# Patient Record
Sex: FEMALE | Race: WHITE | NOT HISPANIC OR LATINO | Employment: OTHER | URBAN - METROPOLITAN AREA
[De-identification: names, ages, dates, MRNs, and addresses within clinical notes are randomized per-mention and may not be internally consistent; named-entity substitution may affect disease eponyms.]

---

## 2017-01-10 ENCOUNTER — HOSPITAL ENCOUNTER (INPATIENT)
Facility: HOSPITAL | Age: 62
LOS: 5 days | Discharge: HOME HEALTH | DRG: 300 | End: 2017-01-15
Attending: EMERGENCY MEDICINE | Admitting: FAMILY MEDICINE
Payer: COMMERCIAL

## 2017-01-10 DIAGNOSIS — L03.119 CELLULITIS AND ABSCESS OF LOWER EXTREMITY: Primary | ICD-10-CM

## 2017-01-10 DIAGNOSIS — L02.419 CELLULITIS AND ABSCESS OF LOWER EXTREMITY: Primary | ICD-10-CM

## 2017-01-10 DIAGNOSIS — I87.2 VENOUS INSUFFICIENCY (CHRONIC) (PERIPHERAL): ICD-10-CM

## 2017-01-10 DIAGNOSIS — E11.9 DIABETES MELLITUS (HCC): ICD-10-CM

## 2017-01-10 DIAGNOSIS — I87.2 CHRONIC VENOUS STASIS DERMATITIS OF BOTH LOWER EXTREMITIES: ICD-10-CM

## 2017-01-10 PROBLEM — L03.90 CELLULITIS: Status: ACTIVE | Noted: 2017-01-10

## 2017-01-10 LAB
ANION GAP SERPL CALCULATED.3IONS-SCNC: 8 MMOL/L (ref 4–13)
BASOPHILS # BLD AUTO: 0.05 THOUSANDS/ΜL (ref 0–0.1)
BASOPHILS NFR BLD AUTO: 1 % (ref 0–1)
BUN SERPL-MCNC: 14 MG/DL (ref 5–25)
CALCIUM SERPL-MCNC: 8.9 MG/DL (ref 8.3–10.1)
CHLORIDE SERPL-SCNC: 94 MMOL/L (ref 100–108)
CO2 SERPL-SCNC: 29 MMOL/L (ref 21–32)
CREAT SERPL-MCNC: 1.06 MG/DL (ref 0.6–1.3)
EOSINOPHIL # BLD AUTO: 0.22 THOUSAND/ΜL (ref 0–0.61)
EOSINOPHIL NFR BLD AUTO: 3 % (ref 0–6)
ERYTHROCYTE [DISTWIDTH] IN BLOOD BY AUTOMATED COUNT: 17.4 % (ref 11.6–15.1)
GFR SERPL CREATININE-BSD FRML MDRD: 52.9 ML/MIN/1.73SQ M
GLUCOSE SERPL-MCNC: 332 MG/DL (ref 65–140)
GLUCOSE SERPL-MCNC: 353 MG/DL (ref 65–140)
HCT VFR BLD AUTO: 34.9 % (ref 34.8–46.1)
HGB BLD-MCNC: 10.8 G/DL (ref 11.5–15.4)
LYMPHOCYTES # BLD AUTO: 2.2 THOUSANDS/ΜL (ref 0.6–4.47)
LYMPHOCYTES NFR BLD AUTO: 26 % (ref 14–44)
MCH RBC QN AUTO: 22.5 PG (ref 26.8–34.3)
MCHC RBC AUTO-ENTMCNC: 30.9 G/DL (ref 31.4–37.4)
MCV RBC AUTO: 73 FL (ref 82–98)
MONOCYTES # BLD AUTO: 0.47 THOUSAND/ΜL (ref 0.17–1.22)
MONOCYTES NFR BLD AUTO: 6 % (ref 4–12)
NEUTROPHILS # BLD AUTO: 5.44 THOUSANDS/ΜL (ref 1.85–7.62)
NEUTS SEG NFR BLD AUTO: 64 % (ref 43–75)
PLATELET # BLD AUTO: 275 THOUSANDS/UL (ref 149–390)
PMV BLD AUTO: 9.1 FL (ref 8.9–12.7)
POTASSIUM SERPL-SCNC: 4.5 MMOL/L (ref 3.5–5.3)
RBC # BLD AUTO: 4.79 MILLION/UL (ref 3.81–5.12)
SODIUM SERPL-SCNC: 131 MMOL/L (ref 136–145)
WBC # BLD AUTO: 8.38 THOUSAND/UL (ref 4.31–10.16)

## 2017-01-10 PROCEDURE — 80048 BASIC METABOLIC PNL TOTAL CA: CPT | Performed by: EMERGENCY MEDICINE

## 2017-01-10 PROCEDURE — 99284 EMERGENCY DEPT VISIT MOD MDM: CPT

## 2017-01-10 PROCEDURE — 85025 COMPLETE CBC W/AUTO DIFF WBC: CPT | Performed by: EMERGENCY MEDICINE

## 2017-01-10 PROCEDURE — 96374 THER/PROPH/DIAG INJ IV PUSH: CPT

## 2017-01-10 PROCEDURE — 36415 COLL VENOUS BLD VENIPUNCTURE: CPT | Performed by: EMERGENCY MEDICINE

## 2017-01-10 PROCEDURE — 82948 REAGENT STRIP/BLOOD GLUCOSE: CPT

## 2017-01-10 RX ORDER — ACETAMINOPHEN 325 MG/1
650 TABLET ORAL EVERY 6 HOURS PRN
Status: DISCONTINUED | OUTPATIENT
Start: 2017-01-10 | End: 2017-01-15 | Stop reason: HOSPADM

## 2017-01-10 RX ORDER — MORPHINE SULFATE 15 MG/1
15 TABLET, FILM COATED, EXTENDED RELEASE ORAL EVERY 12 HOURS SCHEDULED
Status: DISCONTINUED | OUTPATIENT
Start: 2017-01-10 | End: 2017-01-15 | Stop reason: HOSPADM

## 2017-01-10 RX ORDER — OXYCODONE HYDROCHLORIDE 30 MG/1
30 TABLET ORAL EVERY 4 HOURS PRN
Status: ON HOLD | COMMUNITY
End: 2020-03-04 | Stop reason: SDUPTHER

## 2017-01-10 RX ORDER — CALCIUM CARBONATE 200(500)MG
1000 TABLET,CHEWABLE ORAL DAILY PRN
Status: DISCONTINUED | OUTPATIENT
Start: 2017-01-10 | End: 2017-01-15 | Stop reason: HOSPADM

## 2017-01-10 RX ORDER — TRIAMTERENE AND HYDROCHLOROTHIAZIDE 75; 50 MG/1; MG/1
1 TABLET ORAL DAILY
COMMUNITY
End: 2018-02-23 | Stop reason: HOSPADM

## 2017-01-10 RX ORDER — PRAVASTATIN SODIUM 40 MG
40 TABLET ORAL
Status: DISCONTINUED | OUTPATIENT
Start: 2017-01-11 | End: 2017-01-15 | Stop reason: HOSPADM

## 2017-01-10 RX ORDER — TRIAMTERENE AND HYDROCHLOROTHIAZIDE 37.5; 25 MG/1; MG/1
2 TABLET ORAL DAILY
Status: DISCONTINUED | OUTPATIENT
Start: 2017-01-11 | End: 2017-01-15 | Stop reason: HOSPADM

## 2017-01-10 RX ORDER — SENNOSIDES 8.6 MG
1 TABLET ORAL DAILY
Status: DISCONTINUED | OUTPATIENT
Start: 2017-01-11 | End: 2017-01-15 | Stop reason: HOSPADM

## 2017-01-10 RX ORDER — AMLODIPINE BESYLATE AND BENAZEPRIL HYDROCHLORIDE 10; 20 MG/1; MG/1
1 CAPSULE ORAL DAILY
COMMUNITY
End: 2018-02-20

## 2017-01-10 RX ORDER — DULOXETIN HYDROCHLORIDE 60 MG/1
60 CAPSULE, DELAYED RELEASE ORAL DAILY
COMMUNITY
End: 2022-03-23 | Stop reason: HOSPADM

## 2017-01-10 RX ORDER — DULOXETIN HYDROCHLORIDE 30 MG/1
60 CAPSULE, DELAYED RELEASE ORAL DAILY
Status: DISCONTINUED | OUTPATIENT
Start: 2017-01-11 | End: 2017-01-15 | Stop reason: HOSPADM

## 2017-01-10 RX ORDER — MORPHINE SULFATE 15 MG/1
15 TABLET ORAL DAILY
Status: DISCONTINUED | OUTPATIENT
Start: 2017-01-11 | End: 2017-01-10

## 2017-01-10 RX ORDER — SODIUM CHLORIDE 9 MG/ML
100 INJECTION, SOLUTION INTRAVENOUS CONTINUOUS
Status: DISCONTINUED | OUTPATIENT
Start: 2017-01-10 | End: 2017-01-11

## 2017-01-10 RX ORDER — NICOTINE 21 MG/24HR
1 PATCH, TRANSDERMAL 24 HOURS TRANSDERMAL DAILY
Status: DISCONTINUED | OUTPATIENT
Start: 2017-01-11 | End: 2017-01-15 | Stop reason: HOSPADM

## 2017-01-10 RX ORDER — MORPHINE SULFATE 20 MG/1
100 CAPSULE, EXTENDED RELEASE ORAL 2 TIMES DAILY
Status: ON HOLD | COMMUNITY
End: 2018-02-23

## 2017-01-10 RX ORDER — ONDANSETRON 2 MG/ML
4 INJECTION INTRAMUSCULAR; INTRAVENOUS EVERY 6 HOURS PRN
Status: DISCONTINUED | OUTPATIENT
Start: 2017-01-10 | End: 2017-01-15 | Stop reason: HOSPADM

## 2017-01-10 RX ORDER — GLIPIZIDE 10 MG/1
10 TABLET ORAL
COMMUNITY
End: 2018-02-23 | Stop reason: HOSPADM

## 2017-01-10 RX ORDER — SIMVASTATIN 20 MG
20 TABLET ORAL
COMMUNITY
End: 2018-02-23 | Stop reason: HOSPADM

## 2017-01-10 RX ORDER — CLINDAMYCIN PHOSPHATE 600 MG/50ML
600 INJECTION INTRAVENOUS ONCE
Status: COMPLETED | OUTPATIENT
Start: 2017-01-10 | End: 2017-01-10

## 2017-01-10 RX ADMIN — MORPHINE SULFATE 15 MG: 15 TABLET, EXTENDED RELEASE ORAL at 22:11

## 2017-01-10 RX ADMIN — SODIUM CHLORIDE 100 ML/HR: 0.9 INJECTION, SOLUTION INTRAVENOUS at 22:11

## 2017-01-10 RX ADMIN — CLINDAMYCIN PHOSPHATE 600 MG: 12 INJECTION, SOLUTION INTRAMUSCULAR; INTRAVENOUS at 19:19

## 2017-01-10 RX ADMIN — OXYCODONE HYDROCHLORIDE 15 MG: 5 TABLET ORAL at 23:48

## 2017-01-10 RX ADMIN — VANCOMYCIN HYDROCHLORIDE 1250 MG: 1 INJECTION, POWDER, LYOPHILIZED, FOR SOLUTION INTRAVENOUS at 23:40

## 2017-01-11 LAB
ALBUMIN SERPL BCP-MCNC: 2.4 G/DL (ref 3.5–5)
ALP SERPL-CCNC: 95 U/L (ref 46–116)
ALT SERPL W P-5'-P-CCNC: 7 U/L (ref 12–78)
ANION GAP SERPL CALCULATED.3IONS-SCNC: 5 MMOL/L (ref 4–13)
AST SERPL W P-5'-P-CCNC: 15 U/L (ref 5–45)
BILIRUB SERPL-MCNC: 0.3 MG/DL (ref 0.2–1)
BUN SERPL-MCNC: 15 MG/DL (ref 5–25)
CALCIUM SERPL-MCNC: 8.2 MG/DL (ref 8.3–10.1)
CHLORIDE SERPL-SCNC: 103 MMOL/L (ref 100–108)
CO2 SERPL-SCNC: 28 MMOL/L (ref 21–32)
CREAT SERPL-MCNC: 1 MG/DL (ref 0.6–1.3)
ERYTHROCYTE [DISTWIDTH] IN BLOOD BY AUTOMATED COUNT: 17.4 % (ref 11.6–15.1)
EST. AVERAGE GLUCOSE BLD GHB EST-MCNC: 237 MG/DL
GFR SERPL CREATININE-BSD FRML MDRD: 56.6 ML/MIN/1.73SQ M
GLUCOSE SERPL-MCNC: 218 MG/DL (ref 65–140)
GLUCOSE SERPL-MCNC: 248 MG/DL (ref 65–140)
GLUCOSE SERPL-MCNC: 253 MG/DL (ref 65–140)
GLUCOSE SERPL-MCNC: 309 MG/DL (ref 65–140)
GLUCOSE SERPL-MCNC: 367 MG/DL (ref 65–140)
HBA1C MFR BLD: 9.9 % (ref 4.2–6.3)
HCT VFR BLD AUTO: 29.8 % (ref 34.8–46.1)
HGB BLD-MCNC: 8.9 G/DL (ref 11.5–15.4)
MCH RBC QN AUTO: 22.2 PG (ref 26.8–34.3)
MCHC RBC AUTO-ENTMCNC: 29.9 G/DL (ref 31.4–37.4)
MCV RBC AUTO: 74 FL (ref 82–98)
NT-PROBNP SERPL-MCNC: 438 PG/ML
PLATELET # BLD AUTO: 228 THOUSANDS/UL (ref 149–390)
PMV BLD AUTO: 9.2 FL (ref 8.9–12.7)
POTASSIUM SERPL-SCNC: 4.6 MMOL/L (ref 3.5–5.3)
PROT SERPL-MCNC: 6.2 G/DL (ref 6.4–8.2)
RBC # BLD AUTO: 4.01 MILLION/UL (ref 3.81–5.12)
SODIUM SERPL-SCNC: 136 MMOL/L (ref 136–145)
WBC # BLD AUTO: 7.91 THOUSAND/UL (ref 4.31–10.16)

## 2017-01-11 PROCEDURE — 87077 CULTURE AEROBIC IDENTIFY: CPT | Performed by: PHYSICIAN ASSISTANT

## 2017-01-11 PROCEDURE — 80053 COMPREHEN METABOLIC PANEL: CPT | Performed by: PHYSICIAN ASSISTANT

## 2017-01-11 PROCEDURE — 82948 REAGENT STRIP/BLOOD GLUCOSE: CPT

## 2017-01-11 PROCEDURE — 87186 SC STD MICRODIL/AGAR DIL: CPT | Performed by: PHYSICIAN ASSISTANT

## 2017-01-11 PROCEDURE — 87205 SMEAR GRAM STAIN: CPT | Performed by: PHYSICIAN ASSISTANT

## 2017-01-11 PROCEDURE — 87070 CULTURE OTHR SPECIMN AEROBIC: CPT | Performed by: PHYSICIAN ASSISTANT

## 2017-01-11 PROCEDURE — 83036 HEMOGLOBIN GLYCOSYLATED A1C: CPT | Performed by: PHYSICIAN ASSISTANT

## 2017-01-11 PROCEDURE — 85027 COMPLETE CBC AUTOMATED: CPT | Performed by: PHYSICIAN ASSISTANT

## 2017-01-11 PROCEDURE — 83880 ASSAY OF NATRIURETIC PEPTIDE: CPT | Performed by: FAMILY MEDICINE

## 2017-01-11 PROCEDURE — 87147 CULTURE TYPE IMMUNOLOGIC: CPT | Performed by: PHYSICIAN ASSISTANT

## 2017-01-11 RX ORDER — FUROSEMIDE 10 MG/ML
40 INJECTION INTRAMUSCULAR; INTRAVENOUS
Status: DISCONTINUED | OUTPATIENT
Start: 2017-01-11 | End: 2017-01-14

## 2017-01-11 RX ORDER — ALPRAZOLAM 0.5 MG/1
0.5 TABLET ORAL
Status: DISCONTINUED | OUTPATIENT
Start: 2017-01-11 | End: 2017-01-15 | Stop reason: HOSPADM

## 2017-01-11 RX ADMIN — VANCOMYCIN HYDROCHLORIDE 1250 MG: 1 INJECTION, POWDER, LYOPHILIZED, FOR SOLUTION INTRAVENOUS at 09:20

## 2017-01-11 RX ADMIN — ENOXAPARIN SODIUM 40 MG: 40 INJECTION SUBCUTANEOUS at 08:57

## 2017-01-11 RX ADMIN — INSULIN LISPRO 3 UNITS: 100 INJECTION, SOLUTION INTRAVENOUS; SUBCUTANEOUS at 17:23

## 2017-01-11 RX ADMIN — MORPHINE SULFATE 15 MG: 15 TABLET, EXTENDED RELEASE ORAL at 21:44

## 2017-01-11 RX ADMIN — ONDANSETRON 4 MG: 2 INJECTION INTRAMUSCULAR; INTRAVENOUS at 11:14

## 2017-01-11 RX ADMIN — TRIAMTERENE AND HYDROCHLOROTHIAZIDE 2 TABLET: 37.5; 25 TABLET ORAL at 08:55

## 2017-01-11 RX ADMIN — SODIUM CHLORIDE 100 ML/HR: 0.9 INJECTION, SOLUTION INTRAVENOUS at 09:20

## 2017-01-11 RX ADMIN — AMLODIPINE BESYLATE: 10 TABLET ORAL at 08:55

## 2017-01-11 RX ADMIN — PRAVASTATIN SODIUM 40 MG: 40 TABLET ORAL at 15:48

## 2017-01-11 RX ADMIN — INSULIN LISPRO 4 UNITS: 100 INJECTION, SOLUTION INTRAVENOUS; SUBCUTANEOUS at 11:56

## 2017-01-11 RX ADMIN — FUROSEMIDE 40 MG: 10 INJECTION, SOLUTION INTRAMUSCULAR; INTRAVENOUS at 15:48

## 2017-01-11 RX ADMIN — ALPRAZOLAM 0.5 MG: 0.5 TABLET ORAL at 00:41

## 2017-01-11 RX ADMIN — DULOXETINE 60 MG: 30 CAPSULE, DELAYED RELEASE ORAL at 08:55

## 2017-01-11 RX ADMIN — MORPHINE SULFATE 15 MG: 15 TABLET, EXTENDED RELEASE ORAL at 08:55

## 2017-01-11 RX ADMIN — INSULIN LISPRO 3 UNITS: 100 INJECTION, SOLUTION INTRAVENOUS; SUBCUTANEOUS at 08:56

## 2017-01-11 RX ADMIN — SENNOSIDES 8.6 MG: 8.6 TABLET, FILM COATED ORAL at 08:58

## 2017-01-12 LAB
ANION GAP SERPL CALCULATED.3IONS-SCNC: 6 MMOL/L (ref 4–13)
BASOPHILS # BLD AUTO: 0.03 THOUSANDS/ΜL (ref 0–0.1)
BASOPHILS NFR BLD AUTO: 0 % (ref 0–1)
BUN SERPL-MCNC: 15 MG/DL (ref 5–25)
CALCIUM SERPL-MCNC: 8.2 MG/DL (ref 8.3–10.1)
CHLORIDE SERPL-SCNC: 98 MMOL/L (ref 100–108)
CO2 SERPL-SCNC: 29 MMOL/L (ref 21–32)
CREAT SERPL-MCNC: 0.83 MG/DL (ref 0.6–1.3)
EOSINOPHIL # BLD AUTO: 0.11 THOUSAND/ΜL (ref 0–0.61)
EOSINOPHIL NFR BLD AUTO: 2 % (ref 0–6)
ERYTHROCYTE [DISTWIDTH] IN BLOOD BY AUTOMATED COUNT: 17.3 % (ref 11.6–15.1)
GFR SERPL CREATININE-BSD FRML MDRD: >60 ML/MIN/1.73SQ M
GLUCOSE SERPL-MCNC: 207 MG/DL (ref 65–140)
GLUCOSE SERPL-MCNC: 225 MG/DL (ref 65–140)
GLUCOSE SERPL-MCNC: 226 MG/DL (ref 65–140)
GLUCOSE SERPL-MCNC: 287 MG/DL (ref 65–140)
GLUCOSE SERPL-MCNC: 347 MG/DL (ref 65–140)
HCT VFR BLD AUTO: 31.8 % (ref 34.8–46.1)
HGB BLD-MCNC: 9.3 G/DL (ref 11.5–15.4)
LYMPHOCYTES # BLD AUTO: 1.08 THOUSANDS/ΜL (ref 0.6–4.47)
LYMPHOCYTES NFR BLD AUTO: 16 % (ref 14–44)
MCH RBC QN AUTO: 21.9 PG (ref 26.8–34.3)
MCHC RBC AUTO-ENTMCNC: 29.2 G/DL (ref 31.4–37.4)
MCV RBC AUTO: 75 FL (ref 82–98)
MONOCYTES # BLD AUTO: 0.52 THOUSAND/ΜL (ref 0.17–1.22)
MONOCYTES NFR BLD AUTO: 8 % (ref 4–12)
NEUTROPHILS # BLD AUTO: 5.22 THOUSANDS/ΜL (ref 1.85–7.62)
NEUTS SEG NFR BLD AUTO: 74 % (ref 43–75)
PLATELET # BLD AUTO: 232 THOUSANDS/UL (ref 149–390)
PMV BLD AUTO: 9.3 FL (ref 8.9–12.7)
POTASSIUM SERPL-SCNC: 4 MMOL/L (ref 3.5–5.3)
RBC # BLD AUTO: 4.24 MILLION/UL (ref 3.81–5.12)
SODIUM SERPL-SCNC: 133 MMOL/L (ref 136–145)
WBC # BLD AUTO: 6.96 THOUSAND/UL (ref 4.31–10.16)

## 2017-01-12 PROCEDURE — 80048 BASIC METABOLIC PNL TOTAL CA: CPT | Performed by: FAMILY MEDICINE

## 2017-01-12 PROCEDURE — 82948 REAGENT STRIP/BLOOD GLUCOSE: CPT

## 2017-01-12 PROCEDURE — 85025 COMPLETE CBC W/AUTO DIFF WBC: CPT | Performed by: FAMILY MEDICINE

## 2017-01-12 RX ORDER — INSULIN GLARGINE 100 [IU]/ML
10 INJECTION, SOLUTION SUBCUTANEOUS
Status: DISCONTINUED | OUTPATIENT
Start: 2017-01-12 | End: 2017-01-14

## 2017-01-12 RX ADMIN — DULOXETINE 60 MG: 30 CAPSULE, DELAYED RELEASE ORAL at 08:17

## 2017-01-12 RX ADMIN — MORPHINE SULFATE 15 MG: 15 TABLET, EXTENDED RELEASE ORAL at 08:16

## 2017-01-12 RX ADMIN — INSULIN LISPRO 5 UNITS: 100 INJECTION, SOLUTION INTRAVENOUS; SUBCUTANEOUS at 12:36

## 2017-01-12 RX ADMIN — INSULIN LISPRO 4 UNITS: 100 INJECTION, SOLUTION INTRAVENOUS; SUBCUTANEOUS at 17:03

## 2017-01-12 RX ADMIN — OXYCODONE HYDROCHLORIDE 15 MG: 5 TABLET ORAL at 22:12

## 2017-01-12 RX ADMIN — FUROSEMIDE 40 MG: 10 INJECTION, SOLUTION INTRAMUSCULAR; INTRAVENOUS at 16:59

## 2017-01-12 RX ADMIN — INSULIN GLARGINE 10 UNITS: 100 INJECTION, SOLUTION SUBCUTANEOUS at 21:54

## 2017-01-12 RX ADMIN — ENOXAPARIN SODIUM 40 MG: 40 INJECTION SUBCUTANEOUS at 08:17

## 2017-01-12 RX ADMIN — SENNOSIDES 8.6 MG: 8.6 TABLET, FILM COATED ORAL at 08:17

## 2017-01-12 RX ADMIN — FUROSEMIDE 40 MG: 10 INJECTION, SOLUTION INTRAMUSCULAR; INTRAVENOUS at 08:17

## 2017-01-12 RX ADMIN — TRIAMTERENE AND HYDROCHLOROTHIAZIDE 2 TABLET: 37.5; 25 TABLET ORAL at 08:16

## 2017-01-12 RX ADMIN — PRAVASTATIN SODIUM 40 MG: 40 TABLET ORAL at 16:59

## 2017-01-12 RX ADMIN — MORPHINE SULFATE 15 MG: 15 TABLET, EXTENDED RELEASE ORAL at 20:09

## 2017-01-12 RX ADMIN — INSULIN LISPRO 2 UNITS: 100 INJECTION, SOLUTION INTRAVENOUS; SUBCUTANEOUS at 08:18

## 2017-01-12 RX ADMIN — AMLODIPINE BESYLATE: 10 TABLET ORAL at 08:17

## 2017-01-13 LAB
ANION GAP SERPL CALCULATED.3IONS-SCNC: 6 MMOL/L (ref 4–13)
BASOPHILS # BLD AUTO: 0.02 THOUSANDS/ΜL (ref 0–0.1)
BASOPHILS NFR BLD AUTO: 0 % (ref 0–1)
BUN SERPL-MCNC: 13 MG/DL (ref 5–25)
CALCIUM SERPL-MCNC: 8.4 MG/DL (ref 8.3–10.1)
CHLORIDE SERPL-SCNC: 96 MMOL/L (ref 100–108)
CO2 SERPL-SCNC: 34 MMOL/L (ref 21–32)
CREAT SERPL-MCNC: 0.82 MG/DL (ref 0.6–1.3)
EOSINOPHIL # BLD AUTO: 0.12 THOUSAND/ΜL (ref 0–0.61)
EOSINOPHIL NFR BLD AUTO: 2 % (ref 0–6)
ERYTHROCYTE [DISTWIDTH] IN BLOOD BY AUTOMATED COUNT: 17.2 % (ref 11.6–15.1)
GFR SERPL CREATININE-BSD FRML MDRD: >60 ML/MIN/1.73SQ M
GLUCOSE SERPL-MCNC: 192 MG/DL (ref 65–140)
GLUCOSE SERPL-MCNC: 216 MG/DL (ref 65–140)
GLUCOSE SERPL-MCNC: 228 MG/DL (ref 65–140)
GLUCOSE SERPL-MCNC: 257 MG/DL (ref 65–140)
GLUCOSE SERPL-MCNC: 262 MG/DL (ref 65–140)
HCT VFR BLD AUTO: 33.8 % (ref 34.8–46.1)
HGB BLD-MCNC: 10.1 G/DL (ref 11.5–15.4)
LYMPHOCYTES # BLD AUTO: 1.23 THOUSANDS/ΜL (ref 0.6–4.47)
LYMPHOCYTES NFR BLD AUTO: 20 % (ref 14–44)
MCH RBC QN AUTO: 22.1 PG (ref 26.8–34.3)
MCHC RBC AUTO-ENTMCNC: 29.9 G/DL (ref 31.4–37.4)
MCV RBC AUTO: 74 FL (ref 82–98)
MONOCYTES # BLD AUTO: 0.54 THOUSAND/ΜL (ref 0.17–1.22)
MONOCYTES NFR BLD AUTO: 9 % (ref 4–12)
NEUTROPHILS # BLD AUTO: 4.34 THOUSANDS/ΜL (ref 1.85–7.62)
NEUTS SEG NFR BLD AUTO: 69 % (ref 43–75)
PLATELET # BLD AUTO: 234 THOUSANDS/UL (ref 149–390)
PMV BLD AUTO: 9.7 FL (ref 8.9–12.7)
POTASSIUM SERPL-SCNC: 3.6 MMOL/L (ref 3.5–5.3)
RBC # BLD AUTO: 4.57 MILLION/UL (ref 3.81–5.12)
SODIUM SERPL-SCNC: 136 MMOL/L (ref 136–145)
WBC # BLD AUTO: 6.25 THOUSAND/UL (ref 4.31–10.16)

## 2017-01-13 PROCEDURE — 80048 BASIC METABOLIC PNL TOTAL CA: CPT | Performed by: FAMILY MEDICINE

## 2017-01-13 PROCEDURE — 82948 REAGENT STRIP/BLOOD GLUCOSE: CPT

## 2017-01-13 PROCEDURE — 85025 COMPLETE CBC W/AUTO DIFF WBC: CPT | Performed by: FAMILY MEDICINE

## 2017-01-13 RX ORDER — HYDRALAZINE HYDROCHLORIDE 20 MG/ML
5 INJECTION INTRAMUSCULAR; INTRAVENOUS EVERY 6 HOURS PRN
Status: DISCONTINUED | OUTPATIENT
Start: 2017-01-13 | End: 2017-01-15 | Stop reason: HOSPADM

## 2017-01-13 RX ADMIN — OXYCODONE HYDROCHLORIDE 15 MG: 5 TABLET ORAL at 08:26

## 2017-01-13 RX ADMIN — INSULIN GLARGINE 10 UNITS: 100 INJECTION, SOLUTION SUBCUTANEOUS at 22:01

## 2017-01-13 RX ADMIN — OXYCODONE HYDROCHLORIDE 15 MG: 5 TABLET ORAL at 02:56

## 2017-01-13 RX ADMIN — FUROSEMIDE 40 MG: 10 INJECTION, SOLUTION INTRAMUSCULAR; INTRAVENOUS at 08:27

## 2017-01-13 RX ADMIN — INSULIN LISPRO 3 UNITS: 100 INJECTION, SOLUTION INTRAVENOUS; SUBCUTANEOUS at 12:16

## 2017-01-13 RX ADMIN — MORPHINE SULFATE 15 MG: 15 TABLET, EXTENDED RELEASE ORAL at 22:01

## 2017-01-13 RX ADMIN — INSULIN LISPRO 2 UNITS: 100 INJECTION, SOLUTION INTRAVENOUS; SUBCUTANEOUS at 16:40

## 2017-01-13 RX ADMIN — SENNOSIDES 8.6 MG: 8.6 TABLET, FILM COATED ORAL at 08:23

## 2017-01-13 RX ADMIN — ENOXAPARIN SODIUM 40 MG: 40 INJECTION SUBCUTANEOUS at 08:23

## 2017-01-13 RX ADMIN — PRAVASTATIN SODIUM 40 MG: 40 TABLET ORAL at 16:40

## 2017-01-13 RX ADMIN — TRIAMTERENE AND HYDROCHLOROTHIAZIDE 2 TABLET: 37.5; 25 TABLET ORAL at 08:22

## 2017-01-13 RX ADMIN — MORPHINE SULFATE 15 MG: 15 TABLET, EXTENDED RELEASE ORAL at 08:21

## 2017-01-13 RX ADMIN — INSULIN LISPRO 2 UNITS: 100 INJECTION, SOLUTION INTRAVENOUS; SUBCUTANEOUS at 08:35

## 2017-01-13 RX ADMIN — AMLODIPINE BESYLATE: 10 TABLET ORAL at 08:20

## 2017-01-13 RX ADMIN — FUROSEMIDE 40 MG: 10 INJECTION, SOLUTION INTRAMUSCULAR; INTRAVENOUS at 16:40

## 2017-01-13 RX ADMIN — ONDANSETRON 4 MG: 2 INJECTION INTRAMUSCULAR; INTRAVENOUS at 08:40

## 2017-01-13 RX ADMIN — DULOXETINE 60 MG: 30 CAPSULE, DELAYED RELEASE ORAL at 08:21

## 2017-01-14 PROBLEM — E11.40 DIABETIC NEUROPATHY (HCC): Status: ACTIVE | Noted: 2017-01-14

## 2017-01-14 PROBLEM — I87.2 VENOUS INSUFFICIENCY (CHRONIC) (PERIPHERAL): Status: ACTIVE | Noted: 2017-01-14

## 2017-01-14 LAB
ANION GAP SERPL CALCULATED.3IONS-SCNC: 6 MMOL/L (ref 4–13)
BASOPHILS # BLD AUTO: 0.03 THOUSANDS/ΜL (ref 0–0.1)
BASOPHILS NFR BLD AUTO: 0 % (ref 0–1)
BUN SERPL-MCNC: 17 MG/DL (ref 5–25)
CALCIUM SERPL-MCNC: 8.5 MG/DL (ref 8.3–10.1)
CHLORIDE SERPL-SCNC: 94 MMOL/L (ref 100–108)
CO2 SERPL-SCNC: 35 MMOL/L (ref 21–32)
CREAT SERPL-MCNC: 1.22 MG/DL (ref 0.6–1.3)
EOSINOPHIL # BLD AUTO: 0.17 THOUSAND/ΜL (ref 0–0.61)
EOSINOPHIL NFR BLD AUTO: 2 % (ref 0–6)
ERYTHROCYTE [DISTWIDTH] IN BLOOD BY AUTOMATED COUNT: 17.6 % (ref 11.6–15.1)
GFR SERPL CREATININE-BSD FRML MDRD: 45 ML/MIN/1.73SQ M
GLUCOSE SERPL-MCNC: 160 MG/DL (ref 65–140)
GLUCOSE SERPL-MCNC: 169 MG/DL (ref 65–140)
GLUCOSE SERPL-MCNC: 225 MG/DL (ref 65–140)
GLUCOSE SERPL-MCNC: 257 MG/DL (ref 65–140)
GLUCOSE SERPL-MCNC: 266 MG/DL (ref 65–140)
HCT VFR BLD AUTO: 38 % (ref 34.8–46.1)
HGB BLD-MCNC: 11.5 G/DL (ref 11.5–15.4)
LYMPHOCYTES # BLD AUTO: 1.75 THOUSANDS/ΜL (ref 0.6–4.47)
LYMPHOCYTES NFR BLD AUTO: 18 % (ref 14–44)
MCH RBC QN AUTO: 22.5 PG (ref 26.8–34.3)
MCHC RBC AUTO-ENTMCNC: 30.3 G/DL (ref 31.4–37.4)
MCV RBC AUTO: 74 FL (ref 82–98)
MONOCYTES # BLD AUTO: 0.6 THOUSAND/ΜL (ref 0.17–1.22)
MONOCYTES NFR BLD AUTO: 6 % (ref 4–12)
NEUTROPHILS # BLD AUTO: 6.98 THOUSANDS/ΜL (ref 1.85–7.62)
NEUTS SEG NFR BLD AUTO: 74 % (ref 43–75)
PLATELET # BLD AUTO: 282 THOUSANDS/UL (ref 149–390)
PMV BLD AUTO: 9.6 FL (ref 8.9–12.7)
POTASSIUM SERPL-SCNC: 4.2 MMOL/L (ref 3.5–5.3)
RBC # BLD AUTO: 5.12 MILLION/UL (ref 3.81–5.12)
SODIUM SERPL-SCNC: 135 MMOL/L (ref 136–145)
WBC # BLD AUTO: 9.53 THOUSAND/UL (ref 4.31–10.16)

## 2017-01-14 PROCEDURE — 82948 REAGENT STRIP/BLOOD GLUCOSE: CPT

## 2017-01-14 PROCEDURE — 80048 BASIC METABOLIC PNL TOTAL CA: CPT | Performed by: FAMILY MEDICINE

## 2017-01-14 PROCEDURE — 85025 COMPLETE CBC W/AUTO DIFF WBC: CPT | Performed by: FAMILY MEDICINE

## 2017-01-14 RX ORDER — INSULIN GLARGINE 100 [IU]/ML
15 INJECTION, SOLUTION SUBCUTANEOUS
Status: DISCONTINUED | OUTPATIENT
Start: 2017-01-14 | End: 2017-01-15 | Stop reason: HOSPADM

## 2017-01-14 RX ORDER — CYCLOBENZAPRINE HCL 10 MG
10 TABLET ORAL 3 TIMES DAILY PRN
Status: DISCONTINUED | OUTPATIENT
Start: 2017-01-14 | End: 2017-01-15 | Stop reason: HOSPADM

## 2017-01-14 RX ORDER — FUROSEMIDE 40 MG/1
40 TABLET ORAL
Status: DISCONTINUED | OUTPATIENT
Start: 2017-01-14 | End: 2017-01-15 | Stop reason: HOSPADM

## 2017-01-14 RX ADMIN — TRIAMTERENE AND HYDROCHLOROTHIAZIDE 2 TABLET: 37.5; 25 TABLET ORAL at 08:41

## 2017-01-14 RX ADMIN — DULOXETINE 60 MG: 30 CAPSULE, DELAYED RELEASE ORAL at 08:41

## 2017-01-14 RX ADMIN — INSULIN LISPRO 1 UNITS: 100 INJECTION, SOLUTION INTRAVENOUS; SUBCUTANEOUS at 08:03

## 2017-01-14 RX ADMIN — ENOXAPARIN SODIUM 40 MG: 40 INJECTION SUBCUTANEOUS at 08:40

## 2017-01-14 RX ADMIN — PRAVASTATIN SODIUM 40 MG: 40 TABLET ORAL at 16:42

## 2017-01-14 RX ADMIN — INSULIN LISPRO 2 UNITS: 100 INJECTION, SOLUTION INTRAVENOUS; SUBCUTANEOUS at 16:42

## 2017-01-14 RX ADMIN — OXYCODONE HYDROCHLORIDE 15 MG: 5 TABLET ORAL at 05:46

## 2017-01-14 RX ADMIN — MORPHINE SULFATE 15 MG: 15 TABLET, EXTENDED RELEASE ORAL at 08:42

## 2017-01-14 RX ADMIN — FUROSEMIDE 40 MG: 40 TABLET ORAL at 16:42

## 2017-01-14 RX ADMIN — AMLODIPINE BESYLATE: 10 TABLET ORAL at 08:41

## 2017-01-14 RX ADMIN — SENNOSIDES 8.6 MG: 8.6 TABLET, FILM COATED ORAL at 08:42

## 2017-01-14 RX ADMIN — INSULIN GLARGINE 15 UNITS: 100 INJECTION, SOLUTION SUBCUTANEOUS at 21:49

## 2017-01-14 RX ADMIN — OXYCODONE HYDROCHLORIDE 15 MG: 5 TABLET ORAL at 00:57

## 2017-01-14 RX ADMIN — ALPRAZOLAM 0.5 MG: 0.5 TABLET ORAL at 23:48

## 2017-01-14 RX ADMIN — MORPHINE SULFATE 15 MG: 15 TABLET, EXTENDED RELEASE ORAL at 20:25

## 2017-01-14 RX ADMIN — FUROSEMIDE 40 MG: 40 TABLET ORAL at 08:41

## 2017-01-14 RX ADMIN — INSULIN LISPRO 3 UNITS: 100 INJECTION, SOLUTION INTRAVENOUS; SUBCUTANEOUS at 12:57

## 2017-01-14 RX ADMIN — OXYCODONE HYDROCHLORIDE 15 MG: 5 TABLET ORAL at 15:08

## 2017-01-15 VITALS
BODY MASS INDEX: 32.85 KG/M2 | SYSTOLIC BLOOD PRESSURE: 115 MMHG | HEART RATE: 84 BPM | DIASTOLIC BLOOD PRESSURE: 59 MMHG | HEIGHT: 63 IN | RESPIRATION RATE: 18 BRPM | OXYGEN SATURATION: 95 % | WEIGHT: 185.41 LBS | TEMPERATURE: 97.4 F

## 2017-01-15 LAB
ANION GAP SERPL CALCULATED.3IONS-SCNC: 7 MMOL/L (ref 4–13)
BACTERIA WND AEROBE CULT: NORMAL
BASOPHILS # BLD AUTO: 0.03 THOUSANDS/ΜL (ref 0–0.1)
BASOPHILS NFR BLD AUTO: 0 % (ref 0–1)
BUN SERPL-MCNC: 24 MG/DL (ref 5–25)
CALCIUM SERPL-MCNC: 8.6 MG/DL (ref 8.3–10.1)
CHLORIDE SERPL-SCNC: 89 MMOL/L (ref 100–108)
CO2 SERPL-SCNC: 34 MMOL/L (ref 21–32)
CREAT SERPL-MCNC: 1.42 MG/DL (ref 0.6–1.3)
EOSINOPHIL # BLD AUTO: 0.18 THOUSAND/ΜL (ref 0–0.61)
EOSINOPHIL NFR BLD AUTO: 3 % (ref 0–6)
ERYTHROCYTE [DISTWIDTH] IN BLOOD BY AUTOMATED COUNT: 17.4 % (ref 11.6–15.1)
GFR SERPL CREATININE-BSD FRML MDRD: 37.7 ML/MIN/1.73SQ M
GLUCOSE SERPL-MCNC: 168 MG/DL (ref 65–140)
GLUCOSE SERPL-MCNC: 187 MG/DL (ref 65–140)
GLUCOSE SERPL-MCNC: 244 MG/DL (ref 65–140)
GRAM STN SPEC: NORMAL
GRAM STN SPEC: NORMAL
HCT VFR BLD AUTO: 36.7 % (ref 34.8–46.1)
HGB BLD-MCNC: 11.1 G/DL (ref 11.5–15.4)
LYMPHOCYTES # BLD AUTO: 1.7 THOUSANDS/ΜL (ref 0.6–4.47)
LYMPHOCYTES NFR BLD AUTO: 24 % (ref 14–44)
MCH RBC QN AUTO: 22.4 PG (ref 26.8–34.3)
MCHC RBC AUTO-ENTMCNC: 30.2 G/DL (ref 31.4–37.4)
MCV RBC AUTO: 74 FL (ref 82–98)
MONOCYTES # BLD AUTO: 0.55 THOUSAND/ΜL (ref 0.17–1.22)
MONOCYTES NFR BLD AUTO: 8 % (ref 4–12)
NEUTROPHILS # BLD AUTO: 4.61 THOUSANDS/ΜL (ref 1.85–7.62)
NEUTS SEG NFR BLD AUTO: 65 % (ref 43–75)
PLATELET # BLD AUTO: 257 THOUSANDS/UL (ref 149–390)
PMV BLD AUTO: 9.7 FL (ref 8.9–12.7)
POTASSIUM SERPL-SCNC: 4.1 MMOL/L (ref 3.5–5.3)
RBC # BLD AUTO: 4.96 MILLION/UL (ref 3.81–5.12)
SODIUM SERPL-SCNC: 130 MMOL/L (ref 136–145)
WBC # BLD AUTO: 7.07 THOUSAND/UL (ref 4.31–10.16)

## 2017-01-15 PROCEDURE — 82948 REAGENT STRIP/BLOOD GLUCOSE: CPT

## 2017-01-15 PROCEDURE — 85025 COMPLETE CBC W/AUTO DIFF WBC: CPT | Performed by: FAMILY MEDICINE

## 2017-01-15 PROCEDURE — 80048 BASIC METABOLIC PNL TOTAL CA: CPT | Performed by: FAMILY MEDICINE

## 2017-01-15 RX ADMIN — FUROSEMIDE 40 MG: 40 TABLET ORAL at 08:21

## 2017-01-15 RX ADMIN — ENOXAPARIN SODIUM 40 MG: 40 INJECTION SUBCUTANEOUS at 08:15

## 2017-01-15 RX ADMIN — SENNOSIDES 8.6 MG: 8.6 TABLET, FILM COATED ORAL at 08:20

## 2017-01-15 RX ADMIN — AMLODIPINE BESYLATE: 10 TABLET ORAL at 08:15

## 2017-01-15 RX ADMIN — OXYCODONE HYDROCHLORIDE 15 MG: 5 TABLET ORAL at 02:49

## 2017-01-15 RX ADMIN — DULOXETINE 60 MG: 30 CAPSULE, DELAYED RELEASE ORAL at 08:15

## 2017-01-15 RX ADMIN — TRIAMTERENE AND HYDROCHLOROTHIAZIDE 2 TABLET: 37.5; 25 TABLET ORAL at 08:15

## 2017-01-15 RX ADMIN — INSULIN LISPRO 3 UNITS: 100 INJECTION, SOLUTION INTRAVENOUS; SUBCUTANEOUS at 12:14

## 2017-01-15 RX ADMIN — OXYCODONE HYDROCHLORIDE 15 MG: 5 TABLET ORAL at 14:51

## 2017-01-15 RX ADMIN — INSULIN LISPRO 1 UNITS: 100 INJECTION, SOLUTION INTRAVENOUS; SUBCUTANEOUS at 08:43

## 2017-01-15 RX ADMIN — MORPHINE SULFATE 15 MG: 15 TABLET, EXTENDED RELEASE ORAL at 08:20

## 2017-07-14 ENCOUNTER — HOSPITAL ENCOUNTER (EMERGENCY)
Facility: HOSPITAL | Age: 62
End: 2017-07-14
Attending: EMERGENCY MEDICINE | Admitting: EMERGENCY MEDICINE
Payer: COMMERCIAL

## 2017-07-14 ENCOUNTER — APPOINTMENT (EMERGENCY)
Dept: RADIOLOGY | Facility: HOSPITAL | Age: 62
End: 2017-07-14
Payer: COMMERCIAL

## 2017-07-14 ENCOUNTER — HOSPITAL ENCOUNTER (INPATIENT)
Facility: HOSPITAL | Age: 62
LOS: 3 days | Discharge: HOME | DRG: 184 | End: 2017-07-17
Attending: SURGERY | Admitting: SURGERY
Payer: COMMERCIAL

## 2017-07-14 ENCOUNTER — APPOINTMENT (EMERGENCY)
Dept: CT IMAGING | Facility: HOSPITAL | Age: 62
End: 2017-07-14
Payer: COMMERCIAL

## 2017-07-14 ENCOUNTER — APPOINTMENT (EMERGENCY)
Dept: NON INVASIVE DIAGNOSTICS | Facility: HOSPITAL | Age: 62
DRG: 184 | End: 2017-07-14
Payer: COMMERCIAL

## 2017-07-14 VITALS
OXYGEN SATURATION: 99 % | TEMPERATURE: 98.4 F | HEART RATE: 103 BPM | SYSTOLIC BLOOD PRESSURE: 168 MMHG | DIASTOLIC BLOOD PRESSURE: 79 MMHG | RESPIRATION RATE: 18 BRPM

## 2017-07-14 DIAGNOSIS — E78.5 HYPERLIPIDEMIA, UNSPECIFIED HYPERLIPIDEMIA TYPE: ICD-10-CM

## 2017-07-14 DIAGNOSIS — R60.0 EDEMA OF BOTH LEGS: ICD-10-CM

## 2017-07-14 DIAGNOSIS — I87.2 CHRONIC VENOUS STASIS DERMATITIS OF BOTH LOWER EXTREMITIES: Primary | ICD-10-CM

## 2017-07-14 DIAGNOSIS — G93.9 CEREBELLAR LESION: ICD-10-CM

## 2017-07-14 DIAGNOSIS — G45.9 BRAIN TIA: ICD-10-CM

## 2017-07-14 DIAGNOSIS — R40.4 TRANSIENT ALTERATION OF AWARENESS: ICD-10-CM

## 2017-07-14 DIAGNOSIS — M79.662 PAIN IN BOTH LOWER LEGS: ICD-10-CM
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
## 2017-07-14 DIAGNOSIS — S22.41XA CLOSED FRACTURE OF MULTIPLE RIBS OF RIGHT SIDE, INITIAL ENCOUNTER: ICD-10-CM

## 2017-07-14 DIAGNOSIS — R93.0 ABNORMAL HEAD CT: ICD-10-CM
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
## 2017-07-14 DIAGNOSIS — R00.2 PALPITATIONS: ICD-10-CM

## 2017-07-14 DIAGNOSIS — M79.661 PAIN IN BOTH LOWER LEGS: ICD-10-CM

## 2017-07-14 DIAGNOSIS — R55 NEAR SYNCOPE: ICD-10-CM

## 2017-07-14 PROBLEM — E87.1 HYPONATREMIA: Status: ACTIVE | Noted: 2017-07-14

## 2017-07-14 LAB
ALBUMIN SERPL BCP-MCNC: 3.1 G/DL (ref 3.5–5)
ALP SERPL-CCNC: 171 U/L (ref 46–116)
ALT SERPL W P-5'-P-CCNC: 42 U/L (ref 12–78)
ANION GAP SERPL CALCULATED.3IONS-SCNC: 6 MMOL/L (ref 4–13)
APTT PPP: 39 SECONDS (ref 23–35)
AST SERPL W P-5'-P-CCNC: 32 U/L (ref 5–45)
BASOPHILS # BLD AUTO: 0.02 THOUSANDS/ΜL (ref 0–0.1)
BASOPHILS NFR BLD AUTO: 0 % (ref 0–1)
BILIRUB SERPL-MCNC: 0.3 MG/DL (ref 0.2–1)
BUN SERPL-MCNC: 23 MG/DL (ref 5–25)
CALCIUM SERPL-MCNC: 8.6 MG/DL (ref 8.3–10.1)
CHLORIDE SERPL-SCNC: 96 MMOL/L (ref 100–108)
CLARITY, POC: CLEAR
CO2 SERPL-SCNC: 28 MMOL/L (ref 21–32)
COLOR, POC: YELLOW
CREAT SERPL-MCNC: 1.35 MG/DL (ref 0.6–1.3)
EOSINOPHIL # BLD AUTO: 0.1 THOUSAND/ΜL (ref 0–0.61)
EOSINOPHIL NFR BLD AUTO: 2 % (ref 0–6)
ERYTHROCYTE [DISTWIDTH] IN BLOOD BY AUTOMATED COUNT: 15.8 % (ref 11.6–15.1)
EXT BILIRUBIN, UA: NEGATIVE
EXT BLOOD URINE: NORMAL
EXT GLUCOSE, UA: NEGATIVE
EXT KETONES: NEGATIVE
EXT NITRITE, UA: NEGATIVE
EXT PH, UA: 6
EXT PROTEIN, UA: NEGATIVE
EXT SPECIFIC GRAVITY, UA: 1.01
EXT UROBILINOGEN: NEGATIVE
GFR SERPL CREATININE-BSD FRML MDRD: 40 ML/MIN/1.73SQ M
GLUCOSE SERPL-MCNC: 186 MG/DL (ref 65–140)
HCT VFR BLD AUTO: 30.2 % (ref 34.8–46.1)
HGB BLD-MCNC: 9.5 G/DL (ref 11.5–15.4)
INR PPP: 1.05 (ref 0.86–1.16)
LYMPHOCYTES # BLD AUTO: 1.12 THOUSANDS/ΜL (ref 0.6–4.47)
LYMPHOCYTES NFR BLD AUTO: 18 % (ref 14–44)
MCH RBC QN AUTO: 25 PG (ref 26.8–34.3)
MCHC RBC AUTO-ENTMCNC: 31.5 G/DL (ref 31.4–37.4)
MCV RBC AUTO: 80 FL (ref 82–98)
MONOCYTES # BLD AUTO: 0.47 THOUSAND/ΜL (ref 0.17–1.22)
MONOCYTES NFR BLD AUTO: 7 % (ref 4–12)
NEUTROPHILS # BLD AUTO: 4.64 THOUSANDS/ΜL (ref 1.85–7.62)
NEUTS SEG NFR BLD AUTO: 73 % (ref 43–75)
NT-PROBNP SERPL-MCNC: 396 PG/ML
PLATELET # BLD AUTO: 180 THOUSANDS/UL (ref 149–390)
PMV BLD AUTO: 8.6 FL (ref 8.9–12.7)
POTASSIUM SERPL-SCNC: 4.6 MMOL/L (ref 3.5–5.3)
PROT SERPL-MCNC: 7.7 G/DL (ref 6.4–8.2)
PROTHROMBIN TIME: 14 SECONDS (ref 12.1–14.4)
RBC # BLD AUTO: 3.8 MILLION/UL (ref 3.81–5.12)
SODIUM SERPL-SCNC: 130 MMOL/L (ref 136–145)
TROPONIN I SERPL-MCNC: <0.02 NG/ML
WBC # BLD AUTO: 6.35 THOUSAND/UL (ref 4.31–10.16)
WBC # BLD EST: NEGATIVE 10*3/UL

## 2017-07-14 PROCEDURE — 84484 ASSAY OF TROPONIN QUANT: CPT | Performed by: PHYSICIAN ASSISTANT

## 2017-07-14 PROCEDURE — 85730 THROMBOPLASTIN TIME PARTIAL: CPT | Performed by: PHYSICIAN ASSISTANT

## 2017-07-14 PROCEDURE — 93005 ELECTROCARDIOGRAM TRACING: CPT | Performed by: EMERGENCY MEDICINE

## 2017-07-14 PROCEDURE — 85610 PROTHROMBIN TIME: CPT | Performed by: PHYSICIAN ASSISTANT

## 2017-07-14 PROCEDURE — 85025 COMPLETE CBC W/AUTO DIFF WBC: CPT | Performed by: PHYSICIAN ASSISTANT

## 2017-07-14 PROCEDURE — 83880 ASSAY OF NATRIURETIC PEPTIDE: CPT | Performed by: PHYSICIAN ASSISTANT

## 2017-07-14 PROCEDURE — 71020 HB CHEST X-RAY 2VW FRONTAL&LATL: CPT

## 2017-07-14 PROCEDURE — 36415 COLL VENOUS BLD VENIPUNCTURE: CPT | Performed by: PHYSICIAN ASSISTANT

## 2017-07-14 PROCEDURE — 70450 CT HEAD/BRAIN W/O DYE: CPT

## 2017-07-14 PROCEDURE — 99285 EMERGENCY DEPT VISIT HI MDM: CPT

## 2017-07-14 PROCEDURE — 93970 EXTREMITY STUDY: CPT

## 2017-07-14 PROCEDURE — 93005 ELECTROCARDIOGRAM TRACING: CPT | Performed by: PHYSICIAN ASSISTANT

## 2017-07-14 PROCEDURE — 71100 X-RAY EXAM RIBS UNI 2 VIEWS: CPT

## 2017-07-14 PROCEDURE — 81002 URINALYSIS NONAUTO W/O SCOPE: CPT | Performed by: PHYSICIAN ASSISTANT

## 2017-07-14 PROCEDURE — 80053 COMPREHEN METABOLIC PANEL: CPT | Performed by: PHYSICIAN ASSISTANT

## 2017-07-14 RX ORDER — METHOCARBAMOL 500 MG/1
500 TABLET, FILM COATED ORAL EVERY 6 HOURS PRN
Status: DISCONTINUED | OUTPATIENT
Start: 2017-07-14 | End: 2017-07-17 | Stop reason: HOSPADM

## 2017-07-14 RX ORDER — MORPHINE SULFATE 4 MG/ML
4 INJECTION, SOLUTION INTRAMUSCULAR; INTRAVENOUS EVERY 4 HOURS PRN
Status: DISCONTINUED | OUTPATIENT
Start: 2017-07-14 | End: 2017-07-14

## 2017-07-14 RX ORDER — SENNOSIDES 8.6 MG
1 TABLET ORAL DAILY
Status: DISCONTINUED | OUTPATIENT
Start: 2017-07-15 | End: 2017-07-17 | Stop reason: HOSPADM

## 2017-07-14 RX ORDER — OXYCODONE HYDROCHLORIDE 10 MG/1
30 TABLET ORAL EVERY 4 HOURS PRN
Status: DISCONTINUED | OUTPATIENT
Start: 2017-07-14 | End: 2017-07-17 | Stop reason: HOSPADM

## 2017-07-14 RX ORDER — DOCUSATE SODIUM 100 MG/1
100 CAPSULE, LIQUID FILLED ORAL 2 TIMES DAILY
Status: DISCONTINUED | OUTPATIENT
Start: 2017-07-14 | End: 2017-07-17 | Stop reason: HOSPADM

## 2017-07-14 RX ORDER — LIDOCAINE 50 MG/G
1 PATCH TOPICAL DAILY
Status: DISCONTINUED | OUTPATIENT
Start: 2017-07-15 | End: 2017-07-17 | Stop reason: HOSPADM

## 2017-07-14 RX ORDER — DULOXETIN HYDROCHLORIDE 60 MG/1
60 CAPSULE, DELAYED RELEASE ORAL DAILY
Status: DISCONTINUED | OUTPATIENT
Start: 2017-07-15 | End: 2017-07-17 | Stop reason: HOSPADM

## 2017-07-14 RX ORDER — OXYCODONE HYDROCHLORIDE 5 MG/1
5 TABLET ORAL EVERY 4 HOURS PRN
Status: DISCONTINUED | OUTPATIENT
Start: 2017-07-14 | End: 2017-07-14

## 2017-07-14 RX ORDER — MORPHINE SULFATE 100 MG/5ML
20 SOLUTION ORAL DAILY
Status: DISCONTINUED | OUTPATIENT
Start: 2017-07-15 | End: 2017-07-14

## 2017-07-14 RX ORDER — ONDANSETRON 2 MG/ML
4 INJECTION INTRAMUSCULAR; INTRAVENOUS EVERY 6 HOURS PRN
Status: DISCONTINUED | OUTPATIENT
Start: 2017-07-14 | End: 2017-07-17 | Stop reason: HOSPADM

## 2017-07-14 RX ORDER — OXYCODONE HYDROCHLORIDE 5 MG/1
2.5 TABLET ORAL EVERY 4 HOURS PRN
Status: DISCONTINUED | OUTPATIENT
Start: 2017-07-14 | End: 2017-07-14

## 2017-07-14 RX ORDER — OXYCODONE HYDROCHLORIDE 10 MG/1
10 TABLET ORAL EVERY 4 HOURS PRN
Status: DISCONTINUED | OUTPATIENT
Start: 2017-07-14 | End: 2017-07-14

## 2017-07-14 RX ORDER — HEPARIN SODIUM 5000 [USP'U]/ML
5000 INJECTION, SOLUTION INTRAVENOUS; SUBCUTANEOUS EVERY 8 HOURS SCHEDULED
Status: DISCONTINUED | OUTPATIENT
Start: 2017-07-14 | End: 2017-07-14

## 2017-07-14 RX ORDER — ACETAMINOPHEN 325 MG/1
650 TABLET ORAL EVERY 6 HOURS SCHEDULED
Status: DISCONTINUED | OUTPATIENT
Start: 2017-07-14 | End: 2017-07-17 | Stop reason: HOSPADM

## 2017-07-14 RX ADMIN — HYDROMORPHONE HYDROCHLORIDE 1 MG: 1 INJECTION, SOLUTION INTRAMUSCULAR; INTRAVENOUS; SUBCUTANEOUS at 22:27

## 2017-07-14 RX ADMIN — DOCUSATE SODIUM 100 MG: 100 CAPSULE, LIQUID FILLED ORAL at 22:17

## 2017-07-15 ENCOUNTER — APPOINTMENT (INPATIENT)
Dept: RADIOLOGY | Facility: HOSPITAL | Age: 62
DRG: 184 | End: 2017-07-15
Payer: COMMERCIAL

## 2017-07-15 ENCOUNTER — APPOINTMENT (INPATIENT)
Dept: NON INVASIVE DIAGNOSTICS | Facility: HOSPITAL | Age: 62
DRG: 184 | End: 2017-07-15
Payer: COMMERCIAL

## 2017-07-15 LAB
ANION GAP SERPL CALCULATED.3IONS-SCNC: 4 MMOL/L (ref 4–13)
ATRIAL RATE: 91 BPM
ATRIAL RATE: 93 BPM
BUN SERPL-MCNC: 20 MG/DL (ref 5–25)
CALCIUM SERPL-MCNC: 8.4 MG/DL (ref 8.3–10.1)
CHLORIDE SERPL-SCNC: 103 MMOL/L (ref 100–108)
CO2 SERPL-SCNC: 28 MMOL/L (ref 21–32)
CREAT SERPL-MCNC: 1.1 MG/DL (ref 0.6–1.3)
ERYTHROCYTE [DISTWIDTH] IN BLOOD BY AUTOMATED COUNT: 15.9 % (ref 11.6–15.1)
GFR SERPL CREATININE-BSD FRML MDRD: 50.7 ML/MIN/1.73SQ M
GLUCOSE SERPL-MCNC: 127 MG/DL (ref 65–140)
GLUCOSE SERPL-MCNC: 136 MG/DL (ref 65–140)
GLUCOSE SERPL-MCNC: 144 MG/DL (ref 65–140)
GLUCOSE SERPL-MCNC: 237 MG/DL (ref 65–140)
GLUCOSE SERPL-MCNC: 293 MG/DL (ref 65–140)
HCT VFR BLD AUTO: 25.4 % (ref 34.8–46.1)
HGB BLD-MCNC: 8.4 G/DL (ref 11.5–15.4)
INR PPP: 1.21 (ref 0.86–1.16)
MAGNESIUM SERPL-MCNC: 2.4 MG/DL (ref 1.6–2.6)
MCH RBC QN AUTO: 26.1 PG (ref 26.8–34.3)
MCHC RBC AUTO-ENTMCNC: 33.1 G/DL (ref 31.4–37.4)
MCV RBC AUTO: 79 FL (ref 82–98)
P AXIS: 61 DEGREES
P AXIS: 79 DEGREES
PHOSPHATE SERPL-MCNC: 3 MG/DL (ref 2.3–4.1)
PLATELET # BLD AUTO: 162 THOUSANDS/UL (ref 149–390)
PMV BLD AUTO: 8.9 FL (ref 8.9–12.7)
POTASSIUM SERPL-SCNC: 4.7 MMOL/L (ref 3.5–5.3)
PR INTERVAL: 164 MS
PR INTERVAL: 168 MS
PROTHROMBIN TIME: 15.4 SECONDS (ref 12.1–14.4)
QRS AXIS: 32 DEGREES
QRS AXIS: 56 DEGREES
QRSD INTERVAL: 76 MS
QRSD INTERVAL: 82 MS
QT INTERVAL: 328 MS
QT INTERVAL: 338 MS
QTC INTERVAL: 407 MS
QTC INTERVAL: 415 MS
RBC # BLD AUTO: 3.22 MILLION/UL (ref 3.81–5.12)
SODIUM SERPL-SCNC: 135 MMOL/L (ref 136–145)
T WAVE AXIS: 52 DEGREES
T WAVE AXIS: 65 DEGREES
VENTRICULAR RATE: 91 BPM
VENTRICULAR RATE: 93 BPM
WBC # BLD AUTO: 4.33 THOUSAND/UL (ref 4.31–10.16)

## 2017-07-15 PROCEDURE — G8980 MOBILITY D/C STATUS: HCPCS

## 2017-07-15 PROCEDURE — 82948 REAGENT STRIP/BLOOD GLUCOSE: CPT

## 2017-07-15 PROCEDURE — 97165 OT EVAL LOW COMPLEX 30 MIN: CPT

## 2017-07-15 PROCEDURE — 80048 BASIC METABOLIC PNL TOTAL CA: CPT | Performed by: EMERGENCY MEDICINE

## 2017-07-15 PROCEDURE — 93306 TTE W/DOPPLER COMPLETE: CPT

## 2017-07-15 PROCEDURE — 83735 ASSAY OF MAGNESIUM: CPT | Performed by: EMERGENCY MEDICINE

## 2017-07-15 PROCEDURE — 97163 PT EVAL HIGH COMPLEX 45 MIN: CPT

## 2017-07-15 PROCEDURE — G8979 MOBILITY GOAL STATUS: HCPCS

## 2017-07-15 PROCEDURE — 84100 ASSAY OF PHOSPHORUS: CPT | Performed by: EMERGENCY MEDICINE

## 2017-07-15 PROCEDURE — G8978 MOBILITY CURRENT STATUS: HCPCS

## 2017-07-15 PROCEDURE — 85610 PROTHROMBIN TIME: CPT | Performed by: EMERGENCY MEDICINE

## 2017-07-15 PROCEDURE — G8987 SELF CARE CURRENT STATUS: HCPCS

## 2017-07-15 PROCEDURE — 70450 CT HEAD/BRAIN W/O DYE: CPT

## 2017-07-15 PROCEDURE — 85027 COMPLETE CBC AUTOMATED: CPT | Performed by: EMERGENCY MEDICINE

## 2017-07-15 PROCEDURE — G8988 SELF CARE GOAL STATUS: HCPCS

## 2017-07-15 PROCEDURE — G8989 SELF CARE D/C STATUS: HCPCS

## 2017-07-15 RX ADMIN — OXYCODONE HYDROCHLORIDE 30 MG: 10 TABLET ORAL at 11:56

## 2017-07-15 RX ADMIN — MORPHINE SULFATE 105 MG: 30 TABLET, EXTENDED RELEASE ORAL at 20:21

## 2017-07-15 RX ADMIN — OXYCODONE HYDROCHLORIDE 30 MG: 10 TABLET ORAL at 05:15

## 2017-07-15 RX ADMIN — DOCUSATE SODIUM 100 MG: 100 CAPSULE, LIQUID FILLED ORAL at 19:26

## 2017-07-15 RX ADMIN — ACETAMINOPHEN 650 MG: 325 TABLET, FILM COATED ORAL at 11:56

## 2017-07-15 RX ADMIN — MORPHINE SULFATE 105 MG: 30 TABLET, EXTENDED RELEASE ORAL at 08:17

## 2017-07-15 RX ADMIN — OXYCODONE HYDROCHLORIDE 30 MG: 10 TABLET ORAL at 23:45

## 2017-07-15 RX ADMIN — INSULIN LISPRO 4 UNITS: 100 INJECTION, SOLUTION INTRAVENOUS; SUBCUTANEOUS at 11:58

## 2017-07-15 RX ADMIN — SENNOSIDES 8.6 MG: 8.6 TABLET, FILM COATED ORAL at 08:17

## 2017-07-15 RX ADMIN — INSULIN LISPRO 1 UNITS: 100 INJECTION, SOLUTION INTRAVENOUS; SUBCUTANEOUS at 15:15

## 2017-07-15 RX ADMIN — HYDROMORPHONE HYDROCHLORIDE 1 MG: 1 INJECTION, SOLUTION INTRAMUSCULAR; INTRAVENOUS; SUBCUTANEOUS at 15:15

## 2017-07-15 RX ADMIN — DULOXETINE HYDROCHLORIDE 60 MG: 60 CAPSULE, DELAYED RELEASE ORAL at 08:17

## 2017-07-15 RX ADMIN — INSULIN LISPRO 3 UNITS: 100 INJECTION, SOLUTION INTRAVENOUS; SUBCUTANEOUS at 20:21

## 2017-07-15 RX ADMIN — DOCUSATE SODIUM 100 MG: 100 CAPSULE, LIQUID FILLED ORAL at 08:17

## 2017-07-15 RX ADMIN — OXYCODONE HYDROCHLORIDE 30 MG: 10 TABLET ORAL at 17:00

## 2017-07-15 RX ADMIN — OXYCODONE HYDROCHLORIDE 30 MG: 10 TABLET ORAL at 00:31

## 2017-07-15 RX ADMIN — HYDROMORPHONE HYDROCHLORIDE 1 MG: 1 INJECTION, SOLUTION INTRAMUSCULAR; INTRAVENOUS; SUBCUTANEOUS at 19:25

## 2017-07-15 RX ADMIN — LIDOCAINE 1 PATCH: 50 PATCH CUTANEOUS at 08:17

## 2017-07-16 LAB
GLUCOSE SERPL-MCNC: 121 MG/DL (ref 65–140)
GLUCOSE SERPL-MCNC: 172 MG/DL (ref 65–140)
GLUCOSE SERPL-MCNC: 191 MG/DL (ref 65–140)
GLUCOSE SERPL-MCNC: 270 MG/DL (ref 65–140)
TSH SERPL DL<=0.05 MIU/L-ACNC: 1.42 UIU/ML (ref 0.36–3.74)

## 2017-07-16 PROCEDURE — 84443 ASSAY THYROID STIM HORMONE: CPT | Performed by: NURSE PRACTITIONER

## 2017-07-16 PROCEDURE — 82948 REAGENT STRIP/BLOOD GLUCOSE: CPT

## 2017-07-16 RX ORDER — TRIAMTERENE AND HYDROCHLOROTHIAZIDE 37.5; 25 MG/1; MG/1
2 TABLET ORAL DAILY
Status: DISCONTINUED | OUTPATIENT
Start: 2017-07-16 | End: 2017-07-17 | Stop reason: HOSPADM

## 2017-07-16 RX ORDER — AMLODIPINE BESYLATE 10 MG/1
10 TABLET ORAL DAILY
Status: DISCONTINUED | OUTPATIENT
Start: 2017-07-16 | End: 2017-07-17 | Stop reason: HOSPADM

## 2017-07-16 RX ORDER — BENAZEPRIL HYDROCHLORIDE 10 MG/1
20 TABLET ORAL DAILY
Status: DISCONTINUED | OUTPATIENT
Start: 2017-07-16 | End: 2017-07-17 | Stop reason: HOSPADM

## 2017-07-16 RX ADMIN — LIDOCAINE 1 PATCH: 50 PATCH CUTANEOUS at 08:03

## 2017-07-16 RX ADMIN — INSULIN LISPRO 2 UNITS: 100 INJECTION, SOLUTION INTRAVENOUS; SUBCUTANEOUS at 20:46

## 2017-07-16 RX ADMIN — DULOXETINE HYDROCHLORIDE 60 MG: 60 CAPSULE, DELAYED RELEASE ORAL at 08:03

## 2017-07-16 RX ADMIN — AMLODIPINE BESYLATE 10 MG: 10 TABLET ORAL at 12:10

## 2017-07-16 RX ADMIN — OXYCODONE HYDROCHLORIDE 30 MG: 10 TABLET ORAL at 12:11

## 2017-07-16 RX ADMIN — MORPHINE SULFATE 105 MG: 30 TABLET, EXTENDED RELEASE ORAL at 08:16

## 2017-07-16 RX ADMIN — MORPHINE SULFATE 105 MG: 30 TABLET, EXTENDED RELEASE ORAL at 20:45

## 2017-07-16 RX ADMIN — OXYCODONE HYDROCHLORIDE 30 MG: 10 TABLET ORAL at 06:15

## 2017-07-16 RX ADMIN — BENAZEPRIL HYDROCHLORIDE 20 MG: 10 TABLET, COATED ORAL at 12:09

## 2017-07-16 RX ADMIN — INSULIN LISPRO 4 UNITS: 100 INJECTION, SOLUTION INTRAVENOUS; SUBCUTANEOUS at 12:07

## 2017-07-16 RX ADMIN — DOCUSATE SODIUM 100 MG: 100 CAPSULE, LIQUID FILLED ORAL at 20:47

## 2017-07-16 RX ADMIN — HYDROMORPHONE HYDROCHLORIDE 1 MG: 1 INJECTION, SOLUTION INTRAMUSCULAR; INTRAVENOUS; SUBCUTANEOUS at 08:03

## 2017-07-16 RX ADMIN — TRIAMTERENE AND HYDROCHLOROTHIAZIDE 2 TABLET: 37.5; 25 TABLET ORAL at 12:09

## 2017-07-16 RX ADMIN — INSULIN LISPRO 1 UNITS: 100 INJECTION, SOLUTION INTRAVENOUS; SUBCUTANEOUS at 15:46

## 2017-07-16 RX ADMIN — DOCUSATE SODIUM 100 MG: 100 CAPSULE, LIQUID FILLED ORAL at 08:03

## 2017-07-16 RX ADMIN — SENNOSIDES 8.6 MG: 8.6 TABLET, FILM COATED ORAL at 08:03

## 2017-07-17 VITALS
HEIGHT: 62 IN | HEART RATE: 99 BPM | RESPIRATION RATE: 17 BRPM | TEMPERATURE: 97.9 F | DIASTOLIC BLOOD PRESSURE: 71 MMHG | WEIGHT: 205.25 LBS | SYSTOLIC BLOOD PRESSURE: 141 MMHG | BODY MASS INDEX: 37.77 KG/M2 | OXYGEN SATURATION: 96 %

## 2017-07-17 PROBLEM — R26.2 AMBULATORY DYSFUNCTION: Status: ACTIVE | Noted: 2017-07-17

## 2017-07-17 PROBLEM — R41.0 DELIRIUM: Status: ACTIVE | Noted: 2017-07-17

## 2017-07-17 PROBLEM — R53.81 PHYSICAL DECONDITIONING: Status: ACTIVE | Noted: 2017-07-17

## 2017-07-17 PROBLEM — R32 INCONTINENCE: Status: ACTIVE | Noted: 2017-07-17

## 2017-07-17 LAB
FOLATE SERPL-MCNC: 16.2 NG/ML (ref 3.1–17.5)
GLUCOSE SERPL-MCNC: 135 MG/DL (ref 65–140)
GLUCOSE SERPL-MCNC: 162 MG/DL (ref 65–140)
GLUCOSE SERPL-MCNC: 224 MG/DL (ref 65–140)
GLUCOSE SERPL-MCNC: 323 MG/DL (ref 65–140)
VIT B12 SERPL-MCNC: 536 PG/ML (ref 100–900)

## 2017-07-17 PROCEDURE — 82948 REAGENT STRIP/BLOOD GLUCOSE: CPT

## 2017-07-17 PROCEDURE — 82607 VITAMIN B-12: CPT | Performed by: NURSE PRACTITIONER

## 2017-07-17 PROCEDURE — 82746 ASSAY OF FOLIC ACID SERUM: CPT | Performed by: NURSE PRACTITIONER

## 2017-07-17 RX ORDER — DOCUSATE SODIUM 100 MG/1
100 CAPSULE, LIQUID FILLED ORAL 2 TIMES DAILY
Qty: 10 CAPSULE | Refills: 0 | Status: SHIPPED | OUTPATIENT
Start: 2017-07-17 | End: 2020-03-11 | Stop reason: HOSPADM

## 2017-07-17 RX ORDER — ACETAMINOPHEN 325 MG/1
650 TABLET ORAL EVERY 6 HOURS PRN
Qty: 30 TABLET | Refills: 0 | Status: SHIPPED | OUTPATIENT
Start: 2017-07-17 | End: 2020-03-07

## 2017-07-17 RX ADMIN — OXYCODONE HYDROCHLORIDE 30 MG: 10 TABLET ORAL at 10:28

## 2017-07-17 RX ADMIN — MORPHINE SULFATE 105 MG: 30 TABLET, EXTENDED RELEASE ORAL at 08:50

## 2017-07-17 RX ADMIN — TRIAMTERENE AND HYDROCHLOROTHIAZIDE 2 TABLET: 37.5; 25 TABLET ORAL at 08:45

## 2017-07-17 RX ADMIN — LIDOCAINE 1 PATCH: 50 PATCH CUTANEOUS at 08:44

## 2017-07-17 RX ADMIN — DULOXETINE HYDROCHLORIDE 60 MG: 60 CAPSULE, DELAYED RELEASE ORAL at 08:44

## 2017-07-17 RX ADMIN — BENAZEPRIL HYDROCHLORIDE 20 MG: 10 TABLET, COATED ORAL at 08:44

## 2017-07-17 RX ADMIN — DOCUSATE SODIUM 100 MG: 100 CAPSULE, LIQUID FILLED ORAL at 08:44

## 2017-07-17 RX ADMIN — SENNOSIDES 8.6 MG: 8.6 TABLET, FILM COATED ORAL at 08:44

## 2017-07-17 RX ADMIN — AMLODIPINE BESYLATE 10 MG: 10 TABLET ORAL at 08:44

## 2017-07-17 RX ADMIN — INSULIN LISPRO 5 UNITS: 100 INJECTION, SOLUTION INTRAVENOUS; SUBCUTANEOUS at 13:45

## 2017-07-24 DIAGNOSIS — E11.9 TYPE 2 DIABETES MELLITUS WITHOUT COMPLICATIONS (HCC): ICD-10-CM

## 2017-07-26 ENCOUNTER — TRANSCRIBE ORDERS (OUTPATIENT)
Dept: LAB | Facility: CLINIC | Age: 62
End: 2017-07-26

## 2017-07-26 ENCOUNTER — APPOINTMENT (OUTPATIENT)
Dept: LAB | Facility: CLINIC | Age: 62
End: 2017-07-26
Payer: COMMERCIAL

## 2017-07-26 DIAGNOSIS — E11.9 TYPE 2 DIABETES MELLITUS WITHOUT COMPLICATIONS (HCC): ICD-10-CM

## 2017-07-26 LAB
ANION GAP SERPL CALCULATED.3IONS-SCNC: 6 MMOL/L (ref 4–13)
BUN SERPL-MCNC: 20 MG/DL (ref 5–25)
CALCIUM SERPL-MCNC: 8.8 MG/DL (ref 8.3–10.1)
CHLORIDE SERPL-SCNC: 94 MMOL/L (ref 100–108)
CO2 SERPL-SCNC: 28 MMOL/L (ref 21–32)
CREAT SERPL-MCNC: 1.16 MG/DL (ref 0.6–1.3)
GFR SERPL CREATININE-BSD FRML MDRD: 51 ML/MIN/1.73SQ M
GLUCOSE SERPL-MCNC: 210 MG/DL (ref 65–140)
POTASSIUM SERPL-SCNC: 4.9 MMOL/L (ref 3.5–5.3)
SODIUM SERPL-SCNC: 128 MMOL/L (ref 136–145)

## 2017-07-26 PROCEDURE — 36415 COLL VENOUS BLD VENIPUNCTURE: CPT

## 2017-07-26 PROCEDURE — 80048 BASIC METABOLIC PNL TOTAL CA: CPT

## 2017-08-01 ENCOUNTER — ALLSCRIPTS OFFICE VISIT (OUTPATIENT)
Dept: OTHER | Facility: OTHER | Age: 62
End: 2017-08-01

## 2017-08-08 ENCOUNTER — HOSPITAL ENCOUNTER (OUTPATIENT)
Dept: MRI IMAGING | Facility: HOSPITAL | Age: 62
Discharge: HOME | End: 2017-08-08
Payer: COMMERCIAL

## 2017-08-08 DIAGNOSIS — R93.0 ABNORMAL HEAD CT: ICD-10-CM

## 2017-08-08 PROCEDURE — A9585 GADOBUTROL INJECTION: HCPCS | Performed by: PHYSICIAN ASSISTANT

## 2017-08-08 PROCEDURE — 70553 MRI BRAIN STEM W/O & W/DYE: CPT

## 2017-08-08 RX ADMIN — GADOBUTROL 9 ML: 604.72 INJECTION INTRAVENOUS at 15:48

## 2017-08-17 ENCOUNTER — ALLSCRIPTS OFFICE VISIT (OUTPATIENT)
Dept: OTHER | Facility: OTHER | Age: 62
End: 2017-08-17

## 2017-08-17 ENCOUNTER — TRANSCRIBE ORDERS (OUTPATIENT)
Dept: ADMINISTRATIVE | Facility: HOSPITAL | Age: 62
End: 2017-08-17

## 2017-08-17 DIAGNOSIS — R93.0 FAMILIAL ENLARGEMENT OF THE SELLA TURCICA: Primary | ICD-10-CM

## 2017-09-07 ENCOUNTER — TRANSCRIBE ORDERS (OUTPATIENT)
Dept: ADMINISTRATIVE | Facility: HOSPITAL | Age: 62
End: 2017-09-07

## 2017-09-07 DIAGNOSIS — I87.2 UNSPECIFIED VENOUS (PERIPHERAL) INSUFFICIENCY: Primary | ICD-10-CM

## 2017-10-25 ENCOUNTER — GENERIC CONVERSION - ENCOUNTER (OUTPATIENT)
Dept: OTHER | Facility: OTHER | Age: 62
End: 2017-10-25

## 2017-10-25 ENCOUNTER — ALLSCRIPTS OFFICE VISIT (OUTPATIENT)
Dept: OTHER | Facility: OTHER | Age: 62
End: 2017-10-25

## 2017-10-25 DIAGNOSIS — G31.84 MILD COGNITIVE IMPAIRMENT: ICD-10-CM

## 2017-11-15 ENCOUNTER — GENERIC CONVERSION - ENCOUNTER (OUTPATIENT)
Dept: OTHER | Facility: OTHER | Age: 62
End: 2017-11-15

## 2017-12-12 ENCOUNTER — GENERIC CONVERSION - ENCOUNTER (OUTPATIENT)
Dept: OTHER | Facility: OTHER | Age: 62
End: 2017-12-12

## 2018-01-13 VITALS
WEIGHT: 194 LBS | BODY MASS INDEX: 35.7 KG/M2 | TEMPERATURE: 96.6 F | RESPIRATION RATE: 16 BRPM | DIASTOLIC BLOOD PRESSURE: 62 MMHG | SYSTOLIC BLOOD PRESSURE: 98 MMHG | HEART RATE: 80 BPM | HEIGHT: 62 IN

## 2018-01-22 VITALS
WEIGHT: 192 LBS | HEART RATE: 110 BPM | BODY MASS INDEX: 35.33 KG/M2 | HEIGHT: 62 IN | DIASTOLIC BLOOD PRESSURE: 78 MMHG | SYSTOLIC BLOOD PRESSURE: 159 MMHG | OXYGEN SATURATION: 98 % | RESPIRATION RATE: 16 BRPM

## 2018-01-23 NOTE — RESULT NOTES
Discussion/Summary    Patient's order was accidentally placed underneath the trauma team   This was a neurosurgery order  Discussed with team of neurosurgeons  Patient had appropriate follow-up   With neurosurgeryregarding MRI  Verified Results  * MRI BRAIN W WO CONTRAST [id number] 02:41PM Radha Kaur     Test Name Result Flag Reference   MRI BRAIN W WO CONTRAST (Report)     This is a summary report  The complete report is available in the patient's medical record  If you cannot access the medical record, please contact the sending organization for a detailed fax or copy  MRI BRAIN WITH AND WITHOUT CONTRAST     INDICATION: R93 0: Abnormal findings on diagnostic imaging of skull and head, not elsewhere classified  History taken directly from the electronic ordering system  COMPARISON: CT performed on 7/15/2017 and 7/14/2017     TECHNIQUE:   Sagittal T1, axial T2, axial FLAIR, axial T1, axial [place], axial diffusion  Sagittal, axial and coronal T1 postcontrast  Axial BRAVO post contrast       IV Contrast: 9 mL of gadobutrol injection (MULTI-DOSE)       IMAGE QUALITY:  Diagnostic  FINDINGS: BRAIN PARENCHYMA:    There are no areas of restricted diffusion  No midline shift, mass effect, or extra-axial collection is identified  No areas of gradient susceptibility to suggest blood product deposition  Again noted is curvilinear enhancement along the right lateral tentorial leaflet seen on series 15 image 112  No associated restricted diffusion or demonstrable gradient echo but this could be a product of volume averaging  No associated parenchymal    edema  Differential considerations include venous vasculature versus a small tentorial meningioma  Scattered patchy areas of increased T2 and FLAIR signal are present in the supratentorial white matter which is a nonspecific finding but likely represents mild chronic microvascular ischemia  Cerebral volume is within normal limits for age  VENTRICLES: The ventricles are normal in size and contour  VASCULATURE: Major arterial and dural venous flow voids are preserved by spin echo criteria  ORBITS: Normal      PARANASAL SINUSES: Normal      EXTRACRANIAL SOFT TISSUES: Normal      SELLA AND PITUITARY GLAND: Hypothalamic and pituitary region are grossly normal       CALVARIUM AND SKULL BASE: Craniocervical junction is within normal limits  Marrow signal is within normal limits without signs of an infiltrative process  IMPRESSION:     No acute intracranial abnormality  Focus of hyperdensity seen on CT corresponds to a curvilinear focus of enhancement along the right tentorial leaflet  Differential considerations remain small venous vasculature (favored) versus a small tentorial meningioma  No adjacent parenchymal edema or changes         Workstation performed: [id number]     Signed by:   Herminia Franz MD   8/9/17       Signatures   Electronically signed by : Hilaria Jaime, [facility]; Dec 12 2017  3:10PM EST                       (Author)

## 2018-02-20 ENCOUNTER — HOSPITAL ENCOUNTER (INPATIENT)
Facility: HOSPITAL | Age: 63
LOS: 3 days | Discharge: HOME HEALTH | DRG: 638 | End: 2018-02-23
Attending: EMERGENCY MEDICINE | Admitting: FAMILY MEDICINE
Payer: COMMERCIAL

## 2018-02-20 DIAGNOSIS — R53.81 PHYSICAL DECONDITIONING: ICD-10-CM

## 2018-02-20 DIAGNOSIS — I10 ESSENTIAL HYPERTENSION: ICD-10-CM

## 2018-02-20 DIAGNOSIS — E11.65 TYPE 2 DIABETES MELLITUS WITH HYPERGLYCEMIA, WITHOUT LONG-TERM CURRENT USE OF INSULIN (HCC): ICD-10-CM

## 2018-02-20 DIAGNOSIS — R11.0 NAUSEA: ICD-10-CM

## 2018-02-20 DIAGNOSIS — R73.9 HYPERGLYCEMIA: Primary | ICD-10-CM

## 2018-02-20 DIAGNOSIS — G62.9 PERIPHERAL NEUROPATHY: ICD-10-CM

## 2018-02-20 DIAGNOSIS — E87.5 HYPERKALEMIA: ICD-10-CM

## 2018-02-20 DIAGNOSIS — E87.1 HYPONATREMIA: ICD-10-CM

## 2018-02-20 LAB
ACETONE SERPL-MCNC: NEGATIVE MG/DL
ALBUMIN SERPL BCP-MCNC: 3.3 G/DL (ref 3.5–5)
ALP SERPL-CCNC: 144 U/L (ref 46–116)
ALT SERPL W P-5'-P-CCNC: 33 U/L (ref 12–78)
ANION GAP SERPL CALCULATED.3IONS-SCNC: 5 MMOL/L (ref 4–13)
ANION GAP SERPL CALCULATED.3IONS-SCNC: 9 MMOL/L (ref 4–13)
AST SERPL W P-5'-P-CCNC: 49 U/L (ref 5–45)
BACTERIA UR QL AUTO: ABNORMAL /HPF
BASOPHILS # BLD AUTO: 0.01 THOUSANDS/ΜL (ref 0–0.1)
BASOPHILS NFR BLD AUTO: 0 % (ref 0–1)
BILIRUB SERPL-MCNC: 0.6 MG/DL (ref 0.2–1)
BILIRUB UR QL STRIP: NEGATIVE
BUN SERPL-MCNC: 13 MG/DL (ref 5–25)
BUN SERPL-MCNC: 15 MG/DL (ref 5–25)
CALCIUM SERPL-MCNC: 8.6 MG/DL (ref 8.3–10.1)
CALCIUM SERPL-MCNC: 8.7 MG/DL (ref 8.3–10.1)
CHLORIDE SERPL-SCNC: 86 MMOL/L (ref 100–108)
CHLORIDE SERPL-SCNC: 95 MMOL/L (ref 100–108)
CLARITY UR: CLEAR
CO2 SERPL-SCNC: 29 MMOL/L (ref 21–32)
CO2 SERPL-SCNC: 30 MMOL/L (ref 21–32)
COLOR UR: ABNORMAL
CREAT SERPL-MCNC: 1.16 MG/DL (ref 0.6–1.3)
CREAT SERPL-MCNC: 1.3 MG/DL (ref 0.6–1.3)
EOSINOPHIL # BLD AUTO: 0.05 THOUSAND/ΜL (ref 0–0.61)
EOSINOPHIL NFR BLD AUTO: 1 % (ref 0–6)
ERYTHROCYTE [DISTWIDTH] IN BLOOD BY AUTOMATED COUNT: 15.5 % (ref 11.6–15.1)
GFR SERPL CREATININE-BSD FRML MDRD: 44 ML/MIN/1.73SQ M
GFR SERPL CREATININE-BSD FRML MDRD: 51 ML/MIN/1.73SQ M
GLUCOSE SERPL-MCNC: 405 MG/DL (ref 65–140)
GLUCOSE SERPL-MCNC: 427 MG/DL (ref 65–140)
GLUCOSE SERPL-MCNC: 447 MG/DL (ref 65–140)
GLUCOSE SERPL-MCNC: 474 MG/DL (ref 65–140)
GLUCOSE SERPL-MCNC: 781 MG/DL (ref 65–140)
GLUCOSE SERPL-MCNC: >500 MG/DL (ref 65–140)
GLUCOSE UR STRIP-MCNC: ABNORMAL MG/DL
HCT VFR BLD AUTO: 30.4 % (ref 34.8–46.1)
HGB BLD-MCNC: 9.4 G/DL (ref 11.5–15.4)
HGB UR QL STRIP.AUTO: ABNORMAL
KETONES UR STRIP-MCNC: NEGATIVE MG/DL
LEUKOCYTE ESTERASE UR QL STRIP: ABNORMAL
LIPASE SERPL-CCNC: 42 U/L (ref 73–393)
LYMPHOCYTES # BLD AUTO: 0.9 THOUSANDS/ΜL (ref 0.6–4.47)
LYMPHOCYTES NFR BLD AUTO: 21 % (ref 14–44)
MCH RBC QN AUTO: 22.3 PG (ref 26.8–34.3)
MCHC RBC AUTO-ENTMCNC: 30.9 G/DL (ref 31.4–37.4)
MCV RBC AUTO: 72 FL (ref 82–98)
MONOCYTES # BLD AUTO: 0.23 THOUSAND/ΜL (ref 0.17–1.22)
MONOCYTES NFR BLD AUTO: 5 % (ref 4–12)
NEUTROPHILS # BLD AUTO: 3.14 THOUSANDS/ΜL (ref 1.85–7.62)
NEUTS SEG NFR BLD AUTO: 73 % (ref 43–75)
NITRITE UR QL STRIP: NEGATIVE
NON-SQ EPI CELLS URNS QL MICRO: ABNORMAL /HPF
PH UR STRIP.AUTO: 6.5 [PH] (ref 4.5–8)
PLATELET # BLD AUTO: 140 THOUSANDS/UL (ref 149–390)
PMV BLD AUTO: 10.7 FL (ref 8.9–12.7)
POTASSIUM SERPL-SCNC: 3.8 MMOL/L (ref 3.5–5.3)
POTASSIUM SERPL-SCNC: 6 MMOL/L (ref 3.5–5.3)
PROT SERPL-MCNC: 7.7 G/DL (ref 6.4–8.2)
PROT UR STRIP-MCNC: NEGATIVE MG/DL
RBC # BLD AUTO: 4.21 MILLION/UL (ref 3.81–5.12)
RBC #/AREA URNS AUTO: ABNORMAL /HPF
SODIUM SERPL-SCNC: 121 MMOL/L (ref 136–145)
SODIUM SERPL-SCNC: 133 MMOL/L (ref 136–145)
SP GR UR STRIP.AUTO: <=1.005 (ref 1–1.03)
TROPONIN I SERPL-MCNC: <0.02 NG/ML
UROBILINOGEN UR QL STRIP.AUTO: 0.2 E.U./DL
WBC # BLD AUTO: 4.33 THOUSAND/UL (ref 4.31–10.16)
WBC #/AREA URNS AUTO: ABNORMAL /HPF

## 2018-02-20 PROCEDURE — 96374 THER/PROPH/DIAG INJ IV PUSH: CPT

## 2018-02-20 PROCEDURE — 82947 ASSAY GLUCOSE BLOOD QUANT: CPT | Performed by: FAMILY MEDICINE

## 2018-02-20 PROCEDURE — 96361 HYDRATE IV INFUSION ADD-ON: CPT

## 2018-02-20 PROCEDURE — 80053 COMPREHEN METABOLIC PANEL: CPT | Performed by: EMERGENCY MEDICINE

## 2018-02-20 PROCEDURE — 93005 ELECTROCARDIOGRAM TRACING: CPT

## 2018-02-20 PROCEDURE — 81001 URINALYSIS AUTO W/SCOPE: CPT | Performed by: EMERGENCY MEDICINE

## 2018-02-20 PROCEDURE — 85025 COMPLETE CBC W/AUTO DIFF WBC: CPT | Performed by: EMERGENCY MEDICINE

## 2018-02-20 PROCEDURE — 99284 EMERGENCY DEPT VISIT MOD MDM: CPT

## 2018-02-20 PROCEDURE — 82948 REAGENT STRIP/BLOOD GLUCOSE: CPT

## 2018-02-20 PROCEDURE — 83690 ASSAY OF LIPASE: CPT | Performed by: EMERGENCY MEDICINE

## 2018-02-20 PROCEDURE — 36415 COLL VENOUS BLD VENIPUNCTURE: CPT | Performed by: EMERGENCY MEDICINE

## 2018-02-20 PROCEDURE — 99223 1ST HOSP IP/OBS HIGH 75: CPT | Performed by: FAMILY MEDICINE

## 2018-02-20 PROCEDURE — 84484 ASSAY OF TROPONIN QUANT: CPT | Performed by: EMERGENCY MEDICINE

## 2018-02-20 PROCEDURE — 80048 BASIC METABOLIC PNL TOTAL CA: CPT | Performed by: FAMILY MEDICINE

## 2018-02-20 PROCEDURE — 82009 KETONE BODYS QUAL: CPT | Performed by: EMERGENCY MEDICINE

## 2018-02-20 RX ORDER — METOCLOPRAMIDE HYDROCHLORIDE 5 MG/ML
10 INJECTION INTRAMUSCULAR; INTRAVENOUS EVERY 6 HOURS SCHEDULED
Status: DISCONTINUED | OUTPATIENT
Start: 2018-02-21 | End: 2018-02-20

## 2018-02-20 RX ORDER — DIPHENHYDRAMINE HYDROCHLORIDE 50 MG/ML
25 INJECTION INTRAMUSCULAR; INTRAVENOUS EVERY 6 HOURS
Status: DISCONTINUED | OUTPATIENT
Start: 2018-02-20 | End: 2018-02-22

## 2018-02-20 RX ORDER — DOCUSATE SODIUM 100 MG/1
100 CAPSULE, LIQUID FILLED ORAL 2 TIMES DAILY
Status: DISCONTINUED | OUTPATIENT
Start: 2018-02-20 | End: 2018-02-21

## 2018-02-20 RX ORDER — ONDANSETRON 2 MG/ML
4 INJECTION INTRAMUSCULAR; INTRAVENOUS ONCE
Status: COMPLETED | OUTPATIENT
Start: 2018-02-20 | End: 2018-02-20

## 2018-02-20 RX ORDER — METOCLOPRAMIDE HYDROCHLORIDE 5 MG/ML
10 INJECTION INTRAMUSCULAR; INTRAVENOUS EVERY 6 HOURS SCHEDULED
Status: COMPLETED | OUTPATIENT
Start: 2018-02-20 | End: 2018-02-21

## 2018-02-20 RX ORDER — INSULIN GLARGINE 100 [IU]/ML
25 INJECTION, SOLUTION SUBCUTANEOUS
Status: DISCONTINUED | OUTPATIENT
Start: 2018-02-20 | End: 2018-02-22

## 2018-02-20 RX ORDER — MORPHINE SULFATE 30 MG/1
60 TABLET, FILM COATED, EXTENDED RELEASE ORAL EVERY 8 HOURS SCHEDULED
Status: DISCONTINUED | OUTPATIENT
Start: 2018-02-20 | End: 2018-02-21

## 2018-02-20 RX ORDER — ACETAMINOPHEN 325 MG/1
650 TABLET ORAL EVERY 6 HOURS PRN
Status: DISCONTINUED | OUTPATIENT
Start: 2018-02-20 | End: 2018-02-23 | Stop reason: HOSPADM

## 2018-02-20 RX ORDER — PRAVASTATIN SODIUM 40 MG
40 TABLET ORAL
Status: DISCONTINUED | OUTPATIENT
Start: 2018-02-21 | End: 2018-02-23 | Stop reason: HOSPADM

## 2018-02-20 RX ORDER — DULOXETIN HYDROCHLORIDE 30 MG/1
60 CAPSULE, DELAYED RELEASE ORAL DAILY
Status: DISCONTINUED | OUTPATIENT
Start: 2018-02-21 | End: 2018-02-23 | Stop reason: HOSPADM

## 2018-02-20 RX ORDER — OXYCODONE HYDROCHLORIDE 10 MG/1
30 TABLET ORAL EVERY 8 HOURS PRN
Status: DISCONTINUED | OUTPATIENT
Start: 2018-02-20 | End: 2018-02-21

## 2018-02-20 RX ORDER — ONDANSETRON 2 MG/ML
4 INJECTION INTRAMUSCULAR; INTRAVENOUS EVERY 6 HOURS PRN
Status: DISCONTINUED | OUTPATIENT
Start: 2018-02-20 | End: 2018-02-23 | Stop reason: HOSPADM

## 2018-02-20 RX ORDER — SODIUM CHLORIDE 9 MG/ML
100 INJECTION, SOLUTION INTRAVENOUS CONTINUOUS
Status: DISCONTINUED | OUTPATIENT
Start: 2018-02-20 | End: 2018-02-22

## 2018-02-20 RX ADMIN — INSULIN LISPRO 5 UNITS: 100 INJECTION, SOLUTION INTRAVENOUS; SUBCUTANEOUS at 22:13

## 2018-02-20 RX ADMIN — METOCLOPRAMIDE 10 MG: 5 INJECTION, SOLUTION INTRAMUSCULAR; INTRAVENOUS at 22:05

## 2018-02-20 RX ADMIN — INSULIN HUMAN 8 UNITS: 100 INJECTION, SOLUTION PARENTERAL at 17:10

## 2018-02-20 RX ADMIN — ACETAMINOPHEN 650 MG: 325 TABLET ORAL at 20:14

## 2018-02-20 RX ADMIN — INSULIN GLARGINE 25 UNITS: 100 INJECTION, SOLUTION SUBCUTANEOUS at 22:05

## 2018-02-20 RX ADMIN — INSULIN LISPRO 5 UNITS: 100 INJECTION, SOLUTION INTRAVENOUS; SUBCUTANEOUS at 20:21

## 2018-02-20 RX ADMIN — SODIUM CHLORIDE, SODIUM LACTATE, POTASSIUM CHLORIDE, AND CALCIUM CHLORIDE 250 ML: .6; .31; .03; .02 INJECTION, SOLUTION INTRAVENOUS at 20:51

## 2018-02-20 RX ADMIN — OXYCODONE HYDROCHLORIDE 30 MG: 10 TABLET ORAL at 20:50

## 2018-02-20 RX ADMIN — ONDANSETRON 4 MG: 2 INJECTION INTRAMUSCULAR; INTRAVENOUS at 20:13

## 2018-02-20 RX ADMIN — SODIUM CHLORIDE 100 ML/HR: 0.9 INJECTION, SOLUTION INTRAVENOUS at 23:27

## 2018-02-20 RX ADMIN — DOCUSATE SODIUM 100 MG: 100 CAPSULE, LIQUID FILLED ORAL at 20:50

## 2018-02-20 RX ADMIN — INSULIN HUMAN 10 UNITS: 100 INJECTION, SOLUTION PARENTERAL at 18:10

## 2018-02-20 RX ADMIN — SODIUM CHLORIDE 1000 ML: 0.9 INJECTION, SOLUTION INTRAVENOUS at 15:47

## 2018-02-20 RX ADMIN — MORPHINE SULFATE 60 MG: 30 TABLET, FILM COATED, EXTENDED RELEASE ORAL at 22:04

## 2018-02-20 RX ADMIN — DIPHENHYDRAMINE HYDROCHLORIDE 25 MG: 50 INJECTION, SOLUTION INTRAMUSCULAR; INTRAVENOUS at 22:05

## 2018-02-20 RX ADMIN — ONDANSETRON 4 MG: 2 INJECTION INTRAMUSCULAR; INTRAVENOUS at 15:47

## 2018-02-20 NOTE — H&P
H&P- Kasie Paez 1955, 65 y o  female MRN: [id number]    Unit/Bed#: ED 16 Encounter: [id number]    Primary Care Provider: Marcia Watts   Date and time admitted to hospital: 2/20/2018  2:01 PM        * Type 2 diabetes mellitus with hyperglycemia ([address] )   Assessment & Plan    No DKA  Start on Lantus HS  Humalog 6 Unit TID  Humalog 10 unit SQ now  IVF        Hyponatremia   Assessment & Plan    Corrected 137 mg/ld  Likely due to Hyperglycemia  BMP am        Hyperlipidemia   Assessment & Plan    Cont Home medication        Hypertension   Assessment & Plan    Cont Home medication           History and Physical - [facility]      VTE Prophylaxis: Enoxaparin (Lovenox)  / sequential compression device   Code Status: full code  POLST: There is no POLST form on file for this patient (pre-hospital)    Anticipated Length of Stay:  Patient will be admitted on an Inpatient basis with an anticipated length of stay of  >2 midnights  Justification for Hospital Stay: DMII with hyperglycemia requiring IV therapy    Total Time for Visit, including Counseling / Coordination of Care: 45 minutes  Greater than 50% of this total time spent on direct patient counseling and coordination of care  Chief Complaint:  Nausea  History of Present Illness: Kasie Paez is a 65 y o  female significant past medical history of DMII, HTN, Obesity, ambulatory dysfunction who came to ED complaining of nausea and 2 episodes of vomiting  She reported she was seen yesterday by PCP  Patient denying any chest pain, short of breath, palpitation, urinary burning  Review of Systems:    Review of Systems   Constitutional: Negative for activity change, appetite change and chills  Eyes: Negative  Negative for pain, discharge and itching  Respiratory: Negative for apnea, cough, choking, chest tightness, shortness of breath, wheezing and stridor  Cardiovascular: Positive for leg swelling   Negative for chest pain and palpitations  Gastrointestinal: Positive for nausea and vomiting  Negative for abdominal distention, anal bleeding and diarrhea  Endocrine: Positive for polyphagia and polyuria  Negative for cold intolerance, heat intolerance and polydipsia  Genitourinary: Negative for difficulty urinating and dyspareunia  Musculoskeletal: Positive for gait problem and joint swelling  Negative for arthralgias, back pain and neck stiffness  Skin: Positive for pallor  Neurological: Negative for dizziness, facial asymmetry, light-headedness, numbness and headaches  Psychiatric/Behavioral: Negative for agitation  Past Medical and Surgical History:     Past Medical History:   Diagnosis Date    Degenerative disc disease at L5-S1 level     Diabetes mellitus [address] [address] )     Hypertension        Past Surgical History:   Procedure Laterality Date    CHOLECYSTECTOMY      JOINT REPLACEMENT      OOPHORECTOMY         Meds/Allergies:    Prior to Admission medications    Medication Sig Start Date End Date Taking?  Authorizing Provider   acetaminophen (TYLENOL) 325 mg tablet Take 2 tablets by mouth every 6 (six) hours as needed for mild pain 7/17/17   Bushra Limon PA-C   docusate sodium (COLACE) 100 mg capsule Take 1 capsule by mouth 2 (two) times a day 7/17/17   Bushra Limon PA-C   DULoxetine (CYMBALTA) 60 mg delayed release capsule Take 60 mg by mouth daily    Historical Provider, MD   glipiZIDE (GLUCOTROL) 10 mg tablet Take 10 mg by mouth 2 (two) times a day before meals    Historical Provider, MD   morphine (TAMRA) 20 mg 24 hr capsule Take 100 mg by mouth 2 (two) times a day      Historical Provider, MD   oxyCODONE (ROXICODONE) 15 mg immediate release tablet Take 30 mg by mouth every 4 (four) hours as needed for moderate pain      Historical Provider, MD   SAXagliptin-MetFORMIN ER 5-1000 MG TB24 Take 5-1,000 mg by mouth daily    Historical Provider, MD   simvastatin (ZOCOR) 20 mg tablet Take 20 mg by mouth daily at bedtime    Historical Provider, MD   triamterene-hydrochlorothiazide (MAXZIDE) 75-50 MG per tablet Take 1 tablet by mouth daily    Historical Provider, MD   amLODIPine-benazepril (LOTREL) 10-20 MG per capsule Take 1 capsule by mouth daily  2/20/18  Historical Provider, MD     I have reviewed home medications with patient personally  Allergies: No Known Allergies    Social History:     Marital Status: /Civil Union   Occupation:   Patient Pre-hospital Living Situation:   Patient Pre-hospital Level of Mobility: use a walker to ambulate  Patient Pre-hospital Diet Restrictions:  Substance Use History:   History   Alcohol Use No     History   Smoking Status    Former Smoker   Smokeless Tobacco    Not on file     History   Drug Use No       Family History:    non-contributory    Physical Exam:     Vitals:   Blood Pressure: 169/78 (02/20/18 1712)  Pulse: 82 (02/20/18 1712)  Temperature: 97 7 °F (36 5 °C) (02/20/18 1507)  Temp Source: Oral (02/20/18 1507)  Respirations: 20 (02/20/18 1712)  Weight - Scale: 89 4 kg (197 lb 1 5 oz) (02/20/18 1420)  SpO2: 99 % (02/20/18 1712)    Physical Exam   Constitutional: She is oriented to person, place, and time  No distress  HENT:   Head: Normocephalic and atraumatic  Eyes: Conjunctivae and EOM are normal  Pupils are equal, round, and reactive to light  Neck: Normal range of motion  Neck supple  No JVD present  No tracheal deviation present  No thyromegaly present  Cardiovascular: Normal rate and normal heart sounds  Exam reveals no gallop and no friction rub  No murmur heard  Pulmonary/Chest: No respiratory distress  She has no wheezes  She has no rales  She exhibits no tenderness  Abdominal: She exhibits no shifting dullness, no distension, no pulsatile liver, no fluid wave, no abdominal bruit, no ascites, no pulsatile midline mass and no mass  There is tenderness in the left upper quadrant and left lower quadrant  There is guarding     obese Musculoskeletal: She exhibits edema and tenderness  She exhibits no deformity  Right lower leg: She exhibits bony tenderness and edema  Left lower leg: She exhibits tenderness  Lymphadenopathy:     She has no cervical adenopathy  Neurological: She is alert and oriented to person, place, and time  She has normal reflexes  She displays normal reflexes  No cranial nerve deficit  She exhibits normal muscle tone  Coordination abnormal    Skin: Skin is warm  Rash noted  Rash is pustular  She is not diaphoretic  Psychiatric: She has a normal mood and affect  Additional Data:     Lab Results: I have personally reviewed pertinent reports  Results from last 7 days  Lab Units 02/20/18  1504   WBC Thousand/uL 4 33   HEMOGLOBIN g/dL 9 4*   HEMATOCRIT % 30 4*   PLATELETS Thousands/uL 140*   NEUTROS PCT % 73   LYMPHS PCT % 21   MONOS PCT % 5   EOS PCT % 1       Results from last 7 days  Lab Units 02/20/18  1504   SODIUM mmol/L 121*   POTASSIUM mmol/L 6 0*   CHLORIDE mmol/L 86*   CO2 mmol/L 30   BUN mg/dL 15   CREATININE mg/dL 1 30   CALCIUM mg/dL 8 6   TOTAL PROTEIN g/dL 7 7   BILIRUBIN TOTAL mg/dL 0 60   ALK PHOS U/L 144*   ALT U/L 33   AST U/L 49*   GLUCOSE RANDOM mg/dL 781*           Imaging: I have personally reviewed pertinent reports  No results found  EKG, Pathology, and Other Studies Reviewed on Admission:   · EKG: NO ST changes or acute abnormalities     Allscripts / Epic Records Reviewed: Yes     ** Please Note: This note has been constructed using a voice recognition system   **

## 2018-02-20 NOTE — ED PROVIDER NOTES
History  Chief Complaint   Patient presents with    Abnormal Lab     Pt reports having "routine blood work" yesterday at doctors, sent in today because "Hemoglobin, sodium, potassium and iron were all off" and PCP sent her in "because she could have a seizure thats how bad the numbers were " Pts blood sugar was also "in the 600s" Pt reports being diabetic, takes Glipizide and hasn't been able to keep "medication down" due to nausea for the last 3 weeks  Pt     Nausea     Patient referred to the emergency department by her private physician after blood work taking this week came back abnormal   Patient states that she has not been taking all her medications secondary to nausea for the past 3 weeks  She denies specific complaints at this time  She denies chest pain sob headache or visual complaints  She denies abdominal or back pain  Denies urinary symptoms  Denies trauma fall or injury  She denies weakness in any of the extremities  Prior to Admission Medications   Prescriptions Last Dose Informant Patient Reported? Taking?    DULoxetine (CYMBALTA) 60 mg delayed release capsule   Yes No   Sig: Take 60 mg by mouth daily   SAXagliptin-MetFORMIN ER 5-1000 MG TB24   Yes No   Sig: Take 5-1,000 mg by mouth daily   acetaminophen (TYLENOL) 325 mg tablet   No No   Sig: Take 2 tablets by mouth every 6 (six) hours as needed for mild pain   docusate sodium (COLACE) 100 mg capsule   No No   Sig: Take 1 capsule by mouth 2 (two) times a day   glipiZIDE (GLUCOTROL) 10 mg tablet   Yes No   Sig: Take 10 mg by mouth 2 (two) times a day before meals   morphine (TAMRA) 20 mg 24 hr capsule   Yes No   Sig: Take 100 mg by mouth 2 (two) times a day     oxyCODONE (ROXICODONE) 15 mg immediate release tablet   Yes No   Sig: Take 30 mg by mouth every 4 (four) hours as needed for moderate pain     simvastatin (ZOCOR) 20 mg tablet   Yes No   Sig: Take 20 mg by mouth daily at bedtime   triamterene-hydrochlorothiazide (MAXZIDE) 75-50 MG per tablet   Yes No   Sig: Take 1 tablet by mouth daily      Facility-Administered Medications: None       Past Medical History:   Diagnosis Date    Degenerative disc disease at L5-S1 level     Diabetes mellitus [address] [address] [facility]     Hypertension        Past Surgical History:   Procedure Laterality Date    CHOLECYSTECTOMY      JOINT REPLACEMENT      OOPHORECTOMY         History reviewed  No pertinent family history  I have reviewed and agree with the history as documented  Social History   Substance Use Topics    Smoking status: Former Smoker    Smokeless tobacco: Not on file    Alcohol use No        Review of Systems   Constitutional: Negative  Negative for activity change, appetite change, chills, diaphoresis, fatigue and fever  HENT: Negative  Negative for congestion, rhinorrhea, sinus pain, trouble swallowing and voice change  Eyes: Negative  Negative for photophobia, pain and visual disturbance  Respiratory: Negative  Negative for chest tightness and shortness of breath  Cardiovascular: Negative  Negative for chest pain and leg swelling  Gastrointestinal: Positive for nausea  Negative for abdominal pain, anal bleeding, blood in stool, constipation, diarrhea, rectal pain and vomiting  Endocrine: Negative  Genitourinary: Negative  Negative for difficulty urinating, dysuria, flank pain, frequency, hematuria, urgency, vaginal bleeding, vaginal discharge and vaginal pain  Musculoskeletal: Negative  Negative for back pain, neck pain and neck stiffness  Skin: Negative  Negative for rash and wound  Allergic/Immunologic: Negative  Neurological: Negative  Negative for dizziness, tremors, seizures, syncope, facial asymmetry, speech difficulty, weakness, light-headedness, numbness and headaches  Hematological: Negative  Does not bruise/bleed easily  Psychiatric/Behavioral: Negative          Physical Exam  ED Triage Vitals   Temperature Pulse Respirations Blood Pressure SpO2 02/20/18 1300 02/20/18 1259 02/20/18 1259 02/20/18 1259 02/20/18 1259   97 7 °F (36 5 °C) 101 20 (!) 176/83 98 %      Temp Source Heart Rate Source Patient Position - Orthostatic VS BP Location FiO2 (%)   02/20/18 1259 02/20/18 1259 02/20/18 1259 02/20/18 1259 --   Oral Monitor Sitting Left arm       Pain Score       02/20/18 1300       5           Orthostatic Vital Signs  Vitals:    02/20/18 1259 02/20/18 1507 02/20/18 1512   BP: (!) 176/83 (!) 229/100 (!) 192/88   Pulse: 101 84 81   Patient Position - Orthostatic VS: Sitting Lying Sitting       Physical Exam   Constitutional: She is oriented to person, place, and time  She appears well-developed and well-nourished  Nontoxic appearance without respiratory distress  Patient appears comfortable sitting upright on the stretcher  She can gauge is in normal conversation  She moves all extremities spontaneously and to command  HENT:   Head: Normocephalic and atraumatic  Right Ear: External ear normal    Left Ear: External ear normal    Mouth/Throat: Oropharynx is clear and moist    Eyes: Conjunctivae and EOM are normal  Pupils are equal, round, and reactive to light  Right eye exhibits no discharge  Left eye exhibits no discharge  Neck: Normal range of motion  Neck supple  Cardiovascular: Normal rate, regular rhythm, normal heart sounds and intact distal pulses  Pulmonary/Chest: Effort normal and breath sounds normal  No respiratory distress  She has no wheezes  She has no rales  She exhibits no tenderness  Abdominal: Soft  Bowel sounds are normal  She exhibits no distension and no mass  There is no tenderness  There is no rebound and no guarding  No hernia  Musculoskeletal: Normal range of motion  She exhibits no edema, tenderness or deformity  Neurological: She is alert and oriented to person, place, and time  She has normal reflexes  She displays normal reflexes  No cranial nerve deficit or sensory deficit  She exhibits normal muscle tone  Coordination normal    Skin: Skin is warm and dry  No rash noted  No erythema  No pallor  Psychiatric: She has a normal mood and affect  Her behavior is normal  Judgment and thought content normal    Nursing note and vitals reviewed  ED Medications  Medications   sodium chloride 0 9 % bolus 1,000 mL (1,000 mL Intravenous New Bag 2/20/18 1547)   insulin regular (HumuLIN R,NovoLIN R) injection 8 Units (not administered)   sodium chloride 0 9 % bolus 1,000 mL (not administered)   ondansetron (ZOFRAN) injection 4 mg (4 mg Intravenous Given 2/20/18 1547)       Diagnostic Studies  Results Reviewed     Procedure Component Value Units Date/Time    Urine Microscopic [[id number]]  (Abnormal) Collected:  02/20/18 1509    Lab Status:  Final result Specimen:  Urine from Urine, Other Updated:  02/20/18 1614     RBC, UA 2-4 (A) /hpf      WBC, UA 0-1 (A) /hpf      Epithelial Cells Occasional /hpf      Bacteria, UA Occasional /hpf     Comprehensive metabolic panel [[id number]]  (Abnormal) Collected:  02/20/18 1504    Lab Status:  Final result Specimen:  Blood from Arm, Right Updated:  02/20/18 1610     Sodium 121 (L) mmol/L      Potassium 6 0 (H) mmol/L      Chloride 86 (L) mmol/L      CO2 30 mmol/L      Anion Gap 5 mmol/L      BUN 15 mg/dL      Creatinine 1 30 mg/dL      Glucose 781 (HH) mg/dL      Calcium 8 6 mg/dL      AST 49 (H) U/L      ALT 33 U/L      Alkaline Phosphatase 144 (H) U/L      Total Protein 7 7 g/dL      Albumin 3 3 (L) g/dL      Total Bilirubin 0 60 mg/dL      eGFR 44 ml/min/1 73sq m     Narrative:         National Kidney Disease Education Program recommendations are as follows:  GFR calculation is accurate only with a steady state creatinine  Chronic Kidney disease less than 60 ml/min/1 73 sq  meters  Kidney failure less than 15 ml/min/1 73 sq  meters      Lipase [[id number]]  (Abnormal) Collected:  02/20/18 1504    Lab Status:  Final result Specimen:  Blood from Arm, Right Updated:  02/20/18 1556     Lipase 42 (L) u/L     Troponin I [[id number]]  (Normal) Collected:  02/20/18 1504    Lab Status:  Final result Specimen:  Blood from Arm, Right Updated:  02/20/18 1543     Troponin I <0 02 ng/mL     Narrative:         [employer] Chemistry analyzer 99% cutoff is > 0 04 ng/mL in network labs    o cTnI 99% cutoff is useful only when applied to patients in the clinical setting of myocardial ischemia  o cTnI 99% cutoff should be interpreted in the context of clinical history, ECG findings and possibly cardiac imaging to establish correct diagnosis  o cTnI 99% cutoff may be suggestive but clearly not indicative of a coronary event without the clinical setting of myocardial ischemia  UA w Reflex to Microscopic [[id number]]  (Abnormal) Collected:  02/20/18 1509    Lab Status:  Final result Specimen:  Urine from Urine, Other Updated:  02/20/18 1540     Color, UA Light Yellow     Clarity, UA Clear     Specific Gravity, UA <=1 005     pH, UA 6 5     Leukocytes, UA Elevated glucose may cause decreased leukocyte values   See urine microscopic for [facility] result/ (A)     Nitrite, UA Negative     Protein, UA Negative mg/dl      Glucose, UA >=1000 (1%) (A) mg/dl      Ketones, UA Negative mg/dl      Urobilinogen, UA 0 2 E U /dl      Bilirubin, UA Negative     Blood, UA Trace-Intact (A)    Acetone [[id number]]  (Normal) Collected:  02/20/18 1504    Lab Status:  Final result Specimen:  Blood Updated:  02/20/18 1532     Acetone, Bld Negative    CBC and differential [[id number]]  (Abnormal) Collected:  02/20/18 1504    Lab Status:  Final result Specimen:  Blood from Arm, Right Updated:  02/20/18 1516     WBC 4 33 Thousand/uL      RBC 4 21 Million/uL      Hemoglobin 9 4 (L) g/dL      Hematocrit 30 4 (L) %      MCV 72 (L) fL      MCH 22 3 (L) pg      MCHC 30 9 (L) g/dL      RDW 15 5 (H) %      MPV 10 7 fL      Platelets 594 (L) Thousands/uL      Neutrophils Relative 73 %      Lymphocytes Relative 21 %      Monocytes Relative 5 %      Eosinophils Relative 1 % Basophils Relative 0 %      Neutrophils Absolute 3 14 Thousands/µL      Lymphocytes Absolute 0 90 Thousands/µL      Monocytes Absolute 0 23 Thousand/µL      Eosinophils Absolute 0 05 Thousand/µL      Basophils Absolute 0 01 Thousands/µL                  No orders to display              Procedures  ECG 12 Lead Documentation  Date/Time: 2/20/2018 2:33 PM  Performed by: Gely Leija by: Colby Combs     ECG reviewed by me, the ED Provider: yes    Patient location:  ED  Previous ECG:     Previous ECG:  Compared to current    Similarity:  No change  Interpretation:     Interpretation: abnormal    Rate:     ECG rate:  84    ECG rate assessment: normal    Rhythm:     Rhythm: sinus rhythm    Ectopy:     Ectopy: none    QRS:     QRS axis:  Normal    QRS intervals:  Normal  Conduction:     Conduction: normal    ST segments:     ST segments:  Non-specific  T waves:     T waves: non-specific             Phone Contacts  ED Phone Contact    ED Course  ED Course as of Feb 20 1635   Tue Feb 20, 2018   1624 Patient to be admitted to the hospitalist service to treat her hyperglycemia and pseudo hyponatremia and mild hyperkalemia  Her acetone is negative  She is being given fluids and will be given some IV insulin                                  MDM  CritCare Time    Disposition  Final diagnoses:   Hyperglycemia   Hyponatremia   Hyperkalemia   Nausea     Time reflects when diagnosis was documented in both MDM as applicable and the Disposition within this note     Time User Action Codes Description Comment    2/20/2018  4:28 PM Laveda Hams Add [R73 9] Hyperglycemia     2/20/2018  4:28 PM Rahel Jamison Add [E87 1] Hyponatremia     2/20/2018  4:29 PM Rahel Jamison Add [E87 5] Hyperkalemia     2/20/2018  4:29 PM Jae Jamison 57 [R11 0] Nausea       ED Disposition     ED Disposition Condition Comment    Admit  Case was discussed with Dr Jane Farmer and the patient's admission status was agreed to be Admission Status: inpatient status to the service of Dr Dione Gonzales    None       Patient's Medications   Discharge Prescriptions    No medications on file     No discharge procedures on file      ED Provider  Electronically Signed by           Johny Bernal MD  02/20/18 8846

## 2018-02-21 LAB
ANION GAP SERPL CALCULATED.3IONS-SCNC: 10 MMOL/L (ref 4–13)
ATRIAL RATE: 84 BPM
BUN SERPL-MCNC: 13 MG/DL (ref 5–25)
CALCIUM SERPL-MCNC: 8.8 MG/DL (ref 8.3–10.1)
CHLORIDE SERPL-SCNC: 103 MMOL/L (ref 100–108)
CO2 SERPL-SCNC: 26 MMOL/L (ref 21–32)
CREAT SERPL-MCNC: 1.11 MG/DL (ref 0.6–1.3)
ERYTHROCYTE [DISTWIDTH] IN BLOOD BY AUTOMATED COUNT: 15.9 % (ref 11.6–15.1)
EST. AVERAGE GLUCOSE BLD GHB EST-MCNC: 395 MG/DL
GFR SERPL CREATININE-BSD FRML MDRD: 54 ML/MIN/1.73SQ M
GLUCOSE SERPL-MCNC: 111 MG/DL (ref 65–140)
GLUCOSE SERPL-MCNC: 130 MG/DL (ref 65–140)
GLUCOSE SERPL-MCNC: 229 MG/DL (ref 65–140)
GLUCOSE SERPL-MCNC: 266 MG/DL (ref 65–140)
GLUCOSE SERPL-MCNC: 270 MG/DL (ref 65–140)
GLUCOSE SERPL-MCNC: 74 MG/DL (ref 65–140)
HBA1C MFR BLD: 15.4 % (ref 4.2–6.3)
HCT VFR BLD AUTO: 30.7 % (ref 34.8–46.1)
HGB BLD-MCNC: 9.2 G/DL (ref 11.5–15.4)
MCH RBC QN AUTO: 22 PG (ref 26.8–34.3)
MCHC RBC AUTO-ENTMCNC: 30 G/DL (ref 31.4–37.4)
MCV RBC AUTO: 73 FL (ref 82–98)
P AXIS: 74 DEGREES
PLATELET # BLD AUTO: 177 THOUSANDS/UL (ref 149–390)
PMV BLD AUTO: 10.3 FL (ref 8.9–12.7)
POTASSIUM SERPL-SCNC: 4.2 MMOL/L (ref 3.5–5.3)
PR INTERVAL: 170 MS
QRS AXIS: 41 DEGREES
QRSD INTERVAL: 80 MS
QT INTERVAL: 352 MS
QTC INTERVAL: 415 MS
RBC # BLD AUTO: 4.19 MILLION/UL (ref 3.81–5.12)
SODIUM SERPL-SCNC: 139 MMOL/L (ref 136–145)
T WAVE AXIS: 51 DEGREES
VENTRICULAR RATE: 84 BPM
WBC # BLD AUTO: 5.32 THOUSAND/UL (ref 4.31–10.16)

## 2018-02-21 PROCEDURE — 99232 SBSQ HOSP IP/OBS MODERATE 35: CPT | Performed by: FAMILY MEDICINE

## 2018-02-21 PROCEDURE — 85027 COMPLETE CBC AUTOMATED: CPT | Performed by: FAMILY MEDICINE

## 2018-02-21 PROCEDURE — 82948 REAGENT STRIP/BLOOD GLUCOSE: CPT

## 2018-02-21 PROCEDURE — 83036 HEMOGLOBIN GLYCOSYLATED A1C: CPT | Performed by: PHYSICIAN ASSISTANT

## 2018-02-21 PROCEDURE — 80048 BASIC METABOLIC PNL TOTAL CA: CPT | Performed by: FAMILY MEDICINE

## 2018-02-21 RX ORDER — MORPHINE SULFATE 30 MG/1
60 TABLET, FILM COATED, EXTENDED RELEASE ORAL EVERY 8 HOURS SCHEDULED
Status: COMPLETED | OUTPATIENT
Start: 2018-02-21 | End: 2018-02-21

## 2018-02-21 RX ORDER — OXYCODONE HYDROCHLORIDE 10 MG/1
30 TABLET ORAL EVERY 4 HOURS PRN
Status: DISCONTINUED | OUTPATIENT
Start: 2018-02-21 | End: 2018-02-23 | Stop reason: HOSPADM

## 2018-02-21 RX ORDER — MORPHINE SULFATE 100 MG/1
100 CAPSULE, EXTENDED RELEASE ORAL 2 TIMES DAILY
Refills: 0 | COMMUNITY
Start: 2017-12-20 | End: 2020-03-01

## 2018-02-21 RX ORDER — OXYCODONE HYDROCHLORIDE 30 MG/1
30 TABLET ORAL EVERY 4 HOURS PRN
Refills: 0 | Status: ON HOLD | COMMUNITY
Start: 2017-12-20 | End: 2018-02-23 | Stop reason: CLARIF

## 2018-02-21 RX ORDER — METOCLOPRAMIDE HYDROCHLORIDE 5 MG/ML
10 INJECTION INTRAMUSCULAR; INTRAVENOUS EVERY 6 HOURS PRN
Status: DISCONTINUED | OUTPATIENT
Start: 2018-02-21 | End: 2018-02-22

## 2018-02-21 RX ORDER — AMOXICILLIN 250 MG
2 CAPSULE ORAL 2 TIMES DAILY
Status: DISCONTINUED | OUTPATIENT
Start: 2018-02-21 | End: 2018-02-23 | Stop reason: HOSPADM

## 2018-02-21 RX ORDER — MORPHINE SULFATE 100 MG/1
100 CAPSULE, EXTENDED RELEASE ORAL 2 TIMES DAILY
Status: DISCONTINUED | OUTPATIENT
Start: 2018-02-22 | End: 2018-02-22

## 2018-02-21 RX ADMIN — INSULIN LISPRO 3 UNITS: 100 INJECTION, SOLUTION INTRAVENOUS; SUBCUTANEOUS at 11:54

## 2018-02-21 RX ADMIN — ONDANSETRON 4 MG: 2 INJECTION INTRAMUSCULAR; INTRAVENOUS at 14:07

## 2018-02-21 RX ADMIN — MORPHINE SULFATE 60 MG: 30 TABLET, FILM COATED, EXTENDED RELEASE ORAL at 22:17

## 2018-02-21 RX ADMIN — METOCLOPRAMIDE 10 MG: 5 INJECTION, SOLUTION INTRAMUSCULAR; INTRAVENOUS at 19:35

## 2018-02-21 RX ADMIN — ACETAMINOPHEN 650 MG: 325 TABLET ORAL at 11:18

## 2018-02-21 RX ADMIN — INSULIN LISPRO 9 UNITS: 100 INJECTION, SOLUTION INTRAVENOUS; SUBCUTANEOUS at 11:54

## 2018-02-21 RX ADMIN — MORPHINE SULFATE 60 MG: 30 TABLET, FILM COATED, EXTENDED RELEASE ORAL at 05:12

## 2018-02-21 RX ADMIN — DOCUSATE SODIUM 100 MG: 100 CAPSULE, LIQUID FILLED ORAL at 08:53

## 2018-02-21 RX ADMIN — DIPHENHYDRAMINE HYDROCHLORIDE 25 MG: 50 INJECTION, SOLUTION INTRAMUSCULAR; INTRAVENOUS at 08:54

## 2018-02-21 RX ADMIN — INSULIN GLARGINE 10 UNITS: 100 INJECTION, SOLUTION SUBCUTANEOUS at 22:44

## 2018-02-21 RX ADMIN — DIPHENHYDRAMINE HYDROCHLORIDE 25 MG: 50 INJECTION, SOLUTION INTRAMUSCULAR; INTRAVENOUS at 02:47

## 2018-02-21 RX ADMIN — METOCLOPRAMIDE 10 MG: 5 INJECTION, SOLUTION INTRAMUSCULAR; INTRAVENOUS at 11:18

## 2018-02-21 RX ADMIN — ENOXAPARIN SODIUM 40 MG: 40 INJECTION SUBCUTANEOUS at 08:54

## 2018-02-21 RX ADMIN — METOCLOPRAMIDE 10 MG: 5 INJECTION, SOLUTION INTRAMUSCULAR; INTRAVENOUS at 05:12

## 2018-02-21 RX ADMIN — INSULIN LISPRO 9 UNITS: 100 INJECTION, SOLUTION INTRAVENOUS; SUBCUTANEOUS at 17:46

## 2018-02-21 RX ADMIN — DULOXETINE HYDROCHLORIDE 60 MG: 30 CAPSULE, DELAYED RELEASE ORAL at 08:53

## 2018-02-21 RX ADMIN — ACETAMINOPHEN 650 MG: 325 TABLET ORAL at 03:13

## 2018-02-21 RX ADMIN — MORPHINE SULFATE 60 MG: 30 TABLET, FILM COATED, EXTENDED RELEASE ORAL at 14:06

## 2018-02-21 RX ADMIN — METOCLOPRAMIDE 10 MG: 5 INJECTION, SOLUTION INTRAMUSCULAR; INTRAVENOUS at 17:44

## 2018-02-21 RX ADMIN — SODIUM CHLORIDE 100 ML/HR: 0.9 INJECTION, SOLUTION INTRAVENOUS at 10:16

## 2018-02-21 RX ADMIN — PRAVASTATIN SODIUM 40 MG: 40 TABLET ORAL at 16:46

## 2018-02-21 RX ADMIN — SENNOSIDES AND DOCUSATE SODIUM 2 TABLET: 8.6; 5 TABLET ORAL at 18:24

## 2018-02-21 RX ADMIN — OXYCODONE HYDROCHLORIDE 30 MG: 10 TABLET ORAL at 18:24

## 2018-02-21 RX ADMIN — INSULIN LISPRO 9 UNITS: 100 INJECTION, SOLUTION INTRAVENOUS; SUBCUTANEOUS at 08:54

## 2018-02-21 RX ADMIN — SODIUM CHLORIDE 100 ML/HR: 0.9 INJECTION, SOLUTION INTRAVENOUS at 22:17

## 2018-02-21 RX ADMIN — ONDANSETRON 4 MG: 2 INJECTION INTRAMUSCULAR; INTRAVENOUS at 22:17

## 2018-02-21 RX ADMIN — DIPHENHYDRAMINE HYDROCHLORIDE 25 MG: 50 INJECTION, SOLUTION INTRAMUSCULAR; INTRAVENOUS at 14:49

## 2018-02-21 RX ADMIN — OXYCODONE HYDROCHLORIDE 30 MG: 10 TABLET ORAL at 10:22

## 2018-02-21 RX ADMIN — INSULIN LISPRO 2 UNITS: 100 INJECTION, SOLUTION INTRAVENOUS; SUBCUTANEOUS at 08:54

## 2018-02-21 NOTE — CASE MANAGEMENT
Thank you,  [address] [facility] in the [facility] by [address] for 2017  Network Utilization Review Department  Phone: [phone]; Fax [phone]  ATTENTION: The Network Utilization Review Department is now centralized for our 7 Facilities  Make a note that we have a new phone and fax numbers for our Department  Please call with any questions or concerns to [phone] and carefully follow the prompts so that you are directed to the right person  All voicemails are confidential  Fax any determinations, approvals, denials, and requests for initial or continue stay review clinical to [phone]  Due to HIGH CALL volume, it would be easier if you could please send faxed requests to expedite your requests and in part, help us provide discharge notifications faster  Initial Clinical Review    Admission: Date/Time/Statement: 2/20/18 @ 1635     Orders Placed This Encounter   Procedures    Inpatient Admission (expected length of stay for this patient is greater than two midnights)     Standing Status:   Standing     Number of Occurrences:   1     Order Specific Question:   Admitting Physician     Answer:   Gibran Mccray [[id number]]     Order Specific Question:   Level of Care     Answer:   Med Surg [16]     Order Specific Question:   Estimated length of stay     Answer:   More than 2 Midnights     Order Specific Question:   Certification     Answer:   I certify that inpatient services are medically necessary for this patient for a duration of greater than two midnights  See H&P and MD Progress Notes for additional information about the patient's course of treatment           ED: Date/Time/Mode of Arrival:   ED Arrival Information     Expected Arrival Acuity Means of Arrival Escorted By Service Admission Type    - 2/20/2018 12:54 Emergent Walk-In Family Member General Medicine Emergency    Arrival Complaint    abnormal labs, nausea          Chief Complaint: Chief Complaint   Patient presents with    Abnormal Lab     Pt reports having "routine blood work" yesterday at doctors, sent in today because "Hemoglobin, sodium, potassium and iron were all off" and PCP sent her in "because she could have a seizure thats how bad the numbers were " Pts blood sugar was also "in the 600s" Pt reports being diabetic, takes Glipizide and hasn't been able to keep "medication down" due to nausea for the last 3 weeks  Pt     Nausea       History of Illness: Cecelia Mayes is a 65 y o  female significant past medical history of DMII, HTN, Obesity, ambulatory dysfunction who came to ED complaining of nausea and 2 episodes of vomiting  She reported she was seen yesterday by PCP  Patient denying any chest pain, short of breath, palpitation, urinary burning       ED Vital Signs:   ED Triage Vitals   Temperature Pulse Respirations Blood Pressure SpO2   02/20/18 1300 02/20/18 1259 02/20/18 1259 02/20/18 1259 02/20/18 1259   97 7 °F (36 5 °C) 101 20 (!) 176/83 98 %      Temp Source Heart Rate Source Patient Position - Orthostatic VS BP Location FiO2 (%)   02/20/18 1259 02/20/18 1259 02/20/18 1259 02/20/18 1259 --   Oral Monitor Sitting Left arm       Pain Score       02/20/18 1300       5        Wt Readings from Last 1 Encounters:   02/20/18 89 4 kg (197 lb 1 5 oz)       Vital Signs (abnormal):   02/20/18 2300  98 7 °F (37 1 °C)  92  18  130/50  98 %  None (Room air)  Lying   02/20/18 1827  --  82  20  158/83  99 %  None (Room air)  Sitting   02/20/18 1712  --  82  20  169/78  99 %  None (Room air)  Sitting   02/20/18 1527  --  --  --  --  --  None (Room air)  --   02/20/18 1512  --  81  20   192/88  99 %  None (Room air)  Sitting   02/20/18 1507  97 7 °F (36 5 °C)  84  20   229/100  98 %  None (Room air)  Lying   02/20/18 1300  97 7 °F (36 5 °C)  --  --  --  --  --  --   02/20/18 1259  --  101  20   176/83  98 %  None (Room air)           Abnormal Labs/Diagnostic Test Results:      Results from last 7 days  Lab Units 18  1504   WBC Thousand/uL 4 33   HEMOGLOBIN g/dL 9 4*   HEMATOCRIT % 30 4*   PLATELETS Thousands/uL 140*   NEUTROS PCT % 73   LYMPHS PCT % 21   MONOS PCT % 5   EOS PCT % 1         Results from last 7 days  Lab Units 18  1504   SODIUM mmol/L 121*   POTASSIUM mmol/L 6 0*   CHLORIDE mmol/L 86*   CO2 mmol/L 30   BUN mg/dL 15   CREATININE mg/dL 1 30   CALCIUM mg/dL 8 6   TOTAL PROTEIN g/dL 7 7   BILIRUBIN TOTAL mg/dL 0 60   ALK PHOS U/L 144*   ALT U/L 33   AST U/L 49*   GLUCOSE RANDOM mg/dL 781*             Color, UA    Light     Color, UA      Clarity, UA    Clear         Clarity, UA     Specific Gravity, UA    <=1 005 Ref Range: 1 003 - 1 030 Lab: [facility]" style="paddinpx 1px 0px 0px; text-align: right;"><=1 005         Specific Gravity, UA     Glucose, UA    >=1000    Glucose, UA     Ketones, UA    Negative         Ketones, UA     Blood, UA    Trace-  Blood, UA     Nitrite, UA    Negative         Nitrite, UA     Leukocytes, UA    Elevat    Leukocytes, UA     pH, UA    6 5         pH, UA     Protein, UA    Negative         Protein, UA     Bilirubin, UA    Negative         Bilirubin, UA     Urobilinogen, UA    0 2         Urobilinogen, UA     RBC, UA    2-4         RBC, UA     WBC, UA    0-1         WBC, UA     Bacteria, UA    Occasi             Bacteria, UA     TOX, BLOOD       Imaging: I have personally reviewed pertinent reports        No results found      EKG, Pathology, and Other Studies Reviewed on Admission:   · EKG: NO ST changes or acute abnormalities     ED Treatment:   Medication Administration from 2018 1254 to 2018 1844       Date/Time Order Dose Route Action Action by Comments     2018 1706 sodium chloride 0 9 % bolus 1,000 mL 0 mL Intravenous Stopped Maki Bone RN      2018 1547 sodium chloride 0 9 % bolus 1,000 mL 1,000 mL Intravenous [address] Inna Oconnor RN      2018 1547 ondansetron [facility] injection 4 mg 4 mg Intravenous Given Danny Cast RN      02/20/2018 1710 insulin regular (HumuLIN R,NovoLIN R) injection 8 Units 8 Units Intravenous Given Danny Cast RN      02/20/2018 1812 insulin lispro (HumaLOG) 100 units/mL subcutaneous injection 9 Units 9 Units Subcutaneous Not Given Danny Cast RN      02/20/2018 1810 insulin regular (HumuLIN R,NovoLIN R) injection 10 Units 10 Units Subcutaneous Given Danny Cast RN           Past Medical/Surgical History:    Active Ambulatory Problems     Diagnosis Date Noted    Hypertension     Hyperlipidemia     Diabetes mellitus ([facility] [address] )     Degenerative disc disease at L5-S1 level     Cellulitis 01/10/2017    Chronic venous stasis dermatitis of both lower extremities 01/10/2017    Diabetic neuropathy (HCC) 01/14/2017    Venous insufficiency (chronic) (peripheral) 01/14/2017    Fall 07/14/2017    Abnormal head CT 07/14/2017    Closed fracture of multiple ribs of right side 07/14/2017    Palpitations 07/14/2017    Near syncope 07/14/2017    Hyponatremia 07/14/2017    Chronic venous stasis dermatitis of both lower extremities 07/14/2017    Delirium 07/17/2017    Ambulatory dysfunction 07/17/2017    Physical deconditioning 07/17/2017    Incontinence 07/17/2017     Resolved Ambulatory Problems     Diagnosis Date Noted    No Resolved Ambulatory Problems     Past Medical History:   Diagnosis Date    Degenerative disc disease at L5-S1 level     Diabetes mellitus ([facility] [address] )     Hypertension        Admitting Diagnosis: Hyperkalemia [E87 5]  Hyponatremia [E87 1]  Nausea [R11 0]  Hyperglycemia [R73 9]    Age/Sex: 65 y o  female    Assessment/Plan:   * Type 2 diabetes mellitus with hyperglycemia (HCC)   Assessment & Plan     No DKA  Start on Lantus HS  Humalog 6 Unit TID  Humalog 10 unit SQ now  IVF          Hyponatremia   Assessment & Plan     Corrected 137 mg/ld  Likely due to Hyperglycemia  BMP am          Hyperlipidemia   Assessment & Plan     Cont Home medication          Hypertension   Assessment & Plan     Cont Home medication          Admission Orders:  XOCHILT Padron@[employer]  TELE  OOB  CONS CARB DIET  SEQ COMP DEVICE  Scheduled Meds:   Current Facility-Administered Medications:  acetaminophen 650 mg Oral Q6H PRN Sandra Quintana PA-C    diphenhydrAMINE 25 mg Intravenous Q6H Sandra Quintana PA-C    docusate sodium 100 mg Oral BID Sandra Quintana PA-C    DULoxetine 60 mg Oral Daily Sandra Quintana PA-C    enoxaparin 40 mg Subcutaneous Daily Luanne Gr MD    insulin glargine 25 Units Subcutaneous HS Luanne Gr MD    insulin lispro 1-5 Units Subcutaneous TID AC Luanne Gr MD    insulin lispro 1-5 Units Subcutaneous HS Emil Gabriel MD    insulin lispro 9 Units Subcutaneous TID With Meals Luanne Gr MD    metoclopramide 10 mg Intravenous Q6H [address] Emil Gabriel MD    morphine 60 mg Oral Q8H [address] Sandra Quintana PA-C    ondansetron 4 mg Intravenous Q6H PRN Emil Gabriel MD    oxyCODONE 30 mg Oral Q8H PRN Sandra Quintana PA-C    pravastatin 40 mg Oral Daily With [employer]IE    sodium chloride 100 mL/hr Intravenous Continuous Thang Guadalupe PA-C Last Rate: 100 mL/hr (02/21/18 1016)     Continuous Infusions:   sodium chloride 100 mL/hr Last Rate: 100 mL/hr (02/21/18 1016)     PRN Meds:   Acetaminophen X2    Ondansetron X1    oxyCODONE X2

## 2018-02-21 NOTE — PLAN OF CARE
DISCHARGE PLANNING     Discharge to home or other facility with appropriate resources Progressing        INFECTION - ADULT     Absence or prevention of progression during hospitalization Progressing     Absence of fever/infection during neutropenic period Progressing        Knowledge Deficit     Patient/family/caregiver demonstrates understanding of disease process, treatment plan, medications, and discharge instructions Progressing        PAIN - ADULT     Verbalizes/displays adequate comfort level or baseline comfort level Progressing        Potential for Falls     Patient will remain free of falls Progressing        Prexisting or High Potential for Compromised Skin Integrity     Skin integrity is maintained or improved Progressing        SAFETY ADULT     Maintain or return to baseline ADL function Progressing     Maintain or return mobility status to optimal level Progressing     Patient will remain free of falls Progressing

## 2018-02-22 PROBLEM — D50.9 MICROCYTIC ANEMIA: Status: ACTIVE | Noted: 2018-02-22

## 2018-02-22 PROBLEM — G89.4 CHRONIC PAIN SYNDROME: Status: ACTIVE | Noted: 2018-02-22

## 2018-02-22 PROBLEM — G62.9 PERIPHERAL NEUROPATHY: Status: ACTIVE | Noted: 2018-02-22

## 2018-02-22 PROBLEM — R11.0 NAUSEA: Status: ACTIVE | Noted: 2018-02-22

## 2018-02-22 PROBLEM — R51.9 HEADACHE: Status: ACTIVE | Noted: 2018-02-22

## 2018-02-22 LAB
ANION GAP SERPL CALCULATED.3IONS-SCNC: 8 MMOL/L (ref 4–13)
BUN SERPL-MCNC: 11 MG/DL (ref 5–25)
CALCIUM SERPL-MCNC: 8.2 MG/DL (ref 8.3–10.1)
CHLORIDE SERPL-SCNC: 103 MMOL/L (ref 100–108)
CO2 SERPL-SCNC: 25 MMOL/L (ref 21–32)
CREAT SERPL-MCNC: 1.04 MG/DL (ref 0.6–1.3)
ERYTHROCYTE [DISTWIDTH] IN BLOOD BY AUTOMATED COUNT: 16 % (ref 11.6–15.1)
GFR SERPL CREATININE-BSD FRML MDRD: 58 ML/MIN/1.73SQ M
GLUCOSE SERPL-MCNC: 112 MG/DL (ref 65–140)
GLUCOSE SERPL-MCNC: 118 MG/DL (ref 65–140)
GLUCOSE SERPL-MCNC: 118 MG/DL (ref 65–140)
GLUCOSE SERPL-MCNC: 165 MG/DL (ref 65–140)
GLUCOSE SERPL-MCNC: 228 MG/DL (ref 65–140)
GLUCOSE SERPL-MCNC: 271 MG/DL (ref 65–140)
GLUCOSE SERPL-MCNC: 74 MG/DL (ref 65–140)
HCT VFR BLD AUTO: 29.8 % (ref 34.8–46.1)
HEMOCCULT STL QL: NEGATIVE
HGB BLD-MCNC: 8.9 G/DL (ref 11.5–15.4)
MAGNESIUM SERPL-MCNC: 1.8 MG/DL (ref 1.6–2.6)
MCH RBC QN AUTO: 22.3 PG (ref 26.8–34.3)
MCHC RBC AUTO-ENTMCNC: 29.9 G/DL (ref 31.4–37.4)
MCV RBC AUTO: 75 FL (ref 82–98)
PLATELET # BLD AUTO: 133 THOUSANDS/UL (ref 149–390)
PMV BLD AUTO: 10.1 FL (ref 8.9–12.7)
POTASSIUM SERPL-SCNC: 4.5 MMOL/L (ref 3.5–5.3)
RBC # BLD AUTO: 3.99 MILLION/UL (ref 3.81–5.12)
SODIUM SERPL-SCNC: 136 MMOL/L (ref 136–145)
WBC # BLD AUTO: 2.79 THOUSAND/UL (ref 4.31–10.16)

## 2018-02-22 PROCEDURE — 83735 ASSAY OF MAGNESIUM: CPT | Performed by: PHYSICIAN ASSISTANT

## 2018-02-22 PROCEDURE — 85027 COMPLETE CBC AUTOMATED: CPT | Performed by: PHYSICIAN ASSISTANT

## 2018-02-22 PROCEDURE — 99222 1ST HOSP IP/OBS MODERATE 55: CPT | Performed by: INTERNAL MEDICINE

## 2018-02-22 PROCEDURE — 82948 REAGENT STRIP/BLOOD GLUCOSE: CPT

## 2018-02-22 PROCEDURE — 82272 OCCULT BLD FECES 1-3 TESTS: CPT | Performed by: PHYSICIAN ASSISTANT

## 2018-02-22 PROCEDURE — 99233 SBSQ HOSP IP/OBS HIGH 50: CPT | Performed by: PHYSICIAN ASSISTANT

## 2018-02-22 PROCEDURE — 80048 BASIC METABOLIC PNL TOTAL CA: CPT | Performed by: PHYSICIAN ASSISTANT

## 2018-02-22 RX ORDER — METOCLOPRAMIDE 10 MG/1
10 TABLET ORAL
Status: DISCONTINUED | OUTPATIENT
Start: 2018-02-22 | End: 2018-02-23 | Stop reason: HOSPADM

## 2018-02-22 RX ORDER — GABAPENTIN 100 MG/1
100 CAPSULE ORAL 3 TIMES DAILY
Status: DISCONTINUED | OUTPATIENT
Start: 2018-02-22 | End: 2018-02-22

## 2018-02-22 RX ORDER — INSULIN GLARGINE 100 [IU]/ML
12 INJECTION, SOLUTION SUBCUTANEOUS
Status: DISCONTINUED | OUTPATIENT
Start: 2018-02-22 | End: 2018-02-23 | Stop reason: HOSPADM

## 2018-02-22 RX ORDER — INSULIN GLARGINE 100 [IU]/ML
15 INJECTION, SOLUTION SUBCUTANEOUS
Status: DISCONTINUED | OUTPATIENT
Start: 2018-02-22 | End: 2018-02-22

## 2018-02-22 RX ORDER — GABAPENTIN 300 MG/1
300 CAPSULE ORAL
Status: DISCONTINUED | OUTPATIENT
Start: 2018-02-22 | End: 2018-02-23 | Stop reason: HOSPADM

## 2018-02-22 RX ORDER — HYDRALAZINE HYDROCHLORIDE 20 MG/ML
5 INJECTION INTRAMUSCULAR; INTRAVENOUS EVERY 6 HOURS PRN
Status: DISCONTINUED | OUTPATIENT
Start: 2018-02-22 | End: 2018-02-23 | Stop reason: HOSPADM

## 2018-02-22 RX ORDER — POLYETHYLENE GLYCOL 3350 17 G/17G
17 POWDER, FOR SOLUTION ORAL DAILY
Status: DISCONTINUED | OUTPATIENT
Start: 2018-02-22 | End: 2018-02-23 | Stop reason: HOSPADM

## 2018-02-22 RX ORDER — LISINOPRIL 10 MG/1
10 TABLET ORAL DAILY
Status: DISCONTINUED | OUTPATIENT
Start: 2018-02-22 | End: 2018-02-23 | Stop reason: HOSPADM

## 2018-02-22 RX ADMIN — INSULIN LISPRO 3 UNITS: 100 INJECTION, SOLUTION INTRAVENOUS; SUBCUTANEOUS at 12:10

## 2018-02-22 RX ADMIN — GABAPENTIN 300 MG: 300 CAPSULE ORAL at 22:57

## 2018-02-22 RX ADMIN — INSULIN GLARGINE 8 UNITS: 100 INJECTION, SOLUTION SUBCUTANEOUS at 22:57

## 2018-02-22 RX ADMIN — INSULIN LISPRO 2 UNITS: 100 INJECTION, SOLUTION INTRAVENOUS; SUBCUTANEOUS at 10:09

## 2018-02-22 RX ADMIN — METOCLOPRAMIDE HYDROCHLORIDE 10 MG: 10 TABLET ORAL at 12:09

## 2018-02-22 RX ADMIN — SODIUM CHLORIDE 100 ML/HR: 0.9 INJECTION, SOLUTION INTRAVENOUS at 04:53

## 2018-02-22 RX ADMIN — ACETAMINOPHEN 650 MG: 325 TABLET ORAL at 10:11

## 2018-02-22 RX ADMIN — POLYETHYLENE GLYCOL 3350 17 G: 17 POWDER, FOR SOLUTION ORAL at 10:11

## 2018-02-22 RX ADMIN — SENNOSIDES AND DOCUSATE SODIUM 2 TABLET: 8.6; 5 TABLET ORAL at 17:57

## 2018-02-22 RX ADMIN — ENOXAPARIN SODIUM 40 MG: 40 INJECTION SUBCUTANEOUS at 10:13

## 2018-02-22 RX ADMIN — METOCLOPRAMIDE HYDROCHLORIDE 10 MG: 10 TABLET ORAL at 17:57

## 2018-02-22 RX ADMIN — OXYCODONE HYDROCHLORIDE 30 MG: 10 TABLET ORAL at 06:14

## 2018-02-22 RX ADMIN — PRAVASTATIN SODIUM 40 MG: 40 TABLET ORAL at 17:57

## 2018-02-22 RX ADMIN — ONDANSETRON 4 MG: 2 INJECTION INTRAMUSCULAR; INTRAVENOUS at 04:53

## 2018-02-22 RX ADMIN — DIPHENHYDRAMINE HYDROCHLORIDE 25 MG: 50 INJECTION, SOLUTION INTRAMUSCULAR; INTRAVENOUS at 03:40

## 2018-02-22 RX ADMIN — DULOXETINE HYDROCHLORIDE 60 MG: 30 CAPSULE, DELAYED RELEASE ORAL at 10:13

## 2018-02-22 RX ADMIN — LISINOPRIL 10 MG: 10 TABLET ORAL at 10:10

## 2018-02-22 RX ADMIN — SENNOSIDES AND DOCUSATE SODIUM 2 TABLET: 8.6; 5 TABLET ORAL at 10:11

## 2018-02-22 RX ADMIN — ONDANSETRON 4 MG: 2 INJECTION INTRAMUSCULAR; INTRAVENOUS at 20:48

## 2018-02-22 RX ADMIN — OXYCODONE HYDROCHLORIDE 30 MG: 10 TABLET ORAL at 17:56

## 2018-02-22 NOTE — ASSESSMENT & PLAN NOTE
· Suspect related to diabetes  Patient takes significant opioids for a history of neuropathy  She is on Cymbalta  Would recommend the use of gabapentin  Will start low dose tonight and titrate up as able

## 2018-02-22 NOTE — ASSESSMENT & PLAN NOTE
· Patient noncompliant with medications at home  Reports she was supposed to be taking lisinopril, patients YVONNE has improved, only with hyperkalemia x 1 on admission in setting of YVONNE/hyperglycemia/hemolysis on specimen  · Given DM would favor lisinopril use as first agent    · Will start lisinopril 10 mg, hydralazine IV PRN  · Monitor SBP

## 2018-02-22 NOTE — PROGRESS NOTES
[facility]  Progress Note - Thad Ruiz 1955, 65 y o  female MRN: [id number]  Unit/Bed#: -01 Encounter: [id number]  Primary Care Provider: Stefan Watts   Date and time admitted to hospital: 2/20/2018  2:01 PM    * Type 2 diabetes mellitus with hyperglycemia ([facility] [address] )   Assessment & Plan    · Blood sugar on admission 781, at home was on oral agents including glimepiride, combination pill of sitagliptin and metformin however it was noncompliant with these agents  · A1c is 15 4  Prior 1 year ago 9 9  Has never seen an endocrinologist   Will consult endocrine  · Received a total of 42 units of insulin yesterday  Blood sugar yesterday evening 74, this morning 111 with 10 units of Lantus overnight  At this time decreased from Lantus 25 units q h s  to 15 units q h s , decreased mealtime insulin to 5 units t i d  with meals and await Endocrinology input  · Suspect patient also has diabetic gastroparesis, underlying CKD related to uncontrolled diabetes  · Will need outpatient follow-up with Endocrinology, Ophthalmology and close follow-up with family physician  · Insulin teaching        Headache   Assessment & Plan    · Chronic headache  No focal neurologic deficits on exam   · If worsens, consider CT of the head  Nausea   Assessment & Plan    · Chronic nausea, suspect related to diabetic gastroparesis  · Start Reglan t i d  with meals scheduled    Hypertension   Assessment & Plan    · Patient noncompliant with medications at home  Reports she was supposed to be taking lisinopril, patients YVONNE has improved, only with hyperkalemia x 1 on admission in setting of YVONNE/hyperglycemia/hemolysis on specimen  · Given DM would favor lisinopril use as first agent    · Will start lisinopril 10 mg, hydralazine IV PRN  · Monitor SBP        Hyperlipidemia   Assessment & Plan    · Continue statin ( to bring in medications)        Hyponatremia   Assessment & Plan · 131 on admission (corrected from 121) to 133 and yesterday 139  Check BMP now        Microcytic anemia   Assessment & Plan    · Microcytic anemia with hemoglobin 9 2, MCV 73  · Check iron studies  · Check Hemoccult        Chronic pain syndrome   Assessment & Plan    · PDMP reviewed  This shows oxycodone 30 mg tabs, 180 tabs dispensed for 30 day supply  Also with morphine  mg tabs, 90 dispensed for one month supply  · Patient using oxycodone around the clock and morphine p r n     Discussed with patient that extended release morphine should not be an as needed medication  She follows with her family physician who controls her pain  She does not take morphine at home except for approximately once a week  Discontinue morphine, continue oxycodone q 4 hours p r n  · Discussed with patient that given peripheral neuropathy symptoms, gabapentin would likely provide improved pain control and therefore will start at 300 mg q h s  Peripheral neuropathy   Assessment & Plan    · Suspect related to diabetes  Patient takes significant opioids for a history of neuropathy  She is on Cymbalta  Would recommend the use of gabapentin  Will start low dose tonight and titrate up as able  VTE Pharmacologic Prophylaxis:   Pharmacologic: Enoxaparin (Lovenox)  Mechanical VTE Prophylaxis in Place: Yes    Patient Centered Rounds: I have performed bedside rounds with nursing staff today  Rn    Discussions with Specialists or Other Care Team Provider: nursing    Education and Discussions with Family / Patient: patient, left messages for daughter and father    Time Spent for Care: 45 minutes  More than 50% of total time spent on counseling and coordination of care as described above      Current Length of Stay: 2 day(s)    Current Patient Status: Inpatient   Certification Statement: The patient will continue to require additional inpatient hospital stay due to uncontrolled DM2, hypertension, headache/nausea    Discharge Plan: not stable for discharge    Code Status: Level 1 - Full Code      Subjective:   Ms Cici Jennings is a 81-year-old female who has a history of diabetes, hypertension, hyperlipidemia, mild cognitive impairment and prior abnormal MRI who presented to the emergency department after her family physician was noted to perform laboratory studies which were noted to show hyperglycemia, hyponatremia, hyperkalemia  The patient was noted to have a blood sugar on admission of 781  At home she is noncompliant with her oral medications  She has never seen an endocrinologist and had never been on insulin  At home    Objective:     Vitals:   Temp (24hrs), Av 8 °F (36 6 °C), Min:97 4 °F (36 3 °C), Max:98 2 °F (36 8 °C)    HR:  [] 94  Resp:  [18-19] 19  BP: (160-178)/() 166/88  SpO2:  [95 %-97 %] 96 %  Body mass index is 36 64 kg/m²  Input and Output Summary (last 24 hours): Intake/Output Summary (Last 24 hours) at 18 0910  Last data filed at 18 0453   Gross per 24 hour   Intake             1280 ml   Output                0 ml   Net             1280 ml       Physical Exam:     Physical Exam   Constitutional: She is oriented to person, place, and time  No distress  HENT:   Head: Normocephalic and atraumatic  Eyes: Conjunctivae are normal    Neck: No JVD present  Cardiovascular: Normal rate, regular rhythm, normal heart sounds and intact distal pulses  No murmur heard  Pulmonary/Chest: Effort normal and breath sounds normal  No respiratory distress  She has no wheezes  She has no rales  Abdominal: Soft  Bowel sounds are normal  She exhibits no distension  There is tenderness (mild diffusely)  There is no rebound and no guarding  Musculoskeletal: She exhibits edema (Plus one bilateral) and tenderness (BLE)  Neurological: She is alert and oriented to person, place, and time  No cranial nerve deficit  Awake, alert, oriented  Follows commands    Speech clear without dysarthria  Muscle strength 5/5 in BUE, 4/5 in BLE  Sensation intact  EOMs intact  F-N intact without overshooting  Skin: Skin is warm and dry  No rash noted  She is not diaphoretic  No erythema  There is pallor  Chronic venous stasis   Psychiatric: She has a normal mood and affect  Her behavior is normal    Nursing note and vitals reviewed  Additional Data:     Labs:      Results from last 7 days  Lab Units 02/21/18  0515 02/20/18  1504   WBC Thousand/uL 5 32 4 33   HEMOGLOBIN g/dL 9 2* 9 4*   HEMATOCRIT % 30 7* 30 4*   PLATELETS Thousands/uL 177 140*   NEUTROS PCT %  --  73   LYMPHS PCT %  --  21   MONOS PCT %  --  5   EOS PCT %  --  1       Results from last 7 days  Lab Units 02/21/18  0515  02/20/18  1504   SODIUM mmol/L 139  < > 121*   POTASSIUM mmol/L 4 2  < > 6 0*   CHLORIDE mmol/L 103  < > 86*   CO2 mmol/L 26  < > 30   BUN mg/dL 13  < > 15   CREATININE mg/dL 1 11  < > 1 30   CALCIUM mg/dL 8 8  < > 8 6   TOTAL PROTEIN g/dL  --   --  7 7   BILIRUBIN TOTAL mg/dL  --   --  0 60   ALK PHOS U/L  --   --  144*   ALT U/L  --   --  33   AST U/L  --   --  49*   GLUCOSE RANDOM mg/dL 229*  < > 781*   < > = values in this interval not displayed  * I Have Reviewed All Lab Data Listed Above  * Additional Pertinent Lab Tests Reviewed:  [facility]    Imaging:    Imaging Reports Reviewed Today Include: MRI  Imaging Personally Reviewed by Myself Includes:  none    Recent Cultures (last 7 days):           Last 24 Hours Medication List:     Current Facility-Administered Medications:  acetaminophen 650 mg Oral Q6H PRN Belinda Delarosa PA-C   DULoxetine 60 mg Oral Daily Sandra Quintana PA-C   enoxaparin 40 mg Subcutaneous Daily Terri Hall MD   gabapentin 300 mg Oral HS Barbara Robin PA-C   hydrALAZINE 5 mg Intravenous Q6H PRN Barbara Robin PA-C   insulin glargine 15 Units Subcutaneous HS Barbara Robin PA-C   insulin lispro 1-5 Units Subcutaneous TID AC Salbador Henley MD   insulin lispro 1-5 Units Subcutaneous HS Luanne Gr MD   insulin lispro 5 Units Subcutaneous TID With Meals Barbara Robin PA-C   lisinopril 10 mg Oral Daily Barbara Robin PA-C   metoclopramide 10 mg Oral TID AC Barbara Robin PA-C   ondansetron 4 mg Intravenous Q6H PRN Luanne Gr MD   oxyCODONE 30 mg Oral Q4H PRN Sandra Quintana PA-C   polyethylene glycol 17 g Oral Daily Barbara Robin PA-C   pravastatin 40 mg Oral Daily With [employer]   senna-docusate sodium 2 tablet Oral BID Belinda Delarosa PA-C        Today, Patient Was Seen By: Leslie Paris PA-C    ** Please Note: Dictation voice to text software may have been used in the creation of this document   **

## 2018-02-22 NOTE — ASSESSMENT & PLAN NOTE
· PDMP reviewed  This shows oxycodone 30 mg tabs, 180 tabs dispensed for 30 day supply  Also with morphine  mg tabs, 90 dispensed for one month supply  · Patient using oxycodone around the clock and morphine p r n     Discussed with patient that extended release morphine should not be an as needed medication  She follows with her family physician who controls her pain  She does not take morphine at home except for approximately once a week  Discontinue morphine, continue oxycodone q 4 hours p r n  · Discussed with patient that given peripheral neuropathy symptoms, gabapentin would likely provide improved pain control and therefore will start at 300 mg q h s

## 2018-02-22 NOTE — ASSESSMENT & PLAN NOTE
· Chronic nausea, suspect related to diabetic gastroparesis  · Start Reglan t i d  with meals scheduled

## 2018-02-22 NOTE — ASSESSMENT & PLAN NOTE
· Blood sugar on admission 781, at home was on oral agents including glimepiride, combination pill of sitagliptin and metformin however it was noncompliant with these agents  · A1c is 15 4  Prior 1 year ago 9 9  Has never seen an endocrinologist   Will consult endocrine  · Received a total of 42 units of insulin yesterday  Blood sugar yesterday evening 74, this morning 111 with 10 units of Lantus overnight  At this time decreased from Lantus 25 units q h s  to 15 units q h s , decreased mealtime insulin to 5 units t i d  with meals and await Endocrinology input  · Suspect patient also has diabetic gastroparesis, underlying CKD related to uncontrolled diabetes    · Will need outpatient follow-up with Endocrinology, Ophthalmology and close follow-up with family physician  · Insulin teaching

## 2018-02-22 NOTE — CONSULTS
Consultation - Griffin Patel 65 y o  female MRN: [id number]    Unit/Bed#: -01 Encounter: [id number]      Assessment/Plan     Assessment: This is a 65y o -year-old female with diabetes with hyperglycemia  She was started  On insulin regimen during hospital admission, blood sugars improving, still elevated  Plan:  - reduce  Lantus 12  units at bedtime  - continue  Humalog 5  units with meals plus scale ( it was started today)   -continue Humalog scale at bedtime  -for discharge:  Patient will need basal plus bolus insulin regimen, preferably insulin pens, please assess coverage for Lantus/Levemir/tresiba pens or Humalog/Novolog pens   - blood sugars are in the range of 100-200, would consider current doses of insulin for discharge, metformin 1000 mg twice a day can be continued on outpatient basis  -please start insulin pen teaching, patient will need prescription for insulin pens/vial based on her coverage  , needles, glucometer and strips on discharge  -discussed to make appointment with endocrinology in 2 weeks after discharge  -discuss that she will have to check blood sugars before meals and at bedtime, goal for blood sugar is 100-200 initially as she is used to have blood sugars    Thank you for consulting Endocrinology      CC: Diabetes Consult    History of Present Illness     HPI: Griffin Patel is a 65y o  year old female with type 2 diabetes for 20 years, uncontrolled managed on oral medications at home, was admitted for elevated blood sugars in 400 range, as per primary care physician  She admits noncompliance to medications at home, as well as diet  She complains of feeling thirsty excessively, excess urination, feeling tired and fatigue for past couple of months  She also has fogginess in her brain, difficulty in remembering things, blurry vision, tingling and numbness in her extremities  She has a complications of diabetes such as neuropathy    She is not aware of retinopathy a but she needs to make appointment with Ophthalmology, as she has not seen Ophthalmology for many years  She denies history of CAD, CHD or thyroid problems  She denies history of steroid injection recently  She does not check blood sugars at home  Her home medications are glipizide 10 mg 2 times a day, saxagliptin/metformin 1 tablet daily  On admission her blood sugar was 427, anion gap was normal, she received Lantus 25 units on February 28, 10 units yesterday, her blood sugars are improved  Today fasting blood sugar was 112  Results for Manpreet Felipe (MRN [id number]) as of 2/22/2018 15:33   Ref  Range 2/20/2018 21:50   Sodium Latest Ref Range: 136 - 145 mmol/L 133 (L)   Potassium Latest Ref Range: 3 5 - 5 3 mmol/L 3 8   Chloride Latest Ref Range: 100 - 108 mmol/L 95 (L)   CO2 Latest Ref Range: 21 - 32 mmol/L 29   Anion Gap Latest Ref Range: 4 - 13 mmol/L 9   BUN Latest Ref Range: 5 - 25 mg/dL 13   Creatinine Latest Ref Range: 0 60 - 1 30 mg/dL 1 16   Glucose Latest Ref Range: 65 - 140 mg/dL 427 (H)   Calcium Latest Ref Range: 8 3 - 10 1 mg/dL 8 7   eGFR Latest Units: ml/min/1 73sq m 51       Inpatient consult to Endocrinology  Consult performed by: [facility], [facility]  ordered by: Arcelia Bonilla A          Review of Systems   Constitutional: Positive for activity change, appetite change and fatigue  Negative for unexpected weight change  HENT: Negative  Eyes: Positive for visual disturbance  Respiratory: Negative for cough, chest tightness and shortness of breath  Cardiovascular: Negative for chest pain, palpitations and leg swelling  Gastrointestinal: Positive for nausea  Negative for abdominal pain, constipation and diarrhea  Endocrine: Positive for polydipsia and polyuria  Negative for cold intolerance, heat intolerance and polyphagia  Genitourinary: Negative  Musculoskeletal: Negative  Neurological: Positive for dizziness, weakness and headaches  Hematological: Negative  Psychiatric/Behavioral: Negative  Historical Information   Past Medical History:   Diagnosis Date    Degenerative disc disease at L5-S1 level     Diabetes mellitus ([facility] [address] )     Hypertension      Past Surgical History:   Procedure Laterality Date    CHOLECYSTECTOMY      JOINT REPLACEMENT      OOPHORECTOMY       Social History   History   Alcohol Use No     History   Drug Use No     History   Smoking Status    Former Smoker   Smokeless Tobacco    Not on file     Family History: History reviewed  No pertinent family history      Meds/Allergies   Current Facility-Administered Medications   Medication Dose Route Frequency Provider Last Rate Last Dose    acetaminophen (TYLENOL) tablet 650 mg  650 mg Oral Q6H PRN Terrance Yanez PA-C   650 mg at 02/22/18 1011    DULoxetine (CYMBALTA) delayed release capsule 60 mg  60 mg Oral Daily Sandra Quintana PA-C   60 mg at 02/22/18 1013    enoxaparin (LOVENOX) subcutaneous injection 40 mg  40 mg Subcutaneous Daily Luanne Gr MD   40 mg at 02/22/18 1013    gabapentin (NEURONTIN) capsule 300 mg  300 mg Oral HS Barbara Robin PA-C        hydrALAZINE (APRESOLINE) injection 5 mg  5 mg Intravenous Q6H PRN Barbara Robin PA-C        insulin glargine (LANTUS) subcutaneous injection 15 Units  15 Units Subcutaneous HS Barbara Robin PA-C        insulin lispro (HumaLOG) 100 units/mL subcutaneous injection 1-5 Units  1-5 Units Subcutaneous TID AC Luanne Gr MD   3 Units at 02/22/18 1210    insulin lispro (HumaLOG) 100 units/mL subcutaneous injection 1-5 Units  1-5 Units Subcutaneous HS May Andrew MD   5 Units at 02/20/18 2213    insulin lispro (HumaLOG) 100 units/mL subcutaneous injection 5 Units  5 Units Subcutaneous TID With Meals Barbara Robin PA-C   5 Units at 02/22/18 1209    lisinopril (ZESTRIL) tablet 10 mg  10 mg Oral Daily Barbara Robin PA-C   10 mg at 02/22/18 1010    metoclopramide (REGLAN) tablet 10 mg  10 mg Oral TID AC Barbara Robin PA-C   10 mg at 02/22/18 1209    ondansetron (ZOFRAN) injection 4 mg  4 mg Intravenous Q6H PRN Dionna Stevenson MD   4 mg at 02/22/18 0453    oxyCODONE (ROXICODONE) immediate release tablet 30 mg  30 mg Oral Q4H PRN Cecille Nix PA-C   30 mg at 02/22/18 5286    polyethylene glycol (MIRALAX) packet 17 g  17 g Oral Daily Barbara Robin PA-C   17 g at 02/22/18 1011    pravastatin (PRAVACHOL) tablet 40 mg  40 mg Oral Daily With mycujoo TIFFANIE Sainz   40 mg at 02/21/18 1646    senna-docusate sodium (SENOKOT S) 8 6-50 mg per tablet 2 tablet  2 tablet Oral BID Cecille Nix PA-C   2 tablet at 02/22/18 1011     No Known Allergies    Objective   Vitals: Blood pressure 166/88, pulse 94, temperature 97 9 °F (36 6 °C), temperature source Oral, resp  rate 19, weight 89 4 kg (197 lb 1 5 oz), SpO2 96 %  Intake/Output Summary (Last 24 hours) at 02/22/18 1534  Last data filed at 02/22/18 1300   Gross per 24 hour   Intake             2600 ml   Output                0 ml   Net             2600 ml     Invasive Devices     Peripheral Intravenous Line            Peripheral IV 02/21/18 Right Arm 1 day                Physical Exam   Constitutional: She is oriented to person, place, and time  She appears well-developed and well-nourished  No distress  HENT:   Head: Normocephalic and atraumatic  Eyes: Conjunctivae and EOM are normal  Right eye exhibits no discharge  Left eye exhibits no discharge  Neck: Normal range of motion  Neck supple  No thyromegaly present  Cardiovascular: Normal rate, regular rhythm and normal heart sounds  No murmur heard  Pulmonary/Chest: Effort normal and breath sounds normal  No respiratory distress  She has no wheezes  Abdominal: Soft  Bowel sounds are normal  She exhibits no distension  There is no tenderness  Musculoskeletal: Normal range of motion  She exhibits no edema, tenderness or deformity     Neurological: She is alert and oriented to person, place, and time  She has normal reflexes  Skin: Skin is warm and dry  No rash noted  She is not diaphoretic  No erythema  Psychiatric: She has a normal mood and affect  Her behavior is normal    Vitals reviewed  The history was obtained from the review of the chart, patient  Lab Results:     Results from last 7 days  Lab Units 02/21/18  0515   HEMOGLOBIN A1C % 15 4*     Lab Results   Component Value Date    WBC 2 79 (L) 02/22/2018    HGB 8 9 (L) 02/22/2018    HCT 29 8 (L) 02/22/2018    MCV 75 (L) 02/22/2018     (L) 02/22/2018     Lab Results   Component Value Date/Time    BUN 11 02/22/2018 09:17 AM     02/22/2018 09:17 AM    K 4 5 02/22/2018 09:17 AM     02/22/2018 09:17 AM    CO2 25 02/22/2018 09:17 AM    CREATININE 1 04 02/22/2018 09:17 AM    AST 49 (H) 02/20/2018 03:04 PM    ALT 33 02/20/2018 03:04 PM    ALB 3 3 (L) 02/20/2018 03:04 PM     No results for input(s): CHOL, HDL, LDL, TRIG, VLDL in the last 72 hours  No results found for: Carina Gonzales  POC Glucose (mg/dl)   Date Value   02/22/2018 271 (H)   02/22/2018 112   02/22/2018 118   02/21/2018 111   02/21/2018 74   02/21/2018 130   02/21/2018 270 (H)   02/21/2018 266 (H)   02/20/2018 405 (H)   02/20/2018 447 (H)       Imaging Studies: I have personally reviewed pertinent reports  Portions of the record may have been created with voice recognition software

## 2018-02-23 VITALS
WEIGHT: 197.09 LBS | HEART RATE: 77 BPM | RESPIRATION RATE: 18 BRPM | SYSTOLIC BLOOD PRESSURE: 166 MMHG | BODY MASS INDEX: 36.64 KG/M2 | OXYGEN SATURATION: 94 % | DIASTOLIC BLOOD PRESSURE: 82 MMHG | TEMPERATURE: 98.5 F

## 2018-02-23 PROBLEM — E87.1 HYPONATREMIA: Status: RESOLVED | Noted: 2017-07-14 | Resolved: 2018-02-23

## 2018-02-23 PROBLEM — R51.9 HEADACHE: Status: RESOLVED | Noted: 2018-02-22 | Resolved: 2018-02-23

## 2018-02-23 LAB
ALBUMIN SERPL BCP-MCNC: 2.8 G/DL (ref 3.5–5)
ALP SERPL-CCNC: 180 U/L (ref 46–116)
ALT SERPL W P-5'-P-CCNC: 87 U/L (ref 12–78)
ANION GAP SERPL CALCULATED.3IONS-SCNC: 4 MMOL/L (ref 4–13)
AST SERPL W P-5'-P-CCNC: 127 U/L (ref 5–45)
BILIRUB SERPL-MCNC: 0.5 MG/DL (ref 0.2–1)
BUN SERPL-MCNC: 10 MG/DL (ref 5–25)
CALCIUM SERPL-MCNC: 8.3 MG/DL (ref 8.3–10.1)
CHLORIDE SERPL-SCNC: 104 MMOL/L (ref 100–108)
CO2 SERPL-SCNC: 30 MMOL/L (ref 21–32)
CREAT SERPL-MCNC: 0.95 MG/DL (ref 0.6–1.3)
GFR SERPL CREATININE-BSD FRML MDRD: 65 ML/MIN/1.73SQ M
GLUCOSE SERPL-MCNC: 165 MG/DL (ref 65–140)
GLUCOSE SERPL-MCNC: 172 MG/DL (ref 65–140)
GLUCOSE SERPL-MCNC: 189 MG/DL (ref 65–140)
GLUCOSE SERPL-MCNC: 329 MG/DL (ref 65–140)
POTASSIUM SERPL-SCNC: 4.3 MMOL/L (ref 3.5–5.3)
PROT SERPL-MCNC: 6.3 G/DL (ref 6.4–8.2)
SODIUM SERPL-SCNC: 138 MMOL/L (ref 136–145)

## 2018-02-23 PROCEDURE — G8979 MOBILITY GOAL STATUS: HCPCS

## 2018-02-23 PROCEDURE — 97110 THERAPEUTIC EXERCISES: CPT

## 2018-02-23 PROCEDURE — 97163 PT EVAL HIGH COMPLEX 45 MIN: CPT

## 2018-02-23 PROCEDURE — 82948 REAGENT STRIP/BLOOD GLUCOSE: CPT

## 2018-02-23 PROCEDURE — 80053 COMPREHEN METABOLIC PANEL: CPT | Performed by: PHYSICIAN ASSISTANT

## 2018-02-23 PROCEDURE — 99239 HOSP IP/OBS DSCHRG MGMT >30: CPT | Performed by: PHYSICIAN ASSISTANT

## 2018-02-23 PROCEDURE — G8978 MOBILITY CURRENT STATUS: HCPCS

## 2018-02-23 RX ORDER — LISINOPRIL 5 MG/1
10 TABLET ORAL DAILY
Refills: 0
Start: 2018-02-23 | End: 2020-03-19 | Stop reason: DRUGHIGH

## 2018-02-23 RX ORDER — METOCLOPRAMIDE 10 MG/1
10 TABLET ORAL
Qty: 90 TABLET | Refills: 0 | Status: ON HOLD | OUTPATIENT
Start: 2018-02-23 | End: 2020-03-04 | Stop reason: SDUPTHER

## 2018-02-23 RX ORDER — INSULIN GLARGINE 100 [IU]/ML
10 INJECTION, SOLUTION SUBCUTANEOUS
Qty: 10 ML | Refills: 0 | Status: ON HOLD
Start: 2018-02-23 | End: 2018-08-24

## 2018-02-23 RX ORDER — LISINOPRIL 5 MG/1
5 TABLET ORAL DAILY
Status: ON HOLD | COMMUNITY
End: 2018-02-23

## 2018-02-23 RX ORDER — GABAPENTIN 300 MG/1
300 CAPSULE ORAL
Qty: 30 CAPSULE | Refills: 0 | Status: SHIPPED | OUTPATIENT
Start: 2018-02-23 | End: 2020-03-11 | Stop reason: HOSPADM

## 2018-02-23 RX ORDER — GABAPENTIN 300 MG/1
300 CAPSULE ORAL
Refills: 0
Start: 2018-02-23 | End: 2018-02-23

## 2018-02-23 RX ADMIN — LISINOPRIL 10 MG: 10 TABLET ORAL at 08:18

## 2018-02-23 RX ADMIN — METOCLOPRAMIDE HYDROCHLORIDE 10 MG: 10 TABLET ORAL at 13:16

## 2018-02-23 RX ADMIN — ENOXAPARIN SODIUM 40 MG: 40 INJECTION SUBCUTANEOUS at 08:18

## 2018-02-23 RX ADMIN — INSULIN LISPRO 1 UNITS: 100 INJECTION, SOLUTION INTRAVENOUS; SUBCUTANEOUS at 08:22

## 2018-02-23 RX ADMIN — ACETAMINOPHEN 650 MG: 325 TABLET ORAL at 08:15

## 2018-02-23 RX ADMIN — SENNOSIDES AND DOCUSATE SODIUM 2 TABLET: 8.6; 5 TABLET ORAL at 08:18

## 2018-02-23 RX ADMIN — INSULIN LISPRO 3 UNITS: 100 INJECTION, SOLUTION INTRAVENOUS; SUBCUTANEOUS at 13:13

## 2018-02-23 RX ADMIN — DULOXETINE HYDROCHLORIDE 60 MG: 30 CAPSULE, DELAYED RELEASE ORAL at 08:17

## 2018-02-23 RX ADMIN — METOCLOPRAMIDE HYDROCHLORIDE 10 MG: 10 TABLET ORAL at 08:15

## 2018-02-23 RX ADMIN — POLYETHYLENE GLYCOL 3350 17 G: 17 POWDER, FOR SOLUTION ORAL at 08:18

## 2018-02-23 RX ADMIN — OXYCODONE HYDROCHLORIDE 30 MG: 10 TABLET ORAL at 05:42

## 2018-02-23 NOTE — PLAN OF CARE
Problem: Potential for Falls  Goal: Patient will remain free of falls  INTERVENTIONS:  - Assess patient frequently for physical needs  -  Identify cognitive and physical deficits and behaviors that affect risk of falls    -  [place] fall precautions as indicated by assessment   - Educate patient/family on patient safety including physical limitations  - Instruct patient to call for assistance with activity based on assessment  - Modify environment to reduce risk of injury  - Consider OT/PT consult to assist with strengthening/mobility    Outcome: Progressing      Problem: Prexisting or High Potential for Compromised Skin Integrity  Goal: Skin integrity is maintained or improved  INTERVENTIONS:  - Identify patients at risk for skin breakdown  - Assess and monitor skin integrity  - Assess and monitor nutrition and hydration status  - Monitor labs (i e  albumin)  - Assess for incontinence   - Turn and reposition patient  - Assist with mobility/ambulation  - Relieve pressure over bony prominences  - Avoid friction and shearing  - Provide appropriate hygiene as needed including keeping skin clean and dry  - Evaluate need for skin moisturizer/barrier cream  - Collaborate with interdisciplinary team (i e  Nutrition, Rehabilitation, etc )   - Patient/family teaching   Outcome: Progressing      Problem: PAIN - ADULT  Goal: Verbalizes/displays adequate comfort level or baseline comfort level  Interventions:  - Encourage patient to monitor pain and request assistance  - Assess pain using appropriate pain scale  - Administer analgesics based on type and severity of pain and evaluate response  - Implement non-pharmacological measures as appropriate and evaluate response  - Consider cultural and social influences on pain and pain management  - Notify physician/advanced practitioner if interventions unsuccessful or patient reports new pain   Outcome: Progressing      Problem: INFECTION - ADULT  Goal: Absence or prevention of progression during hospitalization  INTERVENTIONS:  - Assess and monitor for signs and symptoms of infection  - Monitor lab/diagnostic results  - Monitor all insertion sites, i e  indwelling lines, tubes, and drains  - Monitor endotracheal (as able) and nasal secretions for changes in amount and color  - [place] appropriate cooling/warming therapies per order  - Administer medications as ordered  - Instruct and encourage patient and family to use good hand hygiene technique  - Identify and instruct in appropriate isolation precautions for identified infection/condition   Outcome: Progressing    Goal: Absence of fever/infection during neutropenic period  INTERVENTIONS:  - Monitor WBC  - Implement neutropenic guidelines   Outcome: Progressing      Problem: SAFETY ADULT  Goal: Maintain or return to baseline ADL function  INTERVENTIONS:  -  Assess patient's ability to carry out ADLs; assess patient's baseline for ADL function and identify physical deficits which impact ability to perform ADLs (bathing, care of mouth/teeth, toileting, grooming, dressing, etc )  - Assess/evaluate cause of self-care deficits   - Assess range of motion  - Assess patient's mobility; develop plan if impaired  - Assess patient's need for assistive devices and provide as appropriate  - Encourage maximum independence but intervene and supervise when necessary  ¯ Involve family in performance of ADLs  ¯ Assess for home care needs following discharge   ¯ Request OT consult to assist with ADL evaluation and planning for discharge  ¯ Provide patient education as appropriate   Outcome: Progressing    Goal: Maintain or return mobility status to optimal level  INTERVENTIONS:  - Assess patient's baseline mobility status (ambulation, transfers, stairs, etc )    - Identify cognitive and physical deficits and behaviors that affect mobility  - Identify mobility aids required to assist with transfers and/or ambulation (gait belt, sit-to-stand, lift, walker, cane, etc )  - [place] fall precautions as indicated by assessment  - Record patient progress and toleration of activity level on Mobility SBAR; progress patient to next Phase/Stage  - Instruct patient to call for assistance with activity based on assessment  - Request Rehabilitation consult to assist with strengthening/weightbearing, etc    Outcome: Progressing    Goal: Patient will remain free of falls  INTERVENTIONS:  - Assess patient frequently for physical needs  -  Identify cognitive and physical deficits and behaviors that affect risk of falls  -  [place] fall precautions as indicated by assessment   - Educate patient/family on patient safety including physical limitations  - Instruct patient to call for assistance with activity based on assessment  - Modify environment to reduce risk of injury  - Consider OT/PT consult to assist with strengthening/mobility    Outcome: Progressing      Problem: DISCHARGE PLANNING  Goal: Discharge to home or other facility with appropriate resources  INTERVENTIONS:  - Identify barriers to discharge w/patient and caregiver  - Arrange for needed discharge resources and transportation as appropriate  - Identify discharge learning needs (meds, wound care, etc )  - Arrange for interpretive services to assist at discharge as needed  - Refer to Case Management Department for coordinating discharge planning if the patient needs post-hospital services based on physician/advanced practitioner order or complex needs related to functional status, cognitive ability, or social support system   Outcome: Progressing      Problem: Knowledge Deficit  Goal: Patient/family/caregiver demonstrates understanding of disease process, treatment plan, medications, and discharge instructions  Complete learning assessment and assess knowledge base    Interventions:  - Provide teaching at level of understanding  - Provide teaching via preferred learning methods   Outcome: Progressing      Problem: Nutrition/Hydration-ADULT  Goal: Nutrient/Hydration intake appropriate for improving, restoring or maintaining nutritional needs  Monitor and assess patient's nutrition/hydration status for malnutrition (ex- brittle hair, bruises, dry skin, pale skin and conjunctiva, muscle wasting, smooth red tongue, and disorientation)  Collaborate with interdisciplinary team and initiate plan and interventions as ordered  Monitor patient's weight and dietary intake as ordered or per policy  Utilize nutrition screening tool and intervene per policy  Determine patient's food preferences and provide high-protein, high-caloric foods as appropriate       INTERVENTIONS:  - Monitor oral intake, urinary output, labs, and treatment plans  - Assess nutrition and hydration status and recommend course of action  - Evaluate amount of meals eaten  - Assist patient with eating if necessary   - Allow adequate time for meals  - Recommend/ encourage appropriate diets, oral nutritional supplements, and vitamin/mineral supplements  - Order, calculate, and assess calorie counts as needed  - Recommend, monitor, and adjust tube feedings and TPN/PPN based on assessed needs  - Assess need for intravenous fluids  - Provide specific nutrition/hydration education as appropriate  - Include patient/family/caregiver in decisions related to nutrition   Outcome: Progressing

## 2018-02-23 NOTE — DISCHARGE SUMMARY
Discharge Summary - [facility] Internal Medicine     Patient Information: Haritha Pozo 65 y o  female MRN: [id number]  Unit/Bed#: -01 Encounter: [id number]     Discharging Physician / Practitioner: Shorty Headley PA-C  PCP: Gene Watts  Admission Date: 2/20/2018  Discharge Date: 02/23/18     Reason for Admission:  Abnormal blood work     Discharge Diagnoses:      Principal Problem:    Type 2 diabetes mellitus with hyperglycemia ([address] )  Active Problems:    Nausea    Hypertension    Hyperlipidemia    Microcytic anemia    Chronic pain syndrome    Peripheral neuropathy  Resolved Problems:    Headache    Hyponatremia        Consultations During Hospital Stay:  · Endocrinology Dr Santo Andrade     Procedures Performed:      None     Significant Findings:     · Extremely uncontrolled Diabetes mellitus type 2 with hyperglycemia, on admission glucose of 781  A1c 15 4  · Chronic nausea, likely related to gastroparesis  · Hypertension, accelerated  · Hyponatremia, resolved  · Hyperkalemia, resolved     Incidental Findings:   · Microcytic anemia  · Transaminitis on day of discharge     Test Results Pending at Discharge (will require follow up): · Iron panel     Outpatient Tests Requested:  · CMP 1 week     Complications:  None     Hospital Course:      Haritha Pozo is a 65 y o  female patient who originally presented to the hospital on 2/20/2018 due to abnormal blood work  The patient was noted to have laboratory studies performed by her family physician and then was noted to have significant abnormalities including hyperglycemia, hyponatremia, hyperkalemia was referred to the emergency department for further evaluation  She has past medical history of diabetes, hypertension, hyperlipidemia, mild cognitive impairment, prior abnormal MRI, chronic pain syndrome, hyperlipidemia, continuous opioid dependence and medical noncompliance  She does not take her medications at home    She reports that she has chronic nausea in her medications made it worse and therefore she stopped taking them  She also has noted have chronic headaches, chronic bilateral lower extremity pain and neuropathy  She was not taking any of her medications at home and was sporadically taking her diabetic medications      The patient was noted to present with a sodium of 121, glucose of 781, potassium of 6 0 which was slightly hemolyzed, AST of 49, alk phos of 144  She was noted to be treated with IV fluid hydration  She was noted to have improvement in hyponatremia and hyperglycemia with IV fluid hydration and then was transitioned insulin  The patient was noted be seen by Endocrinology  She was noted to respond to small amounts of insulin and therefore will be discharged on 10 units of Lantus q h s , Humalog 5 units t i d  with meals  She will be prescribed Lantus solo star pen and Humalog KwikPen  These were noted to be 25 dollars each  Patient is aware of this  Patient had insulin teaching and diabetic teaching while she was her  She will continue diabetic diet on discharge  It is expected that the patient will not require long-term use of insulin given her significant response to small doses of insulin however with A1c of 15, the patient will be discharged on insulin coupled with metformin      In regards to her hypertension, the patient was noted to be resumed upon lisinopril  This will be increased to 10 mg on discharge  We will be holding triamterene/hydrochlorothiazide  She was noted to have hyperkalemia on admission although this was slightly hemolyzed likely related to her YVONNE and severe hyperglycemia  She will follow up with her primary care physician for titration of her antihypertensive medications      On day of discharge patient's sodium 130, potassium 4 3, creatinine 0 95, glucose 172      Of note, on admission the patient was noted to have an AST of 49 and ALT of 33    This was noted to subsequently increased to 127 and 87 respectively at time of discharge  She has a history of cholecystectomy  She has no abdominal pain and chronic nausea has improved during stay  She was not taking statin at home and had been placed on statin while inpatient  This will be held until repeat CMP in one week  I was unable to reach her PCP but left message regarding follow up and will provide her with a prescription for CMP in one week to ensure LFTs stable/improving       Please note that significant time was spent regarding patient's medications upon discharge in discussion with her, her  at bedside         Condition at Discharge: stable      Discharge Day Visit / Exam:      Subjective:  Feeling much better, nausea much improved, no abdominal pain  No dysuria  Headache improved  Bilateral lower extremity pain is chronic however stable  No chest pain or shortness of breath  Felt weak but worked with physical therapy  Was taught insulin teaching  Vitals: Blood Pressure: 166/82 (02/23/18 0700)  Pulse: 77 (02/23/18 0700)  Temperature: 98 5 °F (36 9 °C) (02/23/18 0700)  Temp Source: Oral (02/23/18 0700)  Respirations: 18 (02/23/18 0700)  Weight - Scale: 89 4 kg (197 lb 1 5 oz) (02/20/18 1420)  SpO2: 94 % (02/23/18 0700)     Exam:   Physical Exam   Constitutional: She is oriented to person, place, and time  No distress  HENT:   Head: Normocephalic and atraumatic  Eyes: Conjunctivae are normal    Neck: No JVD present  Cardiovascular: Normal rate, regular rhythm, normal heart sounds and intact distal pulses  No murmur heard  Pulmonary/Chest: Effort normal and breath sounds normal  No respiratory distress  She has no wheezes  She has no rales  Abdominal: Soft  Bowel sounds are normal  She exhibits no distension  There is no tenderness  There is no rebound and no guarding  Obese  Nontender  Negative murphys sign   Musculoskeletal: She exhibits edema (1+ BLE edema) and tenderness (BLE tenderness)     Neurological: She is alert and oriented to person, place, and time  No cranial nerve deficit  Skin: Skin is warm and dry  No rash noted  She is not diaphoretic  No erythema  Chronic venous stasis changes   Psychiatric: She has a normal mood and affect  Her behavior is normal    Nursing note and vitals reviewed      Discussion with Family: patient,  at bedside     Discharge instructions/Information to patient and family:   See after visit summary for information provided to patient and family        Provisions for Follow-Up Care:  See after visit summary for information related to follow-up care and any pertinent home health orders        Disposition:      Home     For Discharges to [facility] SNF:   · Not Applicable to this Patient - Not Applicable to this Patient     Planned Readmission: no     Discharge Statement:  I spent 40 minutes discharging the patient  This time was spent on the day of discharge  I had direct contact with the patient on the day of discharge  Greater than 50% of the total time was spent examining patient, answering all patient questions, arranging and discussing plan of care with patient as well as directly providing post-discharge instructions    Additional time then spent on discharge activities      Discharge Medications:  See after visit summary for reconciled discharge medications provided to patient and family        ** Please Note: This note has been constructed using a voice recognition system **

## 2018-02-23 NOTE — PROGRESS NOTES
[facility] Progress Note  Patient: Charu Parrish 65 y o  female   MRN: [id number]  PCP: Juan Watts  Unit/Bed#: -[facility] Encounter: [id number]  Date Of Visit: 18       * Type 2 diabetes mellitus with hyperglycemia ([address] )   Assessment & Plan     No DKA  Still with elevated glucose  Start on Lantus HS  Humalog 6 Unit TID  Humalog 10 unit SQ   IVF  We will consider Endocrinology consult           Hyponatremia   Assessment & Plan     Corrected 137 mg/ld  Likely due to Hyperglycemia  BMP am          Hyperlipidemia   Assessment & Plan     Cont Home medication          Hypertension   Assessment & Plan     Cont Home medication           VTE Pharmacologic Prophylaxis:   Pharmacologic: Enoxaparin (Lovenox)  Mechanical VTE Prophylaxis in Place: Yes    Patient Centered Rounds: I have performed bedside rounds with nursing staff today  Discussions with Specialists or Other Care Team Provider:     Education and Discussions with Family / Patient: patient     Time Spent for Care: 20 minutes  More than 50% of total time spent on counseling and coordination of care as described above  Current Length of Stay: 3 day(s)    Current Patient Status: Inpatient   Certification Statement: The patient will continue to require additional inpatient hospital stay due to acute above conditions    Discharge Plan / Estimated Discharge Date: depend on clinical course    Code Status: Level 1 - Full Code      Subjective:   I am still nauseated  Objective:     Vitals:   Temp (24hrs), Av 2 °F (36 8 °C), Min:97 7 °F (36 5 °C), Max:98 5 °F (36 9 °C)    HR:  [77-98] 77  Resp:  [18-19] 18  BP: (166-170)/(82-96) 166/82  SpO2:  [94 %-98 %] 94 %  Body mass index is 36 64 kg/m²  Input and Output Summary (last 24 hours):     No intake or output data in the 24 hours ending 18 1513    Physical Exam:     Physical Exam   Constitutional: She is oriented to person, place, and time  No distress     Cardiovascular: Normal rate  Exam reveals no gallop  No murmur heard  Pulmonary/Chest: No respiratory distress  She has no wheezes  She has no rales  She exhibits no tenderness  Abdominal: She exhibits no distension and no mass  There is no tenderness  There is no rebound and no guarding  obese   Musculoskeletal: She exhibits edema  Neurological: She is alert and oriented to person, place, and time  She has normal reflexes  No cranial nerve deficit  Coordination abnormal    Skin: Skin is dry  She is not diaphoretic  Psychiatric: She has a normal mood and affect  Additional Data:     Labs:      Results from last 7 days  Lab Units 02/22/18  0917  02/20/18  1504   WBC Thousand/uL 2 79*  < > 4 33   HEMOGLOBIN g/dL 8 9*  < > 9 4*   HEMATOCRIT % 29 8*  < > 30 4*   PLATELETS Thousands/uL 133*  < > 140*   NEUTROS PCT %  --   --  73   LYMPHS PCT %  --   --  21   MONOS PCT %  --   --  5   EOS PCT %  --   --  1   < > = values in this interval not displayed  Results from last 7 days  Lab Units 02/23/18  0525   SODIUM mmol/L 138   POTASSIUM mmol/L 4 3   CHLORIDE mmol/L 104   CO2 mmol/L 30   BUN mg/dL 10   CREATININE mg/dL 0 95   CALCIUM mg/dL 8 3   TOTAL PROTEIN g/dL 6 3*   BILIRUBIN TOTAL mg/dL 0 50   ALK PHOS U/L 180*   ALT U/L 87*   AST U/L 127*   GLUCOSE RANDOM mg/dL 172*           * I Have Reviewed All Lab Data Listed Above  * Additional Pertinent Lab Tests Reviewed:  All Labs Within Last 24 Hours Reviewed    Imaging:    Imaging Reports Reviewed Today Include:   Imaging Personally Reviewed by Myself Includes:     Recent Cultures (last 7 days):           Last 24 Hours Medication List:     Current Facility-Administered Medications:  acetaminophen 650 mg Oral Q6H PRN Yoko Botello PA-C   DULoxetine 60 mg Oral Daily Sandra Quintana PA-C   enoxaparin 40 mg Subcutaneous Daily Jareth Tran MD   gabapentin 300 mg Oral HS Barbara Robin PA-C   hydrALAZINE 5 mg Intravenous Q6H PRN Barbara Robin PA-C   insulin glargine 12 Units Subcutaneous HS Allison Virgen MD   insulin lispro 1-5 Units Subcutaneous TID AC Angel Gan MD   insulin lispro 1-5 Units Subcutaneous HS Angel Gan MD   insulin lispro 6 Units Subcutaneous TID With Meals Allison Virgen MD   lisinopril 10 mg Oral Daily Barbara Robin PA-C   metoclopramide 10 mg Oral TID AC Barbara Robin PA-C   ondansetron 4 mg Intravenous Q6H PRN Luanne Gr MD   oxyCODONE 30 mg Oral Q4H PRN Sandra Quintana PA-C   polyethylene glycol 17 g Oral Daily Barbara Robin PA-C   pravastatin 40 mg Oral Daily With FinconTIFFANIE   senna-docusate sodium 2 tablet Oral BID Keo Encinas PA-C        Today, Patient Was Seen By: Angel Gan MD    ** Please Note: This note has been constructed using a voice recognition system   **

## 2018-02-23 NOTE — PLAN OF CARE
Problem: Potential for Falls  Goal: Patient will remain free of falls  INTERVENTIONS:  - Assess patient frequently for physical needs  -  Identify cognitive and physical deficits and behaviors that affect risk of falls    -  [place] fall precautions as indicated by assessment   - Educate patient/family on patient safety including physical limitations  - Instruct patient to call for assistance with activity based on assessment  - Modify environment to reduce risk of injury  - Consider OT/PT consult to assist with strengthening/mobility    Outcome: Adequate for Discharge      Problem: Prexisting or High Potential for Compromised Skin Integrity  Goal: Skin integrity is maintained or improved  INTERVENTIONS:  - Identify patients at risk for skin breakdown  - Assess and monitor skin integrity  - Assess and monitor nutrition and hydration status  - Monitor labs (i e  albumin)  - Assess for incontinence   - Turn and reposition patient  - Assist with mobility/ambulation  - Relieve pressure over bony prominences  - Avoid friction and shearing  - Provide appropriate hygiene as needed including keeping skin clean and dry  - Evaluate need for skin moisturizer/barrier cream  - Collaborate with interdisciplinary team (i e  Nutrition, Rehabilitation, etc )   - Patient/family teaching   Outcome: Adequate for Discharge      Problem: PAIN - ADULT  Goal: Verbalizes/displays adequate comfort level or baseline comfort level  Interventions:  - Encourage patient to monitor pain and request assistance  - Assess pain using appropriate pain scale  - Administer analgesics based on type and severity of pain and evaluate response  - Implement non-pharmacological measures as appropriate and evaluate response  - Consider cultural and social influences on pain and pain management  - Notify physician/advanced practitioner if interventions unsuccessful or patient reports new pain   Outcome: Adequate for Discharge      Problem: INFECTION - ADULT  Goal: Absence or prevention of progression during hospitalization  INTERVENTIONS:  - Assess and monitor for signs and symptoms of infection  - Monitor lab/diagnostic results  - Monitor all insertion sites, i e  indwelling lines, tubes, and drains  - Monitor endotracheal (as able) and nasal secretions for changes in amount and color  - [place] appropriate cooling/warming therapies per order  - Administer medications as ordered  - Instruct and encourage patient and family to use good hand hygiene technique  - Identify and instruct in appropriate isolation precautions for identified infection/condition   Outcome: Adequate for Discharge    Goal: Absence of fever/infection during neutropenic period  INTERVENTIONS:  - Monitor WBC  - Implement neutropenic guidelines   Outcome: Adequate for Discharge      Problem: SAFETY ADULT  Goal: Maintain or return to baseline ADL function  INTERVENTIONS:  -  Assess patient's ability to carry out ADLs; assess patient's baseline for ADL function and identify physical deficits which impact ability to perform ADLs (bathing, care of mouth/teeth, toileting, grooming, dressing, etc )  - Assess/evaluate cause of self-care deficits   - Assess range of motion  - Assess patient's mobility; develop plan if impaired  - Assess patient's need for assistive devices and provide as appropriate  - Encourage maximum independence but intervene and supervise when necessary  ¯ Involve family in performance of ADLs  ¯ Assess for home care needs following discharge   ¯ Request OT consult to assist with ADL evaluation and planning for discharge  ¯ Provide patient education as appropriate   Outcome: Adequate for Discharge    Goal: Maintain or return mobility status to optimal level  INTERVENTIONS:  - Assess patient's baseline mobility status (ambulation, transfers, stairs, etc )    - Identify cognitive and physical deficits and behaviors that affect mobility  - Identify mobility aids required to assist with transfers and/or ambulation (gait belt, sit-to-stand, lift, walker, cane, etc )  - [place] fall precautions as indicated by assessment  - Record patient progress and toleration of activity level on Mobility SBAR; progress patient to next Phase/Stage  - Instruct patient to call for assistance with activity based on assessment  - Request Rehabilitation consult to assist with strengthening/weightbearing, etc    Outcome: Adequate for Discharge    Goal: Patient will remain free of falls  INTERVENTIONS:  - Assess patient frequently for physical needs  -  Identify cognitive and physical deficits and behaviors that affect risk of falls  -  [place] fall precautions as indicated by assessment   - Educate patient/family on patient safety including physical limitations  - Instruct patient to call for assistance with activity based on assessment  - Modify environment to reduce risk of injury  - Consider OT/PT consult to assist with strengthening/mobility    Outcome: Adequate for Discharge      Problem: DISCHARGE PLANNING  Goal: Discharge to home or other facility with appropriate resources  INTERVENTIONS:  - Identify barriers to discharge w/patient and caregiver  - Arrange for needed discharge resources and transportation as appropriate  - Identify discharge learning needs (meds, wound care, etc )  - Arrange for interpretive services to assist at discharge as needed  - Refer to Case Management Department for coordinating discharge planning if the patient needs post-hospital services based on physician/advanced practitioner order or complex needs related to functional status, cognitive ability, or social support system   Outcome: Adequate for Discharge      Problem: Knowledge Deficit  Goal: Patient/family/caregiver demonstrates understanding of disease process, treatment plan, medications, and discharge instructions  Complete learning assessment and assess knowledge base    Interventions:  - Provide teaching at level of understanding  - Provide teaching via preferred learning methods   Outcome: Adequate for Discharge      Problem: Nutrition/Hydration-ADULT  Goal: Nutrient/Hydration intake appropriate for improving, restoring or maintaining nutritional needs  Monitor and assess patient's nutrition/hydration status for malnutrition (ex- brittle hair, bruises, dry skin, pale skin and conjunctiva, muscle wasting, smooth red tongue, and disorientation)  Collaborate with interdisciplinary team and initiate plan and interventions as ordered  Monitor patient's weight and dietary intake as ordered or per policy  Utilize nutrition screening tool and intervene per policy  Determine patient's food preferences and provide high-protein, high-caloric foods as appropriate       INTERVENTIONS:  - Monitor oral intake, urinary output, labs, and treatment plans  - Assess nutrition and hydration status and recommend course of action  - Evaluate amount of meals eaten  - Assist patient with eating if necessary   - Allow adequate time for meals  - Recommend/ encourage appropriate diets, oral nutritional supplements, and vitamin/mineral supplements  - Order, calculate, and assess calorie counts as needed  - Recommend, monitor, and adjust tube feedings and TPN/PPN based on assessed needs  - Assess need for intravenous fluids  - Provide specific nutrition/hydration education as appropriate  - Include patient/family/caregiver in decisions related to nutrition   Outcome: Adequate for Discharge

## 2018-02-23 NOTE — PROGRESS NOTES
Discharge Summary - [address] [facility]    Patient Information: Valentina Phillips 65 y o  female MRN: [id number]  Unit/Bed#: -01 Encounter: [id number]    Discharging Physician / Practitioner: Leslie Paris PA-C  PCP: Jadon Watts  Admission Date: 2/20/2018  Discharge Date: 02/23/18    Reason for Admission:  Abnormal blood work    Discharge Diagnoses:     Principal Problem:    Type 2 diabetes mellitus with hyperglycemia ([address] )  Active Problems:    Nausea    Hypertension    Hyperlipidemia    Microcytic anemia    Chronic pain syndrome    Peripheral neuropathy  Resolved Problems:    Headache    Hyponatremia      Consultations During Hospital Stay:  · Endocrinology Dr Ortiz Civil    Procedures Performed:     None    Significant Findings:    · Extremely uncontrolled Diabetes mellitus type 2 with hyperglycemia, on admission glucose of 781  A1c 15 4  · Chronic nausea, likely related to gastroparesis  · Hypertension, accelerated  · Hyponatremia, resolved  · Hyperkalemia, resolved    Incidental Findings:   · Microcytic anemia  · Transaminitis on day of discharge    Test Results Pending at Discharge (will require follow up): · Iron panel     Outpatient Tests Requested:  · CMP 1 week    Complications:  None    Hospital Course: Valentina Phillips is a 65 y o  female patient who originally presented to the hospital on 2/20/2018 due to abnormal blood work  The patient was noted to have laboratory studies performed by her family physician and then was noted to have significant abnormalities including hyperglycemia, hyponatremia, hyperkalemia was referred to the emergency department for further evaluation  She has past medical history of diabetes, hypertension, hyperlipidemia, mild cognitive impairment, prior abnormal MRI, chronic pain syndrome, hyperlipidemia, continuous opioid dependence and medical noncompliance  She does not take her medications at home    She reports that she has chronic nausea in her medications made it worse and therefore she stopped taking them  She also has noted have chronic headaches, chronic bilateral lower extremity pain and neuropathy  She was not taking any of her medications at home and was sporadically taking her diabetic medications  The patient was noted to present with a sodium of 121, glucose of 781, potassium of 6 0 which was slightly hemolyzed, AST of 49, alk phos of 144  She was noted to be treated with IV fluid hydration  She was noted to have improvement in hyponatremia and hyperglycemia with IV fluid hydration and then was transitioned insulin  The patient was noted be seen by Endocrinology  She was noted to respond to small amounts of insulin and therefore will be discharged on 10 units of Lantus q h s , Humalog 5 units t i d  with meals  She will be prescribed Lantus solo star pen and Humalog KwikPen  These were noted to be 25 dollars each  Patient is aware of this  Patient had insulin teaching and diabetic teaching while she was her  She will continue diabetic diet on discharge  It is expected that the patient will not require long-term use of insulin given her significant response to small doses of insulin however with A1c of 15, the patient will be discharged on insulin coupled with metformin  In regards to her hypertension, the patient was noted to be resumed upon lisinopril  This will be increased to 10 mg on discharge  We will be holding triamterene/hydrochlorothiazide  She was noted to have hyperkalemia on admission although this was slightly hemolyzed likely related to her YVONNE and severe hyperglycemia  She will follow up with her primary care physician for titration of her antihypertensive medications  On day of discharge patient's sodium 130, potassium 4 3, creatinine 0 95, glucose 172  Of note, on admission the patient was noted to have an AST of 49 and ALT of 33    This was noted to subsequently increased to 127 and 87 respectively at time of discharge  She has a history of cholecystectomy  She has no abdominal pain and chronic nausea has improved during stay  She was not taking statin at home and had been placed on statin while inpatient  This will be held until repeat CMP in one week  I was unable to reach her PCP but left message regarding follow up and will provide her with a prescription for CMP in one week to ensure LFTs stable/improving  Please note that significant time was spent regarding patient's medications upon discharge in discussion with her, her  at bedside  Condition at Discharge: stable     Discharge Day Visit / Exam:     Subjective:  Feeling much better, nausea much improved, no abdominal pain  No dysuria  Headache improved  Bilateral lower extremity pain is chronic however stable  No chest pain or shortness of breath  Felt weak but worked with physical therapy  Was taught insulin teaching  Vitals: Blood Pressure: 166/82 (02/23/18 0700)  Pulse: 77 (02/23/18 0700)  Temperature: 98 5 °F (36 9 °C) (02/23/18 0700)  Temp Source: Oral (02/23/18 0700)  Respirations: 18 (02/23/18 0700)  Weight - Scale: 89 4 kg (197 lb 1 5 oz) (02/20/18 1420)  SpO2: 94 % (02/23/18 0700)    Exam:   Physical Exam   Constitutional: She is oriented to person, place, and time  No distress  HENT:   Head: Normocephalic and atraumatic  Eyes: Conjunctivae are normal    Neck: No JVD present  Cardiovascular: Normal rate, regular rhythm, normal heart sounds and intact distal pulses  No murmur heard  Pulmonary/Chest: Effort normal and breath sounds normal  No respiratory distress  She has no wheezes  She has no rales  Abdominal: Soft  Bowel sounds are normal  She exhibits no distension  There is no tenderness  There is no rebound and no guarding  Obese  Nontender  Negative murphys sign   Musculoskeletal: She exhibits edema (1+ BLE edema) and tenderness (BLE tenderness)     Neurological: She is alert and oriented to person, place, and time  No cranial nerve deficit  Skin: Skin is warm and dry  No rash noted  She is not diaphoretic  No erythema  Chronic venous stasis changes   Psychiatric: She has a normal mood and affect  Her behavior is normal    Nursing note and vitals reviewed  Discussion with Family: patient,  at bedside    Discharge instructions/Information to patient and family:   See after visit summary for information provided to patient and family  Provisions for Follow-Up Care:  See after visit summary for information related to follow-up care and any pertinent home health orders  Disposition:     Home    For Discharges to   [address] SN[facility]:   · Not Applicable to this Patient - Not Applicable to this Patient    Planned Readmission: no     Discharge Statement:  I spent 40 minutes discharging the patient  This time was spent on the day of discharge  I had direct contact with the patient on the day of discharge  Greater than 50% of the total time was spent examining patient, answering all patient questions, arranging and discussing plan of care with patient as well as directly providing post-discharge instructions  Additional time then spent on discharge activities  Discharge Medications:  See after visit summary for reconciled discharge medications provided to patient and family        ** Please Note: This note has been constructed using a voice recognition system **

## 2018-02-23 NOTE — PLAN OF CARE
Problem: PHYSICAL THERAPY ADULT  Goal: Performs mobility at highest level of function for planned discharge setting  See evaluation for individualized goals  Treatment/Interventions: Functional transfer training, LE strengthening/ROM, Elevations, Therapeutic exercise, Endurance training, Patient/family training, Equipment eval/education, Bed mobility, Gait training  Equipment Recommended: Other (Comment) (rollator)       See flowsheet documentation for full assessment, interventions and recommendations  Prognosis: Fair  Problem List: Decreased strength, Decreased range of motion, Decreased endurance, Impaired balance, Decreased mobility, Decreased coordination, Decreased safety awareness, Impaired sensation, Obesity, Pain  Assessment: Pt complains of nausea and 2 episodes of vomiting  Dx: DM w/ hyperglycemia, HTN, and hyponatremia  order placed for PT eval and tx, w/ activity order of up and OOB as tolerated  pt presents w/ comorbidities of DJD, DM, hyperlipidemia, and HTN and personal factors of ambulating w/ assistive device, positive fall history, inability to perform ADLs and step to enter home  pt presents w/ pain, weakness, decreased ROM, decreased endurance, impaired balance, gait deviations, altered sensation, impaired coordination, decreased safety awareness and fall risk  these impairments are evident in findings from physical examination (weakness, decreased ROM, altered sensation and impaired coordination), mobility assessment (need for standby to min assist w/ all phases of mobility when usually mobilizing independently, tolerance to only 80 feet of ambulation, need for cuing for mobility technique), and Barthel Index: 75/100  pt needed input for mobility technique and safety  pt is at risk for falls due to physical and safety awareness deficits   pt's clinical presentation is unstable/unpredictable (evident in poor blood sugar control, poor blood pressure control, need for assist w/ all phases of mobility when usually mobilizing independently, tolerance to only 80 feet of ambulation, pain impacting overall mobility status, need for input for mobility technique/safety)  pt needs inpatient PT tx to improve mobility deficits  discharge recommendation is for outpatient PT to reduce fall risk and maximize level of functional independence  Recommendation: Outpatient PT          See flowsheet documentation for full assessment

## 2018-02-23 NOTE — PHYSICAL THERAPY NOTE
PHYSICAL THERAPY TREATMENT NOTE    Patient Name: Domingo Butler  BQLGB'Y Date: 2/23/2018 02/23/18 1146   Pain Assessment   Pain Assessment 0-10   Pain Score 4   Pain Location Leg   Pain Orientation Left   Restrictions/Precautions   Other Precautions Contact/isolation; Fall Risk;Pain   General   Chart Reviewed Yes   Family/Caregiver Present Yes   Cognition   Overall Cognitive Status WFL   Arousal/Participation Cooperative   Attention Attends with cues to redirect   Orientation Level Oriented X4;Other (Comment)  (pt was identified w/ full name and birth date)   Following Commands Follows one step commands without difficulty   Subjective   Subjective pt agreed to participate in PT intervention  Activity Tolerance   Activity Tolerance Patient limited by fatigue;Patient limited by pain   Nurse Made Aware spoke to [facility], [facility]   Equipment Use   Comments ankle pumps 20  short arc quads, heel slides, and hip abduction 10 each  Assessment   Prognosis Fair   Problem List Decreased strength;Decreased range of motion;Decreased endurance; Impaired balance;Decreased mobility; Decreased coordination;Decreased safety awareness; Impaired sensation;Obesity;Pain   Assessment Therapist introduced LE exercises to address physical and mobility deficits noted during eval  Pt had fair understanding of exercise technique after initial introduction and demonstration  Occasional rest breaks were needed due to fatigue and pain  Handout was provided to expedite understanding of technique  continued inpatient PT is indicated to facilitate return to independent level of mobility and function  Goals   Patient Goals go home   STG Expiration Date 03/02/18   Treatment Day 1   Plan   Treatment/Interventions Functional transfer training;LE strengthening/ROM; Elevations; Therapeutic exercise; Endurance training;Patient/family training;Equipment eval/education; Bed mobility;Gait training   Progress Progressing toward goals   PT Frequency 5x/wk   Recommendation   Recommendation Outpatient PT   Equipment Recommended Other (Comment)  (rollator)     Skilled inpatient PT recommended while in hospital to progress pt toward treatment goals      Marcos Mata , PT

## 2018-02-23 NOTE — PHYSICAL THERAPY NOTE
PHYSICAL THERAPY EVALUATION NOTE    Patient Name: Ce Hartman  DXWSD'N Date: 2/23/2018  AGE:   65 y o  Mrn:   [id number]  ADMIT DX:  Hyperkalemia [E87 5]  Hyponatremia [E87 1]  Nausea [R11 0]  Hyperglycemia [R73 9]    Past Medical History:   Diagnosis Date    Degenerative disc disease at L5-S1 level     Diabetes mellitus ([address] )     Hypertension      Length Of Stay: 3  PHYSICAL THERAPY EVALUATION :    02/23/18 1136   Pain Assessment   Pain Assessment 0-10   Pain Score 4   Pain Location Leg   Pain Orientation Left   Home Living   Type of Home House   Home Layout Two level; Able to live on main level with bedroom/bathroom; Other (Comment)  (1 CATRACHO)   Additional Comments lives w/ spouse  ambulated w/ rollator as needed  independent w/ ADLs  more than 10 falls in last 6 months  Prior Function   Comments pt seen supine in bed  agreed to participate in PT eval  c/o L LE pain  pt wants to go home  Restrictions/Precautions   Other Precautions Contact/isolation; Fall Risk;Pain   General   Additional Pertinent History 2/21/18 at 5:15, glucose was 229  2/23/18 at 7:00 blood pressure was 166/82 and 2/22/18 at 15:54 was 170/94     Family/Caregiver Present Yes   Cognition   Overall Cognitive Status WFL   Arousal/Participation Cooperative   Orientation Level Oriented X4;Other (Comment)  (pt was identified w/ full name and birth date)   Following Commands Follows one step commands without difficulty   Comments room air resting pulse ox 95% and 65 BPM, active 91% and 76 BPM    RUE Assessment   RUE Assessment WFL  (4-/5, shoulder 3/5)   LUE Assessment   LUE Assessment WFL  (4-/5, shoulder 3/5)   RLE Assessment   RLE Assessment WFL  (4-/5)   LLE Assessment   LLE Assessment X  (flex 3-/5 ext 3/5, knee flex 3+/5 ext 3-/5, ankle 3/5)   Coordination   Movements are Fluid and Coordinated 0   Coordination and Movement Description impaired coordination all extremities   Sensation X  (light touch B LEs impaired below knees)   Bed Mobility   Supine to Sit 5  Supervision   Additional items HOB elevated; Bedrails; Increased time required   Sit to Supine 5  Supervision   Additional items Increased time required;Verbal cues   Transfers   Sit to Stand 5  Supervision   Additional items Increased time required;Verbal cues  (for LE positioning, hand placement)   Stand to Sit 5  Supervision   Additional items Increased time required;Verbal cues  (for body positioning, hand placement)   Ambulation/Elevation   Gait pattern Antalgic; Wide WERNER; Decreased L stance;Shuffling;Excessively slow   Gait Assistance 5  Supervision   Additional items Verbal cues  (for device placement, sequencing)   Assistive Device Other (Comment)  (rollator)   Distance 30 feet, 80 feet x2 w/ standing rest breaks x 2 to 3 minutes each  (additional not possible due to pain and fatigue)   Stair Management Assistance 4  Minimal assist   Additional items Assist x 1;Verbal cues; Increased time required  (for railing use)   Stair Management Technique One rail R   Balance   Static Sitting Fair +   Dynamic Sitting Fair -   Static Standing Fair -   Dynamic Standing [facility] -  (w/ rollator)   Activity Tolerance   Activity Tolerance Patient limited by fatigue;Patient limited by pain   Nurse Made Aware spoke to [facility]   Assessment   Prognosis Fair   Problem List Decreased strength;Decreased range of motion;Decreased endurance; Impaired balance;Decreased mobility; Decreased coordination;Decreased safety awareness; Impaired sensation;Obesity;Pain   Assessment Pt complains of nausea and 2 episodes of vomiting  Dx: DM w/ hyperglycemia, HTN, and hyponatremia  order placed for PT eval and tx, w/ activity order of up and OOB as tolerated   pt presents w/ comorbidities of DJD, DM, hyperlipidemia, and HTN and personal factors of ambulating w/ assistive device, positive fall history, inability to perform ADLs and step to enter home  pt presents w/ pain, weakness, decreased ROM, decreased endurance, impaired balance, gait deviations, altered sensation, impaired coordination, decreased safety awareness and fall risk  these impairments are evident in findings from physical examination (weakness, decreased ROM, altered sensation and impaired coordination), mobility assessment (need for standby to min assist w/ all phases of mobility when usually mobilizing independently, tolerance to only 80 feet of ambulation, need for cuing for mobility technique), and Barthel Index: 75/100  pt needed input for mobility technique and safety  pt is at risk for falls due to physical and safety awareness deficits  pt's clinical presentation is unstable/unpredictable (evident in poor blood sugar control, poor blood pressure control, need for assist w/ all phases of mobility when usually mobilizing independently, tolerance to only 80 feet of ambulation, pain impacting overall mobility status, need for input for mobility technique/safety)  pt needs inpatient PT tx to improve mobility deficits  discharge recommendation is for outpatient PT to reduce fall risk and maximize level of functional independence  Goals   Patient Goals go home   STG Expiration Date 03/02/18   Short Term Goal #1 pt will:  Increase bilateral LE strength 1/2 grade to facilitate independent mobility, Perform all bed mobility tasks independently to decrease fall risk factors, Perform all transfers independently to improve independence, Ambulate 200 ft  w/ rollator independently w/o LOB, Navigate 1 stairs independently with unilateral handrail to facilitate return to previous living environment, Increase ambulatory balance 1 grade to decrease risk for falls, Complete exercise program independently, Tolerate 3 hr OOB to faciliate upright tolerance and Improve Barthel Index score to 90 or greater to facilitate independence   Plan   Treatment/Interventions Functional transfer training;LE strengthening/ROM; Elevations; Therapeutic exercise; Endurance training;Patient/family training;Equipment eval/education; Bed mobility;Gait training   PT Frequency 5x/wk   Recommendation   Recommendation Outpatient PT   Equipment Recommended Other (Comment)  (rollator)   Barthel Index   Feeding 10   Bathing 0   Grooming Score 5   Dressing Score 5   Bladder Score 10   Bowels Score 10   Toilet Use Score 10   Transfers (Bed/Chair) Score 10   Mobility (Level Surface) Score 10   Stairs Score 5   Barthel Index Score 75     Skilled PT recommended while in hospital and upon DC to progress pt toward treatment goals       Hailey Gomes , PT

## 2018-02-23 NOTE — SOCIAL WORK
CM met with patient at bedside to discuss with her the scripts CM was given to price out for her  CM talked about sending the scripts to [facility] vs  Her primary pharmacy and patient requested CM speak with her   Patient called her  and had CM speak with him Leandra Reardon)  Jamilah Anders asked that scripts be sent to their usual pharmacy [facility] on [address] as they needed to pick other items up from there anyway  CM sent scripts to [facility] to ensure affordability  CM heard back from pharmacy and out of pocket costs are as follows: $25 Humalong, $25 Lantus, $25 needles, $10 meter, $39 99 test strips, and $8 52 for the lancets for a total for $133 51  RAUL and SLIM went in to speak with patient about these costs  Patient is agreeable however stated she may try to get some of the supplies cheaper at [facility]  CM reviewed PT recommendation of outpatient PT and patient is agreeable  JEFFERY will provide a script for this to patient and CM provided patient a list of physical Therapy locations  CM reviewed IMM notice  Patient's  is now here and will drive her home  CM reviewed the availability of treatment team to discuss questions or concerns patient and/or family may have regarding  understanding medications and recognizing signs and symptoms once discharged  CM also encouraged patient to follow up with all recommended appointments after discharge  Patient advised of importance for patient and family to participate in managing patients medical well being

## 2018-02-23 NOTE — CASE MANAGEMENT
Continued Stay Review    Date: 2/23/2018    Vital Signs: /82 (BP Location: Left arm)   Pulse 77   Temp 98 5 °F (36 9 °C) (Oral)   Resp 18   Wt 89 4 kg (197 lb 1 5 oz)   SpO2 94%   BMI 36 64 kg/m²     Medications:   Scheduled Meds:   Current Facility-Administered Medications:  acetaminophen 650 mg Oral Q6H PRN Angel Mayorga PA-C   DULoxetine 60 mg Oral Daily Sandra Quintana PA-C   enoxaparin 40 mg Subcutaneous Daily Bernarda Simmons MD   gabapentin 300 mg Oral HS Barbara Robin PA-C   hydrALAZINE 5 mg Intravenous Q6H PRN Barbara Robin PA-C   insulin glargine 12 Units Subcutaneous HS Ren Bhandari MD   insulin lispro 1-5 Units Subcutaneous TID AC Luanne Gr MD   insulin lispro 1-5 Units Subcutaneous HS Bernarda Simmons MD   insulin lispro 6 Units Subcutaneous TID With Meals Ren Bhandari MD   lisinopril 10 mg Oral Daily Barbara Robin PA-C   metoclopramide 10 mg Oral TID AC Barbara Robin PA-C   ondansetron 4 mg Intravenous Q6H PRN Bernarda Simmons MD   oxyCODONE 30 mg Oral Q4H PRN Sandra Quintana PA-C   polyethylene glycol 17 g Oral Daily Barbara Robin PA-C   pravastatin 40 mg Oral Daily With Re PetTIFFANIE   senna-docusate sodium 2 tablet Oral BID Sandra Quintana PA-C     Continuous Infusions:    PRN Meds:   Acetaminophen - used x 1      hydrALAZINE    ondansetron    oxyCODONE - used x 1  Abnormal Labs/Diagnostic Results:   Serial glucose 189; 329  ast 127  Alkaline phosphatase 180  Total protein 6 3  Albumin 2 8      Age/Sex: 65 y o  female     Assessment/Plan:   * Type 2 diabetes mellitus with hyperglycemia (HCC)   Assessment & Plan     · Blood sugar on admission 781, at home was on oral agents including glimepiride, combination pill of sitagliptin and metformin however it was noncompliant with these agents  · A1c is 15 4  Prior 1 year ago 9 9  Has never seen an endocrinologist   Will consult endocrine    · Received a total of 42 units of insulin yesterday  Blood sugar yesterday evening 74, this morning 111 with 10 units of Lantus overnight  At this time decreased from Lantus 25 units q h s  to 15 units q h s , decreased mealtime insulin to 5 units t i d  with meals and await Endocrinology input  · Suspect patient also has diabetic gastroparesis, underlying CKD related to uncontrolled diabetes  · Will need outpatient follow-up with Endocrinology, Ophthalmology and close follow-up with family physician  · Insulin teaching          Headache   Assessment & Plan     · Chronic headache  No focal neurologic deficits on exam   · If worsens, consider CT of the head           Nausea   Assessment & Plan     · Chronic nausea, suspect related to diabetic gastroparesis  · Start Reglan t i d  with meals scheduled             Hypertension   Assessment & Plan     · Patient noncompliant with medications at home  Reports she was supposed to be taking lisinopril, patients YVONNE has improved, only with hyperkalemia x 1 on admission in setting of YVONNE/hyperglycemia/hemolysis on specimen  · Given DM would favor lisinopril use as first agent  · Will start lisinopril 10 mg, hydralazine IV PRN  · Monitor SBP          Hyperlipidemia   Assessment & Plan     · Continue statin ( to bring in medications)          Hyponatremia   Assessment & Plan     · 131 on admission (corrected from 121) to 133 and yesterday 139  Check BMP now          Microcytic anemia   Assessment & Plan     · Microcytic anemia with hemoglobin 9 2, MCV 73  · Check iron studies  · Check Hemoccult          Chronic pain syndrome   Assessment & Plan     · PDMP reviewed  This shows oxycodone 30 mg tabs, 180 tabs dispensed for 30 day supply  Also with morphine  mg tabs, 90 dispensed for one month supply  · Patient using oxycodone around the clock and morphine p r n     Discussed with patient that extended release morphine should not be an as needed medication    She follows with her family physician who controls her pain  She does not take morphine at home except for approximately once a week  Discontinue morphine, continue oxycodone q 4 hours p r n  · Discussed with patient that given peripheral neuropathy symptoms, gabapentin would likely provide improved pain control and therefore will start at 300 mg q h s           Peripheral neuropathy   Assessment & Plan     · Suspect related to diabetes  Patient takes significant opioids for a history of neuropathy  She is on Cymbalta  Would recommend the use of gabapentin  Will start low dose tonight and titrate up as able         Discharge Plan:  PT recommends outpatient PT              Thank you,  [address] [place] [address] in the [facility] by Jack Portillo for 2017  Network Utilization Review Department  Phone: [phone]; Fax [phone]  ATTENTION: The Network Utilization Review Department is now centralized for our 7 Facilities  Make a note that we have a new phone and fax numbers for our Department  Please call with any questions or concerns to [phone] and carefully follow the prompts so that you are directed to the right person  All voicemails are confidential  Fax any determinations, approvals, denials, and requests for initial or continue stay review clinical to [phone]  Due to HIGH CALL volume, it would be easier if you could please send faxed requests to expedite your requests and in part, help us provide discharge notifications faster

## 2018-02-23 NOTE — PLAN OF CARE
Problem: DISCHARGE PLANNING - CARE MANAGEMENT  Goal: Discharge to post-acute care or home with appropriate resources  INTERVENTIONS:  - Conduct assessment to determine patient/family and health care team treatment goals, and need for post-acute services based on payer coverage, community resources, and patient preferences, and barriers to discharge  - Address psychosocial, clinical, and financial barriers to discharge as identified in assessment in conjunction with the patient/family and health care team  - Arrange appropriate level of post-acute services according to patients   needs and preference and payer coverage in collaboration with the physician and health care team  - Communicate with and update the patient/family, physician, and health care team regarding progress on the discharge plan  - Arrange appropriate transportation to post-acute venues  Outcome: Completed Date Met: 02/23/18  CM met with patient at bedside to discuss with her the scripts CM was given to price out for her  CM talked about sending the scripts to [facility] vs  Her primary pharmacy and patient requested CM speak with her   Patient called her  and had CM speak with him Kirsten Woody)  Nils Alonzo asked that scripts be sent to their usual pharmacy [facility] on [address] as they needed to pick other items up from there anyway  CM sent scripts to [facility] to ensure affordability  CM heard back from pharmacy and out of pocket costs are as follows: $25 Humalong, $25 Lantus, $25 needles, $10 meter, $39 99 test strips, and $8 52 for the lancets for a total for $133 51  RAUL and SLIM went in to speak with patient about these costs  Patient is agreeable however stated she may try to get some of the supplies cheaper at [address]  CM reviewed PT recommendation of outpatient PT and patient is agreeable  JEFFERY will provide a script for this to patient and CM provided patient a list of physical Therapy locations  CM reviewed IMM notice  Patient's  is now here and will drive her home  CM reviewed the availability of treatment team to discuss questions or concerns patient and/or family may have regarding  understanding medications and recognizing signs and symptoms once discharged  CM also encouraged patient to follow up with all recommended appointments after discharge  Patient advised of importance for patient and family to participate in managing patients medical well being

## 2018-02-23 NOTE — SOCIAL WORK
At discharge patient and  have requested Home PT and VNA to assist with diabetes management  CM sent out multiple referrals and the earliest [facility] date received back was for Wednesday 2/28 with [facility]  RAUL called patient's  Dk Danielor and left him a detailed message about this being set up as they left before this was able to be confirmed

## 2018-02-23 NOTE — NURSING NOTE
Reviewed d/c paperwork with pt and spouse  RN provided additional insulin teaching with pt and spouse  Pt still had questions re: insulin pen  RN notified TIFFANIE Menjivar) of the situation  Awaiting CM for VNA services

## 2018-02-23 NOTE — PLAN OF CARE
Problem: PHYSICAL THERAPY ADULT  Goal: Performs mobility at highest level of function for planned discharge setting  See evaluation for individualized goals  Treatment/Interventions: Functional transfer training, LE strengthening/ROM, Elevations, Therapeutic exercise, Endurance training, Patient/family training, Equipment eval/education, Bed mobility, Gait training  Equipment Recommended: Other (Comment) (rollator)       See flowsheet documentation for full assessment, interventions and recommendations  Outcome: Progressing  Prognosis: Fair  Problem List: Decreased strength, Decreased range of motion, Decreased endurance, Impaired balance, Decreased mobility, Decreased coordination, Decreased safety awareness, Impaired sensation, Obesity, Pain  Assessment: Therapist introduced LE exercises to address physical and mobility deficits noted during eval  Pt had fair understanding of exercise technique after initial introduction and demonstration  Occasional rest breaks were needed due to fatigue and pain  Handout was provided to expedite understanding of technique  continued inpatient PT is indicated to facilitate return to independent level of mobility and function  Recommendation: Outpatient PT          See flowsheet documentation for full assessment

## 2018-02-23 NOTE — NURSING NOTE
Pt's spouse performed return demonstration of insulin pen  Pt advised to wait until CM sets up VNA prior to leaving

## 2018-02-26 ENCOUNTER — DOCUMENTATION (OUTPATIENT)
Dept: NEUROSURGERY | Facility: CLINIC | Age: 63
End: 2018-02-26

## 2018-02-26 NOTE — CASE MANAGEMENT
Notification of Discharge  This is a Notification of Discharge from our facility [address]  Please be advised that this patient has been discharge from our facility  Below you will find the admission and discharge date and time including the patients disposition  PRESENTATION DATE: 2/20/2018  2:01 PM  IP ADMISSION DATE: 2/20/18 1635  DISCHARGE DATE: 2/23/2018  5:07 PM  DISPOSITION: Home with [address] [place] [address] in the [facility] by Jack Portillo for 2017  Network Utilization Review Department  Phone: [phone]; Fax [phone]  ATTENTION: The Network Utilization Review Department is now centralized for our 7 Facilities  Make a note that we have a new phone and fax numbers for our Department  Please call with any questions or concerns to [phone] and carefully follow the prompts so that you are directed to the right person  All voicemails are confidential  Fax any determinations, approvals, denials, and requests for initial or continue stay review clinical to [phone]  Due to HIGH CALL volume, it would be easier if you could please send faxed requests to expedite your requests and in part, help us provide discharge notifications faster        Reference #[id number]

## 2018-02-26 NOTE — PROGRESS NOTES
02/26/2018-CLAUDETTE FROM [facility] CALLED BACK AND STATES THAT AS LONG AS PROVIDER IS IN NETWORK, NO AUTH IS REQUIRED     02/26/2018-PT IS SCHEDULED FOR MRI BRAIN AT [facility] ON 03/07/2018 AT 3:00pm   CALLED [facility] PHONE#[phone] ON BACK OF CARD, TRANSFERRED TO CLAUDETTE AND LEFT MESSAGE ON MACHINE TO START PRECERT

## 2018-03-01 ENCOUNTER — TELEPHONE (OUTPATIENT)
Dept: NEUROLOGY | Facility: CLINIC | Age: 63
End: 2018-03-01

## 2018-03-12 ENCOUNTER — TELEPHONE (OUTPATIENT)
Dept: NEUROSURGERY | Facility: CLINIC | Age: 63
End: 2018-03-12

## 2018-03-13 ENCOUNTER — TELEPHONE (OUTPATIENT)
Dept: NEUROSURGERY | Facility: CLINIC | Age: 63
End: 2018-03-13

## 2018-03-14 ENCOUNTER — TELEPHONE (OUTPATIENT)
Dept: NEUROSURGERY | Facility: CLINIC | Age: 63
End: 2018-03-14

## 2018-03-14 NOTE — TELEPHONE ENCOUNTER
Spoke to patients  Jennifer Hung and family would like to cancel their appointment and not return for follow up

## 2018-03-14 NOTE — TELEPHONE ENCOUNTER
Spoke to patients' husbands Mansijacquie Brown and family would like to cancel all follow-ups with our office, Patients family did not wish to reschedule MRI brain that was cancelled

## 2018-03-14 NOTE — TELEPHONE ENCOUNTER
Please call patient and inform patient that she would be going against medical advise if she decides not to pursue the follow up MRI brain or follow up appointment with our office  If patient still decides not to pursue follow up with our office then advise patient she needs to follow up with her Primary Care Provider  Please call patient's Primary Care Provider's office to inform PCP of this information  Thank you

## 2018-03-20 ENCOUNTER — DOCUMENTATION (OUTPATIENT)
Dept: NEUROSURGERY | Facility: CLINIC | Age: 63
End: 2018-03-20

## 2018-03-20 NOTE — TELEPHONE ENCOUNTER
Please send patient an unable to reach letter requesting she call office back so information can be relayed to her  Thank you

## 2018-04-25 ENCOUNTER — HOSPITAL ENCOUNTER (INPATIENT)
Facility: HOSPITAL | Age: 63
LOS: 1 days | Discharge: HOME | DRG: 392 | End: 2018-04-26
Attending: EMERGENCY MEDICINE | Admitting: HOSPITALIST
Payer: COMMERCIAL

## 2018-04-25 DIAGNOSIS — E87.6 HYPOKALEMIA: ICD-10-CM

## 2018-04-25 DIAGNOSIS — E83.42 HYPOMAGNESEMIA: ICD-10-CM

## 2018-04-25 DIAGNOSIS — R11.2 NAUSEA AND VOMITING, INTRACTABILITY OF VOMITING NOT SPECIFIED, UNSPECIFIED VOMITING TYPE: ICD-10-CM

## 2018-04-25 DIAGNOSIS — N39.0 UTI (URINARY TRACT INFECTION): ICD-10-CM

## 2018-04-25 DIAGNOSIS — E83.51 HYPOCALCEMIA: ICD-10-CM

## 2018-04-25 DIAGNOSIS — R19.7 DIARRHEA, UNSPECIFIED TYPE: Primary | ICD-10-CM

## 2018-04-25 DIAGNOSIS — D64.9 ANEMIA: ICD-10-CM

## 2018-04-25 PROBLEM — R82.71 BACTERIURIA: Status: ACTIVE | Noted: 2018-04-25

## 2018-04-25 PROBLEM — E11.65 TYPE 2 DIABETES MELLITUS WITH HYPERGLYCEMIA, WITH LONG-TERM CURRENT USE OF INSULIN (HCC): Chronic | Status: ACTIVE | Noted: 2018-02-20

## 2018-04-25 PROBLEM — Z79.4 TYPE 2 DIABETES MELLITUS WITH HYPERGLYCEMIA, WITH LONG-TERM CURRENT USE OF INSULIN (HCC): Chronic | Status: ACTIVE | Noted: 2018-02-20

## 2018-04-25 LAB
ALBUMIN SERPL BCP-MCNC: 2.4 G/DL (ref 3.5–5)
ALP SERPL-CCNC: 63 U/L (ref 46–116)
ALT SERPL W P-5'-P-CCNC: 20 U/L (ref 12–78)
ANION GAP SERPL CALCULATED.3IONS-SCNC: 7 MMOL/L (ref 4–13)
AST SERPL W P-5'-P-CCNC: 16 U/L (ref 5–45)
ATRIAL RATE: 84 BPM
BACTERIA UR QL AUTO: ABNORMAL /HPF
BASOPHILS # BLD AUTO: 0.01 THOUSANDS/ΜL (ref 0–0.1)
BASOPHILS NFR BLD AUTO: 0 % (ref 0–1)
BILIRUB SERPL-MCNC: 0.2 MG/DL (ref 0.2–1)
BILIRUB UR QL STRIP: NEGATIVE
BUN SERPL-MCNC: 19 MG/DL (ref 5–25)
CA-I BLD-SCNC: 1.08 MMOL/L (ref 1.12–1.32)
CALCIUM SERPL-MCNC: 6.1 MG/DL (ref 8.3–10.1)
CHLORIDE SERPL-SCNC: 109 MMOL/L (ref 100–108)
CLARITY UR: CLEAR
CO2 SERPL-SCNC: 22 MMOL/L (ref 21–32)
COLOR UR: YELLOW
CREAT SERPL-MCNC: 0.61 MG/DL (ref 0.6–1.3)
EOSINOPHIL # BLD AUTO: 0.08 THOUSAND/ΜL (ref 0–0.61)
EOSINOPHIL NFR BLD AUTO: 2 % (ref 0–6)
ERYTHROCYTE [DISTWIDTH] IN BLOOD BY AUTOMATED COUNT: 17.9 % (ref 11.6–15.1)
FERRITIN SERPL-MCNC: 5 NG/ML (ref 8–388)
FOLATE SERPL-MCNC: 13.3 NG/ML (ref 3.1–17.5)
GFR SERPL CREATININE-BSD FRML MDRD: 98 ML/MIN/1.73SQ M
GLUCOSE SERPL-MCNC: 181 MG/DL (ref 65–140)
GLUCOSE SERPL-MCNC: 314 MG/DL (ref 65–140)
GLUCOSE SERPL-MCNC: 322 MG/DL (ref 65–140)
GLUCOSE UR STRIP-MCNC: NEGATIVE MG/DL
HCT VFR BLD AUTO: 26.7 % (ref 34.8–46.1)
HGB BLD-MCNC: 8.1 G/DL (ref 11.5–15.4)
HGB UR QL STRIP.AUTO: ABNORMAL
IRON SATN MFR SERPL: 5 %
IRON SERPL-MCNC: 18 UG/DL (ref 50–170)
KETONES UR STRIP-MCNC: NEGATIVE MG/DL
LEUKOCYTE ESTERASE UR QL STRIP: ABNORMAL
LIPASE SERPL-CCNC: 62 U/L (ref 73–393)
LYMPHOCYTES # BLD AUTO: 0.9 THOUSANDS/ΜL (ref 0.6–4.47)
LYMPHOCYTES NFR BLD AUTO: 22 % (ref 14–44)
MAGNESIUM SERPL-MCNC: 1.4 MG/DL (ref 1.6–2.6)
MCH RBC QN AUTO: 21.3 PG (ref 26.8–34.3)
MCHC RBC AUTO-ENTMCNC: 30.3 G/DL (ref 31.4–37.4)
MCV RBC AUTO: 70 FL (ref 82–98)
MONOCYTES # BLD AUTO: 0.26 THOUSAND/ΜL (ref 0.17–1.22)
MONOCYTES NFR BLD AUTO: 6 % (ref 4–12)
NEUTROPHILS # BLD AUTO: 2.94 THOUSANDS/ΜL (ref 1.85–7.62)
NEUTS SEG NFR BLD AUTO: 70 % (ref 43–75)
NITRITE UR QL STRIP: NEGATIVE
NON-SQ EPI CELLS URNS QL MICRO: ABNORMAL /HPF
P AXIS: 76 DEGREES
PH UR STRIP.AUTO: 5.5 [PH] (ref 4.5–8)
PLATELET # BLD AUTO: 151 THOUSANDS/UL (ref 149–390)
PMV BLD AUTO: 9.8 FL (ref 8.9–12.7)
POTASSIUM SERPL-SCNC: 3 MMOL/L (ref 3.5–5.3)
PR INTERVAL: 162 MS
PROT SERPL-MCNC: 5.5 G/DL (ref 6.4–8.2)
PROT UR STRIP-MCNC: ABNORMAL MG/DL
QRS AXIS: 53 DEGREES
QRSD INTERVAL: 74 MS
QT INTERVAL: 358 MS
QTC INTERVAL: 423 MS
RBC # BLD AUTO: 3.8 MILLION/UL (ref 3.81–5.12)
RBC #/AREA URNS AUTO: ABNORMAL /HPF
SODIUM SERPL-SCNC: 138 MMOL/L (ref 136–145)
SP GR UR STRIP.AUTO: 1.02 (ref 1–1.03)
T WAVE AXIS: 42 DEGREES
TIBC SERPL-MCNC: 349 UG/DL (ref 250–450)
UROBILINOGEN UR QL STRIP.AUTO: 0.2 E.U./DL
VENTRICULAR RATE: 84 BPM
VIT B12 SERPL-MCNC: 256 PG/ML (ref 100–900)
WBC # BLD AUTO: 4.19 THOUSAND/UL (ref 4.31–10.16)
WBC #/AREA URNS AUTO: ABNORMAL /HPF

## 2018-04-25 PROCEDURE — 82728 ASSAY OF FERRITIN: CPT | Performed by: PHYSICIAN ASSISTANT

## 2018-04-25 PROCEDURE — 83735 ASSAY OF MAGNESIUM: CPT | Performed by: EMERGENCY MEDICINE

## 2018-04-25 PROCEDURE — 93010 ELECTROCARDIOGRAM REPORT: CPT | Performed by: INTERNAL MEDICINE

## 2018-04-25 PROCEDURE — 96361 HYDRATE IV INFUSION ADD-ON: CPT

## 2018-04-25 PROCEDURE — 87086 URINE CULTURE/COLONY COUNT: CPT

## 2018-04-25 PROCEDURE — 87505 NFCT AGENT DETECTION GI: CPT | Performed by: EMERGENCY MEDICINE

## 2018-04-25 PROCEDURE — 82746 ASSAY OF FOLIC ACID SERUM: CPT | Performed by: PHYSICIAN ASSISTANT

## 2018-04-25 PROCEDURE — 83540 ASSAY OF IRON: CPT | Performed by: PHYSICIAN ASSISTANT

## 2018-04-25 PROCEDURE — 82948 REAGENT STRIP/BLOOD GLUCOSE: CPT

## 2018-04-25 PROCEDURE — 82607 VITAMIN B-12: CPT | Performed by: PHYSICIAN ASSISTANT

## 2018-04-25 PROCEDURE — 83550 IRON BINDING TEST: CPT | Performed by: PHYSICIAN ASSISTANT

## 2018-04-25 PROCEDURE — 99223 1ST HOSP IP/OBS HIGH 75: CPT | Performed by: HOSPITALIST

## 2018-04-25 PROCEDURE — 99285 EMERGENCY DEPT VISIT HI MDM: CPT

## 2018-04-25 PROCEDURE — 87077 CULTURE AEROBIC IDENTIFY: CPT | Performed by: EMERGENCY MEDICINE

## 2018-04-25 PROCEDURE — 82330 ASSAY OF CALCIUM: CPT | Performed by: EMERGENCY MEDICINE

## 2018-04-25 PROCEDURE — 93005 ELECTROCARDIOGRAM TRACING: CPT

## 2018-04-25 PROCEDURE — 87493 C DIFF AMPLIFIED PROBE: CPT | Performed by: EMERGENCY MEDICINE

## 2018-04-25 PROCEDURE — 87077 CULTURE AEROBIC IDENTIFY: CPT

## 2018-04-25 PROCEDURE — 36415 COLL VENOUS BLD VENIPUNCTURE: CPT | Performed by: EMERGENCY MEDICINE

## 2018-04-25 PROCEDURE — 80053 COMPREHEN METABOLIC PANEL: CPT | Performed by: EMERGENCY MEDICINE

## 2018-04-25 PROCEDURE — 83690 ASSAY OF LIPASE: CPT | Performed by: EMERGENCY MEDICINE

## 2018-04-25 PROCEDURE — 87186 SC STD MICRODIL/AGAR DIL: CPT | Performed by: EMERGENCY MEDICINE

## 2018-04-25 PROCEDURE — 87186 SC STD MICRODIL/AGAR DIL: CPT

## 2018-04-25 PROCEDURE — 85025 COMPLETE CBC W/AUTO DIFF WBC: CPT | Performed by: EMERGENCY MEDICINE

## 2018-04-25 PROCEDURE — 96374 THER/PROPH/DIAG INJ IV PUSH: CPT

## 2018-04-25 PROCEDURE — 81001 URINALYSIS AUTO W/SCOPE: CPT

## 2018-04-25 PROCEDURE — 87086 URINE CULTURE/COLONY COUNT: CPT | Performed by: EMERGENCY MEDICINE

## 2018-04-25 PROCEDURE — 96375 TX/PRO/DX INJ NEW DRUG ADDON: CPT

## 2018-04-25 RX ORDER — SODIUM CHLORIDE 9 MG/ML
100 INJECTION, SOLUTION INTRAVENOUS CONTINUOUS
Status: DISCONTINUED | OUTPATIENT
Start: 2018-04-25 | End: 2018-04-26 | Stop reason: HOSPADM

## 2018-04-25 RX ORDER — ONDANSETRON 2 MG/ML
4 INJECTION INTRAMUSCULAR; INTRAVENOUS EVERY 6 HOURS PRN
Status: DISCONTINUED | OUTPATIENT
Start: 2018-04-25 | End: 2018-04-26 | Stop reason: HOSPADM

## 2018-04-25 RX ORDER — PRAVASTATIN SODIUM 40 MG
40 TABLET ORAL
Status: DISCONTINUED | OUTPATIENT
Start: 2018-04-25 | End: 2018-04-26 | Stop reason: HOSPADM

## 2018-04-25 RX ORDER — METOCLOPRAMIDE 10 MG/1
10 TABLET ORAL
Status: DISCONTINUED | OUTPATIENT
Start: 2018-04-25 | End: 2018-04-26 | Stop reason: HOSPADM

## 2018-04-25 RX ORDER — OXYCODONE HYDROCHLORIDE 10 MG/1
30 TABLET ORAL EVERY 4 HOURS PRN
Status: DISCONTINUED | OUTPATIENT
Start: 2018-04-25 | End: 2018-04-26 | Stop reason: HOSPADM

## 2018-04-25 RX ORDER — MORPHINE SULFATE 4 MG/ML
4 INJECTION, SOLUTION INTRAMUSCULAR; INTRAVENOUS ONCE
Status: DISCONTINUED | OUTPATIENT
Start: 2018-04-25 | End: 2018-04-25

## 2018-04-25 RX ORDER — MAGNESIUM SULFATE HEPTAHYDRATE 40 MG/ML
2 INJECTION, SOLUTION INTRAVENOUS ONCE
Status: COMPLETED | OUTPATIENT
Start: 2018-04-25 | End: 2018-04-26

## 2018-04-25 RX ORDER — ERGOCALCIFEROL 1.25 MG/1
50000 CAPSULE ORAL WEEKLY
Status: DISCONTINUED | OUTPATIENT
Start: 2018-04-26 | End: 2018-04-26 | Stop reason: HOSPADM

## 2018-04-25 RX ORDER — GABAPENTIN 300 MG/1
300 CAPSULE ORAL
Status: DISCONTINUED | OUTPATIENT
Start: 2018-04-25 | End: 2018-04-26 | Stop reason: HOSPADM

## 2018-04-25 RX ORDER — METOCLOPRAMIDE HYDROCHLORIDE 5 MG/ML
10 INJECTION INTRAMUSCULAR; INTRAVENOUS ONCE
Status: COMPLETED | OUTPATIENT
Start: 2018-04-25 | End: 2018-04-25

## 2018-04-25 RX ORDER — ACETAMINOPHEN 325 MG/1
650 TABLET ORAL EVERY 6 HOURS PRN
Status: DISCONTINUED | OUTPATIENT
Start: 2018-04-25 | End: 2018-04-26 | Stop reason: HOSPADM

## 2018-04-25 RX ORDER — LISINOPRIL 10 MG/1
10 TABLET ORAL DAILY
Status: DISCONTINUED | OUTPATIENT
Start: 2018-04-26 | End: 2018-04-26 | Stop reason: HOSPADM

## 2018-04-25 RX ORDER — DULOXETIN HYDROCHLORIDE 60 MG/1
60 CAPSULE, DELAYED RELEASE ORAL DAILY
Status: DISCONTINUED | OUTPATIENT
Start: 2018-04-26 | End: 2018-04-26 | Stop reason: HOSPADM

## 2018-04-25 RX ORDER — POTASSIUM CHLORIDE 20 MEQ/1
40 TABLET, EXTENDED RELEASE ORAL ONCE
Status: COMPLETED | OUTPATIENT
Start: 2018-04-25 | End: 2018-04-25

## 2018-04-25 RX ORDER — MORPHINE SULFATE 4 MG/ML
4 INJECTION, SOLUTION INTRAMUSCULAR; INTRAVENOUS ONCE
Status: COMPLETED | OUTPATIENT
Start: 2018-04-25 | End: 2018-04-25

## 2018-04-25 RX ORDER — POTASSIUM CHLORIDE 14.9 MG/ML
20 INJECTION INTRAVENOUS
Status: COMPLETED | OUTPATIENT
Start: 2018-04-25 | End: 2018-04-25

## 2018-04-25 RX ORDER — TRIAMTERENE AND HYDROCHLOROTHIAZIDE 37.5; 25 MG/1; MG/1
2 TABLET ORAL DAILY
Status: DISCONTINUED | OUTPATIENT
Start: 2018-04-26 | End: 2018-04-26 | Stop reason: HOSPADM

## 2018-04-25 RX ORDER — INSULIN GLARGINE 100 [IU]/ML
10 INJECTION, SOLUTION SUBCUTANEOUS
Status: DISCONTINUED | OUTPATIENT
Start: 2018-04-25 | End: 2018-04-26 | Stop reason: HOSPADM

## 2018-04-25 RX ADMIN — ACETAMINOPHEN 650 MG: 325 TABLET, FILM COATED ORAL at 18:40

## 2018-04-25 RX ADMIN — INSULIN LISPRO 5 UNITS: 100 INJECTION, SOLUTION INTRAVENOUS; SUBCUTANEOUS at 22:21

## 2018-04-25 RX ADMIN — INSULIN GLARGINE 10 UNITS: 100 INJECTION, SOLUTION SUBCUTANEOUS at 22:19

## 2018-04-25 RX ADMIN — SODIUM CHLORIDE 500 ML: 0.9 INJECTION, SOLUTION INTRAVENOUS at 13:50

## 2018-04-25 RX ADMIN — POTASSIUM CHLORIDE 20 MEQ: 200 INJECTION, SOLUTION INTRAVENOUS at 18:44

## 2018-04-25 RX ADMIN — METOCLOPRAMIDE HYDROCHLORIDE 10 MG: 10 TABLET ORAL at 18:40

## 2018-04-25 RX ADMIN — INSULIN LISPRO 5 UNITS: 100 INJECTION, SOLUTION INTRAVENOUS; SUBCUTANEOUS at 19:56

## 2018-04-25 RX ADMIN — MORPHINE SULFATE 4 MG: 4 INJECTION INTRAVENOUS at 13:49

## 2018-04-25 RX ADMIN — GABAPENTIN 300 MG: 300 CAPSULE ORAL at 22:19

## 2018-04-25 RX ADMIN — SODIUM CHLORIDE 100 ML/HR: 0.9 INJECTION, SOLUTION INTRAVENOUS at 18:24

## 2018-04-25 RX ADMIN — MAGNESIUM SULFATE IN WATER 2 G: 40 INJECTION, SOLUTION INTRAVENOUS at 23:43

## 2018-04-25 RX ADMIN — METOCLOPRAMIDE 10 MG: 5 INJECTION, SOLUTION INTRAMUSCULAR; INTRAVENOUS at 13:49

## 2018-04-25 RX ADMIN — POTASSIUM CHLORIDE 40 MEQ: 1500 TABLET, EXTENDED RELEASE ORAL at 19:56

## 2018-04-25 RX ADMIN — CEFTRIAXONE SODIUM 2000 MG: 2 INJECTION, POWDER, FOR SOLUTION INTRAMUSCULAR; INTRAVENOUS at 16:27

## 2018-04-25 RX ADMIN — CALCIUM GLUCONATE 3 G: 98 INJECTION, SOLUTION INTRAVENOUS at 18:40

## 2018-04-25 RX ADMIN — POTASSIUM CHLORIDE 20 MEQ: 200 INJECTION, SOLUTION INTRAVENOUS at 20:31

## 2018-04-25 RX ADMIN — ONDANSETRON 4 MG: 2 INJECTION INTRAMUSCULAR; INTRAVENOUS at 20:58

## 2018-04-25 NOTE — ASSESSMENT & PLAN NOTE
· POA, patient with multiple episodes of diarrhea at home  No other sick contacts, travel, or antibiotic use  No one at home with similar symptoms  No episodes here  No appetite at this time, but seems to be improving  · Admit patient to med/surg under inpatient status   · Check stool studies   · C   Diff  · Stool Enteric Panel   · Clear Liquid diet   · Can start supportive care when cultures negative

## 2018-04-25 NOTE — ED NOTES
EKG completed at 13:19, viewed and signed by Dr Purcell Both, copy on chart     Manjit Arroyo  04/25/18 0889

## 2018-04-25 NOTE — ASSESSMENT & PLAN NOTE
· POA, 3 0 on admission, likely secondary to GI losses  · Replenish with 40 mEq each of PO and IV KCl  · Monitor BMP in AM

## 2018-04-25 NOTE — ED PROVIDER NOTES
History  Chief Complaint   Patient presents with    Diarrhea     PT reports "I have had diarrhea 3 days ago and this morning I started vomiting and my head is pounding"     65 yr female with mutliple chronic problems== c/o 3 days of non bloody diarrhea -- no ill contacts/ travel/ new foods/ exp/ recent anibx-- with lower abd crmping-- n/v x 1 today non bloody - hx of gastroparesis-- no fevers- cp/sob/cough/uri-- mild abdcamping- left sided pounding headache- non thunderclap        History provided by:  Patient and relative   used: No        Prior to Admission Medications   Prescriptions Last Dose Informant Patient Reported? Taking?    B-D UF III MINI PEN NEEDLES 31G X 5 MM MISC   Yes No   Si (four) times a day   Blood Glucose Monitoring Suppl (FREESTYLE LITE) JAROD   Yes No   Sig: TEST BEFORE MEALS AND AT BEDTIME   DULoxetine (CYMBALTA) 60 mg delayed release capsule   Yes No   Sig: Take 60 mg by mouth daily   DULoxetine (CYMBALTA) 60 mg delayed release capsule   Yes No   Sig: Take 1 capsule by mouth daily   FREESTYLE LITE test strip   Yes No   Sig: TEST BEFORE MEALS AND BEDTIME   HUMALOG KWIKPEN 100 UNIT/ML SOPN   Yes No   Sig: INJECT 5 UNITS 3 TIMES A DAY WITH MEALS   LANTUS SOLOSTAR injection pen 100 units/mL   Yes No   Sig: inject 10 units subcutaneously at bedtime   Lancets (FREESTYLE) lancets   Yes No   Sig: TEST BEFORE MEALS AND BEDTIME   SAXagliptin-MetFORMIN ER 5-1000 MG TB24   Yes No   Sig: Take 1 tablet by mouth daily   acetaminophen (TYLENOL) 325 mg tablet   No No   Sig: Take 2 tablets by mouth every 6 (six) hours as needed for mild pain   docusate sodium (COLACE) 100 mg capsule   No No   Sig: Take 1 capsule by mouth 2 (two) times a day   ergocalciferol (VITAMIN D2) 50,000 units   Yes No   Sig: Take 50,000 Units by mouth once a week   gabapentin (NEURONTIN) 300 mg capsule   No No   Sig: Take 1 capsule (300 mg total) by mouth daily at bedtime   glipiZIDE (GLUCOTROL) 5 mg tablet Yes No   Sig: Take 2 tablets by mouth 2 (two) times a day   insulin glargine (LANTUS) 100 units/mL subcutaneous injection   No No   Sig: Inject 10 Units under the skin daily at bedtime   insulin lispro (HumaLOG) 100 units/mL injection   No No   Sig: Inject 5 Units under the skin 3 (three) times a day with meals   lisinopril (ZESTRIL) 5 mg tablet   No No   Sig: Take 2 tablets (10 mg total) by mouth daily   metFORMIN (GLUCOPHAGE) 1000 MG tablet   No No   Sig: Take 1 tablet (1,000 mg total) by mouth 2 (two) times a day with meals   metoclopramide (REGLAN) 10 mg tablet   No No   Sig: Take 1 tablet (10 mg total) by mouth 3 (three) times a day before meals   morphine (TAMRA) 100 MG 24 hr capsule   Yes No   Sig: Take 100 mg by mouth 2 (two) times a day   ondansetron (ZOFRAN-ODT) 8 mg disintegrating tablet   Yes No   oxyCODONE (ROXICODONE) 30 MG immediate release tablet   Yes No   Sig: Take 30 mg by mouth every 4 (four) hours as needed for moderate pain     simvastatin (ZOCOR) 20 mg tablet   Yes No   Sig: Take 1 tablet by mouth   triamterene-hydrochlorothiazide (MAXZIDE) 75-50 MG per tablet   Yes No   Sig: Take 1 tablet by mouth daily      Facility-Administered Medications: None       Past Medical History:   Diagnosis Date    Degenerative disc disease at L5-S1 level     Diabetes mellitus ([facility] [address] )     Hypertension        Past Surgical History:   Procedure Laterality Date    CHOLECYSTECTOMY      JOINT REPLACEMENT      OOPHORECTOMY         History reviewed  No pertinent family history  I have reviewed and agree with the history as documented  Social History   Substance Use Topics    Smoking status: Former Smoker     Types: E-Cigarettes    Smokeless tobacco: Never Used    Alcohol use No        Review of Systems   Constitutional: Positive for activity change, appetite change and fatigue  Negative for chills, diaphoresis, fever and unexpected weight change  HENT: Negative  Eyes: Negative  Respiratory: Negative  Cardiovascular: Negative  Gastrointestinal: Positive for abdominal pain, diarrhea, nausea and vomiting  Negative for abdominal distention, anal bleeding, blood in stool, constipation and rectal pain  Endocrine: Negative  Genitourinary: Negative  Musculoskeletal: Negative  Skin: Negative  Allergic/Immunologic: Negative  Neurological: Positive for headaches  Negative for dizziness, tremors, seizures, syncope, facial asymmetry, speech difficulty, weakness, light-headedness and numbness  Hematological: Negative  Psychiatric/Behavioral: Negative  Physical Exam  ED Triage Vitals   Temperature Pulse Respirations Blood Pressure SpO2   04/25/18 1201 04/25/18 1201 04/25/18 1201 04/25/18 1201 04/25/18 1201   98 °F (36 7 °C) 90 20 (!) 189/83 96 %      Temp Source Heart Rate Source Patient Position - Orthostatic VS BP Location FiO2 (%)   04/25/18 1201 04/25/18 1201 04/25/18 1201 04/25/18 1201 --   Oral Monitor Lying Right arm       Pain Score       04/25/18 1349       Worst Possible Pain           Orthostatic Vital Signs  Vitals:    04/25/18 1201 04/25/18 1400   BP: (!) 189/83 (!) 175/81   Pulse: 90 90   Patient Position - Orthostatic VS: Lying        Physical Exam   Constitutional: She is oriented to person, place, and time  No distress  avss-- htnsive-- pulse ox 97 % on ra- intepretation is normal- no intervention    HENT:   Head: Normocephalic and atraumatic  No bilateral temporal artery tenderness/edema/ erythema/ nodularity   Eyes: Conjunctivae and EOM are normal  Pupils are equal, round, and reactive to light  Right eye exhibits no discharge  Left eye exhibits no discharge  No scleral icterus  Mm pink   Neck: Normal range of motion  Neck supple  No JVD present  No tracheal deviation present  No thyromegaly present  Cardiovascular: Normal rate, regular rhythm, normal heart sounds and intact distal pulses  Exam reveals no gallop and no friction rub      No murmur heard   Pulmonary/Chest: Effort normal and breath sounds normal  No stridor  No respiratory distress  She has no wheezes  She has no rales  She exhibits no tenderness  Abdominal: Soft  She exhibits no distension and no mass  There is tenderness  There is no rebound and no guarding  No hernia  Mild diffuse tendnress- no peritonal signs- no cva tendenress- no pulsatiel ad mass/bruit   Musculoskeletal: Normal range of motion  She exhibits no edema, tenderness or deformity  Lymphadenopathy:     She has no cervical adenopathy  Neurological: She is alert and oriented to person, place, and time  No cranial nerve deficit or sensory deficit  She exhibits normal muscle tone  Coordination normal    Skin: Skin is warm  Capillary refill takes less than 2 seconds  No rash noted  She is not diaphoretic  No erythema  There is pallor  Psychiatric: She has a normal mood and affect  Her behavior is normal  Judgment and thought content normal    Nursing note and vitals reviewed  ED Medications  Medications   sodium chloride 0 9 % bolus 500 mL (500 mL Intravenous New Bag 4/25/18 1350)   morphine (PF) 4 mg/mL injection 4 mg (4 mg Intravenous Given 4/25/18 1349)   metoclopramide (REGLAN) injection 10 mg (10 mg Intravenous Given 4/25/18 1349)       Diagnostic Studies  Results Reviewed     Procedure Component Value Units Date/Time    Lipase [[id number]]  (Abnormal) Collected:  04/25/18 1349    Lab Status:  Final result Specimen:  Blood from Arm, Left Updated:  04/25/18 1408     Lipase 62 (L) u/L     Comprehensive metabolic panel [[id number]] Updated:  04/25/18 1400    Lab Status:  No result Specimen:  Blood from Arm, Left     Urine Microscopic [[id number]] Collected:  04/25/18 1357    Lab Status:   In process Specimen:  Urine Updated:  04/25/18 1358    ED Urine Macroscopic [[id number]]  (Abnormal) Collected:  04/25/18 1357    Lab Status:  Final result Specimen:  Urine Updated:  04/25/18 1356     Color, UA Yellow     Clarity, UA Clear pH, UA 5 5     Leukocytes, UA Trace (A)     Nitrite, UA Negative     Protein,  (2+) (A) mg/dl      Glucose, UA Negative mg/dl      Ketones, UA Negative mg/dl      Urobilinogen, UA 0 2 E U /dl      Bilirubin, UA Negative     Blood, UA Small (A)     Specific [place], UA 1 020    Narrative:       CLINITEK RESULT    CBC and differential [[id number]] Collected:  04/25/18 1349    Lab Status: In process Specimen:  Blood from Arm, Left Updated:  04/25/18 1354                 No orders to display              Procedures  Procedures       Phone Contacts  ED Phone Contact    ED Course  ED Course as of Apr 25 1612 Wed Apr 25, 2018   1559 - er md note- pt - re-evlauted    1604 Er md note- - pt -re-evaluated - abd soft- nt- no peritoneal signs                                MDM  CritCare Time    Disposition  Final diagnoses:   None     ED Disposition     None      Follow-up Information    None       Patient's Medications   Discharge Prescriptions    No medications on file     No discharge procedures on file      ED Provider  Electronically Signed by           Joseph Ross MD  04/27/18 1129

## 2018-04-25 NOTE — SEPSIS NOTE
Sepsis Note   Preethi Looney 65 y o  female MRN: [id number]  Unit/Bed#: ED 18 Encounter: [id number]            Initial Sepsis Screening     [address] Name 04/25/18 1619                Is the patient's history suggestive of a new or worsening infection? (!)  Yes (Proceed)  -MB        Suspected source of infection acute abdominal infection  -MB        Are two or more of the following signs & symptoms of infection both present and new to the patient? No  -MB        Indicate SIRS criteria          If the answer is yes to both questions, suspicion of sepsis is present          If severe sepsis is present AND tissue hypoperfusion perists in the hour after fluid resuscitation or lactate > 4, the patient meets criteria for SEPTIC SHOCK          Are any of the following organ dysfunction criteria present within 6 hours of suspected infection and SIRS criteria that are NOT considered to be chronic conditions?         Organ dysfunction          Date of presentation of severe sepsis          Time of presentation of severe sepsis          Tissue hypoperfusion persists in the hour after crystalloid fluid administration, evidenced, by either:          Was hypotension present within one hour of the conclusion of crystalloid fluid administration?           Date of presentation of septic shock          Time of presentation of septic shock            User Key  (r) = Recorded By, (t) = Taken By, (c) = Cosigned By    [address] Name Provider Type    JAQUELINE Gupta MD Physician

## 2018-04-25 NOTE — ASSESSMENT & PLAN NOTE
· BP elevated on admission, has not gotten medications today   · Restart home regimen - Lisinopril, Maxzide

## 2018-04-25 NOTE — ASSESSMENT & PLAN NOTE
· Appears to have a history of this  Noted low MCV     · Check iron studies  · Check B12  · Check Folate  · Supplement as needed

## 2018-04-25 NOTE — ED PROCEDURE NOTE
PROCEDURE  ECG 12 Lead Documentation  Date/Time: 4/25/2018 2:42 PM  Performed by: Angela Last  Authorized by: Angela Last     Indications / Diagnosis:  Upper abd opain   ECG reviewed by me, the ED Provider: yes    Patient location:  ED and bedside  Previous ECG:     Previous ECG:  Compared to current    Comparison ECG info:  2/20/18    Similarity:  No change    Comparison to cardiac monitor: Yes    Interpretation:     Interpretation: non-specific    Rate:     ECG rate:  84    ECG rate assessment: normal    Rhythm:     Rhythm: sinus rhythm    Ectopy:     Ectopy: none    QRS:     QRS axis:  Normal    QRS intervals:  Normal  Conduction:     Conduction: normal    ST segments:     ST segments:  Normal  T waves:     T waves: flattening      Flattening:  III  Q waves:     Q waves:  V1  Comments:      No ecg signs of ischemia/ injury / r heart strain / melinda/pericarditis         Talat Jacques MD  04/25/18 9022

## 2018-04-25 NOTE — ASSESSMENT & PLAN NOTE
· POA, total calcium = 6 1, ionized calcium = 1 08   Once again likely secondary to GI losses  · Replenished with calcium gluconate in the ER  · Recheck in AM  · Check PTH

## 2018-04-25 NOTE — ASSESSMENT & PLAN NOTE
·  on admission, has been admitted for severely high sugar in the past   · Continue home insulin regimen   · Lantus 10 U QHS  · Humalog 5 U TID with meals   · SSI algorithm with meals and bedtime   · QID accucheck   · Hold oral antihyperglycemics

## 2018-04-25 NOTE — ASSESSMENT & PLAN NOTE
· POA, likely secondary to not a clean catch   Afebrile at this time   · Monitor off antibiotics  · Follow cultures

## 2018-04-25 NOTE — H&P
[address] [facility]  H&P- Emmanuelle Hives 1955, 65 y o  female MRN: [id number]    Unit/Bed#: SUN Encounter: [id number]    Primary Care Provider: Kun Watts   Date and time admitted to hospital: 4/25/2018 11:56 AM        * Intractable diarrhea   Assessment & Plan    · POA, patient with multiple episodes of diarrhea at home  No other sick contacts, travel, or antibiotic use  No one at home with similar symptoms  No episodes here  No appetite at this time, but seems to be improving  · Admit patient to med/surg under inpatient status   · Check stool studies   · C  Diff  · Stool Enteric Panel   · Clear Liquid diet   · Can start supportive care when cultures negative         Hypokalemia   Assessment & Plan    · POA, 3 0 on admission, likely secondary to GI losses  · Replenish with 40 mEq each of PO and IV KCl  · Monitor BMP in AM        Hypomagnesemia   Assessment & Plan    · POA, 1 4  Likely secondary to GI losses   · Replenish with 2 gram Mg2+ IV         Hypocalcemia   Assessment & Plan    · POA, total calcium = 6 1, ionized calcium = 1 08  Once again likely secondary to GI losses  · Replenished with calcium gluconate in the ER  · Recheck in AM  · Check PTH         Bacteriuria   Assessment & Plan    · POA, likely secondary to not a clean catch  Afebrile at this time   · Monitor off antibiotics  · Follow cultures         Microcytic anemia   Assessment & Plan    · Appears to have a history of this  Noted low MCV     · Check iron studies  · Check B12  · Check Folate  · Supplement as needed         Type 2 diabetes mellitus with hyperglycemia, with long-term current use of insulin (HCC)   Assessment & Plan    ·  on admission, has been admitted for severely high sugar in the past   · Continue home insulin regimen   · Lantus 10 U QHS  · Humalog 5 U TID with meals   · SSI algorithm with meals and bedtime   · QID accucheck   · Hold oral antihyperglycemics         Essential hypertension Assessment & Plan    · BP elevated on admission, has not gotten medications today   · Restart home regimen - Lisinopril, Maxzide           VTE Prophylaxis: Enoxaparin (Lovenox)  / sequential compression device   Code Status: Full Code   POLST: POLST form is not discussed and not completed at this time  Discussion with family: Discussed with daughter at bedside     Anticipated Length of Stay:  Patient will be admitted on an Inpatient basis with an anticipated length of stay of greater than 2 midnights  Justification for Hospital Stay: multiple electrolyte abnormalities needing IV replenishment in the setting of intractable diarrhea     Total Time for Visit, including Counseling / Coordination of Care: 1 hour  Greater than 50% of this total time spent on direct patient counseling and coordination of care  Chief Complaint:   Diarrhea     History of Present Illness: Preethi Looney is a 65 y o  female with a history of T2DM, HTN who presents with diarrhea for the last 3 days  The patient reports that it started Monday morning and then she had breakfast which according the daughter was a "[employer] breakfast bowl", which made her symptoms worse  Over the last 3 days the patient has had too many episodes of diarrhea to count  In addition to this the patient started with nausea and had a singular episode of vomiting  She reports poor oral intake and a severely decreased appetite  Denies abdominal pain  Denies fevers or chills or rigors  Denies dysuria or other UTI symptoms  Denies recent travel, antibiotic use, or other sick contacts at home  She states that her niece did spend a day with her in the same house while she was ill, however she had already been suffering from her symptoms prior to her niece arriving  Patient reports that since being in the ER her appetite is improving  Review of Systems:    Review of Systems   Constitutional: Positive for appetite change and fatigue   Negative for chills, diaphoresis and fever  HENT: Negative for congestion, rhinorrhea and sore throat  Eyes: Negative for visual disturbance  Respiratory: Negative for cough, chest tightness, shortness of breath and wheezing  Cardiovascular: Negative for chest pain, palpitations and leg swelling  Gastrointestinal: Positive for diarrhea, nausea and vomiting  Negative for abdominal pain, blood in stool and constipation  Genitourinary: Negative for difficulty urinating, dysuria and frequency  Musculoskeletal: Negative for arthralgias and myalgias  Neurological: Positive for weakness  Negative for dizziness, syncope, light-headedness, numbness and headaches  All other systems reviewed and are negative  Past Medical and Surgical History:     Past Medical History:   Diagnosis Date    Degenerative disc disease at L5-S1 level     Diabetes mellitus ([facility] [address] )     Hypertension        Past Surgical History:   Procedure Laterality Date    CHOLECYSTECTOMY      JOINT REPLACEMENT      OOPHORECTOMY         Meds/Allergies:    Prior to Admission medications    Medication Sig Start Date End Date Taking?  Authorizing Provider   acetaminophen (TYLENOL) 325 mg tablet Take 2 tablets by mouth every 6 (six) hours as needed for mild pain 7/17/17  Yes Ashkan Hoover PA-C   B-D UF III MINI PEN NEEDLES 31G X 5 MM MISC 4 (four) times a day 2/23/18  Yes Historical Provider, MD   Blood Glucose Monitoring Suppl (FREESTYLE LITE) JAROD TEST BEFORE MEALS AND AT BEDTIME 2/23/18  Yes Historical Provider, MD   docusate sodium (COLACE) 100 mg capsule Take 1 capsule by mouth 2 (two) times a day 7/17/17  Yes Ashkan Hoover PA-C   ergocalciferol (VITAMIN D2) 50,000 units Take 50,000 Units by mouth once a week 11/27/17  Yes Historical Provider, MD   FREESTYLE LITE test strip TEST BEFORE MEALS AND BEDTIME 2/23/18  Yes Historical Provider, MD   gabapentin (NEURONTIN) 300 mg capsule Take 1 capsule (300 mg total) by mouth daily at bedtime 2/23/18  Yes Lolita Rogers MD   HUMALOG KWIKPEN 100 UNIT/ML SOPN INJECT 5 UNITS 3 TIMES A DAY WITH MEALS 2/23/18  Yes Historical Provider, MD   LANTUS SOLOSTAR injection pen 100 units/mL inject 10 units subcutaneously at bedtime 2/23/18  Yes Historical Provider, MD   lisinopril (ZESTRIL) 5 mg tablet Take 2 tablets (10 mg total) by mouth daily 2/23/18  Yes Lolita Rogers MD   metFORMIN (GLUCOPHAGE) 1000 MG tablet Take 1 tablet (1,000 mg total) by mouth 2 (two) times a day with meals 2/23/18  Yes Lolita Rogers MD   metoclopramide (REGLAN) 10 mg tablet Take 1 tablet (10 mg total) by mouth 3 (three) times a day before meals 2/23/18  Yes Lolita Rogers MD   morphine (TAMRA) 100 MG 24 hr capsule Take 100 mg by mouth 2 (two) times a day 12/20/17  Yes Historical Provider, MD   ondansetron (ZOFRAN-ODT) 8 mg disintegrating tablet  1/29/18  Yes Historical Provider, MD   glipiZIDE (GLUCOTROL) 5 mg tablet Take 2 tablets by mouth 2 (two) times a day  4/25/18 Yes Historical Provider, MD   DULoxetine (CYMBALTA) 60 mg delayed release capsule Take 60 mg by mouth daily    Historical Provider, MD   DULoxetine (CYMBALTA) 60 mg delayed release capsule Take 1 capsule by mouth daily    Historical Provider, MD   insulin glargine (LANTUS) 100 units/mL subcutaneous injection Inject 10 Units under the skin daily at bedtime 2/23/18   Lolita Rogers MD   insulin lispro (HumaLOG) 100 units/mL injection Inject 5 Units under the skin 3 (three) times a day with meals 2/23/18   Lolita Rogers MD   Lancets (FREESTYLE) lancets TEST BEFORE MEALS AND BEDTIME 2/23/18   Historical Provider, MD   oxyCODONE (ROXICODONE) 30 MG immediate release tablet Take 30 mg by mouth every 4 (four) hours as needed for moderate pain      Historical Provider, MD   SAXagliptin-MetFORMIN ER 5-1000 MG TB24 Take 1 tablet by mouth daily    Historical Provider, MD   simvastatin (ZOCOR) 20 mg tablet Take 1 tablet by mouth Historical Provider, MD   triamterene-hydrochlorothiazide (MAXZIDE) 75-50 MG per tablet Take 1 tablet by mouth daily    Historical Provider, MD     I have reviewed home medications with patient family member  Allergies: No Known Allergies    Social History:     Marital Status: /Civil Union   Occupation: None  Patient Pre-hospital Living Situation: Home with    Patient Pre-hospital Level of Mobility: Full  Patient Pre-hospital Diet Restrictions: Diabetic   Substance Use History:   History   Alcohol Use No     History   Smoking Status    Former Smoker    Types: E-Cigarettes   Smokeless Tobacco    Never Used     History   Drug Use No       Family History:    History reviewed  No pertinent family history  Physical Exam:     Vitals:   Blood Pressure: (!) 183/81 (04/25/18 1713)  Pulse: 85 (04/25/18 1713)  Temperature: 98 °F (36 7 °C) (04/25/18 1201)  Temp Source: Oral (04/25/18 1201)  Respirations: 18 (04/25/18 1713)  SpO2: 97 % (04/25/18 1713)    Physical Exam   Constitutional: She is oriented to person, place, and time  Vital signs are normal  She appears well-developed and well-nourished  Non-toxic appearance  She appears ill  No distress  HENT:   Head: Normocephalic and atraumatic  Mouth/Throat: Mucous membranes are dry  Eyes: Conjunctivae and EOM are normal  Pupils are equal, round, and reactive to light  Right pupil is round and reactive  Left pupil is round and reactive  Pupils are equal    Neck: Neck supple  Cardiovascular: Normal rate, regular rhythm, S1 normal, S2 normal and intact distal pulses  Exam reveals no S3 and no S4  No murmur heard  Pulmonary/Chest: Effort normal and breath sounds normal  No accessory muscle usage  No respiratory distress  She has no decreased breath sounds  She has no wheezes  She has no rhonchi  She has no rales  She exhibits no tenderness  Abdominal: Soft  Bowel sounds are normal  She exhibits no distension and no mass   There is generalized tenderness  There is no rigidity, no rebound and no guarding  Neurological: She is alert and oriented to person, place, and time  She is not disoriented  GCS eye subscore is 4  GCS verbal subscore is 5  GCS motor subscore is 6  Skin: Skin is warm and dry  Additional Data:     Lab Results: I have personally reviewed pertinent reports  Results from last 7 days  Lab Units 04/25/18  1512   WBC Thousand/uL 4 19*   HEMOGLOBIN g/dL 8 1*   HEMATOCRIT % 26 7*   PLATELETS Thousands/uL 151   NEUTROS PCT % 70   LYMPHS PCT % 22   MONOS PCT % 6   EOS PCT % 2       Results from last 7 days  Lab Units 04/25/18  1432   SODIUM mmol/L 138   POTASSIUM mmol/L 3 0*   CHLORIDE mmol/L 109*   CO2 mmol/L 22   BUN mg/dL 19   CREATININE mg/dL 0 61   CALCIUM mg/dL 6 1*   TOTAL PROTEIN g/dL 5 5*   BILIRUBIN TOTAL mg/dL 0 20   ALK PHOS U/L 63   ALT U/L 20   AST U/L 16   GLUCOSE RANDOM mg/dL 181*           Imaging: I have personally reviewed pertinent reports  No orders to display       EKG, Pathology, and Other Studies Reviewed on Admission:   · EKG: NSR, 84 BPM    Allscripts / Epic Records Reviewed: Yes     ** Please Note: This note has been constructed using a voice recognition system   **

## 2018-04-26 VITALS
OXYGEN SATURATION: 97 % | RESPIRATION RATE: 18 BRPM | TEMPERATURE: 98.3 F | HEIGHT: 62 IN | HEART RATE: 85 BPM | WEIGHT: 195.99 LBS | BODY MASS INDEX: 36.07 KG/M2 | SYSTOLIC BLOOD PRESSURE: 156 MMHG | DIASTOLIC BLOOD PRESSURE: 90 MMHG

## 2018-04-26 LAB
ALBUMIN SERPL BCP-MCNC: 3 G/DL (ref 3.5–5)
ALP SERPL-CCNC: 89 U/L (ref 46–116)
ALT SERPL W P-5'-P-CCNC: 25 U/L (ref 12–78)
ANION GAP SERPL CALCULATED.3IONS-SCNC: 7 MMOL/L (ref 4–13)
AST SERPL W P-5'-P-CCNC: 30 U/L (ref 5–45)
BILIRUB SERPL-MCNC: 0.2 MG/DL (ref 0.2–1)
BUN SERPL-MCNC: 20 MG/DL (ref 5–25)
C DIFF TOX GENS STL QL NAA+PROBE: NORMAL
CALCIUM SERPL-MCNC: 8.8 MG/DL (ref 8.3–10.1)
CAMPYLOBACTER DNA SPEC NAA+PROBE: NORMAL
CHLORIDE SERPL-SCNC: 106 MMOL/L (ref 100–108)
CO2 SERPL-SCNC: 27 MMOL/L (ref 21–32)
CREAT SERPL-MCNC: 0.86 MG/DL (ref 0.6–1.3)
ERYTHROCYTE [DISTWIDTH] IN BLOOD BY AUTOMATED COUNT: 18 % (ref 11.6–15.1)
GFR SERPL CREATININE-BSD FRML MDRD: 73 ML/MIN/1.73SQ M
GLUCOSE SERPL-MCNC: 108 MG/DL (ref 65–140)
GLUCOSE SERPL-MCNC: 114 MG/DL (ref 65–140)
GLUCOSE SERPL-MCNC: 79 MG/DL (ref 65–140)
HCT VFR BLD AUTO: 28.4 % (ref 34.8–46.1)
HGB BLD-MCNC: 8.7 G/DL (ref 11.5–15.4)
MAGNESIUM SERPL-MCNC: 2.9 MG/DL (ref 1.6–2.6)
MCH RBC QN AUTO: 21.4 PG (ref 26.8–34.3)
MCHC RBC AUTO-ENTMCNC: 30.6 G/DL (ref 31.4–37.4)
MCV RBC AUTO: 70 FL (ref 82–98)
PLATELET # BLD AUTO: 177 THOUSANDS/UL (ref 149–390)
PMV BLD AUTO: 9.5 FL (ref 8.9–12.7)
POTASSIUM SERPL-SCNC: 4.6 MMOL/L (ref 3.5–5.3)
PROT SERPL-MCNC: 6.9 G/DL (ref 6.4–8.2)
PTH-INTACT SERPL-MCNC: 48.7 PG/ML (ref 18.4–80.1)
RBC # BLD AUTO: 4.06 MILLION/UL (ref 3.81–5.12)
SALMONELLA DNA SPEC QL NAA+PROBE: NORMAL
SHIGA TOXIN STX GENE SPEC NAA+PROBE: NORMAL
SHIGELLA DNA SPEC QL NAA+PROBE: NORMAL
SODIUM SERPL-SCNC: 140 MMOL/L (ref 136–145)
WBC # BLD AUTO: 5.66 THOUSAND/UL (ref 4.31–10.16)

## 2018-04-26 PROCEDURE — 99239 HOSP IP/OBS DSCHRG MGMT >30: CPT | Performed by: HOSPITALIST

## 2018-04-26 PROCEDURE — 80053 COMPREHEN METABOLIC PANEL: CPT | Performed by: PHYSICIAN ASSISTANT

## 2018-04-26 PROCEDURE — 82948 REAGENT STRIP/BLOOD GLUCOSE: CPT

## 2018-04-26 PROCEDURE — 83970 ASSAY OF PARATHORMONE: CPT | Performed by: PHYSICIAN ASSISTANT

## 2018-04-26 PROCEDURE — 83735 ASSAY OF MAGNESIUM: CPT | Performed by: PHYSICIAN ASSISTANT

## 2018-04-26 PROCEDURE — 85027 COMPLETE CBC AUTOMATED: CPT | Performed by: PHYSICIAN ASSISTANT

## 2018-04-26 RX ORDER — FERROUS SULFATE 325(65) MG
325 TABLET ORAL
Status: ACTIVE | OUTPATIENT
Start: 2018-04-27

## 2018-04-26 RX ORDER — METOPROLOL TARTRATE 5 MG/5ML
5 INJECTION INTRAVENOUS EVERY 6 HOURS PRN
Status: DISCONTINUED | OUTPATIENT
Start: 2018-04-26 | End: 2018-04-26 | Stop reason: HOSPADM

## 2018-04-26 RX ADMIN — DULOXETINE 60 MG: 60 CAPSULE, DELAYED RELEASE ORAL at 08:40

## 2018-04-26 RX ADMIN — ERGOCALCIFEROL 50000 UNITS: 1.25 CAPSULE ORAL at 08:41

## 2018-04-26 RX ADMIN — LISINOPRIL 10 MG: 10 TABLET ORAL at 08:41

## 2018-04-26 RX ADMIN — TRIAMTERENE AND HYDROCHLOROTHIAZIDE 2 TABLET: 37.5; 25 TABLET ORAL at 08:40

## 2018-04-26 RX ADMIN — ACETAMINOPHEN 650 MG: 325 TABLET, FILM COATED ORAL at 06:30

## 2018-04-26 RX ADMIN — ENOXAPARIN SODIUM 40 MG: 40 INJECTION SUBCUTANEOUS at 08:40

## 2018-04-26 RX ADMIN — INSULIN LISPRO 5 UNITS: 100 INJECTION, SOLUTION INTRAVENOUS; SUBCUTANEOUS at 10:43

## 2018-04-26 RX ADMIN — METOCLOPRAMIDE HYDROCHLORIDE 10 MG: 10 TABLET ORAL at 11:15

## 2018-04-26 RX ADMIN — METOPROLOL TARTRATE 5 MG: 5 INJECTION, SOLUTION INTRAVENOUS at 00:20

## 2018-04-26 RX ADMIN — METOCLOPRAMIDE HYDROCHLORIDE 10 MG: 10 TABLET ORAL at 06:30

## 2018-04-26 RX ADMIN — SODIUM CHLORIDE 100 ML/HR: 0.9 INJECTION, SOLUTION INTRAVENOUS at 06:30

## 2018-04-26 NOTE — DISCHARGE SUMMARY
Discharge- Khoi Score 1955, 65 y o  female MRN: [id number]    Unit/Bed#: -01 Encounter: [id number]    Primary Care Provider: Venus Watts   Date and time admitted to hospital: 4/25/2018 11:56 AM    * Intractable diarrhea   Assessment & Plan    · POA, patient with multiple episodes of diarrhea at home  No other sick contacts, travel, or antibiotic use  No one at home with similar symptoms  No episodes here  No appetite at this time, but seems to be improving  · Check stool studies   · C  Diff: negative  · Stool Enteric Panel: negative    · Clear Liquid diet --> advanced to regular         Hypokalemia   Assessment & Plan    · POA, 3 0 on admission, likely secondary to GI losses  · Improved on am BMP         Hypomagnesemia   Assessment & Plan    · POA, 1 4  Likely secondary to GI losses   · Improved today         Microcytic anemia   Assessment & Plan    · Appears to have a history of this  Noted low MCV  · c/w SARBJIT   · Add ferrous sulfate         Hypocalcemia   Assessment & Plan    · POA, total calcium = 6 1, ionized calcium = 1 08  Once again likely secondary to GI losses  · Improved this am  · Normal PTH         Bacteriuria   Assessment & Plan    · POA, likely secondary to not a clean catch   Afebrile at this time   · Monitor off antibiotics  · Follow cultures           Discharging Physician / Practitioner: Ariela Davidson MD  PCP: Venus Watts  Admission Date:   Admission Orders     Ordered        04/25/18 1607  Inpatient Admission (expected length of stay for this patient is greater than two midnights)  Once             Discharge Date: 04/26/18    Resolved Problems  Date Reviewed: 4/25/2018    None          Consultations During Hospital Stay:  · none    Procedures Performed:     · None     Significant Findings / Test Results:     · C diff: negative  · Enteric pathogen: negative     Incidental Findings:   · None      Test Results Pending at Discharge (will require follow up): · None      Outpatient Tests Requested:  · f/u CBC     Complications:  none    Reason for Admission: none     Hospital Course: Brianna Jolley is a 65 y o  female patient who originally presented to the hospital on 4/25/2018 due to intractable diarrhea with electrolyte abnormalities  Patient had stool studies performed which were negative for C diff as well as other enteric pathogens  Patient noted to be hypokalemic as well as hypocalcemic on admission  These values improved with repletion  PTH was normal   Patient also noted in the neck admission labs with low MC V  Iron studies were performed and labs were consistent with iron deficiency anemia  We will add ferrous sulfate on discharge    Pt improved sooner than expected  Work-up was completed in a timely manner  Please see above list of diagnoses and related plan for additional information  Condition at Discharge: fair     Discharge Day Visit / Exam:     Subjective:  "I feel good Vitals: Blood Pressure: 156/90 (04/26/18 0755)  Pulse: 85 (04/26/18 0755)  Temperature: 98 3 °F (36 8 °C) (04/26/18 0755)  Temp Source: Oral (04/26/18 0755)  Respirations: 18 (04/26/18 0755)  Height: 5' 2" (157 5 cm) (04/25/18 1728)  Weight - Scale: 88 9 kg (195 lb 15 8 oz) (04/25/18 1728)  SpO2: 97 % (04/26/18 0755)  Exam:   Physical Exam   Constitutional: She is oriented to person, place, and time  No distress  Eyes: Conjunctivae are normal  No scleral icterus  Cardiovascular: Normal rate and regular rhythm  No murmur heard  Pulmonary/Chest: Effort normal and breath sounds normal  No respiratory distress  She has no wheezes  Abdominal: Soft  Bowel sounds are normal  She exhibits no distension  There is no tenderness  There is no rebound and no guarding  Neurological: She is alert and oriented to person, place, and time  Skin: Skin is warm and dry  She is not diaphoretic  Psychiatric: She has a normal mood and affect   Her behavior is normal    Nursing note and vitals reviewed  Discharge instructions/Information to patient and family:   See after visit summary for information provided to patient and family  Provisions for Follow-Up Care:  See after visit summary for information related to follow-up care and any pertinent home health orders  Disposition:     Home    For Discharges to [facility]:   · Not Applicable to this Patient - Not Applicable to this Patient    Planned Readmission: none     Discharge Statement:  I spent 45 minutes discharging the patient  This time was spent on the day of discharge  I had direct contact with the patient on the day of discharge  Greater than 50% of the total time was spent examining patient, answering all patient questions, arranging and discussing plan of care with patient as well as directly providing post-discharge instructions  Additional time then spent on discharge activities  Discharge Medications:  See after visit summary for reconciled discharge medications provided to patient and family        ** Please Note: This note has been constructed using a voice recognition system **

## 2018-04-26 NOTE — CASE MANAGEMENT
Initial Clinical Review    Admission: Date/Time/Statement: 4/25/18 @ 1607     Orders Placed This Encounter   Procedures    Inpatient Admission (expected length of stay for this patient is greater than two midnights)     Standing Status:   Standing     Number of Occurrences:   1     Order Specific Question:   Admitting Physician     Answer:   Liz Alcantar [[id number]]     Order Specific Question:   Level of Care     Answer:   Med Surg [[id number]]     Order Specific Question:   Estimated length of stay     Answer:   More than 2 Midnights     Order Specific Question:   Certification     Answer:   I certify that inpatient services are medically necessary for this patient for a duration of greater than two midnights  See H&P and MD Progress Notes for additional information about the patient's course of treatment  ED: Date/Time/Mode of Arrival:   ED Arrival Information     Expected Arrival Acuity Means of Arrival Escorted By Service Admission Type    - 4/25/2018 11:38 Urgent Walk-In Family Member General Medicine Urgent    Arrival Complaint    diarrehea          Chief Complaint:   Chief Complaint   Patient presents with    Diarrhea     PT reports "I have had diarrhea 3 days ago and this morning I started vomiting and my head is pounding"       History of Illness:  65 y o  female with a history of T2DM, HTN who presents with diarrhea for the last 3 days  The patient reports that it started Monday morning and then she had breakfast which according the daughter was a "[place] breakfast bowl", which made her symptoms worse  Over the last 3 days the patient has had too many episodes of diarrhea to count  In addition to this the patient started with nausea and had a singular episode of vomiting  She reports poor oral intake and a severely decreased appetite  Denies abdominal pain  Denies fevers or chills or rigors  Denies dysuria or other UTI symptoms   Denies recent travel, antibiotic use, or other sick contacts at home  She states that her niece did spend a day with her in the same house while she was ill, however she had already been suffering from her symptoms prior to her niece arriving  ED Vital Signs:   ED Triage Vitals   Temperature Pulse Respirations Blood Pressure SpO2   04/25/18 1201 04/25/18 1201 04/25/18 1201 04/25/18 1201 04/25/18 1201   98 °F (36 7 °C) 90 20 (!) 189/83 96 %      Temp Source Heart Rate Source Patient Position - Orthostatic VS BP Location FiO2 (%)   04/25/18 1201 04/25/18 1201 04/25/18 1201 04/25/18 1201 --   Oral Monitor Lying Right arm       Pain Score       04/25/18 1349       Worst Possible Pain        Wt Readings from Last 1 Encounters:   04/25/18 88 9 kg (195 lb 15 8 oz)       Vital Signs (abnormal):  /83 - 181/86    Abnormal Labs/Diagnostic Test Results:   Calcium, ionized 1 08 (lowe)  Magnesium 1 4  Wbc 4 19  hgb 8 1, hct 26 7    k 3  Cl 109  Glucose 181  Calcium 6 1  Total protein 5 5  Albumin 2 4      1815 glucose 322  2051 glucose 314  Labs 4/26/2018- albumin 3  Magnesium 2 9  hgb 8 7, hct 28 4  ED Treatment:   Medication Administration from 04/25/2018 1138 to 04/25/2018 1740       Date/Time Order Dose Route Action Action by Comments     04/25/2018 1450 sodium chloride 0 9 % bolus 500 mL 0 mL Intravenous Stopped Reyes Sanders RN      04/25/2018 1350 sodium chloride 0 9 % bolus 500 mL 500 mL Intravenous New Bag Reyes Sanders RN      04/25/2018 1349 morphine (PF) 4 mg/mL injection 4 mg 4 mg Intravenous Given Reyes Sanders RN      04/25/2018 1349 metoclopramide (REGLAN) injection 10 mg 10 mg Intravenous Given Reyes Sanders RN      04/25/2018 1627 cefTRIAXone (ROCEPHIN) 2,000 mg in dextrose 5 % 50 mL IVPB 2,000 mg Intravenous New Bag Reyes Sanders RN           Past Medical/Surgical History:    Active Ambulatory Problems     Diagnosis Date Noted    Essential hypertension     Hyperlipidemia     Degenerative disc disease at L5-S1 level     Cellulitis 01/10/2017    Chronic venous stasis dermatitis of both lower extremities 01/10/2017    Diabetic neuropathy (HCC) 01/14/2017    Venous insufficiency (chronic) (peripheral) 01/14/2017    Fall 07/14/2017    Abnormal head CT 07/14/2017    Closed fracture of multiple ribs of right side 07/14/2017    Palpitations 07/14/2017    Near syncope 07/14/2017    Chronic venous stasis dermatitis of both lower extremities 07/14/2017    Delirium 07/17/2017    Ambulatory dysfunction 07/17/2017    Physical deconditioning 07/17/2017    Incontinence 07/17/2017    Type 2 diabetes mellitus with hyperglycemia, with long-term current use of insulin (HCC) 02/20/2018    Nausea 02/22/2018    Chronic pain syndrome 02/22/2018    Peripheral neuropathy 02/22/2018    Microcytic anemia 02/22/2018     Resolved Ambulatory Problems     Diagnosis Date Noted    Hyponatremia 07/14/2017    Headache 02/22/2018     Past Medical History:   Diagnosis Date    Degenerative disc disease at L5-S1 level     Diabetes mellitus ([address] )     Hypertension        Admitting Diagnosis: Hypocalcemia [E83 51]  Diarrhea [R19 7]  Hypokalemia [E87 6]  Hypomagnesemia [E83 42]  UTI (urinary tract infection) [N39 0]  Anemia [D64 9]  Diarrhea, unspecified type [R19 7]  Nausea and vomiting, intractability of vomiting not specified, unspecified vomiting type [R11 2]    Age/Sex: 65 y o  female    Assessment/Plan:   Intractable diarrhea   Assessment & Plan     · POA, patient with multiple episodes of diarrhea at home  No other sick contacts, travel, or antibiotic use  No one at home with similar symptoms  No episodes here  No appetite at this time, but seems to be improving  ? Admit patient to med/surg under inpatient status   ? Check stool studies   § C  Diff  § Stool Enteric Panel   ? Clear Liquid diet   ?  Can start supportive care when cultures negative           Hypokalemia   Assessment & Plan     · POA, 3 0 on admission, likely secondary to GI losses  ? Replenish with 40 mEq each of PO and IV KCl  ? Monitor BMP in AM          Hypomagnesemia   Assessment & Plan     · POA, 1 4  Likely secondary to GI losses   ? Replenish with 2 gram Mg2+ IV           Hypocalcemia   Assessment & Plan     · POA, total calcium = 6 1, ionized calcium = 1 08  Once again likely secondary to GI losses  ? Replenished with calcium gluconate in the ER  ? Recheck in AM  ? Check PTH           Bacteriuria   Assessment & Plan     · POA, likely secondary to not a clean catch  Afebrile at this time   ? Monitor off antibiotics  ? Follow cultures           Microcytic anemia   Assessment & Plan     · Appears to have a history of this  Noted low MCV  ? Check iron studies  ? Check B12  ? Check Folate  ? Supplement as needed           Type 2 diabetes mellitus with hyperglycemia, with long-term current use of insulin (HCC)   Assessment & Plan     ·  on admission, has been admitted for severely high sugar in the past   ? Continue home insulin regimen   § Lantus 10 U QHS  § Humalog 5 U TID with meals   ? SSI algorithm with meals and bedtime   ? QID accucheck   ? Hold oral antihyperglycemics           Essential hypertension   Assessment & Plan     · BP elevated on admission, has not gotten medications today   ?  Restart home regimen - Lisinopril, Maxzide          Admission Orders: 4/25/2018  1607 INPATIENT   Scheduled Meds:   Current Facility-Administered Medications:  acetaminophen 650 mg Oral Q6H PRN Johnathon Mcgowan PA-C    DULoxetine 60 mg Oral Daily Ren Schwarz PA-C    enoxaparin 40 mg Subcutaneous Daily Ren Ivory PA-C    ergocalciferol 50,000 Units Oral Weekly Ren Ivory PA-C    gabapentin 300 mg Oral HS Ren Schwarz PA-C    insulin glargine 10 Units Subcutaneous HS Ren Schwarz PA-C    insulin lispro 1-6 Units Subcutaneous TID AC Ren Schwarz PA-C    insulin lispro 1-6 Units Subcutaneous HS Ren Schwarz PA-C    insulin lispro 5 Units Subcutaneous TID With Meals Ren Boyd PA-C    lisinopril 10 mg Oral Daily Ren Boyd PA-C    metoclopramide 10 mg Oral TID AC Ren Schwarz PA-C    metoprolol 5 mg Intravenous Q6H PRN Soyjosafat Floyd PA-C    ondansetron 4 mg Intravenous Q6H PRN Lazarusgurdeep Gonzalez PA-C    oxyCODONE 30 mg Oral Q4H PRN Ren Boyd PA-C    pravastatin 40 mg Oral Daily With Perez'sTIFFANIE    sodium chloride 100 mL/hr Intravenous Continuous Lazarus TIFFANIE Gonzalez Last Rate: 100 mL/hr (04/26/18 0630)   triamterene-hydrochlorothiazide 2 tablet Oral Daily Ren Schwarz PA-C      Calcium gluoconate 3 gms iv - used x 1  Magnesium 2 gms iv - used x 1    kcl 40 po    Continuous Infusions:   sodium chloride 100 mL/hr Last Rate: 100 mL/hr (04/26/18 0630)     PRN Meds:   Acetaminophen - used x 2      Metoprolol 5 mg iv - used x 1 (0020)    Ondansetron  4mg iv - used x 1 (2058)    oxyCODONE    OTHER ORDERS: fingerstick glucose qid    scds  Stool clostridium difficile; enteric bacterial panel  Clears  Urine culture

## 2018-04-26 NOTE — DISCHARGE INSTRUCTIONS
You were hospitalized due to diarrhea and electrolyte abnormalities   You were cared for on the 4th floor under the service of Ginny Stark MD with the [facility] who covers for Corbin Watts while you were hospitalized  If you have any questions or concerns related to this hospitalization, you may contact us at [phone]  For follow up, we recommend that you follow up with your primary care physician  We also provide the following instructions / recommendations at discharge:     · Be sure to stay hydrated if your diarrhea returns  You can try drinks like gatorade or pedialyte to make sure your electrolytes are replaced appropriately     · No evidence of bacterial infection on stool samples

## 2018-04-26 NOTE — ASSESSMENT & PLAN NOTE
· POA, total calcium = 6 1, ionized calcium = 1 08   Once again likely secondary to GI losses  · Improved this am  · Normal PTH

## 2018-04-26 NOTE — ASSESSMENT & PLAN NOTE
· POA, patient with multiple episodes of diarrhea at home  No other sick contacts, travel, or antibiotic use  No one at home with similar symptoms  No episodes here  No appetite at this time, but seems to be improving  · Check stool studies   · C   Diff: negative  · Stool Enteric Panel: negative    · Clear Liquid diet --> advanced to regular

## 2018-04-27 LAB
BACTERIA UR CULT: ABNORMAL
BACTERIA UR CULT: ABNORMAL

## 2018-04-27 NOTE — PROGRESS NOTES
Spoke with patient's  regarding test results  Prescription for Macrobid 100 mg 1 p o  b i d  for 7 days called in to [facility] pharmacy on [address] in [place]

## 2018-05-09 NOTE — CASE MANAGEMENT
Please Reference Auth #[id number] with this clinical    Pippa Wilde RN Registered Nurse Signed   Case Management Date of Service: 4/26/2018  9:53 AM         []Hide copied text  Initial Clinical Review     Admission: Date/Time/Statement: 4/25/18 @ 1607            Orders Placed This Encounter   Procedures    Inpatient Admission (expected length of stay for this patient is greater than two midnights)       Standing Status:   Standing       Number of Occurrences:   1       Order Specific Question:   Admitting Physician       Answer:   Lisa Bella [[id number]]       Order Specific Question:   Level of Care       Answer:   Med Surg [16]       Order Specific Question:   Estimated length of stay       Answer:   More than 2 Midnights       Order Specific Question:   Certification       Answer:   I certify that inpatient services are medically necessary for this patient for a duration of greater than two midnights  See H&P and MD Progress Notes for additional information about the patient's course of treatment             ED: Date/Time/Mode of Arrival:             ED Arrival Information      Expected Arrival Acuity Means of Arrival Escorted By Service Admission Type     - 4/25/2018 11:38 Urgent Walk-In Family Member General Medicine Urgent     Arrival Complaint     diarrehea             Chief Complaint:        Chief Complaint   Patient presents with    Diarrhea       PT reports "I have had diarrhea 3 days ago and this morning I started vomiting and my head is pounding"         History of Illness:  62 y o  female with a history of T2DM, HTN who presents with diarrhea for the last 3 days  The patient reports that it started Monday morning and then she had breakfast which according the daughter was a "huev[place]anche[place] breakfast bowl", which made her symptoms worse  Over the last 3 days the patient has had too many episodes of diarrhea to count   In addition to this the patient started with nausea and had a singular episode of vomiting  She reports poor oral intake and a severely decreased appetite  Denies abdominal pain  Denies fevers or chills or rigors  Denies dysuria or other UTI symptoms  Denies recent travel, antibiotic use, or other sick contacts at home  She states that her niece did spend a day with her in the same house while she was ill, however she had already been suffering from her symptoms prior to her niece arriving       ED Vital Signs:            ED Triage Vitals   Temperature Pulse Respirations Blood Pressure SpO2   04/25/18 1201 04/25/18 1201 04/25/18 1201 04/25/18 1201 04/25/18 1201   98 °F (36 7 °C) 90 20 (!) 189/83 96 %       Temp Source Heart Rate Source Patient Position - Orthostatic VS BP Location FiO2 (%)   04/25/18 1201 04/25/18 1201 04/25/18 1201 04/25/18 1201 --   Oral Monitor Lying Right arm         Pain Score           04/25/18 1349           Worst Possible Pain                Wt Readings from Last 1 Encounters:   04/25/18 88 9 kg (195 lb 15 8 oz)         Vital Signs (abnormal):  /83 - 181/86     Abnormal Labs/Diagnostic Test Results:   Calcium, ionized 1 08 (lowe)  Magnesium 1 4  Wbc 4 19  hgb 8 1, hct 26 7    k 3  Cl 109  Glucose 181  Calcium 6 1  Total protein 5 5  Albumin 2 4       1815 glucose 322  2051 glucose 314       Labs 4/26/2018- albumin 3  Magnesium 2 9  hgb 8 7, hct 28  4       ED Treatment:              Medication Administration from 04/25/2018 1138 to 04/25/2018 1740        Date/Time Order Dose Route Action Action by Comments       04/25/2018 1450 sodium chloride 0 9 % bolus 500 mL 0 mL Intravenous Stopped Jhonathan Green RN         04/25/2018 1350 sodium chloride 0 9 % bolus 500 mL 500 mL Intravenous New Bag Jhonathan Green RN         04/25/2018 1349 morphine (PF) 4 mg/mL injection 4 mg 4 mg Intravenous Given Jhonathan Green RN         04/25/2018 1349 metoclopramide (REGLAN) injection 10 mg 10 mg Intravenous Given Johnathan Green RN         04/25/2018 1627 cefTRIAXone (ROCEPHIN) 2,000 mg in dextrose 5 % 50 mL IVPB 2,000 mg Intravenous New Bag Darylene Curia, RN               Past Medical/Surgical History: Active Ambulatory Problems     Diagnosis Date Noted    Essential hypertension      Hyperlipidemia      Degenerative disc disease at L5-S1 level      Cellulitis 01/10/2017    Chronic venous stasis dermatitis of both lower extremities 01/10/2017    Diabetic neuropathy (HCC) 01/14/2017    Venous insufficiency (chronic) (peripheral) 01/14/2017    Fall 07/14/2017    Abnormal head CT 07/14/2017    Closed fracture of multiple ribs of right side 07/14/2017    Palpitations 07/14/2017    Near syncope 07/14/2017    Chronic venous stasis dermatitis of both lower extremities 07/14/2017    Delirium 07/17/2017    Ambulatory dysfunction 07/17/2017    Physical deconditioning 07/17/2017    Incontinence 07/17/2017    Type 2 diabetes mellitus with hyperglycemia, with long-term current use of insulin (HCC) 02/20/2018    Nausea 02/22/2018    Chronic pain syndrome 02/22/2018    Peripheral neuropathy 02/22/2018    Microcytic anemia 02/22/2018           Resolved Ambulatory Problems     Diagnosis Date Noted    Hyponatremia 07/14/2017    Headache 02/22/2018           Past Medical History:   Diagnosis Date    Degenerative disc disease at L5-S1 level      Diabetes mellitus (HCC)      Hypertension           Admitting Diagnosis: Hypocalcemia [E83 51]  Diarrhea [R19 7]  Hypokalemia [E87 6]  Hypomagnesemia [E83 42]  UTI (urinary tract infection) [N39 0]  Anemia [D64 9]  Diarrhea, unspecified type [R19 7]  Nausea and vomiting, intractability of vomiting not specified, unspecified vomiting type [R11 2]     Age/Sex: 65 y o  female     Assessment/Plan:       Intractable diarrhea   Assessment & Plan     · POA, patient with multiple episodes of diarrhea at home  No other sick contacts, travel, or antibiotic use   No one at home with similar symptoms  No episodes here  No appetite at this time, but seems to be improving  ? Admit patient to med/surg under inpatient status   ? Check stool studies   § C  Diff  § Stool Enteric Panel   ? Clear Liquid diet   ? Can start supportive care when cultures negative           Hypokalemia   Assessment & Plan     · POA, 3 0 on admission, likely secondary to GI losses  ? Replenish with 40 mEq each of PO and IV KCl  ? Monitor BMP in AM          Hypomagnesemia   Assessment & Plan     · POA, 1 4  Likely secondary to GI losses   ? Replenish with 2 gram Mg2+ IV           Hypocalcemia   Assessment & Plan     · POA, total calcium = 6 1, ionized calcium = 1 08  Once again likely secondary to GI losses  ? Replenished with calcium gluconate in the ER  ? Recheck in AM  ? Check PTH           Bacteriuria   Assessment & Plan     · POA, likely secondary to not a clean catch  Afebrile at this time   ? Monitor off antibiotics  ? Follow cultures           Microcytic anemia   Assessment & Plan     · Appears to have a history of this  Noted low MCV  ? Check iron studies  ? Check B12  ? Check Folate  ? Supplement as needed           Type 2 diabetes mellitus with hyperglycemia, with long-term current use of insulin (HCC)   Assessment & Plan     ·  on admission, has been admitted for severely high sugar in the past   ? Continue home insulin regimen   § Lantus 10 U QHS  § Humalog 5 U TID with meals   ? SSI algorithm with meals and bedtime   ? QID accucheck   ? Hold oral antihyperglycemics           Essential hypertension   Assessment & Plan     · BP elevated on admission, has not gotten medications today   ?  Restart home regimen - Lisinopril, Maxzide             Admission Orders: 4/25/2018  1607 INPATIENT   Scheduled Meds:   Current Facility-Administered Medications:  acetaminophen 650 mg Oral Q6H PRN Chivo Kinney PA-C     DULoxetine 60 mg Oral Daily Ren Davison PA-C     enoxaparin 40 mg Subcutaneous Daily Ren Davison PA-C   ergocalciferol 50,000 Units Oral Weekly Ren To PA-C     gabapentin 300 mg Oral HS Ren To PA-C     insulin glargine 10 Units Subcutaneous HS Ren To PA-C     insulin lispro 1-6 Units Subcutaneous TID AC Ren To PA-C     insulin lispro 1-6 Units Subcutaneous HS Ren To PA-C     insulin lispro 5 Units Subcutaneous TID With Meals Ren To PA-C     lisinopril 10 mg Oral Daily Ren To PA-C     metoclopramide 10 mg Oral TID AC Ren To PA-C     metoprolol 5 mg Intravenous Q6H PRN Patdona Galdamez PA-C     ondansetron 4 mg Intravenous Q6H PRN Glorianne LeightonTIFFANIE     oxyCODONE 30 mg Oral Q4H PRN Ren To PA-C     pravastatin 40 mg Oral Daily With Dinner Glorianne LeightonTIFFANIE     sodium chloride 100 mL/hr Intravenous Continuous Glorianne LeightonTIFFANIE Last Rate: 100 mL/hr (04/26/18 0630)   triamterene-hydrochlorothiazide 2 tablet Oral Daily Ren Schwarz PA-C        Calcium gluoconate 3 gms iv - used x 1  Magnesium 2 gms iv - used x 1    kcl 40 po     Continuous Infusions:   sodium chloride 100 mL/hr Last Rate: 100 mL/hr (04/26/18 0630)      PRN Meds:   Acetaminophen - used x 2      Metoprolol 5 mg iv - used x 1 (0020)    Ondansetron  4mg iv - used x 1 (2058)    oxyCODONE     OTHER ORDERS: fingerstick glucose qid    scds  Stool clostridium difficile; enteric bacterial panel  Clears  Urine culture

## 2018-08-15 ENCOUNTER — HOSPITAL ENCOUNTER (EMERGENCY)
Facility: HOSPITAL | Age: 63
Discharge: HOME | End: 2018-08-15
Attending: EMERGENCY MEDICINE | Admitting: EMERGENCY MEDICINE
Payer: COMMERCIAL

## 2018-08-15 VITALS
OXYGEN SATURATION: 96 % | SYSTOLIC BLOOD PRESSURE: 140 MMHG | TEMPERATURE: 98 F | WEIGHT: 188.93 LBS | HEIGHT: 62 IN | DIASTOLIC BLOOD PRESSURE: 66 MMHG | BODY MASS INDEX: 34.77 KG/M2 | HEART RATE: 74 BPM | RESPIRATION RATE: 16 BRPM

## 2018-08-15 DIAGNOSIS — K52.9 ACUTE GASTROENTERITIS: Primary | ICD-10-CM

## 2018-08-15 DIAGNOSIS — R73.9 HYPERGLYCEMIA: ICD-10-CM

## 2018-08-15 LAB
ACETONE SERPL-MCNC: NEGATIVE MG/DL
ALBUMIN SERPL BCP-MCNC: 3.2 G/DL (ref 3.5–5)
ALP SERPL-CCNC: 158 U/L (ref 46–116)
ALT SERPL W P-5'-P-CCNC: 50 U/L (ref 12–78)
ANION GAP SERPL CALCULATED.3IONS-SCNC: 7 MMOL/L (ref 4–13)
APTT PPP: 37 SECONDS (ref 24–36)
AST SERPL W P-5'-P-CCNC: 43 U/L (ref 5–45)
BASOPHILS # BLD AUTO: 0.02 THOUSANDS/ΜL (ref 0–0.1)
BASOPHILS NFR BLD AUTO: 1 % (ref 0–1)
BILIRUB SERPL-MCNC: 0.3 MG/DL (ref 0.2–1)
BUN SERPL-MCNC: 24 MG/DL (ref 5–25)
CALCIUM SERPL-MCNC: 8.5 MG/DL (ref 8.3–10.1)
CHLORIDE SERPL-SCNC: 98 MMOL/L (ref 100–108)
CO2 SERPL-SCNC: 27 MMOL/L (ref 21–32)
CREAT SERPL-MCNC: 1.17 MG/DL (ref 0.6–1.3)
EOSINOPHIL # BLD AUTO: 0.08 THOUSAND/ΜL (ref 0–0.61)
EOSINOPHIL NFR BLD AUTO: 2 % (ref 0–6)
ERYTHROCYTE [DISTWIDTH] IN BLOOD BY AUTOMATED COUNT: 15.9 % (ref 11.6–15.1)
GFR SERPL CREATININE-BSD FRML MDRD: 50 ML/MIN/1.73SQ M
GLUCOSE SERPL-MCNC: 330 MG/DL (ref 65–140)
GLUCOSE SERPL-MCNC: 350 MG/DL (ref 65–140)
HCT VFR BLD AUTO: 29.5 % (ref 34.8–46.1)
HGB BLD-MCNC: 9 G/DL (ref 11.5–15.4)
IMM GRANULOCYTES # BLD AUTO: 0.01 THOUSAND/UL (ref 0–0.2)
IMM GRANULOCYTES NFR BLD AUTO: 0 % (ref 0–2)
INR PPP: 1.09 (ref 0.86–1.17)
LACTATE SERPL-SCNC: 1.7 MMOL/L (ref 0.5–2)
LIPASE SERPL-CCNC: 51 U/L (ref 73–393)
LYMPHOCYTES # BLD AUTO: 0.63 THOUSANDS/ΜL (ref 0.6–4.47)
LYMPHOCYTES NFR BLD AUTO: 16 % (ref 14–44)
MCH RBC QN AUTO: 22.5 PG (ref 26.8–34.3)
MCHC RBC AUTO-ENTMCNC: 30.5 G/DL (ref 31.4–37.4)
MCV RBC AUTO: 74 FL (ref 82–98)
MONOCYTES # BLD AUTO: 0.23 THOUSAND/ΜL (ref 0.17–1.22)
MONOCYTES NFR BLD AUTO: 6 % (ref 4–12)
NEUTROPHILS # BLD AUTO: 2.99 THOUSANDS/ΜL (ref 1.85–7.62)
NEUTS SEG NFR BLD AUTO: 75 % (ref 43–75)
NRBC BLD AUTO-RTO: 0 /100 WBCS
PLATELET # BLD AUTO: 165 THOUSANDS/UL (ref 149–390)
PMV BLD AUTO: 9.5 FL (ref 8.9–12.7)
POTASSIUM SERPL-SCNC: 4.6 MMOL/L (ref 3.5–5.3)
PROT SERPL-MCNC: 7.5 G/DL (ref 6.4–8.2)
PROTHROMBIN TIME: 13.8 SECONDS (ref 11.8–14.2)
RBC # BLD AUTO: 4 MILLION/UL (ref 3.81–5.12)
SODIUM SERPL-SCNC: 132 MMOL/L (ref 136–145)
WBC # BLD AUTO: 3.96 THOUSAND/UL (ref 4.31–10.16)

## 2018-08-15 PROCEDURE — 96374 THER/PROPH/DIAG INJ IV PUSH: CPT

## 2018-08-15 PROCEDURE — 96361 HYDRATE IV INFUSION ADD-ON: CPT

## 2018-08-15 PROCEDURE — 82948 REAGENT STRIP/BLOOD GLUCOSE: CPT

## 2018-08-15 PROCEDURE — 80053 COMPREHEN METABOLIC PANEL: CPT | Performed by: EMERGENCY MEDICINE

## 2018-08-15 PROCEDURE — 83690 ASSAY OF LIPASE: CPT | Performed by: EMERGENCY MEDICINE

## 2018-08-15 PROCEDURE — 85610 PROTHROMBIN TIME: CPT | Performed by: EMERGENCY MEDICINE

## 2018-08-15 PROCEDURE — 96375 TX/PRO/DX INJ NEW DRUG ADDON: CPT

## 2018-08-15 PROCEDURE — 99284 EMERGENCY DEPT VISIT MOD MDM: CPT

## 2018-08-15 PROCEDURE — 85025 COMPLETE CBC W/AUTO DIFF WBC: CPT | Performed by: EMERGENCY MEDICINE

## 2018-08-15 PROCEDURE — 85730 THROMBOPLASTIN TIME PARTIAL: CPT | Performed by: EMERGENCY MEDICINE

## 2018-08-15 PROCEDURE — 82009 KETONE BODYS QUAL: CPT | Performed by: EMERGENCY MEDICINE

## 2018-08-15 PROCEDURE — 36415 COLL VENOUS BLD VENIPUNCTURE: CPT | Performed by: EMERGENCY MEDICINE

## 2018-08-15 PROCEDURE — 83605 ASSAY OF LACTIC ACID: CPT | Performed by: EMERGENCY MEDICINE

## 2018-08-15 RX ORDER — ONDANSETRON 2 MG/ML
4 INJECTION INTRAMUSCULAR; INTRAVENOUS ONCE
Status: COMPLETED | OUTPATIENT
Start: 2018-08-15 | End: 2018-08-15

## 2018-08-15 RX ORDER — ONDANSETRON 4 MG/1
4 TABLET, FILM COATED ORAL EVERY 6 HOURS
Qty: 12 TABLET | Refills: 0 | Status: SHIPPED | OUTPATIENT
Start: 2018-08-15 | End: 2018-08-24 | Stop reason: HOSPADM

## 2018-08-15 RX ADMIN — SODIUM CHLORIDE 1000 ML: 0.9 INJECTION, SOLUTION INTRAVENOUS at 13:45

## 2018-08-15 RX ADMIN — INSULIN HUMAN 5 UNITS: 100 INJECTION, SOLUTION PARENTERAL at 15:36

## 2018-08-15 RX ADMIN — ONDANSETRON 4 MG: 2 INJECTION INTRAMUSCULAR; INTRAVENOUS at 13:45

## 2018-08-15 RX ADMIN — SODIUM CHLORIDE 500 ML: 0.9 INJECTION, SOLUTION INTRAVENOUS at 15:32

## 2018-08-15 NOTE — DISCHARGE INSTRUCTIONS
Gastroenteritis   WHAT YOU NEED TO KNOW:   What is gastroenteritis? Gastroenteritis, or stomach flu, is an infection of the stomach and intestines  It is caused by bacteria, parasites, or viruses  What increases my risk for gastroenteritis? · Close contact with an infected person or animal    · Food poisoning, such as from eggs, raw vegetables, shellfish, or meat that is not fully cooked    · Drinking water that is not clean, such as when you camp or travel  What are the signs and symptoms of gastroenteritis? · Diarrhea or gas    · Nausea, vomiting, or poor appetite    · Abdominal cramps, pain, or gurgling    · Fever    · Tiredness or weakness    · Headaches or muscle aches with any of the above symptoms  How is gastroenteritis diagnosed and treated? Your healthcare provider will examine you  He will check for signs of dehydration  He will ask you how often you are vomiting or have diarrhea  You may need a blood or bowel movement sample tested for the germ causing your gastroenteritis  Gastroenteritis often clears up on its own  Medicines may be given to slow or stop your diarrhea or vomiting  You may also need medicines to treat an infection caused by bacteria or a parasite  How can I manage my gastroenteritis? · Drink liquids as directed  Ask your healthcare provider how much liquid to drink each day, and which liquids are best for you  You may also need to drink an oral rehydration solution (ORS)  An ORS has the right amounts of sugar, salt, and minerals in water to replace body fluids  · Eat bland foods  When you feel hungry, begin eating soft, bland foods  Examples are bananas, clear soup, potatoes, and applesauce  Do not have dairy products, alcohol, sugary drinks, or drinks with caffeine until you feel better  · Rest as much as possible  Slowly start to do more each day when you begin to feel better  How can I prevent gastroenteritis? Gastroenteritis can spread easily   Keep yourself, your family, and your surroundings clean to help prevent the spread of gastroenteritis:  · Wash your hands often  Use soap and water  Wash your hands after you use the bathroom, change a child's diapers, or sneeze  Wash your hands before you prepare or eat food  · Clean surfaces and do laundry often  Wash your clothes and towels separately from the rest of the laundry  Clean surfaces in your home with antibacterial  or bleach  · Clean food thoroughly and cook safely  Wash raw vegetables before you cook  Cook meat, fish, and eggs fully  Do not use the same dishes for raw meat as you do for other foods  Refrigerate any leftover food immediately  · Be aware when you camp or travel  Drink only clean water  Do not drink from rivers or lakes unless you purify or boil the water first  When you travel, drink bottled water and do not add ice  Do not eat fruit that has not been peeled  Do not eat raw fish or meat that is not fully cooked  Call 911 for any of the following:   · You have trouble breathing or a very fast pulse  When should I seek immediate care? · You see blood in your diarrhea  · You cannot stop vomiting  · You have not urinated for 12 hours  · You feel like you are going to faint  When should I contact my healthcare provider? · You have a fever  · You continue to vomit or have diarrhea, even after treatment  · You see worms in your diarrhea  · Your mouth or eyes are dry  You are not urinating as much or as often  · You have questions or concerns about your condition or care  CARE AGREEMENT:   You have the right to help plan your care  Learn about your health condition and how it may be treated  Discuss treatment options with your caregivers to decide what care you want to receive  You always have the right to refuse treatment  The above information is an  only   It is not intended as medical advice for individual conditions or treatments  Talk to your doctor, nurse or pharmacist before following any medical regimen to see if it is safe and effective for you  © 2017 [employer] Information is for End User's use only and may not be sold, redistributed or otherwise used for commercial purposes  All illustrations and images included in CareNotes® are the copyrighted property of [employer]  or [employer]

## 2018-08-15 NOTE — ED NOTES
Unable to obtain urine specimen x multiple attempts d/t specimen contamination with formed to soft BM  Dr Burgess Giancarlo Marx RN  08/15/18 7587

## 2018-08-15 NOTE — ED PROVIDER NOTES
History  Chief Complaint   Patient presents with    Vomiting     pt started with n/v/d approx two hours ago  blood sugar also elevated per pt and ems   Diarrhea     58-year-old female presents for evaluation of nausea, vomiting and diarrhea  Patient woke up this morning with nausea head multiple episodes of vomiting while she was passing watery stool  Her daughter states that she a 4 or 5 yogurt overnight  The patient is diabetic and is poor controlled however recently she has been attempting to watch her diet and check her sugars more frequently  Her blood sugar was in the 200s this morning  She has not had fevers or chills  She denies abdominal pain  No recent travel or sick contacts  The patient has not been on antibiotics recently  No history of C diff colitis  History provided by:  Patient  Vomiting   Severity:  Severe  Duration:  2 hours  Quality:  Stomach contents  Progression:  Improving  Chronicity:  New  Recent urination:  Normal  Relieved by:  Nothing  Worsened by:  Nothing  Ineffective treatments:  None tried  Associated symptoms: diarrhea    Associated symptoms: no abdominal pain, no chills and no fever    Risk factors: no sick contacts, no suspect food intake and no travel to endemic areas        Prior to Admission Medications   Prescriptions Last Dose Informant Patient Reported? Taking?    B-D UF III MINI PEN NEEDLES 31G X 5 MM MISC   Yes No   Si (four) times a day   Blood Glucose Monitoring Suppl (FREESTYLE LITE) JAROD   Yes No   Sig: TEST BEFORE MEALS AND AT BEDTIME   DULoxetine (CYMBALTA) 60 mg delayed release capsule   Yes No   Sig: Take 60 mg by mouth daily   FREESTYLE LITE test strip   Yes No   Sig: TEST BEFORE MEALS AND BEDTIME   HUMALOG KWIKPEN 100 UNIT/ML SOPN   Yes No   Sig: INJECT 5 UNITS 3 TIMES A DAY WITH MEALS   Lancets (FREESTYLE) lancets   Yes No   Sig: TEST BEFORE MEALS AND BEDTIME   acetaminophen (TYLENOL) 325 mg tablet   No No   Sig: Take 2 tablets by mouth every 6 (six) hours as needed for mild pain   docusate sodium (COLACE) 100 mg capsule   No No   Sig: Take 1 capsule by mouth 2 (two) times a day   ergocalciferol (VITAMIN D2) 50,000 units   Yes No   Sig: Take 50,000 Units by mouth once a week   gabapentin (NEURONTIN) 300 mg capsule   No No   Sig: Take 1 capsule (300 mg total) by mouth daily at bedtime   insulin glargine (LANTUS) 100 units/mL subcutaneous injection   No No   Sig: Inject 10 Units under the skin daily at bedtime   lisinopril (ZESTRIL) 5 mg tablet   No No   Sig: Take 2 tablets (10 mg total) by mouth daily   metFORMIN (GLUCOPHAGE) 1000 MG tablet   No No   Sig: Take 1 tablet (1,000 mg total) by mouth 2 (two) times a day with meals   metoclopramide (REGLAN) 10 mg tablet   No No   Sig: Take 1 tablet (10 mg total) by mouth 3 (three) times a day before meals   morphine (TAMRA) 100 MG 24 hr capsule   Yes No   Sig: Take 100 mg by mouth 2 (two) times a day   ondansetron (ZOFRAN-ODT) 8 mg disintegrating tablet   Yes No   oxyCODONE (ROXICODONE) 30 MG immediate release tablet   Yes No   Sig: Take 30 mg by mouth every 4 (four) hours as needed for moderate pain     simvastatin (ZOCOR) 20 mg tablet   Yes No   Sig: Take 1 tablet by mouth   triamterene-hydrochlorothiazide (MAXZIDE) 75-50 MG per tablet   Yes No   Sig: Take 1 tablet by mouth daily      Facility-Administered Medications: None       Past Medical History:   Diagnosis Date    Degenerative disc disease at L5-S1 level     Diabetes mellitus (HCC)     History of methicillin resistant staphylococcus aureus (MRSA) 08/21/2018    negative nasal culture- isolation and hx of mrsa removed 8/20/2018    Hypertension        Past Surgical History:   Procedure Laterality Date    CHOLECYSTECTOMY      JOINT REPLACEMENT      OOPHORECTOMY         History reviewed  No pertinent family history  I have reviewed and agree with the history as documented      Social History   Substance Use Topics    Smoking status: Former Smoker     Types: E-Cigarettes    Smokeless tobacco: Never Used    Alcohol use No        Review of Systems   Constitutional: Negative for appetite change, chills and fever  Gastrointestinal: Positive for diarrhea, nausea and vomiting  Negative for abdominal pain  Genitourinary: Negative for dysuria  Musculoskeletal: Negative for back pain  Neurological: Positive for weakness  All other systems reviewed and are negative  Physical Exam  Physical Exam   Constitutional: She is oriented to person, place, and time  She appears well-developed and well-nourished  No distress  drowsy   HENT:   Head: Normocephalic  Nose: Nose normal    Mouth/Throat: Oropharynx is clear and moist  Mucous membranes are dry  No oropharyngeal exudate  Eyes: Conjunctivae and EOM are normal  Pupils are equal, round, and reactive to light  Neck: Normal range of motion  Neck supple  Cardiovascular: Normal rate, regular rhythm, normal heart sounds and intact distal pulses  Pulmonary/Chest: Effort normal and breath sounds normal  No respiratory distress  She has no wheezes  Abdominal: Soft  She exhibits no distension  Bowel sounds are increased  There is no tenderness  There is no rebound and no guarding  Musculoskeletal: Normal range of motion  She exhibits edema (Mild erythema anterior surface of the distal lower extremities)  She exhibits no tenderness or deformity  Lymphadenopathy:     She has no cervical adenopathy  Neurological: She is alert and oriented to person, place, and time  She has normal strength and normal reflexes  No cranial nerve deficit or sensory deficit  She exhibits normal muscle tone  Coordination and gait normal    Skin: Skin is warm, dry and intact  No rash noted  There is pallor  Psychiatric: She has a normal mood and affect  Her behavior is normal  Judgment and thought content normal    Nursing note and vitals reviewed        Vital Signs  ED Triage Vitals [08/15/18 1309]   Temperature Pulse Respirations Blood Pressure SpO2   98 °F (36 7 °C) 66 16 140/66 97 %      Temp Source Heart Rate Source Patient Position - Orthostatic VS BP Location FiO2 (%)   Oral -- Sitting Right arm --      Pain Score       No Pain           Vitals:    08/15/18 1309 08/15/18 1515   BP: 140/66    Pulse: 66 74   Patient Position - Orthostatic VS: Sitting        Visual Acuity      ED Medications  Medications   sodium chloride 0 9 % bolus 1,000 mL (0 mL Intravenous Stopped 8/15/18 1530)   ondansetron (ZOFRAN) injection 4 mg (4 mg Intravenous Given 8/15/18 1345)   insulin regular (HumuLIN R,NovoLIN R) injection 5 Units (5 Units Intravenous Given 8/15/18 1536)   sodium chloride 0 9 % bolus 500 mL (0 mL Intravenous Stopped 8/15/18 1634)       Diagnostic Studies  Results Reviewed     Procedure Component Value Units Date/Time    Lactic acid, plasma [[id number]]  (Normal) Collected:  08/15/18 1334    Lab Status:  Final result Specimen:  Blood from Arm, Right Updated:  08/15/18 1404     LACTIC ACID 1 7 mmol/L     Narrative:         Result may be elevated if tourniquet was used during collection      Acetone [[id number]]  (Normal) Collected:  08/15/18 1334    Lab Status:  Final result Specimen:  Blood from Arm, Right Updated:  08/15/18 1403     Acetone, Bld Negative    Comprehensive metabolic panel [[id number]]  (Abnormal) Collected:  08/15/18 1334    Lab Status:  Final result Specimen:  Blood from Arm, Right Updated:  08/15/18 1401     Sodium 132 (L) mmol/L      Potassium 4 6 mmol/L      Chloride 98 (L) mmol/L      CO2 27 mmol/L      Anion Gap 7 mmol/L      BUN 24 mg/dL      Creatinine 1 17 mg/dL      Glucose 330 (H) mg/dL      Calcium 8 5 mg/dL      AST 43 U/L      ALT 50 U/L      Alkaline Phosphatase 158 (H) U/L      Total Protein 7 5 g/dL      Albumin 3 2 (L) g/dL      Total Bilirubin 0 30 mg/dL      eGFR 50 ml/min/1 73sq m     Narrative:         National Kidney Disease Education Program recommendations are as follows:  GFR calculation is accurate only with a steady state creatinine  Chronic Kidney disease less than 60 ml/min/1 73 sq  meters  Kidney failure less than 15 ml/min/1 73 sq  meters  Lipase [[id number]]  (Abnormal) Collected:  08/15/18 1334    Lab Status:  Final result Specimen:  Blood from Arm, Right Updated:  08/15/18 1401     Lipase 51 (L) u/L     Protime-INR [[id number]]  (Normal) Collected:  08/15/18 1334    Lab Status:  Final result Specimen:  Blood from Arm, Right Updated:  08/15/18 1357     Protime 13 8 seconds      INR 1 09    APTT [[id number]]  (Abnormal) Collected:  08/15/18 1334    Lab Status:  Final result Specimen:  Blood from Arm, Right Updated:  08/15/18 1357     PTT 37 (H) seconds     CBC and differential [[id number]]  (Abnormal) Collected:  08/15/18 1334    Lab Status:  Final result Specimen:  Blood from Arm, Right Updated:  08/15/18 1346     WBC 3 96 (L) Thousand/uL      RBC 4 00 Million/uL      Hemoglobin 9 0 (L) g/dL      Hematocrit 29 5 (L) %      MCV 74 (L) fL      MCH 22 5 (L) pg      MCHC 30 5 (L) g/dL      RDW 15 9 (H) %      MPV 9 5 fL      Platelets 790 Thousands/uL      nRBC 0 /100 WBCs      Neutrophils Relative 75 %      Immat GRANS % 0 %      Lymphocytes Relative 16 %      Monocytes Relative 6 %      Eosinophils Relative 2 %      Basophils Relative 1 %      Neutrophils Absolute 2 99 Thousands/µL      Immature Grans Absolute 0 01 Thousand/uL      Lymphocytes Absolute 0 63 Thousands/µL      Monocytes Absolute 0 23 Thousand/µL      Eosinophils Absolute 0 08 Thousand/µL      Basophils Absolute 0 02 Thousands/µL     Fingerstick Glucose (POCT) [[id number]]  (Abnormal) Collected:  08/15/18 1311    Lab Status:  Final result Updated:  08/15/18 1312     POC Glucose 350 (H) mg/dl                  No orders to display              Procedures  Procedures       Phone Contacts  ED Phone Contact    ED Course  ED Course as of Aug 23 1541   Wed Aug 15, 2018   1630 Patient re-evaluated by me  She has tolerated some sips of fluids  She has completed her 2nd bolus of IV fluids  She is resting comfortably in bed  I discussed plan for discharge and patient and daughter are agreeable with plan  MDM  Number of Diagnoses or Management Options  Acute gastroenteritis: new and requires workup  Hyperglycemia: new and requires workup     Amount and/or Complexity of Data Reviewed  Clinical lab tests: ordered and reviewed  Tests in the radiology section of CPT®: ordered and reviewed  Decide to obtain previous medical records or to obtain history from someone other than the patient: yes  Obtain history from someone other than the patient: yes    Risk of Complications, Morbidity, and/or Mortality  General comments: 65year old female with 2 hour history of nausea vomiting and diarrhea  Patient has moderately elevated glucose but no signs of diabetic ketoacidosis  She is chronically hyperglycemic  Sodium is low but corrects for elevated blood sugar  She has tolerated oral intake  Will discharge with antiemetic recommend increase fluid intake today at home  Discussed signs and symptoms return to the emergency department  Patient Progress  Patient progress: improved    CritCare Time    Disposition  Final diagnoses:   Acute gastroenteritis   Hyperglycemia     Time reflects when diagnosis was documented in both MDM as applicable and the Disposition within this note     Time User Action Codes Description Comment    8/15/2018  4:31 PM Isa Gordillo Add [K52 9] Acute gastroenteritis     8/15/2018  4:34 PM Isa Gordillo Add [R73 9] Hyperglycemia       ED Disposition     ED Disposition Condition Comment    Discharge  Kari Castro discharge to home/self care      Condition at discharge: Stable        Follow-up Information     Follow up With Specialties Details Why Contact Info    Corbin Watts  Schedule an appointment as soon as possible for a visit in 1 day For recheck of current symptoms [address] [place] [address]  [phone]            Discharge Medication List as of 8/15/2018  4:34 PM      START taking these medications    Details   ondansetron (ZOFRAN) 4 mg tablet Take 1 tablet (4 mg total) by mouth every 6 (six) hours, Starting Wed 8/15/2018, Normal         CONTINUE these medications which have NOT CHANGED    Details   acetaminophen (TYLENOL) 325 mg tablet Take 2 tablets by mouth every 6 (six) hours as needed for mild pain, Starting Mon 7/17/2017, Print      B-D UF III MINI PEN NEEDLES 31G X 5 MM MISC 4 (four) times a day, Starting Fri 2/23/2018, Historical Med      Blood Glucose Monitoring Suppl (FREESTYLE LITE) JAROD TEST BEFORE MEALS AND AT BEDTIME, Historical Med      docusate sodium (COLACE) 100 mg capsule Take 1 capsule by mouth 2 (two) times a day, Starting Mon 7/17/2017, Print      DULoxetine (CYMBALTA) 60 mg delayed release capsule Take 60 mg by mouth daily, Until Discontinued, Historical Med      ergocalciferol (VITAMIN D2) 50,000 units Take 50,000 Units by mouth once a week, Starting Mon 11/27/2017, Historical Med      FREESTYLE LITE test strip TEST BEFORE MEALS AND BEDTIME, Historical Med      gabapentin (NEURONTIN) 300 mg capsule Take 1 capsule (300 mg total) by mouth daily at bedtime, Starting Fri 2/23/2018, Normal      HUMALOG KWIKPEN 100 UNIT/ML SOPN INJECT 5 UNITS 3 TIMES A DAY WITH MEALS, Historical Med      insulin glargine (LANTUS) 100 units/mL subcutaneous injection Inject 10 Units under the skin daily at bedtime, Starting Fri 2/23/2018, No Print      Lancets (FREESTYLE) lancets TEST BEFORE MEALS AND BEDTIME, Historical Med      lisinopril (ZESTRIL) 5 mg tablet Take 2 tablets (10 mg total) by mouth daily, Starting Fri 2/23/2018, No Print      metFORMIN (GLUCOPHAGE) 1000 MG tablet Take 1 tablet (1,000 mg total) by mouth 2 (two) times a day with meals, Starting Fri 2/23/2018, Normal      metoclopramide (REGLAN) 10 mg tablet Take 1 tablet (10 mg total) by mouth 3 (three) times a day before meals, Starting Fri 2/23/2018, Normal      morphine (TAMRA) 100 MG 24 hr capsule Take 100 mg by mouth 2 (two) times a day, Starting Wed 12/20/2017, Historical Med      ondansetron (ZOFRAN-ODT) 8 mg disintegrating tablet Starting Mon 1/29/2018, Historical Med      oxyCODONE (ROXICODONE) 30 MG immediate release tablet Take 30 mg by mouth every 4 (four) hours as needed for moderate pain  , Historical Med      simvastatin (ZOCOR) 20 mg tablet Take 1 tablet by mouth, Historical Med      triamterene-hydrochlorothiazide (MAXZIDE) 75-50 MG per tablet Take 1 tablet by mouth daily, Historical Med           No discharge procedures on file      ED Provider  Electronically Signed by           Marley Richardson DO  08/23/18 0589

## 2018-08-18 ENCOUNTER — APPOINTMENT (EMERGENCY)
Dept: RADIOLOGY | Facility: HOSPITAL | Age: 63
DRG: 871 | End: 2018-08-18
Payer: COMMERCIAL

## 2018-08-18 ENCOUNTER — HOSPITAL ENCOUNTER (INPATIENT)
Facility: HOSPITAL | Age: 63
LOS: 5 days | Discharge: HOME HEALTH | DRG: 871 | End: 2018-08-24
Attending: EMERGENCY MEDICINE | Admitting: INTERNAL MEDICINE
Payer: COMMERCIAL

## 2018-08-18 ENCOUNTER — APPOINTMENT (EMERGENCY)
Dept: CT IMAGING | Facility: HOSPITAL | Age: 63
DRG: 871 | End: 2018-08-18
Payer: COMMERCIAL

## 2018-08-18 DIAGNOSIS — L03.116 BILATERAL LOWER LEG CELLULITIS: ICD-10-CM

## 2018-08-18 DIAGNOSIS — E11.65 TYPE 2 DIABETES MELLITUS WITH HYPERGLYCEMIA, WITH LONG-TERM CURRENT USE OF INSULIN (HCC): Chronic | ICD-10-CM

## 2018-08-18 DIAGNOSIS — R11.0 NAUSEA: ICD-10-CM

## 2018-08-18 DIAGNOSIS — E11.65 TYPE 2 DIABETES MELLITUS WITH HYPERGLYCEMIA, WITHOUT LONG-TERM CURRENT USE OF INSULIN (HCC): ICD-10-CM

## 2018-08-18 DIAGNOSIS — J18.9 PNEUMONIA: ICD-10-CM

## 2018-08-18 DIAGNOSIS — I10 ESSENTIAL HYPERTENSION: Chronic | ICD-10-CM

## 2018-08-18 DIAGNOSIS — A41.9 SEVERE SEPSIS (HCC): Primary | ICD-10-CM

## 2018-08-18 DIAGNOSIS — Z79.4 TYPE 2 DIABETES MELLITUS WITH HYPERGLYCEMIA, WITH LONG-TERM CURRENT USE OF INSULIN (HCC): Chronic | ICD-10-CM

## 2018-08-18 DIAGNOSIS — L03.115 BILATERAL LOWER LEG CELLULITIS: ICD-10-CM

## 2018-08-18 DIAGNOSIS — E87.1 HYPONATREMIA: ICD-10-CM

## 2018-08-18 DIAGNOSIS — E87.5 HYPERKALEMIA: ICD-10-CM

## 2018-08-18 DIAGNOSIS — R41.82 ALTERED MENTAL STATUS: ICD-10-CM

## 2018-08-18 DIAGNOSIS — R06.2 WHEEZING: ICD-10-CM

## 2018-08-18 DIAGNOSIS — M51.37 DEGENERATIVE DISC DISEASE AT L5-S1 LEVEL: ICD-10-CM
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
## 2018-08-18 DIAGNOSIS — R05.9 COUGH: ICD-10-CM

## 2018-08-18 DIAGNOSIS — N17.9 ACUTE KIDNEY INJURY (HCC): ICD-10-CM

## 2018-08-18 DIAGNOSIS — R65.20 SEVERE SEPSIS (HCC): Primary | ICD-10-CM

## 2018-08-18 DIAGNOSIS — J98.8 RESPIRATORY TRACT INFECTION: ICD-10-CM

## 2018-08-18 DIAGNOSIS — R53.81 PHYSICAL DECONDITIONING: ICD-10-CM

## 2018-08-18 DIAGNOSIS — E78.5 HYPERLIPIDEMIA, UNSPECIFIED HYPERLIPIDEMIA TYPE: ICD-10-CM

## 2018-08-18 LAB
ACETONE SERPL-MCNC: NEGATIVE MG/DL
ALBUMIN SERPL BCP-MCNC: 3.1 G/DL (ref 3.5–5)
ALP SERPL-CCNC: 141 U/L (ref 46–116)
ALT SERPL W P-5'-P-CCNC: 40 U/L (ref 12–78)
AMMONIA PLAS-SCNC: 11 UMOL/L (ref 11–35)
ANION GAP SERPL CALCULATED.3IONS-SCNC: 10 MMOL/L (ref 4–13)
APAP SERPL-MCNC: <2 UG/ML (ref 10–30)
APTT PPP: 38 SECONDS (ref 24–36)
AST SERPL W P-5'-P-CCNC: 35 U/L (ref 5–45)
BASE EX.OXY STD BLDV CALC-SCNC: 82.7 % (ref 60–80)
BASE EXCESS BLDV CALC-SCNC: -0.7 MMOL/L
BASOPHILS # BLD MANUAL: 0 THOUSAND/UL (ref 0–0.1)
BASOPHILS NFR MAR MANUAL: 0 % (ref 0–1)
BILIRUB SERPL-MCNC: 0.4 MG/DL (ref 0.2–1)
BUN SERPL-MCNC: 32 MG/DL (ref 5–25)
CALCIUM SERPL-MCNC: 8.1 MG/DL (ref 8.3–10.1)
CHLORIDE SERPL-SCNC: 92 MMOL/L (ref 100–108)
CK SERPL-CCNC: 68 U/L (ref 26–192)
CO2 SERPL-SCNC: 24 MMOL/L (ref 21–32)
CREAT SERPL-MCNC: 1.67 MG/DL (ref 0.6–1.3)
EOSINOPHIL # BLD MANUAL: 0.14 THOUSAND/UL (ref 0–0.4)
EOSINOPHIL NFR BLD MANUAL: 1 % (ref 0–6)
ERYTHROCYTE [DISTWIDTH] IN BLOOD BY AUTOMATED COUNT: 15.9 % (ref 11.6–15.1)
ETHANOL SERPL-MCNC: <3 MG/DL (ref 0–3)
GFR SERPL CREATININE-BSD FRML MDRD: 33 ML/MIN/1.73SQ M
GLUCOSE SERPL-MCNC: 168 MG/DL (ref 65–140)
GLUCOSE SERPL-MCNC: 184 MG/DL (ref 65–140)
HCO3 BLDV-SCNC: 25.4 MMOL/L (ref 24–30)
HCT VFR BLD AUTO: 30 % (ref 34.8–46.1)
HGB BLD-MCNC: 9.1 G/DL (ref 11.5–15.4)
INR PPP: 1.07 (ref 0.86–1.17)
LACTATE SERPL-SCNC: 0.9 MMOL/L (ref 0.5–2)
LYMPHOCYTES # BLD AUTO: 0.69 THOUSAND/UL (ref 0.6–4.47)
LYMPHOCYTES # BLD AUTO: 5 % (ref 14–44)
MAGNESIUM SERPL-MCNC: 1.5 MG/DL (ref 1.6–2.6)
MCH RBC QN AUTO: 22.2 PG (ref 26.8–34.3)
MCHC RBC AUTO-ENTMCNC: 30.3 G/DL (ref 31.4–37.4)
MCV RBC AUTO: 73 FL (ref 82–98)
MONOCYTES # BLD AUTO: 0.83 THOUSAND/UL (ref 0–1.22)
MONOCYTES NFR BLD: 6 % (ref 4–12)
NEUTROPHILS # BLD MANUAL: 12.22 THOUSAND/UL (ref 1.85–7.62)
NEUTS BAND NFR BLD MANUAL: 4 % (ref 0–8)
NEUTS SEG NFR BLD AUTO: 84 % (ref 43–75)
NRBC BLD AUTO-RTO: 0 /100 WBCS
O2 CT BLDV-SCNC: 11.4 ML/DL
PCO2 BLDV: 48.4 MM HG (ref 42–50)
PH BLDV: 7.34 [PH] (ref 7.3–7.4)
PHOSPHATE SERPL-MCNC: 4.2 MG/DL (ref 2.3–4.1)
PLATELET # BLD AUTO: 192 THOUSANDS/UL (ref 149–390)
PLATELET BLD QL SMEAR: ADEQUATE
PMV BLD AUTO: 9.4 FL (ref 8.9–12.7)
PO2 BLDV: 54.7 MM HG (ref 35–45)
POTASSIUM SERPL-SCNC: 5.7 MMOL/L (ref 3.5–5.3)
PROT SERPL-MCNC: 6.8 G/DL (ref 6.4–8.2)
PROTHROMBIN TIME: 13.6 SECONDS (ref 11.8–14.2)
RBC # BLD AUTO: 4.09 MILLION/UL (ref 3.81–5.12)
SALICYLATES SERPL-MCNC: <3 MG/DL (ref 3–20)
SODIUM SERPL-SCNC: 126 MMOL/L (ref 136–145)
TOTAL CELLS COUNTED SPEC: 100
TROPONIN I SERPL-MCNC: <0.02 NG/ML
TSH SERPL DL<=0.05 MIU/L-ACNC: 0.76 UIU/ML (ref 0.36–3.74)
WBC # BLD AUTO: 13.89 THOUSAND/UL (ref 4.31–10.16)

## 2018-08-18 PROCEDURE — 85007 BL SMEAR W/DIFF WBC COUNT: CPT | Performed by: EMERGENCY MEDICINE

## 2018-08-18 PROCEDURE — 83735 ASSAY OF MAGNESIUM: CPT | Performed by: EMERGENCY MEDICINE

## 2018-08-18 PROCEDURE — 71045 X-RAY EXAM CHEST 1 VIEW: CPT

## 2018-08-18 PROCEDURE — 84146 ASSAY OF PROLACTIN: CPT | Performed by: EMERGENCY MEDICINE

## 2018-08-18 PROCEDURE — 82140 ASSAY OF AMMONIA: CPT | Performed by: EMERGENCY MEDICINE

## 2018-08-18 PROCEDURE — 82009 KETONE BODYS QUAL: CPT | Performed by: EMERGENCY MEDICINE

## 2018-08-18 PROCEDURE — 80053 COMPREHEN METABOLIC PANEL: CPT | Performed by: EMERGENCY MEDICINE

## 2018-08-18 PROCEDURE — 84443 ASSAY THYROID STIM HORMONE: CPT | Performed by: EMERGENCY MEDICINE

## 2018-08-18 PROCEDURE — 83605 ASSAY OF LACTIC ACID: CPT | Performed by: EMERGENCY MEDICINE

## 2018-08-18 PROCEDURE — 85610 PROTHROMBIN TIME: CPT | Performed by: EMERGENCY MEDICINE

## 2018-08-18 PROCEDURE — 87040 BLOOD CULTURE FOR BACTERIA: CPT | Performed by: EMERGENCY MEDICINE

## 2018-08-18 PROCEDURE — 96375 TX/PRO/DX INJ NEW DRUG ADDON: CPT

## 2018-08-18 PROCEDURE — 85027 COMPLETE CBC AUTOMATED: CPT | Performed by: EMERGENCY MEDICINE

## 2018-08-18 PROCEDURE — 80329 ANALGESICS NON-OPIOID 1 OR 2: CPT | Performed by: EMERGENCY MEDICINE

## 2018-08-18 PROCEDURE — 82805 BLOOD GASES W/O2 SATURATION: CPT | Performed by: EMERGENCY MEDICINE

## 2018-08-18 PROCEDURE — 82948 REAGENT STRIP/BLOOD GLUCOSE: CPT

## 2018-08-18 PROCEDURE — 96365 THER/PROPH/DIAG IV INF INIT: CPT

## 2018-08-18 PROCEDURE — 85730 THROMBOPLASTIN TIME PARTIAL: CPT | Performed by: EMERGENCY MEDICINE

## 2018-08-18 PROCEDURE — 84484 ASSAY OF TROPONIN QUANT: CPT | Performed by: EMERGENCY MEDICINE

## 2018-08-18 PROCEDURE — 93005 ELECTROCARDIOGRAM TRACING: CPT

## 2018-08-18 PROCEDURE — 82550 ASSAY OF CK (CPK): CPT | Performed by: EMERGENCY MEDICINE

## 2018-08-18 PROCEDURE — 84100 ASSAY OF PHOSPHORUS: CPT | Performed by: EMERGENCY MEDICINE

## 2018-08-18 PROCEDURE — 70450 CT HEAD/BRAIN W/O DYE: CPT

## 2018-08-18 PROCEDURE — 36415 COLL VENOUS BLD VENIPUNCTURE: CPT | Performed by: EMERGENCY MEDICINE

## 2018-08-18 PROCEDURE — 80320 DRUG SCREEN QUANTALCOHOLS: CPT | Performed by: EMERGENCY MEDICINE

## 2018-08-18 PROCEDURE — 90715 TDAP VACCINE 7 YRS/> IM: CPT | Performed by: EMERGENCY MEDICINE

## 2018-08-18 RX ORDER — LORAZEPAM 2 MG/ML
0.5 INJECTION INTRAMUSCULAR ONCE
Status: COMPLETED | OUTPATIENT
Start: 2018-08-18 | End: 2018-08-18

## 2018-08-18 RX ORDER — DEXTROSE MONOHYDRATE 25 G/50ML
25 INJECTION, SOLUTION INTRAVENOUS ONCE
Status: COMPLETED | OUTPATIENT
Start: 2018-08-19 | End: 2018-08-19

## 2018-08-18 RX ADMIN — LORAZEPAM 0.5 MG: 2 INJECTION INTRAMUSCULAR; INTRAVENOUS at 22:20

## 2018-08-18 RX ADMIN — VANCOMYCIN HYDROCHLORIDE 1250 MG: 1 INJECTION, POWDER, LYOPHILIZED, FOR SOLUTION INTRAVENOUS at 23:28

## 2018-08-18 RX ADMIN — CEFEPIME HYDROCHLORIDE 1000 MG: 1 INJECTION, SOLUTION INTRAVENOUS at 22:54

## 2018-08-18 RX ADMIN — TETANUS TOXOID, REDUCED DIPHTHERIA TOXOID AND ACELLULAR PERTUSSIS VACCINE, ADSORBED 0.5 ML: 5; 2.5; 8; 8; 2.5 SUSPENSION INTRAMUSCULAR at 22:55

## 2018-08-19 PROBLEM — R41.82 ALTERED MENTAL STATUS: Status: ACTIVE | Noted: 2018-08-19

## 2018-08-19 PROBLEM — N17.9 ACUTE KIDNEY INJURY (HCC): Status: ACTIVE | Noted: 2018-08-19

## 2018-08-19 PROBLEM — A41.9 SEPSIS (HCC): Status: ACTIVE | Noted: 2018-08-19

## 2018-08-19 LAB
ALBUMIN SERPL BCP-MCNC: 2.6 G/DL (ref 3.5–5)
ALP SERPL-CCNC: 117 U/L (ref 46–116)
ALT SERPL W P-5'-P-CCNC: 32 U/L (ref 12–78)
AMPHETAMINES SERPL QL SCN: NEGATIVE
ANION GAP SERPL CALCULATED.3IONS-SCNC: 3 MMOL/L (ref 4–13)
ANION GAP SERPL CALCULATED.3IONS-SCNC: 4 MMOL/L (ref 4–13)
ANION GAP SERPL CALCULATED.3IONS-SCNC: 6 MMOL/L (ref 4–13)
AST SERPL W P-5'-P-CCNC: 32 U/L (ref 5–45)
BACTERIA UR QL AUTO: ABNORMAL /HPF
BARBITURATES UR QL: NEGATIVE
BASOPHILS # BLD AUTO: 0.02 THOUSANDS/ΜL (ref 0–0.1)
BASOPHILS NFR BLD AUTO: 0 % (ref 0–1)
BENZODIAZ UR QL: NEGATIVE
BILIRUB SERPL-MCNC: 0.4 MG/DL (ref 0.2–1)
BILIRUB UR QL STRIP: NEGATIVE
BILIRUB UR QL STRIP: NEGATIVE
BUN SERPL-MCNC: 23 MG/DL (ref 5–25)
BUN SERPL-MCNC: 24 MG/DL (ref 5–25)
BUN SERPL-MCNC: 30 MG/DL (ref 5–25)
CA-I BLD-SCNC: 1.08 MMOL/L (ref 1.12–1.32)
CALCIUM SERPL-MCNC: 7.9 MG/DL (ref 8.3–10.1)
CALCIUM SERPL-MCNC: 8 MG/DL (ref 8.3–10.1)
CALCIUM SERPL-MCNC: 8.2 MG/DL (ref 8.3–10.1)
CHLORIDE SERPL-SCNC: 101 MMOL/L (ref 100–108)
CHLORIDE SERPL-SCNC: 102 MMOL/L (ref 100–108)
CHLORIDE SERPL-SCNC: 99 MMOL/L (ref 100–108)
CLARITY UR: ABNORMAL
CLARITY UR: CLEAR
CO2 SERPL-SCNC: 26 MMOL/L (ref 21–32)
CO2 SERPL-SCNC: 27 MMOL/L (ref 21–32)
CO2 SERPL-SCNC: 28 MMOL/L (ref 21–32)
COCAINE UR QL: NEGATIVE
COLOR UR: ABNORMAL
COLOR UR: YELLOW
CREAT SERPL-MCNC: 1.07 MG/DL (ref 0.6–1.3)
CREAT SERPL-MCNC: 1.2 MG/DL (ref 0.6–1.3)
CREAT SERPL-MCNC: 1.42 MG/DL (ref 0.6–1.3)
EOSINOPHIL # BLD AUTO: 0.05 THOUSAND/ΜL (ref 0–0.61)
EOSINOPHIL NFR BLD AUTO: 1 % (ref 0–6)
ERYTHROCYTE [DISTWIDTH] IN BLOOD BY AUTOMATED COUNT: 16 % (ref 11.6–15.1)
FERRITIN SERPL-MCNC: 12 NG/ML (ref 8–388)
GFR SERPL CREATININE-BSD FRML MDRD: 40 ML/MIN/1.73SQ M
GFR SERPL CREATININE-BSD FRML MDRD: 49 ML/MIN/1.73SQ M
GFR SERPL CREATININE-BSD FRML MDRD: 56 ML/MIN/1.73SQ M
GLUCOSE SERPL-MCNC: 123 MG/DL (ref 65–140)
GLUCOSE SERPL-MCNC: 126 MG/DL (ref 65–140)
GLUCOSE SERPL-MCNC: 133 MG/DL (ref 65–140)
GLUCOSE SERPL-MCNC: 133 MG/DL (ref 65–140)
GLUCOSE SERPL-MCNC: 147 MG/DL (ref 65–140)
GLUCOSE SERPL-MCNC: 147 MG/DL (ref 65–140)
GLUCOSE SERPL-MCNC: 174 MG/DL (ref 65–140)
GLUCOSE SERPL-MCNC: 186 MG/DL (ref 65–140)
GLUCOSE UR STRIP-MCNC: NEGATIVE MG/DL
GLUCOSE UR STRIP-MCNC: NEGATIVE MG/DL
HCT VFR BLD AUTO: 27.1 % (ref 34.8–46.1)
HGB BLD-MCNC: 8.3 G/DL (ref 11.5–15.4)
HGB UR QL STRIP.AUTO: NEGATIVE
HGB UR QL STRIP.AUTO: NEGATIVE
IMM GRANULOCYTES # BLD AUTO: 0.02 THOUSAND/UL (ref 0–0.2)
IMM GRANULOCYTES NFR BLD AUTO: 0 % (ref 0–2)
IRON SATN MFR SERPL: 6 %
IRON SERPL-MCNC: 24 UG/DL (ref 50–170)
KETONES UR STRIP-MCNC: NEGATIVE MG/DL
KETONES UR STRIP-MCNC: NEGATIVE MG/DL
LEUKOCYTE ESTERASE UR QL STRIP: NEGATIVE
LEUKOCYTE ESTERASE UR QL STRIP: NEGATIVE
LYMPHOCYTES # BLD AUTO: 1.14 THOUSANDS/ΜL (ref 0.6–4.47)
LYMPHOCYTES NFR BLD AUTO: 13 % (ref 14–44)
MAGNESIUM SERPL-MCNC: 1.6 MG/DL (ref 1.6–2.6)
MCH RBC QN AUTO: 22.7 PG (ref 26.8–34.3)
MCHC RBC AUTO-ENTMCNC: 30.6 G/DL (ref 31.4–37.4)
MCV RBC AUTO: 74 FL (ref 82–98)
METHADONE UR QL: NEGATIVE
MONOCYTES # BLD AUTO: 0.49 THOUSAND/ΜL (ref 0.17–1.22)
MONOCYTES NFR BLD AUTO: 6 % (ref 4–12)
NEUTROPHILS # BLD AUTO: 6.87 THOUSANDS/ΜL (ref 1.85–7.62)
NEUTS SEG NFR BLD AUTO: 80 % (ref 43–75)
NITRITE UR QL STRIP: NEGATIVE
NITRITE UR QL STRIP: NEGATIVE
NON-SQ EPI CELLS URNS QL MICRO: ABNORMAL /HPF
NRBC BLD AUTO-RTO: 0 /100 WBCS
OPIATES UR QL SCN: POSITIVE
PCP UR QL: NEGATIVE
PH UR STRIP.AUTO: 5.5 [PH] (ref 4.5–8)
PH UR STRIP.AUTO: 5.5 [PH] (ref 4.5–8)
PHOSPHATE SERPL-MCNC: 3.4 MG/DL (ref 2.3–4.1)
PLATELET # BLD AUTO: 136 THOUSANDS/UL (ref 149–390)
PMV BLD AUTO: 9 FL (ref 8.9–12.7)
POTASSIUM SERPL-SCNC: 4.5 MMOL/L (ref 3.5–5.3)
POTASSIUM SERPL-SCNC: 4.8 MMOL/L (ref 3.5–5.3)
POTASSIUM SERPL-SCNC: 5 MMOL/L (ref 3.5–5.3)
PROCALCITONIN SERPL-MCNC: 0.25 NG/ML
PROCALCITONIN SERPL-MCNC: 0.25 NG/ML
PROLACTIN SERPL-MCNC: 74.6 NG/ML
PROT SERPL-MCNC: 6 G/DL (ref 6.4–8.2)
PROT UR STRIP-MCNC: ABNORMAL MG/DL
PROT UR STRIP-MCNC: NEGATIVE MG/DL
RBC # BLD AUTO: 3.65 MILLION/UL (ref 3.81–5.12)
RBC #/AREA URNS AUTO: ABNORMAL /HPF
SODIUM SERPL-SCNC: 131 MMOL/L (ref 136–145)
SODIUM SERPL-SCNC: 132 MMOL/L (ref 136–145)
SODIUM SERPL-SCNC: 133 MMOL/L (ref 136–145)
SP GR UR STRIP.AUTO: 1.01 (ref 1–1.03)
SP GR UR STRIP.AUTO: 1.02 (ref 1–1.03)
THC UR QL: NEGATIVE
TIBC SERPL-MCNC: 380 UG/DL (ref 250–450)
UROBILINOGEN UR QL STRIP.AUTO: 0.2 E.U./DL
UROBILINOGEN UR QL STRIP.AUTO: 0.2 E.U./DL
WBC # BLD AUTO: 8.59 THOUSAND/UL (ref 4.31–10.16)
WBC #/AREA URNS AUTO: ABNORMAL /HPF

## 2018-08-19 PROCEDURE — 81001 URINALYSIS AUTO W/SCOPE: CPT

## 2018-08-19 PROCEDURE — 87081 CULTURE SCREEN ONLY: CPT | Performed by: PHYSICIAN ASSISTANT

## 2018-08-19 PROCEDURE — 82948 REAGENT STRIP/BLOOD GLUCOSE: CPT

## 2018-08-19 PROCEDURE — 80307 DRUG TEST PRSMV CHEM ANLYZR: CPT | Performed by: EMERGENCY MEDICINE

## 2018-08-19 PROCEDURE — 82728 ASSAY OF FERRITIN: CPT | Performed by: NURSE PRACTITIONER

## 2018-08-19 PROCEDURE — 82330 ASSAY OF CALCIUM: CPT | Performed by: NURSE PRACTITIONER

## 2018-08-19 PROCEDURE — 84145 PROCALCITONIN (PCT): CPT | Performed by: PHYSICIAN ASSISTANT

## 2018-08-19 PROCEDURE — 99285 EMERGENCY DEPT VISIT HI MDM: CPT

## 2018-08-19 PROCEDURE — 80053 COMPREHEN METABOLIC PANEL: CPT | Performed by: NURSE PRACTITIONER

## 2018-08-19 PROCEDURE — 83550 IRON BINDING TEST: CPT | Performed by: NURSE PRACTITIONER

## 2018-08-19 PROCEDURE — 90471 IMMUNIZATION ADMIN: CPT

## 2018-08-19 PROCEDURE — 85025 COMPLETE CBC W/AUTO DIFF WBC: CPT | Performed by: NURSE PRACTITIONER

## 2018-08-19 PROCEDURE — 80048 BASIC METABOLIC PNL TOTAL CA: CPT | Performed by: NURSE PRACTITIONER

## 2018-08-19 PROCEDURE — 83540 ASSAY OF IRON: CPT | Performed by: NURSE PRACTITIONER

## 2018-08-19 PROCEDURE — 83735 ASSAY OF MAGNESIUM: CPT | Performed by: NURSE PRACTITIONER

## 2018-08-19 PROCEDURE — 99222 1ST HOSP IP/OBS MODERATE 55: CPT | Performed by: HOSPITALIST

## 2018-08-19 PROCEDURE — 84100 ASSAY OF PHOSPHORUS: CPT | Performed by: NURSE PRACTITIONER

## 2018-08-19 PROCEDURE — 81003 URINALYSIS AUTO W/O SCOPE: CPT | Performed by: NURSE PRACTITIONER

## 2018-08-19 PROCEDURE — 84145 PROCALCITONIN (PCT): CPT | Performed by: NURSE PRACTITIONER

## 2018-08-19 RX ORDER — ONDANSETRON 2 MG/ML
4 INJECTION INTRAMUSCULAR; INTRAVENOUS EVERY 6 HOURS PRN
Status: DISCONTINUED | OUTPATIENT
Start: 2018-08-19 | End: 2018-08-24 | Stop reason: HOSPADM

## 2018-08-19 RX ORDER — ACETAMINOPHEN 325 MG/1
650 TABLET ORAL EVERY 6 HOURS PRN
Status: DISCONTINUED | OUTPATIENT
Start: 2018-08-19 | End: 2018-08-24 | Stop reason: HOSPADM

## 2018-08-19 RX ORDER — MORPHINE SULFATE 100 MG/1
100 CAPSULE, EXTENDED RELEASE ORAL 2 TIMES DAILY
Status: DISCONTINUED | OUTPATIENT
Start: 2018-08-19 | End: 2018-08-19

## 2018-08-19 RX ORDER — OXYCODONE HYDROCHLORIDE 10 MG/1
30 TABLET ORAL EVERY 4 HOURS PRN
Status: DISCONTINUED | OUTPATIENT
Start: 2018-08-19 | End: 2018-08-24 | Stop reason: HOSPADM

## 2018-08-19 RX ORDER — DOCUSATE SODIUM 100 MG/1
100 CAPSULE, LIQUID FILLED ORAL 2 TIMES DAILY
Status: DISCONTINUED | OUTPATIENT
Start: 2018-08-19 | End: 2018-08-24 | Stop reason: HOSPADM

## 2018-08-19 RX ORDER — METOCLOPRAMIDE 10 MG/1
10 TABLET ORAL
Status: DISCONTINUED | OUTPATIENT
Start: 2018-08-19 | End: 2018-08-24 | Stop reason: HOSPADM

## 2018-08-19 RX ORDER — HEPARIN SODIUM 5000 [USP'U]/ML
5000 INJECTION, SOLUTION INTRAVENOUS; SUBCUTANEOUS EVERY 8 HOURS SCHEDULED
Status: DISCONTINUED | OUTPATIENT
Start: 2018-08-19 | End: 2018-08-24 | Stop reason: HOSPADM

## 2018-08-19 RX ORDER — TRIAMTERENE AND HYDROCHLOROTHIAZIDE 37.5; 25 MG/1; MG/1
1 TABLET ORAL DAILY
Status: DISCONTINUED | OUTPATIENT
Start: 2018-08-19 | End: 2018-08-24 | Stop reason: HOSPADM

## 2018-08-19 RX ORDER — DULOXETIN HYDROCHLORIDE 60 MG/1
60 CAPSULE, DELAYED RELEASE ORAL DAILY
Status: DISCONTINUED | OUTPATIENT
Start: 2018-08-19 | End: 2018-08-24 | Stop reason: HOSPADM

## 2018-08-19 RX ORDER — MAGNESIUM SULFATE HEPTAHYDRATE 40 MG/ML
4 INJECTION, SOLUTION INTRAVENOUS ONCE
Status: COMPLETED | OUTPATIENT
Start: 2018-08-19 | End: 2018-08-20

## 2018-08-19 RX ORDER — PRAVASTATIN SODIUM 40 MG
40 TABLET ORAL
Status: DISCONTINUED | OUTPATIENT
Start: 2018-08-19 | End: 2018-08-24 | Stop reason: HOSPADM

## 2018-08-19 RX ORDER — GABAPENTIN 300 MG/1
300 CAPSULE ORAL
Status: DISCONTINUED | OUTPATIENT
Start: 2018-08-19 | End: 2018-08-24 | Stop reason: HOSPADM

## 2018-08-19 RX ORDER — INSULIN GLARGINE 100 [IU]/ML
10 INJECTION, SOLUTION SUBCUTANEOUS
Status: DISCONTINUED | OUTPATIENT
Start: 2018-08-19 | End: 2018-08-21

## 2018-08-19 RX ORDER — SODIUM CHLORIDE 9 MG/ML
75 INJECTION, SOLUTION INTRAVENOUS CONTINUOUS
Status: DISCONTINUED | OUTPATIENT
Start: 2018-08-19 | End: 2018-08-21

## 2018-08-19 RX ORDER — ACETAMINOPHEN 325 MG/1
650 TABLET ORAL EVERY 6 HOURS PRN
Status: DISCONTINUED | OUTPATIENT
Start: 2018-08-19 | End: 2018-08-19 | Stop reason: SDUPTHER

## 2018-08-19 RX ADMIN — Medication 1 G: at 00:19

## 2018-08-19 RX ADMIN — CEFEPIME HYDROCHLORIDE 2000 MG: 2 INJECTION, POWDER, FOR SOLUTION INTRAVENOUS at 23:17

## 2018-08-19 RX ADMIN — PRAVASTATIN SODIUM 40 MG: 40 TABLET ORAL at 16:58

## 2018-08-19 RX ADMIN — SODIUM CHLORIDE 75 ML/HR: 0.9 INJECTION, SOLUTION INTRAVENOUS at 14:25

## 2018-08-19 RX ADMIN — METOCLOPRAMIDE 10 MG: 10 TABLET ORAL at 17:15

## 2018-08-19 RX ADMIN — DEXTROSE MONOHYDRATE 25 ML: 25 INJECTION, SOLUTION INTRAVENOUS at 00:18

## 2018-08-19 RX ADMIN — INSULIN LISPRO 1 UNITS: 100 INJECTION, SOLUTION INTRAVENOUS; SUBCUTANEOUS at 21:16

## 2018-08-19 RX ADMIN — SODIUM BICARBONATE 25 MEQ: 84 INJECTION, SOLUTION INTRAVENOUS at 00:18

## 2018-08-19 RX ADMIN — SODIUM CHLORIDE 1000 ML: 0.9 INJECTION, SOLUTION INTRAVENOUS at 03:22

## 2018-08-19 RX ADMIN — SODIUM CHLORIDE 75 ML/HR: 0.9 INJECTION, SOLUTION INTRAVENOUS at 02:16

## 2018-08-19 RX ADMIN — VANCOMYCIN HYDROCHLORIDE 1250 MG: 1 INJECTION, POWDER, LYOPHILIZED, FOR SOLUTION INTRAVENOUS at 21:25

## 2018-08-19 RX ADMIN — HEPARIN SODIUM 5000 UNITS: 5000 INJECTION, SOLUTION INTRAVENOUS; SUBCUTANEOUS at 21:16

## 2018-08-19 RX ADMIN — GABAPENTIN 300 MG: 300 CAPSULE ORAL at 21:16

## 2018-08-19 RX ADMIN — METOCLOPRAMIDE 10 MG: 10 TABLET ORAL at 10:52

## 2018-08-19 RX ADMIN — INSULIN LISPRO 5 UNITS: 100 INJECTION, SOLUTION INTRAVENOUS; SUBCUTANEOUS at 18:30

## 2018-08-19 RX ADMIN — DOCUSATE SODIUM 100 MG: 100 CAPSULE, LIQUID FILLED ORAL at 17:56

## 2018-08-19 RX ADMIN — HEPARIN SODIUM 5000 UNITS: 5000 INJECTION, SOLUTION INTRAVENOUS; SUBCUTANEOUS at 05:05

## 2018-08-19 RX ADMIN — TRIAMTERENE AND HYDROCHLOROTHIAZIDE 1 TABLET: 37.5; 25 TABLET ORAL at 11:00

## 2018-08-19 RX ADMIN — Medication 1 G: at 06:20

## 2018-08-19 RX ADMIN — HEPARIN SODIUM 5000 UNITS: 5000 INJECTION, SOLUTION INTRAVENOUS; SUBCUTANEOUS at 15:00

## 2018-08-19 RX ADMIN — INSULIN HUMAN 4 UNITS: 100 INJECTION, SOLUTION PARENTERAL at 00:18

## 2018-08-19 RX ADMIN — MAGNESIUM SULFATE HEPTAHYDRATE 4 G: 40 INJECTION, SOLUTION INTRAVENOUS at 06:20

## 2018-08-19 RX ADMIN — INSULIN GLARGINE 10 UNITS: 100 INJECTION, SOLUTION SUBCUTANEOUS at 21:16

## 2018-08-19 RX ADMIN — INSULIN GLARGINE 10 UNITS: 100 INJECTION, SOLUTION SUBCUTANEOUS at 02:40

## 2018-08-19 RX ADMIN — DULOXETINE HYDROCHLORIDE 60 MG: 60 CAPSULE, DELAYED RELEASE ORAL at 10:54

## 2018-08-19 RX ADMIN — DOCUSATE SODIUM 100 MG: 100 CAPSULE, LIQUID FILLED ORAL at 10:54

## 2018-08-19 NOTE — PLAN OF CARE
DISCHARGE PLANNING     Discharge to home or other facility with appropriate resources Progressing        GENITOURINARY - ADULT     Urinary catheter remains patent Progressing        INFECTION - ADULT     Absence or prevention of progression during hospitalization Progressing        Knowledge Deficit     Patient/family/caregiver demonstrates understanding of disease process, treatment plan, medications, and discharge instructions Progressing        NEUROSENSORY - ADULT     Achieves stable or improved neurological status Progressing        PAIN - ADULT     Verbalizes/displays adequate comfort level or baseline comfort level Progressing        Potential for Falls     Patient will remain free of falls Progressing        Prexisting or High Potential for Compromised Skin Integrity     Skin integrity is maintained or improved Progressing        RESPIRATORY - ADULT     Achieves optimal ventilation and oxygenation Progressing        SAFETY ADULT     Maintain or return to baseline ADL function Progressing     Maintain or return mobility status to optimal level Progressing        SKIN/TISSUE INTEGRITY - ADULT     Skin integrity remains intact Progressing     Incision(s), wounds(s) or drain site(s) healing without S/S of infection Progressing     Oral mucous membranes remain intact Progressing

## 2018-08-19 NOTE — ASSESSMENT & PLAN NOTE
· Baseline Cr approx 1 1, Cr today 1 67  · Suspect pre-renal due to recent volume depletion  · IVF, AM BMP

## 2018-08-19 NOTE — ED PROVIDER NOTES
History  Chief Complaint   Patient presents with    Altered Mental Status     Decreased level of consciousness, tremors  Onset today     Patient is a 65year old female with altered mental status and decreased consciousness with diffuse tremors today  Patient denies headache, fever  Patient unable to provide complete hx and needed my immediate attention  Was last seen in this ED on 8/15/18 for gastroenteritis  [facility] website checked on this patient and last Rxs filled was on 18 for oxycodone and morphine for 30 day supplies each  Patient has diabetes  ?last Td  History provided by:  Patient, spouse and relative (daughter)  History limited by:  Acuity of condition   used: No        Prior to Admission Medications   Prescriptions Last Dose Informant Patient Reported? Taking?    B-D UF III MINI PEN NEEDLES 31G X 5 MM MISC   Yes No   Si (four) times a day   Blood Glucose Monitoring Suppl (FREESTYLE LITE) JAROD   Yes No   Sig: TEST BEFORE MEALS AND AT BEDTIME   DULoxetine (CYMBALTA) 60 mg delayed release capsule Unknown at Unknown time  Yes No   Sig: Take 60 mg by mouth daily   FREESTYLE LITE test strip   Yes No   Sig: TEST BEFORE MEALS AND BEDTIME   HUMALOG KWIKPEN 100 UNIT/ML SOPN Unknown at Unknown time  Yes No   Sig: INJECT 5 UNITS 3 TIMES A DAY WITH MEALS   Lancets (FREESTYLE) lancets Unknown at Unknown time  Yes No   Sig: TEST BEFORE MEALS AND BEDTIME   acetaminophen (TYLENOL) 325 mg tablet Unknown at Unknown time  No No   Sig: Take 2 tablets by mouth every 6 (six) hours as needed for mild pain   docusate sodium (COLACE) 100 mg capsule Unknown at Unknown time  No No   Sig: Take 1 capsule by mouth 2 (two) times a day   ergocalciferol (VITAMIN D2) 50,000 units Unknown at Unknown time  Yes No   Sig: Take 50,000 Units by mouth once a week   gabapentin (NEURONTIN) 300 mg capsule Unknown at Unknown time  No No   Sig: Take 1 capsule (300 mg total) by mouth daily at bedtime   insulin glargine (LANTUS) 100 units/mL subcutaneous injection Unknown at Unknown time  No No   Sig: Inject 10 Units under the skin daily at bedtime   lisinopril (ZESTRIL) 5 mg tablet Unknown at Unknown time  No No   Sig: Take 2 tablets (10 mg total) by mouth daily   metFORMIN (GLUCOPHAGE) 1000 MG tablet Unknown at Unknown time  No No   Sig: Take 1 tablet (1,000 mg total) by mouth 2 (two) times a day with meals   metoclopramide (REGLAN) 10 mg tablet Unknown at Unknown time  No No   Sig: Take 1 tablet (10 mg total) by mouth 3 (three) times a day before meals   morphine (TAMRA) 100 MG 24 hr capsule Unknown at Unknown time  Yes No   Sig: Take 100 mg by mouth 2 (two) times a day   ondansetron (ZOFRAN) 4 mg tablet Unknown at Unknown time  No No   Sig: Take 1 tablet (4 mg total) by mouth every 6 (six) hours   ondansetron (ZOFRAN-ODT) 8 mg disintegrating tablet Unknown at Unknown time  Yes No   oxyCODONE (ROXICODONE) 30 MG immediate release tablet Unknown at Unknown time  Yes No   Sig: Take 30 mg by mouth every 4 (four) hours as needed for moderate pain     simvastatin (ZOCOR) 20 mg tablet Unknown at Unknown time  Yes No   Sig: Take 1 tablet by mouth   triamterene-hydrochlorothiazide (MAXZIDE) 75-50 MG per tablet Unknown at Unknown time  Yes No   Sig: Take 1 tablet by mouth daily      Facility-Administered Medications: None       Past Medical History:   Diagnosis Date    Degenerative disc disease at L5-S1 level     Diabetes mellitus ([facility] )     Hypertension        Past Surgical History:   Procedure Laterality Date    CHOLECYSTECTOMY      JOINT REPLACEMENT      OOPHORECTOMY         History reviewed  No pertinent family history  I have reviewed and agree with the history as documented      Social History   Substance Use Topics    Smoking status: Former Smoker     Types: E-Cigarettes    Smokeless tobacco: Never Used    Alcohol use No        Review of Systems   Unable to perform ROS: Mental status change   Neurological: Positive for tremors  Physical Exam  Physical Exam   Constitutional: She appears distressed (moderate)  HENT:   Head: Normocephalic and atraumatic  Mucous membranes somewhat moist     Eyes: EOM are normal  Pupils are equal, round, and reactive to light  No scleral icterus  Neck: No JVD present  No tracheal deviation present  Cardiovascular: Regular rhythm and normal heart sounds  No murmur heard  Tachycardia  Pulmonary/Chest: Effort normal and breath sounds normal  No stridor  No respiratory distress  She has no wheezes  She has no rales  Abdominal: Soft  Bowel sounds are normal  There is no tenderness  Musculoskeletal: She exhibits edema (bilateral LE edema)  She exhibits no deformity  Neurological:   Awakens to voice  Able to answer some questions  Diffuse myoclonic jerks and tremors  Skin: Skin is warm and dry  There is erythema (bilateral LE erythema which family states is much improved  )  Nursing note and vitals reviewed        Vital Signs  ED Triage Vitals [08/18/18 2208]   Temperature Pulse Respirations Blood Pressure SpO2   100 2 °F (37 9 °C) (!) 120 18 119/63 94 %      Temp Source Heart Rate Source Patient Position - Orthostatic VS BP Location FiO2 (%)   Oral Monitor Lying Right arm --      Pain Score       No Pain           Vitals:    08/18/18 2331 08/19/18 0000 08/19/18 0030 08/19/18 0108   BP: 113/55 121/56 118/58 112/57   Pulse: (!) 108 (!) 110 (!) 110 (!) 106   Patient Position - Orthostatic VS: Lying Lying Lying Lying       Visual Acuity      ED Medications  Medications   LORazepam (ATIVAN) 2 mg/mL injection 0 5 mg (0 5 mg Intravenous Given 8/18/18 2220)   cefepime (MAXIPIME) IVPB (premix) 1,000 mg (0 mg Intravenous Stopped 8/18/18 2329)   vancomycin (VANCOCIN) 1,250 mg in sodium chloride 0 9 % 250 mL IVPB (1,250 mg Intravenous New Bag 8/18/18 2328)   tetanus-diphtheria-acellular pertussis (BOOSTRIX) IM injection 0 5 mL (0 5 mL Intramuscular Given 8/18/18 2255)   sodium bicarbonate 8 4 % injection 25 mEq (25 mEq Intravenous Given 8/19/18 0018)   dextrose 50 % IV solution 25 mL (25 mL Intravenous Given 8/19/18 0018)   insulin regular (HumuLIN R,NovoLIN R) injection 4 Units (4 Units Intravenous Given 8/19/18 0018)   calcium gluconate 1 g in sodium chloride 0 9 % 100 mL IVPB (1 g Intravenous New Bag 8/19/18 0019)       Diagnostic Studies  Results Reviewed     Procedure Component Value Units Date/Time    Urine Microscopic [[id number]]  (Abnormal) Collected:  08/19/18 0055    Lab Status:  Final result Specimen:  Urine from Urine, Suprapubic catheter Updated:  08/19/18 0119     RBC, UA None Seen /hpf      WBC, UA 0-1 (A) /hpf      Epithelial Cells None Seen /hpf      Bacteria, UA None Seen /hpf     Rapid drug screen, urine [[id number]]  (Abnormal) Collected:  08/19/18 0043    Lab Status:  Final result Specimen:  Urine from Urine, Catheter Updated:  08/19/18 0058     Amph/Meth UR Negative     Barbiturate Ur Negative     Benzodiazepine Urine Negative     Cocaine Urine Negative     Methadone Urine Negative     Opiate Urine Positive (A)     PCP Ur Negative     THC Urine Negative    Narrative:         Presumptive report  If requested, specimen will be sent to reference lab for confirmation  FOR MEDICAL PURPOSES ONLY  IF CONFIRMATION NEEDED PLEASE CONTACT THE LAB WITHIN 5 DAYS      Drug Screen Cutoff Levels:  AMPHETAMINE/METHAMPHETAMINES  1000 ng/mL  BARBITURATES     200 ng/mL  BENZODIAZEPINES     200 ng/mL  COCAINE      300 ng/mL  METHADONE      300 ng/mL  OPIATES      300 ng/mL  PHENCYCLIDINE     25 ng/mL  THC       50 ng/mL    UA w Reflex to Microscopic w Reflex to Culture [[id number]] Collected:  08/19/18 0042    Lab Status:  Final result Specimen:  Urine from Urine, Indwelling Gar Catheter Updated:  08/19/18 0051     Color, UA Yellow     Clarity, UA Clear     Specific [place], UA 1 020     pH, UA 5 5     Leukocytes, UA Negative     Nitrite, UA Negative     Protein, UA Negative mg/dl Glucose, UA Negative mg/dl      Ketones, UA Negative mg/dl      Urobilinogen, UA 0 2 E U /dl      Bilirubin, UA Negative     Blood, UA Negative    ED Urine Macroscopic [[id number]]  (Abnormal) Collected:  08/19/18 0055    Lab Status:  Final result Specimen:  Urine Updated:  08/19/18 0046     Color, UA Natasha     Clarity, UA Slightly Cloudy     pH, UA 5 5     Leukocytes, UA Negative     Nitrite, UA Negative     Protein, UA 30 (1+) (A) mg/dl      Glucose, UA Negative mg/dl      Ketones, UA Negative mg/dl      Urobilinogen, UA 0 2 E U /dl      Bilirubin, UA Negative     Blood, UA Negative     Specific Gravity, UA 1 015    Narrative:       CLINITEK RESULT    CBC and differential [[id number]]  (Abnormal) Collected:  08/18/18 2241    Lab Status:  Final result Specimen:  Blood from Arm, Left Updated:  08/18/18 2340     WBC 13 89 (H) Thousand/uL      RBC 4 09 Million/uL      Hemoglobin 9 1 (L) g/dL      Hematocrit 30 0 (L) %      MCV 73 (L) fL      MCH 22 2 (L) pg      MCHC 30 3 (L) g/dL      RDW 15 9 (H) %      MPV 9 4 fL      Platelets 852 Thousands/uL      nRBC 0 /100 WBCs     Narrative: This is an appended report  These results have been appended to a previously verified report      Comprehensive metabolic panel [[id number]]  (Abnormal) Collected:  08/18/18 2242    Lab Status:  Final result Specimen:  Blood from Arm, Left Updated:  08/18/18 2329     Sodium 126 (L) mmol/L      Potassium 5 7 (H) mmol/L      Chloride 92 (L) mmol/L      CO2 24 mmol/L      Anion Gap 10 mmol/L      BUN 32 (H) mg/dL      Creatinine 1 67 (H) mg/dL      Glucose 168 (H) mg/dL      Calcium 8 1 (L) mg/dL      AST 35 U/L      ALT 40 U/L      Alkaline Phosphatase 141 (H) U/L      Total Protein 6 8 g/dL      Albumin 3 1 (L) g/dL      Total Bilirubin 0 40 mg/dL      eGFR 33 ml/min/1 73sq m     Narrative:         National Kidney Disease Education Program recommendations are as follows:  GFR calculation is accurate only with a steady state creatinine  Chronic Kidney disease less than 60 ml/min/1 73 sq  meters  Kidney failure less than 15 ml/min/1 73 sq  meters  TSH [[id number]]  (Normal) Collected:  08/18/18 2242    Lab Status:  Final result Specimen:  Blood from Arm, Left Updated:  08/18/18 2323     TSH 3RD GENERATON 0 762 uIU/mL     Narrative:         Patients undergoing fluorescein dye angiography may retain small amounts of fluorescein in the body for 48-72 hours post procedure  Samples containing fluorescein can produce falsely depressed TSH values  If the patient had this procedure,a specimen should be resubmitted post fluorescein clearance            The recommended reference ranges for TSH during pregnancy are as follows:  First trimester 0 1 to 2 5 uIU/mL  Second trimester  0 2 to 3 0 uIU/mL  Third trimester 0 3 to 3 0 uIU/m      CK Total with Reflex CKMB [[id number]]  (Normal) Collected:  08/18/18 2242    Lab Status:  Final result Specimen:  Blood from Arm, Left Updated:  08/18/18 2323     Total CK 68 U/L     Salicylate level [[id number]]  (Abnormal) Collected:  08/18/18 2242    Lab Status:  Final result Specimen:  Blood from Arm, Left Updated:  67/28/56 1044     Salicylate Lvl <3 (L) mg/dL     Acetaminophen level [[id number]]  (Abnormal) Collected:  08/18/18 2242    Lab Status:  Final result Specimen:  Blood from Arm, Left Updated:  08/18/18 2323     Acetaminophen Level <2 (L) ug/mL     Magnesium [[id number]]  (Abnormal) Collected:  08/18/18 2242    Lab Status:  Final result Specimen:  Blood from Arm, Left Updated:  08/18/18 2323     Magnesium 1 5 (L) mg/dL     Phosphorus [[id number]]  (Abnormal) Collected:  08/18/18 2242    Lab Status:  Final result Specimen:  Blood from Arm, Left Updated:  08/18/18 2323     Phosphorus 4 2 (H) mg/dL     Lactic acid, plasma [[id number]]  (Normal) Collected:  08/18/18 2241    Lab Status:  Final result Specimen:  Blood from Arm, Left Updated:  08/18/18 2318     LACTIC ACID 0 9 mmol/L     Narrative:         Result may be elevated if tourniquet was used during collection  Troponin I [[id number]]  (Normal) Collected:  08/18/18 2242    Lab Status:  Final result Specimen:  Blood from Arm, Left Updated:  08/18/18 2317     Troponin I <0 02 ng/mL     Ammonia [[id number]]  (Normal) Collected:  08/18/18 2241    Lab Status:  Final result Specimen:  Blood from Arm, Left Updated:  08/18/18 2312     Ammonia 11 umol/L     Ethanol [[id number]]  (Normal) Collected:  08/18/18 2242    Lab Status:  Final result Specimen:  Blood from Arm, Left Updated:  08/18/18 2311     Ethanol Lvl <3 mg/dL     Acetone [[id number]]  (Normal) Collected:  08/18/18 2241    Lab Status:  Final result Specimen:  Blood from Arm, Left Updated:  08/18/18 2309     Acetone, Bld Negative    Protime-INR [[id number]]  (Normal) Collected:  08/18/18 2242    Lab Status:  Final result Specimen:  Blood from Arm, Left Updated:  08/18/18 2309     Protime 13 6 seconds      INR 1 07    APTT [[id number]]  (Abnormal) Collected:  08/18/18 2242    Lab Status:  Final result Specimen:  Blood from Arm, Left Updated:  08/18/18 2309     PTT 38 (H) seconds     Blood culture #2 [[id number]] Collected:  08/18/18 2242    Lab Status: In process Specimen:  Blood from Arm, Left Updated:  08/18/18 2258    Blood gas, venous [[id number]]  (Abnormal) Collected:  08/18/18 2241    Lab Status:  Final result Specimen:  Blood from Arm, Left Updated:  08/18/18 2254     pH, Surjit 7 337     pCO2, Surjit 48 4 mm Hg      pO2, Surjit 54 7 (H) mm Hg      HCO3, Surjit 25 4 mmol/L      Base Excess, Surjit -0 7 mmol/L      O2 Content, Surjit 11 4 ml/dL      O2 HGB, VENOUS 82 7 (H) %     Narrative: Therapeutic levels (1 mg/mL and 2 mg/mL) of hydroxocobalamin may interfere with the fCOHb and fMetHb where it may cause lower than expected values    Prolactin [[id number]] Collected:  08/18/18 2242    Lab Status: In process Specimen:  Blood from Arm, Left Updated:  08/18/18 2252    Blood culture #1 [[id number]] Collected:  08/18/18 7775    Lab Status:   In process Specimen:  Blood from Hand, Left Updated:  08/18/18 2221    Fingerstick Glucose (POCT) [[id number]]  (Abnormal) Collected:  08/18/18 2215    Lab Status:  Final result Updated:  08/18/18 2219     POC Glucose 184 (H) mg/dl                  CT head without contrast   ED Interpretation by Primo Felipe MD (08/18 2257)   FINDINGS:      PARENCHYMA: Decreased attenuation is noted in periventricular and subcortical white matter demonstrating an appearance that is statistically most likely to represent mild microangiopathic change; this appearance is similar when compared to most recent    prior examination  No CT signs of acute infarction   No intracranial mass, mass effect or midline shift   No acute parenchymal hemorrhage  VENTRICLES AND EXTRA-AXIAL SPACES:  Normal for the patient's age   6 mm hyperdensity is present in the anterior right tentorium cerebelli unchanged from prior study  VISUALIZED ORBITS AND PARANASAL SINUSES:  Unremarkable  CALVARIUM AND EXTRACRANIAL SOFT TISSUES:  Normal    Impression:        No acute intracranial abnormality  Workstation performed: [id number]         Final Result by Suman Calolway DO (08/18 2244)      No acute intracranial abnormality  Workstation performed: [id number]         XR chest 1 view portable   ED Interpretation by Primo Felipe MD (08/18 2243)   Elevated R hemidiaphragm and bilateral lower lobe infiltrates read by me                    Procedures  ECG 12 Lead Documentation  Date/Time: 8/18/2018 10:08 PM  Performed by: Jamila Paiz  Authorized by: Jamila Paiz     Indications / Diagnosis:  AMS  ECG reviewed by me, the ED Provider: yes    Patient location:  ED  Previous ECG:     Previous ECG:  Compared to current    Comparison ECG info:  4/25/18    Similarity:  Changes noted (s  tachy now)  Quality:     Tracing quality:  Limited by artifact  Rate:     ECG rate:  125    ECG rate assessment: tachycardic    Rhythm:     Rhythm: sinus tachycardia    Ectopy:     Ectopy: none    QRS:     QRS axis:  Normal    QRS intervals:  Normal  Conduction:     Conduction: normal    ST segments:     ST segments:  Normal  T waves:     T waves: normal             Phone Contacts  ED Phone Contact    ED Course  ED Course as of Aug 19 0122   Sat Aug 18, 2018   2257 CT, EKG, CXR d/w family  No tremors or myoclonic jerks noted after ativan given  Gautam Ott 124 d/w family  Tx ordered for hyperkalemia  Sepsis alert called and critical care AP saw patient in ED      2358 Patient being admitted to level 2 step down  Initial Sepsis Screening     Row Name 08/18/18 2300                Is the patient's history suggestive of a new or worsening infection? (!)  Yes (Proceed)  -AO        Suspected source of infection suspect infection, source unknown  -AO        Are two or more of the following signs & symptoms of infection both present and new to the patient? (!)  Yes (Proceed)  -AO        Indicate SIRS criteria Tachycardia > 90 bpm;Leukocytosis (WBC > 27167 IJL); Altered mental status  -AO        If the answer is yes to both questions, suspicion of sepsis is present          If severe sepsis is present AND tissue hypoperfusion perists in the hour after fluid resuscitation or lactate > 4, the patient meets criteria for SEPTIC SHOCK          Are any of the following organ dysfunction criteria present within 6 hours of suspected infection and SIRS criteria that are NOT considered to be chronic conditions? (!)  Yes  -AO        Organ dysfunction Creatinine > 0 5 mg/dl ABOVE BASELINE  -AO        Date of presentation of severe sepsis 08/18/18  -AO        Time of presentation of severe sepsis 2340  -AO        Tissue hypoperfusion persists in the hour after crystalloid fluid administration, evidenced, by either:          Was hypotension present within one hour of the conclusion of crystalloid fluid administration?    Date of presentation of septic shock          Time of presentation of septic shock            User Key  (r) = Recorded By, (t) = Taken By, (c) = Cosigned By    [address] Name Provider Jerrell Loya MD Physician                  MDM  Number of Diagnoses or Management Options  Diagnosis management comments: DDx including but not limited to: metabolic abnormality, intracranial etiology, cardiac etiology, substance abuse, infectious etiology including UTI, thyroid disease, hyperammonemia, delirium, dementia, seizure, pneumonia, cellulitis, MRSA, doubt meningitis  Amount and/or Complexity of Data Reviewed  Clinical lab tests: ordered and reviewed  Tests in the radiology section of CPT®: ordered and reviewed  Decide to obtain previous medical records or to obtain history from someone other than the patient: yes  Obtain history from someone other than the patient: yes  Review and summarize past medical records: yes  Independent visualization of images, tracings, or specimens: yes      The patient presented with a condition in which there was a high probability of imminent or life-threatening deterioration, and critical care services (excluding separately billable procedures) totalled 30-74 minutes          Disposition  Final diagnoses:   Severe sepsis ([facility] )   Pneumonia   Acute kidney injury ([facility] )   Hyperkalemia   Hyponatremia   Altered mental status   Bilateral lower leg cellulitis     Time reflects when diagnosis was documented in both MDM as applicable and the Disposition within this note     Time User Action Codes Description Comment    8/19/2018 12:00 AM Ernesto Punches Add [A41 9,  R65 20] Severe sepsis ([facility] )     8/19/2018 12:00 AM Ernesto Punches Add [J18 9] Pneumonia     8/19/2018 12:00 AM Ernesto Punches Add [N17 9] Acute kidney injury ([facility] )     8/19/2018 12:00 AM Ernesto Punches Add [E87 5] Hyperkalemia     8/19/2018 12:00 AM Ernesto Punches Add [E87 1] Hyponatremia 8/19/2018 12:00 AM Abebe Barb Add [R41 82] Altered mental status     8/19/2018 12:00 AM Abebe Day Add [L03 116,  L03 115] Bilateral lower leg cellulitis       ED Disposition     ED Disposition Condition Comment    Admit  Case was discussed with critical care AP and the patient's admission status was agreed to be Admission Status: inpatient status to the service of Dr Tanna Dempsey   Follow-up Information    None         Patient's Medications   Discharge Prescriptions    No medications on file     No discharge procedures on file      ED Provider  Electronically Signed by           Esau Bond MD  08/19/18 0002       Esau Bond MD  08/19/18 3247       Esau Bond MD  08/19/18 7111       Esau Bond MD  08/19/18 3300       Esau Bond MD  08/19/18 8463

## 2018-08-19 NOTE — ASSESSMENT & PLAN NOTE
· Hyperglycemic in 160's on arrival, per daughter she runs high in the 200's  · Will continue 10 U Lantus hs and insulin lispro 5 U TID ac  · Add SSI

## 2018-08-19 NOTE — ASSESSMENT & PLAN NOTE
· POA as evidenced by tachycardia and leukocytosis   · Maintaining sats in high 90's on 4L nasal cannula   · Likely due to b/l PNA seen on CXR   · Pt was seen 8/15 for vomiting and diarrhea as well  · Cannot r/o viral gastroenteritis contributing  · UA negative for UTI  · Lactic acid wnl, BCx2 pending   · Continue Cefepime and Vanc   · Reported h/o MRSA  · F/u Bcx2  · Check strep and legionella urine antigen, sputum cx  · Respiratory protocol

## 2018-08-19 NOTE — RESPIRATORY THERAPY NOTE
RT Protocol Note  Earline Chandler 65 y o  female MRN: [id number]  Unit/Bed#:  Encounter: [id number]    Assessment    Principal Problem:    Sepsis ([facility] )  Active Problems:    Essential hypertension    Hyperlipidemia    Chronic venous stasis dermatitis of both lower extremities    Hyponatremia    Type 2 diabetes mellitus with hyperglycemia, with long-term current use of insulin (HCC)    Microcytic anemia    Altered mental status    Acute kidney injury ([facility] [address] )      Home Pulmonary Medications: None       Past Medical History:   Diagnosis Date    Degenerative disc disease at L5-S1 level     Diabetes mellitus ([facility] )     Hypertension      Social History     Social History    Marital status: /Civil Union     Spouse name: N/A    Number of children: N/A    Years of education: N/A     Social History Main Topics    Smoking status: Former Smoker     Types: E-Cigarettes    Smokeless tobacco: Never Used    Alcohol use No    Drug use: No    Sexual activity: Not Asked     Other Topics Concern    None     Social History Narrative    None       Subjective         Objective    Physical Exam:   Assessment Type: (P) Assess only  General Appearance: (P) Awake, Alert  Respiratory Pattern: (P) Normal  Chest Assessment: (P) Chest expansion symmetrical  Bilateral Breath Sounds: (P) Clear  O2 Device: (P) NC    Vitals:  Blood pressure 117/58, pulse 102, temperature 99 3 °F (37 4 °C), temperature source Axillary, resp  rate 16, height 5' (1 524 m), weight 93 6 kg (206 lb 5 6 oz), SpO2 96 %  Imaging and other studies: I have personally reviewed pertinent reports  O2 Device: (P) NC     Plan    Respiratory Plan: (P) Discontinue Protocol        Resp Comments: (P) Assessed pt per respiratory protocol SPO2 is 95% on NC bi lateral BS are clear, pt shows no signs of distress at this time and states she has no pulmonary history and does not currently take any pulmonary medication   Will discontiue protocol at this time,

## 2018-08-19 NOTE — ASSESSMENT & PLAN NOTE
· Suspect 2/2 dehydration in the setting of recent GI losses and decreased PO intake  · Will start with IV NS  · Q8 BMP  · If worsening, will consult Nephrology

## 2018-08-19 NOTE — ASSESSMENT & PLAN NOTE
· Likely a toxic metabolic encephalopathy 2/2 infection in the setting of electrolyte disturbance  · CXR with b/l PNA  · Sodium 128, hyperkalemic at 5 7  · CT head negative, trop negative  · Ammonia, VBG, TSH wnl   · Monitor for improvement on abx  · Q4 neuro checks   · Consider Neuro checks if s/sx of seizure   · Will keep on seizure precaution

## 2018-08-19 NOTE — H&P
H&P- Baldev Juan 1955, 65 y o  female MRN: [id number]    Unit/Bed#:  Encounter: [id number]    Primary Care Provider: Marbella Watts   Date and time admitted to hospital: 8/18/2018  9:59 PM    * Sepsis [facility])   Assessment & Plan    · POA as evidenced by tachycardia and leukocytosis   · Maintaining sats in high 90's on 4L nasal cannula   · Likely due to b/l PNA seen on CXR   · Pt was seen 8/15 for vomiting and diarrhea as well  · Cannot r/o viral gastroenteritis contributing  · UA negative for UTI  · Lactic acid wnl, BCx2 pending   · Continue Cefepime and Vanc   · Reported h/o MRSA  · F/u Bcx2  · Check strep and legionella urine antigen, sputum cx  · Respiratory protocol         Acute kidney injury ([facility] )   Assessment & Plan    · Baseline Cr approx 1 1, Cr today 1 67  · Suspect pre-renal due to recent volume depletion  · IVF, AM BMP        Altered mental status   Assessment & Plan    · Likely a toxic metabolic encephalopathy 2/2 infection in the setting of electrolyte disturbance  · CXR with b/l PNA  · Sodium 128, hyperkalemic at 5 7  · CT head negative, trop negative  · Ammonia, VBG, TSH wnl   · Monitor for improvement on abx  · Q4 neuro checks   · Consider Neuro checks if s/sx of seizure   · Will keep on seizure precaution         Hyponatremia   Assessment & Plan    · Suspect 2/2 dehydration in the setting of recent GI losses and decreased PO intake  · Will start with IV NS  · Q8 BMP  · If worsening, will consult Nephrology         Chronic venous stasis dermatitis of both lower extremities   Assessment & Plan    · Receives wound care at her home  · Per daughter, LE's are better than baseline appearance         Hyperlipidemia   Assessment & Plan    · Continue statin        Essential hypertension   Assessment & Plan    · Controled, hold ACEI until renal function improves        Type 2 diabetes mellitus with hyperglycemia, with long-term current use of insulin ([facility] )   Assessment & Plan · Hyperglycemic in 160's on arrival, per daughter she runs high in the 200's  · Will continue 10 U Lantus hs and insulin lispro 5 U TID ac  · Add SSI         Microcytic anemia   Assessment & Plan    · Hgb at baseline, 9 1          VTE Prophylaxis: Heparin  / reason for no mechanical VTE prophylaxis ambulate   Code Status: FULL  POLST: POLST form is not discussed and not completed at this time  Discussion with family: daughter and  at bedside     Anticipated Length of Stay:  Patient will be admitted on an Inpatient basis with an anticipated length of stay of  > 2 midnights  Justification for Hospital Stay: per plan above     Total Time for Visit, including Counseling / Coordination of Care: 30 minutes  Greater than 50% of this total time spent on direct patient counseling and coordination of care  Chief Complaint:   tremors    History of Present Illness: Preethi Looney is a 65 y o  female with PMHx of DM, HTN, HLD,  who presents with tremors  Pt is arousable to sternal rub but otherwise provides not history besides telling me she is in the hospital and stating her name  Daughter states she was came over to her mother's house this afternoon and her mother was extremely lethargic and was shaking  She says her mother would wake up and tell her not to bring her to the hospital but then would "pass out" again  She reports both arms were flexed at the elbow and her upper body was shaking  Denies loss of bowel function, pt is incontinent at baseline  Denies witnessing any tongue bite  Denies slurred speech or facial droop   reports noticing a wet cough the past couple days  No temperatures taken at home  Pt was seen in ED recently for vomiting and nausea  Daughter states her mother's diarrhea resolved shortly after this and that she did not vomit after being discharged with Zofran prescription  Daughter reports poor PO intake however since her v/d episode   Otherwise denies hearing any complaints of CP, SOB, HA, dizziness, abdominal pain, urinary symptoms  Review of Systems:    Review of Systems   Unable to perform ROS: Mental status change       Past Medical and Surgical History:     Past Medical History:   Diagnosis Date    Degenerative disc disease at L5-S1 level     Diabetes mellitus ([facility] )     Hypertension        Past Surgical History:   Procedure Laterality Date    CHOLECYSTECTOMY      JOINT REPLACEMENT      OOPHORECTOMY         Meds/Allergies:    Prior to Admission medications    Medication Sig Start Date End Date Taking?  Authorizing Provider   acetaminophen (TYLENOL) 325 mg tablet Take 2 tablets by mouth every 6 (six) hours as needed for mild pain 7/17/17   Alma Baumgarten, PA-C   B-D UF III MINI PEN NEEDLES 31G X 5 MM MISC 4 (four) times a day 2/23/18   Historical Provider, MD   Blood Glucose Monitoring Suppl (FREESTYLE LITE) JAROD TEST BEFORE MEALS AND AT BEDTIME 2/23/18   Historical Provider, MD   docusate sodium (COLACE) 100 mg capsule Take 1 capsule by mouth 2 (two) times a day 7/17/17   Alma Baumgarten, PA-C   DULoxetine (CYMBALTA) 60 mg delayed release capsule Take 60 mg by mouth daily    Historical Provider, MD   ergocalciferol (VITAMIN D2) 50,000 units Take 50,000 Units by mouth once a week 11/27/17   Historical Provider, MD   FREESTYLE LITE test strip TEST BEFORE MEALS AND BEDTIME 2/23/18   Historical Provider, MD   gabapentin (NEURONTIN) 300 mg capsule Take 1 capsule (300 mg total) by mouth daily at bedtime 2/23/18   Wanda Ramsay MD   HUMALOG KWIKPEN 100 UNIT/ML SOPN INJECT 5 UNITS 3 TIMES A DAY WITH MEALS 2/23/18   Historical Provider, MD   insulin glargine (LANTUS) 100 units/mL subcutaneous injection Inject 10 Units under the skin daily at bedtime 2/23/18   Wanda Ramsay MD   Lancets (FREESTYLE) lancets TEST BEFORE MEALS AND BEDTIME 2/23/18   Historical Provider, MD   lisinopril (ZESTRIL) 5 mg tablet Take 2 tablets (10 mg total) by mouth daily 2/23/18   Rosey Sheffield MD   metFORMIN (GLUCOPHAGE) 1000 MG tablet Take 1 tablet (1,000 mg total) by mouth 2 (two) times a day with meals 2/23/18   Rosey Sheffield MD   metoclopramide (REGLAN) 10 mg tablet Take 1 tablet (10 mg total) by mouth 3 (three) times a day before meals 2/23/18   Rosey Sheffield MD   morphine (TAMRA) 100 MG 24 hr capsule Take 100 mg by mouth 2 (two) times a day 12/20/17   Historical Provider, MD   ondansetron (ZOFRAN) 4 mg tablet Take 1 tablet (4 mg total) by mouth every 6 (six) hours 8/15/18   Isa Gordillo DO   ondansetron (ZOFRAN-ODT) 8 mg disintegrating tablet  1/29/18   Historical Provider, MD   oxyCODONE (ROXICODONE) 30 MG immediate release tablet Take 30 mg by mouth every 4 (four) hours as needed for moderate pain      Historical Provider, MD   simvastatin (ZOCOR) 20 mg tablet Take 1 tablet by mouth    Historical Provider, MD   triamterene-hydrochlorothiazide (MAXZIDE) 75-50 MG per tablet Take 1 tablet by mouth daily    Historical Provider, MD     I have reviewed home medications with patient family member      Allergies: No Known Allergies    Social History:     Marital Status: /Civil Union   Occupation: retired  Patient Pre-hospital Living Situation: home with family  Patient Pre-hospital Level of Mobility: non ambulatory   Patient Pre-hospital Diet Restrictions: none  Substance Use History:   History   Alcohol Use No     History   Smoking Status    Former Smoker    Types: E-Cigarettes   Smokeless Tobacco    Never Used     History   Drug Use No       Family History:    non-contributory    Physical Exam:     Vitals:   Blood Pressure: 112/58 (08/19/18 0200)  Pulse: 105 (08/19/18 0200)  Temperature: 98 7 °F (37 1 °C) (08/19/18 0200)  Temp Source: Oral (08/19/18 0200)  Respirations: 19 (08/19/18 0200)  Height: 5' (152 4 cm) (08/19/18 0200)  Weight - Scale: 93 6 kg (206 lb 5 6 oz) (08/19/18 0200)  SpO2: 98 % (08/19/18 0200)    Physical Exam Constitutional: She appears well-developed and well-nourished  No distress  Resting comfortably    HENT:   Head: Normocephalic  Cardiovascular: Normal rate, regular rhythm, normal heart sounds and intact distal pulses  Exam reveals no gallop and no friction rub  No murmur heard  Pulmonary/Chest: Breath sounds normal  No respiratory distress  She has no wheezes  She has no rales  She exhibits no tenderness  No labored breathing on 4L nasal cannula   Abdominal: Soft  Bowel sounds are normal  She exhibits no distension and no mass  There is no tenderness  There is no rebound and no guarding  Musculoskeletal: She exhibits edema  She exhibits no tenderness  Neurological:   Arouses to sternal rub   Skin: Skin is warm and dry  No rash noted  She is not diaphoretic  There is erythema  No pallor  Bilateral LE erythema, edema and warmth    Nursing note and vitals reviewed  Additional Data:     Lab Results: I have personally reviewed pertinent reports  Results from last 7 days  Lab Units 08/18/18  2241 08/15/18  1334   WBC Thousand/uL 13 89* 3 96*   HEMOGLOBIN g/dL 9 1* 9 0*   HEMATOCRIT % 30 0* 29 5*   PLATELETS Thousands/uL 192 165   NEUTROS PCT %  --  75   LYMPHS PCT %  --  16   LYMPHO PCT % 5*  --    MONOS PCT %  --  6   MONO PCT MAN % 6  --    EOS PCT %  --  2   EOSINO PCT MANUAL % 1  --        Results from last 7 days  Lab Units 08/18/18  2242   SODIUM mmol/L 126*   POTASSIUM mmol/L 5 7*   CHLORIDE mmol/L 92*   CO2 mmol/L 24   BUN mg/dL 32*   CREATININE mg/dL 1 67*   CALCIUM mg/dL 8 1*   TOTAL PROTEIN g/dL 6 8   BILIRUBIN TOTAL mg/dL 0 40   ALK PHOS U/L 141*   ALT U/L 40   AST U/L 35   GLUCOSE RANDOM mg/dL 168*       Results from last 7 days  Lab Units 08/18/18  2242   INR  1 07       Results from last 7 days  Lab Units 08/19/18  0211 08/18/18  2215 08/15/18  1311   POC GLUCOSE mg/dl 147* 184* 350*           Imaging: I have personally reviewed pertinent reports        CT head without contrast   ED Interpretation by Toni Elliott MD (08/18 2257)   FINDINGS:      PARENCHYMA: Decreased attenuation is noted in periventricular and subcortical white matter demonstrating an appearance that is statistically most likely to represent mild microangiopathic change; this appearance is similar when compared to most recent    prior examination  No CT signs of acute infarction   No intracranial mass, mass effect or midline shift   No acute parenchymal hemorrhage  VENTRICLES AND EXTRA-AXIAL SPACES:  Normal for the patient's age   6 mm hyperdensity is present in the anterior right tentorium cerebelli unchanged from prior study  VISUALIZED ORBITS AND PARANASAL SINUSES:  Unremarkable  CALVARIUM AND EXTRACRANIAL SOFT TISSUES:  Normal    Impression:        No acute intracranial abnormality  Workstation performed: [id number]         Final Result by Tavo Burks DO (08/18 2244)      No acute intracranial abnormality  Workstation performed: [id number]         XR chest 1 view portable   ED Interpretation by Toni Elliott MD (08/18 2243)   Elevated R hemidiaphragm and bilateral lower lobe infiltrates read by me  Allscripts / Epic Records Reviewed: Yes     ** Please Note: This note has been constructed using a voice recognition system   **

## 2018-08-20 LAB
ANION GAP SERPL CALCULATED.3IONS-SCNC: 5 MMOL/L (ref 4–13)
ATRIAL RATE: 125 BPM
BASOPHILS # BLD AUTO: 0.03 THOUSANDS/ΜL (ref 0–0.1)
BASOPHILS NFR BLD AUTO: 1 % (ref 0–1)
BUN SERPL-MCNC: 20 MG/DL (ref 5–25)
CALCIUM SERPL-MCNC: 7.9 MG/DL (ref 8.3–10.1)
CHLORIDE SERPL-SCNC: 102 MMOL/L (ref 100–108)
CO2 SERPL-SCNC: 28 MMOL/L (ref 21–32)
CREAT SERPL-MCNC: 1.04 MG/DL (ref 0.6–1.3)
EOSINOPHIL # BLD AUTO: 0.07 THOUSAND/ΜL (ref 0–0.61)
EOSINOPHIL NFR BLD AUTO: 2 % (ref 0–6)
ERYTHROCYTE [DISTWIDTH] IN BLOOD BY AUTOMATED COUNT: 16.1 % (ref 11.6–15.1)
GFR SERPL CREATININE-BSD FRML MDRD: 58 ML/MIN/1.73SQ M
GLUCOSE SERPL-MCNC: 122 MG/DL (ref 65–140)
GLUCOSE SERPL-MCNC: 142 MG/DL (ref 65–140)
GLUCOSE SERPL-MCNC: 143 MG/DL (ref 65–140)
GLUCOSE SERPL-MCNC: 213 MG/DL (ref 65–140)
GLUCOSE SERPL-MCNC: 262 MG/DL (ref 65–140)
HCT VFR BLD AUTO: 29.1 % (ref 34.8–46.1)
HGB BLD-MCNC: 8.5 G/DL (ref 11.5–15.4)
IMM GRANULOCYTES # BLD AUTO: 0.01 THOUSAND/UL (ref 0–0.2)
IMM GRANULOCYTES NFR BLD AUTO: 0 % (ref 0–2)
L PNEUMO1 AG UR QL IA.RAPID: NEGATIVE
LYMPHOCYTES # BLD AUTO: 0.69 THOUSANDS/ΜL (ref 0.6–4.47)
LYMPHOCYTES NFR BLD AUTO: 17 % (ref 14–44)
MAGNESIUM SERPL-MCNC: 2.2 MG/DL (ref 1.6–2.6)
MCH RBC QN AUTO: 22.3 PG (ref 26.8–34.3)
MCHC RBC AUTO-ENTMCNC: 29.2 G/DL (ref 31.4–37.4)
MCV RBC AUTO: 76 FL (ref 82–98)
MONOCYTES # BLD AUTO: 0.3 THOUSAND/ΜL (ref 0.17–1.22)
MONOCYTES NFR BLD AUTO: 7 % (ref 4–12)
MRSA NOSE QL CULT: NORMAL
NEUTROPHILS # BLD AUTO: 3.04 THOUSANDS/ΜL (ref 1.85–7.62)
NEUTS SEG NFR BLD AUTO: 73 % (ref 43–75)
NRBC BLD AUTO-RTO: 0 /100 WBCS
P AXIS: 72 DEGREES
PLATELET # BLD AUTO: 130 THOUSANDS/UL (ref 149–390)
PMV BLD AUTO: 9.2 FL (ref 8.9–12.7)
POTASSIUM SERPL-SCNC: 5 MMOL/L (ref 3.5–5.3)
PR INTERVAL: 152 MS
QRS AXIS: 58 DEGREES
QRSD INTERVAL: 82 MS
QT INTERVAL: 294 MS
QTC INTERVAL: 424 MS
RBC # BLD AUTO: 3.82 MILLION/UL (ref 3.81–5.12)
S PNEUM AG UR QL: NEGATIVE
SODIUM SERPL-SCNC: 135 MMOL/L (ref 136–145)
T WAVE AXIS: 47 DEGREES
VENTRICULAR RATE: 125 BPM
WBC # BLD AUTO: 4.14 THOUSAND/UL (ref 4.31–10.16)

## 2018-08-20 PROCEDURE — 99232 SBSQ HOSP IP/OBS MODERATE 35: CPT | Performed by: HOSPITALIST

## 2018-08-20 PROCEDURE — 85025 COMPLETE CBC W/AUTO DIFF WBC: CPT | Performed by: NURSE PRACTITIONER

## 2018-08-20 PROCEDURE — 93010 ELECTROCARDIOGRAM REPORT: CPT | Performed by: INTERNAL MEDICINE

## 2018-08-20 PROCEDURE — 83735 ASSAY OF MAGNESIUM: CPT | Performed by: NURSE PRACTITIONER

## 2018-08-20 PROCEDURE — 80048 BASIC METABOLIC PNL TOTAL CA: CPT | Performed by: NURSE PRACTITIONER

## 2018-08-20 PROCEDURE — 82948 REAGENT STRIP/BLOOD GLUCOSE: CPT

## 2018-08-20 PROCEDURE — 87449 NOS EACH ORGANISM AG IA: CPT | Performed by: HOSPITALIST

## 2018-08-20 RX ORDER — HYDRALAZINE HYDROCHLORIDE 20 MG/ML
5 INJECTION INTRAMUSCULAR; INTRAVENOUS ONCE
Status: COMPLETED | OUTPATIENT
Start: 2018-08-20 | End: 2018-08-20

## 2018-08-20 RX ORDER — LISINOPRIL 10 MG/1
10 TABLET ORAL DAILY
Status: DISCONTINUED | OUTPATIENT
Start: 2018-08-20 | End: 2018-08-24 | Stop reason: HOSPADM

## 2018-08-20 RX ORDER — HYDRALAZINE HYDROCHLORIDE 20 MG/ML
10 INJECTION INTRAMUSCULAR; INTRAVENOUS ONCE
Status: COMPLETED | OUTPATIENT
Start: 2018-08-20 | End: 2018-08-20

## 2018-08-20 RX ADMIN — METOCLOPRAMIDE 10 MG: 10 TABLET ORAL at 11:57

## 2018-08-20 RX ADMIN — INSULIN GLARGINE 10 UNITS: 100 INJECTION, SOLUTION SUBCUTANEOUS at 21:09

## 2018-08-20 RX ADMIN — HYDRALAZINE HYDROCHLORIDE 5 MG: 20 INJECTION INTRAMUSCULAR; INTRAVENOUS at 21:09

## 2018-08-20 RX ADMIN — SODIUM CHLORIDE 75 ML/HR: 0.9 INJECTION, SOLUTION INTRAVENOUS at 02:51

## 2018-08-20 RX ADMIN — DOCUSATE SODIUM 100 MG: 100 CAPSULE, LIQUID FILLED ORAL at 08:42

## 2018-08-20 RX ADMIN — INSULIN LISPRO 5 UNITS: 100 INJECTION, SOLUTION INTRAVENOUS; SUBCUTANEOUS at 17:25

## 2018-08-20 RX ADMIN — HEPARIN SODIUM 5000 UNITS: 5000 INJECTION, SOLUTION INTRAVENOUS; SUBCUTANEOUS at 21:10

## 2018-08-20 RX ADMIN — LISINOPRIL 10 MG: 10 TABLET ORAL at 11:57

## 2018-08-20 RX ADMIN — VANCOMYCIN HYDROCHLORIDE 1250 MG: 1 INJECTION, POWDER, LYOPHILIZED, FOR SOLUTION INTRAVENOUS at 21:09

## 2018-08-20 RX ADMIN — INSULIN LISPRO 2 UNITS: 100 INJECTION, SOLUTION INTRAVENOUS; SUBCUTANEOUS at 21:10

## 2018-08-20 RX ADMIN — OXYCODONE HYDROCHLORIDE 30 MG: 10 TABLET ORAL at 06:08

## 2018-08-20 RX ADMIN — METOCLOPRAMIDE 10 MG: 10 TABLET ORAL at 17:24

## 2018-08-20 RX ADMIN — CEFEPIME HYDROCHLORIDE 2000 MG: 2 INJECTION, POWDER, FOR SOLUTION INTRAVENOUS at 22:44

## 2018-08-20 RX ADMIN — HYDRALAZINE HYDROCHLORIDE 10 MG: 20 INJECTION INTRAMUSCULAR; INTRAVENOUS at 23:01

## 2018-08-20 RX ADMIN — INSULIN LISPRO 5 UNITS: 100 INJECTION, SOLUTION INTRAVENOUS; SUBCUTANEOUS at 08:21

## 2018-08-20 RX ADMIN — GABAPENTIN 300 MG: 300 CAPSULE ORAL at 21:09

## 2018-08-20 RX ADMIN — HEPARIN SODIUM 5000 UNITS: 5000 INJECTION, SOLUTION INTRAVENOUS; SUBCUTANEOUS at 06:01

## 2018-08-20 RX ADMIN — DULOXETINE HYDROCHLORIDE 60 MG: 60 CAPSULE, DELAYED RELEASE ORAL at 08:42

## 2018-08-20 RX ADMIN — METOCLOPRAMIDE 10 MG: 10 TABLET ORAL at 06:01

## 2018-08-20 RX ADMIN — INSULIN LISPRO 2 UNITS: 100 INJECTION, SOLUTION INTRAVENOUS; SUBCUTANEOUS at 12:19

## 2018-08-20 RX ADMIN — PRAVASTATIN SODIUM 40 MG: 40 TABLET ORAL at 17:25

## 2018-08-20 RX ADMIN — TRIAMTERENE AND HYDROCHLOROTHIAZIDE 1 TABLET: 37.5; 25 TABLET ORAL at 08:42

## 2018-08-20 RX ADMIN — SODIUM CHLORIDE 75 ML/HR: 0.9 INJECTION, SOLUTION INTRAVENOUS at 17:30

## 2018-08-20 NOTE — CASE MANAGEMENT
Thank you,  [address]  Utilization Review Department  Phone: [phone]; Fax [phone]  ATTENTION: Please call with any questions or concerns to [phone]  and carefully follow the prompts so that you are directed to the right person  Send all requests for admission clinical reviews, approved or denied determinations and any other requests to fax [phone]  All voicemails are confidential    Initial Clinical Review    Admission: Date/Time/Statement: INPATIENT ADMISSION 8/19/18 @ 0001     Orders Placed This Encounter   Procedures    Inpatient Admission (expected length of stay for this patient is greater than two midnights)     Telemetry     Standing Status:   Standing     Number of Occurrences:   1     Order Specific Question:   Admitting Physician     Answer:   Eleonore      Order Specific Question:   Level of Care     Answer:   Level 2 Stepdown / HOT [14]     Order Specific Question:   Bed request comments     Answer:   telemetry; droplet precautions     Order Specific Question:   Estimated length of stay     Answer:   More than 2 Midnights     Order Specific Question:   Certification     Answer:   I certify that inpatient services are medically necessary for this patient for a duration of greater than two midnights  See H&P and MD Progress Notes for additional information about the patient's course of treatment  ED: Date/Time/Mode of Arrival:   ED Arrival Information     Expected Arrival Acuity Means of Arrival Escorted By Service Admission Type    - 8/18/2018 21:59 Emergent Ambulance [facility] Emergency    Arrival Complaint    Tremors          Chief Complaint:   Chief Complaint   Patient presents with    Altered Mental Status     Decreased level of consciousness, tremors  Onset today       History of Illness:  65year old female with altered mental status and decreased consciousness with diffuse tremors today   Patient denies headache, fever  Patient unable to provide complete hx , given by family  Daughter states she was came over to her mother's house this afternoon and her mother was extremely lethargic and was shaking  She says her mother would wake up and tell her not to bring her to the hospital but then would "pass out" again  She reports both arms were flexed at the elbow and her upper body was shaking  Daughter reports poor PO intake      ED Vital Signs:   ED Triage Vitals [08/18/18 2208]   Temperature Pulse Respirations Blood Pressure SpO2   100 2 °F (37 9 °C) (!) 120 18 119/63 94 %      Temp Source Heart Rate Source Patient Position - Orthostatic VS BP Location FiO2 (%)   Oral Monitor Lying Right arm --      Pain Score       No Pain        Wt Readings from Last 1 Encounters:   08/19/18 93 6 kg (206 lb 5 6 oz)       Vital Signs (abnormal): 08/18: Temp 100 2F 99 5,   ,112,108,106,110; SpO2 92-99% on 4L Nasal cannula     Abnormal Labs/Diagnostic Test Results: 08/18:  EKG: Sinus tachycardia  Otherwise normal ECG    08/18:  Sodium 126     Potassium 5 7     Chloride 92     BUN 32     Creatinine 1 67     Glucose 168     Calcium 8 1     Alkaline Phosphatase 141     Albumin 3 1       PTT 38, acetaminophen level <2, salicylate level <3, blood culture pending    08/19:  Sodium 132     Chloride 99     BUN 30     Creatinine 1 42     Calcium 7 9     Alkaline Phosphatase 117     Total Protein 6 0     Albumin 2 6     Iron 24,   RBC 3 65     Hemoglobin 8 3     Hematocrit 27 1     Platelets 023     Sodium 133, 131,  anion gap 3, calcium 8 2,8, glucose 174,   Urinalysis: wbc 0-1, protein +1  XR chest:Left basilar patchy density may be related to atelectasis or pneumonia           08/20: sodium 135, calcium 7 9  WBC 4 14     RBC 3 82    Hemoglobin 8 5     Hematocrit 29 1     Platelets 188     Glucose 143    ED Treatment:   Medication Administration from 08/18/2018 4639 to 08/19/2018 0159       Date/Time Order Dose Route Action Comments     08/18/2018 2220 LORazepam (ATIVAN) 2 mg/mL injection 0 5 mg 0 5 mg Intravenous Given      08/18/2018 2329 cefepime (MAXIPIME) IVPB (premix) 1,000 mg 0 mg Intravenous Stopped      08/18/2018 2254 cefepime (MAXIPIME) IVPB (premix) 1,000 mg 1,000 mg Intravenous New Bag      08/19/2018 0125 vancomycin (VANCOCIN) 1,250 mg in sodium chloride 0 9 % 250 mL IVPB 0 mg/kg Intravenous Stopped      08/18/2018 2328 vancomycin (VANCOCIN) 1,250 mg in sodium chloride 0 9 % 250 mL IVPB 1,250 mg Intravenous New Bag      08/18/2018 2255 tetanus-diphtheria-acellular pertussis (BOOSTRIX) IM injection 0 5 mL 0 5 mL Intramuscular Given      08/19/2018 0018 sodium bicarbonate 8 4 % injection 25 mEq 25 mEq Intravenous Given      08/19/2018 0018 dextrose 50 % IV solution 25 mL 25 mL Intravenous Given      08/19/2018 0018 insulin regular (HumuLIN R,NovoLIN R) injection 4 Units 4 Units Intravenous Given      08/19/2018 0126 calcium gluconate 1 g in sodium chloride 0 9 % 100 mL IVPB 0 g Intravenous Stopped      08/19/2018 0019 calcium gluconate 1 g in sodium chloride 0 9 % 100 mL IVPB 1 g Intravenous New Bag           Past Medical/Surgical History:    Active Ambulatory Problems     Diagnosis Date Noted    Essential hypertension     Hyperlipidemia     Degenerative disc disease at L5-S1 level     Cellulitis 01/10/2017    Chronic venous stasis dermatitis of both lower extremities 01/10/2017    Diabetic neuropathy ([facility] ) 01/14/2017    Venous insufficiency (chronic) (peripheral) 01/14/2017    Fall 07/14/2017    Abnormal head CT 07/14/2017    Closed fracture of multiple ribs of right side 07/14/2017    Palpitations 07/14/2017    Near syncope 07/14/2017    Hyponatremia 07/14/2017    Chronic venous stasis dermatitis of both lower extremities 07/14/2017    Delirium 07/17/2017    Ambulatory dysfunction 07/17/2017    Physical deconditioning 07/17/2017    Incontinence 07/17/2017    Type 2 diabetes mellitus with hyperglycemia, with long-term current use of insulin ([place] [address] ) 02/20/2018    Nausea 02/22/2018    Chronic pain syndrome 02/22/2018    Peripheral neuropathy 02/22/2018    Microcytic anemia 02/22/2018    Intractable diarrhea 04/25/2018    Hypokalemia 04/25/2018    Hypomagnesemia 04/25/2018    Hypocalcemia 04/25/2018    Bacteriuria 04/25/2018     Resolved Ambulatory Problems     Diagnosis Date Noted    Headache 02/22/2018     Past Medical History:   Diagnosis Date    Degenerative disc disease at L5-S1 level     Diabetes mellitus ([place] [address] )     Hypertension        Admitting Diagnosis: Hyperkalemia [E87 5]  Hyponatremia [E87 1]  Altered mental status [R41 82]  Pneumonia [J18 9]  Acute kidney injury ([place] [address] ) [N17 9]  Bilateral lower leg cellulitis [L03 116, L03 115]  Severe sepsis ([place] [address] ) [A41 9, R65 20]    Age/Sex: 65 y o  female    Assessment/Plan:   Sepsis ([place] [address] )   Assessment & Plan     · POA as evidenced by tachycardia and leukocytosis   ? Maintaining sats in high 90's on 4L nasal cannula   ? Likely due to b/l PNA seen on CXR   ? Pt was seen 8/15 for vomiting and diarrhea as well  ? Cannot r/o viral gastroenteritis contributing  ? UA negative for UTI  · Lactic acid wnl, BCx2 pending   · Continue Cefepime and Vanc   ? Reported h/o MRSA  ? F/u Bcx2  · Check strep and legionella urine antigen, sputum cx  · Respiratory protocol           Acute kidney injury ([place] [address] )   Assessment & Plan     · Baseline Cr approx 1 1, Cr today 1 67  ? Suspect pre-renal due to recent volume depletion  · IVF, AM BMP          Altered mental status   Assessment & Plan     · Likely a toxic metabolic encephalopathy 2/2 infection in the setting of electrolyte disturbance  ? CXR with b/l PNA  ? Sodium 128, hyperkalemic at 5 7  ? CT head negative, trop negative  ? Ammonia, VBG, TSH wnl   · Monitor for improvement on abx  · Q4 neuro checks   · Consider Neuro checks if s/sx of seizure   ?  Will keep on seizure precaution           Hyponatremia   Assessment & Plan     · Suspect 2/2 dehydration in the setting of recent GI losses and decreased PO intake  · Will start with IV NS  ?  Q8 BMP  · If worsening, will consult Nephrology           Chronic venous stasis dermatitis of both lower extremities   Assessment & Plan     · Receives wound care at her home  · Per daughter, LE's are better than baseline appearance           Hyperlipidemia   Assessment & Plan     · Continue statin          Essential hypertension   Assessment & Plan     · Controled, hold ACEI until renal function improves          Type 2 diabetes mellitus with hyperglycemia, with long-term current use of insulin (HCC)   Assessment & Plan     · Hyperglycemic in 160's on arrival, per daughter she runs high in the 200's  · Will continue 10 U Lantus hs and insulin lispro 5 U TID ac  · Add SSI           Microcytic anemia   Assessment & Plan     · Hgb at baseline, 9 1         Admission Orders:  Scheduled Meds:   Current Facility-Administered Medications:  acetaminophen 650 mg Oral Q6H PRN    cefepime 2,000 mg Intravenous Q24H Last Rate: Stopped (08/19/18 5672)   docusate sodium 100 mg Oral BID    DULoxetine 60 mg Oral Daily    gabapentin 300 mg Oral HS    heparin (porcine) 5,000 Units Subcutaneous Q8H [facility] custodial    insulin glargine 10 Units Subcutaneous HS    insulin lispro 1-5 Units Subcutaneous HS    insulin lispro 1-6 Units Subcutaneous TID With Meals    insulin lispro 5 Units Subcutaneous TID With Meals    metoclopramide 10 mg Oral TID AC    ondansetron 4 mg Intravenous Q6H PRN    oxyCODONE 30 mg Oral Q4H PRN    pravastatin 40 mg Oral Daily With Dinner    sodium chloride 75 mL/hr Intravenous Continuous Last Rate: 75 mL/hr (08/20/18 0251)   triamterene-hydrochlorothiazide 1 tablet Oral Daily    vancomycin 20 mg/kg (Adjusted) Intravenous Q24H Last Rate: Stopped (08/19/18 6660)     Facility-Administered Medications Ordered in Other Encounters:  ferrous sulfate 325 mg Oral Daily With Breakfast Albert Victor MD     Continuous Infusions:   sodium chloride 75 mL/hr Last Rate: 75 mL/hr (08/20/18 0251)     PRN Meds:   acetaminophen    ondansetron    oxyCODONE   peripheal IV  Sputum culture & gram stain  Vitals routine  Gavin?CHO controlled diet  Aspiration precautuins  Up w assistance  Fingerstick gluc hs & bef meals  Elevate head 30 degrees  Oral care

## 2018-08-20 NOTE — MALNUTRITION/BMI
This medical record reflects one or more clinical indicators suggestive of  morbid obesity  BMI Findings:  BMI Classifications: Morbid Obesity 40-44 9     Body mass index is 40 3 kg/m²  See Nutrition note dated 8/20/18 for additional details  Completed nutrition assessment is viewable in the nutrition documentation

## 2018-08-20 NOTE — PROGRESS NOTES
Progress Note - Prasanna Concepcion 65 y o  female MRN: [id number]    Unit/Bed#:  Encounter: [id number]      Assessment:  Patient Active Problem List   Diagnosis    Essential hypertension    Hyperlipidemia    Degenerative disc disease at L5-S1 level    Cellulitis    Chronic venous stasis dermatitis of both lower extremities    Diabetic neuropathy ([address] )    Venous insufficiency (chronic) (peripheral)    Fall    Abnormal head CT    Closed fracture of multiple ribs of right side    Palpitations    Near syncope    Hyponatremia    Chronic venous stasis dermatitis of both lower extremities    Delirium    Ambulatory dysfunction    Physical deconditioning    Incontinence    Type 2 diabetes mellitus with hyperglycemia, with long-term current use of insulin (HCC)    Nausea    Chronic pain syndrome    Peripheral neuropathy    Microcytic anemia    Intractable diarrhea    Hypokalemia    Hypomagnesemia    Hypocalcemia    Bacteriuria    Altered mental status    [facility] [address]    Acute kidney injury ([facility] [address] )         Plan:  1  Left lower lobe pneumonia  - continue with antibiotics  Sputum culture  Check Legionella and strep antigens  Neb treatments, supplemental oxygen  Respiratory protocol  Hemodynamically stable, will transfer out of ICU today  2   Sepsis present on admission likely secondary to pneumonia  3   Anemia - stable  4  Acute kidney injury present on admission - resolved with IV hydration  5   Hyponatremia - back to baseline  This may have contributed to her altered mental status on admission  This may also be secondary to Legionella infection, Legionella antigen is pending  6   Chronic venous stasis changes of bilateral lower extremity - continue local wound care  7   Hypertension - resume ACE-inhibitor  8   Type 2 diabetes - continue Accu-Cheks  Continue insulin  Subjective:   Patient is doing better today  She is less lethargic  She is out of bed to chair    She still coughing but without sputum production  Objective:     Vitals: Blood pressure 168/76, pulse 88, temperature 98 6 °F (37 °C), temperature source Oral, resp  rate 16, height 5' (1 524 m), weight 93 6 kg (206 lb 5 6 oz), SpO2 96 %  ,Body mass index is 40 3 kg/m²  Weight (last 2 days)     Date/Time   Weight    08/19/18 0200  93 6 (206 35)    08/18/18 2208  85 7 (188 93)              Intake/Output Summary (Last 24 hours) at 08/20/18 1139  Last data filed at 08/20/18 0800   Gross per 24 hour   Intake          3083 38 ml   Output             2860 ml   Net           223 38 ml       Physical Exam: General:  NAD, awake, alert, oriented x 3  HEENT:  NC/AT, mucous membranes moist  Neck:  Supple, No JVP elevation  CV:  + S1, +S2, RRR  Pulm:  Left lower lung fields with crackles and wheeze    Abd:  Soft, Non-tender, Non-distended  Ext:  No clubbing/cyanosis/edema    Scheduled Meds:  Current Facility-Administered Medications:  acetaminophen 650 mg Oral Q6H PRN Kylie Montanez PA-C    cefepime 2,000 mg Intravenous Q24H Kylie Montanez PA-C Last Rate: Stopped (08/19/18 2348)   docusate sodium 100 mg Oral BID Kylie Montanez PA-C    DULoxetine 60 mg Oral Daily Lucy Wilkins PA-C    gabapentin 300 mg Oral HS Kylie Montanez PA-C    heparin (porcine) 5,000 Units Subcutaneous Q8H [address] Lucy Wilkins PA-C    insulin glargine 10 Units Subcutaneous HS Lucy He PA-C    insulin lispro 1-5 Units Subcutaneous HS Lucy He PA-C    insulin lispro 1-6 Units Subcutaneous TID With Meals Kylie Montanez PA-C    insulin lispro 5 Units Subcutaneous TID With Meals Kylie Montanez PA-C    metoclopramide 10 mg Oral TID AC Lucy He PA-C    ondansetron 4 mg Intravenous Q6H PRN Kylie Montanez PA-C    oxyCODONE 30 mg Oral Q4H PRN Kylie Montanez PA-C    pravastatin 40 mg Oral Daily With [employer], PA-C    sodium chloride 75 mL/hr Intravenous Continuous Michelle Fay MD Last Rate: 75 mL/hr (08/20/18 0251)   triamterene-hydrochlorothiazide 1 tablet Oral Daily Lucy He PA-C    vancomycin 20 mg/kg (Adjusted) Intravenous Q24H Tram Erickson PA-C Last Rate: Stopped (08/19/18 2255)     Facility-Administered Medications Ordered in Other Encounters:  ferrous sulfate 325 mg Oral Daily With Breakfast Austen Jaquez MD     Continuous Infusions:  sodium chloride 75 mL/hr Last Rate: 75 mL/hr (08/20/18 0251)     PRN Meds:   acetaminophen    ondansetron    oxyCODONE      Invasive Devices     Peripheral Intravenous Line            Peripheral IV 08/18/18 Left Forearm 1 day          Drain            Urethral Catheter 1 day                  Results from last 7 days  Lab Units 08/20/18  0436 08/19/18  0439 08/18/18  2241 08/15/18  1334   WBC Thousand/uL 4 14* 8 59 13 89* 3 96*   HEMOGLOBIN g/dL 8 5* 8 3* 9 1* 9 0*   HEMATOCRIT % 29 1* 27 1* 30 0* 29 5*   PLATELETS Thousands/uL 130* 136* 192 165   NEUTROS PCT % 73 80*  --  75   MONOS PCT % 7 6  --  6   MONO PCT MAN %  --   --  6  --          Results from last 7 days  Lab Units 08/20/18  0436 08/19/18 2038 08/19/18  1258 08/19/18  0439 08/18/18  2242 08/15/18  1334   SODIUM mmol/L 135* 131* 133* 132* 126* 132*   POTASSIUM mmol/L 5 0 5 0 4 8 4 5 5 7* 4 6   CHLORIDE mmol/L 102 101 102 99* 92* 98*   CO2 mmol/L 28 26 28 27 24 27   BUN mg/dL 20 23 24 30* 32* 24   CREATININE mg/dL 1 04 1 07 1 20 1 42* 1 67* 1 17   CALCIUM mg/dL 7 9* 8 0* 8 2* 7 9* 8 1* 8 5   TOTAL PROTEIN g/dL  --   --   --  6 0* 6 8 7 5   BILIRUBIN TOTAL mg/dL  --   --   --  0 40 0 40 0 30   ALK PHOS U/L  --   --   --  117* 141* 158*   ALT U/L  --   --   --  32 40 50   AST U/L  --   --   --  32 35 43   GLUCOSE RANDOM mg/dL 122 174* 133 126 168* 330*         Lab Results   Component Value Date    CKTOTAL 68 08/18/2018    TROPONINI <0 02 08/18/2018       Lab Results   Component Value Date    INR 1 07 08/18/2018    INR 1 09 08/15/2018    INR 1 21 (H) 07/15/2017    PROTIME 13 6 08/18/2018    PROTIME 13 8 08/15/2018    PROTIME 15 4 (H) 07/15/2017                 Micro:  Lab Results   Component Value Date    BLOODCX No Growth at 24 hrs  08/18/2018    BLOODCX No Growth at 24 hrs  08/18/2018    URINECX >100,000 cfu/ml Klebsiella pneumoniae (A) 04/25/2018    URINECX >100,000 cfu/ml Enterobacter aerogenes (A) 04/25/2018    WOUNDCULT 2+ Growth of Pseudomonas stutzeri 01/11/2017    WOUNDCULT 2+ Growth of Klebsiella pneumoniae 01/11/2017    WOUNDCULT 2+ Growth of Staphylococcus aureus 01/11/2017    WOUNDCULT 2+ Growth of Mixed Skin Lata 01/11/2017       Lab, Imaging and other studies: I have personally reviewed pertinent reports      VTE Pharmacologic Prophylaxis: Heparin  VTE Mechanical Prophylaxis: sequential compression device

## 2018-08-21 LAB
ANION GAP SERPL CALCULATED.3IONS-SCNC: 4 MMOL/L (ref 4–13)
BASOPHILS # BLD AUTO: 0.02 THOUSANDS/ΜL (ref 0–0.1)
BASOPHILS NFR BLD AUTO: 0 % (ref 0–1)
BUN SERPL-MCNC: 18 MG/DL (ref 5–25)
CALCIUM SERPL-MCNC: 8.4 MG/DL (ref 8.3–10.1)
CHLORIDE SERPL-SCNC: 102 MMOL/L (ref 100–108)
CO2 SERPL-SCNC: 27 MMOL/L (ref 21–32)
CREAT SERPL-MCNC: 0.88 MG/DL (ref 0.6–1.3)
EOSINOPHIL # BLD AUTO: 0.09 THOUSAND/ΜL (ref 0–0.61)
EOSINOPHIL NFR BLD AUTO: 2 % (ref 0–6)
ERYTHROCYTE [DISTWIDTH] IN BLOOD BY AUTOMATED COUNT: 15.8 % (ref 11.6–15.1)
GFR SERPL CREATININE-BSD FRML MDRD: 71 ML/MIN/1.73SQ M
GLUCOSE SERPL-MCNC: 150 MG/DL (ref 65–140)
GLUCOSE SERPL-MCNC: 158 MG/DL (ref 65–140)
GLUCOSE SERPL-MCNC: 292 MG/DL (ref 65–140)
GLUCOSE SERPL-MCNC: 296 MG/DL (ref 65–140)
GLUCOSE SERPL-MCNC: 344 MG/DL (ref 65–140)
GLUCOSE SERPL-MCNC: 387 MG/DL (ref 65–140)
HCT VFR BLD AUTO: 29.6 % (ref 34.8–46.1)
HGB BLD-MCNC: 8.9 G/DL (ref 11.5–15.4)
IMM GRANULOCYTES # BLD AUTO: 0.02 THOUSAND/UL (ref 0–0.2)
IMM GRANULOCYTES NFR BLD AUTO: 0 % (ref 0–2)
LYMPHOCYTES # BLD AUTO: 0.78 THOUSANDS/ΜL (ref 0.6–4.47)
LYMPHOCYTES NFR BLD AUTO: 15 % (ref 14–44)
MCH RBC QN AUTO: 22.4 PG (ref 26.8–34.3)
MCHC RBC AUTO-ENTMCNC: 30.1 G/DL (ref 31.4–37.4)
MCV RBC AUTO: 74 FL (ref 82–98)
MONOCYTES # BLD AUTO: 0.44 THOUSAND/ΜL (ref 0.17–1.22)
MONOCYTES NFR BLD AUTO: 8 % (ref 4–12)
NEUTROPHILS # BLD AUTO: 3.99 THOUSANDS/ΜL (ref 1.85–7.62)
NEUTS SEG NFR BLD AUTO: 75 % (ref 43–75)
NRBC BLD AUTO-RTO: 0 /100 WBCS
PLATELET # BLD AUTO: 154 THOUSANDS/UL (ref 149–390)
PMV BLD AUTO: 9.5 FL (ref 8.9–12.7)
POTASSIUM SERPL-SCNC: 4.6 MMOL/L (ref 3.5–5.3)
PROCALCITONIN SERPL-MCNC: 0.22 NG/ML
RBC # BLD AUTO: 3.98 MILLION/UL (ref 3.81–5.12)
SODIUM SERPL-SCNC: 133 MMOL/L (ref 136–145)
WBC # BLD AUTO: 5.34 THOUSAND/UL (ref 4.31–10.16)

## 2018-08-21 PROCEDURE — 99233 SBSQ HOSP IP/OBS HIGH 50: CPT | Performed by: INTERNAL MEDICINE

## 2018-08-21 PROCEDURE — 82948 REAGENT STRIP/BLOOD GLUCOSE: CPT

## 2018-08-21 PROCEDURE — 94640 AIRWAY INHALATION TREATMENT: CPT

## 2018-08-21 PROCEDURE — 94760 N-INVAS EAR/PLS OXIMETRY 1: CPT

## 2018-08-21 PROCEDURE — 84145 PROCALCITONIN (PCT): CPT | Performed by: INTERNAL MEDICINE

## 2018-08-21 PROCEDURE — 85025 COMPLETE CBC W/AUTO DIFF WBC: CPT | Performed by: HOSPITALIST

## 2018-08-21 PROCEDURE — 94664 DEMO&/EVAL PT USE INHALER: CPT

## 2018-08-21 PROCEDURE — 80048 BASIC METABOLIC PNL TOTAL CA: CPT | Performed by: HOSPITALIST

## 2018-08-21 RX ORDER — HYDRALAZINE HYDROCHLORIDE 20 MG/ML
10 INJECTION INTRAMUSCULAR; INTRAVENOUS EVERY 6 HOURS PRN
Status: DISCONTINUED | OUTPATIENT
Start: 2018-08-21 | End: 2018-08-24 | Stop reason: HOSPADM

## 2018-08-21 RX ORDER — LEVALBUTEROL 1.25 MG/.5ML
1.25 SOLUTION, CONCENTRATE RESPIRATORY (INHALATION)
Status: DISCONTINUED | OUTPATIENT
Start: 2018-08-21 | End: 2018-08-21

## 2018-08-21 RX ORDER — HYDRALAZINE HYDROCHLORIDE 20 MG/ML
10 INJECTION INTRAMUSCULAR; INTRAVENOUS ONCE
Status: COMPLETED | OUTPATIENT
Start: 2018-08-21 | End: 2018-08-21

## 2018-08-21 RX ORDER — PREDNISONE 20 MG/1
40 TABLET ORAL DAILY
Status: DISCONTINUED | OUTPATIENT
Start: 2018-08-21 | End: 2018-08-23

## 2018-08-21 RX ORDER — LEVALBUTEROL 1.25 MG/.5ML
1.25 SOLUTION, CONCENTRATE RESPIRATORY (INHALATION) 2 TIMES DAILY
Status: DISCONTINUED | OUTPATIENT
Start: 2018-08-21 | End: 2018-08-24 | Stop reason: HOSPADM

## 2018-08-21 RX ORDER — LEVALBUTEROL 1.25 MG/.5ML
1.25 SOLUTION, CONCENTRATE RESPIRATORY (INHALATION) EVERY 6 HOURS PRN
Status: DISCONTINUED | OUTPATIENT
Start: 2018-08-21 | End: 2018-08-24 | Stop reason: HOSPADM

## 2018-08-21 RX ORDER — SODIUM CHLORIDE FOR INHALATION 0.9 %
3 VIAL, NEBULIZER (ML) INHALATION
Status: DISCONTINUED | OUTPATIENT
Start: 2018-08-21 | End: 2018-08-24 | Stop reason: HOSPADM

## 2018-08-21 RX ORDER — BENZONATATE 100 MG/1
100 CAPSULE ORAL 3 TIMES DAILY PRN
Status: DISCONTINUED | OUTPATIENT
Start: 2018-08-21 | End: 2018-08-24 | Stop reason: HOSPADM

## 2018-08-21 RX ORDER — SODIUM CHLORIDE FOR INHALATION 0.9 %
3 VIAL, NEBULIZER (ML) INHALATION
Status: DISCONTINUED | OUTPATIENT
Start: 2018-08-21 | End: 2018-08-21

## 2018-08-21 RX ORDER — SODIUM CHLORIDE FOR INHALATION 0.9 %
3 VIAL, NEBULIZER (ML) INHALATION EVERY 6 HOURS PRN
Status: DISCONTINUED | OUTPATIENT
Start: 2018-08-21 | End: 2018-08-24 | Stop reason: HOSPADM

## 2018-08-21 RX ORDER — GUAIFENESIN 600 MG
600 TABLET, EXTENDED RELEASE 12 HR ORAL EVERY 12 HOURS SCHEDULED
Status: DISCONTINUED | OUTPATIENT
Start: 2018-08-21 | End: 2018-08-24 | Stop reason: HOSPADM

## 2018-08-21 RX ORDER — LEVALBUTEROL 1.25 MG/.5ML
1.25 SOLUTION, CONCENTRATE RESPIRATORY (INHALATION) 2 TIMES DAILY
Status: DISCONTINUED | OUTPATIENT
Start: 2018-08-21 | End: 2018-08-21

## 2018-08-21 RX ORDER — INSULIN GLARGINE 100 [IU]/ML
15 INJECTION, SOLUTION SUBCUTANEOUS
Status: DISCONTINUED | OUTPATIENT
Start: 2018-08-21 | End: 2018-08-22

## 2018-08-21 RX ADMIN — INSULIN LISPRO 5 UNITS: 100 INJECTION, SOLUTION INTRAVENOUS; SUBCUTANEOUS at 16:17

## 2018-08-21 RX ADMIN — OXYCODONE HYDROCHLORIDE 30 MG: 10 TABLET ORAL at 17:39

## 2018-08-21 RX ADMIN — PRAVASTATIN SODIUM 40 MG: 40 TABLET ORAL at 16:17

## 2018-08-21 RX ADMIN — INSULIN GLARGINE 15 UNITS: 100 INJECTION, SOLUTION SUBCUTANEOUS at 21:54

## 2018-08-21 RX ADMIN — ACETAMINOPHEN 650 MG: 325 TABLET, FILM COATED ORAL at 06:33

## 2018-08-21 RX ADMIN — LEVALBUTEROL HYDROCHLORIDE 1.25 MG: 1.25 SOLUTION, CONCENTRATE RESPIRATORY (INHALATION) at 19:12

## 2018-08-21 RX ADMIN — METOCLOPRAMIDE 10 MG: 10 TABLET ORAL at 07:16

## 2018-08-21 RX ADMIN — INSULIN LISPRO 4 UNITS: 100 INJECTION, SOLUTION INTRAVENOUS; SUBCUTANEOUS at 11:27

## 2018-08-21 RX ADMIN — LEVALBUTEROL HYDROCHLORIDE 1.25 MG: 1.25 SOLUTION, CONCENTRATE RESPIRATORY (INHALATION) at 13:00

## 2018-08-21 RX ADMIN — METOCLOPRAMIDE 10 MG: 10 TABLET ORAL at 11:00

## 2018-08-21 RX ADMIN — INSULIN LISPRO 4 UNITS: 100 INJECTION, SOLUTION INTRAVENOUS; SUBCUTANEOUS at 21:55

## 2018-08-21 RX ADMIN — TRIAMTERENE AND HYDROCHLOROTHIAZIDE 1 TABLET: 37.5; 25 TABLET ORAL at 08:39

## 2018-08-21 RX ADMIN — METOCLOPRAMIDE 10 MG: 10 TABLET ORAL at 16:17

## 2018-08-21 RX ADMIN — INSULIN LISPRO 5 UNITS: 100 INJECTION, SOLUTION INTRAVENOUS; SUBCUTANEOUS at 11:26

## 2018-08-21 RX ADMIN — OXYCODONE HYDROCHLORIDE 30 MG: 10 TABLET ORAL at 07:26

## 2018-08-21 RX ADMIN — HYDRALAZINE HYDROCHLORIDE 10 MG: 20 INJECTION INTRAMUSCULAR; INTRAVENOUS at 19:26

## 2018-08-21 RX ADMIN — OXYCODONE HYDROCHLORIDE 30 MG: 10 TABLET ORAL at 13:37

## 2018-08-21 RX ADMIN — INSULIN LISPRO 5 UNITS: 100 INJECTION, SOLUTION INTRAVENOUS; SUBCUTANEOUS at 07:14

## 2018-08-21 RX ADMIN — LISINOPRIL 10 MG: 10 TABLET ORAL at 11:00

## 2018-08-21 RX ADMIN — INSULIN LISPRO 1 UNITS: 100 INJECTION, SOLUTION INTRAVENOUS; SUBCUTANEOUS at 07:14

## 2018-08-21 RX ADMIN — HEPARIN SODIUM 5000 UNITS: 5000 INJECTION, SOLUTION INTRAVENOUS; SUBCUTANEOUS at 13:40

## 2018-08-21 RX ADMIN — HEPARIN SODIUM 5000 UNITS: 5000 INJECTION, SOLUTION INTRAVENOUS; SUBCUTANEOUS at 21:54

## 2018-08-21 RX ADMIN — DULOXETINE HYDROCHLORIDE 60 MG: 60 CAPSULE, DELAYED RELEASE ORAL at 08:39

## 2018-08-21 RX ADMIN — OXYCODONE HYDROCHLORIDE 30 MG: 10 TABLET ORAL at 22:10

## 2018-08-21 RX ADMIN — PREDNISONE 40 MG: 20 TABLET ORAL at 11:23

## 2018-08-21 RX ADMIN — SODIUM CHLORIDE 75 ML/HR: 0.9 INJECTION, SOLUTION INTRAVENOUS at 07:16

## 2018-08-21 RX ADMIN — HYDRALAZINE HYDROCHLORIDE 10 MG: 20 INJECTION INTRAMUSCULAR; INTRAVENOUS at 05:03

## 2018-08-21 RX ADMIN — ISODIUM CHLORIDE 3 ML: 0.03 SOLUTION RESPIRATORY (INHALATION) at 19:12

## 2018-08-21 RX ADMIN — DOCUSATE SODIUM 100 MG: 100 CAPSULE, LIQUID FILLED ORAL at 08:39

## 2018-08-21 RX ADMIN — GUAIFENESIN 600 MG: 600 TABLET, EXTENDED RELEASE ORAL at 21:54

## 2018-08-21 RX ADMIN — HYDROMORPHONE HYDROCHLORIDE 0.5 MG: 1 INJECTION, SOLUTION INTRAMUSCULAR; INTRAVENOUS; SUBCUTANEOUS at 16:15

## 2018-08-21 RX ADMIN — HEPARIN SODIUM 5000 UNITS: 5000 INJECTION, SOLUTION INTRAVENOUS; SUBCUTANEOUS at 05:03

## 2018-08-21 RX ADMIN — ISODIUM CHLORIDE 3 ML: 0.03 SOLUTION RESPIRATORY (INHALATION) at 13:00

## 2018-08-21 RX ADMIN — GUAIFENESIN 600 MG: 600 TABLET, EXTENDED RELEASE ORAL at 11:00

## 2018-08-21 RX ADMIN — CEFEPIME HYDROCHLORIDE 2000 MG: 2 INJECTION, POWDER, FOR SOLUTION INTRAVENOUS at 22:38

## 2018-08-21 RX ADMIN — GABAPENTIN 300 MG: 300 CAPSULE ORAL at 21:55

## 2018-08-21 RX ADMIN — DOCUSATE SODIUM 100 MG: 100 CAPSULE, LIQUID FILLED ORAL at 17:40

## 2018-08-21 NOTE — ASSESSMENT & PLAN NOTE
· Improving  · Suspect 2/2 dehydration in the setting of recent GI losses and decreased PO intake  · Status post IVF  · If worsening significant, will consult Nephrology   · Monitor

## 2018-08-21 NOTE — ASSESSMENT & PLAN NOTE
· POA as evidenced by tachycardia and leukocytosis   · Presently maintaining good oxygen saturations on room air  · Likely due to a left lower lobe PNA seen on CXR, and/or acute tracheobronchitis, with wheezing  · Pt was seen 8/15 for vomiting and diarrhea as well  · Cannot r/o a previous viral gastroenteritis contributing  · UA negative for UTI  · Lactic acid wnl, BCx2 negative so far  · On Cefepime and Vanc  · Reported h/o MRSA; however, nasal swab culture was negative for MRSA, thus will discontinue vancomycin  · F/u Bcx2:  Negative so far  · Negative strep and legionella urine antigen, follow-up sputum cx  · Respiratory protocol   · Procalcitonin was actually equivocal   However, with symptoms of an infection and evidence of pneumonia and on the x-ray, will continue with IV antibiotic with cefepime  Will check another procalcitonin  · Today, patient still has some cough and she has difficulty expectorating her phlegm and at the same time was noted to have significant wheezing  Thus, nebulizations were ordered, as well as Mucinex and prednisone

## 2018-08-21 NOTE — ASSESSMENT & PLAN NOTE
· Controled  · Status post holding off lisinopril and diuretic medications secondary to previous acute kidney injury  These medications were  already started back

## 2018-08-21 NOTE — ASSESSMENT & PLAN NOTE
· Receives wound care at her home; continue local wound care    · Per daughter, LE's are better than baseline appearance

## 2018-08-21 NOTE — ASSESSMENT & PLAN NOTE
· Resolved  · Baseline Cr approx 1 1, Cr on admission was 1 67  · Suspect pre-renal due to recent volume depletion  · Status post IV fluids  · Monitor  · Avoid nephrotoxins  · Avoid hypotension

## 2018-08-21 NOTE — PROGRESS NOTES
Progress Note - Baldev Martinez 1955, 65 y o  female MRN: [id number]    Unit/Bed#:  Encounter: [id number]    Primary Care Provider: Marbella Watts   Date and time admitted to hospital: 8/18/2018  9:59 PM        * Sepsis [facility])   Assessment & Plan    · POA as evidenced by tachycardia and leukocytosis   · Presently maintaining good oxygen saturations on room air  · Likely due to a left lower lobe PNA seen on CXR, and/or acute tracheobronchitis, with wheezing  · Pt was seen 8/15 for vomiting and diarrhea as well  · Cannot r/o a previous viral gastroenteritis contributing  · UA negative for UTI  · Lactic acid wnl, BCx2 negative so far  · On Cefepime and Vanc  · Reported h/o MRSA; however, nasal swab culture was negative for MRSA, thus will discontinue vancomycin  · F/u Bcx2:  Negative so far  · Negative strep and legionella urine antigen, follow-up sputum cx  · Respiratory protocol   · Procalcitonin was actually equivocal   However, with symptoms of an infection and evidence of pneumonia and on the x-ray, will continue with IV antibiotic with cefepime  Will check another procalcitonin  · Today, patient still has some cough and she has difficulty expectorating her phlegm and at the same time was noted to have significant wheezing  Thus, nebulizations were ordered, as well as Mucinex and prednisone  Acute kidney injury [facility])   Assessment & Plan    · Resolved  · Baseline Cr approx 1 1, Cr on admission was 1 67  · Suspect pre-renal due to recent volume depletion  · Status post IV fluids  · Monitor  · Avoid nephrotoxins  · Avoid hypotension  Altered mental status   Assessment & Plan    · Resolved      · Likely a toxic metabolic encephalopathy 2/2 infection in the setting of electrolyte disturbance  · CXR with left lower lobe PNA  · On admission, Sodium 126, hyperkalemic at 5 7; sodium now is 133 and potassium is normal   · CT head negative, trop negative  · Ammonia, VBG, TSH wnl · Monitor for improvement on abx  · Status post Q4 neuro checks   · Status post seizure precaution         Microcytic anemia   Assessment & Plan    · Hgb stable at this point  · No active bleeding  · Monitor  Type 2 diabetes mellitus with hyperglycemia, with long-term current use of insulin (HCC)   Assessment & Plan    · Continue basal bolus insulin treatment  · Adjust treatment accordingly  · Monitor  Hyponatremia   Assessment & Plan    · Improving  · Suspect 2/2 dehydration in the setting of recent GI losses and decreased PO intake  · Status post IVF  · If worsening significant, will consult Nephrology   · Monitor  Chronic venous stasis dermatitis of both lower extremities   Assessment & Plan    · Receives wound care at her home; continue local wound care  · Per daughter, LE's are better than baseline appearance         Hyperlipidemia   Assessment & Plan    · Continue statin        Essential hypertension   Assessment & Plan    · Controled  · Status post holding off lisinopril and diuretic medications secondary to previous acute kidney injury  These medications were  already started back  VTE Pharmacologic Prophylaxis:   Pharmacologic: Heparin  Mechanical VTE Prophylaxis in Place: Yes    Patient Centered Rounds: I have performed bedside rounds with nursing staff today  Discussions with Specialists or Other Care Team Provider:  Case management  Education and Discussions with Family / Patient:  Patient  I spoke to the patient's daughter, Katina Darnell, at length and discussed with her our findings and plans  I answered questions and concerns    Time Spent for Care: Approximately 35 minutes  More than 50% of total time spent on counseling and coordination of care as described above      Current Length of Stay: 2 day(s)    Current Patient Status: Inpatient   Certification Statement: The patient will continue to require additional inpatient hospital stay due to Above findings and plans  Discharge Plan:  None yet  Code Status: Level 1 - Full Code      Subjective:   Patient tells me that she is still not doing well at this point  She claims she still having cough, however, it is hard to expect to rate her phlegm  No shortness of breath at rest, but admits to some shortness of breath on exertion  Patient admits to generalized weakness  Patient denies any pains  No other complaints  Objective:     Vitals:   Temp (24hrs), Av 5 °F (36 9 °C), Min:98 1 °F (36 7 °C), Max:98 9 °F (37 2 °C)    HR:  [67-94] 76  Resp:  [16-22] 22  BP: (144-207)/(64-87) 149/64  SpO2:  [94 %-98 %] 97 %  Body mass index is 40 3 kg/m²  Input and Output Summary (last 24 hours): Intake/Output Summary (Last 24 hours) at 18 1315  Last data filed at 18 1216   Gross per 24 hour   Intake             1500 ml   Output             4000 ml   Net            -2500 ml       Physical Exam:     Physical Exam   Constitutional: No distress  HENT:   Head: Normocephalic and atraumatic  Eyes: Right eye exhibits no discharge  Left eye exhibits no discharge  No scleral icterus  Neck: No JVD present  No tracheal deviation present  Cardiovascular: Normal rate, regular rhythm and normal heart sounds  Exam reveals no gallop and no friction rub  No murmur heard  Pulmonary/Chest: Effort normal  No stridor  No respiratory distress  She has wheezes  She has no rales  Positive for bilateral wheezing  Abdominal: Soft  She exhibits no distension  There is no tenderness  There is no rebound and no guarding  Musculoskeletal: She exhibits no edema, tenderness or deformity  Neurological: She is alert  No cranial nerve deficit  Skin: Skin is warm  No rash noted  She is not diaphoretic  No erythema  No pallor  Psychiatric: She has a normal mood and affect  Her behavior is normal  Thought content normal    Vitals reviewed             Additional Data:     Labs:      Results from last 7 days  Lab Units 08/21/18  0348   WBC Thousand/uL 5 34   HEMOGLOBIN g/dL 8 9*   HEMATOCRIT % 29 6*   PLATELETS Thousands/uL 154   NEUTROS PCT % 75   LYMPHS PCT % 15   MONOS PCT % 8   EOS PCT % 2       Results from last 7 days  Lab Units 08/21/18  0348  08/19/18  0439   SODIUM mmol/L 133*  < > 132*   POTASSIUM mmol/L 4 6  < > 4 5   CHLORIDE mmol/L 102  < > 99*   CO2 mmol/L 27  < > 27   BUN mg/dL 18  < > 30*   CREATININE mg/dL 0 88  < > 1 42*   CALCIUM mg/dL 8 4  < > 7 9*   TOTAL PROTEIN g/dL  --   --  6 0*   BILIRUBIN TOTAL mg/dL  --   --  0 40   ALK PHOS U/L  --   --  117*   ALT U/L  --   --  32   AST U/L  --   --  32   GLUCOSE RANDOM mg/dL 158*  < > 126   < > = values in this interval not displayed  Results from last 7 days  Lab Units 08/18/18  2242   INR  1 07       * I Have Reviewed All Lab Data Listed Above  * Additional Pertinent Lab Tests Reviewed: [address] Admission Reviewed    Imaging:    Imaging Reports Reviewed Today Include:  Diagnostic imaging studies that were done on this admission  Imaging Personally Reviewed by Myself Includes:  None  Recent Cultures (last 7 days):       Results from last 7 days  Lab Units 08/20/18  1144 08/18/18  2242 08/18/18 2217   BLOOD CULTURE   --  No Growth at 48 hrs  No Growth at 48 hrs     LEGIONELLA URINARY ANTIGEN  Negative  --   --        Last 24 Hours Medication List:     Current Facility-Administered Medications:  acetaminophen 650 mg Oral Q6H PRN Tess Gay PA-C    benzonatate 100 mg Oral TID PRN Osmel Michael MD    cefepime 2,000 mg Intravenous Q24H Tess Gay PA-C Last Rate: 2,000 mg (08/20/18 2244)   docusate sodium 100 mg Oral BID Tess Gay PA-C    DULoxetine 60 mg Oral Daily Tess Gay PA-C    gabapentin 300 mg Oral HS Lucy He PA-C    guaiFENesin 600 mg Oral Q12H [address] Osmel Michael MD    heparin (porcine) 5,000 Units Subcutaneous Q8H [address] Lucy He, PAIGNACIO    insulin glargine 10 Units Subcutaneous HS Lucy He PA-C    insulin lispro 1-5 Units Subcutaneous HS Lucy He PA-C    insulin lispro 1-6 Units Subcutaneous TID With Meals Buck Conte PA-C    insulin lispro 5 Units Subcutaneous TID With Meals Buck Conte PA-C    levalbuterol 1 25 mg Nebulization Q6H PRN Osmel Michael MD    lisinopril 10 mg Oral Daily Sravani Tejada MD    metoclopramide 10 mg Oral TID AC Lucy He PA-C    ondansetron 4 mg Intravenous Q6H PRN Buck Conte PA-C    oxyCODONE 30 mg Oral Q4H PRN Buck Conte PA-C    pravastatin 40 mg Oral Daily With Andes StendalTIFFANIE    predniSONE 40 mg Oral Daily Osmel Michael MD    sodium chloride 3 mL Nebulization Q6H PRN Osmel Michael MD    triamterene-hydrochlorothiazide 1 tablet Oral Daily Buck Conte PA-C      Facility-Administered Medications Ordered in Other Encounters:  ferrous sulfate 325 mg Oral Daily With Radha Chapman MD        Today, Patient Was Seen By: Jed Zurita MD    ** Please Note: Dragon 360 Dictation voice to text software may have been used in the creation of this document   **

## 2018-08-21 NOTE — ASSESSMENT & PLAN NOTE
· Resolved      · Likely a toxic metabolic encephalopathy 2/2 infection in the setting of electrolyte disturbance  · CXR with left lower lobe PNA  · On admission, Sodium 126, hyperkalemic at 5 7; sodium now is 133 and potassium is normal   · CT head negative, trop negative  · Ammonia, VBG, TSH wnl   · Monitor for improvement on abx  · Status post Q4 neuro checks   · Status post seizure precaution

## 2018-08-22 LAB
ANION GAP SERPL CALCULATED.3IONS-SCNC: 4 MMOL/L (ref 4–13)
BUN SERPL-MCNC: 20 MG/DL (ref 5–25)
CALCIUM SERPL-MCNC: 8.8 MG/DL (ref 8.3–10.1)
CHLORIDE SERPL-SCNC: 99 MMOL/L (ref 100–108)
CO2 SERPL-SCNC: 28 MMOL/L (ref 21–32)
CREAT SERPL-MCNC: 1.09 MG/DL (ref 0.6–1.3)
GFR SERPL CREATININE-BSD FRML MDRD: 55 ML/MIN/1.73SQ M
GLUCOSE SERPL-MCNC: 292 MG/DL (ref 65–140)
GLUCOSE SERPL-MCNC: 296 MG/DL (ref 65–140)
GLUCOSE SERPL-MCNC: 326 MG/DL (ref 65–140)
GLUCOSE SERPL-MCNC: 335 MG/DL (ref 65–140)
GLUCOSE SERPL-MCNC: 366 MG/DL (ref 65–140)
GLUCOSE SERPL-MCNC: 455 MG/DL (ref 65–140)
POTASSIUM SERPL-SCNC: 4.3 MMOL/L (ref 3.5–5.3)
SODIUM SERPL-SCNC: 131 MMOL/L (ref 136–145)

## 2018-08-22 PROCEDURE — G8978 MOBILITY CURRENT STATUS: HCPCS

## 2018-08-22 PROCEDURE — 97163 PT EVAL HIGH COMPLEX 45 MIN: CPT

## 2018-08-22 PROCEDURE — 97167 OT EVAL HIGH COMPLEX 60 MIN: CPT

## 2018-08-22 PROCEDURE — 82948 REAGENT STRIP/BLOOD GLUCOSE: CPT

## 2018-08-22 PROCEDURE — G8979 MOBILITY GOAL STATUS: HCPCS

## 2018-08-22 PROCEDURE — 99233 SBSQ HOSP IP/OBS HIGH 50: CPT | Performed by: INTERNAL MEDICINE

## 2018-08-22 PROCEDURE — 80048 BASIC METABOLIC PNL TOTAL CA: CPT | Performed by: INTERNAL MEDICINE

## 2018-08-22 PROCEDURE — 94760 N-INVAS EAR/PLS OXIMETRY 1: CPT

## 2018-08-22 PROCEDURE — 94640 AIRWAY INHALATION TREATMENT: CPT

## 2018-08-22 PROCEDURE — G8988 SELF CARE GOAL STATUS: HCPCS

## 2018-08-22 PROCEDURE — 97530 THERAPEUTIC ACTIVITIES: CPT

## 2018-08-22 PROCEDURE — G8987 SELF CARE CURRENT STATUS: HCPCS

## 2018-08-22 RX ORDER — INSULIN GLARGINE 100 [IU]/ML
20 INJECTION, SOLUTION SUBCUTANEOUS
Status: DISCONTINUED | OUTPATIENT
Start: 2018-08-22 | End: 2018-08-22

## 2018-08-22 RX ORDER — AMLODIPINE BESYLATE 5 MG/1
5 TABLET ORAL DAILY
Status: DISCONTINUED | OUTPATIENT
Start: 2018-08-22 | End: 2018-08-23

## 2018-08-22 RX ORDER — MORPHINE SULFATE 30 MG/1
90 TABLET, FILM COATED, EXTENDED RELEASE ORAL EVERY 12 HOURS SCHEDULED
Status: DISCONTINUED | OUTPATIENT
Start: 2018-08-22 | End: 2018-08-24 | Stop reason: HOSPADM

## 2018-08-22 RX ORDER — INSULIN GLARGINE 100 [IU]/ML
25 INJECTION, SOLUTION SUBCUTANEOUS
Status: DISCONTINUED | OUTPATIENT
Start: 2018-08-22 | End: 2018-08-23

## 2018-08-22 RX ADMIN — INSULIN LISPRO 5 UNITS: 100 INJECTION, SOLUTION INTRAVENOUS; SUBCUTANEOUS at 12:05

## 2018-08-22 RX ADMIN — HEPARIN SODIUM 5000 UNITS: 5000 INJECTION, SOLUTION INTRAVENOUS; SUBCUTANEOUS at 05:59

## 2018-08-22 RX ADMIN — ISODIUM CHLORIDE 3 ML: 0.03 SOLUTION RESPIRATORY (INHALATION) at 07:09

## 2018-08-22 RX ADMIN — HEPARIN SODIUM 5000 UNITS: 5000 INJECTION, SOLUTION INTRAVENOUS; SUBCUTANEOUS at 21:14

## 2018-08-22 RX ADMIN — OXYCODONE HYDROCHLORIDE 30 MG: 10 TABLET ORAL at 13:47

## 2018-08-22 RX ADMIN — GABAPENTIN 300 MG: 300 CAPSULE ORAL at 21:13

## 2018-08-22 RX ADMIN — ISODIUM CHLORIDE 3 ML: 0.03 SOLUTION RESPIRATORY (INHALATION) at 19:32

## 2018-08-22 RX ADMIN — METOCLOPRAMIDE 10 MG: 10 TABLET ORAL at 12:03

## 2018-08-22 RX ADMIN — INSULIN GLARGINE 25 UNITS: 100 INJECTION, SOLUTION SUBCUTANEOUS at 21:14

## 2018-08-22 RX ADMIN — GUAIFENESIN 600 MG: 600 TABLET, EXTENDED RELEASE ORAL at 21:13

## 2018-08-22 RX ADMIN — METOCLOPRAMIDE 10 MG: 10 TABLET ORAL at 17:25

## 2018-08-22 RX ADMIN — AMLODIPINE BESYLATE 5 MG: 5 TABLET ORAL at 08:26

## 2018-08-22 RX ADMIN — GUAIFENESIN 600 MG: 600 TABLET, EXTENDED RELEASE ORAL at 08:25

## 2018-08-22 RX ADMIN — LEVALBUTEROL HYDROCHLORIDE 1.25 MG: 1.25 SOLUTION, CONCENTRATE RESPIRATORY (INHALATION) at 07:10

## 2018-08-22 RX ADMIN — TRIAMTERENE AND HYDROCHLOROTHIAZIDE 1 TABLET: 37.5; 25 TABLET ORAL at 08:26

## 2018-08-22 RX ADMIN — DOCUSATE SODIUM 100 MG: 100 CAPSULE, LIQUID FILLED ORAL at 17:25

## 2018-08-22 RX ADMIN — LEVALBUTEROL HYDROCHLORIDE 1.25 MG: 1.25 SOLUTION, CONCENTRATE RESPIRATORY (INHALATION) at 19:32

## 2018-08-22 RX ADMIN — METOCLOPRAMIDE 10 MG: 10 TABLET ORAL at 06:00

## 2018-08-22 RX ADMIN — OXYCODONE HYDROCHLORIDE 30 MG: 10 TABLET ORAL at 05:58

## 2018-08-22 RX ADMIN — LISINOPRIL 10 MG: 10 TABLET ORAL at 08:25

## 2018-08-22 RX ADMIN — INSULIN LISPRO 3 UNITS: 100 INJECTION, SOLUTION INTRAVENOUS; SUBCUTANEOUS at 21:16

## 2018-08-22 RX ADMIN — PREDNISONE 40 MG: 20 TABLET ORAL at 08:25

## 2018-08-22 RX ADMIN — PRAVASTATIN SODIUM 40 MG: 40 TABLET ORAL at 17:25

## 2018-08-22 RX ADMIN — INSULIN LISPRO 6 UNITS: 100 INJECTION, SOLUTION INTRAVENOUS; SUBCUTANEOUS at 17:49

## 2018-08-22 RX ADMIN — HEPARIN SODIUM 5000 UNITS: 5000 INJECTION, SOLUTION INTRAVENOUS; SUBCUTANEOUS at 13:45

## 2018-08-22 RX ADMIN — DULOXETINE HYDROCHLORIDE 60 MG: 60 CAPSULE, DELAYED RELEASE ORAL at 08:25

## 2018-08-22 RX ADMIN — MORPHINE SULFATE 90 MG: 30 TABLET, FILM COATED, EXTENDED RELEASE ORAL at 21:13

## 2018-08-22 RX ADMIN — INSULIN LISPRO 4 UNITS: 100 INJECTION, SOLUTION INTRAVENOUS; SUBCUTANEOUS at 08:24

## 2018-08-22 RX ADMIN — DOCUSATE SODIUM 100 MG: 100 CAPSULE, LIQUID FILLED ORAL at 08:26

## 2018-08-22 NOTE — ASSESSMENT & PLAN NOTE
· Clinically resolved  · POA as evidenced by tachycardia and leukocytosis   · Presently maintaining good oxygen saturations on room air  · Likely due to a left lower lobe PNA seen on CXR, and/or acute tracheobronchitis, with wheezing  · Pt was seen 8/15 for vomiting and diarrhea as well  · Cannot r/o a previous viral gastroenteritis contributing  · UA negative for UTI  · Lactic acid wnl, BCx2 negative so far  · Status post Cefepime and Vanc  · Reported h/o MRSA; however, nasal swab culture was negative for MRSA, thus vancomycin discontinued  · F/u Bcx2:  Negative so far  · Negative strep and legionella urine antigen, follow-up sputum cx  · Respiratory protocol   · Procalcitonin was actually equivocal   However, with symptoms of an infection and evidence of pneumonia on the x-ray, we continued with IV antibiotic with cefepime  Procalcitonin today is normal:  Thus we will now switch IV cefepime to oral Omnicef, to finish a 7 day course of antibiotic treatment  · Today, patient's cough has been improving as well as patient denies any shortness of breath  · As per discussion with the patient's daughter, they are concerned that patient is vaping may have contributed to patient's pneumonia/bronchitis  Thus I counseled the patient not to do vaping anymore, as I explained to her, that this may have caused her pneumonia/bronchitis/wheezing  Patient told me that she will stop vaping

## 2018-08-22 NOTE — PLAN OF CARE
Problem: Potential for Falls  Goal: Patient will remain free of falls  INTERVENTIONS:  - Assess patient frequently for physical needs  -  Identify cognitive and physical deficits and behaviors that affect risk of falls    -  [place] fall precautions as indicated by assessment   - Educate patient/family on patient safety including physical limitations  - Instruct patient to call for assistance with activity based on assessment  - Modify environment to reduce risk of injury  - Consider OT/PT consult to assist with strengthening/mobility   Outcome: Progressing      Problem: Prexisting or High Potential for Compromised Skin Integrity  Goal: Skin integrity is maintained or improved  INTERVENTIONS:  - Identify patients at risk for skin breakdown  - Assess and monitor skin integrity  - Assess and monitor nutrition and hydration status  - Monitor labs (i e  albumin)  - Assess for incontinence   - Turn and reposition patient  - Assist with mobility/ambulation  - Relieve pressure over bony prominences  - Avoid friction and shearing  - Provide appropriate hygiene as needed including keeping skin clean and dry  - Evaluate need for skin moisturizer/barrier cream  - Collaborate with interdisciplinary team (i e  Nutrition, Rehabilitation, etc )   - Patient/family teaching   Outcome: Progressing      Problem: PAIN - ADULT  Goal: Verbalizes/displays adequate comfort level or baseline comfort level  Interventions:  - Encourage patient to monitor pain and request assistance  - Assess pain using appropriate pain scale  - Administer analgesics based on type and severity of pain and evaluate response  - Implement non-pharmacological measures as appropriate and evaluate response  - Consider cultural and social influences on pain and pain management  - Notify physician/advanced practitioner if interventions unsuccessful or patient reports new pain   Outcome: Progressing      Problem: INFECTION - ADULT  Goal: Absence or prevention of progression during hospitalization  INTERVENTIONS:  - Assess and monitor for signs and symptoms of infection  - Monitor lab/diagnostic results  - Monitor all insertion sites, i e  indwelling lines, tubes, and drains  - Monitor endotracheal (as able) and nasal secretions for changes in amount and color  - [place] appropriate cooling/warming therapies per order  - Administer medications as ordered  - Instruct and encourage patient and family to use good hand hygiene technique  - Identify and instruct in appropriate isolation precautions for identified infection/condition   Outcome: Progressing      Problem: SAFETY ADULT  Goal: Maintain or return to baseline ADL function  INTERVENTIONS:  -  Assess patient's ability to carry out ADLs; assess patient's baseline for ADL function and identify physical deficits which impact ability to perform ADLs (bathing, care of mouth/teeth, toileting, grooming, dressing, etc )  - Assess/evaluate cause of self-care deficits   - Assess range of motion  - Assess patient's mobility; develop plan if impaired  - Assess patient's need for assistive devices and provide as appropriate  - Encourage maximum independence but intervene and supervise when necessary  ¯ Involve family in performance of ADLs  ¯ Assess for home care needs following discharge   ¯ Request OT consult to assist with ADL evaluation and planning for discharge  ¯ Provide patient education as appropriate   Outcome: Progressing    Goal: Maintain or return mobility status to optimal level  INTERVENTIONS:  - Assess patient's baseline mobility status (ambulation, transfers, stairs, etc )    - Identify cognitive and physical deficits and behaviors that affect mobility  - Identify mobility aids required to assist with transfers and/or ambulation (gait belt, sit-to-stand, lift, walker, cane, etc )  - [place] fall precautions as indicated by assessment  - Record patient progress and toleration of activity level on Mobility SBAR; progress patient to next Phase/Stage  - Instruct patient to call for assistance with activity based on assessment  - Request Rehabilitation consult to assist with strengthening/weightbearing, etc    Outcome: Progressing      Problem: DISCHARGE PLANNING  Goal: Discharge to home or other facility with appropriate resources  INTERVENTIONS:  - Identify barriers to discharge w/patient and caregiver  - Arrange for needed discharge resources and transportation as appropriate  - Identify discharge learning needs (meds, wound care, etc )  - Arrange for interpretive services to assist at discharge as needed  - Refer to Case Management Department for coordinating discharge planning if the patient needs post-hospital services based on physician/advanced practitioner order or complex needs related to functional status, cognitive ability, or social support system   Outcome: Progressing      Problem: Knowledge Deficit  Goal: Patient/family/caregiver demonstrates understanding of disease process, treatment plan, medications, and discharge instructions  Complete learning assessment and assess knowledge base    Interventions:  - Provide teaching at level of understanding  - Provide teaching via preferred learning methods   Outcome: Progressing      Problem: SKIN/TISSUE INTEGRITY - ADULT  Goal: Skin integrity remains intact  INTERVENTIONS  - Identify patients at risk for skin breakdown  - Assess and monitor skin integrity  - Assess and monitor nutrition and hydration status  - Monitor labs (i e  albumin)  - Assess for incontinence   - Turn and reposition patient  - Assist with mobility/ambulation  - Relieve pressure over bony prominences  - Avoid friction and shearing  - Provide appropriate hygiene as needed including keeping skin clean and dry  - Evaluate need for skin moisturizer/barrier cream  - Collaborate with interdisciplinary team (i e  Nutrition, Rehabilitation, etc )   - Patient/family teaching   Outcome: Progressing    Goal: Incision(s), wounds(s) or drain site(s) healing without S/S of infection  INTERVENTIONS  - Assess and document risk factors for skin impairment   - Assess and document dressing, incision, wound bed, drain sites and surrounding tissue  - Initiate Nutrition services consult and/or wound management as needed   Outcome: Progressing

## 2018-08-22 NOTE — SOCIAL WORK
LOS 3  Pt is not a bundle or a readmission  Pt lives with her , daughter and daughter's girlfriend in a apartment in [place]  Per pt's daughter Chiara Crimes, pt is very non-compliant and forgetful and she doesn't feel it's safe for her to be home and in charge of her own health  She states that the patient and her  do not have enough money to pay for DAVE or 24 hour care  Pt's daughter would like CM to attempt to place pt in a nursing home for at the very least STR  I made her aware of the auth process with insurance and that there are no guarantees  Discuss medical assistance options for SNF and community as well ass qualifiers for such benefits  Daughter is agreeable to referrals being placed to [facility]  [facility], and [facility] in no particular order  Referrals were placed   dept will follow

## 2018-08-22 NOTE — PROGRESS NOTES
Progress Note - Brianna Jolley 1955, 65 y o  female MRN: [id number]    Unit/Bed#: -01 Encounter: [id number]    Primary Care Provider: Yoli Watts   Date and time admitted to hospital: 8/18/2018  9:59 PM        * Sepsis [facility])   Assessment & Plan    · Clinically resolved  · POA as evidenced by tachycardia and leukocytosis   · Presently maintaining good oxygen saturations on room air  · Likely due to a left lower lobe PNA seen on CXR, and/or acute tracheobronchitis, with wheezing  · Pt was seen 8/15 for vomiting and diarrhea as well  · Cannot r/o a previous viral gastroenteritis contributing  · UA negative for UTI  · Lactic acid wnl, BCx2 negative so far  · Status post Cefepime and Vanc  · Reported h/o MRSA; however, nasal swab culture was negative for MRSA, thus vancomycin discontinued  · F/u Bcx2:  Negative so far  · Negative strep and legionella urine antigen, follow-up sputum cx  · Respiratory protocol   · Procalcitonin was actually equivocal   However, with symptoms of an infection and evidence of pneumonia on the x-ray, we continued with IV antibiotic with cefepime  Procalcitonin today is normal:  Thus we will now switch IV cefepime to oral Omnicef, to finish a 7 day course of antibiotic treatment  · Today, patient's cough has been improving as well as patient denies any shortness of breath  · As per discussion with the patient's daughter, they are concerned that patient is vaping may have contributed to patient's pneumonia/bronchitis  Thus I counseled the patient not to do vaping anymore, as I explained to her, that this may have caused her pneumonia/bronchitis/wheezing  Patient told me that she will stop vaping  Acute kidney injury [facility])   Assessment & Plan    · Resolved  · Baseline Cr approx 1 1, Cr on admission was 1 67  · Suspect pre-renal due to recent volume depletion  · Status post IV fluids  · Monitor  · Avoid nephrotoxins  · Avoid hypotension  Altered mental status   Assessment & Plan    · Resolved  · Likely a toxic metabolic encephalopathy 2/2 infection in the setting of electrolyte disturbance  · CXR with left lower lobe PNA  · On admission, Sodium 126, hyperkalemic at 5 7; sodium now is 133 and potassium is normal   · CT head negative, trop negative  · Ammonia, VBG, TSH wnl   · Monitor for improvement on abx  · Status post Q4 neuro checks   · Status post seizure precaution         Microcytic anemia   Assessment & Plan    · Hgb stable at this point  · No active bleeding  · Monitor  Type 2 diabetes mellitus with hyperglycemia, with long-term current use of insulin ([facility] [address] )   Assessment & Plan    · Poorly controlled  · Continue basal bolus insulin treatment  We will increase the dose today  · According to the patient's daughter, at home, patient is very stubborn regarding diet  According to her, patient usually eats and drinks high-calorie/sweet food and drinks  In fact she told me that at times her blood sugars were in the 400s and 500s at home  Patient was counseled about this  · Adjust treatment accordingly  · Monitor  Hyponatremia   Assessment & Plan    · Improving  · Corrected serum sodium today with patient's blood sugar is around 134  · Suspect 2/2 dehydration in the setting of recent GI losses and decreased PO intake, plus due to hyperglycemia  · Status post IVF  · If worsening significant, will consult Nephrology   · Monitor  Chronic venous stasis dermatitis of both lower extremities   Assessment & Plan    · Receives wound care at her home; continue local wound care  · Per daughter, LE's are better than baseline appearance         Hyperlipidemia   Assessment & Plan    · Continue statin        Essential hypertension   Assessment & Plan    · Still poorly controlled  · Status post holding off lisinopril and diuretic medications secondary to previous acute kidney injury    These medications were  already started back  · Amlodipine started today  VTE Pharmacologic Prophylaxis:   Pharmacologic: Heparin  Mechanical VTE Prophylaxis in Place: Yes    Patient Centered Rounds: I have performed bedside rounds with nursing staff today  Discussions with Specialists or Other Care Team Provider:  Case management  Education and Discussions with Family / Patient:  Patient  I spoke at length to the patient's daughter, Cait, and discussed with her our findings and plans  I answered all concerns and questions  From my discussion with her, she told me that patient at home is very stubborn  According to her, her mother would eat and drink even sweet food and drinks even though she knows that she is diabetic  Patient also has a habit of vaping  Thus patient was counseled about these issues  According to the patient's daughter, her mother is usually left alone for some time as all of them go to work  Thus I spoke to the  and she spoke to the daughter about placement versus home with home health services  I also verified from the patient's daughter, that patient goes to a pain specialist Dr  as she has chronic pains due to spine/bone problems  From my discussion with her, she is on narcotic pain medications for some time now (maintenance medications already to control her pain)  Time Spent for Care: 45 minutes  More than 50% of total time spent on counseling and coordination of care as described above  Current Length of Stay: 3 day(s)    Current Patient Status: Inpatient   Certification Statement: The patient will continue to require additional inpatient hospital stay due to Above findings and plans  Discharge Plan:  None yet today  Code Status: Level 1 - Full Code      Subjective:   Patient feels fine and better  Patient denies any significant shortness of breath  Patient still has occasional cough but not as bad as before  Patient denies any pains    Initially this morning, patient was adamant to go home  However, after explaining that she still needs to stay here, as her blood sugars are really high and the same time, were working on a safe discharge for her, as per discussion with patient's daughter, patient is okay to stay  Objective:     Vitals:   Temp (24hrs), Av 1 °F (36 7 °C), Min:98 °F (36 7 °C), Max:98 1 °F (36 7 °C)    HR:  [] 90  Resp:  [20-22] 20  BP: (165-197)/(74-86) 175/82  SpO2:  [94 %-96 %] 95 %  Body mass index is 40 3 kg/m²  Input and Output Summary (last 24 hours): Intake/Output Summary (Last 24 hours) at 18 1624  Last data filed at 18 1459   Gross per 24 hour   Intake              500 ml   Output             3025 ml   Net            -2525 ml       Physical Exam:     Physical Exam   Constitutional: No distress  HENT:   Head: Normocephalic and atraumatic  Eyes: Right eye exhibits no discharge  Left eye exhibits no discharge  No scleral icterus  Neck: No JVD present  No tracheal deviation present  Cardiovascular: Normal rate, regular rhythm and normal heart sounds  Exam reveals no gallop and no friction rub  No murmur heard  Pulmonary/Chest: Effort normal  No stridor  No respiratory distress  She has wheezes  She has no rales  Positive for occasional wheezing bilaterally (improved as compared to my examination yesterday)  Abdominal: Soft  Bowel sounds are normal  She exhibits no distension  There is no tenderness  There is no rebound and no guarding  Musculoskeletal: She exhibits edema  She exhibits no tenderness or deformity  Positive for bilateral lower extremity edema  Neurological: She is alert  No cranial nerve deficit  Skin: Skin is warm  No rash noted  She is not diaphoretic  No erythema  No pallor  Positive for chronic venous stasis skin changes on bilateral lower extremities   Psychiatric: She has a normal mood and affect  Her behavior is normal  Thought content normal    Vitals reviewed  Additional Data:     Labs:      Results from last 7 days  Lab Units 08/21/18  0348   WBC Thousand/uL 5 34   HEMOGLOBIN g/dL 8 9*   HEMATOCRIT % 29 6*   PLATELETS Thousands/uL 154   NEUTROS PCT % 75   LYMPHS PCT % 15   MONOS PCT % 8   EOS PCT % 2       Results from last 7 days  Lab Units 08/22/18  0638  08/19/18  0439   SODIUM mmol/L 131*  < > 132*   POTASSIUM mmol/L 4 3  < > 4 5   CHLORIDE mmol/L 99*  < > 99*   CO2 mmol/L 28  < > 27   BUN mg/dL 20  < > 30*   CREATININE mg/dL 1 09  < > 1 42*   CALCIUM mg/dL 8 8  < > 7 9*   TOTAL PROTEIN g/dL  --   --  6 0*   BILIRUBIN TOTAL mg/dL  --   --  0 40   ALK PHOS U/L  --   --  117*   ALT U/L  --   --  32   AST U/L  --   --  32   GLUCOSE RANDOM mg/dL 296*  < > 126   < > = values in this interval not displayed  Results from last 7 days  Lab Units 08/18/18  2242   INR  1 07       * I Have Reviewed All Lab Data Listed Above  * Additional Pertinent Lab Tests Reviewed: [facility]    Imaging:    Imaging Reports Reviewed Today Include:  Diagnostic imaging studies that were done on this admission  Imaging Personally Reviewed by Myself Includes:  None  Recent Cultures (last 7 days):       Results from last 7 days  Lab Units 08/20/18  1144 08/18/18  2242 08/18/18  2217   BLOOD CULTURE   --  No Growth at 72 hrs  No Growth at 72 hrs     LEGIONELLA URINARY ANTIGEN  Negative  --   --        Last 24 Hours Medication List:     Current Facility-Administered Medications:  acetaminophen 650 mg Oral Q6H PRN Lucy He PA-C    amLODIPine 5 mg Oral Daily Osmel Michael MD    benzonatate 100 mg Oral TID PRN Osmel Michael MD    cefepime 2,000 mg Intravenous Q24H Dena Rand PA-C Last Rate: 2,000 mg (08/21/18 2238)   docusate sodium 100 mg Oral BID Dena Rand PA-C    DULoxetine 60 mg Oral Daily Lucy He PA-C    gabapentin 300 mg Oral HS Lucy He PA-C    guaiFENesin 600 mg Oral Q12H [address] [place] YOLY Michael MD    heparin (porcine) 5,000 Units Subcutaneous Q8H [address] Lucy He, TIFFANIE    hydrALAZINE 10 mg Intravenous Q6H PRN Burak Curiel PA-C    insulin glargine 25 Units Subcutaneous HS Osmel Michael MD    insulin lispro 1-5 Units Subcutaneous HS Lucy He, PA-C    insulin lispro 1-6 Units Subcutaneous TID With Meals Lucy He, TIFFANIE    insulin lispro 10 Units Subcutaneous TID With Meals Osmel Michael MD    levalbuterol 1 25 mg Nebulization Q6H PRN Osmel Michael MD    levalbuterol 1 25 mg Nebulization BID Osmel Michael MD    lisinopril 10 mg Oral Daily Kwasi Head MD    metoclopramide 10 mg Oral TID AC Lucy He, TIFFANIE    ondansetron 4 mg Intravenous Q6H PRN Judit Breath, PA-C    oxyCODONE 30 mg Oral Q4H PRN Judit Breath, PA-C    pravastatin 40 mg Oral Daily [employer], PA-C    predniSONE 40 mg Oral Daily Osmel Michael MD    sodium chloride 3 mL Nebulization Q6H PRN Osmel Michael MD    sodium chloride 3 mL Nebulization BID Osmel Michael MD    triamterene-hydrochlorothiazide 1 tablet Oral Daily Judit Breath, PA-C      Facility-Administered Medications Ordered in Other Encounters:  ferrous sulfate 325 mg Oral Daily With Kateryna Casas MD        Today, Patient Was Seen By: Alyson Khan MD    ** Please Note: Dragon 360 Dictation voice to text software may have been used in the creation of this document   **

## 2018-08-22 NOTE — PLAN OF CARE
Problem: OCCUPATIONAL THERAPY ADULT  Goal: Performs self-care activities at highest level of function for planned discharge setting  See evaluation for individualized goals  Treatment Interventions: ADL retraining, Functional transfer training, Endurance training, Patient/family training, Compensatory technique education, Activityengagement  Equipment Recommended: Other (comment) (has tub seat)       See flowsheet documentation for full assessment, interventions and recommendations  Limitation: Decreased ADL status, Decreased endurance, Decreased self-care trans, Decreased high-level ADLs     Assessment: Pt is a 62yo female [facility]  Pt presents w/ sepsis and signficant PMH impacting her occupational performance including DM, L5-S1 degerative disease, diabetic neuropathy, R rib fx, L hip fx, HTN  Pt reports living in [facility] w/ her  and daughter PTA  Pt reports first floor set- up and using rollator for functional mobility  Pt reports sleeping in recliner chair at baseline  Upon evaluation, pt engaged in functional mobility using RW w/ S and toileting w/ mod I after set- up S to complete functional transfer on / off  Pt completed grooming standing at sink to wash / dry hands w/ S  Pt presents w/ decreased LE strength, decresead standing balance, decreased endurance, decreased activity tolerance impacting her I w/ dressing, bathing, functional mobility, functional transfers, clothing mgmt, and activity engagement  Pt would benefit from OT while in acute care to address deficits  From an OT perspective, pt can return home to [facility] w/ family assist, VNA, and home OT when medically stable for discharge from acute care   Will continue to follow pt while in acute care     OT Discharge Recommendation: Home OT  OT - OK to Discharge:  (when medically stable)

## 2018-08-22 NOTE — SOCIAL WORK
Received message from pt's daughter stating that she s/w her father and he adamantly refusing to place pt in a nursing home even for STR  She is requesting VNA at this time  She would like [facility] as first choice and is open to other VNA's if [facility] is not available  Referrals were placed  CM dept will follow  I did discuss the process of direct admitting pt to a SNF if they do decide to go that course in the future

## 2018-08-22 NOTE — PLAN OF CARE
Problem: Potential for Falls  Goal: Patient will remain free of falls  INTERVENTIONS:  - Assess patient frequently for physical needs  -  Identify cognitive and physical deficits and behaviors that affect risk of falls    -  [place] fall precautions as indicated by assessment   - Educate patient/family on patient safety including physical limitations  - Instruct patient to call for assistance with activity based on assessment  - Modify environment to reduce risk of injury  - Consider OT/PT consult to assist with strengthening/mobility   Outcome: Progressing      Problem: Prexisting or High Potential for Compromised Skin Integrity  Goal: Skin integrity is maintained or improved  INTERVENTIONS:  - Identify patients at risk for skin breakdown  - Assess and monitor skin integrity  - Assess and monitor nutrition and hydration status  - Monitor labs (i e  albumin)  - Assess for incontinence   - Turn and reposition patient  - Assist with mobility/ambulation  - Relieve pressure over bony prominences  - Avoid friction and shearing  - Provide appropriate hygiene as needed including keeping skin clean and dry  - Evaluate need for skin moisturizer/barrier cream  - Collaborate with interdisciplinary team (i e  Nutrition, Rehabilitation, etc )   - Patient/family teaching   Outcome: Progressing      Problem: PAIN - ADULT  Goal: Verbalizes/displays adequate comfort level or baseline comfort level  Interventions:  - Encourage patient to monitor pain and request assistance  - Assess pain using appropriate pain scale  - Administer analgesics based on type and severity of pain and evaluate response  - Implement non-pharmacological measures as appropriate and evaluate response  - Consider cultural and social influences on pain and pain management  - Notify physician/advanced practitioner if interventions unsuccessful or patient reports new pain   Outcome: Progressing      Problem: INFECTION - ADULT  Goal: Absence or prevention of progression during hospitalization  INTERVENTIONS:  - Assess and monitor for signs and symptoms of infection  - Monitor lab/diagnostic results  - Monitor all insertion sites, i e  indwelling lines, tubes, and drains  - Monitor endotracheal (as able) and nasal secretions for changes in amount and color  - Jewett appropriate cooling/warming therapies per order  - Administer medications as ordered  - Instruct and encourage patient and family to use good hand hygiene technique  - Identify and instruct in appropriate isolation precautions for identified infection/condition   Outcome: Progressing      Problem: SAFETY ADULT  Goal: Maintain or return to baseline ADL function  INTERVENTIONS:  -  Assess patient's ability to carry out ADLs; assess patient's baseline for ADL function and identify physical deficits which impact ability to perform ADLs (bathing, care of mouth/teeth, toileting, grooming, dressing, etc )  - Assess/evaluate cause of self-care deficits   - Assess range of motion  - Assess patient's mobility; develop plan if impaired  - Assess patient's need for assistive devices and provide as appropriate  - Encourage maximum independence but intervene and supervise when necessary  ¯ Involve family in performance of ADLs  ¯ Assess for home care needs following discharge   ¯ Request OT consult to assist with ADL evaluation and planning for discharge  ¯ Provide patient education as appropriate   Outcome: Progressing    Goal: Maintain or return mobility status to optimal level  INTERVENTIONS:  - Assess patient's baseline mobility status (ambulation, transfers, stairs, etc )    - Identify cognitive and physical deficits and behaviors that affect mobility  - Identify mobility aids required to assist with transfers and/or ambulation (gait belt, sit-to-stand, lift, walker, cane, etc )  - Jewett fall precautions as indicated by assessment  - Record patient progress and toleration of activity level on Mobility SBAR; progress patient to next Phase/Stage  - Instruct patient to call for assistance with activity based on assessment  - Request Rehabilitation consult to assist with strengthening/weightbearing, etc    Outcome: Progressing      Problem: DISCHARGE PLANNING  Goal: Discharge to home or other facility with appropriate resources  INTERVENTIONS:  - Identify barriers to discharge w/patient and caregiver  - Arrange for needed discharge resources and transportation as appropriate  - Identify discharge learning needs (meds, wound care, etc )  - Arrange for interpretive services to assist at discharge as needed  - Refer to Case Management Department for coordinating discharge planning if the patient needs post-hospital services based on physician/advanced practitioner order or complex needs related to functional status, cognitive ability, or social support system   Outcome: Progressing      Problem: Knowledge Deficit  Goal: Patient/family/caregiver demonstrates understanding of disease process, treatment plan, medications, and discharge instructions  Complete learning assessment and assess knowledge base  Interventions:  - Provide teaching at level of understanding  - Provide teaching via preferred learning methods   Outcome: Progressing      Problem: NEUROSENSORY - ADULT  Goal: Achieves stable or improved neurological status  INTERVENTIONS  - Monitor and report changes in neurological status  - Initiate measures to prevent increased intracranial pressure  - Maintain blood pressure and fluid volume within ordered parameters to optimize cerebral perfusion  - Monitor temperature, glucose, and sodium or any other associated labs   Initiate appropriate interventions as ordered  - Monitor for seizure activity   - Administer anti-seizure medications as ordered   Outcome: Completed Date Met: 08/22/18      Problem: RESPIRATORY - ADULT  Goal: Achieves optimal ventilation and oxygenation  INTERVENTIONS:  - Assess for changes in respiratory status  - Assess for changes in mentation and behavior  - Position to facilitate oxygenation and minimize respiratory effort  - Oxygen administration by appropriate delivery method based on oxygen saturation (per order) or ABGs  - Initiate smoking cessation education as indicated  - Encourage broncho-pulmonary hygiene including cough, deep breathe, Incentive Spirometry  - Assess the need for suctioning and aspirate as needed  - Assess and instruct to report SOB or any respiratory difficulty  - Respiratory Therapy support as indicated   Outcome: Completed Date Met: 08/22/18      Problem: GENITOURINARY - ADULT  Goal: Urinary catheter remains patent  INTERVENTIONS:  - Assess patency of urinary catheter  - If patient has a chronic luther, consider changing catheter if non-functioning  - Follow guidelines for intermittent irrigation of non-functioning urinary catheter   Outcome: Completed Date Met: 08/22/18      Problem: SKIN/TISSUE INTEGRITY - ADULT  Goal: Skin integrity remains intact  INTERVENTIONS  - Identify patients at risk for skin breakdown  - Assess and monitor skin integrity  - Assess and monitor nutrition and hydration status  - Monitor labs (i e  albumin)  - Assess for incontinence   - Turn and reposition patient  - Assist with mobility/ambulation  - Relieve pressure over bony prominences  - Avoid friction and shearing  - Provide appropriate hygiene as needed including keeping skin clean and dry  - Evaluate need for skin moisturizer/barrier cream  - Collaborate with interdisciplinary team (i e  Nutrition, Rehabilitation, etc )   - Patient/family teaching   Outcome: Progressing    Goal: Incision(s), wounds(s) or drain site(s) healing without S/S of infection  INTERVENTIONS  - Assess and document risk factors for skin impairment   - Assess and document dressing, incision, wound bed, drain sites and surrounding tissue  - Initiate Nutrition services consult and/or wound management as needed   Outcome: Progressing    Goal: Oral mucous membranes remain intact  INTERVENTIONS  - Assess oral mucosa and hygiene practices  - Implement preventative oral hygiene regimen  - Implement oral medicated treatments as ordered  - Initiate Nutrition services referral as needed   Outcome: Progressing      Problem: Nutrition/Hydration-ADULT  Goal: Nutrient/Hydration intake appropriate for improving, restoring or maintaining nutritional needs  Monitor and assess patient's nutrition/hydration status for malnutrition (ex- brittle hair, bruises, dry skin, pale skin and conjunctiva, muscle wasting, smooth red tongue, and disorientation)  Collaborate with interdisciplinary team and initiate plan and interventions as ordered  Monitor patient's weight and dietary intake as ordered or per policy  Utilize nutrition screening tool and intervene per policy  Determine patient's food preferences and provide high-protein, high-caloric foods as appropriate       INTERVENTIONS:  - Monitor oral intake, urinary output, labs, and treatment plans  - Assess nutrition and hydration status and recommend course of action  - Evaluate amount of meals eaten  - Assist patient with eating if necessary   - Allow adequate time for meals  - Recommend/ encourage appropriate diets, oral nutritional supplements, and vitamin/mineral supplements  - Order, calculate, and assess calorie counts as needed  - Recommend, monitor, and adjust tube feedings and TPN/PPN based on assessed needs  - Assess need for intravenous fluids  - Provide specific nutrition/hydration education as appropriate  - Include patient/family/caregiver in decisions related to nutrition   Outcome: Completed Date Met: 08/22/18

## 2018-08-22 NOTE — ASSESSMENT & PLAN NOTE
· Poorly controlled  · Continue basal bolus insulin treatment  We will increase the dose today  · According to the patient's daughter, at home, patient is very stubborn regarding diet  According to her, patient usually eats and drinks high-calorie/sweet food and drinks  In fact she told me that at times her blood sugars were in the 400s and 500s at home  Patient was counseled about this  · Adjust treatment accordingly  · Monitor

## 2018-08-22 NOTE — ASSESSMENT & PLAN NOTE
· Still poorly controlled  · Status post holding off lisinopril and diuretic medications secondary to previous acute kidney injury  These medications were  already started back  · Amlodipine started today

## 2018-08-22 NOTE — ASSESSMENT & PLAN NOTE
· Improving  · Corrected serum sodium today with patient's blood sugar is around 134  · Suspect 2/2 dehydration in the setting of recent GI losses and decreased PO intake, plus due to hyperglycemia  · Status post IVF  · If worsening significant, will consult Nephrology   · Monitor

## 2018-08-22 NOTE — PLAN OF CARE
Problem: DISCHARGE PLANNING - CARE MANAGEMENT  Goal: Discharge to post-acute care or home with appropriate resources  INTERVENTIONS:  - Conduct assessment to determine patient/family and health care team treatment goals, and need for post-acute services based on payer coverage, community resources, and patient preferences, and barriers to discharge  - Address psychosocial, clinical, and financial barriers to discharge as identified in assessment in conjunction with the patient/family and health care team  - Arrange appropriate level of post-acute services according to patients   needs and preference and payer coverage in collaboration with the physician and health care team  - Communicate with and update the patient/family, physician, and health care team regarding progress on the discharge plan  - Arrange appropriate transportation to post-acute venues  Outcome: Progressing  LOS 3  Pt is not a bundle or a readmission  Pt lives with her , daughter and daughter's girlfriend in a apartment in [place]  Per pt's daughter Neli Joshua, pt is very non-compliant and forgetful and she doesn't feel it's safe for her to be home and in charge of her own health  She states that the patient and her  do not have enough money to pay for DAVE or 24 hour care  Pt's daughter would like CM to attempt to place pt in a nursing home for at the very least STR  I made her aware of the auth process with insurance and that there are no guarantees  Discuss medical assistance options for SNF and community as well ass qualifiers for such benefits  Daughter is agreeable to referrals being placed to [facility] [address], [facility], and [facility] in no particular order  Referrals were placed   dept will follow

## 2018-08-22 NOTE — PHYSICAL THERAPY NOTE
Physical Therapy Evaluation:    2 forms of pt ID verified:name,birthdate and pt ID rosanne    Patient's Name: Angela Ortega    Admitting Diagnosis  Hyperkalemia [E87 5]  Hyponatremia [E87 1]  Altered mental status [R41 82]  Pneumonia [J18 9]  Acute kidney injury ([facility] [address] ) [N17 9]  Bilateral lower leg cellulitis [A77 906, A26 983]  Severe sepsis ([address] ) [A41 9, R65 20]    Problem List  Patient Active Problem List   Diagnosis    Essential hypertension    Hyperlipidemia    Degenerative disc disease at L5-S1 level    Cellulitis    Chronic venous stasis dermatitis of both lower extremities    Diabetic neuropathy ([facility] [address] )    Venous insufficiency (chronic) (peripheral)    Fall    Abnormal head CT    Closed fracture of multiple ribs of right side    Palpitations    Near syncope    Hyponatremia    Chronic venous stasis dermatitis of both lower extremities    Delirium    Ambulatory dysfunction    Physical deconditioning    Incontinence    Type 2 diabetes mellitus with hyperglycemia, with long-term current use of insulin ([facility])    Nausea    Chronic pain syndrome    Peripheral neuropathy    Microcytic anemia    Intractable diarrhea    Hypokalemia    Hypomagnesemia    Hypocalcemia    Bacteriuria    Altered mental status    Sepsis ([address] )    Acute kidney injury ([address] )       Past Medical History  Past Medical History:   Diagnosis Date    Degenerative disc disease at L5-S1 level     Diabetes mellitus ([facility] [address] )     History of methicillin resistant staphylococcus aureus (MRSA) 08/21/2018    negative nasal culture- isolation and hx of mrsa removed 8/20/2018    Hypertension        Past Surgical History  Past Surgical History:   Procedure Laterality Date    CHOLECYSTECTOMY      JOINT REPLACEMENT      OOPHORECTOMY        08/22/18 1055   Note Type   Note type Eval only   Pain Assessment   Pain Assessment No/denies pain   Pain Score No Pain   Home Living   Type of Home House   Home Layout Performs ADLs on one level;Able to live on main level with bedroom/bathroom; Two level  (1 CATRACHO)   Home Equipment Walker;Grab bars  (personal rollator for mobility use,sleeps in lift chair)   Additional Comments pt reports living with  and daughter in 2 story home,1 CATRACHO,first floor setup,use of personal DME PTA,lift chair use to sleep,reports needing "some" A for laundry,meal prep   Prior Function   Level of [place] Independent with ADLs and functional mobility  (per pt PTA)   Lives With Spouse;Daughter  ( works unitl 6 and daughter works until 3)   [address] Help From Family  (pt reports home care services PTA)   ADL Assistance Independent   IADLs Needs assistance   Falls in the last 6 months 0   Restrictions/Precautions   Other Precautions Chair Alarm; Fall Risk;Multiple lines   General   Additional Pertinent History hyperkalemia,hyponatremia,altered MS,sepsis,YVONNE,PNA   Family/Caregiver Present No   Cognition   Overall Cognitive Status WFL   Arousal/Participation Cooperative   Attention Within functional limits   Orientation Level Oriented X4   Following Commands Follows one step commands with increased time or repetition   RLE Assessment   RLE Assessment (4/5 grossly throughout)   LLE Assessment   LLE Assessment (4/5 grossly throughout)   Coordination   Movements are Fluid and Coordinated 0   Coordination and Movement Description dec BLE step length,narrow WERNER,ataxic and unsteady at times especially during longer distances   Sensation WFL  (inc sensitivity to light touch 2* neuropathy per pt)   Light Touch   RLE Light Touch Grossly intact   LLE Light Touch Grossly intact   Bed Mobility   Supine to Sit Unable to assess  (pt sitting in chair pre and post mobility)   Transfers   Sit to Stand 5  Supervision   Additional items Assist x 1; Armrests; Increased time required;Verbal cues   Stand to Sit 5  Supervision   Additional items Assist x 1; Armrests; Increased time required;Verbal cues   Ambulation/Elevation   Gait pattern Improper Weight shift;Narrow WERNER; Forward Flexion; Inconsistent manuel; Foward flexed; Short stride; Ataxia   Gait Assistance 5  Supervision   Additional items Assist x 1;Verbal cues   Assistive Device Rolling walker   Distance 70 feet x2 with use of RW on tile and hardwood hans;no LOB noted and no seated rest breaks needed  Pt reports "this walk is a lot longer than what I would have to do at home";pt reports short room to room household distances PTA with use of RW   Balance   Static Sitting Good   Dynamic Sitting Fair   Static Standing Fair   Dynamic Standing Fair   Ambulatory Fair   Endurance Deficit   Endurance Deficit Yes   Endurance Deficit Description SOB and fatigue following mobility   Activity Tolerance   Activity Tolerance (fair->good)   Medical Staff Made Aware CM Nilam Cantu), OT Migel Clemens)   Nurse Made Aware yes   Assessment   Prognosis Good   Problem List Decreased strength; Impaired balance;Decreased endurance;Decreased mobility;Obesity; Decreased skin integrity   Assessment Pt is a 66 y/o female admitted to T 2* PNA,hyperkalemia,hyponatremia,altered MS,BLE cellulitis,severe sepsis and YVONNE  Pt lives with  and daughter in 2 story home with 1 CATRACHO,first floor setup available and pt reports sleeping in reclining lift chair PTA  Pt reports ambulating short household distances (room to room) with use of personal DME daily  Pt reports needing A for laundry,meal prep and "whatever I ask my family to do for me'  pt reports being alone at times during the day while her family works,no reports of recent falls  Pt currently is not at functional mobility baseline,needs Ax1 for mobility,slow and ataxic gait pattern at times,multiple lines,IV medicine management and ongoing medical care  Pt demonstrates limited mobility and gait 2* dec endurance,dec balance,dec BLE strength,ataxic and unsteady gait at times and needs S level of A for transfers and gait with use of RW   Pt would cont to benefit from skilled inpt PT services to maximize functional independence  Barriers to Discharge Inaccessible home environment  (1 CATRACHO,alone at times during the day)   Goals   Patient Goals to return home   STG Expiration Date 09/01/18   Short Term Goal #1 in 7-10 days:pt will be able to ambulate short household distances with use of RW on various surfaces S->completely I without rest breaks and no LOB to A with return to PLOF,activity tolerance:45mins/45mins,inc balance 1/2 grade to dec fall risk,inc BLE strength 1/2 grade to inc balance,mobility and endurance,I with BLE ther ex HEP in various positions to inc balance,strength and endurance,up and down 1 step with use of RW S level of A to navigate CATRACHO,BM and transfers to and from various surfaces consistently S->completely I to A with return PLOF   Treatment Day 0   Plan   Treatment/Interventions Functional transfer training;LE strengthening/ROM; Elevations; Therapeutic exercise; Endurance training;Patient/family training;Equipment eval/education; Bed mobility;Gait training;Spoke to nursing;Spoke to case management;OT   PT Frequency Other (Comment)  (3-5x/week)   Recommendation   Recommendation Home with family support;Home PT  (cont use of personal DME)   Equipment Recommended Walker  (RW)   Barthel Index   Feeding 10   Bathing 0   Grooming Score 5   Dressing Score 5   Bladder Score 10   Bowels Score 10   Toilet Use Score 10   Transfers (Bed/Chair) Score 10   Mobility (Level Surface) Score 0   Stairs Score 0   Barthel Index Score 60       Ricardo Purvis, PT, DPT    Additional Physical Therapy Tx Session: ([id number])  Pt able to ambulate additional 40 feet with use of RW on various surfaces S level of A without LOB and no rest breaks  Pt able to perform sit to stand toilet transfers needing S level of A  Pt reports wanting to go home and having home care services "just like I had before"   Pt would cont to benefit from skilled inpt PT services to maximize functional independence      Angeles Anguiano, PT, DPT

## 2018-08-22 NOTE — PLAN OF CARE
Problem: PHYSICAL THERAPY ADULT  Goal: Performs mobility at highest level of function for planned discharge setting  See evaluation for individualized goals  Treatment/Interventions: Functional transfer training, LE strengthening/ROM, Elevations, Therapeutic exercise, Endurance training, Patient/family training, Equipment eval/education, Bed mobility, Gait training, Spoke to nursing, Spoke to case management, OT  Equipment Recommended: Enriqueta Ayala (ELAYNE)       See flowsheet documentation for full assessment, interventions and recommendations  Prognosis: Good  Problem List: Decreased strength, Impaired balance, Decreased endurance, Decreased mobility, Obesity, Decreased skin integrity  Assessment: Pt is a 64 y/o female admitted to T 2* PNA,hyperkalemia,hyponatremia,altered MS,BLE cellulitis,severe sepsis and YVONNE  Pt lives with  and daughter in 2 story home with 1 CATRACHO,first floor setup available and pt reports sleeping in reclining lift chair PTA  Pt reports ambulating short household distances (room to room) with use of personal DME daily  Pt reports needing A for laundry,meal prep and "whatever I ask my family to do for me'  pt reports being alone at times during the day while her family works,no reports of recent falls  Pt currently is not at functional mobility baseline,needs Ax1 for mobility,slow and ataxic gait pattern at times,multiple lines,IV medicine management and ongoing medical care  Pt demonstrates limited mobility and gait 2* dec endurance,dec balance,dec BLE strength,ataxic and unsteady gait at times and needs S level of A for transfers and gait with use of RW  Pt would cont to benefit from skilled inpt PT services to maximize functional independence  Barriers to Discharge: Inaccessible home environment (1 CATRACHO,alone at times during the day)     Recommendation: Home with family support, Home PT (cont use of personal DME)          See flowsheet documentation for full assessment

## 2018-08-22 NOTE — OCCUPATIONAL THERAPY NOTE
[address]  Evaluation     Patient Name: Nadia Rivero  [id number] Date: 8/22/2018  Problem List  Patient Active Problem List   Diagnosis    Essential hypertension    Hyperlipidemia    Degenerative disc disease at L5-S1 level    Cellulitis    Chronic venous stasis dermatitis of both lower extremities    Diabetic neuropathy ([facility]    Venous insufficiency (chronic) (peripheral)    Fall    Abnormal head CT    Closed fracture of multiple ribs of right side    Palpitations    Near syncope    Hyponatremia    Chronic venous stasis dermatitis of both lower extremities    Delirium    Ambulatory dysfunction    Physical deconditioning    Incontinence    Type 2 diabetes mellitus with hyperglycemia, with long-term current use of insulin ([facility])    Nausea    Chronic pain syndrome    Peripheral neuropathy    Microcytic anemia    Intractable diarrhea    Hypokalemia    Hypomagnesemia    Hypocalcemia    Bacteriuria    Altered mental status    Sepsis ([facility]    Acute kidney injury ([facility] )     Past Medical History  Past Medical History:   Diagnosis Date    Degenerative disc disease at L5-S1 level     Diabetes mellitus ([facility]     History of methicillin resistant staphylococcus aureus (MRSA) 08/21/2018    negative nasal culture- isolation and hx of mrsa removed 8/20/2018    Hypertension      Past Surgical History  Past Surgical History:   Procedure Laterality Date    CHOLECYSTECTOMY      JOINT REPLACEMENT      OOPHORECTOMY        08/22/18 1051   Note Type   Note type Eval only   Restrictions/Precautions   Weight Bearing Precautions Per Order No   Other Precautions Chair Alarm; Bed Alarm; Fall Risk;Pain   Pain Assessment   Pain Assessment 0-10   Pain Location Knee;Leg;Hip   Pain Orientation Left   Effect of Pain on Daily Activities limits standing tolerance and I w/ LB ADL performance   Patient's Stated Pain Goal No pain   Hospital Pain Intervention(s) Repositioned; Ambulation/increased activity; Emotional support   Response to Interventions tolerated   Home Living   Type of Home House; Other (Comment)  (1 [address]RACHO)   Home Layout Two level;Performs ADLs on one level   Bathroom Shower/Tub Walk-in shower   Bathroom Toilet Standard   Bathroom Equipment Shower chair;Grab bars in shower   Bathroom Accessibility Accessible via walker; Other (Comment)  (rollator)   Home Equipment Grab bars; Other (Comment)  (rollator)   Additional Comments Pt reports living w/  and daughter in [facility] w/ 1 [address]  Pt reports first floor set- up and sleeps in reclner lift chair   Prior Function   Level of [place] Independent with ADLs and functional mobility   Lives With Spouse;Daughter  ( works till 10, daughter works until 3)   [address] Help From Family; Other (Comment)  (VNA once daily to assist w/ DM meds)   ADL Assistance Independent   IADLs Needs assistance   Falls in the last 6 months 0  (+ fall history, but pt reports 0 in past 6 motnhs)   Vocational Retired   Comments Pt reports using rollator for functional mobility PTA, and I w/ self - care ADL  Pt reports that her daughter or  drive her to appts, and assist w/ IADL   Lifestyle   Autonomy Pt reprots mostly sedetary, but completed ADL's w/ out assist  Pt reports family assist w/ IADL    Reciprocal Relationships Pt reports living w/  and daughter  Pt reports hx VNA to assist w/ DM   Service to Others 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 [address] Pt reports mostly sedentary and watches tv      Subjective   Subjective "Am I going home today?"   ADL   Eating Assistance 6  Modified independent   Eating Deficit Setup   Grooming Assistance 5  Supervision/Setup   Grooming Deficit Setup;Standing with assistive device   UB Bathing Assistance Unable to assess   LB Bathing Assistance Unable to assess   UB Dressing Assistance 5  Supervision/Setup   UB Dressing Deficit Setup   LB Dressing Assistance 5 Supervision/Setup   LB Dressing Deficit Pull up over hips; Supervision/safety;Setup; Increased time to complete   Toileting Assistance  6  Modified independent   Toileting Deficit Increased time to complete;Supervison/safety;Setup   Bed Mobility   Supine to Sit Unable to assess   Sit to Supine Unable to assess   Additional Comments Pt seated OOB in chair upon therapist arrival and OOB in chair post eval w/ call bell in reach, chair alarm activated and needs met   Transfers   Sit to Stand 5  Supervision   Additional items Assist x 1; Armrests; Increased time required   Stand to Sit 5  Supervision   Additional items Assist x 1; Armrests; Verbal cues   Toilet transfer 5  Supervision   Additional items Assist x 1;Standard toilet  (no grab bar use, reach for toilet seat w/ R UE)   Functional Mobility   Functional Mobility 5  Supervision   Additional items Rolling walker   Balance   Static Sitting Good   Static Standing Fair   Ambulatory Fair -   Activity Tolerance   Activity Tolerance Patient limited by fatigue   Medical Staff Made Aware spoke to PT, [address] spoke to RN, John Curry   RUE Assessment   RUE Assessment [facility]   RU Strength   RUE Overall Strength Deficits; Other (Comment)  (able to complete ADL, grasp 3+/5, shoulder 3/5)   LUE Assessment   LUE Assessment WFL   LUE Strength   LUE Overall Strength Deficits; Other (Comment)  (able to complete ADL, grasp 3+/5, shoudler 3/5)   Hand Function   Gross Motor Coordination Functional   Fine Motor Coordination Functional   Sensation   Light Touch Partial deficits in the RUE;Partial deficits in the LUE  (decresed sesnsation to light touch B hands)   Sharp/Dull Not tested  (pt reports can detect hot vs cold, temperature)   Additional Comments Pt reports "feels like you are touching me through a blanket"   Vision-Basic Assessment   Current Vision Wears glasses all the time   Vision - Complex Assessment   Acuity Able to read clock/calendar on wall without difficulty Perception   Inattention/Neglect Appears intact   Motor Planning Appears intact   Perseveration Not present   Cognition   Overall Cognitive Status WFL   Arousal/Participation Alert; Cooperative   Attention Within functional limits   Orientation Level Oriented X4   Memory Decreased recall of recent events   Following Commands Follows multistep commands with increased time or repetition   Comments Identified pt by full name and birthdate  Pt able to provide social history and participate in conversation w/ appropraite long term recall  Assessment   Limitation Decreased ADL status; Decreased endurance;Decreased self-care trans;Decreased high-level ADLs   Assessment Pt is a 62yo female admitted to [facility] on 8/18/2018  Pt presents w/ sepsis and signficant PMH impacting her occupational performance including DM, L5-S1 degerative disease, diabetic neuropathy, R rib fx, L hip fx, HTN  Pt reports living in [facility] w/ her  and daughter PTA  Pt reports first floor set- up and using rollator for functional mobility  Pt reports sleeping in recliner chair at baseline  Upon evaluation, pt engaged in functional mobility using RW w/ S and toileting w/ mod I after set- up S to complete functional transfer on / off  Pt completed grooming standing at sink to wash / dry hands w/ S  Pt presents w/ decreased LE strength, decresead standing balance, decreased endurance, decreased activity tolerance impacting her I w/ dressing, bathing, functional mobility, functional transfers, clothing mgmt, and activity engagement  Pt would benefit from OT while in acute care to address deficits  From an OT perspective, pt can return home to [facility] w/ family assist, VNA, and home OT when medically stable for discharge from acute care  Will continue to follow pt while in acute care   Goals   Patient Goals Pt stated that she would like to go home   Plan   Treatment Interventions ADL retraining;Functional transfer training; Endurance training;Patient/family training; Compensatory technique education; Activityengagement   Goal Expiration Date 08/29/18   OT Frequency 2-3x/wk   Recommendation   OT Discharge Recommendation Home OT   Equipment Recommended Other (comment)  (has tub seat)   OT - OK to Discharge (when medically stable)   Barthel Index   Feeding 10   Bathing 0   Grooming Score 5   Dressing Score 5   Bladder Score 10   Bowels Score 10   Toilet Use Score 10   Transfers (Bed/Chair) Score 10   Mobility (Level Surface) Score 0   Stairs Score 0   Barthel Index Score 60   Modified Curtis Scale   Modified Curtis Scale 3   Pt goals to be met in 7 days:  1  Pt will complete functional transfers to all surfaces w/ mod I using AD to max I w/ functional mobility to return home to [facility] w/  and daughter  2  Pt will complete UBD / LBD w/ mod I after set- up   3  Pt will demonstrate increased activity and functional standing tolerance while engaged in grooming / hygiene standing at sink using AD for at least 15 minutes to max I w/ ADL performance to return to [facility]  4  Pt will demonstrate good attention and verbalize understanding of safety precautions for ADL performance  5  Pt will consistently demonstrate understanding of safety precautions during ADL performance  6  Pt will demonstrate good attention and verbalize understanding of energy conservation tech for ADL performance  7   Pt will consistently demonstrate understanding of energy conservation tech during ADL performance    [facility] OTR/L

## 2018-08-23 LAB
ANION GAP SERPL CALCULATED.3IONS-SCNC: 9 MMOL/L (ref 4–13)
BUN SERPL-MCNC: 24 MG/DL (ref 5–25)
CALCIUM SERPL-MCNC: 8.7 MG/DL (ref 8.3–10.1)
CHLORIDE SERPL-SCNC: 97 MMOL/L (ref 100–108)
CO2 SERPL-SCNC: 28 MMOL/L (ref 21–32)
CREAT SERPL-MCNC: 1.18 MG/DL (ref 0.6–1.3)
ERYTHROCYTE [DISTWIDTH] IN BLOOD BY AUTOMATED COUNT: 16.7 % (ref 11.6–15.1)
GFR SERPL CREATININE-BSD FRML MDRD: 50 ML/MIN/1.73SQ M
GLUCOSE SERPL-MCNC: 145 MG/DL (ref 65–140)
GLUCOSE SERPL-MCNC: 159 MG/DL (ref 65–140)
GLUCOSE SERPL-MCNC: 265 MG/DL (ref 65–140)
GLUCOSE SERPL-MCNC: 357 MG/DL (ref 65–140)
GLUCOSE SERPL-MCNC: 396 MG/DL (ref 65–140)
HCT VFR BLD AUTO: 28.9 % (ref 34.8–46.1)
HGB BLD-MCNC: 8.7 G/DL (ref 11.5–15.4)
MCH RBC QN AUTO: 22.3 PG (ref 26.8–34.3)
MCHC RBC AUTO-ENTMCNC: 30.1 G/DL (ref 31.4–37.4)
MCV RBC AUTO: 74 FL (ref 82–98)
PLATELET # BLD AUTO: 204 THOUSANDS/UL (ref 149–390)
PMV BLD AUTO: 9.4 FL (ref 8.9–12.7)
POTASSIUM SERPL-SCNC: 4.6 MMOL/L (ref 3.5–5.3)
RBC # BLD AUTO: 3.9 MILLION/UL (ref 3.81–5.12)
SODIUM SERPL-SCNC: 134 MMOL/L (ref 136–145)
WBC # BLD AUTO: 7.41 THOUSAND/UL (ref 4.31–10.16)

## 2018-08-23 PROCEDURE — 82948 REAGENT STRIP/BLOOD GLUCOSE: CPT

## 2018-08-23 PROCEDURE — 80048 BASIC METABOLIC PNL TOTAL CA: CPT | Performed by: INTERNAL MEDICINE

## 2018-08-23 PROCEDURE — 97110 THERAPEUTIC EXERCISES: CPT

## 2018-08-23 PROCEDURE — 99232 SBSQ HOSP IP/OBS MODERATE 35: CPT | Performed by: INTERNAL MEDICINE

## 2018-08-23 PROCEDURE — 97116 GAIT TRAINING THERAPY: CPT

## 2018-08-23 PROCEDURE — 85027 COMPLETE CBC AUTOMATED: CPT | Performed by: INTERNAL MEDICINE

## 2018-08-23 RX ORDER — INSULIN GLARGINE 100 [IU]/ML
28 INJECTION, SOLUTION SUBCUTANEOUS
Status: DISCONTINUED | OUTPATIENT
Start: 2018-08-23 | End: 2018-08-23

## 2018-08-23 RX ORDER — CEFDINIR 300 MG/1
300 CAPSULE ORAL EVERY 12 HOURS SCHEDULED
Status: DISCONTINUED | OUTPATIENT
Start: 2018-08-23 | End: 2018-08-24 | Stop reason: HOSPADM

## 2018-08-23 RX ORDER — AMLODIPINE BESYLATE 10 MG/1
10 TABLET ORAL DAILY
Status: DISCONTINUED | OUTPATIENT
Start: 2018-08-23 | End: 2018-08-24 | Stop reason: HOSPADM

## 2018-08-23 RX ORDER — INSULIN GLARGINE 100 [IU]/ML
36 INJECTION, SOLUTION SUBCUTANEOUS
Status: DISCONTINUED | OUTPATIENT
Start: 2018-08-23 | End: 2018-08-24 | Stop reason: HOSPADM

## 2018-08-23 RX ADMIN — INSULIN LISPRO 1 UNITS: 100 INJECTION, SOLUTION INTRAVENOUS; SUBCUTANEOUS at 09:16

## 2018-08-23 RX ADMIN — INSULIN LISPRO 6 UNITS: 100 INJECTION, SOLUTION INTRAVENOUS; SUBCUTANEOUS at 17:56

## 2018-08-23 RX ADMIN — GABAPENTIN 300 MG: 300 CAPSULE ORAL at 21:00

## 2018-08-23 RX ADMIN — GUAIFENESIN 600 MG: 600 TABLET, EXTENDED RELEASE ORAL at 09:11

## 2018-08-23 RX ADMIN — AMLODIPINE BESYLATE 10 MG: 10 TABLET ORAL at 09:12

## 2018-08-23 RX ADMIN — ACETAMINOPHEN 650 MG: 325 TABLET, FILM COATED ORAL at 10:55

## 2018-08-23 RX ADMIN — MORPHINE SULFATE 90 MG: 30 TABLET, FILM COATED, EXTENDED RELEASE ORAL at 09:11

## 2018-08-23 RX ADMIN — GUAIFENESIN 600 MG: 600 TABLET, EXTENDED RELEASE ORAL at 20:58

## 2018-08-23 RX ADMIN — CEFDINIR 300 MG: 300 CAPSULE ORAL at 09:10

## 2018-08-23 RX ADMIN — PREDNISONE 40 MG: 20 TABLET ORAL at 09:12

## 2018-08-23 RX ADMIN — DULOXETINE HYDROCHLORIDE 60 MG: 60 CAPSULE, DELAYED RELEASE ORAL at 09:10

## 2018-08-23 RX ADMIN — METOCLOPRAMIDE 10 MG: 10 TABLET ORAL at 05:53

## 2018-08-23 RX ADMIN — INSULIN LISPRO 4 UNITS: 100 INJECTION, SOLUTION INTRAVENOUS; SUBCUTANEOUS at 21:40

## 2018-08-23 RX ADMIN — OXYCODONE HYDROCHLORIDE 30 MG: 10 TABLET ORAL at 17:32

## 2018-08-23 RX ADMIN — HEPARIN SODIUM 5000 UNITS: 5000 INJECTION, SOLUTION INTRAVENOUS; SUBCUTANEOUS at 14:08

## 2018-08-23 RX ADMIN — HEPARIN SODIUM 5000 UNITS: 5000 INJECTION, SOLUTION INTRAVENOUS; SUBCUTANEOUS at 05:53

## 2018-08-23 RX ADMIN — HEPARIN SODIUM 5000 UNITS: 5000 INJECTION, SOLUTION INTRAVENOUS; SUBCUTANEOUS at 20:58

## 2018-08-23 RX ADMIN — MORPHINE SULFATE 90 MG: 30 TABLET, FILM COATED, EXTENDED RELEASE ORAL at 20:58

## 2018-08-23 RX ADMIN — INSULIN LISPRO 3 UNITS: 100 INJECTION, SOLUTION INTRAVENOUS; SUBCUTANEOUS at 14:09

## 2018-08-23 RX ADMIN — PRAVASTATIN SODIUM 40 MG: 40 TABLET ORAL at 17:29

## 2018-08-23 RX ADMIN — LISINOPRIL 10 MG: 10 TABLET ORAL at 09:12

## 2018-08-23 RX ADMIN — METOCLOPRAMIDE 10 MG: 10 TABLET ORAL at 10:55

## 2018-08-23 RX ADMIN — OXYCODONE HYDROCHLORIDE 30 MG: 10 TABLET ORAL at 01:12

## 2018-08-23 RX ADMIN — DOCUSATE SODIUM 100 MG: 100 CAPSULE, LIQUID FILLED ORAL at 17:29

## 2018-08-23 RX ADMIN — DOCUSATE SODIUM 100 MG: 100 CAPSULE, LIQUID FILLED ORAL at 09:12

## 2018-08-23 RX ADMIN — CEFDINIR 300 MG: 300 CAPSULE ORAL at 20:58

## 2018-08-23 RX ADMIN — TRIAMTERENE AND HYDROCHLOROTHIAZIDE 1 TABLET: 37.5; 25 TABLET ORAL at 09:12

## 2018-08-23 RX ADMIN — INSULIN GLARGINE 36 UNITS: 100 INJECTION, SOLUTION SUBCUTANEOUS at 21:40

## 2018-08-23 RX ADMIN — CEFEPIME HYDROCHLORIDE 2000 MG: 2 INJECTION, POWDER, FOR SOLUTION INTRAVENOUS at 00:04

## 2018-08-23 RX ADMIN — METOCLOPRAMIDE 10 MG: 10 TABLET ORAL at 17:29

## 2018-08-23 NOTE — ASSESSMENT & PLAN NOTE
· Still poorly controlled  · Status post holding off lisinopril and diuretic medications secondary to previous acute kidney injury  These medications were  already started back  · With blood pressure is still high today, I have increased amlodipine dose to 10 mg once a day

## 2018-08-23 NOTE — PROGRESS NOTES
Progress Note - Preethi Looney 1955, 65 y o  female MRN: [id number]    Unit/Bed#: -01 Encounter: [id number]    Primary Care Provider: Jairo Watts   Date and time admitted to hospital: 8/18/2018  9:59 PM        * Sepsis [facility])   Assessment & Plan    · Clinically resolved  · POA as evidenced by tachycardia and leukocytosis   · Presently maintaining good oxygen saturations on room air  · Likely due to a left lower lobe PNA seen on CXR, and/or acute tracheobronchitis, with wheezing  · Pt was seen 8/15 for vomiting and diarrhea as well  · Cannot r/o a previous viral gastroenteritis contributing  · UA negative for UTI  · Lactic acid wnl, BCx2 negative so far  · Status post Cefepime and Vanc  · Reported h/o MRSA; however, nasal swab culture was negative for MRSA, thus vancomycin discontinued  · F/u Bcx2:  Negative so far  · Negative strep and legionella urine antigen, follow-up sputum cx  · Respiratory protocol   · Procalcitonin was actually equivocal   However, with symptoms of an infection and evidence of pneumonia on the x-ray, we continued with IV antibiotic with cefepime  Repeat Procalcitonin 8/22 was normal:  Thus IV cefepime was switched to oral Omnicef, to finish a 7 day course of antibiotic treatment  · Today, patient's cough continues to improve as well as patient denies any shortness of breath  · As per discussion with the patient's daughter, they are concerned that patient is vaping may have contributed to patient's pneumonia/bronchitis  Thus I counseled the patient not to do vaping anymore, as I explained to her, that this may have caused her pneumonia/bronchitis/wheezing  Patient told me that she will stop vaping  · With resolution of patient's wheezing and patient's breathing is much better, and with patient's blood sugars really poorly controlled at this point likely exacerbated by the steroids, I will discontinue the prednisone today            Acute kidney injury [facility])   Assessment & Plan    · Resolved  · Baseline Cr approx 1 1, Cr on admission was 1 67  · Suspect pre-renal due to recent volume depletion  · Status post IV fluids  · Monitor  · Avoid nephrotoxins  · Avoid hypotension  Altered mental status   Assessment & Plan    · Resolved  · Likely a toxic metabolic encephalopathy 2/2 infection in the setting of electrolyte disturbance  · CXR with left lower lobe PNA  · On admission, Sodium 126, hyperkalemic at 5 7; sodium now is 133 and potassium is normal   · CT head negative, trop negative  · Ammonia, VBG, TSH wnl   · Monitor for improvement on abx  · Status post Q4 neuro checks   · Status post seizure precaution         Microcytic anemia   Assessment & Plan    · Hgb stable at this point  · No active bleeding  · Monitor  Type 2 diabetes mellitus with hyperglycemia, with long-term current use of insulin ([facility] [address] )   Assessment & Plan    · Poorly controlled  · Exacerbated by steroids  · Continue basal bolus insulin treatment  We will increase the dose today  · According to the patient's daughter, at home, patient is very stubborn regarding diet  According to her, patient usually eats and drinks high-calorie/sweet food and drinks  In fact she told me that at times her blood sugars were in the 400s and 500s at home  Patient was counseled about this  · Adjust treatment accordingly  · Monitor  Hyponatremia   Assessment & Plan    · Improved  · Suspect previously 2/2 dehydration in the setting of recent GI losses and decreased PO intake, plus due to hyperglycemia  · Status post IVF  · Manage patient's hyperglycemia  · If worsening significant, will consult Nephrology   · Monitor  Chronic venous stasis dermatitis of both lower extremities   Assessment & Plan    · Receives wound care at her home; continue local wound care    · Per daughter, LE's are better than baseline appearance         Hyperlipidemia   Assessment & Plan · Continue statin        Essential hypertension   Assessment & Plan    · Still poorly controlled  · Status post holding off lisinopril and diuretic medications secondary to previous acute kidney injury  These medications were  already started back  · With blood pressure is still high today, I have increased amlodipine dose to 10 mg once a day  VTE Pharmacologic Prophylaxis:   Pharmacologic: Heparin  Mechanical VTE Prophylaxis in Place: Yes    Patient Centered Rounds: I have performed bedside rounds with nursing staff today  Discussions with Specialists or Other Care Team Provider:  Case management  Education and Discussions with Family / Patient:  Patient  I spoke to the patient's daughter, Nicole Archuleta, and gave updates for today  I answered questions and concerns  Time Spent for Care: 30 minutes  More than 50% of total time spent on counseling and coordination of care as described above  Current Length of Stay: 4 day(s)    Current Patient Status: Inpatient   Certification Statement: The patient will continue to require additional inpatient hospital stay due to Above findings and plans  Discharge Plan:  None yet  Patient's blood pressures and blood sugars are still very high this morning  Code Status: Level 1 - Full Code      Subjective:   Patient is presently doing fine and feels better  Patient denies any shortness of breath  Patient still has cough but it is not as bad as before  Patient denies any pains  Objective:     Vitals:   Temp (24hrs), Av °F (36 7 °C), Min:98 °F (36 7 °C), Max:98 °F (36 7 °C)    HR:  [86-90] 90  Resp:  [20] 20  BP: (128-179)/(61-81) 140/64  SpO2:  [94 %-97 %] 94 %  Body mass index is 40 3 kg/m²  Input and Output Summary (last 24 hours):        Intake/Output Summary (Last 24 hours) at 18 1703  Last data filed at 18 1407   Gross per 24 hour   Intake              480 ml   Output             2000 ml   Net            -1520 ml Physical Exam:     Physical Exam   Constitutional: No distress  HENT:   Head: Normocephalic and atraumatic  Eyes: Right eye exhibits no discharge  Left eye exhibits no discharge  No scleral icterus  Neck: No JVD present  No tracheal deviation present  Cardiovascular: Normal rate, regular rhythm and normal heart sounds  Exam reveals no gallop and no friction rub  No murmur heard  Pulmonary/Chest: Effort normal and breath sounds normal  No stridor  No respiratory distress  She has no wheezes  She has no rales  Abdominal: Soft  Bowel sounds are normal  She exhibits no distension  There is no tenderness  There is no rebound and no guarding  Musculoskeletal: She exhibits edema  She exhibits no tenderness or deformity  Positive for bilateral lower extremity edema  Neurological: She is alert  No cranial nerve deficit  Skin: Skin is warm  No rash noted  She is not diaphoretic  No erythema  No pallor  Positive for chronic venous stasis skin changes on bilateral lower extremities    Psychiatric: She has a normal mood and affect   Her behavior is normal  Thought content normal             Additional Data:     Labs:      Results from last 7 days  Lab Units 08/23/18  0621 08/21/18  0348   WBC Thousand/uL 7 41 5 34   HEMOGLOBIN g/dL 8 7* 8 9*   HEMATOCRIT % 28 9* 29 6*   PLATELETS Thousands/uL 204 154   NEUTROS PCT %  --  75   LYMPHS PCT %  --  15   MONOS PCT %  --  8   EOS PCT %  --  2       Results from last 7 days  Lab Units 08/23/18  0621  08/19/18  0439   SODIUM mmol/L 134*  < > 132*   POTASSIUM mmol/L 4 6  < > 4 5   CHLORIDE mmol/L 97*  < > 99*   CO2 mmol/L 28  < > 27   BUN mg/dL 24  < > 30*   CREATININE mg/dL 1 18  < > 1 42*   CALCIUM mg/dL 8 7  < > 7 9*   TOTAL PROTEIN g/dL  --   --  6 0*   BILIRUBIN TOTAL mg/dL  --   --  0 40   ALK PHOS U/L  --   --  117*   ALT U/L  --   --  32   AST U/L  --   --  32   GLUCOSE RANDOM mg/dL 145*  < > 126   < > = values in this interval not displayed  Results from last 7 days  Lab Units 08/18/18  2242   INR  1 07       * I Have Reviewed All Lab Data Listed Above  * Additional Pertinent Lab Tests Reviewed: [place] [address] Admission Reviewed    Imaging:    Imaging Reports Reviewed Today Include:  Diagnostic imaging studies that were done on this admission  Imaging Personally Reviewed by Myself Includes:  None  Recent Cultures (last 7 days):       Results from last 7 days  Lab Units 08/20/18  1144 08/18/18  2242 08/18/18  2217   BLOOD CULTURE   --  No Growth After 4 Days  No Growth After 4 Days     LEGIONELLA URINARY ANTIGEN  Negative  --   --        Last 24 Hours Medication List:     Current Facility-Administered Medications:  acetaminophen 650 mg Oral Q6H PRN Tram Erickson PA-C   amLODIPine 10 mg Oral Daily Osmel Michael MD   benzonatate 100 mg Oral TID PRN Osmel Michael MD   cefdinir 300 mg Oral Q12H [facility] Osmel Michael MD   docusate sodium 100 mg Oral BID Tram Erickson PA-C   DULoxetine 60 mg Oral Daily Tram Erickson PA-C   gabapentin 300 mg Oral HS Lucy He PA-C   guaiFENesin 600 mg Oral Q12H [facility] Osmel Michael MD   heparin (porcine) 5,000 Units Subcutaneous Q8H [facility] Lucy He PA-C   hydrALAZINE 10 mg Intravenous Q6H PRN Burak Curiel PA-C   insulin glargine 28 Units Subcutaneous HS Osmel Michael MD   insulin lispro 1-5 Units Subcutaneous HS Lucy He PA-C   insulin lispro 1-6 Units Subcutaneous TID With Meals Lucy He PA-C   insulin lispro 12 Units Subcutaneous TID With Meals Osmel Michael MD   levalbuterol 1 25 mg Nebulization Q6H PRN Osmel Michael MD   levalbuterol 1 25 mg Nebulization BID Osmel Michael MD   lisinopril 10 mg Oral Daily Rachel Grimes MD   metoclopramide 10 mg Oral TID AC Lucy He PA-C   morphine 90 mg Oral Q12H [facility] Osmel Michael MD   ondansetron 4 mg Intravenous Q6H PRN Lucy RAMIREZ Suma Ness PA-C   oxyCODONE 30 mg Oral Q4H PRN Coit Lot, PAIGNACIO   pravastatin 40 mg [facility] Daily [facility]   predniSONE 40 mg Oral Daily Osmel Michael MD   sodium chloride 3 mL Nebulization Q6H PRN Osmel iMchael MD   sodium chloride 3 mL Nebulization BID Osmel Michael MD   triamterene-hydrochlorothiazide 1 tablet Oral Daily Coit Lot, PAIGNACIO     Facility-Administered Medications Ordered in Other Encounters:  ferrous sulfate 325 mg Oral Daily With Holley Osgood, MD        Today, Patient Was Seen By: Jose Castro MD    ** Please Note: Dragon 360 Dictation voice to text software may have been used in the creation of this document   **

## 2018-08-23 NOTE — PLAN OF CARE
Problem: PHYSICAL THERAPY ADULT  Goal: Performs mobility at highest level of function for planned discharge setting  See evaluation for individualized goals  Treatment/Interventions: Functional transfer training, LE strengthening/ROM, Elevations, Therapeutic exercise, Endurance training, Patient/family training, Equipment eval/education, Bed mobility, Gait training, Spoke to nursing, Spoke to case management, OT  Equipment Recommended: Harvey Phoenix (RW)       See flowsheet documentation for full assessment, interventions and recommendations  Outcome: Progressing  Prognosis: Good  Problem List: Decreased strength, Impaired balance, Decreased endurance, Decreased mobility, Obesity, Decreased skin integrity  Assessment: Patient remains supervision level of assist for transfers with verbal instruction for hand placement and body positioning  Presents with impulsiveness and decreased safety awareness with tendency to abandon walker when turning to descend into chair  Provided educated and demonstration of safe technique with fair understanding  Demonstrated ability to ambulate increased distance with consistent supervision assist with good obstacle negotiation and walker managament  Pt able to ascend and descend  1 step with B UE support on L handrail and min a x 1 for 2 trials  Participated in B LE exercise program with input from therapist for form and repetitions  Continue to focus on gait and stair training progression as appropriate  Barriers to Discharge: Inaccessible home environment (1 [address],alone at times during the day)     Recommendation: Home with family support, Home PT (cont use of personal DME)          See flowsheet documentation for full assessment

## 2018-08-23 NOTE — ASSESSMENT & PLAN NOTE
· Clinically resolved  · POA as evidenced by tachycardia and leukocytosis   · Presently maintaining good oxygen saturations on room air  · Likely due to a left lower lobe PNA seen on CXR, and/or acute tracheobronchitis, with wheezing  · Pt was seen 8/15 for vomiting and diarrhea as well  · Cannot r/o a previous viral gastroenteritis contributing  · UA negative for UTI  · Lactic acid wnl, BCx2 negative so far  · Status post Cefepime and Vanc  · Reported h/o MRSA; however, nasal swab culture was negative for MRSA, thus vancomycin discontinued  · F/u Bcx2:  Negative so far  · Negative strep and legionella urine antigen, follow-up sputum cx  · Respiratory protocol   · Procalcitonin was actually equivocal   However, with symptoms of an infection and evidence of pneumonia on the x-ray, we continued with IV antibiotic with cefepime  Repeat Procalcitonin 8/22 was normal:  Thus IV cefepime was switched to oral Omnicef, to finish a 7 day course of antibiotic treatment  · Today, patient's cough continues to improve as well as patient denies any shortness of breath  · As per discussion with the patient's daughter, they are concerned that patient is vaping may have contributed to patient's pneumonia/bronchitis  Thus I counseled the patient not to do vaping anymore, as I explained to her, that this may have caused her pneumonia/bronchitis/wheezing  Patient told me that she will stop vaping  · With resolution of patient's wheezing and patient's breathing is much better, and with patient's blood sugars really poorly controlled at this point likely exacerbated by the steroids, I will discontinue the prednisone today

## 2018-08-23 NOTE — ASSESSMENT & PLAN NOTE
· Poorly controlled  · Exacerbated by steroids  · Continue basal bolus insulin treatment  We will increase the dose today  · According to the patient's daughter, at home, patient is very stubborn regarding diet  According to her, patient usually eats and drinks high-calorie/sweet food and drinks  In fact she told me that at times her blood sugars were in the 400s and 500s at home  Patient was counseled about this  · Adjust treatment accordingly  · Monitor

## 2018-08-23 NOTE — ASSESSMENT & PLAN NOTE
· Improved  · Suspect previously 2/2 dehydration in the setting of recent GI losses and decreased PO intake, plus due to hyperglycemia  · Status post IVF  · Manage patient's hyperglycemia  · If worsening significant, will consult Nephrology   · Monitor

## 2018-08-23 NOTE — PHYSICAL THERAPY NOTE
PHYSICAL THERAPY NOTE    Patient Name: Marc Geronimo  UPQEP'R Date: 18 1031   Pain Assessment   Pain Assessment 0-10   Pain Type Chronic pain   Pain Location Knee;Hip   Pain Orientation Left   Restrictions/Precautions   Weight Bearing Precautions Per Order No   Other Precautions Chair Alarm; Fall Risk;Multiple lines   General   Family/Caregiver Present No   Subjective   Subjective Patient seated OOB in recliner and is agreeable to therapy session  Patient identifers obtained from name,   Bed Mobility   Supine to Sit Unable to assess   Sit to Supine Unable to assess   Additional Comments Patient seated OOB in recliner pre and post session with chair alarm engaged, call bell and belongings in reach  Transfers   Sit to Stand 5  Supervision   Additional items Assist x 1; Armrests; Increased time required;Verbal cues   Stand to Sit 5  Supervision   Additional items Assist x 1; Armrests; Increased time required;Verbal cues   Ambulation/Elevation   Gait pattern Improper Weight shift;Narrow WERNER; Forward Flexion; Inconsistent manuel; Foward flexed; Short stride; Ataxia   Gait Assistance 5  Supervision   Additional items Assist x 1;Verbal cues   Assistive Device Rolling walker   Distance 150' x1   Stair Management Assistance 4  Minimal assist   Additional items Assist x 1;Verbal cues; Increased time required   Stair Management Technique Foreward; Step to pattern; One rail L  (B UE on rail)   Number of Stairs 1  (x 2 trials)   Balance   Static Sitting Good   Dynamic Sitting Fair   Static Standing Fair   Dynamic Standing Fair   Ambulatory Fair   Endurance Deficit   Endurance Deficit Yes   Endurance Deficit Description reported fatigue with mobility   Activity Tolerance   Activity Tolerance Patient limited by fatigue   Nurse Made Aware Spoke to Rowan Wick RN   Exercises   Heelslides Sitting;5 reps;AROM; Bilateral  (with BLE elevated in chair)   Hip Abduction Sitting;5 reps;AROM; Bilateral  (with BLE elevated in chair)   Hip Adduction Sitting;5 reps;AROM; Bilateral  (with BLE elevated in chair)   Ankle Pumps Sitting;10 reps;AROM; Bilateral  (with BLE elevated in chair)   Assessment   Prognosis Good   Problem List Decreased strength; Impaired balance;Decreased endurance;Decreased mobility;Obesity; Decreased skin integrity   Assessment Patient remains supervision level of assist for transfers with verbal instruction for hand placement and body positioning  Presents with impulsiveness and decreased safety awareness with tendency to abandon walker when turning to descend into chair  Provided educated and demonstration of safe technique with fair understanding  Demonstrated ability to ambulate increased distance with consistent supervision assist with good obstacle negotiation and walker managament  Pt able to ascend and descend  1 step with B UE support on L handrail and min a x 1 for 2 trials  Participated in B LE exercise program with input from therapist for form and repetitions  Continue to focus on gait and stair training progression as appropriate  Barriers to Discharge Inaccessible home environment  ([address],alone at times during the day)   Goals   Patient Goals to go home   STG Expiration Date 09/01/18   Treatment Day 1   Plan   Treatment/Interventions Functional transfer training;LE strengthening/ROM; Elevations; Therapeutic exercise; Endurance training;Patient/family training;Equipment eval/education; Bed mobility;Gait training;Spoke to nursing   PT Frequency Other (Comment)  (3-5x/week)   Recommendation   Recommendation Home with family support;Home PT  (cont use of personal DME)   Equipment Recommended Walker  (RW)       Destiney Soto, PTA

## 2018-08-23 NOTE — CASE MANAGEMENT
Thank you,  [address]  Utilization Review Department  Phone: [phone]; Fax [phone]  ATTENTION: Please call with any questions or concerns to [phone]  and carefully follow the prompts so that you are directed to the right person  Send all requests for admission clinical reviews, approved or denied determinations and any other requests to fax [phone]   All voicemails are confidential    Continued Stay Review    Date: 08/23/2018 completed for 8/22    Vital Signs: /61 (BP Location: Right arm)   Pulse 89   Temp 98 °F (36 7 °C) (Oral)   Resp 20   Ht 5' (1 524 m)   Wt 93 6 kg (206 lb 5 6 oz)   LMP  (LMP Unknown) Comment: post menopause  SpO2 94%   BMI 40 30 kg/m²     Medications:   Scheduled Meds:   Current Facility-Administered Medications:  acetaminophen 650 mg Oral Q6H PRN   amLODIPine 10 mg Oral Daily   benzonatate 100 mg Oral TID PRN   cefdinir 300 mg Oral Q12H TANNER   docusate sodium 100 mg Oral BID   DULoxetine 60 mg Oral Daily   gabapentin 300 mg Oral HS   guaiFENesin 600 mg Oral Q12H [facility]   heparin (porcine) 5,000 Units Subcutaneous Q8H [facility]   hydrALAZINE 10 mg Intravenous Q6H PRN   insulin glargine 28 Units Subcutaneous HS   insulin lispro 1-5 Units Subcutaneous HS   insulin lispro 1-6 Units Subcutaneous TID With Meals   insulin lispro 12 Units Subcutaneous TID With Meals   levalbuterol 1 25 mg Nebulization Q6H PRN   levalbuterol 1 25 mg Nebulization BID   lisinopril 10 mg Oral Daily   metoclopramide 10 mg Oral TID AC   morphine 90 mg Oral Q12H TANNER   ondansetron 4 mg Intravenous Q6H PRN   oxyCODONE 30 mg Oral Q4H PRN   pravastatin 40 mg Oral Daily With Dinner   predniSONE 40 mg Oral Daily   sodium chloride 3 mL Nebulization Q6H PRN   sodium chloride 3 mL Nebulization BID   triamterene-hydrochlorothiazide 1 tablet Oral Daily     Facility-Administered Medications Ordered in Other Encounters:  ferrous sulfate 325 mg Oral Daily With Holley Osgood, MD Continuous Infusions:    PRN Meds:   acetaminophen    benzonatate    hydrALAZINE    levalbuterol    ondansetron    oxyCODONE 08/22 x2 PO 08/23 x1 PO    sodium chloride    Abnormal Labs/Diagnostic Results: 08/23; Sodium 134     Chloride 97     Glucose 145     hgb 8 7, hct 28 9      Age/Sex: 65 y o  female     Assessment/Plan: 08/22 Attending MD:  [facility])   Assessment & Plan     · Clinically resolved  · POA as evidenced by tachycardia and leukocytosis   ? Presently maintaining good oxygen saturations on room air  ? Likely due to a left lower lobe PNA seen on CXR, and/or acute tracheobronchitis, with wheezing  ? Pt was seen 8/15 for vomiting and diarrhea as well  ? Cannot r/o a previous viral gastroenteritis contributing  ? UA negative for UTI  · Lactic acid wnl, BCx2 negative so far  · Status post Cefepime and Vanc  ? Reported h/o MRSA; however, nasal swab culture was negative for MRSA, thus vancomycin discontinued  ? F/u Bcx2:  Negative so far  · Negative strep and legionella urine antigen, follow-up sputum cx  · Respiratory protocol   · Procalcitonin was actually equivocal   However, with symptoms of an infection and evidence of pneumonia on the x-ray, we continued with IV antibiotic with cefepime  Procalcitonin today is normal:  Thus we will now switch IV cefepime to oral Omnicef, to finish a 7 day course of antibiotic treatment  · Today, patient's cough has been improving as well as patient denies any shortness of breath  · As per discussion with the patient's daughter, they are concerned that patient is vaping may have contributed to patient's pneumonia/bronchitis  Thus I counseled the patient not to do vaping anymore, as I explained to her, that this may have caused her pneumonia/bronchitis/wheezing  Patient told me that she will stop vaping             Acute kidney injury ([facility] [address] )   Assessment & Plan     · Resolved  · Baseline Cr approx 1 1, Cr on admission was 1 67  ?  Suspect pre-renal due to recent volume depletion  · Status post IV fluids  · Monitor  · Avoid nephrotoxins  · Avoid hypotension           Altered mental status   Assessment & Plan     · Resolved  · Likely a toxic metabolic encephalopathy 2/2 infection in the setting of electrolyte disturbance  ? CXR with left lower lobe PNA  ? On admission, Sodium 126, hyperkalemic at 5 7; sodium now is 133 and potassium is normal   ? CT head negative, trop negative  ? Ammonia, VBG, TSH wnl   · Monitor for improvement on abx  · Status post Q4 neuro checks   ? Status post seizure precaution           Microcytic anemia   Assessment & Plan     · Hgb stable at this point  · No active bleeding  · Monitor           Type 2 diabetes mellitus with hyperglycemia, with long-term current use of insulin (HCC)   Assessment & Plan     · Poorly controlled  · Continue basal bolus insulin treatment  We will increase the dose today  · According to the patient's daughter, at home, patient is very stubborn regarding diet  According to her, patient usually eats and drinks high-calorie/sweet food and drinks  In fact she told me that at times her blood sugars were in the 400s and 500s at home  Patient was counseled about this  · Adjust treatment accordingly  · Monitor             Hyponatremia   Assessment & Plan     · Improving  · Corrected serum sodium today with patient's blood sugar is around 134  · Suspect 2/2 dehydration in the setting of recent GI losses and decreased PO intake, plus due to hyperglycemia  · Status post IVF  · If worsening significant, will consult Nephrology   · Monitor           Chronic venous stasis dermatitis of both lower extremities   Assessment & Plan     · Receives wound care at her home; continue local wound care    · Per daughter, LE's are better than baseline appearance           Hyperlipidemia   Assessment & Plan     · Continue statin          Essential hypertension   Assessment & Plan     · Still poorly controlled  · Status post holding off lisinopril and diuretic medications secondary to previous acute kidney injury  These medications were  already started back  · Amlodipine started today         Discharge Plan: TBD

## 2018-08-23 NOTE — PLAN OF CARE
DISCHARGE PLANNING     Discharge to home or other facility with appropriate resources Progressing        DISCHARGE PLANNING - CARE MANAGEMENT     Discharge to post-acute care or home with appropriate resources Progressing        INFECTION - ADULT     Absence or prevention of progression during hospitalization Progressing        Knowledge Deficit     Patient/family/caregiver demonstrates understanding of disease process, treatment plan, medications, and discharge instructions Progressing        PAIN - ADULT     Verbalizes/displays adequate comfort level or baseline comfort level Progressing        Potential for Falls     Patient will remain free of falls Progressing        Prexisting or High Potential for Compromised Skin Integrity     Skin integrity is maintained or improved Progressing        SAFETY ADULT     Maintain or return to baseline ADL function Progressing     Maintain or return mobility status to optimal level Progressing        SKIN/TISSUE INTEGRITY - ADULT     Skin integrity remains intact Progressing     Incision(s), wounds(s) or drain site(s) healing without S/S of infection Progressing     Oral mucous membranes remain intact Progressing

## 2018-08-24 VITALS
WEIGHT: 206.35 LBS | HEART RATE: 73 BPM | HEIGHT: 60 IN | RESPIRATION RATE: 18 BRPM | TEMPERATURE: 98.2 F | OXYGEN SATURATION: 98 % | SYSTOLIC BLOOD PRESSURE: 149 MMHG | BODY MASS INDEX: 40.51 KG/M2 | DIASTOLIC BLOOD PRESSURE: 69 MMHG

## 2018-08-24 LAB
BACTERIA BLD CULT: NORMAL
BACTERIA BLD CULT: NORMAL
GLUCOSE SERPL-MCNC: 215 MG/DL (ref 65–140)
GLUCOSE SERPL-MCNC: 404 MG/DL (ref 65–140)

## 2018-08-24 PROCEDURE — 99239 HOSP IP/OBS DSCHRG MGMT >30: CPT | Performed by: INTERNAL MEDICINE

## 2018-08-24 PROCEDURE — 82948 REAGENT STRIP/BLOOD GLUCOSE: CPT

## 2018-08-24 RX ORDER — INSULIN GLARGINE 100 [IU]/ML
30 INJECTION, SOLUTION SUBCUTANEOUS
Qty: 10 ML | Refills: 0 | Status: ON HOLD | OUTPATIENT
Start: 2018-08-24 | End: 2020-03-04 | Stop reason: SDUPTHER

## 2018-08-24 RX ORDER — ONDANSETRON 8 MG/1
8 TABLET, ORALLY DISINTEGRATING ORAL EVERY 12 HOURS PRN
Refills: 0
Start: 2018-08-24 | End: 2020-03-07

## 2018-08-24 RX ORDER — AMLODIPINE BESYLATE 10 MG/1
10 TABLET ORAL DAILY
Qty: 30 TABLET | Refills: 0 | Status: SHIPPED | OUTPATIENT
Start: 2018-08-25

## 2018-08-24 RX ORDER — SIMVASTATIN 20 MG
20 TABLET ORAL
Refills: 0
Start: 2018-08-24 | End: 2020-03-07

## 2018-08-24 RX ORDER — CEFDINIR 300 MG/1
300 CAPSULE ORAL EVERY 12 HOURS SCHEDULED
Qty: 3 CAPSULE | Refills: 0 | Status: SHIPPED | OUTPATIENT
Start: 2018-08-24 | End: 2018-08-26

## 2018-08-24 RX ORDER — GUAIFENESIN 600 MG
600 TABLET, EXTENDED RELEASE 12 HR ORAL EVERY 12 HOURS PRN
Qty: 14 TABLET | Refills: 0 | Status: SHIPPED | OUTPATIENT
Start: 2018-08-24 | End: 2020-03-04 | Stop reason: HOSPADM

## 2018-08-24 RX ORDER — INSULIN LISPRO 100 [IU]/ML
10 INJECTION, SOLUTION INTRAVENOUS; SUBCUTANEOUS
Qty: 5 PEN | Refills: 0 | Status: ON HOLD | OUTPATIENT
Start: 2018-08-24 | End: 2020-03-11 | Stop reason: SDUPTHER

## 2018-08-24 RX ORDER — ALBUTEROL SULFATE 90 UG/1
1 AEROSOL, METERED RESPIRATORY (INHALATION) EVERY 6 HOURS PRN
Qty: 1 INHALER | Refills: 0 | Status: SHIPPED | OUTPATIENT
Start: 2018-08-24 | End: 2020-03-07

## 2018-08-24 RX ADMIN — DOCUSATE SODIUM 100 MG: 100 CAPSULE, LIQUID FILLED ORAL at 09:31

## 2018-08-24 RX ADMIN — METOCLOPRAMIDE 10 MG: 10 TABLET ORAL at 06:08

## 2018-08-24 RX ADMIN — OXYCODONE HYDROCHLORIDE 30 MG: 10 TABLET ORAL at 06:10

## 2018-08-24 RX ADMIN — DULOXETINE HYDROCHLORIDE 60 MG: 60 CAPSULE, DELAYED RELEASE ORAL at 09:31

## 2018-08-24 RX ADMIN — MORPHINE SULFATE 90 MG: 30 TABLET, FILM COATED, EXTENDED RELEASE ORAL at 09:32

## 2018-08-24 RX ADMIN — METOCLOPRAMIDE 10 MG: 10 TABLET ORAL at 11:19

## 2018-08-24 RX ADMIN — AMLODIPINE BESYLATE 10 MG: 10 TABLET ORAL at 09:33

## 2018-08-24 RX ADMIN — GUAIFENESIN 600 MG: 600 TABLET, EXTENDED RELEASE ORAL at 09:31

## 2018-08-24 RX ADMIN — HEPARIN SODIUM 5000 UNITS: 5000 INJECTION, SOLUTION INTRAVENOUS; SUBCUTANEOUS at 06:08

## 2018-08-24 RX ADMIN — CEFDINIR 300 MG: 300 CAPSULE ORAL at 09:31

## 2018-08-24 RX ADMIN — LISINOPRIL 10 MG: 10 TABLET ORAL at 09:33

## 2018-08-24 RX ADMIN — INSULIN LISPRO 6 UNITS: 100 INJECTION, SOLUTION INTRAVENOUS; SUBCUTANEOUS at 11:15

## 2018-08-24 RX ADMIN — INSULIN LISPRO 2 UNITS: 100 INJECTION, SOLUTION INTRAVENOUS; SUBCUTANEOUS at 09:36

## 2018-08-24 RX ADMIN — TRIAMTERENE AND HYDROCHLOROTHIAZIDE 1 TABLET: 37.5; 25 TABLET ORAL at 09:33

## 2018-08-24 NOTE — PROGRESS NOTES
Pt awake, alert and oriented  Denies pain, sob, n/v  Is on RA and is stand by assisted to the bathroom with a walker, noted ? Severe pvd discoloration on b/l le    Per pt anticipating d/c today  Call bell within reach

## 2018-08-24 NOTE — PLAN OF CARE
DISCHARGE PLANNING     Discharge to home or other facility with appropriate resources Adequate for Discharge        DISCHARGE PLANNING - CARE MANAGEMENT     Discharge to post-acute care or home with appropriate resources Adequate for Discharge        INFECTION - ADULT     Absence or prevention of progression during hospitalization Adequate for Discharge        Knowledge Deficit     Patient/family/caregiver demonstrates understanding of disease process, treatment plan, medications, and discharge instructions Adequate for Discharge        PAIN - ADULT     Verbalizes/displays adequate comfort level or baseline comfort level Adequate for Discharge        Potential for Falls     Patient will remain free of falls Adequate for Discharge        Prexisting or High Potential for Compromised Skin Integrity     Skin integrity is maintained or improved Adequate for Discharge        SAFETY ADULT     Maintain or return to baseline ADL function Adequate for Discharge     Maintain or return mobility status to optimal level Adequate for Discharge        SKIN/TISSUE INTEGRITY - ADULT     Skin integrity remains intact Adequate for Discharge     Incision(s), wounds(s) or drain site(s) healing without S/S of infection Adequate for Discharge     Oral mucous membranes remain intact Adequate for Discharge

## 2018-08-24 NOTE — ASSESSMENT & PLAN NOTE
· Receives wound care at her home; continue local wound care  · Per daughter, LE's are better than baseline appearance   · VNA will be set up at home  Outpatient follow-up with primary care physician

## 2018-08-24 NOTE — CASE MANAGEMENT
Notification of Discharge  This is a Notification of Discharge from our facility [address]  Please be advised that this patient has been discharge from our facility  Below you will find the admission and discharge date and time including the patients disposition  PRESENTATION DATE: 8/18/2018  9:59 PM  IP ADMISSION DATE: 8/19/18 0001  DISCHARGE DATE: 8/24/2018  2:47 PM  DISPOSITION: Home with [address] [place] [address] in the [facility] by [facility] Utilization Review Department  Phone: [phone]; Fax [phone]  ATTENTION: The Network Utilization Review Department is now centralized for our 9 Facilities  Make a note that we have a new phone and fax numbers for our Department  Please call with any questions or concerns to [phone] and carefully follow the prompts so that you are directed to the right person  All voicemails are confidential  Fax any determinations, approvals, denials, and requests for initial or continue stay review clinical to [phone]  Due to HIGH CALL volume, it would be easier if you could please send faxed requests to expedite your requests and in part, help us provide discharge notifications faster    Reference# [id number]

## 2018-08-24 NOTE — ASSESSMENT & PLAN NOTE
· Resolved  · Baseline Cr approx 1 1, Cr on admission was 1 67  · Suspect pre-renal due to recent volume depletion  · Status post IV fluids  · Monitor  · Avoid nephrotoxins  · Avoid hypotension  · I am recommending a repeat BMP in 1 week  Patient's primary care physician may facilitate patient having this test   Outpatient follow with primary care physician

## 2018-08-24 NOTE — ASSESSMENT & PLAN NOTE
· Hgb stable at this point  · No active bleeding  · Monitor  Thus I am recommending a repeat CBC in 1 week  Patient's primary care physician may facilitate patient having this test   Outpatient follow-up with primary care physician

## 2018-08-24 NOTE — ASSESSMENT & PLAN NOTE
· Previously poorly controlled, but now with better control  · Status post holding off lisinopril and diuretic medications secondary to previous acute kidney injury  These medications were  already started back  · Aside from the lisinopril and patient's diuretic medications, patient was also prescribed with amlodipine at 10 mg once a day  This was explained to the patient and patient's family  Continue blood pressure medications  · Outpatient follow-up with primary care physician for re-evaluation

## 2018-08-24 NOTE — ASSESSMENT & PLAN NOTE
· Clinically resolved  No more shortness of breath or wheezing  Cough had subsided significantly  · POA as evidenced by tachycardia and leukocytosis   · Presently maintaining good oxygen saturations on room air  · Likely due to a left lower lobe PNA seen on CXR, and/or acute tracheobronchitis, with wheezing  · Pt was seen 8/15 for vomiting and diarrhea as well  · Cannot r/o a previous viral gastroenteritis contributing  · UA negative for UTI  · Lactic acid wnl, BCx2 negative so far  · Status post Cefepime and Vanc  · Reported h/o MRSA; however, nasal swab culture was negative for MRSA, thus vancomycin discontinued  · F/u Bcx2:  Negative so far  · Negative strep and legionella urine antigen, follow-up sputum cx  · Respiratory protocol   · Procalcitonin was actually equivocal   However, with symptoms of an infection and evidence of pneumonia on the x-ray, we continued with IV antibiotic with cefepime  Repeat Procalcitonin 8/22 was normal:  Thus IV cefepime was switched to oral Omnicef, to finish a 7 day course of antibiotic treatment  Patient just needs 3 more doses to complete 1 week treatment  · As per discussion with the patient's daughter, they are concerned that patient is vaping may have contributed to patient's pneumonia/bronchitis  Thus I counseled the patient not to do vaping anymore, as I explained to her, that this may have caused her pneumonia/bronchitis/wheezing  Patient told me that she will stop vaping  · Status post steroids/prednisone treatment  · Outpatient follow-up with primary care physician

## 2018-08-24 NOTE — ASSESSMENT & PLAN NOTE
· Improved  · Suspect previously 2/2 dehydration in the setting of recent GI losses and decreased PO intake, plus due to hyperglycemia  · Status post IVF  · Manage patient's hyperglycemia  · Monitor  Thus I am recommending a repeat BMP in 1 week  Patient's primary care physician may facilitate patient having this test   Outpatient follow-up with primary care physician

## 2018-08-24 NOTE — DISCHARGE SUMMARY
Discharge- Pedro Rodriguez 1955, 65 y o  female MRN: [id number]    Unit/Bed#: -01 Encounter: [id number]    Primary Care Provider: Elisabeth Watts   Date and time admitted to hospital: 8/18/2018  9:59 PM        * Sepsis [facility])   Assessment & Plan    · Clinically resolved  No more shortness of breath or wheezing  Cough had subsided significantly  · POA as evidenced by tachycardia and leukocytosis   · Presently maintaining good oxygen saturations on room air  · Likely due to a left lower lobe PNA seen on CXR, and/or acute tracheobronchitis, with wheezing  · Pt was seen 8/15 for vomiting and diarrhea as well  · Cannot r/o a previous viral gastroenteritis contributing  · UA negative for UTI  · Lactic acid wnl, BCx2 negative so far  · Status post Cefepime and Vanc  · Reported h/o MRSA; however, nasal swab culture was negative for MRSA, thus vancomycin discontinued  · F/u Bcx2:  Negative so far  · Negative strep and legionella urine antigen, follow-up sputum cx  · Respiratory protocol   · Procalcitonin was actually equivocal   However, with symptoms of an infection and evidence of pneumonia on the x-ray, we continued with IV antibiotic with cefepime  Repeat Procalcitonin 8/22 was normal:  Thus IV cefepime was switched to oral Omnicef, to finish a 7 day course of antibiotic treatment  Patient just needs 3 more doses to complete 1 week treatment  · As per discussion with the patient's daughter, they are concerned that patient is vaping may have contributed to patient's pneumonia/bronchitis  Thus I counseled the patient not to do vaping anymore, as I explained to her, that this may have caused her pneumonia/bronchitis/wheezing  Patient told me that she will stop vaping  · Status post steroids/prednisone treatment  · Outpatient follow-up with primary care physician  Acute kidney injury [facility])   Assessment & Plan    · Resolved    · Baseline Cr approx 1 1, Cr on admission was 1 67  · Suspect pre-renal due to recent volume depletion  · Status post IV fluids  · Monitor  · Avoid nephrotoxins  · Avoid hypotension  · I am recommending a repeat BMP in 1 week  Patient's primary care physician may facilitate patient having this test   Outpatient follow with primary care physician  Altered mental status   Assessment & Plan    · Resolved  · Likely a toxic metabolic encephalopathy 2/2 infection in the setting of electrolyte disturbance  · CXR with left lower lobe PNA  · On admission, Sodium 126, hyperkalemic at 5 7; improved to 134 and potassium is normal   · CT head negative, trop negative  · Ammonia, VBG, TSH wnl   · Monitor for improvement on abx  · Status post Q4 neuro checks   · Status post seizure precaution   · Outpatient follow-up with primary care physician  Microcytic anemia   Assessment & Plan    · Hgb stable at this point  · No active bleeding  · Monitor  Thus I am recommending a repeat CBC in 1 week  Patient's primary care physician may facilitate patient having this test   Outpatient follow-up with primary care physician  Type 2 diabetes mellitus with hyperglycemia, with long-term current use of insulin ([facility] [address] )   Assessment & Plan    · Poorly controlled  · Exacerbated by steroids  · Patient's insulin doses were increased while patient was here  · Continue basal bolus insulin treatment  · According to the patient's daughter, at home, patient is very stubborn regarding diet  According to her, patient usually eats and drinks high-calorie/sweet food and drinks  In fact she told me that at times her blood sugars were in the 400s and 500s at home  Patient was counseled about this  · Adjust treatment accordingly  · Monitor  · Patient is done with prednisone/steroid treatment  Thus expect blood sugars to go down    Due to patient's diabetes being poorly controlled at home, I will increase patient's doses of insulin treatment as compared to her previous home doses of insulin  Patient will be prescribed with Lantus at 30 units at bedtime as well as the short-acting insulin at 10 units 3 times a day with meals  Patient may continue with her metformin  This was explained to the patient and patient's daughter  · Outpatient follow-up with primary care physician for re-evaluation  Hyponatremia   Assessment & Plan    · Improved  · Suspect previously 2/2 dehydration in the setting of recent GI losses and decreased PO intake, plus due to hyperglycemia  · Status post IVF  · Manage patient's hyperglycemia  · Monitor  Thus I am recommending a repeat BMP in 1 week  Patient's primary care physician may facilitate patient having this test   Outpatient follow-up with primary care physician  Chronic venous stasis dermatitis of both lower extremities   Assessment & Plan    · Receives wound care at her home; continue local wound care  · Per daughter, LE's are better than baseline appearance   · VNA will be set up at home  Outpatient follow-up with primary care physician  Hyperlipidemia   Assessment & Plan    · Continue statin  · Outpatient follow-up with primary care physician  Essential hypertension   Assessment & Plan    · Previously poorly controlled, but now with better control  · Status post holding off lisinopril and diuretic medications secondary to previous acute kidney injury  These medications were  already started back  · Aside from the lisinopril and patient's diuretic medications, patient was also prescribed with amlodipine at 10 mg once a day  This was explained to the patient and patient's family  Continue blood pressure medications  · Outpatient follow-up with primary care physician for re-evaluation  Discharging Physician / Practitioner: Jose Castro MD  PCP: Jackie Watts  Admission Date:  August 18, 2018      Discharge Date: 08/24/18        Consultations During [facility] Stay:  · None  Procedures Performed:     · Please see diagnosis and notes above  Significant Findings / Test Results:     · Please see diagnosis and notes above  Incidental Findings:   · None  Test Results Pending at Discharge (will require follow up): · None  Outpatient Tests Requested:  · None  However, I am strongly recommending a repeat BMP and CBC in 1 week  Patient's primary care physician may facilitate patient having these tests  Complications:  None  Reason for Admission:  Sepsis  Hospital Course: Vinnie Farias is a 65 y o  female patient who originally presented to the hospital on 8/18/2018 due to tremors  Patient was eventually found to have sepsis secondary to a respiratory tract infection  Patient had significant productive cough and initially had shortness of breath  Chest x-ray revealed possibility of pneumonia  There is also possibility that patient had was more of acute tracheobronchitis as patient was noticed to be wheezing also  Patient was placed on antibiotics, nebulizations, and oral prednisone  Patient's condition improved  Patient's shortness of breath and wheezing had resolved  Patient's cough had subsided significantly  Patient does not have any tremors anymore  Patient's condition improved  For details about patient's diagnosis, workups, management, hospital course and discharge plans, please see above list of diagnoses and related notes  Condition at Discharge: stable     Discharge Day Visit / Exam:     Subjective:    Patient is doing fine and feels a lot better  Patient denies any symptoms  Patient does not have any complaints  Patient denies any shortness of breath or any pains  Patient tells me that she does not have that much cough anymore as compared to the previous days  Patient is okay with her discharge to home today with visiting home health services    From our discussion today, patient promised me that she will not vape anymore and will not eat or drink high-calorie or sweet food or drinks  She even gave me a pinky promise  Vitals: Blood Pressure: 149/69 (08/24/18 0715)  Pulse: 73 (08/24/18 0715)  Temperature: 98 2 °F (36 8 °C) (08/24/18 0715)  Temp Source: Oral (08/24/18 0715)  Respirations: 18 (08/24/18 0715)  Height: 5' (152 4 cm) (08/19/18 0200)  Weight - Scale: 93 6 kg (206 lb 5 6 oz) (08/19/18 0200)  SpO2: 98 % (08/24/18 0715)  Exam:   Physical Exam   Constitutional: No distress  HENT:   Head: Normocephalic and atraumatic  Eyes: Right eye exhibits no discharge  Left eye exhibits no discharge  No scleral icterus  Neck: No JVD present  No tracheal deviation present  Cardiovascular: Normal rate, regular rhythm and normal heart sounds  Exam reveals no gallop and no friction rub  No murmur heard  Pulmonary/Chest: Effort normal and breath sounds normal  No stridor  No respiratory distress  She has no wheezes  She has no rales  Abdominal: Soft  Bowel sounds are normal  She exhibits no distension  There is no tenderness  There is no rebound and no guarding  Musculoskeletal: She exhibits edema  She exhibits no tenderness or deformity  Positive for bilateral lower extremity edema (chronic)  Neurological: She is alert  No cranial nerve deficit  Skin: Skin is warm  No rash noted  She is not diaphoretic  No erythema  No pallor  Positive for chronic venous stasis skin changes on bilateral lower extremities     Psychiatric: She has a normal mood and affect  Her behavior is normal  Thought content normal    Patient is pleasant  Vitals reviewed  Discussion with Family:  I spoke to the patient's daughter, Cait, and gave updates for today  She is okay with the discharge plans  She is okay with discharge of her mother today back to her home with home health services  Discharge instructions/Information to patient and family:   See after visit summary for information provided to patient and family  Provisions for Follow-Up Care:  See after visit summary for information related to follow-up care and any pertinent home health orders  Disposition:     Home    For Discharges to [facility]:   · Not Applicable to this Patient - Not Applicable to this Patient    Planned Readmission:  None  Discharge Statement:  I spent 45 minutes discharging the patient  This time was spent on the day of discharge  I had direct contact with the patient on the day of discharge  Greater than 50% of the total time was spent examining patient, answering all patient questions, arranging and discussing plan of care with patient as well as directly providing post-discharge instructions  Additional time then spent on discharge activities  Discharge Medications:  See after visit summary for reconciled discharge medications provided to patient and family        ** Please Note: This note has been constructed using a voice recognition system **

## 2018-08-24 NOTE — DISCHARGE INSTRUCTIONS
· As we have discussed, your vaping may have caused your respiratory tract infection and wheezing  As you have promised me, do not vape anymore  · As we have discussed, do not eat or drink high-calorie or sweet food or drinks as you are diabetic  · Both of these above, you promised me to keep, you even gave me a "pinky" promise

## 2018-08-24 NOTE — ASSESSMENT & PLAN NOTE
· Poorly controlled  · Exacerbated by steroids  · Patient's insulin doses were increased while patient was here  · Continue basal bolus insulin treatment  · According to the patient's daughter, at home, patient is very stubborn regarding diet  According to her, patient usually eats and drinks high-calorie/sweet food and drinks  In fact she told me that at times her blood sugars were in the 400s and 500s at home  Patient was counseled about this  · Adjust treatment accordingly  · Monitor  · Patient is done with prednisone/steroid treatment  Thus expect blood sugars to go down  Due to patient's diabetes being poorly controlled at home, I will increase patient's doses of insulin treatment as compared to her previous home doses of insulin  Patient will be prescribed with Lantus at 30 units at bedtime as well as the short-acting insulin at 10 units 3 times a day with meals  Patient may continue with her metformin  This was explained to the patient and patient's daughter  · Outpatient follow-up with primary care physician for re-evaluation

## 2018-08-24 NOTE — ASSESSMENT & PLAN NOTE
· Resolved  · Likely a toxic metabolic encephalopathy 2/2 infection in the setting of electrolyte disturbance  · CXR with left lower lobe PNA  · On admission, Sodium 126, hyperkalemic at 5 7; improved to 134 and potassium is normal   · CT head negative, trop negative  · Ammonia, VBG, TSH wnl   · Monitor for improvement on abx  · Status post Q4 neuro checks   · Status post seizure precaution   · Outpatient follow-up with primary care physician

## 2018-08-28 NOTE — CASE MANAGEMENT
May we have an update on the last 2 pending days please? Notification of Discharge  This is a Notification of Discharge from our facility [facility]  Please be advised that this patient has been discharge from our facility  Below you will find the admission and discharge date and time including the patients disposition  PRESENTATION DATE: 8/18/2018  9:59 PM  IP ADMISSION DATE: 8/19/18 0001  DISCHARGE DATE: 8/24/2018  2:47 PM  DISPOSITION: Home with [address] [place] [address] in the [facility] by [facility] Utilization Review Department  Phone: [phone]; Fax [phone]  ATTENTION: The Network Utilization Review Department is now centralized for our 9 Facilities  Make a note that we have a new phone and fax numbers for our Department  Please call with any questions or concerns to [phone] and carefully follow the prompts so that you are directed to the right person  All voicemails are confidential  Fax any determinations, approvals, denials, and requests for initial or continue stay review clinical to [phone]  Due to HIGH CALL volume, it would be easier if you could please send faxed requests to expedite your requests and in part, help us provide discharge notifications faster    Reference #[id number]

## 2020-02-29 ENCOUNTER — HOSPITAL ENCOUNTER (INPATIENT)
Facility: HOSPITAL | Age: 65
LOS: 3 days | Discharge: HOME | DRG: 871 | End: 2020-03-04
Attending: EMERGENCY MEDICINE | Admitting: HOSPITALIST
Payer: COMMERCIAL

## 2020-02-29 DIAGNOSIS — G93.41 ACUTE METABOLIC ENCEPHALOPATHY: ICD-10-CM

## 2020-02-29 DIAGNOSIS — R25.1 OCCASIONAL TREMORS: ICD-10-CM

## 2020-02-29 DIAGNOSIS — F11.20 CONTINUOUS OPIOID DEPENDENCE (HCC): ICD-10-CM

## 2020-02-29 DIAGNOSIS — E11.65 TYPE 2 DIABETES MELLITUS WITH HYPERGLYCEMIA, WITHOUT LONG-TERM CURRENT USE OF INSULIN (HCC): ICD-10-CM

## 2020-02-29 DIAGNOSIS — G89.4 CHRONIC PAIN SYNDROME: ICD-10-CM

## 2020-02-29 DIAGNOSIS — R41.82 ALTERED MENTAL STATE: ICD-10-CM

## 2020-02-29 DIAGNOSIS — N39.0 UTI (URINARY TRACT INFECTION): Primary | ICD-10-CM

## 2020-02-29 LAB
ALBUMIN SERPL BCP-MCNC: 3.4 G/DL (ref 3.5–5)
ALP SERPL-CCNC: 151 U/L (ref 46–116)
ALT SERPL W P-5'-P-CCNC: 27 U/L (ref 12–78)
ANION GAP SERPL CALCULATED.3IONS-SCNC: 5 MMOL/L (ref 4–13)
APTT PPP: 36 SECONDS (ref 23–37)
AST SERPL W P-5'-P-CCNC: 24 U/L (ref 5–45)
BACTERIA UR QL AUTO: ABNORMAL /HPF
BASOPHILS # BLD AUTO: 0.03 THOUSANDS/ΜL (ref 0–0.1)
BASOPHILS NFR BLD AUTO: 0 % (ref 0–1)
BILIRUB SERPL-MCNC: 0.49 MG/DL (ref 0.2–1)
BILIRUB UR QL STRIP: NEGATIVE
BUN SERPL-MCNC: 45 MG/DL (ref 5–25)
CALCIUM SERPL-MCNC: 8.5 MG/DL (ref 8.3–10.1)
CHLORIDE SERPL-SCNC: 91 MMOL/L (ref 100–108)
CLARITY UR: ABNORMAL
CO2 SERPL-SCNC: 27 MMOL/L (ref 21–32)
COLOR UR: YELLOW
CREAT SERPL-MCNC: 1.85 MG/DL (ref 0.6–1.3)
EOSINOPHIL # BLD AUTO: 0.2 THOUSAND/ΜL (ref 0–0.61)
EOSINOPHIL NFR BLD AUTO: 2 % (ref 0–6)
ERYTHROCYTE [DISTWIDTH] IN BLOOD BY AUTOMATED COUNT: 14.3 % (ref 11.6–15.1)
FLUAV RNA NPH QL NAA+PROBE: NORMAL
FLUBV RNA NPH QL NAA+PROBE: NORMAL
GFR SERPL CREATININE-BSD FRML MDRD: 29 ML/MIN/1.73SQ M
GLUCOSE SERPL-MCNC: 94 MG/DL (ref 65–140)
GLUCOSE UR STRIP-MCNC: NEGATIVE MG/DL
HCT VFR BLD AUTO: 38.4 % (ref 34.8–46.1)
HGB BLD-MCNC: 12.7 G/DL (ref 11.5–15.4)
HGB UR QL STRIP.AUTO: NEGATIVE
IMM GRANULOCYTES # BLD AUTO: 0.03 THOUSAND/UL (ref 0–0.2)
IMM GRANULOCYTES NFR BLD AUTO: 0 % (ref 0–2)
INR PPP: 1.05 (ref 0.84–1.19)
KETONES UR STRIP-MCNC: NEGATIVE MG/DL
LACTATE SERPL-SCNC: 0.5 MMOL/L (ref 0.5–2)
LEUKOCYTE ESTERASE UR QL STRIP: ABNORMAL
LIPASE SERPL-CCNC: 24 U/L (ref 73–393)
LYMPHOCYTES # BLD AUTO: 1.21 THOUSANDS/ΜL (ref 0.6–4.47)
LYMPHOCYTES NFR BLD AUTO: 11 % (ref 14–44)
MCH RBC QN AUTO: 30.6 PG (ref 26.8–34.3)
MCHC RBC AUTO-ENTMCNC: 33.1 G/DL (ref 31.4–37.4)
MCV RBC AUTO: 93 FL (ref 82–98)
MONOCYTES # BLD AUTO: 0.56 THOUSAND/ΜL (ref 0.17–1.22)
MONOCYTES NFR BLD AUTO: 5 % (ref 4–12)
NEUTROPHILS # BLD AUTO: 8.89 THOUSANDS/ΜL (ref 1.85–7.62)
NEUTS SEG NFR BLD AUTO: 82 % (ref 43–75)
NITRITE UR QL STRIP: NEGATIVE
NON-SQ EPI CELLS URNS QL MICRO: ABNORMAL /HPF
NRBC BLD AUTO-RTO: 0 /100 WBCS
NT-PROBNP SERPL-MCNC: 312 PG/ML
PH UR STRIP.AUTO: 6 [PH]
PLATELET # BLD AUTO: 166 THOUSANDS/UL (ref 149–390)
PMV BLD AUTO: 9.2 FL (ref 8.9–12.7)
POTASSIUM SERPL-SCNC: 6.5 MMOL/L (ref 3.5–5.3)
PROT SERPL-MCNC: 7.6 G/DL (ref 6.4–8.2)
PROT UR STRIP-MCNC: NEGATIVE MG/DL
PROTHROMBIN TIME: 13.1 SECONDS (ref 11.6–14.5)
RBC # BLD AUTO: 4.15 MILLION/UL (ref 3.81–5.12)
RBC #/AREA URNS AUTO: ABNORMAL /HPF
RSV RNA NPH QL NAA+PROBE: NORMAL
SODIUM SERPL-SCNC: 123 MMOL/L (ref 136–145)
SP GR UR STRIP.AUTO: 1.02 (ref 1–1.03)
TROPONIN I SERPL-MCNC: <0.02 NG/ML
UROBILINOGEN UR QL STRIP.AUTO: 0.2 E.U./DL
WBC # BLD AUTO: 10.92 THOUSAND/UL (ref 4.31–10.16)
WBC #/AREA URNS AUTO: ABNORMAL /HPF

## 2020-02-29 PROCEDURE — 36415 COLL VENOUS BLD VENIPUNCTURE: CPT | Performed by: EMERGENCY MEDICINE

## 2020-02-29 PROCEDURE — 85025 COMPLETE CBC W/AUTO DIFF WBC: CPT | Performed by: EMERGENCY MEDICINE

## 2020-02-29 PROCEDURE — 83605 ASSAY OF LACTIC ACID: CPT | Performed by: EMERGENCY MEDICINE

## 2020-02-29 PROCEDURE — 87186 SC STD MICRODIL/AGAR DIL: CPT | Performed by: EMERGENCY MEDICINE

## 2020-02-29 PROCEDURE — 85730 THROMBOPLASTIN TIME PARTIAL: CPT | Performed by: EMERGENCY MEDICINE

## 2020-02-29 PROCEDURE — 99285 EMERGENCY DEPT VISIT HI MDM: CPT | Performed by: EMERGENCY MEDICINE

## 2020-02-29 PROCEDURE — 87631 RESP VIRUS 3-5 TARGETS: CPT | Performed by: EMERGENCY MEDICINE

## 2020-02-29 PROCEDURE — 83880 ASSAY OF NATRIURETIC PEPTIDE: CPT | Performed by: EMERGENCY MEDICINE

## 2020-02-29 PROCEDURE — 87086 URINE CULTURE/COLONY COUNT: CPT | Performed by: EMERGENCY MEDICINE

## 2020-02-29 PROCEDURE — 85610 PROTHROMBIN TIME: CPT | Performed by: EMERGENCY MEDICINE

## 2020-02-29 PROCEDURE — 84484 ASSAY OF TROPONIN QUANT: CPT | Performed by: EMERGENCY MEDICINE

## 2020-02-29 PROCEDURE — 87040 BLOOD CULTURE FOR BACTERIA: CPT | Performed by: EMERGENCY MEDICINE

## 2020-02-29 PROCEDURE — 87077 CULTURE AEROBIC IDENTIFY: CPT | Performed by: EMERGENCY MEDICINE

## 2020-02-29 PROCEDURE — 81001 URINALYSIS AUTO W/SCOPE: CPT | Performed by: EMERGENCY MEDICINE

## 2020-02-29 PROCEDURE — 83690 ASSAY OF LIPASE: CPT | Performed by: EMERGENCY MEDICINE

## 2020-02-29 PROCEDURE — 99285 EMERGENCY DEPT VISIT HI MDM: CPT

## 2020-02-29 PROCEDURE — 93005 ELECTROCARDIOGRAM TRACING: CPT

## 2020-02-29 PROCEDURE — 80053 COMPREHEN METABOLIC PANEL: CPT | Performed by: EMERGENCY MEDICINE

## 2020-02-29 RX ORDER — ALBUTEROL SULFATE 2.5 MG/3ML
10 SOLUTION RESPIRATORY (INHALATION) ONCE
Status: COMPLETED | OUTPATIENT
Start: 2020-03-01 | End: 2020-03-01

## 2020-02-29 RX ORDER — DEXTROSE MONOHYDRATE 25 G/50ML
50 INJECTION, SOLUTION INTRAVENOUS ONCE
Status: COMPLETED | OUTPATIENT
Start: 2020-03-01 | End: 2020-03-01

## 2020-02-29 RX ORDER — CALCIUM GLUCONATE 20 MG/ML
1 INJECTION, SOLUTION INTRAVENOUS ONCE
Status: COMPLETED | OUTPATIENT
Start: 2020-03-01 | End: 2020-03-01

## 2020-03-01 ENCOUNTER — APPOINTMENT (INPATIENT)
Dept: ULTRASOUND IMAGING | Facility: HOSPITAL | Age: 65
DRG: 871 | End: 2020-03-01
Payer: COMMERCIAL

## 2020-03-01 ENCOUNTER — APPOINTMENT (EMERGENCY)
Dept: CT IMAGING | Facility: HOSPITAL | Age: 65
DRG: 871 | End: 2020-03-01
Payer: COMMERCIAL

## 2020-03-01 ENCOUNTER — APPOINTMENT (INPATIENT)
Dept: RADIOLOGY | Facility: HOSPITAL | Age: 65
DRG: 871 | End: 2020-03-01
Payer: COMMERCIAL

## 2020-03-01 PROBLEM — G93.41 ACUTE METABOLIC ENCEPHALOPATHY: Status: ACTIVE | Noted: 2018-08-19

## 2020-03-01 PROBLEM — E87.5 HYPERKALEMIA: Status: ACTIVE | Noted: 2018-04-25

## 2020-03-01 PROBLEM — R29.6 RECURRENT FALLS: Status: ACTIVE | Noted: 2017-07-14

## 2020-03-01 PROBLEM — R19.7 INTRACTABLE DIARRHEA: Status: RESOLVED | Noted: 2018-04-25 | Resolved: 2020-03-01

## 2020-03-01 LAB
ALBUMIN SERPL BCP-MCNC: 2.9 G/DL (ref 3.5–5)
ALP SERPL-CCNC: 164 U/L (ref 46–116)
ALT SERPL W P-5'-P-CCNC: 44 U/L (ref 12–78)
AMMONIA PLAS-SCNC: 11 UMOL/L (ref 11–35)
ANION GAP SERPL CALCULATED.3IONS-SCNC: 4 MMOL/L (ref 4–13)
ANION GAP SERPL CALCULATED.3IONS-SCNC: 6 MMOL/L (ref 4–13)
AST SERPL W P-5'-P-CCNC: 85 U/L (ref 5–45)
ATRIAL RATE: 109 BPM
ATRIAL RATE: 109 BPM
BASOPHILS # BLD AUTO: 0.03 THOUSANDS/ΜL (ref 0–0.1)
BASOPHILS NFR BLD AUTO: 0 % (ref 0–1)
BILIRUB SERPL-MCNC: 1.07 MG/DL (ref 0.2–1)
BUN SERPL-MCNC: 33 MG/DL (ref 5–25)
BUN SERPL-MCNC: 39 MG/DL (ref 5–25)
BUN SERPL-MCNC: 43 MG/DL (ref 5–25)
BUN SERPL-MCNC: 45 MG/DL (ref 5–25)
CA-I BLD-SCNC: 1.05 MMOL/L (ref 1.12–1.32)
CALCIUM SERPL-MCNC: 8 MG/DL (ref 8.3–10.1)
CALCIUM SERPL-MCNC: 8.1 MG/DL (ref 8.3–10.1)
CALCIUM SERPL-MCNC: 8.2 MG/DL (ref 8.3–10.1)
CALCIUM SERPL-MCNC: 8.4 MG/DL (ref 8.3–10.1)
CHLORIDE SERPL-SCNC: 93 MMOL/L (ref 100–108)
CHLORIDE SERPL-SCNC: 96 MMOL/L (ref 100–108)
CHLORIDE SERPL-SCNC: 97 MMOL/L (ref 100–108)
CHLORIDE SERPL-SCNC: 98 MMOL/L (ref 100–108)
CO2 SERPL-SCNC: 23 MMOL/L (ref 21–32)
CO2 SERPL-SCNC: 25 MMOL/L (ref 21–32)
CO2 SERPL-SCNC: 26 MMOL/L (ref 21–32)
CO2 SERPL-SCNC: 28 MMOL/L (ref 21–32)
CREAT SERPL-MCNC: 1.23 MG/DL (ref 0.6–1.3)
CREAT SERPL-MCNC: 1.5 MG/DL (ref 0.6–1.3)
CREAT SERPL-MCNC: 1.59 MG/DL (ref 0.6–1.3)
CREAT SERPL-MCNC: 1.83 MG/DL (ref 0.6–1.3)
CREAT UR-MCNC: 66.4 MG/DL
EOSINOPHIL # BLD AUTO: 0.13 THOUSAND/ΜL (ref 0–0.61)
EOSINOPHIL NFR BLD AUTO: 1 % (ref 0–6)
ERYTHROCYTE [DISTWIDTH] IN BLOOD BY AUTOMATED COUNT: 14.2 % (ref 11.6–15.1)
EST. AVERAGE GLUCOSE BLD GHB EST-MCNC: 108 MG/DL
GFR SERPL CREATININE-BSD FRML MDRD: 29 ML/MIN/1.73SQ M
GFR SERPL CREATININE-BSD FRML MDRD: 34 ML/MIN/1.73SQ M
GFR SERPL CREATININE-BSD FRML MDRD: 37 ML/MIN/1.73SQ M
GFR SERPL CREATININE-BSD FRML MDRD: 47 ML/MIN/1.73SQ M
GLUCOSE SERPL-MCNC: 156 MG/DL (ref 65–140)
GLUCOSE SERPL-MCNC: 166 MG/DL (ref 65–140)
GLUCOSE SERPL-MCNC: 167 MG/DL (ref 65–140)
GLUCOSE SERPL-MCNC: 192 MG/DL (ref 65–140)
GLUCOSE SERPL-MCNC: 57 MG/DL (ref 65–140)
GLUCOSE SERPL-MCNC: 79 MG/DL (ref 65–140)
GLUCOSE SERPL-MCNC: 80 MG/DL (ref 65–140)
GLUCOSE SERPL-MCNC: 84 MG/DL (ref 65–140)
GLUCOSE SERPL-MCNC: 93 MG/DL (ref 65–140)
HBA1C MFR BLD: 5.4 %
HCT VFR BLD AUTO: 41.4 % (ref 34.8–46.1)
HGB BLD-MCNC: 13.4 G/DL (ref 11.5–15.4)
IMM GRANULOCYTES # BLD AUTO: 0.05 THOUSAND/UL (ref 0–0.2)
IMM GRANULOCYTES NFR BLD AUTO: 0 % (ref 0–2)
LYMPHOCYTES # BLD AUTO: 1.4 THOUSANDS/ΜL (ref 0.6–4.47)
LYMPHOCYTES NFR BLD AUTO: 13 % (ref 14–44)
MAGNESIUM SERPL-MCNC: 1.7 MG/DL (ref 1.6–2.6)
MCH RBC QN AUTO: 30.8 PG (ref 26.8–34.3)
MCHC RBC AUTO-ENTMCNC: 32.4 G/DL (ref 31.4–37.4)
MCV RBC AUTO: 95 FL (ref 82–98)
MONOCYTES # BLD AUTO: 0.65 THOUSAND/ΜL (ref 0.17–1.22)
MONOCYTES NFR BLD AUTO: 6 % (ref 4–12)
NEUTROPHILS # BLD AUTO: 8.91 THOUSANDS/ΜL (ref 1.85–7.62)
NEUTS SEG NFR BLD AUTO: 80 % (ref 43–75)
NRBC BLD AUTO-RTO: 0 /100 WBCS
P AXIS: 70 DEGREES
P AXIS: 74 DEGREES
PHOSPHATE SERPL-MCNC: 4.2 MG/DL (ref 2.3–4.1)
PLATELET # BLD AUTO: 135 THOUSANDS/UL (ref 149–390)
PLATELET # BLD AUTO: 145 THOUSANDS/UL (ref 149–390)
PMV BLD AUTO: 10 FL (ref 8.9–12.7)
PMV BLD AUTO: 9.6 FL (ref 8.9–12.7)
POTASSIUM SERPL-SCNC: 5.5 MMOL/L (ref 3.5–5.3)
POTASSIUM SERPL-SCNC: 5.6 MMOL/L (ref 3.5–5.3)
POTASSIUM SERPL-SCNC: 5.7 MMOL/L (ref 3.5–5.3)
POTASSIUM SERPL-SCNC: 6.1 MMOL/L (ref 3.5–5.3)
PR INTERVAL: 168 MS
PR INTERVAL: 174 MS
PROCALCITONIN SERPL-MCNC: 0.16 NG/ML
PROT SERPL-MCNC: 6.7 G/DL (ref 6.4–8.2)
QRS AXIS: 38 DEGREES
QRS AXIS: 54 DEGREES
QRSD INTERVAL: 84 MS
QRSD INTERVAL: 88 MS
QT INTERVAL: 300 MS
QT INTERVAL: 322 MS
QTC INTERVAL: 404 MS
QTC INTERVAL: 433 MS
RBC # BLD AUTO: 4.35 MILLION/UL (ref 3.81–5.12)
SODIUM 24H UR-SCNC: 33 MOL/L
SODIUM SERPL-SCNC: 122 MMOL/L (ref 136–145)
SODIUM SERPL-SCNC: 127 MMOL/L (ref 136–145)
SODIUM SERPL-SCNC: 129 MMOL/L (ref 136–145)
SODIUM SERPL-SCNC: 130 MMOL/L (ref 136–145)
T WAVE AXIS: 46 DEGREES
T WAVE AXIS: 65 DEGREES
VENTRICULAR RATE: 109 BPM
VENTRICULAR RATE: 109 BPM
WBC # BLD AUTO: 11.17 THOUSAND/UL (ref 4.31–10.16)

## 2020-03-01 PROCEDURE — 82140 ASSAY OF AMMONIA: CPT | Performed by: PHYSICIAN ASSISTANT

## 2020-03-01 PROCEDURE — 93010 ELECTROCARDIOGRAM REPORT: CPT | Performed by: INTERNAL MEDICINE

## 2020-03-01 PROCEDURE — 71045 X-RAY EXAM CHEST 1 VIEW: CPT

## 2020-03-01 PROCEDURE — 83036 HEMOGLOBIN GLYCOSYLATED A1C: CPT | Performed by: NURSE PRACTITIONER

## 2020-03-01 PROCEDURE — 96365 THER/PROPH/DIAG IV INF INIT: CPT

## 2020-03-01 PROCEDURE — 96374 THER/PROPH/DIAG INJ IV PUSH: CPT

## 2020-03-01 PROCEDURE — 94644 CONT INHLJ TX 1ST HOUR: CPT

## 2020-03-01 PROCEDURE — 93970 EXTREMITY STUDY: CPT | Performed by: SURGERY

## 2020-03-01 PROCEDURE — 84100 ASSAY OF PHOSPHORUS: CPT | Performed by: NURSE PRACTITIONER

## 2020-03-01 PROCEDURE — 82948 REAGENT STRIP/BLOOD GLUCOSE: CPT

## 2020-03-01 PROCEDURE — 80053 COMPREHEN METABOLIC PANEL: CPT | Performed by: NURSE PRACTITIONER

## 2020-03-01 PROCEDURE — 96375 TX/PRO/DX INJ NEW DRUG ADDON: CPT

## 2020-03-01 PROCEDURE — 85025 COMPLETE CBC W/AUTO DIFF WBC: CPT | Performed by: NURSE PRACTITIONER

## 2020-03-01 PROCEDURE — 85049 AUTOMATED PLATELET COUNT: CPT | Performed by: NURSE PRACTITIONER

## 2020-03-01 PROCEDURE — 82330 ASSAY OF CALCIUM: CPT | Performed by: NURSE PRACTITIONER

## 2020-03-01 PROCEDURE — 70450 CT HEAD/BRAIN W/O DYE: CPT

## 2020-03-01 PROCEDURE — 74176 CT ABD & PELVIS W/O CONTRAST: CPT

## 2020-03-01 PROCEDURE — 94760 N-INVAS EAR/PLS OXIMETRY 1: CPT

## 2020-03-01 PROCEDURE — 72125 CT NECK SPINE W/O DYE: CPT

## 2020-03-01 PROCEDURE — 93005 ELECTROCARDIOGRAM TRACING: CPT

## 2020-03-01 PROCEDURE — 84300 ASSAY OF URINE SODIUM: CPT | Performed by: NURSE PRACTITIONER

## 2020-03-01 PROCEDURE — 82570 ASSAY OF URINE CREATININE: CPT | Performed by: NURSE PRACTITIONER

## 2020-03-01 PROCEDURE — 84145 PROCALCITONIN (PCT): CPT | Performed by: NURSE PRACTITIONER

## 2020-03-01 PROCEDURE — 99291 CRITICAL CARE FIRST HOUR: CPT | Performed by: INTERNAL MEDICINE

## 2020-03-01 PROCEDURE — 93970 EXTREMITY STUDY: CPT

## 2020-03-01 PROCEDURE — 71250 CT THORAX DX C-: CPT

## 2020-03-01 PROCEDURE — 80048 BASIC METABOLIC PNL TOTAL CA: CPT | Performed by: NURSE PRACTITIONER

## 2020-03-01 PROCEDURE — 83735 ASSAY OF MAGNESIUM: CPT | Performed by: NURSE PRACTITIONER

## 2020-03-01 RX ORDER — CHLORHEXIDINE GLUCONATE 0.12 MG/ML
15 RINSE ORAL EVERY 12 HOURS SCHEDULED
Status: DISCONTINUED | OUTPATIENT
Start: 2020-03-01 | End: 2020-03-02

## 2020-03-01 RX ORDER — CALCIUM GLUCONATE 20 MG/ML
1 INJECTION, SOLUTION INTRAVENOUS ONCE
Status: COMPLETED | OUTPATIENT
Start: 2020-03-01 | End: 2020-03-01

## 2020-03-01 RX ORDER — DEXTROSE MONOHYDRATE 25 G/50ML
50 INJECTION, SOLUTION INTRAVENOUS ONCE
Status: COMPLETED | OUTPATIENT
Start: 2020-03-01 | End: 2020-03-01

## 2020-03-01 RX ORDER — GUAIFENESIN 600 MG
600 TABLET, EXTENDED RELEASE 12 HR ORAL EVERY 12 HOURS PRN
Status: DISCONTINUED | OUTPATIENT
Start: 2020-03-01 | End: 2020-03-01

## 2020-03-01 RX ORDER — MORPHINE SULFATE 100 MG/1
100 CAPSULE, EXTENDED RELEASE ORAL 2 TIMES DAILY
Status: ON HOLD | COMMUNITY
End: 2020-03-04 | Stop reason: SDUPTHER

## 2020-03-01 RX ORDER — LORAZEPAM 2 MG/ML
0.5 INJECTION INTRAMUSCULAR ONCE
Status: COMPLETED | OUTPATIENT
Start: 2020-03-01 | End: 2020-03-01

## 2020-03-01 RX ORDER — HEPARIN SODIUM 5000 [USP'U]/ML
5000 INJECTION, SOLUTION INTRAVENOUS; SUBCUTANEOUS EVERY 8 HOURS SCHEDULED
Status: DISCONTINUED | OUTPATIENT
Start: 2020-03-01 | End: 2020-03-04 | Stop reason: HOSPADM

## 2020-03-01 RX ORDER — GABAPENTIN 300 MG/1
300 CAPSULE ORAL
Status: DISCONTINUED | OUTPATIENT
Start: 2020-03-01 | End: 2020-03-01

## 2020-03-01 RX ORDER — SODIUM CHLORIDE, SODIUM GLUCONATE, SODIUM ACETATE, POTASSIUM CHLORIDE, MAGNESIUM CHLORIDE, SODIUM PHOSPHATE, DIBASIC, AND POTASSIUM PHOSPHATE .53; .5; .37; .037; .03; .012; .00082 G/100ML; G/100ML; G/100ML; G/100ML; G/100ML; G/100ML; G/100ML
75 INJECTION, SOLUTION INTRAVENOUS CONTINUOUS
Status: DISCONTINUED | OUTPATIENT
Start: 2020-03-01 | End: 2020-03-01

## 2020-03-01 RX ORDER — DULOXETIN HYDROCHLORIDE 30 MG/1
60 CAPSULE, DELAYED RELEASE ORAL DAILY
Status: DISCONTINUED | OUTPATIENT
Start: 2020-03-02 | End: 2020-03-04 | Stop reason: HOSPADM

## 2020-03-01 RX ORDER — METOCLOPRAMIDE 10 MG/1
10 TABLET ORAL
Status: DISCONTINUED | OUTPATIENT
Start: 2020-03-01 | End: 2020-03-03

## 2020-03-01 RX ORDER — DULOXETIN HYDROCHLORIDE 60 MG/1
60 CAPSULE, DELAYED RELEASE ORAL DAILY
Status: DISCONTINUED | OUTPATIENT
Start: 2020-03-01 | End: 2020-03-01

## 2020-03-01 RX ORDER — DEXTROSE MONOHYDRATE 25 G/50ML
INJECTION, SOLUTION INTRAVENOUS
Status: COMPLETED
Start: 2020-03-01 | End: 2020-03-01

## 2020-03-01 RX ORDER — METOCLOPRAMIDE 10 MG/1
10 TABLET ORAL
Status: DISCONTINUED | OUTPATIENT
Start: 2020-03-01 | End: 2020-03-01

## 2020-03-01 RX ORDER — AMOXICILLIN 250 MG
2 CAPSULE ORAL 2 TIMES DAILY
Status: DISCONTINUED | OUTPATIENT
Start: 2020-03-01 | End: 2020-03-04 | Stop reason: HOSPADM

## 2020-03-01 RX ORDER — NYSTATIN 100000 [USP'U]/G
POWDER TOPICAL 2 TIMES DAILY
Status: DISCONTINUED | OUTPATIENT
Start: 2020-03-01 | End: 2020-03-04 | Stop reason: HOSPADM

## 2020-03-01 RX ORDER — INSULIN GLARGINE 100 [IU]/ML
10 INJECTION, SOLUTION SUBCUTANEOUS
Status: DISCONTINUED | OUTPATIENT
Start: 2020-03-01 | End: 2020-03-01

## 2020-03-01 RX ORDER — INSULIN GLARGINE 100 [IU]/ML
10 INJECTION, SOLUTION SUBCUTANEOUS
Status: DISCONTINUED | OUTPATIENT
Start: 2020-03-02 | End: 2020-03-04 | Stop reason: HOSPADM

## 2020-03-01 RX ORDER — DEXTROSE MONOHYDRATE 25 G/50ML
INJECTION, SOLUTION INTRAVENOUS
Status: DISPENSED
Start: 2020-03-01 | End: 2020-03-01

## 2020-03-01 RX ORDER — SODIUM CHLORIDE 9 MG/ML
75 INJECTION, SOLUTION INTRAVENOUS CONTINUOUS
Status: DISCONTINUED | OUTPATIENT
Start: 2020-03-01 | End: 2020-03-02

## 2020-03-01 RX ORDER — MAGNESIUM SULFATE HEPTAHYDRATE 40 MG/ML
2 INJECTION, SOLUTION INTRAVENOUS ONCE
Status: COMPLETED | OUTPATIENT
Start: 2020-03-01 | End: 2020-03-01

## 2020-03-01 RX ORDER — PRAVASTATIN SODIUM 40 MG
40 TABLET ORAL
Status: DISCONTINUED | OUTPATIENT
Start: 2020-03-01 | End: 2020-03-04 | Stop reason: HOSPADM

## 2020-03-01 RX ORDER — POLYETHYLENE GLYCOL 3350 17 G/17G
17 POWDER, FOR SOLUTION ORAL DAILY PRN
Status: DISCONTINUED | OUTPATIENT
Start: 2020-03-01 | End: 2020-03-04 | Stop reason: HOSPADM

## 2020-03-01 RX ORDER — BISACODYL 10 MG
10 SUPPOSITORY, RECTAL RECTAL DAILY PRN
Status: DISCONTINUED | OUTPATIENT
Start: 2020-03-01 | End: 2020-03-04 | Stop reason: HOSPADM

## 2020-03-01 RX ADMIN — NYSTATIN 1 APPLICATION: 100000 POWDER TOPICAL at 13:51

## 2020-03-01 RX ADMIN — HEPARIN SODIUM 5000 UNITS: 5000 INJECTION INTRAVENOUS; SUBCUTANEOUS at 13:50

## 2020-03-01 RX ADMIN — HEPARIN SODIUM 5000 UNITS: 5000 INJECTION INTRAVENOUS; SUBCUTANEOUS at 06:29

## 2020-03-01 RX ADMIN — DEXTROSE 50 % IN WATER (D50W) INTRAVENOUS SYRINGE 50 ML: at 00:04

## 2020-03-01 RX ADMIN — DEXTROSE MONOHYDRATE 25 ML: 25 INJECTION, SOLUTION INTRAVENOUS at 23:59

## 2020-03-01 RX ADMIN — CALCIUM GLUCONATE 1 G: 20 INJECTION, SOLUTION INTRAVENOUS at 00:13

## 2020-03-01 RX ADMIN — OXYCODONE HYDROCHLORIDE 15 MG: 10 TABLET ORAL at 23:58

## 2020-03-01 RX ADMIN — CALCIUM GLUCONATE 1 G: 20 INJECTION, SOLUTION INTRAVENOUS at 19:24

## 2020-03-01 RX ADMIN — SODIUM CHLORIDE 75 ML/HR: 0.9 INJECTION, SOLUTION INTRAVENOUS at 19:24

## 2020-03-01 RX ADMIN — INSULIN LISPRO 1 UNITS: 100 INJECTION, SOLUTION INTRAVENOUS; SUBCUTANEOUS at 18:12

## 2020-03-01 RX ADMIN — LORAZEPAM 0.5 MG: 2 INJECTION INTRAMUSCULAR; INTRAVENOUS at 07:32

## 2020-03-01 RX ADMIN — DEXTROSE MONOHYDRATE 50 ML: 25 INJECTION, SOLUTION INTRAVENOUS at 19:25

## 2020-03-01 RX ADMIN — METOCLOPRAMIDE HYDROCHLORIDE 10 MG: 10 TABLET ORAL at 15:15

## 2020-03-01 RX ADMIN — CHLORHEXIDINE GLUCONATE 0.12% ORAL RINSE 15 ML: 1.2 LIQUID ORAL at 08:18

## 2020-03-01 RX ADMIN — INSULIN HUMAN 10 UNITS: 100 INJECTION, SOLUTION PARENTERAL at 19:25

## 2020-03-01 RX ADMIN — NYSTATIN: 100000 POWDER TOPICAL at 21:27

## 2020-03-01 RX ADMIN — SODIUM CHLORIDE, SODIUM GLUCONATE, SODIUM ACETATE, POTASSIUM CHLORIDE AND MAGNESIUM CHLORIDE 75 ML/HR: 526; 502; 368; 37; 30 INJECTION, SOLUTION INTRAVENOUS at 15:15

## 2020-03-01 RX ADMIN — MAGNESIUM SULFATE HEPTAHYDRATE 2 G: 40 INJECTION, SOLUTION INTRAVENOUS at 02:38

## 2020-03-01 RX ADMIN — DEXTROSE 50 % IN WATER (D50W) INTRAVENOUS SYRINGE 25 ML: at 23:59

## 2020-03-01 RX ADMIN — SODIUM CHLORIDE 1000 ML: 0.9 INJECTION, SOLUTION INTRAVENOUS at 00:09

## 2020-03-01 RX ADMIN — CEFEPIME HYDROCHLORIDE 2000 MG: 2 INJECTION, POWDER, FOR SOLUTION INTRAVENOUS at 00:35

## 2020-03-01 RX ADMIN — INSULIN HUMAN 10 UNITS: 100 INJECTION, SOLUTION PARENTERAL at 00:07

## 2020-03-01 RX ADMIN — DEXTROSE MONOHYDRATE 50 ML: 25 INJECTION, SOLUTION INTRAVENOUS at 01:34

## 2020-03-01 RX ADMIN — SODIUM CHLORIDE 1000 ML: 0.9 INJECTION, SOLUTION INTRAVENOUS at 02:36

## 2020-03-01 RX ADMIN — CEFEPIME HYDROCHLORIDE 2000 MG: 2 INJECTION, POWDER, FOR SOLUTION INTRAVENOUS at 12:51

## 2020-03-01 RX ADMIN — HEPARIN SODIUM 5000 UNITS: 5000 INJECTION INTRAVENOUS; SUBCUTANEOUS at 21:27

## 2020-03-01 RX ADMIN — ALBUTEROL SULFATE 10 MG: 2.5 SOLUTION RESPIRATORY (INHALATION) at 00:15

## 2020-03-01 RX ADMIN — SODIUM CHLORIDE, SODIUM GLUCONATE, SODIUM ACETATE, POTASSIUM CHLORIDE AND MAGNESIUM CHLORIDE 75 ML/HR: 526; 502; 368; 37; 30 INJECTION, SOLUTION INTRAVENOUS at 02:38

## 2020-03-01 NOTE — ED PROVIDER NOTES
History  Chief Complaint   Patient presents with    Tremors     Pt presetns with c/o tremors intermittent AMS  Per EMS, family concerned that pt's Sodium is low as this is what happend the last time these symptoms occured  HPI     Patient is a 61-year-old female that reports to the emergency department with altered mental status and tremors  No overt seizure-like activity, this appears to be more of a tremor or rider, she does have this activity at bedside  No vomiting or diarrhea  She notes vague mild mid abdominal pain  She has bilateral lower extremity edema that is chronic at the family reports that it appears better than normal   Patient was hypoxic with an O2 saturation of 85 on initial presentation, placed on nasal cannula  Unable to obtain a history from the patient, instead I am able to talk to the family  Lungs with decreased breath movement bilaterally  They noted a history of prior pneumonia and hyponatremia  Medical decision makin-year-old female, no obvious cellulitis, she has a low-grade temperature of 100 3° rectally, concern for pneumonia versus other intrathoracic pathology, given the mild abdominal pain and abdominal tenderness, will also CT scan  Furthermore, trauma exam negative but family reports falls today  Trauma exam performed: GCS 15, full ROM of bilateral upper and lower extremities  Airway intact, bilateral breath sounds, palpable pulses  No active bleeding  No bony point tenderness in extremities, chest, abdomen or c/t,l spine  No crepitus, abdomen soft/non tender  Chest wall soft non tender with no deformities  Pelvis stable  Prior to Admission Medications   Prescriptions Last Dose Informant Patient Reported? Taking?    B-D UF III MINI PEN NEEDLES 31G X 5 MM MISC   Yes No   Si (four) times a day   Blood Glucose Monitoring Suppl (FREESTYLE LITE) JAROD   Yes No   Sig: TEST BEFORE MEALS AND AT BEDTIME   DULoxetine (CYMBALTA) 60 mg delayed release capsule Yes Yes   Sig: Take 60 mg by mouth daily   FREESTYLE LITE test strip   Yes No   Sig: TEST BEFORE MEALS AND BEDTIME   HUMALOG KWIKPEN 100 units/mL injection pen   No Yes   Sig: Inject 10 Units under the skin 3 (three) times a day with meals   Lancets (FREESTYLE) lancets   Yes No   Sig: TEST BEFORE MEALS AND BEDTIME   Mirabegron (MYRBETRIQ PO)   Yes Yes   Sig: Take 50 mg by mouth daily   Mirabegron ER 50 MG TB24 Not Taking at Unknown time  Yes No   Sig: Take by mouth   acetaminophen (TYLENOL) 325 mg tablet   No No   Sig: Take 2 tablets by mouth every 6 (six) hours as needed for mild pain   albuterol (VENTOLIN HFA) 90 mcg/act inhaler   No No   Sig: Inhale 1 puff every 6 (six) hours as needed for wheezing or shortness of breath   amLODIPine (NORVASC) 10 mg tablet   No Yes   Sig: Take 1 tablet (10 mg total) by mouth daily   docusate sodium (COLACE) 100 mg capsule   No No   Sig: Take 1 capsule by mouth 2 (two) times a day   ergocalciferol (VITAMIN D2) 50,000 units   Yes No   Sig: Take 50,000 Units by mouth once a week   gabapentin (NEURONTIN) 300 mg capsule   No Yes   Sig: Take 1 capsule (300 mg total) by mouth daily at bedtime   guaiFENesin (MUCINEX) 600 mg 12 hr tablet   No No   Sig: Take 1 tablet (600 mg total) by mouth every 12 (twelve) hours as needed for cough   insulin glargine (LANTUS) 100 units/mL subcutaneous injection   No Yes   Sig: Inject 30 Units under the skin daily at bedtime   Patient taking differently: Inject 25 Units under the skin daily at bedtime    lisinopril (ZESTRIL) 5 mg tablet   No Yes   Sig: Take 2 tablets (10 mg total) by mouth daily   metFORMIN (GLUCOPHAGE) 1000 MG tablet   No Yes   Sig: Take 1 tablet (1,000 mg total) by mouth 2 (two) times a day with meals   Patient taking differently: Take 500 mg by mouth 2 (two) times a day with meals    metoclopramide (REGLAN) 10 mg tablet   No Yes   Sig: Take 1 tablet (10 mg total) by mouth 3 (three) times a day before meals   morphine (Julita) 100 MG 24 hr capsule   Yes Yes   Sig: Take 100 mg by mouth 2 (two) times a day   ondansetron (ZOFRAN-ODT) 8 mg disintegrating tablet   No No   Sig: Take 1 tablet (8 mg total) by mouth every 12 (twelve) hours as needed for nausea or vomiting   oxyCODONE (ROXICODONE) 30 MG immediate release tablet   Yes Yes   Sig: Take 30 mg by mouth every 4 (four) hours as needed for moderate pain     simvastatin (ZOCOR) 20 mg tablet   No No   Sig: Take 1 tablet (20 mg total) by mouth daily at bedtime   triamterene-hydrochlorothiazide (MAXZIDE) 75-50 MG per tablet   Yes Yes   Sig: Take 1 tablet by mouth daily      Facility-Administered Medications: None       Past Medical History:   Diagnosis Date    Degenerative disc disease at L5-S1 level     Diabetes mellitus ([address] )     History of methicillin resistant staphylococcus aureus (MRSA) 08/21/2018    negative nasal culture- isolation and hx of mrsa removed 8/20/2018    Hypertension        Past Surgical History:   Procedure Laterality Date    CHOLECYSTECTOMY      JOINT REPLACEMENT      OOPHORECTOMY         Family History   Problem Relation Age of Onset    Rheum arthritis Mother     Alzheimer's disease Mother     Lupus Mother      I have reviewed and agree with the history as documented  E-Cigarette/Vaping     E-Cigarette/Vaping Substances     Social History     Tobacco Use    Smoking status: Former Smoker     Types: E-Cigarettes    Smokeless tobacco: Never Used   Substance Use Topics    Alcohol use: No    Drug use: No       Review of Systems   Unable to perform ROS: Mental status change   All other systems reviewed and are negative  Physical Exam  Physical Exam   Constitutional: She appears well-developed and well-nourished  HENT:   Head: Normocephalic and atraumatic  Right Ear: External ear normal    Left Ear: External ear normal    Eyes: Conjunctivae and EOM are normal    Neck: Normal range of motion  Neck supple  No JVD present  No tracheal deviation present  Cardiovascular: Normal rate, regular rhythm and normal heart sounds  Pulmonary/Chest: Effort normal  No respiratory distress  She has no wheezes  She has rales  Decrease breath sounds bilaterally   Abdominal: Soft  Bowel sounds are normal  There is tenderness  There is no rebound and no guarding  Musculoskeletal: She exhibits edema  She exhibits no tenderness or deformity  Neurological: She is alert  Oriented x1   Skin: Skin is warm and dry  No rash noted  No erythema  Psychiatric:   Confused, slow to respond, altered   Nursing note and vitals reviewed        Vital Signs  ED Triage Vitals   Temperature Pulse Respirations Blood Pressure SpO2   02/29/20 2229 02/29/20 2229 02/29/20 2229 02/29/20 2229 02/29/20 2229   99 2 °F (37 3 °C) (!) 108 18 136/82 (!) 85 %      Temp Source Heart Rate Source Patient Position - Orthostatic VS BP Location FiO2 (%)   02/29/20 2250 02/29/20 2229 02/29/20 2229 02/29/20 2229 --   Rectal Monitor Lying Right arm       Pain Score       02/29/20 2229       No Pain           Vitals:    03/02/20 0039 03/02/20 0200 03/02/20 0300 03/02/20 0519   BP: 136/58 146/91 148/92 (!) 154/116   Pulse: 96 100 100 98   Patient Position - Orthostatic VS: Lying Lying Lying Lying         Visual Acuity  Visual Acuity      Most Recent Value   L Pupil Size (mm)  2   R Pupil Size (mm)  2   L Pupil Shape  Round   R Pupil Shape  Round          ED Medications  Medications   chlorhexidine (PERIDEX) 0 12 % oral rinse 15 mL (15 mL Swish & Spit Refused 3/1/20 2127)   heparin (porcine) subcutaneous injection 5,000 Units (5,000 Units Subcutaneous Given 3/2/20 0511)   pravastatin (PRAVACHOL) tablet 40 mg (40 mg Oral Not Given 3/1/20 1812)   insulin lispro (HumaLOG) 100 units/mL subcutaneous injection 1-6 Units (1 Units Subcutaneous Not Given 3/2/20 0556)   cefepime (MAXIPIME) 2,000 mg in dextrose 5 % 50 mL IVPB (2,000 mg Intravenous New Bag 3/2/20 0034)   dextrose 50 % IV solution **ADS Override Pull** ( Canceled Entry 3/1/20 0136)   oxyCODONE (ROXICODONE) IR tablet 15 mg (15 mg Oral Given 3/1/20 2358)   insulin glargine (LANTUS) subcutaneous injection 10 Units 0 1 mL (has no administration in time range)   senna-docusate sodium (SENOKOT S) 8 6-50 mg per tablet 2 tablet (2 tablets Oral Not Given 3/1/20 1812)   polyethylene glycol (MIRALAX) packet 17 g (has no administration in time range)   bisacodyl (DULCOLAX) rectal suppository 10 mg (has no administration in time range)   nystatin (MYCOSTATIN) powder ( Topical Given 3/1/20 2127)   DULoxetine (CYMBALTA) delayed release capsule 60 mg (has no administration in time range)   metoclopramide (REGLAN) tablet 10 mg (10 mg Oral Given 3/1/20 1515)   sodium chloride 0 9 % infusion (75 mL/hr Intravenous New Bag 3/1/20 1924)   albuterol inhalation solution 10 mg (10 mg Nebulization Given 3/1/20 0015)   insulin regular (HumuLIN R,NovoLIN R) injection 10 Units (10 Units Intravenous Given 3/1/20 0007)   dextrose 50 % IV solution 50 mL (25 mL Intravenous Given 3/1/20 2359)   sodium chloride 0 9 % bolus 1,000 mL (0 mL Intravenous Stopped 3/1/20 0222)   calcium gluconate 1 g in sodium chloride 0 9% 50 mL (premix) (0 g Intravenous Stopped 3/1/20 0146)   cefepime (MAXIPIME) 2 g/50 mL dextrose IVPB (0 mg Intravenous Stopped 3/1/20 0147)   dextrose 50 % IV solution 50 mL (50 mL Intravenous Given 3/1/20 0134)   sodium chloride 0 9 % bolus 1,000 mL (0 mL Intravenous Stopped 3/1/20 0636)   magnesium sulfate 2 g/50 mL IVPB (premix) 2 g (0 g Intravenous Stopped 3/1/20 0636)   LORazepam (ATIVAN) injection 0 5 mg (0 5 mg Intravenous Given 3/1/20 0732)   dextrose 50 % IV solution 50 mL (50 mL Intravenous Given 3/1/20 1925)   insulin regular (HumuLIN R,NovoLIN R) injection 10 Units (10 Units Intravenous Given 3/1/20 1925)   calcium gluconate 1 g in sodium chloride 0 9% 50 mL (premix) (0 g Intravenous Stopped 3/1/20 2126)   furosemide (LASIX) injection 10 mg (10 mg Intravenous Given 3/2/20 0105) calcium gluconate 1 g in sodium chloride 0 9% 50 mL (premix) (0 g Intravenous Stopped 3/2/20 0511)       Diagnostic Studies  Results Reviewed     Procedure Component Value Units Date/Time    Blood culture #1 [[id number]] Collected:  02/29/20 2255    Lab Status:  Preliminary result Specimen:  Blood from Arm, Right Updated:  03/01/20 0801     Blood Culture Received in Microbiology Lab  Culture in Progress  Blood culture #2 [[id number]] Collected:  02/29/20 2300    Lab Status:  Preliminary result Specimen:  Blood from Arm, Left Updated:  03/01/20 0801     Blood Culture Received in Microbiology Lab  Culture in Progress      Procalcitonin [[id number]]  (Normal) Collected:  03/01/20 0144    Lab Status:  Final result Specimen:  Blood from Arm, Left Updated:  03/01/20 0511     Procalcitonin 0 16 ng/ml     Hemoglobin A1C [[id number]] Collected:  03/01/20 0144    Lab Status:  Final result Specimen:  Blood from Arm, Left Updated:  03/01/20 0459     Hemoglobin A1C 5 4 %       mg/dl     Platelet count [[id number]]  (Abnormal) Collected:  03/01/20 0144    Lab Status:  Final result Specimen:  Blood from Arm, Left Updated:  03/01/20 0228     Platelets 921 Thousands/uL      MPV 10 0 fL     Basic metabolic panel [[id number]]  (Abnormal) Collected:  03/01/20 0144    Lab Status:  Final result Specimen:  Blood from Arm, Left Updated:  03/01/20 0283     Sodium 122 mmol/L      Potassium 5 6 mmol/L      Chloride 93 mmol/L      CO2 23 mmol/L      ANION GAP 6 mmol/L      BUN 45 mg/dL      Creatinine 1 83 mg/dL      Glucose 192 mg/dL      Calcium 8 1 mg/dL      eGFR 29 ml/min/1 73sq m     Narrative:       [place] guidelines for Chronic Kidney Disease (CKD):     Stage 1 with normal or high GFR (GFR > 90 mL/min/1 73 square meters)    Stage 2 Mild CKD (GFR = 60-89 mL/min/1 73 square meters)    Stage 3A Moderate CKD (GFR = 45-59 mL/min/1 73 square meters)    Stage 3B Moderate CKD (GFR = 30-44 mL/min/1 73 square meters)    Stage 4 Severe CKD (GFR = 15-29 mL/min/1 73 square meters)    Stage 5 End Stage CKD (GFR <15 mL/min/1 73 square meters)  Note: GFR calculation is accurate only with a steady state creatinine    Magnesium [[id number]]  (Normal) Collected:  03/01/20 0144    Lab Status:  Final result Specimen:  Blood from Arm, Left Updated:  03/01/20 0210     Magnesium 1 7 mg/dL     Phosphorus [[id number]]  (Abnormal) Collected:  03/01/20 0144    Lab Status:  Final result Specimen:  Blood from Arm, Left Updated:  03/01/20 0210     Phosphorus 4 2 mg/dL     Urine Microscopic [[id number]]  (Abnormal) Collected:  02/29/20 2326    Lab Status:  Final result Specimen:  Urine, Straight Cath Updated:  02/29/20 2344     RBC, UA None Seen /hpf      WBC, UA 20-30 /hpf      Epithelial Cells Occasional /hpf      Bacteria, UA Innumerable /hpf     Urine culture [[id number]] Collected:  02/29/20 2326    Lab Status:   In process Specimen:  Urine, Straight Cath Updated:  02/29/20 2344    Influenza A/B and RSV PCR [[id number]]  (Normal) Collected:  02/29/20 2258    Lab Status:  Final result Specimen:  Nose Updated:  02/29/20 2341     INFLUENZA A PCR None Detected     INFLUENZA B PCR None Detected     RSV PCR None Detected    Comprehensive metabolic panel [[id number]]  (Abnormal) Collected:  02/29/20 2255    Lab Status:  Final result Specimen:  Blood from Arm, Right Updated:  02/29/20 2338     Sodium 123 mmol/L      Potassium 6 5 mmol/L      Chloride 91 mmol/L      CO2 27 mmol/L      ANION GAP 5 mmol/L      BUN 45 mg/dL      Creatinine 1 85 mg/dL      Glucose 94 mg/dL      Calcium 8 5 mg/dL      AST 24 U/L      ALT 27 U/L      Alkaline Phosphatase 151 U/L      Total Protein 7 6 g/dL      Albumin 3 4 g/dL      Total Bilirubin 0 49 mg/dL      eGFR 29 ml/min/1 73sq m     Narrative:       [place] guidelines for Chronic Kidney Disease (CKD):     Stage 1 with normal or high GFR (GFR > 90 mL/min/1 73 square meters)    Stage 2 Mild CKD (GFR = 60-89 mL/min/1 73 square meters)    Stage 3A Moderate CKD (GFR = 45-59 mL/min/1 73 square meters)    Stage 3B Moderate CKD (GFR = 30-44 mL/min/1 73 square meters)    Stage 4 Severe CKD (GFR = 15-29 mL/min/1 73 square meters)    Stage 5 End Stage CKD (GFR <15 mL/min/1 73 square meters)  Note: GFR calculation is accurate only with a steady state creatinine    Lipase [[id number]]  (Abnormal) Collected:  02/29/20 2255    Lab Status:  Final result Specimen:  Blood from Arm, Right Updated:  02/29/20 2337     Lipase 24 u/L     NT-BNP PRO [[id number]]  (Abnormal) Collected:  02/29/20 2255    Lab Status:  Final result Specimen:  Blood from Arm, Right Updated:  02/29/20 2337     NT-proBNP 312 pg/mL     Troponin I [[id number]]  (Normal) Collected:  02/29/20 2255    Lab Status:  Final result Specimen:  Blood from Arm, Right Updated:  02/29/20 2333     Troponin I <0 02 ng/mL     UA w Reflex to Microscopic w Reflex to Culture [[id number]]  (Abnormal) Collected:  02/29/20 2326    Lab Status:  Final result Specimen:  Urine, Straight Cath Updated:  02/29/20 2331     Color, UA Yellow     Clarity, UA Cloudy     Specific [place], UA 1 020     pH, UA 6 0     Leukocytes, UA Moderate     Nitrite, UA Negative     Protein, UA Negative mg/dl      Glucose, UA Negative mg/dl      Ketones, UA Negative mg/dl      Urobilinogen, UA 0 2 E U /dl      Bilirubin, UA Negative     Blood, UA Negative    Lactic acid, plasma x2 [[id number]]  (Normal) Collected:  02/29/20 2255    Lab Status:  Final result Specimen:  Blood from Hand, Right Updated:  02/29/20 2326     LACTIC ACID 0 5 mmol/L     Narrative:       Result may be elevated if tourniquet was used during collection      Protime-INR [[id number]]  (Normal) Collected:  02/29/20 2255    Lab Status:  Final result Specimen:  Blood from Arm, Right Updated:  02/29/20 2317     Protime 13 1 seconds      INR 1 05    APTT [[id number]]  (Normal) Collected:  02/29/20 2255    Lab Status:  Final result Specimen: Blood from Arm, Right Updated:  02/29/20 2317     PTT 36 seconds     CBC and differential [[id number]]  (Abnormal) Collected:  02/29/20 2255    Lab Status:  Final result Specimen:  Blood from Arm, Right Updated:  02/29/20 2307     WBC 10 92 Thousand/uL      RBC 4 15 Million/uL      Hemoglobin 12 7 g/dL      Hematocrit 38 4 %      MCV 93 fL      MCH 30 6 pg      MCHC 33 1 g/dL      RDW 14 3 %      MPV 9 2 fL      Platelets 415 Thousands/uL      nRBC 0 /100 WBCs      Neutrophils Relative 82 %      Immat GRANS % 0 %      Lymphocytes Relative 11 %      Monocytes Relative 5 %      Eosinophils Relative 2 %      Basophils Relative 0 %      Neutrophils Absolute 8 89 Thousands/µL      Immature Grans Absolute 0 03 Thousand/uL      Lymphocytes Absolute 1 21 Thousands/µL      Monocytes Absolute 0 56 Thousand/µL      Eosinophils Absolute 0 20 Thousand/µL      Basophils Absolute 0 03 Thousands/µL                  VAS lower limb venous duplex study, complete bilateral   Final Result by Nghia Santillan MD (03/01 2247)      CT head without contrast   Final Result by Gwendolyn Leon MD (03/01 0214)      No acute intracranial abnormality  Workstation performed: [id number]         CT spine cervical without contrast   Final Result by Gwendolyn Leon MD (03/01 0217)      No cervical spine fracture or traumatic malalignment  Workstation performed: [id number]         CT chest abdomen pelvis wo contrast   Final Result by Gwendolyn Leon MD (03/01 9300)      No evidence of acute intra-abdominal or pelvic pathology  Cirrhotic liver morphology  Constipation                 Workstation performed: [id number]         XR chest portable    (Results Pending)              Procedures  Procedures         ED Course  ED Course as of Mar 02 0607   Reji Gallagher Mar 01, 2020   0004 Suspect UTI as driving source behind multiple lab abnormalities and low grade temp, will image for PNA and trauma                      Initial Sepsis Screening     Row Name 02/29/20 5839                Is the patient's history suggestive of a new or worsening infection? (!) Yes (Proceed)  -JK        Suspected source of infection  urinary tract infection  -JK        Are two or more of the following signs & symptoms of infection both present and new to the patient? (!) Yes (Proceed)  -JK        Indicate SIRS criteria  Tachycardia > 90 bpm;Altered mental status  -JK        If the answer is yes to both questions, suspicion of sepsis is present          If severe sepsis is present AND tissue hypoperfusion perists in the hour after fluid resuscitation or lactate > 4, the patient meets criteria for SEPTIC SHOCK          Are any of the following organ dysfunction criteria present within 6 hours of suspected infection and SIRS criteria that are NOT considered to be chronic conditions? (!) Yes  -JK        Organ dysfunction  Creatinine > 0 5 mg/dl ABOVE BASELINE  -JK        Date of presentation of severe sepsis  02/29/20  -JK        Time of presentation of severe sepsis  2350  -JK        Tissue hypoperfusion persists in the hour after crystalloid fluid administration, evidenced, by either:          Was hypotension present within one hour of the conclusion of crystalloid fluid administration?   No  -JK        Date of presentation of septic shock          Time of presentation of septic shock            User Key  (r) = Recorded By, (t) = Taken By, (c) = Cosigned By    [address] Name Provider Melissa Thapa MD Physician           Default Flowsheet Data (last 720 hours)      Sepsis Reassess     Row Name 03/01/20 0038                   Repeat Volume Status and Tissue Perfusion Assessment Performed    Repeat Volume Status and Tissue Perfusion Assessment Performed  Yes  -SP           Volume Status and Tissue Perfusion Post Fluid Resuscitation * Must Document All *    Vital Signs Reviewed (HR, RR, BP, T)  Yes  -SP        Shock Index Reviewed  Yes  -SP Arterial Oxygen Saturation Reviewed (POx, SaO2 or SpO2)  Yes (comment %)  -SP        Cardio  (!) Normal S1/S2; Tachycardia  -SP        Pulmonary  (!) Wheezes; Normal effort  -SP        Capillary Refill  Brisk  -SP        Peripheral Pulses  Radial;Dorsalis Pedis  -SP        Peripheral Pulse  +2  -SP        Dorsalis Pedis  +1  -SP        Skin  Warm  -SP        Urine output assessed  Decreased  -SP           *OR*   Intensive Monitoring- Must Document One of the Following Four *:    Vital Signs Reviewed          * Central Venous Pressure (CVP or RAP)          * Central Venous Oxygen (SVO2, ScvO2 or Oxygen saturation via central catheter)          * Bedside Cardiovascular US in IVC diameter and % collapse          * Passive Leg Raise OR Crystalloid Challenge            User Key  (r) = Recorded By, (t) = Taken By, (c) = Cosigned By    Initials Name Provider Type    Lj Kirk Nurse Practitioner                MDM      Disposition  Final diagnoses:   UTI (urinary tract infection)   Altered mental state     Time reflects when diagnosis was documented in both MDM as applicable and the Disposition within this note     Time User Action Codes Description Comment    3/1/2020 12:53 AM Yonas, [address] [place] [address] [N39 [id number]] UTI (urinary tract infection)     3/1/2020 12:53 AM Marimar Branham Add [R41 [id number]] Altered mental state       ED Disposition     ED Disposition Condition Date/Time Comment    Admit Stable Sun Mar 1, 2020 12:53 AM Case was discussed with Chay Gabriel and the patient's admission status was agreed to be Admission Status: inpatient status to the service of Dr Kylie Arroyo          Follow-up Information    None         Current Discharge Medication List      CONTINUE these medications which have NOT CHANGED    Details   amLODIPine (NORVASC) 10 mg tablet Take 1 tablet (10 mg total) by mouth daily  Qty: 30 tablet, Refills: 0    Associated Diagnoses: Essential hypertension      DULoxetine (CYMBALTA) 60 mg delayed release capsule Take 60 mg by mouth daily      gabapentin (NEURONTIN) 300 mg capsule Take 1 capsule (300 mg total) by mouth daily at bedtime  Qty: 30 capsule, Refills: 0    Associated Diagnoses: Peripheral neuropathy      HUMALOG KWIKPEN 100 units/mL injection pen Inject 10 Units under the skin 3 (three) times a day with meals  Qty: 5 pen, Refills: 0    Associated Diagnoses: Type 2 diabetes mellitus with hyperglycemia, with long-term current use of insulin ([facility])      insulin glargine (LANTUS) 100 units/mL subcutaneous injection Inject 30 Units under the skin daily at bedtime  Qty: 10 mL, Refills: 0    Associated Diagnoses: Type 2 diabetes mellitus with hyperglycemia, without long-term current use of insulin ([facility])      lisinopril (ZESTRIL) 5 mg tablet Take 2 tablets (10 mg total) by mouth daily  Refills: 0    Associated Diagnoses: Essential hypertension      metFORMIN (GLUCOPHAGE) 1000 MG tablet Take 1 tablet (1,000 mg total) by mouth 2 (two) times a day with meals  Qty: 60 tablet, Refills: 0    Associated Diagnoses: Type 2 diabetes mellitus with hyperglycemia, without long-term current use of insulin ([facility])      metoclopramide (REGLAN) 10 mg tablet Take 1 tablet (10 mg total) by mouth 3 (three) times a day before meals  Qty: 90 tablet, Refills: 0    Associated Diagnoses: Type 2 diabetes mellitus with hyperglycemia, without long-term current use of insulin ([facility] [address] )      ! !  Mirabegron (MYRBETRIQ PO) Take 50 mg by mouth daily      morphine (Julita) 100 MG 24 hr capsule Take 100 mg by mouth 2 (two) times a day      oxyCODONE (ROXICODONE) 30 MG immediate release tablet Take 30 mg by mouth every 4 (four) hours as needed for moderate pain        triamterene-hydrochlorothiazide (MAXZIDE) 75-50 MG per tablet Take 1 tablet by mouth daily      acetaminophen (TYLENOL) 325 mg tablet Take 2 tablets by mouth every 6 (six) hours as needed for mild pain  Qty: 30 tablet, Refills: 0      albuterol (VENTOLIN HFA) 90 mcg/act inhaler Inhale 1 puff every 6 (six) hours as needed for wheezing or shortness of breath  Qty: 1 Inhaler, Refills: 0    Associated Diagnoses: Wheezing      B-D UF III MINI PEN NEEDLES 31G X 5 MM MISC 4 (four) times a day  Refills: 0      Blood Glucose Monitoring Suppl (FREESTYLE LITE) JAROD TEST BEFORE MEALS AND AT BEDTIME  Refills: 0      docusate sodium (COLACE) 100 mg capsule Take 1 capsule by mouth 2 (two) times a day  Qty: 10 capsule, Refills: 0      ergocalciferol (VITAMIN D2) 50,000 units Take 50,000 Units by mouth once a week  Refills: 0      FREESTYLE LITE test strip TEST BEFORE MEALS AND BEDTIME  Refills: 0      guaiFENesin (MUCINEX) 600 mg 12 hr tablet Take 1 tablet (600 mg total) by mouth every 12 (twelve) hours as needed for cough  Qty: 14 tablet, Refills: 0    Associated Diagnoses: Cough      Lancets (FREESTYLE) lancets TEST BEFORE MEALS AND BEDTIME  Refills: 0      !! Mirabegron ER 50 MG TB24 Take by mouth      ondansetron (ZOFRAN-ODT) 8 mg disintegrating tablet Take 1 tablet (8 mg total) by mouth every 12 (twelve) hours as needed for nausea or vomiting  Refills: 0    Associated Diagnoses: Nausea      simvastatin (ZOCOR) 20 mg tablet Take 1 tablet (20 mg total) by mouth daily at bedtime  Refills: 0    Associated Diagnoses: Hyperlipidemia, unspecified hyperlipidemia type       !! - Potential duplicate medications found  Please discuss with provider  No discharge procedures on file      PDMP Review     None          ED Provider  Electronically Signed by           Kemi Vargas MD  03/02/20 4710

## 2020-03-01 NOTE — SEPSIS NOTE
Sepsis Note   Delonte Martinez 62 y o  female MRN: [id number]  Unit/Bed#: ED 10 Encounter: [id number]      qSOFA     Row Name 02/29/20 2229                Altered mental status GCS < 15          Respiratory Rate > / =47  0        Systolic BP < / =533  0        Q Sofa Score  0            Initial Sepsis Screening     Row Name 02/29/20 2349                Is the patient's history suggestive of a new or worsening infection? (!) Yes (Proceed)  -JK        Suspected source of infection  urinary tract infection  -JK        Are two or more of the following signs & symptoms of infection both present and new to the patient? (!) Yes (Proceed)  -JK        Indicate SIRS criteria  Tachycardia > 90 bpm;Altered mental status  -JK        If the answer is yes to both questions, suspicion of sepsis is present          If severe sepsis is present AND tissue hypoperfusion perists in the hour after fluid resuscitation or lactate > 4, the patient meets criteria for SEPTIC SHOCK          Are any of the following organ dysfunction criteria present within 6 hours of suspected infection and SIRS criteria that are NOT considered to be chronic conditions? (!) Yes  -JK        Organ dysfunction  Creatinine > 0 5 mg/dl ABOVE BASELINE  -JK        Date of presentation of severe sepsis  02/29/20  -JK        Time of presentation of severe sepsis  2350  -JK        Tissue hypoperfusion persists in the hour after crystalloid fluid administration, evidenced, by either:          Was hypotension present within one hour of the conclusion of crystalloid fluid administration?   No  -JK        Date of presentation of septic shock          Time of presentation of septic shock            User Key  (r) = Recorded By, (t) = Taken By, (c) = Cosigned By    [address] Name Provider Hung Mcclain MD Physician

## 2020-03-01 NOTE — PLAN OF CARE
Problem: Prexisting or High Potential for Compromised Skin Integrity  Goal: Skin integrity is maintained or improved  Description  INTERVENTIONS:  - Identify patients at risk for skin breakdown  - Assess and monitor skin integrity  - Assess and monitor nutrition and hydration status  - Monitor labs   - Assess for incontinence   - Turn and reposition patient  - Assist with mobility/ambulation  - Relieve pressure over bony prominences  - Avoid friction and shearing  - Provide appropriate hygiene as needed including keeping skin clean and dry  - Evaluate need for skin moisturizer/barrier cream  - Collaborate with interdisciplinary team   - Patient/family teaching  - Consider wound care consult   Outcome: Progressing     Problem: Potential for Falls  Goal: Patient will remain free of falls  Description  INTERVENTIONS:  - Assess patient frequently for physical needs  -  Identify cognitive and physical deficits and behaviors that affect risk of falls    -  [place] fall precautions as indicated by assessment   - Educate patient/family on patient safety including physical limitations  - Instruct patient to call for assistance with activity based on assessment  - Modify environment to reduce risk of injury  - Consider OT/PT consult to assist with strengthening/mobility  Outcome: Progressing     Problem: PAIN - ADULT  Goal: Verbalizes/displays adequate comfort level or baseline comfort level  Description  Interventions:  - Encourage patient to monitor pain and request assistance  - Assess pain using appropriate pain scale  - Administer analgesics based on type and severity of pain and evaluate response  - Implement non-pharmacological measures as appropriate and evaluate response  - Consider cultural and social influences on pain and pain management  - Notify physician/advanced practitioner if interventions unsuccessful or patient reports new pain  Outcome: Progressing     Problem: INFECTION - ADULT  Goal: Absence or prevention of progression during hospitalization  Description  INTERVENTIONS:  - Assess and monitor for signs and symptoms of infection  - Monitor lab/diagnostic results  - Monitor all insertion sites, i e  indwelling lines, tubes, and drains  - Monitor endotracheal if appropriate and nasal secretions for changes in amount and color  - [place] appropriate cooling/warming therapies per order  - Administer medications as ordered  - Instruct and encourage patient and family to use good hand hygiene technique  - Identify and instruct in appropriate isolation precautions for identified infection/condition  Outcome: Progressing     Problem: SAFETY ADULT  Goal: Maintain or return to baseline ADL function  Description  INTERVENTIONS:  -  Assess patient's ability to carry out ADLs; assess patient's baseline for ADL function and identify physical deficits which impact ability to perform ADLs (bathing, care of mouth/teeth, toileting, grooming, dressing, etc )  - Assess/evaluate cause of self-care deficits   - Assess range of motion  - Assess patient's mobility; develop plan if impaired  - Assess patient's need for assistive devices and provide as appropriate  - Encourage maximum independence but intervene and supervise when necessary  - Involve family in performance of ADLs  - Assess for home care needs following discharge   - Consider OT consult to assist with ADL evaluation and planning for discharge  - Provide patient education as appropriate  Outcome: Progressing  Goal: Maintain or return mobility status to optimal level  Description  INTERVENTIONS:  - Assess patient's baseline mobility status (ambulation, transfers, stairs, etc )    - Identify cognitive and physical deficits and behaviors that affect mobility  - Identify mobility aids required to assist with transfers and/or ambulation (gait belt, sit-to-stand, lift, walker, cane, etc )  - [place] fall precautions as indicated by assessment  - Record patient progress and toleration of activity level on Mobility SBAR; progress patient to next Phase/Stage  - Instruct patient to call for assistance with activity based on assessment  - Consider rehabilitation consult to assist with strengthening/weightbearing, etc   Outcome: Progressing     Problem: DISCHARGE PLANNING  Goal: Discharge to home or other facility with appropriate resources  Description  INTERVENTIONS:  - Identify barriers to discharge w/patient and caregiver  - Arrange for needed discharge resources and transportation as appropriate  - Identify discharge learning needs (meds, wound care, etc )  - Arrange for interpretive services to assist at discharge as needed  - Refer to Case Management Department for coordinating discharge planning if the patient needs post-hospital services based on physician/advanced practitioner order or complex needs related to functional status, cognitive ability, or social support system  Outcome: Progressing     Problem: Knowledge Deficit  Goal: Patient/family/caregiver demonstrates understanding of disease process, treatment plan, medications, and discharge instructions  Description  Complete learning assessment and assess knowledge base    Interventions:  - Provide teaching at level of understanding  - Provide teaching via preferred learning methods  Outcome: Progressing     Problem: NEUROSENSORY - ADULT  Goal: Achieves stable or improved neurological status  Description  INTERVENTIONS  - Monitor and report changes in neurological status  - Monitor vital signs such as temperature, blood pressure, glucose, and any other labs ordered   - Initiate measures to prevent increased intracranial pressure  - Monitor for seizure activity and implement precautions if appropriate      Outcome: Progressing  Goal: Achieves maximal functionality and self care  Description  INTERVENTIONS  - Monitor swallowing and airway patency with patient fatigue and changes in neurological status  - Encourage and assist patient to increase activity and self care  - Encourage visually impaired, hearing impaired and aphasic patients to use assistive/communication devices  Outcome: Progressing     Problem: GENITOURINARY - ADULT  Goal: Maintains or returns to baseline urinary function  Description  INTERVENTIONS:  - Assess urinary function  - Encourage oral fluids to ensure adequate hydration if ordered  - Administer IV fluids as ordered to ensure adequate hydration  - Administer ordered medications as needed  - Offer frequent toileting  - Follow urinary retention protocol if ordered  Outcome: Progressing  Goal: Absence of urinary retention  Description  INTERVENTIONS:  - Assess patients ability to void and empty bladder  - Monitor I/O  - Bladder scan as needed  - Discuss with physician/AP medications to alleviate retention as needed  - Discuss catheterization for long term situations as appropriate  Outcome: Progressing     Problem: Nutrition/Hydration-ADULT  Goal: Nutrient/Hydration intake appropriate for improving, restoring or maintaining nutritional needs  Description  Monitor and assess patient's nutrition/hydration status for malnutrition  Collaborate with interdisciplinary team and initiate plan and interventions as ordered  Monitor patient's weight and dietary intake as ordered or per policy  Utilize nutrition screening tool and intervene as necessary  Determine patient's food preferences and provide high-protein, high-caloric foods as appropriate       INTERVENTIONS:  - Monitor oral intake, urinary output, labs, and treatment plans  - Assess nutrition and hydration status and recommend course of action  - Evaluate amount of meals eaten  - Assist patient with eating if necessary   - Allow adequate time for meals  - Recommend/ encourage appropriate diets, oral nutritional supplements, and vitamin/mineral supplements  - Order, calculate, and assess calorie counts as needed  - Recommend, monitor, and adjust tube feedings and TPN/PPN based on assessed needs  - Assess need for intravenous fluids  - Provide specific nutrition/hydration education as appropriate  - Include patient/family/caregiver in decisions related to nutrition  Outcome: Not Progressing

## 2020-03-01 NOTE — SEPSIS NOTE
Sepsis Note   Aliyah Sy 62 y o  female MRN: [id number]  Unit/Bed#: ED 10 Encounter: [id number]      qSOFA     Row Name 03/01/20 0012 02/29/20 2229             Altered mental status GCS < 15           Respiratory Rate > / =22  0  0       Systolic BP < / =463  0  0       Q Sofa Score  0  0           Initial Sepsis Screening     Row Name 02/29/20 2349                Is the patient's history suggestive of a new or worsening infection? (!) Yes (Proceed)  -JK        Suspected source of infection  urinary tract infection  -JK        Are two or more of the following signs & symptoms of infection both present and new to the patient? (!) Yes (Proceed)  -JK        Indicate SIRS criteria  Tachycardia > 90 bpm;Altered mental status  -JK        If the answer is yes to both questions, suspicion of sepsis is present          If severe sepsis is present AND tissue hypoperfusion perists in the hour after fluid resuscitation or lactate > 4, the patient meets criteria for SEPTIC SHOCK          Are any of the following organ dysfunction criteria present within 6 hours of suspected infection and SIRS criteria that are NOT considered to be chronic conditions? (!) Yes  -JK        Organ dysfunction  Creatinine > 0 5 mg/dl ABOVE BASELINE  -JK        Date of presentation of severe sepsis  02/29/20  -JK        Time of presentation of severe sepsis  2350  -JK        Tissue hypoperfusion persists in the hour after crystalloid fluid administration, evidenced, by either:          Was hypotension present within one hour of the conclusion of crystalloid fluid administration?   No  -JK        Date of presentation of septic shock          Time of presentation of septic shock            User Key  (r) = Recorded By, (t) = Taken By, (c) = Cosigned By    [address] Name Provider Melissa Thapa MD Physician               Default Flowsheet Data (last 720 hours)      Sepsis Reassess     Row Name 03/01/20 9984 Repeat Volume Status and Tissue Perfusion Assessment Performed    Repeat Volume Status and Tissue Perfusion Assessment Performed  Yes  -SP           Volume Status and Tissue Perfusion Post Fluid Resuscitation * Must Document All *    Vital Signs Reviewed (HR, RR, BP, T)  Yes  -SP        Shock Index Reviewed  Yes  -SP        Arterial Oxygen Saturation Reviewed (POx, SaO2 or SpO2)  Yes (comment %)  -SP        Cardio  (!) Normal S1/S2; Tachycardia  -SP        Pulmonary  (!) Wheezes; Normal effort  -SP        Capillary Refill  Brisk  -SP        Peripheral Pulses  Radial;Dorsalis Pedis  -SP        Peripheral Pulse  +2  -SP        Dorsalis Pedis  +1  -SP        Skin  Warm  -SP        Urine output assessed  Decreased  -SP           *OR*   Intensive Monitoring- Must Document One of the Following Four *:    Vital Signs Reviewed          * Central Venous Pressure (CVP or RAP)          * Central Venous Oxygen (SVO2, ScvO2 or Oxygen saturation via central catheter)          * Bedside Cardiovascular US in IVC diameter and % collapse          * Passive Leg Raise OR Crystalloid Challenge            User Key  (r) = Recorded By, (t) = Taken By, (c) = Cosigned By    Initials Name Provider Type    SP Stanley Smoker, [address] Lia Levy Nurse Practitioner

## 2020-03-01 NOTE — H&P
H&P Exam - [place] Alejandrina Mackey 62 y o  female MRN: [id number]  Unit/Bed#: ED 10 Encounter: [id number]      -------------------------------------------------------------------------------------------------------------  Chief Complaint: Unable to provide verbal complaints due to encephalopathy     History of Present Illness     Threasa Parent is a 62 y o  female with hypertension, hyperlipidemia, insulin-dependent type 2 diabetes with associated diabetic peripheral neuropathy, chronic venous stasis and edema, chronic pain syndrome with opioid dependence secondary to degenerative disc disease, cognitive decline, and ambulatory dysfunction  Patient was brought to the ED by EMS, who was called by family for altered mental status  The patient's  and daughter states that she was in her usual state of health as of this morning, however they noted an increase in tremors and lethargy over the course of the afternoon into the evening  They state that this behavioral is similar to when she was last admitted with an infection  With further questioning, they did state that she has had several falls over the last week, including one today  They are unsure whether she hit her head or loss consciousness with some of the falls  The patient's  also states that she has had little to no oral fluid intake for approximately 1 week  They have not noticed any change in bowel or bladder habits  The patient is usually incontinent of urine at baseline, however they deny that the patient complained of any recent dysuria, malodorous urine, nausea, vomiting, or diarrhea  On arrival to the ED, the patient was initially hypoxic with SpO2 85% on room air, which improved with the application of nasal cannula  She was found to have in YVONNE with hyponatremia and hyperkalemia  She was also found to have a UA consistent with UTI and was given IV antibiotics    Given her complex medical history in constellation of symptoms, she was admitted to step-down level 1 for closer monitoring  History obtained from chart review,  and daughter  -------------------------------------------------------------------------------------------------------------  Assessment and Plan:    Neuro:    Acute toxic/metabolic encephalopathy  o Suspect multifactorial etiology secondary to sepsis and possible toxidrome affects of opiate medications in the presence of an YVONNE  All likely superimposed on slower cognitive decline, which has been mentioned on prior admissions  o Cannot completely rule out early stages of serotonin syndrome given the patient is on Cymbalta, which can have some combined surgery to effects with synthetic opiates, however symptoms are not completely consistent, and this seems less likely  - Positive symptoms : Mild/low-grade fever, restlessness/agitation, disorientation, tachycardia, tremo  - Negative symptoms:  Diaphoresis, nausea/vomiting, myoclonus, muscle rigidity, hyper reflexia    Unfortunately, lower extremity pain precludes checking for lower extremity clonus, which is typically most prominent  o 3/1/20 CT head with no acute intracranial abnormality  o Q 2 hour neuro checks  o Sleep/wake cycle regulation  o CAM-ICU BID  o If patient does not continue to improve neurologically, may need to consider Toxicology consult for evaluation of home medications causing encephalopathy  o Also, if patient continues to show sign of sepsis with encephalopathy, may need to consider LP   Chronic pain syndrome with opioid dependence  o PDMP review reveals refill of 180 tablets of oxycodone 30 mg on 12/12/20 and 60 tablets of morphine extended release 100 mg on 1/10/20  - 470 00 MME   - All medications by single prescriber is patient's PCP/family practice physician  o Given YVONNE, will hold morphine  mg BID  o In order to avoid withdrawal symptoms, will continue oxycodone 15 mg q 4 hours PRN for moderate to severe pain  - Half of home dose based on creatinine clearance < 60, which is associated with increased serum concentration by approximately 50 %  o Consider palliative care consult for pain management   Diabetic peripheral neuropathy  o Gabapentin 300 mg qHS  o Cymbalta 60 mg daily  o Will continue both unless patient's renal function worsens vs improves with IV fluids    CV:    Sinus tachycardia  o Multifactorial secondary to hypovolemia, sepsis, fever, albuterol treatment  o EKG normal sinus tachycardia  o Continued close telemetry monitoring   Hypertension  o Holding home triamterene-hydrochlorothiazide 75-50 mg secondary YVONNE  o Holding home lisinopril 10 mg secondary to YVONNE  o Amlodipine 10 mg daily currently on hold, however if BP remains WNL or is increasing, can give this morning  o Maintain SBP < 180, MAP > 65  o Continue vital signs per critical care protocol   Hyperlipidemia  o Pravastatin 40 mg qHS  - Equivalent of home simvastatin 20 mg    Pulm:   Persistent hypoxia  o No significant abnormality seen on CT chest  o Cannot completely rule out PE given the patient is largely sedentary, however unable to obtain IV contrast at this time  o Given lower extremity tenderness and edema, will check lower extremity duplex  o Will repeat chest x-ray later this morning after volume resuscitation as complete  o Maintain SpO2 >88%  o Continue pulmonary hygiene  Incentive spirometer q1h while awake, encourage coughing and deep breathing  Upright positioning    GI:    Constipation  o Likely secondary to opiate use  o Bowel regimen:  Senna/Colace BID, MiraLax PRN, Dulcolax PRN  o AST/ALT WNL  o Total bilirubin WNL  o Alk-phos mildly elevated, however likely due to hypovolemia  o Check CMP in AM   Stress ulcer prophylaxis:  Not indicated       :    Acute kidney injury  o Baseline creatinine:  Unknown  0 8-0 18 in 2018  o Current creatinine:  1 85  o Likely secondary to hypovolemia    Unknown if patient has worsened baseline since 2018 given multiple comorbidities  o Monitor BMP q 6 hours  o No Gar catheter  o Strict q4h I/O monitoring  o Continue to follow renal function tests    F/E/N:    Hyponatremia  o Suspect hypovolemic hyponatremia  o Likely not significantly chronic given fairly acute presentation of symptoms  o Plan to correct sodium no higher 134 today  o BMP q 6 hours  o Will check urine sodium/urine creatinine  o S/p 1 L normal saline in ED  o Will give an additional 1L normal saline followed by isolyte at 75 mL/hr   Hyperkalemia  o Likely secondary to hypovolemia in the presence of YVONNE  o S/p insulin/dextrose, albuterol, calcium gluconate in ED  o Will continue with IV fluid hydration  o Will carefully use isolyte, however anticipate that potassium will continue to decrease with fluid resuscitation   Nutrition:  NPO secondary to encephalopathy at this time    Heme/Onc:    History anemia  o Baseline hemoglobin:  8 3-9 1 in 2018  o Current hemoglobin:  12 7  - Likely hemoconcentrated  o Anticipate decrease  o CBC in AM  o Transfuse for hemoglobin <7 0   Thrombocytopenia  o Likely secondary to sepsis  o Baseline platelets:  191-851  o Current platelets 473  o Check CBC in AM   VTE prophylaxis:  Sub-q heparin TID, SCD's to BLE    Endo:    Insulin-dependent type 2 diabetes  o Last hemoglobin A1c 15 4% on 2/21/18  o Repeat this AM  o Hold home Metformin 500 mg BID  o Patient on home Lantus 25 units qHS, however while NPO, will place on 10 units qHS with sliding scale insulin  o Adjust insulin regimen as needed to maintain goal -180    ID:    Suspected sepsis  o Present on admission, suspected secondary to UTI  o UA positive for innumerable bacteria, 20-30 WBC, moderate leuks  o Follow-up blood cultures  o Follow-up urine culture  o Procalcitonin pending, although may be negative if source is soley urinary  o Influenza negative  o Continue IV cefepime  o Continue to monitor fever and WBC curve     MSK/Skin:    Ambulatory dysfunction  o PT/OT  o Reposition q2h, eliminate pressure points while in bed   Chronic venous stasis with edema  o Routine wound care  o Compression stockings  o LE duplex    Close skin surveillance       Disposition: Admit to Stepdown Level 1  Code Status: Level 1 - Full Code  --------------------------------------------------------------------------------------------------------------  Review of Systems   Unable to perform ROS: Mental status change   Patient encephalopathic and unable to consistently answer questions  Does deny abdominal pain  Otherwise, unable to obtain review of systems    Review of systems was unable to be performed secondary to encephalpathy    Physical Exam   Constitutional: She appears well-developed  She appears lethargic  No distress  Face mask in place  HENT:   Head: Normocephalic and atraumatic  Mouth/Throat: Mucous membranes are dry  Eyes: Pupils are equal, round, and reactive to light  No scleral icterus  Bilateral eyes bloodshot   Neck: Neck supple  No JVD present  Cardiovascular: Regular rhythm  Occasional extrasystoles are present  Tachycardia present  Pulses:       Radial pulses are 2+ on the right side, and 2+ on the left side  Dorsalis pedis pulses are 1+ on the right side, and 1+ on the left side  2- 3+ lower extremity edema  Venous stasis  Calf tenderness    Pulmonary/Chest: Tachypnea noted  She has decreased breath sounds in the right lower field and the left lower field  She has wheezes in the right upper field and the left upper field  She has no rhonchi  She has no rales  Abdominal: Soft  She exhibits distension  Bowel sounds are decreased  There is no tenderness  Feet:   Right Foot:   Skin Integrity: Positive for dry skin  Left Foot:   Skin Integrity: Positive for dry skin  Neurological: She has normal strength  She appears lethargic  GCS eye subscore is 4  GCS verbal subscore is 3  GCS motor subscore is 6  Skin: Skin is warm and dry  Capillary refill takes less than 2 seconds  She is not diaphoretic      --------------------------------------------------------------------------------------------------------------  Vitals:   Vitals:    02/29/20 2229 02/29/20 2250 03/01/20 0012 03/01/20 0015   BP: 136/82  143/65    BP Location: Right arm  Right arm    Pulse: (!) 108  99    Resp: 18  18    Temp: 99 2 °F (37 3 °C) 100 3 °F (37 9 °C)     TempSrc:  Rectal     SpO2: (!) 85% 93% 95% 97%   Weight: 95 7 kg (210 lb 15 7 oz)        Temp  Min: 99 2 °F (37 3 °C)  Max: 100 3 °F (37 9 °C)  IBW: -92 5 kg     Body mass index is 41 2 kg/m²  Laboratory and Diagnostics:  Results from last 7 days   Lab Units 02/29/20 2255   WBC Thousand/uL 10 92*   HEMOGLOBIN g/dL 12 7   HEMATOCRIT % 38 4   PLATELETS Thousands/uL 166   NEUTROS PCT % 82*   MONOS PCT % 5     Results from last 7 days   Lab Units 02/29/20 2255   SODIUM mmol/L 123*   POTASSIUM mmol/L 6 5*   CHLORIDE mmol/L 91*   CO2 mmol/L 27   ANION GAP mmol/L 5   BUN mg/dL 45*   CREATININE mg/dL 1 85*   CALCIUM mg/dL 8 5   GLUCOSE RANDOM mg/dL 94   ALT U/L 27   AST U/L 24   ALK PHOS U/L 151*   ALBUMIN g/dL 3 4*   TOTAL BILIRUBIN mg/dL 0 49          Results from last 7 days   Lab Units 02/29/20 2255   INR  1 05   PTT seconds 36      Results from last 7 days   Lab Units 02/29/20 2255   TROPONIN I ng/mL <0 02     Results from last 7 days   Lab Units 02/29/20 2255   LACTIC ACID mmol/L 0 5         EKG: Sinus tach,QTc 404  Imaging: I have personally reviewed pertinent reports  and I have personally reviewed pertinent films in PACS   CT head: No acute intracranial abnormality   CT c-spine: No fracture or traumatic malalignment  CT chest/abdomen/pelvis: No evidence of acute intra-abdominal or pelvic pathology  Cirrhotic liver morphology  Constipation      Historical Information   Past Medical History:   Diagnosis Date    Degenerative disc disease at L5-S1 level     Diabetes mellitus ([facility] [address] )     History of methicillin resistant staphylococcus aureus (MRSA) 08/21/2018    negative nasal culture- isolation and hx of mrsa removed 8/20/2018    Hypertension      Past Surgical History:   Procedure Laterality Date    CHOLECYSTECTOMY      JOINT REPLACEMENT      OOPHORECTOMY       Social History   Social History     Substance and Sexual Activity   Alcohol Use No     Social History     Substance and Sexual Activity   Drug Use No     Social History     Tobacco Use   Smoking Status Former Smoker    Types: E-Cigarettes   Smokeless Tobacco Never Used     Exercise History: Largely sedentary   Ambulates with assistance     Family History:   Family History   Problem Relation Age of Onset    Rheum arthritis Mother     Alzheimer's disease Mother     Lupus Mother      I have reviewed this patient's family history and commented on sigificant items within the HPI      Medications:  Current Facility-Administered Medications   Medication Dose Route Frequency    cefepime (MAXIPIME) 2 g/50 mL dextrose IVPB  2,000 mg Intravenous Once    chlorhexidine (PERIDEX) 0 12 % oral rinse 15 mL  15 mL Swish & Spit Q12H [address]    DULoxetine (CYMBALTA) delayed release capsule 60 mg  60 mg Oral Daily    gabapentin (NEURONTIN) capsule 300 mg  300 mg Oral HS    guaiFENesin (MUCINEX) 12 hr tablet 600 mg  600 mg Oral Q12H PRN    heparin (porcine) subcutaneous injection 5,000 Units  5,000 Units Subcutaneous Q8H [address]    insulin glargine (LANTUS) subcutaneous injection 10 Units 0 1 mL  10 Units Subcutaneous HS    insulin lispro (HumaLOG) 100 units/mL subcutaneous injection 1-6 Units  1-6 Units Subcutaneous Q6H [address]    metoclopramide (REGLAN) tablet 10 mg  10 mg Oral TID AC    pravastatin (PRAVACHOL) tablet 40 mg  40 mg Oral Daily With Dinner    sodium chloride 0 9 % bolus 1,000 mL  1,000 mL Intravenous Once     Facility-Administered Medications Ordered in Other Encounters   Medication Dose Route Frequency    ferrous sulfate tablet 325 mg  325 mg Oral Daily With Breakfast     Home medications:  Prior to Admission Medications   Prescriptions Last Dose Informant Patient Reported? Taking?    B-D UF III MINI PEN NEEDLES 31G X 5 MM MISC   Yes No   Si (four) times a day   Blood Glucose Monitoring Suppl (FREESTYLE LITE) JAROD   Yes No   Sig: TEST BEFORE MEALS AND AT BEDTIME   DULoxetine (CYMBALTA) 60 mg delayed release capsule   Yes Yes   Sig: Take 60 mg by mouth daily   FREESTYLE LITE test strip   Yes No   Sig: TEST BEFORE MEALS AND BEDTIME   HUMALOG KWIKPEN 100 units/mL injection pen   No Yes   Sig: Inject 10 Units under the skin 3 (three) times a day with meals   Lancets (FREESTYLE) lancets   Yes No   Sig: TEST BEFORE MEALS AND BEDTIME   Mirabegron (MYRBETRIQ PO)   Yes Yes   Sig: Take 50 mg by mouth daily   Mirabegron ER 50 MG TB24 Not Taking at Unknown time  Yes No   Sig: Take by mouth   acetaminophen (TYLENOL) 325 mg tablet   No No   Sig: Take 2 tablets by mouth every 6 (six) hours as needed for mild pain   albuterol (VENTOLIN HFA) 90 mcg/act inhaler   No No   Sig: Inhale 1 puff every 6 (six) hours as needed for wheezing or shortness of breath   amLODIPine (NORVASC) 10 mg tablet   No Yes   Sig: Take 1 tablet (10 mg total) by mouth daily   docusate sodium (COLACE) 100 mg capsule   No No   Sig: Take 1 capsule by mouth 2 (two) times a day   ergocalciferol (VITAMIN D2) 50,000 units   Yes No   Sig: Take 50,000 Units by mouth once a week   gabapentin (NEURONTIN) 300 mg capsule   No Yes   Sig: Take 1 capsule (300 mg total) by mouth daily at bedtime   guaiFENesin (MUCINEX) 600 mg 12 hr tablet   No No   Sig: Take 1 tablet (600 mg total) by mouth every 12 (twelve) hours as needed for cough   insulin glargine (LANTUS) 100 units/mL subcutaneous injection   No Yes   Sig: Inject 30 Units under the skin daily at bedtime   Patient taking differently: Inject 25 Units under the skin daily at bedtime    lisinopril (ZESTRIL) 5 mg tablet   No Yes   Sig: Take 2 tablets (10 mg total) by mouth daily   metFORMIN (GLUCOPHAGE) 1000 MG tablet   No Yes   Sig: Take 1 tablet (1,000 mg total) by mouth 2 (two) times a day with meals   Patient taking differently: Take 500 mg by mouth 2 (two) times a day with meals    metoclopramide (REGLAN) 10 mg tablet   No Yes   Sig: Take 1 tablet (10 mg total) by mouth 3 (three) times a day before meals   ondansetron (ZOFRAN-ODT) 8 mg disintegrating tablet   No No   Sig: Take 1 tablet (8 mg total) by mouth every 12 (twelve) hours as needed for nausea or vomiting   oxyCODONE (ROXICODONE) 30 MG immediate release tablet   Yes Yes   Sig: Take 30 mg by mouth every 4 (four) hours as needed for moderate pain     simvastatin (ZOCOR) 20 mg tablet   No No   Sig: Take 1 tablet (20 mg total) by mouth daily at bedtime   triamterene-hydrochlorothiazide (MAXZIDE) 75-50 MG per tablet   Yes Yes   Sig: Take 1 tablet by mouth daily      Facility-Administered Medications: None     Allergies:  No Known Allergies  ------------------------------------------------------------------------------------------------------------    Anticipated Length of Stay is > 2 midnights    Care Time Delivered:   No Critical Care time spent       APRYL Tang        Portions of the record may have been created with voice recognition software  Occasional wrong word or "sound a like" substitutions may have occurred due to the inherent limitations of voice recognition software    Read the chart carefully and recognize, using context, where substitutions have occurred

## 2020-03-02 PROBLEM — S22.41XA CLOSED FRACTURE OF MULTIPLE RIBS OF RIGHT SIDE: Chronic | Status: ACTIVE | Noted: 2017-07-14

## 2020-03-02 PROBLEM — E83.51 HYPOCALCEMIA: Status: RESOLVED | Noted: 2018-04-25 | Resolved: 2020-03-02

## 2020-03-02 PROBLEM — E83.42 HYPOMAGNESEMIA: Status: RESOLVED | Noted: 2018-04-25 | Resolved: 2020-03-02

## 2020-03-02 PROBLEM — R00.2 PALPITATIONS: Status: RESOLVED | Noted: 2017-07-14 | Resolved: 2020-03-02

## 2020-03-02 PROBLEM — R32 INCONTINENCE: Status: RESOLVED | Noted: 2017-07-17 | Resolved: 2020-03-02

## 2020-03-02 PROBLEM — R93.0 ABNORMAL HEAD CT: Chronic | Status: ACTIVE | Noted: 2017-07-14

## 2020-03-02 PROBLEM — D50.9 MICROCYTIC ANEMIA: Status: RESOLVED | Noted: 2018-02-22 | Resolved: 2020-03-02

## 2020-03-02 PROBLEM — R11.0 NAUSEA: Status: RESOLVED | Noted: 2018-02-22 | Resolved: 2020-03-02

## 2020-03-02 PROBLEM — L03.90 CELLULITIS: Status: RESOLVED | Noted: 2017-01-10 | Resolved: 2020-03-02

## 2020-03-02 LAB
ANION GAP SERPL CALCULATED.3IONS-SCNC: 6 MMOL/L (ref 4–13)
ANION GAP SERPL CALCULATED.3IONS-SCNC: 7 MMOL/L (ref 4–13)
ANION GAP SERPL CALCULATED.3IONS-SCNC: 8 MMOL/L (ref 4–13)
BASOPHILS # BLD AUTO: 0.02 THOUSANDS/ΜL (ref 0–0.1)
BASOPHILS NFR BLD AUTO: 1 % (ref 0–1)
BUN SERPL-MCNC: 25 MG/DL (ref 5–25)
BUN SERPL-MCNC: 26 MG/DL (ref 5–25)
BUN SERPL-MCNC: 28 MG/DL (ref 5–25)
CALCIUM SERPL-MCNC: 8.4 MG/DL (ref 8.3–10.1)
CALCIUM SERPL-MCNC: 8.5 MG/DL (ref 8.3–10.1)
CALCIUM SERPL-MCNC: 8.8 MG/DL (ref 8.3–10.1)
CHLORIDE SERPL-SCNC: 100 MMOL/L (ref 100–108)
CHLORIDE SERPL-SCNC: 100 MMOL/L (ref 100–108)
CHLORIDE SERPL-SCNC: 101 MMOL/L (ref 100–108)
CO2 SERPL-SCNC: 26 MMOL/L (ref 21–32)
CO2 SERPL-SCNC: 27 MMOL/L (ref 21–32)
CO2 SERPL-SCNC: 28 MMOL/L (ref 21–32)
CREAT SERPL-MCNC: 1.04 MG/DL (ref 0.6–1.3)
CREAT SERPL-MCNC: 1.11 MG/DL (ref 0.6–1.3)
CREAT SERPL-MCNC: 1.13 MG/DL (ref 0.6–1.3)
EOSINOPHIL # BLD AUTO: 0.07 THOUSAND/ΜL (ref 0–0.61)
EOSINOPHIL NFR BLD AUTO: 2 % (ref 0–6)
ERYTHROCYTE [DISTWIDTH] IN BLOOD BY AUTOMATED COUNT: 14.2 % (ref 11.6–15.1)
EST. AVERAGE GLUCOSE BLD GHB EST-MCNC: 103 MG/DL
GFR SERPL CREATININE-BSD FRML MDRD: 52 ML/MIN/1.73SQ M
GFR SERPL CREATININE-BSD FRML MDRD: 53 ML/MIN/1.73SQ M
GFR SERPL CREATININE-BSD FRML MDRD: 57 ML/MIN/1.73SQ M
GLUCOSE SERPL-MCNC: 104 MG/DL (ref 65–140)
GLUCOSE SERPL-MCNC: 104 MG/DL (ref 65–140)
GLUCOSE SERPL-MCNC: 128 MG/DL (ref 65–140)
GLUCOSE SERPL-MCNC: 155 MG/DL (ref 65–140)
GLUCOSE SERPL-MCNC: 178 MG/DL (ref 65–140)
GLUCOSE SERPL-MCNC: 191 MG/DL (ref 65–140)
GLUCOSE SERPL-MCNC: 215 MG/DL (ref 65–140)
GLUCOSE SERPL-MCNC: 68 MG/DL (ref 65–140)
HBA1C MFR BLD: 5.2 %
HCT VFR BLD AUTO: 37.4 % (ref 34.8–46.1)
HGB BLD-MCNC: 12.1 G/DL (ref 11.5–15.4)
IMM GRANULOCYTES # BLD AUTO: 0.01 THOUSAND/UL (ref 0–0.2)
IMM GRANULOCYTES NFR BLD AUTO: 0 % (ref 0–2)
LYMPHOCYTES # BLD AUTO: 0.6 THOUSANDS/ΜL (ref 0.6–4.47)
LYMPHOCYTES NFR BLD AUTO: 15 % (ref 14–44)
MCH RBC QN AUTO: 30.6 PG (ref 26.8–34.3)
MCHC RBC AUTO-ENTMCNC: 32.4 G/DL (ref 31.4–37.4)
MCV RBC AUTO: 94 FL (ref 82–98)
MONOCYTES # BLD AUTO: 0.29 THOUSAND/ΜL (ref 0.17–1.22)
MONOCYTES NFR BLD AUTO: 7 % (ref 4–12)
NEUTROPHILS # BLD AUTO: 3.05 THOUSANDS/ΜL (ref 1.85–7.62)
NEUTS SEG NFR BLD AUTO: 75 % (ref 43–75)
NRBC BLD AUTO-RTO: 0 /100 WBCS
PLATELET # BLD AUTO: 119 THOUSANDS/UL (ref 149–390)
PMV BLD AUTO: 9.4 FL (ref 8.9–12.7)
POTASSIUM SERPL-SCNC: 4.9 MMOL/L (ref 3.5–5.3)
POTASSIUM SERPL-SCNC: 5.5 MMOL/L (ref 3.5–5.3)
POTASSIUM SERPL-SCNC: 6.1 MMOL/L (ref 3.5–5.3)
PROCALCITONIN SERPL-MCNC: 0.1 NG/ML
RBC # BLD AUTO: 3.96 MILLION/UL (ref 3.81–5.12)
SODIUM SERPL-SCNC: 133 MMOL/L (ref 136–145)
SODIUM SERPL-SCNC: 134 MMOL/L (ref 136–145)
SODIUM SERPL-SCNC: 136 MMOL/L (ref 136–145)
WBC # BLD AUTO: 4.04 THOUSAND/UL (ref 4.31–10.16)

## 2020-03-02 PROCEDURE — 85025 COMPLETE CBC W/AUTO DIFF WBC: CPT | Performed by: FAMILY MEDICINE

## 2020-03-02 PROCEDURE — 82948 REAGENT STRIP/BLOOD GLUCOSE: CPT

## 2020-03-02 PROCEDURE — 80048 BASIC METABOLIC PNL TOTAL CA: CPT | Performed by: NURSE PRACTITIONER

## 2020-03-02 PROCEDURE — 80048 BASIC METABOLIC PNL TOTAL CA: CPT | Performed by: FAMILY MEDICINE

## 2020-03-02 PROCEDURE — 83036 HEMOGLOBIN GLYCOSYLATED A1C: CPT | Performed by: FAMILY MEDICINE

## 2020-03-02 PROCEDURE — NC001 PR NO CHARGE: Performed by: INTERNAL MEDICINE

## 2020-03-02 PROCEDURE — 84145 PROCALCITONIN (PCT): CPT | Performed by: FAMILY MEDICINE

## 2020-03-02 PROCEDURE — 99232 SBSQ HOSP IP/OBS MODERATE 35: CPT | Performed by: INTERNAL MEDICINE

## 2020-03-02 RX ORDER — FUROSEMIDE 10 MG/ML
10 INJECTION INTRAMUSCULAR; INTRAVENOUS ONCE
Status: COMPLETED | OUTPATIENT
Start: 2020-03-02 | End: 2020-03-02

## 2020-03-02 RX ORDER — CALCIUM GLUCONATE 20 MG/ML
1 INJECTION, SOLUTION INTRAVENOUS ONCE
Status: COMPLETED | OUTPATIENT
Start: 2020-03-02 | End: 2020-03-02

## 2020-03-02 RX ORDER — GABAPENTIN 300 MG/1
300 CAPSULE ORAL
Status: DISCONTINUED | OUTPATIENT
Start: 2020-03-02 | End: 2020-03-04 | Stop reason: HOSPADM

## 2020-03-02 RX ORDER — LISINOPRIL 10 MG/1
10 TABLET ORAL DAILY
Status: DISCONTINUED | OUTPATIENT
Start: 2020-03-03 | End: 2020-03-04 | Stop reason: HOSPADM

## 2020-03-02 RX ORDER — MORPHINE SULFATE 15 MG/1
15 TABLET, FILM COATED, EXTENDED RELEASE ORAL EVERY 12 HOURS SCHEDULED
Status: DISCONTINUED | OUTPATIENT
Start: 2020-03-02 | End: 2020-03-02

## 2020-03-02 RX ADMIN — METOCLOPRAMIDE HYDROCHLORIDE 10 MG: 10 TABLET ORAL at 07:32

## 2020-03-02 RX ADMIN — INSULIN GLARGINE 10 UNITS: 100 INJECTION, SOLUTION SUBCUTANEOUS at 21:57

## 2020-03-02 RX ADMIN — OXYCODONE HYDROCHLORIDE 15 MG: 10 TABLET ORAL at 17:23

## 2020-03-02 RX ADMIN — INSULIN LISPRO 2 UNITS: 100 INJECTION, SOLUTION INTRAVENOUS; SUBCUTANEOUS at 12:11

## 2020-03-02 RX ADMIN — DULOXETINE HYDROCHLORIDE 60 MG: 60 CAPSULE, DELAYED RELEASE ORAL at 08:47

## 2020-03-02 RX ADMIN — NYSTATIN 1 APPLICATION: 100000 POWDER TOPICAL at 21:58

## 2020-03-02 RX ADMIN — PRAVASTATIN SODIUM 40 MG: 40 TABLET ORAL at 17:24

## 2020-03-02 RX ADMIN — HEPARIN SODIUM 5000 UNITS: 5000 INJECTION INTRAVENOUS; SUBCUTANEOUS at 21:57

## 2020-03-02 RX ADMIN — OXYCODONE HYDROCHLORIDE 15 MG: 10 TABLET ORAL at 07:29

## 2020-03-02 RX ADMIN — SENNOSIDES AND DOCUSATE SODIUM 2 TABLET: 8.6; 5 TABLET ORAL at 17:24

## 2020-03-02 RX ADMIN — CEFEPIME HYDROCHLORIDE 2000 MG: 2 INJECTION, POWDER, FOR SOLUTION INTRAVENOUS at 12:27

## 2020-03-02 RX ADMIN — NYSTATIN: 100000 POWDER TOPICAL at 08:47

## 2020-03-02 RX ADMIN — METOCLOPRAMIDE HYDROCHLORIDE 10 MG: 10 TABLET ORAL at 12:10

## 2020-03-02 RX ADMIN — FUROSEMIDE 10 MG: 10 INJECTION, SOLUTION INTRAMUSCULAR; INTRAVENOUS at 01:05

## 2020-03-02 RX ADMIN — METOCLOPRAMIDE HYDROCHLORIDE 10 MG: 10 TABLET ORAL at 17:24

## 2020-03-02 RX ADMIN — SENNOSIDES AND DOCUSATE SODIUM 2 TABLET: 8.6; 5 TABLET ORAL at 08:47

## 2020-03-02 RX ADMIN — GABAPENTIN 300 MG: 300 CAPSULE ORAL at 21:57

## 2020-03-02 RX ADMIN — CEFEPIME HYDROCHLORIDE 2000 MG: 2 INJECTION, POWDER, FOR SOLUTION INTRAVENOUS at 00:34

## 2020-03-02 RX ADMIN — HEPARIN SODIUM 5000 UNITS: 5000 INJECTION INTRAVENOUS; SUBCUTANEOUS at 05:11

## 2020-03-02 RX ADMIN — INSULIN LISPRO 1 UNITS: 100 INJECTION, SOLUTION INTRAVENOUS; SUBCUTANEOUS at 18:24

## 2020-03-02 RX ADMIN — CALCIUM GLUCONATE 1 G: 20 INJECTION, SOLUTION INTRAVENOUS at 01:05

## 2020-03-02 RX ADMIN — HEPARIN SODIUM 5000 UNITS: 5000 INJECTION INTRAVENOUS; SUBCUTANEOUS at 13:23

## 2020-03-02 NOTE — PROGRESS NOTES
Progress Note - Naina Haynes 1955, 62 y o  female MRN: [id number]    Unit/Bed#: ICU 09 Encounter: [id number]    Primary Care Provider: Carlos Watts   Date and time admitted to hospital: 2/29/2020 10:25 PM    Acute kidney injury [facility])  Assessment & Plan  ? Secondary to hypovolemia  ? Creatinine trending down 1 85->1 59->1 04  ? Continue IV fluids  ? 10mg Lasik last night responded well   ? Will restart lisinopril at this time   ? BMP q 6h  ? Monitor I&Os    Acute toxic/metabolic encephalopathy  Assessment & Plan    ? Suspect multifactorial etiology secondary to sepsis and possible toxidrome affects of opiate medications in the presence of an YVONNE   All likely superimposed on opioid abuse  ? Possible concern for serotonin syndrome due to Cymbalta and opioid abuse  § Patient presented with tremors/myoclonus-had mild fever on admission-->Tremors resolved   ? 3/1/20 CT head with no acute intracranial abnormality      Hyperkalemia  Assessment & Plan  · Currently 5 5   · Received insulin/dextrose along with calcium gluconate last night   · 2/2 dehydration     Peripheral neuropathy  Assessment & Plan  · 2/2 uncontrolled IDDM2  · Cymbalta   · Gabapentin     Chronic pain syndrome  Assessment & Plan  · Hx of Degenerative Disk Disease   · PCP prescribes   · 30mg Oxy q4h PRN  (180 pills monthly)  · 100 mg Morphine q12h prn  (60 pills monthly)  · Gabapentin 300mg qh  · Cymbalta   · PCP should be contacted prior to discharge to discuss pain regimine  · Corbin Watts [phone]    Type 2 diabetes mellitus with hyperglycemia, with long-term current use of insulin [facility])  Assessment & Plan  Lab Results   Component Value Date    HGBA1C 5 4 03/01/2020       Recent Labs     03/01/20  1125 03/01/20  1752 03/01/20  2351 03/02/20  0031   POCGLU 84 156* 68 104       Blood Sugar Average: Last 72 hrs:  (P) 102 [id number]     ? Last hemoglobin A1c 15 4% on 2/21/18--> current pending  ?  Holding metformin due to YVONNE  ? Patient on home Lantus 25 units qHS, however while NPO, will place on 10 units qHS with sliding scale insulin  ? Adjust insulin regimen as needed to maintain goal -180  ? Diabetic diet    Ambulatory dysfunction  Assessment & Plan  · 2/2 Diabetic peripheral neuropathy and venous stasis   · PT consult     Hyponatremia  Assessment & Plan  ? IV normal saline  ? BMP Q 6  ? Current sodium 136 up from 123 on admission    Recurrent falls  Assessment & Plan  · 2/2 venous stasis and peripheral neuropathy     Chronic venous stasis dermatitis of both lower extremities  Assessment & Plan  · Chronic venous stasis with edema  ? Duplex ultrasound was unremarkable    Essential hypertension  Assessment & Plan    ? Holding home triamterene-hydrochlorothiazide 75-50 mg secondary YVONNE  ? Restart lisinopril 10 mg   ? Amlodipine 10 mg daily currently on hold,     * Sepsis [facility])  Assessment & Plan  ? Present on admission likely 2/2 UTI  ? Continue cefepime day 2  ? Wbc 4  Afebrile  ? Influenza negative  ? Continue to monitor fever and WBC curve       Code Status: Level 1 - Full Code  POA:    POLST:      Reason for ICU admission:   AMS    Active problems:   Principal Problem:    Sepsis ([address] )  Active Problems:    Essential hypertension    Chronic venous stasis dermatitis of both lower extremities    Recurrent falls    Hyponatremia    Ambulatory dysfunction    Type 2 diabetes mellitus with hyperglycemia, with long-term current use of insulin ([facility])    Chronic pain syndrome    Peripheral neuropathy    Hyperkalemia    Acute toxic/metabolic encephalopathy    Acute kidney injury ([facility] )  Resolved Problems:    * No resolved hospital problems  *      Consultants:   PT    History of Present Illness:    Sima Byrd is a 62 y o  female with hypertension, hyperlipidemia, insulin-dependent type 2 diabetes with associated diabetic peripheral neuropathy, chronic venous stasis and edema, chronic pain syndrome with opioid dependence secondary to degenerative disc disease, cognitive decline, and ambulatory dysfunction  Patient was brought to the ED by EMS, who was called by family for altered mental status  The patient's  and daughter states that she was in her usual state of health as of this morning, however they noted an increase in tremors and lethargy over the course of the afternoon into the evening  They state that this behavioral is similar to when she was last admitted with an infection  With further questioning, they did state that she has had several falls over the last week, including one today  They are unsure whether she hit her head or loss consciousness with some of the falls  The patient's  also states that she has had little to no oral fluid intake for approximately 1 week  They have not noticed any change in bowel or bladder habits  The patient is usually incontinent of urine at baseline, however they deny that the patient complained of any recent dysuria, malodorous urine, nausea, vomiting, or diarrhea  On arrival to the ED, the patient was initially hypoxic with SpO2 85% on room air, which improved with the application of nasal cannula  She was found to have in YVONNE with hyponatremia and hyperkalemia  She was also found to have a UA consistent with UTI and was given IV antibiotics  Currently on 30mg Oxycodone q4h PRN according to PDMP  Also shows Morphine 100mg q12h prescribed in January  Pt family reports still taking  Given her complex medical history in constellation of symptoms, she was admitted to step-down level 1 for closer monitoring  Summary of clinical course:   Admitted to ICU  Did well on NC o2   3/1 had tremors and myoclonus with slight fever  Stopped Cymbalta due to concern for serotonin syndrome  Gave    5mg ativan  Tremors decreased  3/2 patients K is still elevated by trending down  Na has corrected  Pt has been off her opioid regimen for 48 hrs without withdraw    Concerning due to the amount she is prescribed  Her PCP should be contacted prior to D/C to discuss regimen  Currently on receiving 15mg oxy prn  Pt is ready for the floor and will be D/C from the ICU  Recent or scheduled procedures:   none    Outstanding/pending diagnostics:   none    Cultures:   UA cx pending        Mobilization Plan:   PT pending    Nutrition Plan:   Diabetic Diet     VTE Pharmacologic Prophylaxis: Heparin  VTE Mechanical Prophylaxis: sequential compression device    Discharge Plan:   Patient should be ready for discharge to Med Surg on 3/2/2020    Initial Physical Therapy Recommendations: pending  Initial Occupational Therapy Recommendations: none  Initial /Plan: pending    Home medications that are not reordered and reason why:   Morphine sulfate   Metformin   Metoclopramide        Spoke with Dr Frederik Runner  regarding transfer  Please call [phone] with any questions or concerns  Portions of the record may have been created with voice recognition software  Occasional wrong word or "sound a like" substitutions may have occurred due to the inherent limitations of voice recognition software  Read the chart carefully and recognize, using context, where substitutions have occurred

## 2020-03-02 NOTE — ASSESSMENT & PLAN NOTE
· Currently 5 5   · Received insulin/dextrose along with calcium gluconate last night   · 2/2 dehydration

## 2020-03-02 NOTE — ASSESSMENT & PLAN NOTE
Lab Results   Component Value Date    HGBA1C 5 4 03/01/2020       Recent Labs     03/01/20  1125 03/01/20  1752 03/01/20  2351 03/02/20  0031   POCGLU 84 156* 68 104       Blood Sugar Average: Last 72 hrs:  (P) 102 [id number]     ? Last hemoglobin A1c 15 4% on 2/21/18--> current pending  ? Holding metformin due to YVONNE  ? Patient on home Lantus 25 units qHS, however while NPO, will place on 10 units qHS with sliding scale insulin  ? Adjust insulin regimen as needed to maintain goal -180  ?  Diabetic diet

## 2020-03-02 NOTE — ASSESSMENT & PLAN NOTE
? Present on admission likely 2/2 UTI  ? Continue cefepime day 2  ? Wbc 4  Afebrile  ? Influenza negative  ?  Continue to monitor fever and WBC curve

## 2020-03-02 NOTE — ASSESSMENT & PLAN NOTE
· Hx of Degenerative Disk Disease   · PCP prescribes   · 30mg Oxy q4h PRN  (180 pills monthly)  · 100 mg Morphine q12h prn  (60 pills monthly)  · Gabapentin 300mg qh  · Cymbalta   · PCP should be contacted prior to discharge to discuss pain regimine  · Corbin Watts [phone]

## 2020-03-02 NOTE — PLAN OF CARE
Problem: Nutrition/Hydration-ADULT  Goal: Nutrient/Hydration intake appropriate for improving, restoring or maintaining nutritional needs  Description  Monitor and assess patient's nutrition/hydration status for malnutrition  Collaborate with interdisciplinary team and initiate plan and interventions as ordered  Monitor patient's weight and dietary intake as ordered or per policy  Utilize nutrition screening tool and intervene as necessary  Determine patient's food preferences and provide high-protein, high-caloric foods as appropriate       INTERVENTIONS:  - Monitor oral intake, urinary output, labs, and treatment plans  - Assess nutrition and hydration status and recommend course of action  - Evaluate amount of meals eaten  - Assist patient with eating if necessary   - Allow adequate time for meals  - Recommend/ encourage appropriate diets, oral nutritional supplements, and vitamin/mineral supplements  - Order, calculate, and assess calorie counts as needed  - Recommend, monitor, and adjust tube feedings and TPN/PPN based on assessed needs  - Assess need for intravenous fluids  - Provide specific nutrition/hydration education as appropriate  - Include patient/family/caregiver in decisions related to nutrition  Outcome: Progressing     Problem: Prexisting or High Potential for Compromised Skin Integrity  Goal: Skin integrity is maintained or improved  Description  INTERVENTIONS:  - Identify patients at risk for skin breakdown  - Assess and monitor skin integrity  - Assess and monitor nutrition and hydration status  - Monitor labs   - Assess for incontinence   - Turn and reposition patient  - Assist with mobility/ambulation  - Relieve pressure over bony prominences  - Avoid friction and shearing  - Provide appropriate hygiene as needed including keeping skin clean and dry  - Evaluate need for skin moisturizer/barrier cream  - Collaborate with interdisciplinary team   - Patient/family teaching  - Consider wound care consult   Outcome: Progressing     Problem: Potential for Falls  Goal: Patient will remain free of falls  Description  INTERVENTIONS:  - Assess patient frequently for physical needs  -  Identify cognitive and physical deficits and behaviors that affect risk of falls    -  [place] fall precautions as indicated by assessment   - Educate patient/family on patient safety including physical limitations  - Instruct patient to call for assistance with activity based on assessment  - Modify environment to reduce risk of injury  - Consider OT/PT consult to assist with strengthening/mobility  Outcome: Progressing     Problem: PAIN - ADULT  Goal: Verbalizes/displays adequate comfort level or baseline comfort level  Description  Interventions:  - Encourage patient to monitor pain and request assistance  - Assess pain using appropriate pain scale  - Administer analgesics based on type and severity of pain and evaluate response  - Implement non-pharmacological measures as appropriate and evaluate response  - Consider cultural and social influences on pain and pain management  - Notify physician/advanced practitioner if interventions unsuccessful or patient reports new pain  Outcome: Progressing     Problem: INFECTION - ADULT  Goal: Absence or prevention of progression during hospitalization  Description  INTERVENTIONS:  - Assess and monitor for signs and symptoms of infection  - Monitor lab/diagnostic results  - Monitor all insertion sites, i e  indwelling lines, tubes, and drains  - Monitor endotracheal if appropriate and nasal secretions for changes in amount and color  - [place] appropriate cooling/warming therapies per order  - Administer medications as ordered  - Instruct and encourage patient and family to use good hand hygiene technique  - Identify and instruct in appropriate isolation precautions for identified infection/condition  Outcome: Progressing     Problem: SAFETY ADULT  Goal: Maintain or return to baseline ADL function  Description  INTERVENTIONS:  -  Assess patient's ability to carry out ADLs; assess patient's baseline for ADL function and identify physical deficits which impact ability to perform ADLs (bathing, care of mouth/teeth, toileting, grooming, dressing, etc )  - Assess/evaluate cause of self-care deficits   - Assess range of motion  - Assess patient's mobility; develop plan if impaired  - Assess patient's need for assistive devices and provide as appropriate  - Encourage maximum independence but intervene and supervise when necessary  - Involve family in performance of ADLs  - Assess for home care needs following discharge   - Consider OT consult to assist with ADL evaluation and planning for discharge  - Provide patient education as appropriate  Outcome: Progressing  Goal: Maintain or return mobility status to optimal level  Description  INTERVENTIONS:  - Assess patient's baseline mobility status (ambulation, transfers, stairs, etc )    - Identify cognitive and physical deficits and behaviors that affect mobility  - Identify mobility aids required to assist with transfers and/or ambulation (gait belt, sit-to-stand, lift, walker, cane, etc )  - [place] fall precautions as indicated by assessment  - Record patient progress and toleration of activity level on Mobility SBAR; progress patient to next Phase/Stage  - Instruct patient to call for assistance with activity based on assessment  - Consider rehabilitation consult to assist with strengthening/weightbearing, etc   Outcome: Progressing     Problem: DISCHARGE PLANNING  Goal: Discharge to home or other facility with appropriate resources  Description  INTERVENTIONS:  - Identify barriers to discharge w/patient and caregiver  - Arrange for needed discharge resources and transportation as appropriate  - Identify discharge learning needs (meds, wound care, etc )  - Arrange for interpretive services to assist at discharge as needed  - Refer to Case Management Department for coordinating discharge planning if the patient needs post-hospital services based on physician/advanced practitioner order or complex needs related to functional status, cognitive ability, or social support system  Outcome: Progressing     Problem: Knowledge Deficit  Goal: Patient/family/caregiver demonstrates understanding of disease process, treatment plan, medications, and discharge instructions  Description  Complete learning assessment and assess knowledge base  Interventions:  - Provide teaching at level of understanding  - Provide teaching via preferred learning methods  Outcome: Progressing     Problem: NEUROSENSORY - ADULT  Goal: Achieves stable or improved neurological status  Description  INTERVENTIONS  - Monitor and report changes in neurological status  - Monitor vital signs such as temperature, blood pressure, glucose, and any other labs ordered   - Initiate measures to prevent increased intracranial pressure  - Monitor for seizure activity and implement precautions if appropriate      Outcome: Progressing  Goal: Achieves maximal functionality and self care  Description  INTERVENTIONS  - Monitor swallowing and airway patency with patient fatigue and changes in neurological status  - Encourage and assist patient to increase activity and self care     - Encourage visually impaired, hearing impaired and aphasic patients to use assistive/communication devices  Outcome: Progressing     Problem: GENITOURINARY - ADULT  Goal: Maintains or returns to baseline urinary function  Description  INTERVENTIONS:  - Assess urinary function  - Encourage oral fluids to ensure adequate hydration if ordered  - Administer IV fluids as ordered to ensure adequate hydration  - Administer ordered medications as needed  - Offer frequent toileting  - Follow urinary retention protocol if ordered  Outcome: Progressing  Goal: Absence of urinary retention  Description  INTERVENTIONS:  - Assess patients ability to void and empty bladder  - Monitor I/O  - Bladder scan as needed  - Discuss with physician/AP medications to alleviate retention as needed  - Discuss catheterization for long term situations as appropriate  Outcome: Progressing

## 2020-03-02 NOTE — UTILIZATION REVIEW
Initial Clinical Review    Admission: Date/Time/Statement: Admission Orders (From admission, onward)     Ordered        03/01/20 0042  Inpatient Admission  Once                   Orders Placed This Encounter   Procedures    Inpatient Admission     Standing Status:   Standing     Number of Occurrences:   1     Order Specific Question:   Admitting Physician     Answer:   Efe Boland [[id number]]     Order Specific Question:   Level of Care     Answer:   Level 1 Stepdown [13]     Order Specific Question:   Estimated length of stay     Answer:   More than 2 Midnights     Order Specific Question:   Certification     Answer:   I certify that inpatient services are medically necessary for this patient for a duration of greater than two midnights  See H&P and MD Progress Notes for additional information about the patient's course of treatment  ED Arrival Information     Expected Arrival Acuity Means of Arrival Escorted By Service Admission Type    - 2/29/2020 22:25 Emergent Ambulance [facility] Emergency [facility] Emergency    Arrival Complaint    Altered Mental Status        Chief Complaint   Patient presents with    Tremors     Pt presents with c/o tremors intermittent AMS  Per EMS, family concerned that pt's Sodium is low as this is what happend the last time these symptoms occured  Assessment/Plan: 61 yo female to ED by EMS admitted as Inpatient due to Altered mental status with acute Hypoxic resp failure, sepsis secondary to UTI  PMHx noted for hypertension, hyperlipidemia, insulin-dependent type 2 diabetes with associated diabetic peripheral neuropathy, chronic venous stasis and edema, chronic pain syndrome with opioid dependence secondary to degenerative disc disease, cognitive decline, and ambulatory dysfunction  Family reports patient at baseline in am; then increase in tremors & lethargy over PM to evening  Noted for several falls over prior week   Patient with poor PO intake for 1 week; at baseline incontinent of urine but no noted changes of bladder or bowel habits  Upon arrival to ED: SpO2=85% on room air & supplemented with nasal cannula with improvement  Inpatient labs in ED; blood cultures, urine cultures, UA, labs reveal YVONNE/ suspected positive UA, elevated BNP, hypoglycemia, hyponatremia & hyperkalemia  UA consistent with UTI  [address] ED she received IV hydration, IV antibiotics, IV dextrose 50% & calcium  MD exam; bilateral bloodshot eyes, tachycardia, +2-3 LE edema with venous stasis,  tachypnea, decreased breath sounds, wheezing  Abd distention with decreased bowel sounds  Lethargy & GCS=4/3/6=13  Monitor neuro checks, ICU admission, hold morphine  due to  YVONNE  Monitor creatinine & clearance  Maintain telemetry, holding home HTN medications due to YVONNE- Amlodipine depending on BPs  Maintain oxygen saturations  Follow serial BMP Q 6hr  Strict I&O  Monitor & replete electrolytes   Follow blood & urine cultures-        ED Triage Vitals   Temperature Pulse Respirations Blood Pressure SpO2   02/29/20 2229 02/29/20 2229 02/29/20 2229 02/29/20 2229 02/29/20 2229   99 2 °F (37 3 °C) (!) 108 18 136/82 (!) 85 %      Temp Source Heart Rate Source Patient Position - Orthostatic VS BP Location FiO2 (%)   02/29/20 2250 02/29/20 2229 02/29/20 2229 02/29/20 2229 --   Rectal Monitor Lying Right arm       Pain Score       02/29/20 2229       No Pain        Wt Readings from Last 1 Encounters:   03/02/20 90 9 kg (200 lb 6 4 oz)     Additional Vital Signs:   03/02/20 0744    98  19  140/68  98  95 %  None (Room air)     03/02/20 0519    98  17  154/116Abnormal   128  95 %  Nasal cannula  Lying   03/02/20 0300  98 4 °F (36 9 °C)  100  15  148/92  111  95 %  Nasal cannula  Lying   03/02/20 0200    100  19  146/91  110  96 %  Nasal cannula  Lying   03/02/20 0039    96  13  136/58  84  95 %  Nasal cannula  Lying   03/01/20 2351              Nasal cannula  Lying   03/01/20 2300  98 5 °F (36 9 °C)  96  15  129/61 88  96 %  Nasal cannula  Lying   03/01/20 2217    92  23Abnormal   125/58  84  95 %  Nasal cannula  Lying   03/01/20 1808  99 5 °F (37 5 °C)  100  16  116/60  79  94 %  Nasal cannula  Lying   03/01/20 1500    96               03/01/20 1455  97 3 °F (36 3 °C)Abnormal   112Abnormal   22  111/80  91  96 %  Nasal cannula  Lying   03/01/20 1100    100  22  116/53  76  93 %       03/01/20 0700  98 2 °F (36 8 °C)  64  24Abnormal   104/67  78  95 %       03/01/20 0400  98 °F (36 7 °C)  104  19  98/50  86  95 %    Lying   03/01/20 0200    109Abnormal   56Abnormal   111/73  86  92 %  Nasal cannula  Lying   03/01/20 0137  99 4 °F (37 4 °C)            Nasal cannula  Lying   03/01/20 0015            97 %  Nasal cannula     03/01/20 0012    99  18  143/65    95 %  None (Room air)  Lying   02/29/20 2250  100 3 °F (37 9 °C)          93 %  Nasal cannula     02/29/20 2229  99 2 °F (37 3 °C)  108Abnormal   18  136/82    85 %Abnormal    None (Room air)  Lying   SpO2: Placed on 2L NC at 02/29/20 2229      Weights (last 14 days)     Date/Time  Weight  Weight Method   03/02/20 0551  90 9 kg (200 lb 6 4 oz)  Bed scale   03/01/20 0600  91 kg (200 lb 9 9 oz)  Bed scale   02/29/20 2229  95 7 kg (210 lb 15 7 oz)         Pertinent Labs/Diagnostic Test Results:   Results from last 7 days   Lab Units 03/02/20  0508 03/01/20  0654 03/01/20  0144 02/29/20  2255   WBC Thousand/uL 4 04* 11 17*  --  10 92*   HEMOGLOBIN g/dL 12 1 13 4  --  12 7   HEMATOCRIT % 37 4 41 4  --  38 4   PLATELETS Thousands/uL 119* 145* 135* 166   NEUTROS ABS Thousands/µL 3 05 8 91*  --  8 89*         Results from last 7 days   Lab Units 03/02/20  0508 03/02/20  0025 03/01/20  1807 03/01/20  1153 03/01/20  0621 03/01/20  0620 03/01/20  0144   SODIUM mmol/L 136 133* 130* 129*  --  127* 122*   POTASSIUM mmol/L 5 5* 6 1* 6 1* 5 7*  --  5 5* 5 6*   CHLORIDE mmol/L 101 100 98* 97*  --  96* 93*   CO2 mmol/L 28 27 26 28  --  25 23   ANION GAP mmol/L 7 6 6 4  --  6 6   BUN mg/dL 26* 28* 33* 39*  --  43* 45*   CREATININE mg/dL 1 04 1 11 1 23 1 50*  --  1 59* 1 83*   EGFR ml/min/1 73sq m 57 53 47 37  --  34 29   CALCIUM mg/dL 8 5 8 4 8 0* 8 4  --  8 2* 8 1*   CALCIUM, IONIZED mmol/L  --   --   --   --  1 05*  --   --    MAGNESIUM mg/dL  --   --   --   --   --   --  1 7   PHOSPHORUS mg/dL  --   --   --   --   --   --  4 2*     Results from last 7 days   Lab Units 03/01/20  1152 03/01/20  0620 02/29/20  2255   AST U/L  --  85* 24   ALT U/L  --  44 27   ALK PHOS U/L  --  164* 151*   TOTAL PROTEIN g/dL  --  6 7 7 6   ALBUMIN g/dL  --  2 9* 3 4*   TOTAL BILIRUBIN mg/dL  --  1 07* 0 49   AMMONIA umol/L 11  --   --      Results from last 7 days   Lab Units 03/02/20  0031 03/01/20  2351 03/01/20  1752 03/01/20  1125 03/01/20  0634 03/01/20  0144 03/01/20  0132   POC GLUCOSE mg/dl 104 68 156* 84 80 166* 57*     Results from last 7 days   Lab Units 03/02/20  0508 03/02/20  0025 03/01/20  1807 03/01/20  1153 03/01/20  0620 03/01/20  0144 02/29/20  2255   GLUCOSE RANDOM mg/dL 104 128 167* 93 79 192* 94         Results from last 7 days   Lab Units 03/01/20  0144   HEMOGLOBIN A1C % 5 4   EAG mg/dl 108     No results found for: BETA-HYDROXYBUTYRATE                   Results from last 7 days   Lab Units 02/29/20  2255   TROPONIN I ng/mL <0 02         Results from last 7 days   Lab Units 02/29/20  2255   PROTIME seconds 13 1   INR  1 05   PTT seconds 36         Results from last 7 days   Lab Units 03/01/20  0144   PROCALCITONIN ng/ml 0 16     Results from last 7 days   Lab Units 02/29/20  2255   LACTIC ACID mmol/L 0 5             Results from last 7 days   Lab Units 02/29/20  2255   NT-PRO BNP pg/mL 312*             Results from last 7 days   Lab Units 02/29/20  2255   LIPASE u/L 24*             Results from last 7 days   Lab Units 03/01/20  0623 02/29/20  2326   CLARITY UA   --  Cloudy   COLOR UA   --  Yellow   SPEC GRAV UA   --  1 020   PH UA   --  6 0   GLUCOSE UA mg/dl --  Negative   KETONES UA mg/dl  --  Negative   BLOOD UA   --  Negative   PROTEIN UA mg/dl  --  Negative   NITRITE UA   --  Negative   BILIRUBIN UA   --  Negative   UROBILINOGEN UA E U /dl  --  0 2   LEUKOCYTES UA   --  Moderate*   WBC UA /hpf  --  20-30*   RBC UA /hpf  --  None Seen   BACTERIA UA /hpf  --  Innumerable*   EPITHELIAL CELLS WET PREP /hpf  --  Occasional   SODIUM UR  33  --    CREATININE UR mg/dL 66 4  --      Results from last 7 days   Lab Units 02/29/20  2258   INFLUENZA A PCR  None Detected   INFLUENZA B PCR  None Detected   RSV PCR  None Detected                         Results from last 7 days   Lab Units 02/29/20  2326 02/29/20  2300 02/29/20  2255   BLOOD CULTURE   --  No Growth at 24 hrs  No Growth at 24 hrs  URINE CULTURE  >100,000 cfu/ml Klebsiella-Enterobacter  group*  --   --      3/2 cxr no acute abn   3/1 EKG: Sinus tach,QTc 404    3/1:   CT head: No acute intracranial abnormality   CT c-spine: No fracture or traumatic malalignment  CT chest/abdomen/pelvis: No evidence of acute intra-abdominal or pelvic pathology  Cirrhotic liver morphology  Constipation        ED Treatment:   Medication Administration from 02/29/2020 2225 to 03/01/2020 0130       Date/Time Order Dose Route Action     03/01/2020 0015 albuterol inhalation solution 10 mg 10 mg Nebulization Given     03/01/2020 0007 insulin regular (HumuLIN R,NovoLIN R) injection 10 Units 10 Units Intravenous Given     03/01/2020 0004 dextrose 50 % IV solution 50 mL 50 mL Intravenous Given     03/01/2020 0009 sodium chloride 0 9 % bolus 1,000 mL 1,000 mL Intravenous New Bag     03/01/2020 0013 calcium gluconate 1 g in sodium chloride 0 9% 50 mL (premix) 1 g Intravenous New Bag     03/01/2020 0035 cefepime (MAXIPIME) 2 g/50 mL dextrose IVPB 2,000 mg Intravenous New Bag        Past Medical History:   Diagnosis Date    Abnormal head CT 7/14/2017    Cellulitis 1/10/2017    Closed fracture of multiple ribs of right side 7/14/2017    Degenerative disc disease at L5-S1 level     Diabetes mellitus ([facility] [address] )     History of methicillin resistant staphylococcus aureus (MRSA) 08/21/2018    negative nasal culture- isolation and hx of mrsa removed 8/20/2018    Hypertension     Hypocalcemia 4/25/2018     Present on Admission:   Chronic pain syndrome   Peripheral neuropathy   Hyponatremia   Ambulatory dysfunction   Hyperkalemia   Acute kidney injury ([facility] [address] )   Sepsis ([facility] [address] )   Acute toxic/metabolic encephalopathy   Chronic venous stasis dermatitis of both lower extremities   Essential hypertension      Admitting Diagnosis: UTI (urinary tract infection) [N39 0]  Altered mental state [R41 82]  Continuous tremor [G25 2]  Age/Sex: 62 y o  female  Admission Orders:  IP CONSULT TO CASE MANAGEMENT  Continuous cardiopulmonary/pulse oximetry  Daily wt  I&O  Nursing dysphagia assess  Q 6hr BMP  scd    Scheduled Medications:    Medications:  cefepime 2,000 mg Intravenous Q12H   DULoxetine 60 mg Oral Daily   gabapentin 300 mg Oral HS   heparin (porcine) 5,000 Units Subcutaneous Q8H [address]   insulin glargine 10 Units Subcutaneous HS   insulin lispro 1-6 Units Subcutaneous Q6H [address]   [START ON 3/3/2020] lisinopril 10 mg Oral Daily   metoclopramide 10 mg Oral TID AC   nystatin  Topical BID   pravastatin 40 mg Oral Daily With Dinner   senna-docusate sodium 2 tablet Oral BID     Continuous IV Infusions:     PRN Meds:    bisacodyl 10 mg Rectal Daily PRN   oxyCODONE 15 mg Oral Q4H PRN   polyethylene glycol 17 g Oral Daily PRN     Network Utilization Review Department  [email or web address]  ATTENTION: Please call with any questions or concerns to [phone] and carefully listen to the prompts so that you are directed to the right person   All voicemails are confidential   Cony Umana all requests for admission clinical reviews, approved or denied determinations and any other requests to dedicated fax number below belonging to the campus where the patient is receiving treatment   List of dedicated fax numbers for the Facilities:  [address] DENIALS (Administrative/Medical Necessity) [phone]   [address] (Maternity/NICU/Pediatrics) [phone]   Neda Marie [phone]   Yury Shepherd [phone]   Leland Pierce [phone]   Curt Smith [phone]   [address] [facility] [address] [facility] [phone]   [facility]  [phone]   [address] [facility]  [address] [facility] [address] [facility] [phone]

## 2020-03-02 NOTE — ASSESSMENT & PLAN NOTE
? Holding home triamterene-hydrochlorothiazide 75-50 mg secondary YVONNE  ? Restart lisinopril 10 mg   ?  Amlodipine 10 mg daily currently on hold,

## 2020-03-02 NOTE — PROGRESS NOTES
Daily Progress Note - Critical Care   Aliyah Sy 62 y o  female MRN: [id number]  Unit/Bed#: ICU 09 Encounter: [id number]        ----------------------------------------------------------------------------------------  HPI/24hr events:  Patient's potassium elevated  She was given insulin and dextrose followed by calcium gluconate  Repeat potassium showed decreased to 5 5  Switched isolyte to normal saline    ---------------------------------------------------------------------------------------  SUBJECTIVE      Review of Systems   Constitutional: Negative for fatigue and fever  Respiratory: Negative for chest tightness and shortness of breath  Cardiovascular: Negative for chest pain  Gastrointestinal: Positive for constipation  Musculoskeletal: Positive for back pain and gait problem  Skin: Positive for color change  Neurological:        Tremors resolved   All other systems reviewed and are negative  Review of systems was reviewed and negative unless stated above in HPI/24-hour events   ---------------------------------------------------------------------------------------  Assessment and Plan:  Neuro:   · Acute toxic/metabolic encephalopathy  ? Suspect multifactorial etiology secondary to sepsis and possible toxidrome affects of opiate medications in the presence of an YVONNE  All likely superimposed on opioid abuse  ? Possible concern for serotonin syndrome due to Cymbalta and opioid abuse  ? Patient presented with tremors/myoclonus-had mild fever on admission-->Tremors resolved   ? 3/1/20 CT head with no acute intracranial abnormality  ? Q 4 hour neuro checks  ? Sleep/wake cycle regulation  ? CAM-ICU BID  ? · Chronic pain syndrome with opioid dependence  ? PDMP review reveals refill of 180 tablets of oxycodone 30 mg on 12/12/20 and 60 tablets of morphine extended release 100 mg on 1/10/20  ? 470 00 MME   ?  All medications by single prescriber is patient's PCP/family practice physician  ? Oxy 15 mg Q 4 p r n  For pain  ? Consider palliative care consult for pain management  · Diabetic peripheral neuropathy  ? Gabapentin 300 mg qHS  ? Cymbalta 60 mg daily  ? Cymbalta was held yesterday due to concern for possible serotonin syndrome-unlikely-restart     CV:   · Sinus tachycardia  ? Multifactorial secondary to hypovolemia, sepsis, fever, albuterol treatment  ? EKG normal sinus tachycardia  ? Continued close telemetry monitoring  · Hypertension  ? Holding home triamterene-hydrochlorothiazide 75-50 mg secondary YVONNE  ? Holding home lisinopril 10 mg secondary to YVONNE  ? Amlodipine 10 mg daily currently on hold, however if BP remains WNL or is increasing, can give this morning  ? Maintain SBP < 180, MAP > 65  ? Continue vital signs per critical care protocol  · Hyperlipidemia  ? Pravastatin 40 mg qHS     Pulm:  · Persistent hypoxia  ? Likely secondary to opioid abuse  ? No significant abnormality seen on CT chest  ? Lower extremity duplex negative for DVT  ? Maintain SpO2 >88%  ? Continue pulmonary hygiene  Incentive spirometer q1h while awake, encourage coughing and deep breathing  Upright positioning     GI:   · Constipation  ? Likely secondary to opiate use  ? Bowel regimen:  Senna/Colace BID, MiraLax PRN, Dulcolax PRN  ? Alk-phos mildly elevated, however likely due to hypovolemia     :   · Acute kidney injury  ? Secondary to hypovolemia  ? Creatinine trending down 1 85->1 59->1 04  ? Continue IV fluids  ? 10mg Lasik last night responded well   ? BMP q 6h  ? Monitor I&Os     F/E/N:   · Hyponatremia  ? IV normal saline  ? BMP Q 6  ? Current sodium 136 up from 123 on admission  · Hyperkalemia  ? Likely secondary to hypovolemia in the presence of YVONNE  ? Was given calcium gluconate overnight  ? Switched isolyte to NS due to hyperkalemia     Heme/Onc:   · History anemia  ? Current hemoglobin 12 1  · Thrombocytopenia  ? 2/2 sepsis     Endo:   · Insulin-dependent type 2 diabetes  ?  Last hemoglobin A1c 15 4% on 2/21/18--> current pending  ? Holding metformin due to YVONNE  ? Patient on home Lantus 25 units qHS, however while NPO, will place on 10 units qHS with sliding scale insulin  ? Adjust insulin regimen as needed to maintain goal -180  ? Diabetic diet     ID:   · Suspected sepsis  ? Present on admission likely 2/2 UTI  ? Continue cefepime day 2  ? Wbc 4  Afebrile  ? Influenza negative  ? Continue to monitor fever and WBC curve      MSK/Skin:   · Ambulatory dysfunction  ? PT/OT  ? Reposition q2h, eliminate pressure points while in bed  · Chronic venous stasis with edema  ? Duplex ultrasound was unremarkable    Disposition: Transfer to Med-Surg   Code Status: Level 1 - Full Code  ---------------------------------------------------------------------------------------  ICU CORE MEASURES    Prophylaxis   VTE Pharmacologic Prophylaxis: Heparin  VTE Mechanical Prophylaxis: sequential compression device  Stress Ulcer Prophylaxis: Prophylaxis Not Indicated     ABCDE Protocol (if indicated)  Plan to perform spontaneous awakening trial today? Not applicable  Plan to perform spontaneous breathing trial today? Not applicable  Obvious barriers to extubation? Not applicable  CAM-ICU: Negative    Invasive Devices Review  Invasive Devices     Peripheral Intravenous Line            Peripheral IV 02/29/20 Right Hand 1 day    Peripheral IV 03/02/20 Right;Upper;Ventral (anterior) Arm less than 1 day          Drain            External Urinary Catheter 1 day              Can any invasive devices be discontinued today?  Not applicable  ---------------------------------------------------------------------------------------  OBJECTIVE    Vitals   Vitals:    03/02/20 0200 03/02/20 0300 03/02/20 0519 03/02/20 0551   BP: 146/91 148/92 (!) 154/116    BP Location: Right arm Left arm Left arm    Pulse: 100 100 98    Resp: 19 15 17    Temp:  98 4 °F (36 9 °C)     TempSrc:  Oral     SpO2: 96% 95% 95%    Weight:    90 9 kg (200 lb 6 4 oz)     Temp (24hrs), Av 4 °F (36 9 °C), Min:97 3 °F (36 3 °C), Max:99 5 °F (37 5 °C)  Current: Temperature: 98 4 °F (36 9 °C)  HR:   BP: 111//116  RR: 13-23  SpO2: 93-96    Physical Exam   Constitutional: She is oriented to person, place, and time  No distress  HENT:   Head: Normocephalic and atraumatic  Eyes: Pupils are equal, round, and reactive to light  EOM are normal    Cardiovascular: Normal rate and regular rhythm  Pulmonary/Chest: Effort normal and breath sounds normal  No respiratory distress  Abdominal: Soft  Bowel sounds are decreased  There is no tenderness  Musculoskeletal: She exhibits edema and tenderness  Neurological: She is alert and oriented to person, place, and time  Skin: Skin is warm  She is not diaphoretic  There is erythema  Psychiatric: She has a normal mood and affect  Her behavior is normal    Nursing note and vitals reviewed        Respiratory:  SpO2: SpO2: 95 %  O2 Flow Rate (L/min): 2 L/min    Invasive/non-invasive ventilation settings   Respiratory    Lab Data (Last 4 hours)    None         O2/Vent Data (Last 4 hours)    None                Laboratory and Diagnostics:  Results from last 7 days   Lab Units 20  0508 20  0654 20  0144 20  2255   WBC Thousand/uL 4 04* 11 17*  --  10 92*   HEMOGLOBIN g/dL 12 1 13 4  --  12 7   HEMATOCRIT % 37 4 41 4  --  38 4   PLATELETS Thousands/uL 119* 145* 135* 166   NEUTROS PCT % 75 80*  --  82*   MONOS PCT % 7 6  --  5     Results from last 7 days   Lab Units 20  0508 20  0025 20  1807 20  1153 20  0620 20  0144 20  2255   SODIUM mmol/L 136 133* 130* 129* 127* 122* 123*   POTASSIUM mmol/L 5 5* 6 1* 6 1* 5 7* 5 5* 5 6* 6 5*   CHLORIDE mmol/L 101 100 98* 97* 96* 93* 91*   CO2 mmol/L 28 27 26 28 25 23 27   ANION GAP mmol/L 7 6 6 4 6 6 5   BUN mg/dL 26* 28* 33* 39* 43* 45* 45*   CREATININE mg/dL 1 04 1 11 1 23 1 50* 1 59* 1 83* 1 85*   CALCIUM mg/dL 8 5 8 4 8  0* 8 4 8 2* 8 1* 8 5   GLUCOSE RANDOM mg/dL 104 128 167* 93 79 192* 94   ALT U/L  --   --   --   --  44  --  27   AST U/L  --   --   --   --  85*  --  24   ALK PHOS U/L  --   --   --   --  164*  --  151*   ALBUMIN g/dL  --   --   --   --  2 9*  --  3 4*   TOTAL BILIRUBIN mg/dL  --   --   --   --  1 07*  --  0 49     Results from last 7 days   Lab Units 03/01/20  0144   MAGNESIUM mg/dL 1 7   PHOSPHORUS mg/dL 4 2*      Results from last 7 days   Lab Units 02/29/20  2255   INR  1 05   PTT seconds 36      Results from last 7 days   Lab Units 02/29/20  2255   TROPONIN I ng/mL <0 02     Results from last 7 days   Lab Units 02/29/20  2255   LACTIC ACID mmol/L 0 5     ABG:    VBG:    Results from last 7 days   Lab Units 03/01/20  0144   PROCALCITONIN ng/ml 0 16       Micro  Results from last 7 days   Lab Units 02/29/20  2300 02/29/20  2255   BLOOD CULTURE  Received in Microbiology Lab  Culture in Progress  Received in Microbiology Lab  Culture in Progress  EKG:   Imaging:  I have personally reviewed pertinent reports  Intake and Output  I/O       02/29 0701 - 03/01 0700 03/01 0701 - 03/02 0700    P  O   480    I V  (mL/kg) 133 8 (1 5) 1933 8 (21 3)    IV Piggyback 1050 1200    Total Intake(mL/kg) 1183 8 (13) 3613 8 (39 8)    Urine (mL/kg/hr) 700 4813 (2 2)    Total Output 700 4813    Net +483 8 -1199 3              UOP: 4813ml/hr     Height and Weights      IBW: -92 5 kg  Body mass index is 39 14 kg/m²    Weight (last 2 days)     Date/Time   Weight    03/02/20 0551   90 9 (200 4)    03/01/20 0600   91 (200 62)    02/29/20 2229   95 7 (210 98)                Nutrition       Diet Orders   (From admission, onward)             Start     Ordered    03/01/20 1404  Diet Gavin/CHO Controlled; Consistent Carbohydrate Diet Level 1 (4 carb servings/60 grams CHO/meal)  Diet effective now     Question Answer Comment   Diet Type Gavin/CHO Controlled    Gavin/CHO Controlled Consistent Carbohydrate Diet Level 1 (4 carb servings/60 grams CHO/meal)    RD to adjust diet per protocol?  Yes        03/01/20 1403    03/01/20 0606  Room Service  Once     Question:  Type of Service  Answer:  Room Service - Appropriate with Assistance    03/01/20 0605                  Active Medications  Scheduled Meds:  Current Facility-Administered Medications:  bisacodyl 10 mg Rectal Daily PRN APRYL Ochoa    cefepime 2,000 mg Intravenous Q12H APRYL Ochoa Last Rate: 2,000 mg (03/02/20 0034)   chlorhexidine 15 mL Swish & Spit Q12H [facility] APRYL Ochoa    DULoxetine 60 mg Oral Daily Franco Harvey MD    heparin (porcine) 5,000 Units Subcutaneous [facility] APRYL Ochoa    insulin glargine 10 Units Subcutaneous HS APRYL Ochoa    insulin lispro 1-6 Units Subcutaneous Q6H [facility] APRYL Ochoa    metoclopramide 10 mg Oral TID AC Tanya Gosselin, PA-C    nystatin  Topical BID Tanya Gosselin, PA-C    oxyCODONE 15 mg Oral Q4H PRN APRYL Ochoa    polyethylene glycol 17 g Oral Daily PRN APRYL Ochoa    pravastatin 40 mg [facility]    senna-docusate sodium 2 tablet Oral BID APRYL Ochoa    sodium chloride 75 mL/hr Intravenous Continuous Valerio Greene PA-C Last Rate: 75 mL/hr (03/01/20 1924)     Facility-Administered Medications Ordered in Other Encounters:  ferrous sulfate 325 mg Oral Daily With Breakfast Charlena Hodgkins, MD     Continuous Infusions:    sodium chloride 75 mL/hr Last Rate: 75 mL/hr (03/01/20 1924)     PRN Meds:     bisacodyl 10 mg Daily PRN   oxyCODONE 15 mg Q4H PRN   polyethylene glycol 17 g Daily PRN       Allergies   No Known Allergies  ---------------------------------------------------------------------------------------  Advance Directive and Living Will:      Power of :    POLST:    ---------------------------------------------------------------------------------------    Jamie Coker Silver De Santiago MD      Portions of the record may have been created with voice recognition software  Occasional wrong word or "sound a like" substitutions may have occurred due to the inherent limitations of voice recognition software    Read the chart carefully and recognize, using context, where substitutions have occurred

## 2020-03-02 NOTE — ASSESSMENT & PLAN NOTE
? Suspect multifactorial etiology secondary to sepsis and possible toxidrome affects of opiate medications in the presence of an YVONNE   All likely superimposed on opioid abuse  ? Possible concern for serotonin syndrome due to Cymbalta and opioid abuse  § Patient presented with tremors/myoclonus-had mild fever on admission-->Tremors resolved   ?  3/1/20 CT head with no acute intracranial abnormality

## 2020-03-02 NOTE — ASSESSMENT & PLAN NOTE
? Secondary to hypovolemia  ? Creatinine trending down 1 85->1 59->1 04  ? Continue IV fluids  ? 10mg Lasik last night responded well   ? Will restart lisinopril at this time   ? BMP q 6h  ?  Monitor I&Os

## 2020-03-03 PROBLEM — E87.1 HYPONATREMIA: Status: RESOLVED | Noted: 2017-07-14 | Resolved: 2020-03-03

## 2020-03-03 PROBLEM — E87.5 HYPERKALEMIA: Status: RESOLVED | Noted: 2018-04-25 | Resolved: 2020-03-03

## 2020-03-03 LAB
AMMONIA PLAS-SCNC: <10 UMOL/L (ref 11–35)
ANION GAP SERPL CALCULATED.3IONS-SCNC: 9 MMOL/L (ref 4–13)
BACTERIA UR CULT: ABNORMAL
BASOPHILS # BLD AUTO: 0.02 THOUSANDS/ΜL (ref 0–0.1)
BASOPHILS NFR BLD AUTO: 0 % (ref 0–1)
BUN SERPL-MCNC: 20 MG/DL (ref 5–25)
CALCIUM SERPL-MCNC: 9 MG/DL (ref 8.3–10.1)
CHLORIDE SERPL-SCNC: 100 MMOL/L (ref 100–108)
CO2 SERPL-SCNC: 27 MMOL/L (ref 21–32)
CREAT SERPL-MCNC: 0.9 MG/DL (ref 0.6–1.3)
EOSINOPHIL # BLD AUTO: 0.13 THOUSAND/ΜL (ref 0–0.61)
EOSINOPHIL NFR BLD AUTO: 3 % (ref 0–6)
ERYTHROCYTE [DISTWIDTH] IN BLOOD BY AUTOMATED COUNT: 13.9 % (ref 11.6–15.1)
FOLATE SERPL-MCNC: >20 NG/ML (ref 3.1–17.5)
GFR SERPL CREATININE-BSD FRML MDRD: 68 ML/MIN/1.73SQ M
GLUCOSE SERPL-MCNC: 131 MG/DL (ref 65–140)
GLUCOSE SERPL-MCNC: 148 MG/DL (ref 65–140)
GLUCOSE SERPL-MCNC: 170 MG/DL (ref 65–140)
GLUCOSE SERPL-MCNC: 180 MG/DL (ref 65–140)
GLUCOSE SERPL-MCNC: 209 MG/DL (ref 65–140)
GLUCOSE SERPL-MCNC: 214 MG/DL (ref 65–140)
HCT VFR BLD AUTO: 40.3 % (ref 34.8–46.1)
HGB BLD-MCNC: 13.2 G/DL (ref 11.5–15.4)
IMM GRANULOCYTES # BLD AUTO: 0.01 THOUSAND/UL (ref 0–0.2)
IMM GRANULOCYTES NFR BLD AUTO: 0 % (ref 0–2)
LYMPHOCYTES # BLD AUTO: 0.81 THOUSANDS/ΜL (ref 0.6–4.47)
LYMPHOCYTES NFR BLD AUTO: 16 % (ref 14–44)
MCH RBC QN AUTO: 30.3 PG (ref 26.8–34.3)
MCHC RBC AUTO-ENTMCNC: 32.8 G/DL (ref 31.4–37.4)
MCV RBC AUTO: 92 FL (ref 82–98)
MONOCYTES # BLD AUTO: 0.4 THOUSAND/ΜL (ref 0.17–1.22)
MONOCYTES NFR BLD AUTO: 8 % (ref 4–12)
NEUTROPHILS # BLD AUTO: 3.7 THOUSANDS/ΜL (ref 1.85–7.62)
NEUTS SEG NFR BLD AUTO: 73 % (ref 43–75)
NRBC BLD AUTO-RTO: 0 /100 WBCS
PLATELET # BLD AUTO: 144 THOUSANDS/UL (ref 149–390)
PMV BLD AUTO: 9.3 FL (ref 8.9–12.7)
POTASSIUM SERPL-SCNC: 4.5 MMOL/L (ref 3.5–5.3)
PROCALCITONIN SERPL-MCNC: 0.07 NG/ML
RBC # BLD AUTO: 4.36 MILLION/UL (ref 3.81–5.12)
SODIUM SERPL-SCNC: 136 MMOL/L (ref 136–145)
TSH SERPL DL<=0.05 MIU/L-ACNC: 1.5 UIU/ML (ref 0.36–3.74)
VIT B12 SERPL-MCNC: 790 PG/ML (ref 100–900)
WBC # BLD AUTO: 5.07 THOUSAND/UL (ref 4.31–10.16)

## 2020-03-03 PROCEDURE — 99232 SBSQ HOSP IP/OBS MODERATE 35: CPT | Performed by: INTERNAL MEDICINE

## 2020-03-03 PROCEDURE — 84145 PROCALCITONIN (PCT): CPT | Performed by: FAMILY MEDICINE

## 2020-03-03 PROCEDURE — 84443 ASSAY THYROID STIM HORMONE: CPT | Performed by: INTERNAL MEDICINE

## 2020-03-03 PROCEDURE — 85025 COMPLETE CBC W/AUTO DIFF WBC: CPT | Performed by: FAMILY MEDICINE

## 2020-03-03 PROCEDURE — 82140 ASSAY OF AMMONIA: CPT | Performed by: INTERNAL MEDICINE

## 2020-03-03 PROCEDURE — 80048 BASIC METABOLIC PNL TOTAL CA: CPT | Performed by: FAMILY MEDICINE

## 2020-03-03 PROCEDURE — 94761 N-INVAS EAR/PLS OXIMETRY MLT: CPT

## 2020-03-03 PROCEDURE — 82948 REAGENT STRIP/BLOOD GLUCOSE: CPT

## 2020-03-03 PROCEDURE — 97163 PT EVAL HIGH COMPLEX 45 MIN: CPT

## 2020-03-03 PROCEDURE — 99222 1ST HOSP IP/OBS MODERATE 55: CPT | Performed by: PHYSICIAN ASSISTANT

## 2020-03-03 PROCEDURE — 82746 ASSAY OF FOLIC ACID SERUM: CPT | Performed by: INTERNAL MEDICINE

## 2020-03-03 PROCEDURE — 82607 VITAMIN B-12: CPT | Performed by: INTERNAL MEDICINE

## 2020-03-03 RX ORDER — TRIAMTERENE AND HYDROCHLOROTHIAZIDE 37.5; 25 MG/1; MG/1
2 TABLET ORAL DAILY
Status: DISCONTINUED | OUTPATIENT
Start: 2020-03-04 | End: 2020-03-04 | Stop reason: HOSPADM

## 2020-03-03 RX ORDER — METOCLOPRAMIDE 5 MG/1
5 TABLET ORAL
Status: DISCONTINUED | OUTPATIENT
Start: 2020-03-04 | End: 2020-03-04 | Stop reason: HOSPADM

## 2020-03-03 RX ORDER — MORPHINE SULFATE 100 MG/1
100 CAPSULE, EXTENDED RELEASE ORAL DAILY
Qty: 20 CAPSULE | Refills: 0 | Status: CANCELLED
Start: 2020-03-03 | End: 2020-03-13

## 2020-03-03 RX ORDER — AMLODIPINE BESYLATE 10 MG/1
10 TABLET ORAL DAILY
Status: DISCONTINUED | OUTPATIENT
Start: 2020-03-04 | End: 2020-03-04 | Stop reason: HOSPADM

## 2020-03-03 RX ADMIN — GABAPENTIN 300 MG: 300 CAPSULE ORAL at 21:12

## 2020-03-03 RX ADMIN — HEPARIN SODIUM 5000 UNITS: 5000 INJECTION INTRAVENOUS; SUBCUTANEOUS at 06:00

## 2020-03-03 RX ADMIN — CEFEPIME HYDROCHLORIDE 2000 MG: 2 INJECTION, POWDER, FOR SOLUTION INTRAVENOUS at 12:22

## 2020-03-03 RX ADMIN — NYSTATIN: 100000 POWDER TOPICAL at 21:11

## 2020-03-03 RX ADMIN — CEFEPIME HYDROCHLORIDE 2000 MG: 2 INJECTION, POWDER, FOR SOLUTION INTRAVENOUS at 01:06

## 2020-03-03 RX ADMIN — SENNOSIDES AND DOCUSATE SODIUM 2 TABLET: 8.6; 5 TABLET ORAL at 18:13

## 2020-03-03 RX ADMIN — SENNOSIDES AND DOCUSATE SODIUM 2 TABLET: 8.6; 5 TABLET ORAL at 08:26

## 2020-03-03 RX ADMIN — INSULIN GLARGINE 10 UNITS: 100 INJECTION, SOLUTION SUBCUTANEOUS at 21:18

## 2020-03-03 RX ADMIN — OXYCODONE HYDROCHLORIDE 15 MG: 10 TABLET ORAL at 06:04

## 2020-03-03 RX ADMIN — PRAVASTATIN SODIUM 40 MG: 40 TABLET ORAL at 18:13

## 2020-03-03 RX ADMIN — OXYCODONE HYDROCHLORIDE 15 MG: 10 TABLET ORAL at 12:21

## 2020-03-03 RX ADMIN — METOCLOPRAMIDE HYDROCHLORIDE 10 MG: 10 TABLET ORAL at 06:00

## 2020-03-03 RX ADMIN — HEPARIN SODIUM 5000 UNITS: 5000 INJECTION INTRAVENOUS; SUBCUTANEOUS at 15:22

## 2020-03-03 RX ADMIN — OXYCODONE HYDROCHLORIDE 15 MG: 10 TABLET ORAL at 18:14

## 2020-03-03 RX ADMIN — LISINOPRIL 10 MG: 10 TABLET ORAL at 08:26

## 2020-03-03 RX ADMIN — DULOXETINE HYDROCHLORIDE 60 MG: 60 CAPSULE, DELAYED RELEASE ORAL at 08:26

## 2020-03-03 RX ADMIN — METOCLOPRAMIDE HYDROCHLORIDE 10 MG: 10 TABLET ORAL at 12:21

## 2020-03-03 RX ADMIN — NYSTATIN: 100000 POWDER TOPICAL at 08:26

## 2020-03-03 NOTE — PLAN OF CARE
Problem: Nutrition/Hydration-ADULT  Goal: Nutrient/Hydration intake appropriate for improving, restoring or maintaining nutritional needs  Description  Monitor and assess patient's nutrition/hydration status for malnutrition  Collaborate with interdisciplinary team and initiate plan and interventions as ordered  Monitor patient's weight and dietary intake as ordered or per policy  Utilize nutrition screening tool and intervene as necessary  Determine patient's food preferences and provide high-protein, high-caloric foods as appropriate       INTERVENTIONS:  - Monitor oral intake, urinary output, labs, and treatment plans  - Assess nutrition and hydration status and recommend course of action  - Evaluate amount of meals eaten  - Assist patient with eating if necessary   - Allow adequate time for meals  - Recommend/ encourage appropriate diets, oral nutritional supplements, and vitamin/mineral supplements  - Order, calculate, and assess calorie counts as needed  - Recommend, monitor, and adjust tube feedings and TPN/PPN based on assessed needs  - Assess need for intravenous fluids  - Provide specific nutrition/hydration education as appropriate  - Include patient/family/caregiver in decisions related to nutrition  Outcome: Progressing     Problem: Prexisting or High Potential for Compromised Skin Integrity  Goal: Skin integrity is maintained or improved  Description  INTERVENTIONS:  - Identify patients at risk for skin breakdown  - Assess and monitor skin integrity  - Assess and monitor nutrition and hydration status  - Monitor labs   - Assess for incontinence   - Turn and reposition patient  - Assist with mobility/ambulation  - Relieve pressure over bony prominences  - Avoid friction and shearing  - Provide appropriate hygiene as needed including keeping skin clean and dry  - Evaluate need for skin moisturizer/barrier cream  - Collaborate with interdisciplinary team   - Patient/family teaching  - Consider wound care consult   Outcome: Progressing     Problem: Potential for Falls  Goal: Patient will remain free of falls  Description  INTERVENTIONS:  - Assess patient frequently for physical needs  -  Identify cognitive and physical deficits and behaviors that affect risk of falls    -  [place] fall precautions as indicated by assessment   - Educate patient/family on patient safety including physical limitations  - Instruct patient to call for assistance with activity based on assessment  - Modify environment to reduce risk of injury  - Consider OT/PT consult to assist with strengthening/mobility  Outcome: Progressing     Problem: PAIN - ADULT  Goal: Verbalizes/displays adequate comfort level or baseline comfort level  Description  Interventions:  - Encourage patient to monitor pain and request assistance  - Assess pain using appropriate pain scale  - Administer analgesics based on type and severity of pain and evaluate response  - Implement non-pharmacological measures as appropriate and evaluate response  - Consider cultural and social influences on pain and pain management  - Notify physician/advanced practitioner if interventions unsuccessful or patient reports new pain  Outcome: Progressing     Problem: INFECTION - ADULT  Goal: Absence or prevention of progression during hospitalization  Description  INTERVENTIONS:  - Assess and monitor for signs and symptoms of infection  - Monitor lab/diagnostic results  - Monitor all insertion sites, i e  indwelling lines, tubes, and drains  - Monitor endotracheal if appropriate and nasal secretions for changes in amount and color  - [place] appropriate cooling/warming therapies per order  - Administer medications as ordered  - Instruct and encourage patient and family to use good hand hygiene technique  - Identify and instruct in appropriate isolation precautions for identified infection/condition  Outcome: Progressing     Problem: SAFETY ADULT  Goal: Maintain or return to baseline ADL function  Description  INTERVENTIONS:  -  Assess patient's ability to carry out ADLs; assess patient's baseline for ADL function and identify physical deficits which impact ability to perform ADLs (bathing, care of mouth/teeth, toileting, grooming, dressing, etc )  - Assess/evaluate cause of self-care deficits   - Assess range of motion  - Assess patient's mobility; develop plan if impaired  - Assess patient's need for assistive devices and provide as appropriate  - Encourage maximum independence but intervene and supervise when necessary  - Involve family in performance of ADLs  - Assess for home care needs following discharge   - Consider OT consult to assist with ADL evaluation and planning for discharge  - Provide patient education as appropriate  Outcome: Progressing  Goal: Maintain or return mobility status to optimal level  Description  INTERVENTIONS:  - Assess patient's baseline mobility status (ambulation, transfers, stairs, etc )    - Identify cognitive and physical deficits and behaviors that affect mobility  - Identify mobility aids required to assist with transfers and/or ambulation (gait belt, sit-to-stand, lift, walker, cane, etc )  - [place] fall precautions as indicated by assessment  - Record patient progress and toleration of activity level on Mobility SBAR; progress patient to next Phase/Stage  - Instruct patient to call for assistance with activity based on assessment  - Consider rehabilitation consult to assist with strengthening/weightbearing, etc   Outcome: Progressing     Problem: DISCHARGE PLANNING  Goal: Discharge to home or other facility with appropriate resources  Description  INTERVENTIONS:  - Identify barriers to discharge w/patient and caregiver  - Arrange for needed discharge resources and transportation as appropriate  - Identify discharge learning needs (meds, wound care, etc )  - Arrange for interpretive services to assist at discharge as needed  - Refer to Case Management Department for coordinating discharge planning if the patient needs post-hospital services based on physician/advanced practitioner order or complex needs related to functional status, cognitive ability, or social support system  Outcome: Progressing     Problem: Knowledge Deficit  Goal: Patient/family/caregiver demonstrates understanding of disease process, treatment plan, medications, and discharge instructions  Description  Complete learning assessment and assess knowledge base  Interventions:  - Provide teaching at level of understanding  - Provide teaching via preferred learning methods  Outcome: Progressing     Problem: NEUROSENSORY - ADULT  Goal: Achieves stable or improved neurological status  Description  INTERVENTIONS  - Monitor and report changes in neurological status  - Monitor vital signs such as temperature, blood pressure, glucose, and any other labs ordered   - Initiate measures to prevent increased intracranial pressure  - Monitor for seizure activity and implement precautions if appropriate      Outcome: Progressing  Goal: Achieves maximal functionality and self care  Description  INTERVENTIONS  - Monitor swallowing and airway patency with patient fatigue and changes in neurological status  - Encourage and assist patient to increase activity and self care     - Encourage visually impaired, hearing impaired and aphasic patients to use assistive/communication devices  Outcome: Progressing     Problem: GENITOURINARY - ADULT  Goal: Maintains or returns to baseline urinary function  Description  INTERVENTIONS:  - Assess urinary function  - Encourage oral fluids to ensure adequate hydration if ordered  - Administer IV fluids as ordered to ensure adequate hydration  - Administer ordered medications as needed  - Offer frequent toileting  - Follow urinary retention protocol if ordered  Outcome: Progressing  Goal: Absence of urinary retention  Description  INTERVENTIONS:  - Assess patients ability to void and empty bladder  - Monitor I/O  - Bladder scan as needed  - Discuss with physician/AP medications to alleviate retention as needed  - Discuss catheterization for long term situations as appropriate  Outcome: Progressing

## 2020-03-03 NOTE — DISCHARGE INSTR - AVS FIRST PAGE
Dear Анна Mitchell,     It was our pleasure to care for you here at [facility]  It is our hope that we were always able to exceed the expected standards for your care during your stay  You were hospitalized due to acute metabolic encephalopathy and acute hypoxic respiratory failure (alteration in her mental status due to a urinary tract infection, in medications and low oxygen levels in no blood)  You were cared for on the [place] [address] 2nd floor by Jaspal Gifford MD under the service of Marimar Chew DO with the [facility]ist [facility] who covers for your primary care physician (PCP), Corbin Watts, while you were hospitalized  If you have any questions or concerns related to this hospitalization, you may contact us at [phone]  For follow up as well as any medication refills, we recommend that you follow up with your primary care physician  A registered nurse will reach out to you by phone within a few days after your discharge to answer any additional questions that you may have after going home  However, at this time we provide for you here, the most important instructions / recommendations at discharge:     · Notable Medication Adjustments -   · We decreased till oxycodone dose to 15 mg every 4 hours as needed  · We decreased morphine 100 mg to once a day   · We decreased reglan to 5 mg 3 times a day  · We have added an antibiotic (cefdinir 300 mg capsule), take it every 12 hours for 3 days starting late tonight (around 11:00 p m )  · Continue all other home medications as you did prior to admission  · Testing Required after Discharge -   · None    Your primary care physician will decide if you need further testing in the future when you follow-up with him  · Important follow up information -   · Follow-up with your primary care physician within 1 week  · Follow up with movement disorder clinic in 2 weeks (referral has been made, and if you do not hear from the office within 2 weeks, please call and schedule an appointment)  · Other Instructions -   · Take your pain medications only when needed, high doses of pain medications (opioids) can cause altered mental status  · Please review this entire after visit summary as additional general instructions including medication list, appointments, activity, diet, any pertinent wound care, and other additional recommendations from your care team that may be provided for you        Sincerely,     Emerald Orellana MD

## 2020-03-03 NOTE — PROGRESS NOTES
Progress Note - Debbi Hoyt 1955, 62 y o  female MRN: [id number]    Unit/Bed#: S -01 Encounter: [id number]    Primary Care Provider: Feroz Watts   Date and time admitted to hospital: 2/29/2020 10:25 PM    Patient seen earlier in the morning, late note entry      * Sepsis ([address] )  Assessment & Plan  · Present on admission with fever, leukocytosis, acute metabolic encephalopathy  · Likely secondary to urinary tract infection  · Urine cultures grew Klebsiella pneumoniae, sensitive to cefazolin and cefepime  · Blood cultures negative at 48 hours  · Influenza/RSV negative  · ICU stay from 3/1 - 3/2  · Currently on IV cefepime 2 g q 12h (3 days completed)  · Afebrile for more than 48 hours  · Clinically improved  · Per , patient is still not at her baseline    Plan  1  Continue IV cefepime  2  And transition to oral cefdinir on discharge (total of 7 days of antibiotics)  3  Monitor vitals  4  CBC a m  Acute toxic/metabolic encephalopathy  Assessment & Plan  · Present on admission with altered mental status  · Likely multifactorial due to sepsis (UTI), FX of opioid medications in the presence of YVONNE  · ICU course from 03/01 - 3/2 :  Opioid medications adjusted, recommended to have patient off of morphine, and continue oxycodone at 15 mg q 4h p r n  · Per ICU note, there was a possible concern for serotonin syndrome due to Cymbalta and opioid abuse  · CT head on 03/01 showed no acute intracranial abnormality  · Per patient's , she has abnormal tremors, does not suggest seizure activity, or rigors  · Currently patient's mental status has improved, however  feels like she is not at her baseline  Plan  5  Monitor mental status  6  Treat infection as mentioned above  7  Pain medications adjusted:  Oxycodone 15 mg q 4h p r n   8  Can add morphine 100 mg daily on discharge  9  Vitamin B12, folate, TSH, ammonia  10   Inpatient consult to neurology    Acute kidney injury [facility])  Assessment & Plan  · Creatinine on admission 1 85, trended down to 1 04 during the ICU course managed with IV fluids, and 1 dose of Lasix  · a m  Creatinine 0 9  · Likely secondary to hypovolemia and ongoing infection  · Currently off IV fluids    Plan  11  BMP a m  Chronic pain syndrome  Assessment & Plan  · Patient uses oxycodone 30 mg q 4h p r n  And morphine 100 mg b i d  And gabapentin 300 mg daily  · Prescribed by primary care physician (180 pills oxycodone monthly, and 60 pills morphine monthly)  · Has ongoing peripheral neuropathy, and degenerative disc disease  · PDMP reviewed, morphine last refilled in January, and prior to this in August  · Will contact PCP prior to discharge to discuss pain regimen : Corbin Watts [phone]    Plan  1  Will continue oxycodone 15 mg q 4h p r n   2  Monitor pain levels  3  Will add morphine 100 mg daily on discharge    Type 2 diabetes mellitus with hyperglycemia, with long-term current use of insulin [facility])  Assessment & Plan  Lab Results   Component Value Date    HGBA1C 5 2 03/02/2020       Recent Labs     03/02/20  2152 03/03/20  0645 03/03/20  1228 03/03/20  1534   POCGLU 178* 131 214* 180*       Blood Sugar Average: Last 72 hrs:  (P) 135 [id number]   · HbA1c 5 2  · Home medications include metformin and Lantus 25 units hs  · Currently on Lantus 10 unit hs with sliding scale    Plan  12  Continue current regimen  13  Restart home medications on discharge    Ambulatory dysfunction  Assessment & Plan  · Patient has a history of recurrent falls  · Has ongoing diabetic peripheral neuropathy, and venous stasis  · Currently uses a walker to ambulate  · Seen by PT:  Recommend home with family support and home PT    Plan  14   Will arrange home PT on discharge    Essential hypertension  Assessment & Plan  · Home medications include triamterene-hydrochlorothiazide 75-50 mg, lisinopril 10 mg, amlodipine 10 mg  · All antihypertensives will on hold on admission due to YVONNE  · Lisinopril restarted yesterday  · Renal functions stable at 0 9  · Pressure a m  173/74    Plan  15  Restart home medications        VTE Pharmacologic Prophylaxis:   Pharmacologic: Heparin  Mechanical VTE Prophylaxis in Place: Yes    Discussions with Specialists or Other Care Team Provider:  Neurology, case management, nursing     Education and Discussions with Family / Patient:  Patient and her     Current Length of Stay: 2 day(s)    Current Patient Status: Inpatient     Discharge Plan / Estimated Discharge Date:  24-48 hours    Code Status: Level 1 - Full Code      Subjective:   Patient is feeling well, denies headache, nausea, vomiting, visual disturbances  No fever, dysuria, urgency, frequency  Has good appetite, with good oral intake of food and liquids  She is able to walk short distances with a walker   expresses concern about her not being at her baseline mental status this morning  Objective:     Vitals:   Temp (24hrs), Av 2 °F (36 8 °C), Min:98 1 °F (36 7 °C), Max:98 4 °F (36 9 °C)    Temp:  [98 1 °F (36 7 °C)-98 4 °F (36 9 °C)] 98 1 °F (36 7 °C)  HR:  [83-88] 87  Resp:  [18] 18  BP: (169-179)/(74-87) 179/84  SpO2:  [90 %-95 %] 95 %  Body mass index is 38 92 kg/m²  Input and Output Summary (last 24 hours): Intake/Output Summary (Last 24 hours) at 3/3/2020 1718  Last data filed at 3/3/2020 2521  Gross per 24 hour   Intake 300 ml   Output 2 ml   Net 298 ml       Physical Exam:     Physical Exam   Constitutional: She is oriented to person, place, and time  She appears well-developed and well-nourished  No distress  HENT:   Head: Normocephalic and atraumatic  Mouth/Throat: Oropharynx is clear and moist    Eyes: Pupils are equal, round, and reactive to light  EOM are normal  No scleral icterus  Neck: Normal range of motion  Neck supple  No JVD present  Cardiovascular: Normal rate, regular rhythm, normal heart sounds and intact distal pulses  No murmur heard  Pulmonary/Chest: Effort normal  She has no wheezes  She has rales (Occasional bilateral)  Abdominal: Soft  Bowel sounds are normal  She exhibits no distension  There is no tenderness  Musculoskeletal: Normal range of motion  She exhibits edema ( With chronic venous stasis dermatitis of bilateral lower extremities)  She exhibits no tenderness  Neurological: She is alert and oriented to person, place, and time  Skin: Skin is warm  Capillary refill takes less than 2 seconds  Rash ( chronic venous stasis dermatitis) noted  She is not diaphoretic  Psychiatric: She has a normal mood and affect  Her behavior is normal  Judgment and thought content normal    Nursing note and vitals reviewed  Additional Data:     Labs:    Results from last 7 days   Lab Units 03/03/20  0628   WBC Thousand/uL 5 07   HEMOGLOBIN g/dL 13 2   HEMATOCRIT % 40 3   PLATELETS Thousands/uL 144*   NEUTROS PCT % 73   LYMPHS PCT % 16   MONOS PCT % 8   EOS PCT % 3     Results from last 7 days   Lab Units 03/03/20  0628  03/01/20  0620   POTASSIUM mmol/L 4 5   < > 5 5*   CHLORIDE mmol/L 100   < > 96*   CO2 mmol/L 27   < > 25   BUN mg/dL 20   < > 43*   CREATININE mg/dL 0 90   < > 1 59*   CALCIUM mg/dL 9 0   < > 8 2*   ALK PHOS U/L  --   --  164*   ALT U/L  --   --  44   AST U/L  --   --  85*    < > = values in this interval not displayed  Results from last 7 days   Lab Units 02/29/20  2255   INR  1 05       * I Have Reviewed All Lab Data Listed Above  * Additional Pertinent Lab Tests Reviewed: [address] Admission Reviewed    Imaging:    Imaging Reports Reviewed Today Include:  None  Imaging Personally Reviewed by Myself Includes:  None    Recent Cultures (last 7 days):     Results from last 7 days   Lab Units 02/29/20  2326 02/29/20  2300 02/29/20  2255   BLOOD CULTURE   --  No Growth at 48 hrs  No Growth at 48 hrs     URINE CULTURE  >100,000 cfu/ml Klebsiella variicola*  --   --        Last 24 Hours Medication List:     Current Facility-Administered Medications:  [START ON 3/4/2020] amLODIPine 10 mg Oral Daily Janny Garcia MD    bisacodyl 10 mg Rectal Daily PRN Jorden Elaine MD    cefepime 2,000 mg Intravenous Q12H Jorden Elaine MD Last Rate: 2,000 mg (03/03/20 1222)   DULoxetine 60 mg Oral Daily Jorden Elaine MD    gabapentin 300 mg Oral HS Jorden Elaine MD    heparin (porcine) 5,000 Units Subcutaneous Q8H [address] Jorden Elaine MD    insulin glargine 10 Units Subcutaneous HS Jorden Elaine MD    insulin lispro 1-6 Units Subcutaneous 4x Daily (AC & HS) Montrell Gaitan PA-C    lisinopril 10 mg Oral Daily Jorden Elaine MD    [START ON 3/4/2020] metoclopramide 5 mg Oral TID AC Jovany Mullins PA-C    nystatin  Topical BID Jorden Elaine MD    oxyCODONE 15 mg Oral Q4H PRN Jorden Elaine MD    polyethylene glycol 17 g Oral Daily PRN Jorden Elaine MD    pravastatin 40 mg Oral Daily With Rita Romo MD    senna-docusate sodium 2 tablet Oral BID MD Obi Arroyo ON 3/4/2020] triamterene-hydrochlorothiazide 2 tablet Oral Daily Janny Garcia MD      Facility-Administered Medications Ordered in Other Encounters:  ferrous sulfate 325 mg Oral Daily With Amos Patel MD        Today, Patient Was Seen By: Janny Garcia MD    ** Please Note: This note has been constructed using a voice recognition system   **

## 2020-03-03 NOTE — ASSESSMENT & PLAN NOTE
· Home medications include triamterene-hydrochlorothiazide 75-50 mg, lisinopril 10 mg, amlodipine 10 mg  · All antihypertensives will on hold on admission due to YVONNE  · Lisinopril restarted yesterday  · Renal functions stable at 0 9  · Pressure a m  173/74    Plan  1   Restart home medications

## 2020-03-03 NOTE — ASSESSMENT & PLAN NOTE
· Present on admission with fever, leukocytosis, acute metabolic encephalopathy  · Likely secondary to urinary tract infection  · Urine cultures grew Klebsiella pneumoniae, sensitive to cefazolin and cefepime  · Blood cultures negative at 48 hours  · Influenza/RSV negative  · ICU stay from 3/1 - 3/2  · Currently on IV cefepime 2 g q 12h (3 days completed)  · Afebrile for more than 48 hours  · Clinically improved  · Per , patient is still not at her baseline    Plan  1  Continue IV cefepime  2  And transition to oral cefdinir on discharge (total of 7 days of antibiotics)  3  Monitor vitals  4  CBC a m

## 2020-03-03 NOTE — ASSESSMENT & PLAN NOTE
· Patient uses oxycodone 30 mg q 4h p r n  And morphine 100 mg b i d  And gabapentin 300 mg daily  · Prescribed by primary care physician (180 pills oxycodone monthly, and 60 pills morphine monthly)  · Has ongoing peripheral neuropathy, and degenerative disc disease  · PDMP reviewed, morphine last refilled in January, and prior to this in August  · Will contact PCP prior to discharge to discuss pain regimen : Corbin Watts [phone]    Plan  1  Will continue oxycodone 15 mg q 4h p r n   2  Monitor pain levels  3   Will add morphine 100 mg daily on discharge

## 2020-03-03 NOTE — ASSESSMENT & PLAN NOTE
· Present on admission with altered mental status  · Likely multifactorial due to sepsis (UTI), FX of opioid medications in the presence of YVONNE  · ICU course from 03/01 - 3/2 :  Opioid medications adjusted, recommended to have patient off of morphine, and continue oxycodone at 15 mg q 4h p r n  · Per ICU note, there was a possible concern for serotonin syndrome due to Cymbalta and opioid abuse  · CT head on 03/01 showed no acute intracranial abnormality  · Per patient's , she has abnormal tremors, does not suggest seizure activity, or rigors  · Currently patient's mental status has improved, however  feels like she is not at her baseline  Plan  1  Monitor mental status  2  Treat infection as mentioned above  3  Pain medications adjusted:  Oxycodone 15 mg q 4h p r n   4  Can add morphine 100 mg daily on discharge  5  Vitamin B12, folate, TSH, ammonia  6   Inpatient consult to neurology

## 2020-03-03 NOTE — ASSESSMENT & PLAN NOTE
· Creatinine on admission 1 85, trended down to 1 04 during the ICU course managed with IV fluids, and 1 dose of Lasix  · a m  Creatinine 0 9  · Likely secondary to hypovolemia and ongoing infection  · Currently off IV fluids    Plan  1  BMP a m

## 2020-03-03 NOTE — ASSESSMENT & PLAN NOTE
· Patient has a history of recurrent falls  · Has ongoing diabetic peripheral neuropathy, and venous stasis  · Currently uses a walker to ambulate  · Seen by PT:  Recommend home with family support and home PT    Plan  1   Will arrange home PT on discharge

## 2020-03-03 NOTE — RESPIRATORY THERAPY NOTE
Home Oxygen Qualifying Test       Patient name: Alvin Ferraro        : 1955   Date of Test:  March 3, 2020  Diagnosis:      Home Oxygen Test:    **Medicare Guidelines require item(s) 1-5 on all ambulatory patients or 1 and 2 on non-ambulatory patients  1   Baseline SPO2 on Room Air at rest 96-97%  2   SPO2 during exercise on Room Air 94-95 %  During exercise monitor SpO2  If SPO2 increases >=89% with ambulation do not add supplemental             oxygen  If <= 88% on room air add O2 via NC and titrate patient  Patient must be ambulated with O2 and titrated to > 88% with exertion  3   SPO2 on Oxygen at rest: N/A    4  SPO2 during exercise on Oxygen: N/A    5  Exercise performed:          walking, duration 6 (min), distance 90 (feet)          []  Supplemental Home Oxygen is indicated  [x]  Client does not qualify for home oxygen  Respiratory Additional Notes- No dyspnea was noted during ambulation         Moises [address], RT

## 2020-03-03 NOTE — CONSULTS
Consultation - Neurology   Veronica Wharton 62 y o  female MRN: [id number]  Unit/Bed#: S -01 Encounter: [id number]    Assessment/Plan   Assessment/Plan:   71-year-old female with history of hypertension, hyperlipidemia, insulin-dependent diabetes with peripheral neuropathy, chronic venous stasis as well as chronic pain syndrome with significant opioid regimen at home (as well as previous evaluations for cognitive decline/impairment over the past few years) and multiple prior admissions for altered mental status who again presents with altered mental status and encephalopathy due to profound metabolic derangements, infectious derangements, hypoxic insult superimposed on her baseline cognitive impairment/significant opioid use  Her mental status has cleared in the setting of correction of these derangements, thus supporting poly factorial/toxic metabolic encephalopathy  In regards to her tics/tremulousness (noted by patient and  this afternoon), no definitive suspicious evidence for clinical seizure activity with these events; there is concern given the above significant history as well as her longstanding medication use (potentially Reglan/gabapentin), that medication-use tremulousness could be a potential component (as well as obviously the metabolic and infectious derangements which can certainly produce myoclonus)  1  Altered mental status/encephalopathy, tremulousness and tics:  -CT head during admission was negative for any acute intracranial pathology  -do NOT feel additional neurodiagnostics including MRI brain and EEG are needed  -would recommend decreasing Reglan dose to 5 mg three times daily, as this medication can produce takes/tremulousness  -could also consider tapering off gabapentin in the future as well  -would advise outpatient follow-up with [facility] movement disorder team for further evaluation/investigation of her symptoms    2   Continued medical management per primary team:  -metabolic and infectious derangement correction  -IV antibiotics  -blood count and metabolic panel monitoring    3  Chronic pain:  -monitoring for withdrawal, as opioid use has been controlled/limited here during admission    No further inpatient neurologic workup anticipated  Discussed plan of care with attending neurologist this afternoon  History of Present Illness     Reason for Consult / Principal Problem:  Altered mental status/encephalopathy, tremulousness    HPI: Debbi Hoyt is a 62 y o  female with history as mentioned above in assessment who neurology is asked to evaluate in regards to altered mental status/encephalopathy and concern for generalized tremulousness/shaking prior to and during admission  History is obtained both through discussion with patient this afternoon as well as chart review  To review, the patient was initially evaluated at [facility] ED on 02/29 with concern for altered mental status as well as tremulousness noted at home  Patient states that she had two specific episodes at home of generalized whole body tremulousness, the patient states she was aware of the events occurring but could not stop them  No specific tonic nature to the events, they lasted several minutes in duration  Patient denies feeling any significant postictal state or profound worsening of lethargy/encephalopathy after the episodes  Family also found to be lethargic and not at baseline and thus she came to the ED  She was admitted to the ICU due concern for acute encephalopathy likely secondary to hypoxic respiratory failure, sepsis likely secondary to UTI, she is also on a significant pain medication regimen which includes morphine twice daily and oxycodone 6 times daily, as well as Cymbalta and gabapentin        Initial CT head was negative for acute intracranial pathology, she also had profound metabolic derangements including hyponatremia, hyperkalemia, acute kidney injury, leukocytosis with 11 K, critical care team has noted occasional tremulousness throughout her admission  She was transferred to the medical-surgical floor, her leukocytosis, febrile state as well as metabolic derangements have all significantly improved (as has her mental status, as today she is much more oriented then yesterday per nursing staff)  Family however was concerned in regards to the episodes of tremulousness that occurred at home and during hospitalization and thus Neurology was consulted  Patient states no prior history of tremulousness or similar symptoms  She has no traditional seizure risk factors nor prior history of seizure  She was evaluated by the neuro hospitalist team as well as outpatient Neurology team several years ago for episodes of lightheadedness as well as mental status change  Concern was from poly factorial in the setting of electrolyte abnormalities, significant pain medication regimen, she was then evaluated by the outpatient team and thought to have poly factorial mild cognitive impairment with a large contributing component of excess opioid use  She also denies any acute focal neurologic symptoms including headache, vision or speech abnormalities, lateralizing weakness or sensory symptoms, no changes to gait recently, no bladder bowel changes, ambulated to the bathroom well without instability  Did walk in the [place] earlier today with therapy  Nursing staff has not seen any profound tremulousness throughout today  Upon further discussion with the patient's  this afternoon, he continues to feel something is off about the patient  She continues to have subtle tics of her head as well as extremities from time to time, has also had a frequent upper extremity tremulousness over the past year or two (without any abrupt alterations in consciousness or responsiveness)  Inpatient consult to Neurology  Consult performed by:  Tavia Weeks PA-C  Consult ordered by: Constantino Crooks Wayne Mackay MD          Review of Systems   Constitutional: Negative  HENT: Negative  Eyes: Negative  Respiratory: Negative  Cardiovascular: Negative  Gastrointestinal: Negative  Genitourinary: Negative  Musculoskeletal: Positive for arthralgias and back pain  Negative for gait problem  Skin: Negative  Neurological: Negative for dizziness, tremors, seizures, syncope, facial asymmetry, speech difficulty, weakness, light-headedness, numbness and headaches  Psychiatric/Behavioral: Positive for confusion and decreased concentration  All other ROS reviewed and negative  Historical Information   Past Medical History:   Diagnosis Date    Abnormal head CT 7/14/2017    Cellulitis 1/10/2017    Closed fracture of multiple ribs of right side 7/14/2017    Degenerative disc disease at L5-S1 level     Diabetes mellitus (HCC)     History of methicillin resistant staphylococcus aureus (MRSA) 08/21/2018    negative nasal culture- isolation and hx of mrsa removed 8/20/2018    Hypertension     Hypocalcemia 4/25/2018     Past Surgical History:   Procedure Laterality Date    CHOLECYSTECTOMY      JOINT REPLACEMENT      OOPHORECTOMY       Social History   Social History     Substance and Sexual Activity   Alcohol Use No     Social History     Substance and Sexual Activity   Drug Use No     E-Cigarette/Vaping     E-Cigarette/Vaping Substances     Social History     Tobacco Use   Smoking Status Former Smoker    Types: E-Cigarettes   Smokeless Tobacco Never Used     Family History:   Family History   Problem Relation Age of Onset    Rheum arthritis Mother     Alzheimer's disease Mother     Lupus Mother        Review of previous medical records was completed      Meds/Allergies   current meds:   Current Facility-Administered Medications   Medication Dose Route Frequency    bisacodyl (DULCOLAX) rectal suppository 10 mg  10 mg Rectal Daily PRN    cefepime (MAXIPIME) 2,000 mg in dextrose 5 % 50 mL IVPB  2,000 mg Intravenous Q12H    DULoxetine (CYMBALTA) delayed release capsule 60 mg  60 mg Oral Daily    gabapentin (NEURONTIN) capsule 300 mg  300 mg Oral HS    heparin (porcine) subcutaneous injection 5,000 Units  5,000 Units Subcutaneous Q8H [facility]    insulin glargine (LANTUS) subcutaneous injection 10 Units 0 1 mL  10 Units Subcutaneous HS    insulin lispro (HumaLOG) 100 units/mL subcutaneous injection 1-6 Units  1-6 Units Subcutaneous 4x Daily (AC & HS)    lisinopril (ZESTRIL) tablet 10 mg  10 mg Oral Daily    metoclopramide (REGLAN) tablet 10 mg  10 mg Oral TID AC    nystatin (MYCOSTATIN) powder   Topical BID    oxyCODONE (ROXICODONE) IR tablet 15 mg  15 mg Oral Q4H PRN    polyethylene glycol (MIRALAX) packet 17 g  17 g Oral Daily PRN    pravastatin (PRAVACHOL) tablet 40 mg  40 mg Oral Daily With Dinner    senna-docusate sodium (SENOKOT S) 8 6-50 mg per tablet 2 tablet  2 tablet Oral BID     Facility-Administered Medications Ordered in Other Encounters   Medication Dose Route Frequency    ferrous sulfate tablet 325 mg  325 mg Oral Daily With Breakfast    and PTA meds:   Prior to Admission Medications   Prescriptions Last Dose Informant Patient Reported? Taking?    B-D UF III MINI PEN NEEDLES 31G X 5 MM MISC   Yes No   Si (four) times a day   Blood Glucose Monitoring Suppl (FREESTYLE LITE) JAROD   Yes No   Sig: TEST BEFORE MEALS AND AT BEDTIME   DULoxetine (CYMBALTA) 60 mg delayed release capsule   Yes Yes   Sig: Take 60 mg by mouth daily   FREESTYLE LITE test strip   Yes No   Sig: TEST BEFORE MEALS AND BEDTIME   HUMALOG KWIKPEN 100 units/mL injection pen   No Yes   Sig: Inject 10 Units under the skin 3 (three) times a day with meals   Lancets (FREESTYLE) lancets   Yes No   Sig: TEST BEFORE MEALS AND BEDTIME   Mirabegron (MYRBETRIQ PO)   Yes Yes   Sig: Take 50 mg by mouth daily   Mirabegron ER 50 MG TB24 Not Taking at Unknown time  Yes No   Sig: Take by mouth   acetaminophen (TYLENOL) 325 mg tablet   No No   Sig: Take 2 tablets by mouth every 6 (six) hours as needed for mild pain   albuterol (VENTOLIN HFA) 90 mcg/act inhaler   No No   Sig: Inhale 1 puff every 6 (six) hours as needed for wheezing or shortness of breath   amLODIPine (NORVASC) 10 mg tablet   No Yes   Sig: Take 1 tablet (10 mg total) by mouth daily   docusate sodium (COLACE) 100 mg capsule   No No   Sig: Take 1 capsule by mouth 2 (two) times a day   ergocalciferol (VITAMIN D2) 50,000 units   Yes No   Sig: Take 50,000 Units by mouth once a week   gabapentin (NEURONTIN) 300 mg capsule   No Yes   Sig: Take 1 capsule (300 mg total) by mouth daily at bedtime   guaiFENesin (MUCINEX) 600 mg 12 hr tablet   No No   Sig: Take 1 tablet (600 mg total) by mouth every 12 (twelve) hours as needed for cough   insulin glargine (LANTUS) 100 units/mL subcutaneous injection   No Yes   Sig: Inject 30 Units under the skin daily at bedtime   Patient taking differently: Inject 25 Units under the skin daily at bedtime    lisinopril (ZESTRIL) 5 mg tablet   No Yes   Sig: Take 2 tablets (10 mg total) by mouth daily   metFORMIN (GLUCOPHAGE) 1000 MG tablet   No Yes   Sig: Take 1 tablet (1,000 mg total) by mouth 2 (two) times a day with meals   Patient taking differently: Take 500 mg by mouth 2 (two) times a day with meals    metoclopramide (REGLAN) 10 mg tablet   No Yes   Sig: Take 1 tablet (10 mg total) by mouth 3 (three) times a day before meals   morphine (Julita) 100 MG 24 hr capsule   Yes Yes   Sig: Take 100 mg by mouth 2 (two) times a day   ondansetron (ZOFRAN-ODT) 8 mg disintegrating tablet   No No   Sig: Take 1 tablet (8 mg total) by mouth every 12 (twelve) hours as needed for nausea or vomiting   oxyCODONE (ROXICODONE) 30 MG immediate release tablet   Yes Yes   Sig: Take 30 mg by mouth every 4 (four) hours as needed for moderate pain     simvastatin (ZOCOR) 20 mg tablet   No No   Sig: Take 1 tablet (20 mg total) by mouth daily at bedtime triamterene-hydrochlorothiazide (MAXZIDE) 75-50 MG per tablet   Yes Yes   Sig: Take 1 tablet by mouth daily      Facility-Administered Medications: None       No Known Allergies    Objective   Vitals:Blood pressure (!) 179/84, pulse 87, temperature 98 1 °F (36 7 °C), temperature source Oral, resp  rate 18, weight 90 4 kg (199 lb 4 7 oz), SpO2 95 %  ,Body mass index is 38 92 kg/m²  Intake/Output Summary (Last 24 hours) at 3/3/2020 1609  Last data filed at 3/3/2020 0906  Gross per 24 hour   Intake 300 ml   Output 2 ml   Net 298 ml       Invasive Devices: Invasive Devices     Peripheral Intravenous Line            Peripheral IV 03/02/20 Right;Upper;Ventral (anterior) Arm 1 day                Physical Exam   Constitutional: She is oriented to person, place, and time  She appears well-developed and well-nourished  HENT:   Head: Normocephalic and atraumatic  Eyes: Pupils are equal, round, and reactive to light  EOM are normal    Neck: Normal range of motion  Neck supple  Cardiovascular: Normal rate and regular rhythm  Pulmonary/Chest: Effort normal and breath sounds normal    Abdominal: Soft  Bowel sounds are normal    Musculoskeletal: Normal range of motion  Neurological: She is alert and oriented to person, place, and time  She has a normal Finger-Nose-Finger Test    Skin: Skin is warm and dry  Neurologic Exam     Mental Status   Oriented to person, place, and time  Awake and alert, oriented to self, location, date, can state her city of residence  Names colors and objects, perform simple calculations accurately  Speech is without dysarthria or aphasia  Follows central and appendicular commands  Cranial Nerves     CN II   Visual fields full to confrontation  CN III, IV, VI   Pupils are equal, round, and reactive to light  Extraocular motions are normal      CN V   Facial sensation intact  CN VII   Facial expression full, symmetric       CN VIII   CN VIII normal      CN IX, X   CN IX normal    CN X normal      CN XI   CN XI normal      CN XII   CN XII normal      Motor Exam   Muscle bulk: normal  Overall muscle tone: normal  Right arm pronator drift: absent  Left arm pronator drift: absent  Full appearing strength in the upper extremities bilaterally  She has limited hip flexor range of motion due to a left hip fracture previously, making testing difficult  Right hip flexor as well as distal right leg strength appears full  Left flexion/extension testing was difficult due to pain, patient refused formal testing of the distal left lower extremity  Sensory Exam     Stocking length sensory loss in the lower extremities to vibratory, temperature  Intact multi modality sensory testing in all proximal extremities  Gait, Coordination, and Reflexes     Gait  Gait: (Seen ambulating from bed to bathroom with Rollator, no gross lateral sway or instability noted)    Coordination   Finger to nose coordination: normal    Tremor   Resting tremor: absent  Intention tremor: absent    Reflexes   Right plantar: normal  Left plantar: normal  Right ankle clonus: absent  Left ankle clonus: absent  Lower extremity DTRs deferred due to joint pain//neuropathic pain  One-two episodes of head/tics to the right during exam, consciousness maintained during episodes/not seizure            Lab Results:   CBC:   Results from last 7 days   Lab Units 03/03/20  0628 03/02/20  0508 03/01/20  0654   WBC Thousand/uL 5 07 4 04* 11 17*   RBC Million/uL 4 36 3 96 4 35   HEMOGLOBIN g/dL 13 2 12 1 13 4   HEMATOCRIT % 40 3 37 4 41 4   MCV fL 92 94 95   PLATELETS Thousands/uL 144* 119* 145*   , BMP/CMP:   Results from last 7 days   Lab Units 03/03/20  0628 03/02/20  1210 03/02/20  0508  03/01/20  0620  02/29/20  2255   SODIUM mmol/L 136 134* 136   < > 127*   < > 123*   POTASSIUM mmol/L 4 5 4 9 5 5*   < > 5 5*   < > 6 5*   CHLORIDE mmol/L 100 100 101   < > 96*   < > 91*   CO2 mmol/L 27 26 28   < > 25   < > 27   BUN mg/dL 20 25 26*   < > 43*   < > 45*   CREATININE mg/dL 0 90 1 13 1 04   < > 1 59*   < > 1 85*   CALCIUM mg/dL 9 0 8 8 8 5   < > 8 2*   < > 8 5   AST U/L  --   --   --   --  85*  --  24   ALT U/L  --   --   --   --  44  --  27   ALK PHOS U/L  --   --   --   --  164*  --  151*   EGFR ml/min/1 73sq m 68 52 57   < > 34   < > 29    < > = values in this interval not displayed  , Vitamin B12:   , HgBA1C:   Results from last 7 days   Lab Units 03/02/20  1210 03/01/20  0144   HEMOGLOBIN A1C % 5 2 5 4   , TSH:   Results from last 7 days   Lab Units 03/03/20  1527   TSH 3RD GENERATON uIU/mL 1 495   , Coagulation:   Results from last 7 days   Lab Units 02/29/20  2255   INR  1 05   , Lipid Profile:   , Ammonia:   Results from last 7 days   Lab Units 03/01/20  1152   AMMONIA umol/L 11   , Urinalysis:   Results from last 7 days   Lab Units 02/29/20  2326   COLOR UA  Yellow   CLARITY UA  Cloudy   SPEC GRAV UA  1 020   PH UA  6 0   LEUKOCYTES UA  Moderate*   NITRITE UA  Negative   GLUCOSE UA mg/dl Negative   KETONES UA mg/dl Negative   BILIRUBIN UA  Negative   BLOOD UA  Negative   , Drug Screen:     Imaging Studies: I have personally reviewed pertinent films in PACS   CT head 03/01/2020: No acute intracranial abnormality  EKG, Pathology, and Other Studies: I have personally reviewed pertinent reports  VTE Prophylaxis: Sequential compression device (Venodyne)  and Heparin    Code Status: Level 1 - Full Code    Total time spent today 65 minutes  Discussed plan of care with patient/family and primary team:  Concern for metabolic/infectious/medication driven neurologic symptoms, no concerning findings afternoon on exam, would lower Reglan dose and recommend outpatient movement disorder team follow-up

## 2020-03-03 NOTE — DISCHARGE INSTRUCTIONS
Encephalopathy   WHAT YOU NEED TO KNOW:   Encephalopathy is a term used to describe brain disease or brain damage  It usually develops because of a health condition such as cirrhosis, or a brain injury  Symptoms may be mild or severe, and may be short-term or permanent  DISCHARGE INSTRUCTIONS:   Call 911 or have someone else call for any of the following:   · You cannot be woken  · You had a seizure  Seek care immediately if:   · You had a seizure  · You feel confused, dizzy, or lightheaded  · Your heart is beating faster than is normal for you  · You have sudden shortness of breath  Contact your healthcare provider if:   · You have a fever  · You are sleeping more than usual     · You have questions or concerns about your condition or care  Medicines: You may need any of the following:  · Blood pressure medicine  may be given to raise or lower your blood pressure  · Antibiotics  help treat an infection caused by bacteria  · A vitamin B supplement  may be needed if the level in your blood is too low  · Take your medicine as directed  Contact your healthcare provider if you think your medicine is not helping or if you have side effects  Tell him or her if you are allergic to any medicine  Keep a list of the medicines, vitamins, and herbs you take  Include the amounts, and when and why you take them  Bring the list or the pill bottles to follow-up visits  Carry your medicine list with you in case of an emergency  Manage encephalopathy:   · Limit or do not drink alcohol  Alcohol can worsen your condition  Alcohol can also cause new or worsening damage to your liver and brain  Ask your healthcare provider if it is safe for you to drink alcohol  Limit alcohol to 1 drink a day if you are a woman, or 2 drinks a day if you are a man  A drink of alcohol is 12 ounces of beer, 5 ounces of wine, or 1½ ounces of liquor  · Eat low-protein and low-sodium foods, if directed    If your symptoms are caused by a liver disease, you may be asked to eat less protein and sodium (salt)  Some foods high in protein include beef, pork, poultry (chicken, turkey), beans, and nuts  Some foods high in sodium include salty snacks, canned foods, condiments, deli meats, and cured meat such as luong  Ask your dietitian for more information about foods to limit or avoid  Follow up with your healthcare provider as directed:  Write down your questions so you remember to ask them during your visits  © 2017 [facility] Information is for End User's use only and may not be sold, redistributed or otherwise used for commercial purposes  All illustrations and images included in CareNotes® are the copyrighted property of A D A M [employer] Inc  or [employer] Portillo  The above information is an  only  It is not intended as medical advice for individual conditions or treatments  Talk to your doctor, nurse or pharmacist before following any medical regimen to see if it is safe and effective for you

## 2020-03-03 NOTE — ASSESSMENT & PLAN NOTE
Lab Results   Component Value Date    HGBA1C 5 2 03/02/2020       Recent Labs     03/02/20  2152 03/03/20  0645 03/03/20  1228 03/03/20  1534   POCGLU 178* 131 214* 180*       Blood Sugar Average: Last 72 hrs:  (P) 135 [id number]   · HbA1c 5 2  · Home medications include metformin and Lantus 25 units hs  · Currently on Lantus 10 unit hs with sliding scale    Plan  1  Continue current regimen  2   Restart home medications on discharge

## 2020-03-03 NOTE — PLAN OF CARE
Problem: PHYSICAL THERAPY ADULT  Goal: Performs mobility at highest level of function for planned discharge setting  See evaluation for individualized goals  Description  Treatment/Interventions: Functional transfer training, LE strengthening/ROM, Therapeutic exercise, Endurance training, Patient/family training, Equipment eval/education, Bed mobility, Gait training  Equipment Recommended: Kip Saxena Other (Comment)(rollator)       See flowsheet documentation for full assessment, interventions and recommendations  Note:   Prognosis: Fair  Problem List: Decreased strength, Decreased endurance, Impaired balance, Decreased mobility, Decreased safety awareness, Obesity, Pain  Assessment: Pt is a 62 y o  female seen for PT evaluation s/p admit to [facility] on 2/29/2020 w/ Sepsis ([facility] [address] )  Order placed for PT  Comorbidities affecting pt's physical performance at time of assessment listed above  Personal factors affecting pt at time of IE include: limited home support, past experience, behavioral pattern, inability to perform IADLs, inability to perform ADLs and recent fall(s)  Prior to admission, pt was was independent w/ all functional mobility w/ rollator, lived in one floor environment, had 1 CATRACHO unknown railing and lived with  and daughter  Upon evaluation: Pt requires min A for bed mobility, supervision for sit to stand, and supervision for ambulation with rollator  Pt reports she is close to her functional baseline, however reports ~3 falls in the last 6 months  (Please find full objective findings from PT assessment regarding body systems outlined above)  Impairments and limitations also listed above, especially due to  weakness, impaired balance, decreased endurance, gait deviations, pain, decreased activity tolerance, decreased safety awareness and fall risk  The following objective measures performed on IE also reveal limitations: Barthel Index 60/100   Pt's clinical presentation is currently unstable/unpredictable seen in pt's presentation of decreased safety awareness, impulsivity, fall risk, and decreased insight into deficits  Pt to benefit from continued skilled PT tx while in hospital and upon DC to address deficits as defined above and maximize level of functional mobility  From PT/mobility standpoint, recommendation at time of d/c would be home with family support and use of rollator  Recommend progression of ambulation and initiation of HEP as appropriate  Recommendation: Home with family support          See flowsheet documentation for full assessment

## 2020-03-03 NOTE — PHYSICAL THERAPY NOTE
PHYSICAL THERAPY EVALUATION  NAME: Ezekiel Limon  AGE:   62 y o  MRN:  [id number]  ADMIT DX: UTI (urinary tract infection) [N39 0]  Altered mental state [R41 82]  Continuous tremor [G25 2]    PMH:   Past Medical History:   Diagnosis Date    Abnormal head CT 2017    Cellulitis 1/10/2017    Closed fracture of multiple ribs of right side 2017    Degenerative disc disease at L5-S1 level     Diabetes mellitus (HCC)     History of methicillin resistant staphylococcus aureus (MRSA) 2018    negative nasal culture- isolation and hx of mrsa removed 2018    Hypertension     Hypocalcemia 2018     LENGTH OF STAY: 2       20 1205   Pain Assessment   Pain Assessment 0-10   Pain Score 8   Pain Type Chronic pain   Pain Location [facility]   Hospital Pain Intervention(s) Ambulation/increased activity;Repositioned   Response to Interventions tolerated   Home Living   Type of [address] [place] [address] Two level; Able to live on main level with bedroom/bathroom;Stairs to enter with rails  (1 CATRACHO; 1st floor setup; pt sleeps in recliner chair)   [address] [facility]  (rollator)   Additional Comments Ambulates with mod I and rollator at baseline  Prior Function   Level of [place] Independent with ADLs and functional mobility   Lives With Spouse;Daughter  (work during the day)   [place]   IADLs Needs assistance   Falls in the last 6 months 1 to 4  (pt reports 3)   Restrictions/Precautions   Weight Bearing Precautions Per Order No   Other Precautions Fall Risk;Pain   General   Family/Caregiver Present No   Cognition   Overall Cognitive Status WFL   Arousal/Participation Cooperative   Orientation Level Oriented X4   Memory Within functional limits   Following Commands Follows all commands and directions without difficulty   Comments Pt identified by name and       RLE Assessment   RLE Assessment X   Strength RLE   RLE Overall Strength 4-/5  (functionally)   LLE Assessment   LLE Assessment X   Strength LLE   LLE Overall Strength 4-/5  (functionally)   Coordination   Sensation X   Light Touch   RLE Light Touch Impaired   RLE Light Touch Comments BLE neuropathy   LLE Light Touch Impaired   LLE Light Touch Comments BLE neuropathy   Bed Mobility   Supine to Sit 4  Minimal assistance   Additional items Assist x 1;HOB elevated; Bedrails; Increased time required;Verbal cues;LE management  (usually sleeps in recliner chair at home)   Sit to Supine 4  Minimal assistance   Additional items Assist x 1; Increased time required;Verbal cues;LE management   Transfers   Sit to Stand 5  Supervision   Additional items Verbal cues; Impulsive   Stand to Sit 5  Supervision   Additional items Impulsive;Verbal cues   Ambulation/Elevation   Gait pattern Improper Weight shift;Decreased foot clearance; Short stride; Excessively slow  (increased lateral sway)   Gait Assistance 5  Supervision   Additional items Verbal cues   Assistive Device Other (Comment)  (rollator)   Distance 120` x1   Balance   Static Sitting Good   Dynamic Sitting Fair +   Static Standing Fair +   Dynamic Standing Fair   Ambulatory Fair -   Endurance Deficit   Endurance Deficit Yes   Endurance Deficit Description limited ambulation distance   Activity Tolerance   Activity Tolerance Patient limited by fatigue   Nurse Made Aware Per RN, pt appropriate to evaluate   Assessment   Prognosis Fair   Problem List Decreased strength;Decreased endurance; Impaired balance;Decreased mobility; Decreased safety awareness; Obesity;Pain   Goals   Patient Goals Pt would like to go home      STG Expiration Date 03/12/20   Short Term Goal #1 Pt will be able to: (1) perform bed mobility with mod I to decrease caregiver burden (2) perform sit to stand with mod I to increase level of independence and promote safe toiletting and transfers (3) ambulate at least 200` with mod I and least restrictive AD to increase activity tolerance and allow for safe community mobilization (4) increase standing balance by 1 grade to decrease risk of falls   PT Treatment Day 0   Plan   Treatment/Interventions Functional transfer training;LE strengthening/ROM; Therapeutic exercise; Endurance training;Patient/family training;Equipment eval/education; Bed mobility;Gait training   PT Frequency 2-3x/wk   Recommendation   Recommendation Home with family support   Equipment Recommended Walker; Other (Comment)  (rollator)   Barthel Index   Feeding 10   Bathing 0   Grooming Score 5   Dressing Score 5   Bladder Score 5   Bowels Score 10   Toilet Use Score 5   Transfers (Bed/Chair) Score 10   Mobility (Level Surface) Score 10   Stairs Score 0   Barthel Index Score 60       Assessment: Pt is a 62 y o  female seen for PT evaluation s/p admit to [address] [place] [address] on 2/29/2020 w/ Sepsis ([facility] [address] )  Order placed for PT  Comorbidities affecting pt's physical performance at time of assessment listed above  Personal factors affecting pt at time of IE include: limited home support, past experience, behavioral pattern, inability to perform IADLs, inability to perform ADLs and recent fall(s)  Prior to admission, pt was was independent w/ all functional mobility w/ rollator, lived in one floor environment, had 1 CATRACHO unknown railing and lived with  and daughter  Upon evaluation: Pt requires min A for bed mobility, supervision for sit to stand, and supervision for ambulation with rollator  Pt reports she is close to her functional baseline, however reports ~3 falls in the last 6 months  (Please find full objective findings from PT assessment regarding body systems outlined above)  Impairments and limitations also listed above, especially due to  weakness, impaired balance, decreased endurance, gait deviations, pain, decreased activity tolerance, decreased safety awareness and fall risk  The following objective measures performed on IE also reveal limitations: Barthel Index 60/100   Pt's clinical presentation is currently unstable/unpredictable seen in pt's presentation of decreased safety awareness, impulsivity, fall risk, and decreased insight into deficits  Pt to benefit from continued skilled PT tx while in hospital and upon DC to address deficits as defined above and maximize level of functional mobility  From PT/mobility standpoint, recommendation at time of d/c would be home with family support and use of rollator  Recommend progression of ambulation and initiation of HEP as appropriate        Filipe Dupont, PT,DPT

## 2020-03-04 VITALS
SYSTOLIC BLOOD PRESSURE: 150 MMHG | RESPIRATION RATE: 18 BRPM | WEIGHT: 190.6 LBS | OXYGEN SATURATION: 93 % | TEMPERATURE: 98.7 F | HEART RATE: 88 BPM | BODY MASS INDEX: 37.22 KG/M2 | DIASTOLIC BLOOD PRESSURE: 82 MMHG

## 2020-03-04 PROBLEM — R25.1 OCCASIONAL TREMORS: Status: ACTIVE | Noted: 2020-03-04

## 2020-03-04 PROBLEM — N17.9 ACUTE KIDNEY INJURY (HCC): Status: RESOLVED | Noted: 2018-08-19 | Resolved: 2020-03-04

## 2020-03-04 LAB
ANION GAP SERPL CALCULATED.3IONS-SCNC: 9 MMOL/L (ref 4–13)
BUN SERPL-MCNC: 16 MG/DL (ref 5–25)
CALCIUM SERPL-MCNC: 8.8 MG/DL (ref 8.3–10.1)
CHLORIDE SERPL-SCNC: 101 MMOL/L (ref 100–108)
CO2 SERPL-SCNC: 27 MMOL/L (ref 21–32)
CREAT SERPL-MCNC: 1.02 MG/DL (ref 0.6–1.3)
GFR SERPL CREATININE-BSD FRML MDRD: 59 ML/MIN/1.73SQ M
GLUCOSE SERPL-MCNC: 150 MG/DL (ref 65–140)
GLUCOSE SERPL-MCNC: 157 MG/DL (ref 65–140)
POTASSIUM SERPL-SCNC: 4 MMOL/L (ref 3.5–5.3)
SODIUM SERPL-SCNC: 137 MMOL/L (ref 136–145)

## 2020-03-04 PROCEDURE — 99239 HOSP IP/OBS DSCHRG MGMT >30: CPT | Performed by: INTERNAL MEDICINE

## 2020-03-04 PROCEDURE — 80048 BASIC METABOLIC PNL TOTAL CA: CPT | Performed by: INTERNAL MEDICINE

## 2020-03-04 PROCEDURE — 82948 REAGENT STRIP/BLOOD GLUCOSE: CPT

## 2020-03-04 RX ORDER — CEFDINIR 300 MG/1
300 CAPSULE ORAL ONCE
Status: COMPLETED | OUTPATIENT
Start: 2020-03-04 | End: 2020-03-04

## 2020-03-04 RX ORDER — OXYCODONE HYDROCHLORIDE 30 MG/1
15 TABLET ORAL EVERY 4 HOURS PRN
Qty: 30 TABLET | Refills: 0
Start: 2020-03-04 | End: 2020-03-11 | Stop reason: HOSPADM

## 2020-03-04 RX ORDER — METOCLOPRAMIDE 10 MG/1
5 TABLET ORAL
Refills: 0
Start: 2020-03-04 | End: 2020-03-11 | Stop reason: HOSPADM

## 2020-03-04 RX ORDER — MORPHINE SULFATE 100 MG/1
100 CAPSULE, EXTENDED RELEASE ORAL DAILY
Qty: 20 CAPSULE | Refills: 0 | Status: CANCELLED
Start: 2020-03-04 | End: 2020-03-14

## 2020-03-04 RX ORDER — CEFDINIR 300 MG/1
300 CAPSULE ORAL EVERY 12 HOURS SCHEDULED
Qty: 6 CAPSULE | Refills: 0 | Status: CANCELLED | OUTPATIENT
Start: 2020-03-04 | End: 2020-03-07

## 2020-03-04 RX ORDER — CEFDINIR 300 MG/1
300 CAPSULE ORAL EVERY 12 HOURS SCHEDULED
Qty: 6 CAPSULE | Refills: 0 | Status: SHIPPED | OUTPATIENT
Start: 2020-03-04 | End: 2020-03-07

## 2020-03-04 RX ORDER — METOCLOPRAMIDE 10 MG/1
5 TABLET ORAL
Qty: 90 TABLET | Refills: 0 | Status: CANCELLED
Start: 2020-03-04

## 2020-03-04 RX ORDER — MORPHINE SULFATE 100 MG/1
100 CAPSULE, EXTENDED RELEASE ORAL DAILY
Refills: 0
Start: 2020-03-04 | End: 2020-03-11 | Stop reason: HOSPADM

## 2020-03-04 RX ORDER — OXYCODONE HYDROCHLORIDE 30 MG/1
15 TABLET ORAL EVERY 4 HOURS PRN
Qty: 30 TABLET | Refills: 0 | Status: CANCELLED
Start: 2020-03-04 | End: 2020-03-14

## 2020-03-04 RX ORDER — INSULIN GLARGINE 100 [IU]/ML
25 INJECTION, SOLUTION SUBCUTANEOUS
Status: ON HOLD
Start: 2020-03-04 | End: 2020-03-11 | Stop reason: SDUPTHER

## 2020-03-04 RX ADMIN — OXYCODONE HYDROCHLORIDE 15 MG: 10 TABLET ORAL at 11:33

## 2020-03-04 RX ADMIN — HEPARIN SODIUM 5000 UNITS: 5000 INJECTION INTRAVENOUS; SUBCUTANEOUS at 06:05

## 2020-03-04 RX ADMIN — AMLODIPINE BESYLATE 10 MG: 10 TABLET ORAL at 08:58

## 2020-03-04 RX ADMIN — OXYCODONE HYDROCHLORIDE 15 MG: 10 TABLET ORAL at 06:07

## 2020-03-04 RX ADMIN — LISINOPRIL 10 MG: 10 TABLET ORAL at 08:58

## 2020-03-04 RX ADMIN — METOCLOPRAMIDE HYDROCHLORIDE 5 MG: 5 TABLET ORAL at 06:05

## 2020-03-04 RX ADMIN — TRIAMTERENE AND HYDROCHLOROTHIAZIDE 2 TABLET: 37.5; 25 TABLET ORAL at 08:58

## 2020-03-04 RX ADMIN — CEFEPIME HYDROCHLORIDE 2000 MG: 2 INJECTION, POWDER, FOR SOLUTION INTRAVENOUS at 00:24

## 2020-03-04 RX ADMIN — CEFDINIR 300 MG: 300 CAPSULE ORAL at 11:33

## 2020-03-04 RX ADMIN — DULOXETINE HYDROCHLORIDE 60 MG: 60 CAPSULE, DELAYED RELEASE ORAL at 08:58

## 2020-03-04 NOTE — ASSESSMENT & PLAN NOTE
· Present on admission with fever, leukocytosis, acute metabolic encephalopathy  · Likely secondary to urinary tract infection  · Urine cultures grew Klebsiella pneumoniae, sensitive to cefazolin and cefepime  · Blood cultures negative at 48 hours  · Influenza/RSV negative  · ICU stay from 3/1 - 3/2  · IV cefepime 2 g q 12h (4 days completed)  · Afebrile  · Clinically improved    Plan  1  transition to oral cefdinir 300 mg q12q 7 more doses starting tonight (total of 7 days of antibiotics)

## 2020-03-04 NOTE — ASSESSMENT & PLAN NOTE
· Patient has a history of recurrent falls  · Has ongoing diabetic peripheral neuropathy, and venous stasis  · Currently uses a walker to ambulate  · Seen by PT:  Recommend home with family support

## 2020-03-04 NOTE — ASSESSMENT & PLAN NOTE
· Patient's  reported on and off tremors of the hand and legs for the past year, noted to have increased recently  · Does not appear to be seizure activities  · Neurology consulted:  Likely medication use tremulousness, Raglan/gabapentin) as well as metabolic and infectious derangement that could cause myoclonus  · Raglan dose adjusted to 5 mg 3 times daily    Plan  1  Continue Reglan at 5 mg 3 times a day   2   Ambulatory referral to movement disorder clinic the neurology recommendations

## 2020-03-04 NOTE — ASSESSMENT & PLAN NOTE
· Home medications include triamterene-hydrochlorothiazide 75-50 mg, lisinopril 10 mg, amlodipine 10 mg  · All antihypertensives were on hold on admission due to YVONNE  · Lisinopril restarted on 03/03/2020, and other medications restarted on 03/03/2020  · Renal functions stable at 0 9  · A m  blood pressure 150/82    Plan  1   Continue home medications

## 2020-03-04 NOTE — DISCHARGE SUMMARY
Discharge- Varsha Poe 1955, 62 y o  female MRN: [id number]    Unit/Bed#: S -01 Encounter: [id number]    Primary Care Provider: David Watst   Date and time admitted to hospital: 2/29/2020 10:25 PM        * Sepsis [facility])  Assessment & Plan  · Present on admission with fever, leukocytosis, acute metabolic encephalopathy  · Likely secondary to urinary tract infection  · Urine cultures grew Klebsiella pneumoniae, sensitive to cefazolin and cefepime  · Blood cultures negative at 48 hours  · Influenza/RSV negative  · ICU stay from 3/1 - 3/2  · IV cefepime 2 g q 12h (4 days completed)  · Afebrile  · Clinically improved    Plan  1  transition to oral cefdinir 300 mg q12q 7 more doses starting tonight (total of 7 days of antibiotics)    Acute toxic/metabolic encephalopathy  Assessment & Plan  · Present on admission with altered mental status  · Likely multifactorial due to sepsis (UTI), FX of opioid medications in the presence of YVONNE  · ICU course from 03/01 - 3/2 :  Opioid medications adjusted, recommended to have patient off of morphine, and continue oxycodone at 15 mg q 4h p r n  · Per ICU note, there was a possible concern for serotonin syndrome due to Cymbalta and opioid abuse  · CT head on 03/01 showed no acute intracranial abnormality  · Ammonia < 10, folate > 20, vitamin B12 790, TSH 1 495  · Per patient's , she has abnormal tremors, does not suggest seizure activity, or rigors  · Seen by neurology, reglan dose decreased (see below)    Plan  2  Continue oral antibiotics (see above)  3  Pain medications adjusted:  Oxycodone 15 mg q 4h p r n, morphine 100 mg daily  4  Follow up with PCP in 1 week  5  Ambulatory referral to movement disorder clinic placed per Neurology recommendations    Acute kidney injury (HCC)resolved as of 3/4/2020  Assessment & Plan  · Creatinine on admission 1 85, trended down to 1 04 during the ICU course managed with IV fluids, and 1 dose of Lasix  · a m  Creatinine 0 9  · Likely secondary to hypovolemia and ongoing infection  · Currently off IV fluids    Occasional tremors  Assessment & Plan  · Patient's  reported on and off tremors of the hand and legs for the past year, noted to have increased recently  · Does not appear to be seizure activities  · Neurology consulted:  Likely medication use tremulousness, Raglan/gabapentin) as well as metabolic and infectious derangement that could cause myoclonus  · Raglan dose adjusted to 5 mg 3 times daily    Plan  1  Continue Reglan at 5 mg 3 times a day   2  Ambulatory referral to movement disorder clinic the neurology recommendations     Chronic pain syndrome  Assessment & Plan  · Patient uses oxycodone 30 mg q 4h p r n  And morphine 100 mg b i d  And gabapentin 300 mg daily  · Prescribed by primary care physician (180 pills oxycodone monthly, and 60 pills morphine monthly)  · Has ongoing peripheral neuropathy, and degenerative disc disease  · PDMP reviewed, morphine last refilled in January, and prior to this in August  · Will contact PCP after discharge to inform pain medication adjustments : Corbin Watts [phone]    Plan  1  continue oxycodone 15 mg q 4h p r n   2  morphine 100 mg daily on discharge    Type 2 diabetes mellitus with hyperglycemia, with long-term current use of insulin [facility])  Assessment & Plan  Lab Results   Component Value Date    HGBA1C 5 2 03/02/2020       Recent Labs     03/03/20  1534 03/03/20  1839 03/03/20  2118 03/04/20  0720   POCGLU 180* 170* 209* 157*       Blood Sugar Average: Last 72 hrs:  (P) 143 75   · HbA1c 5 2  · Home medications include metformin and Lantus 25 units hs  · Currently on Lantus 10 unit hs with sliding scale    Plan  6   Restart home medications    Ambulatory dysfunction  Assessment & Plan  · Patient has a history of recurrent falls  · Has ongoing diabetic peripheral neuropathy, and venous stasis  · Currently uses a walker to ambulate  · Seen by PT: Recommend home with family support    Essential hypertension  Assessment & Plan  · Home medications include triamterene-hydrochlorothiazide 75-50 mg, lisinopril 10 mg, amlodipine 10 mg  · All antihypertensives were on hold on admission due to YVONNE  · Lisinopril restarted on 03/03/2020, and other medications restarted on 03/03/2020  · Renal functions stable at 0 9  · A m  blood pressure 150/82    Plan  7  Continue home medications      Discharging Resident Physician: Josh Priest MD  Attending: No att  providers found  PCP: Melita Watts  Admission Date: 2/29/2020  Discharge Date: 03/04/20    Disposition:     Home    Reason for Admission:  Altered mental status, fever    Consultations During Hospital Stay:  · Critical care  · Neurology  · Physical therapy    Procedures Performed:     · None    Significant Findings / Test Results:     · CT head:  No acute intracranial abnormality  · Urine cultures:  Klebsiella variicola, sensitive to ampicillin, cefazolin, cefepime  · Blood cultures show no growth at 72 hours  · Influenza/RSV negative  · HbA1c 5 2  · Procalcitonin 0 10  · Ammonia less than 10  · Folate more than 20  · B12 vitamin 790  · TSH 1 495  · CBC:  On admission WBC was 11 7, trended down to 5 07, hemoglobin 13 2  · BMP:  Admission sodium was 129 which improved to 136 on discharge, potassium 4 5, creatinine was 1 5 on admission and trended down to 0 9    Incidental Findings:   · None    Test Results Pending at Discharge (will require follow up): · None     Outpatient Tests Requested:  · None    Complications:  None    Hospital Course: Danie Hayward is a 62 y o  female patient who originally presented to the hospital on 2/29/2020 due to altered mental status, and tremors  On admission, patient was noted to be hypoxic with saturations at 85%, she was started on supplemental oxygen via nasal cannula  She was also found be febrile and 100 3    CT head showed no acute intracranial abnormality  She was transferred to the ICU on admission  She was managed for acute metabolic encephalopathy, and acute hypoxic respiratory failure likely secondary to sepsis, increased use of opioid medications with ongoing YVONNE, and hyponatremia  Sepsis was likely secondary to UTI as patient urinalysis was abnormal and urine cultures grew Klebsiella variicola  She was started on IV cefepime 2 g q 12h, PDMP was reviewed, and her pain medications were adjusted  She was managed off of morphine, and was monitored for withdrawal symptoms  Patient did not going to opioid withdrawal over with absence of morphine  She was also noted to have an acute kidney injury on admission, managed with IV fluids  Her antihypertensives were on hold  Renal functions and patient's mental status improved during the hospital stay  She was weaned off of oxygen, and transferred  to [facility] floor on 03/02/2020  Patient continued to improve during the hospital stay  Her antihypertensives were restarted  As patient and her  expressed concerns about her tremors, Neurology was consulted  It was thought that of tremors are likely extrapyramidal side effects of Raglan, and her dosage was decreased  Physical therapy evaluated the patient, and recommended home with family support  Home oxygen evaluation was done, and patient did not require supplementary oxygen prior to discharge  Patient received total of 3 days of IV cefepime, and she was transitioned to oral cefdinir to complete total of 7 days of antibiotics  Ambulatory referral to movement disorder has been made, and patient was advised to follow-up with her primary care physician  Condition at Discharge: stable     Discharge Day Visit / Exam:     Subjective:  Patient has no new complaints today  She states that she is feeling well, and ready to go home  Denies fever, chills  No headaches, nausea, vomiting, visual disturbances    She states that she did not have tremors for the past 24 hours  She is able to ambulate on her own  She has a good appetite with good oral intake of food and liquids  Vitals: Blood Pressure: 150/82 (03/04/20 0714)  Pulse: 88 (03/04/20 0714)  Temperature: 98 7 °F (37 1 °C) (03/04/20 0714)  Temp Source: Oral (03/04/20 0714)  Respirations: 18 (03/04/20 0714)  Weight - Scale: 86 5 kg (190 lb 9 6 oz) (03/04/20 0600)  SpO2: 93 % (03/04/20 0714)     Exam:   Physical Exam   Constitutional: She is oriented to person, place, and time  She appears well-developed and well-nourished  No distress  HENT:   Head: Normocephalic and atraumatic  Mouth/Throat: Oropharynx is clear and moist    Eyes: Pupils are equal, round, and reactive to light  EOM are normal  No scleral icterus  Neck: Normal range of motion  Neck supple  No JVD present  Cardiovascular: Normal rate, regular rhythm, normal heart sounds and intact distal pulses  No murmur heard  Pulmonary/Chest: Effort normal  She has no wheezes  She has rales (Occasional bilateral)  Abdominal: Soft  Bowel sounds are normal  She exhibits no distension  There is no tenderness  Musculoskeletal: Normal range of motion  She exhibits edema ( With chronic venous stasis dermatitis of bilateral lower extremities)  She exhibits no tenderness  Neurological: She is alert and oriented to person, place, and time  Skin: Skin is warm  Capillary refill takes less than 2 seconds  Rash ( chronic venous stasis dermatitis) noted  She is not diaphoretic  Psychiatric: She has a normal mood and affect  Her behavior is normal  Judgment and thought content normal    Nursing note and vitals reviewed  Discussion with Family:  Patient and her     Discharge instructions/Information to patient and family:   See after visit summary for information provided to patient and family        Provisions for Follow-Up Care:  See after visit summary for information related to follow-up care and any pertinent home health orders  Planned Readmission:  None     Discharge Medications:  See after visit summary for reconciled discharge medications provided to patient and family        ** Please Note: This note has been constructed using a voice recognition system **

## 2020-03-04 NOTE — ASSESSMENT & PLAN NOTE
· Creatinine on admission 1 85, trended down to 1 04 during the ICU course managed with IV fluids, and 1 dose of Lasix  · a m   Creatinine 0 9  · Likely secondary to hypovolemia and ongoing infection  · Currently off IV fluids

## 2020-03-04 NOTE — ASSESSMENT & PLAN NOTE
· Patient uses oxycodone 30 mg q 4h p r n  And morphine 100 mg b i d   And gabapentin 300 mg daily  · Prescribed by primary care physician (180 pills oxycodone monthly, and 60 pills morphine monthly)  · Has ongoing peripheral neuropathy, and degenerative disc disease  · PDMP reviewed, morphine last refilled in January, and prior to this in August  · Will contact PCP after discharge to inform pain medication adjustments : Corbin Watts [phone]    Plan  1  continue oxycodone 15 mg q 4h p r n   2  morphine 100 mg daily on discharge

## 2020-03-04 NOTE — ASSESSMENT & PLAN NOTE
Lab Results   Component Value Date    HGBA1C 5 2 03/02/2020       Recent Labs     03/03/20  1534 03/03/20  1839 03/03/20  2118 03/04/20  0720   POCGLU 180* 170* 209* 157*       Blood Sugar Average: Last 72 hrs:  (P) 143 75   · HbA1c 5 2  · Home medications include metformin and Lantus 25 units hs  · Currently on Lantus 10 unit hs with sliding scale    Plan  1   Restart home medications

## 2020-03-04 NOTE — ASSESSMENT & PLAN NOTE
· Present on admission with altered mental status  · Likely multifactorial due to sepsis (UTI), FX of opioid medications in the presence of YVONNE  · ICU course from 03/01 - 3/2 :  Opioid medications adjusted, recommended to have patient off of morphine, and continue oxycodone at 15 mg q 4h p r n  · Per ICU note, there was a possible concern for serotonin syndrome due to Cymbalta and opioid abuse  · CT head on 03/01 showed no acute intracranial abnormality  · Ammonia < 10, folate > 20, vitamin B12 790, TSH 1 495  · Per patient's , she has abnormal tremors, does not suggest seizure activity, or rigors  · Seen by neurology, reglan dose decreased (see below)    Plan  1  Continue oral antibiotics (see above)  2  Pain medications adjusted:  Oxycodone 15 mg q 4h p r n, morphine 100 mg daily  3  Follow up with PCP in 1 week  4   Ambulatory referral to movement disorder clinic placed per Neurology recommendations

## 2020-03-06 LAB
BACTERIA BLD CULT: NORMAL
BACTERIA BLD CULT: NORMAL

## 2020-03-07 ENCOUNTER — HOSPITAL ENCOUNTER (INPATIENT)
Facility: HOSPITAL | Age: 65
LOS: 3 days | Discharge: NURSING, REHAB OR LONG-TERM CARE | DRG: 682 | End: 2020-03-11
Attending: EMERGENCY MEDICINE | Admitting: INTERNAL MEDICINE
Payer: COMMERCIAL

## 2020-03-07 ENCOUNTER — APPOINTMENT (EMERGENCY)
Dept: RADIOLOGY | Facility: HOSPITAL | Age: 65
DRG: 682 | End: 2020-03-07
Payer: COMMERCIAL

## 2020-03-07 DIAGNOSIS — R25.1 OCCASIONAL TREMORS: ICD-10-CM

## 2020-03-07 DIAGNOSIS — E11.65 TYPE 2 DIABETES MELLITUS WITH HYPERGLYCEMIA, WITH LONG-TERM CURRENT USE OF INSULIN (HCC): Chronic | ICD-10-CM

## 2020-03-07 DIAGNOSIS — Z79.4 TYPE 2 DIABETES MELLITUS WITH HYPERGLYCEMIA, WITH LONG-TERM CURRENT USE OF INSULIN (HCC): Chronic | ICD-10-CM

## 2020-03-07 DIAGNOSIS — E11.65 TYPE 2 DIABETES MELLITUS WITH HYPERGLYCEMIA, WITHOUT LONG-TERM CURRENT USE OF INSULIN (HCC): ICD-10-CM

## 2020-03-07 DIAGNOSIS — N17.9 AKI (ACUTE KIDNEY INJURY) (HCC): Primary | ICD-10-CM

## 2020-03-07 DIAGNOSIS — G93.40 ENCEPHALOPATHY: ICD-10-CM

## 2020-03-07 DIAGNOSIS — G89.4 CHRONIC PAIN SYNDROME: ICD-10-CM

## 2020-03-07 PROBLEM — Z91.89 AT RISK FOR POLYPHARMACY: Status: ACTIVE | Noted: 2020-03-07

## 2020-03-07 LAB
ALBUMIN SERPL BCP-MCNC: 2.6 G/DL (ref 3.5–5)
ALP SERPL-CCNC: 96 U/L (ref 46–116)
ALT SERPL W P-5'-P-CCNC: 22 U/L (ref 12–78)
ANION GAP SERPL CALCULATED.3IONS-SCNC: 7 MMOL/L (ref 4–13)
AST SERPL W P-5'-P-CCNC: 16 U/L (ref 5–45)
BASE EX.OXY STD BLDV CALC-SCNC: 96.1 % (ref 60–80)
BASE EXCESS BLDV CALC-SCNC: -3.6 MMOL/L
BASOPHILS # BLD AUTO: 0.04 THOUSANDS/ΜL (ref 0–0.1)
BASOPHILS NFR BLD AUTO: 1 % (ref 0–1)
BILIRUB SERPL-MCNC: 0.33 MG/DL (ref 0.2–1)
BUN SERPL-MCNC: 54 MG/DL (ref 5–25)
CALCIUM SERPL-MCNC: 7.1 MG/DL (ref 8.3–10.1)
CHLORIDE SERPL-SCNC: 102 MMOL/L (ref 100–108)
CO2 SERPL-SCNC: 24 MMOL/L (ref 21–32)
CREAT SERPL-MCNC: 1.78 MG/DL (ref 0.6–1.3)
EOSINOPHIL # BLD AUTO: 0.24 THOUSAND/ΜL (ref 0–0.61)
EOSINOPHIL NFR BLD AUTO: 3 % (ref 0–6)
ERYTHROCYTE [DISTWIDTH] IN BLOOD BY AUTOMATED COUNT: 14.1 % (ref 11.6–15.1)
FLUAV RNA NPH QL NAA+PROBE: NORMAL
FLUBV RNA NPH QL NAA+PROBE: NORMAL
GFR SERPL CREATININE-BSD FRML MDRD: 30 ML/MIN/1.73SQ M
GLUCOSE SERPL-MCNC: 117 MG/DL (ref 65–140)
GLUCOSE SERPL-MCNC: 94 MG/DL (ref 65–140)
HCO3 BLDV-SCNC: 23.3 MMOL/L (ref 24–30)
HCT VFR BLD AUTO: 37.4 % (ref 34.8–46.1)
HGB BLD-MCNC: 12.2 G/DL (ref 11.5–15.4)
IMM GRANULOCYTES # BLD AUTO: 0.04 THOUSAND/UL (ref 0–0.2)
IMM GRANULOCYTES NFR BLD AUTO: 1 % (ref 0–2)
LYMPHOCYTES # BLD AUTO: 1.59 THOUSANDS/ΜL (ref 0.6–4.47)
LYMPHOCYTES NFR BLD AUTO: 19 % (ref 14–44)
MAGNESIUM SERPL-MCNC: 1.7 MG/DL (ref 1.6–2.6)
MCH RBC QN AUTO: 30.1 PG (ref 26.8–34.3)
MCHC RBC AUTO-ENTMCNC: 32.6 G/DL (ref 31.4–37.4)
MCV RBC AUTO: 92 FL (ref 82–98)
MONOCYTES # BLD AUTO: 0.58 THOUSAND/ΜL (ref 0.17–1.22)
MONOCYTES NFR BLD AUTO: 7 % (ref 4–12)
NEUTROPHILS # BLD AUTO: 5.74 THOUSANDS/ΜL (ref 1.85–7.62)
NEUTS SEG NFR BLD AUTO: 69 % (ref 43–75)
NRBC BLD AUTO-RTO: 0 /100 WBCS
O2 CT BLDV-SCNC: 16.9 ML/DL
PCO2 BLDV: 49.7 MM HG (ref 42–50)
PH BLDV: 7.29 [PH] (ref 7.3–7.4)
PHOSPHATE SERPL-MCNC: 4.3 MG/DL (ref 2.3–4.1)
PLATELET # BLD AUTO: 140 THOUSANDS/UL (ref 149–390)
PMV BLD AUTO: 9.5 FL (ref 8.9–12.7)
PO2 BLDV: 143.7 MM HG (ref 35–45)
POTASSIUM SERPL-SCNC: 4.9 MMOL/L (ref 3.5–5.3)
PROCALCITONIN SERPL-MCNC: 0.11 NG/ML
PROT SERPL-MCNC: 5.8 G/DL (ref 6.4–8.2)
RBC # BLD AUTO: 4.05 MILLION/UL (ref 3.81–5.12)
RSV RNA NPH QL NAA+PROBE: NORMAL
SODIUM SERPL-SCNC: 133 MMOL/L (ref 136–145)
WBC # BLD AUTO: 8.23 THOUSAND/UL (ref 4.31–10.16)

## 2020-03-07 PROCEDURE — 84145 PROCALCITONIN (PCT): CPT | Performed by: EMERGENCY MEDICINE

## 2020-03-07 PROCEDURE — 80053 COMPREHEN METABOLIC PANEL: CPT | Performed by: EMERGENCY MEDICINE

## 2020-03-07 PROCEDURE — 93005 ELECTROCARDIOGRAM TRACING: CPT

## 2020-03-07 PROCEDURE — 99285 EMERGENCY DEPT VISIT HI MDM: CPT | Performed by: EMERGENCY MEDICINE

## 2020-03-07 PROCEDURE — 36415 COLL VENOUS BLD VENIPUNCTURE: CPT | Performed by: EMERGENCY MEDICINE

## 2020-03-07 PROCEDURE — 83735 ASSAY OF MAGNESIUM: CPT | Performed by: EMERGENCY MEDICINE

## 2020-03-07 PROCEDURE — 71046 X-RAY EXAM CHEST 2 VIEWS: CPT

## 2020-03-07 PROCEDURE — 85025 COMPLETE CBC W/AUTO DIFF WBC: CPT | Performed by: EMERGENCY MEDICINE

## 2020-03-07 PROCEDURE — 82948 REAGENT STRIP/BLOOD GLUCOSE: CPT

## 2020-03-07 PROCEDURE — 96374 THER/PROPH/DIAG INJ IV PUSH: CPT

## 2020-03-07 PROCEDURE — 87631 RESP VIRUS 3-5 TARGETS: CPT | Performed by: EMERGENCY MEDICINE

## 2020-03-07 PROCEDURE — 82805 BLOOD GASES W/O2 SATURATION: CPT | Performed by: INTERNAL MEDICINE

## 2020-03-07 PROCEDURE — 99285 EMERGENCY DEPT VISIT HI MDM: CPT

## 2020-03-07 PROCEDURE — 84100 ASSAY OF PHOSPHORUS: CPT | Performed by: EMERGENCY MEDICINE

## 2020-03-07 PROCEDURE — 99220 PR INITIAL OBSERVATION CARE/DAY 70 MINUTES: CPT | Performed by: INTERNAL MEDICINE

## 2020-03-07 RX ORDER — LORAZEPAM 2 MG/ML
0.5 INJECTION INTRAMUSCULAR ONCE
Status: COMPLETED | OUTPATIENT
Start: 2020-03-07 | End: 2020-03-07

## 2020-03-07 RX ADMIN — LORAZEPAM 0.5 MG: 2 INJECTION INTRAMUSCULAR; INTRAVENOUS at 21:16

## 2020-03-08 LAB
ALBUMIN SERPL BCP-MCNC: 3.1 G/DL (ref 3.5–5)
ALP SERPL-CCNC: 118 U/L (ref 46–116)
ALT SERPL W P-5'-P-CCNC: 26 U/L (ref 12–78)
ANION GAP SERPL CALCULATED.3IONS-SCNC: 8 MMOL/L (ref 4–13)
AST SERPL W P-5'-P-CCNC: 26 U/L (ref 5–45)
ATRIAL RATE: 91 BPM
BASOPHILS # BLD AUTO: 0.04 THOUSANDS/ΜL (ref 0–0.1)
BASOPHILS NFR BLD AUTO: 1 % (ref 0–1)
BILIRUB SERPL-MCNC: 0.4 MG/DL (ref 0.2–1)
BUN SERPL-MCNC: 67 MG/DL (ref 5–25)
CALCIUM SERPL-MCNC: 8.8 MG/DL (ref 8.3–10.1)
CHLORIDE SERPL-SCNC: 92 MMOL/L (ref 100–108)
CO2 SERPL-SCNC: 26 MMOL/L (ref 21–32)
CREAT SERPL-MCNC: 2.14 MG/DL (ref 0.6–1.3)
EOSINOPHIL # BLD AUTO: 0.18 THOUSAND/ΜL (ref 0–0.61)
EOSINOPHIL NFR BLD AUTO: 2 % (ref 0–6)
ERYTHROCYTE [DISTWIDTH] IN BLOOD BY AUTOMATED COUNT: 14.3 % (ref 11.6–15.1)
EST. AVERAGE GLUCOSE BLD GHB EST-MCNC: 103 MG/DL
GFR SERPL CREATININE-BSD FRML MDRD: 24 ML/MIN/1.73SQ M
GLUCOSE SERPL-MCNC: 105 MG/DL (ref 65–140)
GLUCOSE SERPL-MCNC: 113 MG/DL (ref 65–140)
GLUCOSE SERPL-MCNC: 124 MG/DL (ref 65–140)
GLUCOSE SERPL-MCNC: 166 MG/DL (ref 65–140)
GLUCOSE SERPL-MCNC: 188 MG/DL (ref 65–140)
GLUCOSE SERPL-MCNC: 96 MG/DL (ref 65–140)
HBA1C MFR BLD: 5.2 %
HCT VFR BLD AUTO: 35 % (ref 34.8–46.1)
HGB BLD-MCNC: 11.3 G/DL (ref 11.5–15.4)
IMM GRANULOCYTES # BLD AUTO: 0.04 THOUSAND/UL (ref 0–0.2)
IMM GRANULOCYTES NFR BLD AUTO: 1 % (ref 0–2)
LYMPHOCYTES # BLD AUTO: 1.73 THOUSANDS/ΜL (ref 0.6–4.47)
LYMPHOCYTES NFR BLD AUTO: 22 % (ref 14–44)
MAGNESIUM SERPL-MCNC: 2.3 MG/DL (ref 1.6–2.6)
MCH RBC QN AUTO: 30.1 PG (ref 26.8–34.3)
MCHC RBC AUTO-ENTMCNC: 32.3 G/DL (ref 31.4–37.4)
MCV RBC AUTO: 93 FL (ref 82–98)
MONOCYTES # BLD AUTO: 0.67 THOUSAND/ΜL (ref 0.17–1.22)
MONOCYTES NFR BLD AUTO: 9 % (ref 4–12)
NEUTROPHILS # BLD AUTO: 5.18 THOUSANDS/ΜL (ref 1.85–7.62)
NEUTS SEG NFR BLD AUTO: 65 % (ref 43–75)
NRBC BLD AUTO-RTO: 0 /100 WBCS
OSMOLALITY UR/SERPL-RTO: 289 MMOL/KG (ref 282–298)
OSMOLALITY UR: 267 MMOL/KG
P AXIS: 80 DEGREES
PHOSPHATE SERPL-MCNC: 5.3 MG/DL (ref 2.3–4.1)
PLATELET # BLD AUTO: 120 THOUSANDS/UL (ref 149–390)
PLATELET # BLD AUTO: 131 THOUSANDS/UL (ref 149–390)
PMV BLD AUTO: 10.1 FL (ref 8.9–12.7)
PMV BLD AUTO: 9.5 FL (ref 8.9–12.7)
POTASSIUM SERPL-SCNC: 5.4 MMOL/L (ref 3.5–5.3)
PR INTERVAL: 170 MS
PROT SERPL-MCNC: 6.9 G/DL (ref 6.4–8.2)
QRS AXIS: 57 DEGREES
QRSD INTERVAL: 84 MS
QT INTERVAL: 328 MS
QTC INTERVAL: 403 MS
RBC # BLD AUTO: 3.76 MILLION/UL (ref 3.81–5.12)
SODIUM 24H UR-SCNC: 32 MOL/L
SODIUM SERPL-SCNC: 126 MMOL/L (ref 136–145)
T WAVE AXIS: 72 DEGREES
TSH SERPL DL<=0.05 MIU/L-ACNC: 1.52 UIU/ML (ref 0.36–3.74)
URATE SERPL-MCNC: 8.3 MG/DL (ref 2–6.8)
VENTRICULAR RATE: 91 BPM
WBC # BLD AUTO: 7.84 THOUSAND/UL (ref 4.31–10.16)

## 2020-03-08 PROCEDURE — 84300 ASSAY OF URINE SODIUM: CPT | Performed by: INTERNAL MEDICINE

## 2020-03-08 PROCEDURE — 83930 ASSAY OF BLOOD OSMOLALITY: CPT | Performed by: INTERNAL MEDICINE

## 2020-03-08 PROCEDURE — 83935 ASSAY OF URINE OSMOLALITY: CPT | Performed by: INTERNAL MEDICINE

## 2020-03-08 PROCEDURE — 84100 ASSAY OF PHOSPHORUS: CPT | Performed by: INTERNAL MEDICINE

## 2020-03-08 PROCEDURE — 84443 ASSAY THYROID STIM HORMONE: CPT | Performed by: INTERNAL MEDICINE

## 2020-03-08 PROCEDURE — 85049 AUTOMATED PLATELET COUNT: CPT | Performed by: INTERNAL MEDICINE

## 2020-03-08 PROCEDURE — 93010 ELECTROCARDIOGRAM REPORT: CPT | Performed by: INTERNAL MEDICINE

## 2020-03-08 PROCEDURE — 99254 IP/OBS CNSLTJ NEW/EST MOD 60: CPT | Performed by: INTERNAL MEDICINE

## 2020-03-08 PROCEDURE — 85025 COMPLETE CBC W/AUTO DIFF WBC: CPT | Performed by: INTERNAL MEDICINE

## 2020-03-08 PROCEDURE — 82948 REAGENT STRIP/BLOOD GLUCOSE: CPT

## 2020-03-08 PROCEDURE — 99232 SBSQ HOSP IP/OBS MODERATE 35: CPT | Performed by: INTERNAL MEDICINE

## 2020-03-08 PROCEDURE — 83036 HEMOGLOBIN GLYCOSYLATED A1C: CPT | Performed by: INTERNAL MEDICINE

## 2020-03-08 PROCEDURE — 83735 ASSAY OF MAGNESIUM: CPT | Performed by: INTERNAL MEDICINE

## 2020-03-08 PROCEDURE — 80053 COMPREHEN METABOLIC PANEL: CPT | Performed by: INTERNAL MEDICINE

## 2020-03-08 PROCEDURE — 99222 1ST HOSP IP/OBS MODERATE 55: CPT | Performed by: PSYCHIATRY & NEUROLOGY

## 2020-03-08 PROCEDURE — 84550 ASSAY OF BLOOD/URIC ACID: CPT | Performed by: INTERNAL MEDICINE

## 2020-03-08 RX ORDER — ONDANSETRON 2 MG/ML
4 INJECTION INTRAMUSCULAR; INTRAVENOUS EVERY 6 HOURS PRN
Status: DISCONTINUED | OUTPATIENT
Start: 2020-03-08 | End: 2020-03-11 | Stop reason: HOSPADM

## 2020-03-08 RX ORDER — SODIUM CHLORIDE 9 MG/ML
50 INJECTION, SOLUTION INTRAVENOUS CONTINUOUS
Status: DISPENSED | OUTPATIENT
Start: 2020-03-08 | End: 2020-03-09

## 2020-03-08 RX ORDER — HYDROMORPHONE HCL/PF 1 MG/ML
0.2 SYRINGE (ML) INJECTION EVERY 4 HOURS PRN
Status: DISCONTINUED | OUTPATIENT
Start: 2020-03-08 | End: 2020-03-09

## 2020-03-08 RX ORDER — INSULIN GLARGINE 100 [IU]/ML
25 INJECTION, SOLUTION SUBCUTANEOUS
Status: DISCONTINUED | OUTPATIENT
Start: 2020-03-08 | End: 2020-03-11 | Stop reason: HOSPADM

## 2020-03-08 RX ORDER — HEPARIN SODIUM 5000 [USP'U]/ML
5000 INJECTION, SOLUTION INTRAVENOUS; SUBCUTANEOUS EVERY 8 HOURS SCHEDULED
Status: DISCONTINUED | OUTPATIENT
Start: 2020-03-08 | End: 2020-03-11 | Stop reason: HOSPADM

## 2020-03-08 RX ADMIN — SODIUM CHLORIDE 50 ML/HR: 0.9 INJECTION, SOLUTION INTRAVENOUS at 21:00

## 2020-03-08 RX ADMIN — INSULIN LISPRO 1 UNITS: 100 INJECTION, SOLUTION INTRAVENOUS; SUBCUTANEOUS at 17:50

## 2020-03-08 RX ADMIN — INSULIN GLARGINE 25 UNITS: 100 INJECTION, SOLUTION SUBCUTANEOUS at 21:22

## 2020-03-08 RX ADMIN — HEPARIN SODIUM 5000 UNITS: 5000 INJECTION INTRAVENOUS; SUBCUTANEOUS at 21:21

## 2020-03-08 RX ADMIN — HEPARIN SODIUM 5000 UNITS: 5000 INJECTION INTRAVENOUS; SUBCUTANEOUS at 05:30

## 2020-03-08 NOTE — ED PROVIDER NOTES
History  Chief Complaint   Patient presents with    Medical Problem     Pt has been having a "neurlogical pause" starting at 1330  Pt would have a blank gaze and her upper extremities would jerk  Pt was evaluated for same 3 days ago at [facility] [place]  Was satting 88% on room air  Placed on 4 liters nasal canula  71-year-old female with past medical history type 2 diabetes, recently admitted to the ICU for altered mental status, encephalopathy of unknown etiology, was found to have YVONNE hyperkalemia and hyponatremia  She is brought in after family observed her shivering and cold most of the day, requiring multiple blankets, and having an episode around dinner time where she would not respond to voice and appeared dazed  She then could not get up on her own or with assistance  She was also tremulous, worse than her baseline which appears parkinsonian according to family  He reports since leaving the ICU that she has been improving, eating well otherwise, but that this worsened today  She does not report any active symptoms other than a mild frontal headache, that was not sudden in onset or worst of life  She does not report any neuro deficits  No nausea or vomiting, no bowel changes, ROS is otherwise negative  Per family, she used a [address] before her "dazed" episode  Prior to Admission Medications   Prescriptions Last Dose Informant Patient Reported? Taking?    DULoxetine (CYMBALTA) 60 mg delayed release capsule   Yes No   Sig: Take 60 mg by mouth daily   HUMALOG KWIKPEN 100 units/mL injection pen   No No   Sig: Inject 10 Units under the skin 3 (three) times a day with meals   Mirabegron ER 50 MG TB24   Yes No   Sig: Take by mouth   amLODIPine (NORVASC) 10 mg tablet   No No   Sig: Take 1 tablet (10 mg total) by mouth daily   docusate sodium (COLACE) 100 mg capsule   No No   Sig: Take 1 capsule by mouth 2 (two) times a day   Patient taking differently: Take 100 mg by mouth 2 (two) times a day as needed for constipation    ergocalciferol (VITAMIN D2) 50,000 units   Yes No   Sig: Take 50,000 Units by mouth once a week   gabapentin (NEURONTIN) 300 mg capsule   No No   Sig: Take 1 capsule (300 mg total) by mouth daily at bedtime   Patient taking differently: Take 300 mg by mouth 2 (two) times a day    insulin glargine (LANTUS) 100 units/mL subcutaneous injection   No No   Sig: Inject 25 Units under the skin daily at bedtime   lisinopril (ZESTRIL) 5 mg tablet   No No   Sig: Take 2 tablets (10 mg total) by mouth daily   metFORMIN (GLUCOPHAGE) 1000 MG tablet   No No   Sig: Take 0 5 tablets (500 mg total) by mouth 2 (two) times a day with meals   metoclopramide (REGLAN) 10 mg tablet   No No   Sig: Take 0 5 tablets (5 mg total) by mouth 3 (three) times a day before meals   morphine (Julita) 100 MG 24 hr capsule   No No   Sig: Take 1 capsule (100 mg total) by mouth daily for 10 daysMax Daily Amount: 100 mg   oxyCODONE (ROXICODONE) 30 MG immediate release tablet   No No   Sig: Take 0 5 tablets (15 mg total) by mouth every 4 (four) hours as needed for moderate pain for up to 10 daysMax Daily Amount: 90 mg   Patient taking differently: Take 30 mg by mouth every 4 (four) hours as needed for moderate pain    triamterene-hydrochlorothiazide (MAXZIDE) 75-50 MG per tablet   Yes No   Sig: Take 1 tablet by mouth daily      Facility-Administered Medications: None       Past Medical History:   Diagnosis Date    Abnormal head CT 7/14/2017    Acute kidney injury ([facility] [address] ) 8/19/2018    Cellulitis 1/10/2017    Closed fracture of multiple ribs of right side 7/14/2017    Degenerative disc disease at L5-S1 level     Diabetes mellitus (HCC)     History of methicillin resistant staphylococcus aureus (MRSA) 08/21/2018    negative nasal culture- isolation and hx of mrsa removed 8/20/2018    Hyperkalemia 4/25/2018    Hypertension     Hypocalcemia 4/25/2018    Hyponatremia 7/14/2017       Past Surgical History:   Procedure Laterality Date    CHOLECYSTECTOMY      JOINT REPLACEMENT      OOPHORECTOMY         Family History   Problem Relation Age of Onset    Rheum arthritis Mother     Alzheimer's disease Mother     Lupus Mother      I have reviewed and agree with the history as documented  E-Cigarette/Vaping    E-Cigarette Use Current Every Day User      E-Cigarette/Vaping Substances    Nicotine Yes     THC Yes      Social History     Tobacco Use    Smoking status: Former Smoker     Types: E-Cigarettes    Smokeless tobacco: Never Used   Substance Use Topics    Alcohol use: No    Drug use: Yes     Types: Marijuana        Review of Systems   Constitutional: Negative for chills and fever  HENT: Negative for congestion, rhinorrhea and sore throat  Respiratory: Negative for cough and shortness of breath  Cardiovascular: Negative for chest pain and palpitations  Gastrointestinal: Negative for abdominal pain, constipation, diarrhea, nausea and vomiting  Genitourinary: Negative for difficulty urinating and flank pain  Musculoskeletal: Negative for arthralgias  Neurological: Positive for tremors and headaches  Negative for dizziness, weakness and light-headedness  Psychiatric/Behavioral: Positive for confusion and decreased concentration  Negative for agitation and behavioral problems  All other systems reviewed and are negative  Physical Exam  ED Triage Vitals [03/07/20 1958]   Temperature Pulse Respirations Blood Pressure SpO2   98 8 °F (37 1 °C) 100 20 143/63 93 %      Temp src Heart Rate Source Patient Position - Orthostatic VS BP Location FiO2 (%)   -- -- -- -- --      Pain Score       No Pain             Orthostatic Vital Signs  Vitals:    03/07/20 1958 03/07/20 2015 03/07/20 2215   BP: 143/63 123/59 123/58   Pulse: 100 94 78       Physical Exam   Constitutional: She is oriented to person, place, and time  She appears well-developed and well-nourished  HENT:   Head: Normocephalic and atraumatic  Eyes: EOM are normal    Neck: Normal range of motion  Neck supple  Cardiovascular: Normal rate, regular rhythm and normal heart sounds  Exam reveals no friction rub  No murmur heard  Pulmonary/Chest: Effort normal and breath sounds normal  No respiratory distress  She has no wheezes  She has no rales  Abdominal: Soft  Bowel sounds are normal  She exhibits no distension  There is no tenderness  Musculoskeletal: Normal range of motion  She exhibits no edema  Neurological: She is alert and oriented to person, place, and time  No cranial nerve deficit or sensory deficit  She exhibits normal muscle tone  Coordination normal    Patient is tremulous on exam  Waxing and waning concentration  AAO x3  No neuro deficits  Skin: Skin is warm  Psychiatric: She has a normal mood and affect   Her behavior is normal  Judgment and thought content normal        ED Medications  Medications   LORazepam (ATIVAN) injection 0 5 mg (0 5 mg Intravenous Given 3/7/20 2116)       Diagnostic Studies  Results Reviewed     Procedure Component Value Units Date/Time    Blood gas, venous [[id number]]  (Abnormal) Collected:  03/07/20 2220    Lab Status:  Final result Specimen:  Blood from Arm, Right Updated:  03/07/20 2225     pH, Surjit 7 288     pCO2, Surjit 49 7 mm Hg      pO2, Surjit 143 7 mm Hg      HCO3, Surjit 23 3 mmol/L      Base Excess, Surjit -3 6 mmol/L      O2 Content, Surjit 16 9 ml/dL      O2 HGB, VENOUS 96 1 %     Influenza A/B and RSV PCR [[id number]]  (Normal) Collected:  03/07/20 2112    Lab Status:  Final result Specimen:  Nasopharyngeal Swab Updated:  03/07/20 2202     INFLUENZA A PCR None Detected     INFLUENZA B PCR None Detected     RSV PCR None Detected    Procalcitonin [[id number]]  (Normal) Collected:  03/07/20 2111    Lab Status:  Final result Specimen:  Blood Updated:  03/07/20 2134     Procalcitonin 0 11 ng/ml     Comprehensive metabolic panel [[id number]]  (Abnormal) Collected:  03/07/20 2044    Lab Status:  Final result Specimen:  Blood from Arm, Right Updated:  03/07/20 2112     Sodium 133 mmol/L      Potassium 4 9 mmol/L      Chloride 102 mmol/L      CO2 24 mmol/L      ANION GAP 7 mmol/L      BUN 54 mg/dL      Creatinine 1 78 mg/dL      Glucose 94 mg/dL      Calcium 7 1 mg/dL      AST 16 U/L      ALT 22 U/L      Alkaline Phosphatase 96 U/L      Total Protein 5 8 g/dL      Albumin 2 6 g/dL      Total Bilirubin 0 33 mg/dL      eGFR 30 ml/min/1 73sq m     Narrative:       National Kidney Disease Foundation guidelines for Chronic Kidney Disease (CKD):     Stage 1 with normal or high GFR (GFR > 90 mL/min/1 73 square meters)    Stage 2 Mild CKD (GFR = 60-89 mL/min/1 73 square meters)    Stage 3A Moderate CKD (GFR = 45-59 mL/min/1 73 square meters)    Stage 3B Moderate CKD (GFR = 30-44 mL/min/1 73 square meters)    Stage 4 Severe CKD (GFR = 15-29 mL/min/1 73 square meters)    Stage 5 End Stage CKD (GFR <15 mL/min/1 73 square meters)  Note: GFR calculation is accurate only with a steady state creatinine    Magnesium [[id number]]  (Normal) Collected:  03/07/20 2044    Lab Status:  Final result Specimen:  Blood from Arm, Right Updated:  03/07/20 2112     Magnesium 1 7 mg/dL     Phosphorus [[id number]]  (Abnormal) Collected:  03/07/20 2044    Lab Status:  Final result Specimen:  Blood from Arm, Right Updated:  03/07/20 2112     Phosphorus 4 3 mg/dL     CBC and differential [[id number]]  (Abnormal) Collected:  03/07/20 2044    Lab Status:  Final result Specimen:  Blood from Arm, Right Updated:  03/07/20 2101     WBC 8 23 Thousand/uL      RBC 4 05 Million/uL      Hemoglobin 12 2 g/dL      Hematocrit 37 4 %      MCV 92 fL      MCH 30 1 pg      MCHC 32 6 g/dL      RDW 14 1 %      MPV 9 5 fL      Platelets 779 Thousands/uL      nRBC 0 /100 WBCs      Neutrophils Relative 69 %      Immat GRANS % 1 %      Lymphocytes Relative 19 %      Monocytes Relative 7 %      Eosinophils Relative 3 %      Basophils Relative 1 %      Neutrophils Absolute 5 74 Thousands/µL      Immature Grans Absolute 0 04 Thousand/uL      Lymphocytes Absolute 1 59 Thousands/µL      Monocytes Absolute 0 58 Thousand/µL      Eosinophils Absolute 0 24 Thousand/µL      Basophils Absolute 0 04 Thousands/µL     POCT urinalysis dipstick [[id number]]     Lab Status:  No result Specimen:  Urine     Fingerstick Glucose (POCT) [[id number]]  (Normal) Collected:  03/07/20 2021    Lab Status:  Final result Updated:  03/07/20 2022     POC Glucose 117 mg/dl                  XR chest 2 views   ED Interpretation by Sabi Valentin MD (03/07 2137)   No acute cardiopulmonary disease            Procedures  ECG 12 Lead Documentation Only  Date/Time: 3/7/2020 9:07 PM  Performed by: Sabi Valentin MD  Authorized by: Sabi Valentin MD     Indications / Diagnosis:  AMS  ECG reviewed by me, the ED Provider: yes    Patient location:  ED  Previous ECG:     Previous ECG:  Compared to current    Similarity:  No change    Comparison to cardiac monitor: Yes    Interpretation:     Interpretation: non-specific    Rate:     ECG rate:  91    ECG rate assessment: normal    Rhythm:     Rhythm: sinus rhythm    Ectopy:     Ectopy: none    QRS:     QRS axis:  Normal  Conduction:     Conduction: normal    ST segments:     ST segments:  Normal  T waves:     T waves: normal            ED Course                               MDM  Number of Diagnoses or Management Options  YVONNE (acute kidney injury) ([facility] ):   Encephalopathy:   Diagnosis management comments: Concern for hypoglycemia versus sepsis versus polypharmacy versus intoxication versus YVONNE  Patient has elevated creatinine when compared to baseline, also has a decreased total protein  This may be concerning for poor consumption with secondary YVONNE I encephalopathy  EKG was unremarkable  Patient to be admitted to [facility] for encephalopathy and YVONNE          Disposition  Final diagnoses:   YVONNE (acute kidney injury) ([facility] )   Encephalopathy     Time reflects when diagnosis was documented in both MDM as applicable and the Disposition within this note     Time User Action Codes Description Comment    3/7/2020  9:53 PM Arlyn, [facility] [N17 9] YVONNE (acute kidney injury) ([facility] [address] )     3/7/2020  9:53 PM Dalia Jung Add [G93 40] Encephalopathy     3/7/2020 10:13 PM [place] Debbie Add [R25 1] Occasional tremors       ED Disposition     ED Disposition Condition Date/Time Comment    Admit Stable Sat Mar 7, 2020  9:53 PM Case was discussed with JEFFERY and the patient's admission status was agreed to be Admission Status: observation status to the service of Dr Rochelle Bray  Follow-up Information    None         Patient's Medications   Discharge Prescriptions    No medications on file     No discharge procedures on file  PDMP Review     None           ED Provider  Attending physically available and evaluated Estephania Marcial I managed the patient along with the ED Attending      Electronically Signed by         Pricilla Harris MD  03/07/20 1527

## 2020-03-08 NOTE — ASSESSMENT & PLAN NOTE
At this point the patient is sedated; would need review of all her pain medications as well as other medications that could affect mental status

## 2020-03-08 NOTE — UTILIZATION REVIEW
Initial Clinical Review    Admission: Date/Time/Statement: Admission Orders (From admission, onward)     Ordered        03/07/20 2154  Place in Observation  Once                   Orders Placed This Encounter   Procedures    Place in Observation     Standing Status:   Standing     Number of Occurrences:   1     Order Specific Question:   Admitting Physician     Answer:   Jennifer Molina [[id number]]     Order Specific Question:   Level of Care     Answer:   Med Surg [[id number]]     ED Arrival Information     Expected Arrival Acuity Means of Arrival Escorted By Service Admission Type    - 3/7/2020 19:[facility]    Arrival Complaint    -        Chief Complaint   Patient presents with   [place] Medical Problem     Pt has been having a "neurlogical pause" starting at 1330  Pt would have a blank gaze and her upper extremities would jerk  Pt was evaluated for same 3 days ago at [place]  Was satting 88% on room air  Placed on 4 liters nasal canula  Assessment/Plan: 20-year-old female with past medical history type 2 diabetes, recently admitted to the ICU for altered mental status, encephalopathy of unknown etiology, was found to have YVONNE hyperkalemia and hyponatremia  She is brought in after family observed her shivering and cold most of the day, requiring multiple blankets, and having an episode around dinner time where she would not respond to voice and appeared dazed  She then could not get up on her own or with assistance  She was also tremulous, worse than her baseline which appears parkinsonian according to family  He reports since leaving the ICU that she has been improving, eating well otherwise, but that this worsened today  She does not report any active symptoms other than a mild frontal headache, that was not sudden in onset or worst of life  She does not report any neuro deficits  No nausea or vomiting, no bowel changes, ROS is otherwise negative  she used a THC vaporizer before her "dazed" episode  Patient is tremulous on exam  Waxing and waning concentration  AAO x3   No neuro deficits       ED Triage Vitals   Temperature Pulse Respirations Blood Pressure SpO2   03/07/20 1958 03/07/20 1958 03/07/20 1958 03/07/20 1958 03/07/20 1958   98 8 °F (37 1 °C) 100 20 143/63 93 %      Temp Source Heart Rate Source Patient Position - Orthostatic VS BP Location FiO2 (%)   03/08/20 0048 -- 03/08/20 0048 03/08/20 0048 --   Oral  Lying Left arm       Pain Score       03/07/20 1958       No Pain        Wt Readings from Last 1 Encounters:   03/08/20 94 6 kg (208 lb 8 9 oz)     Additional Vital Signs:   Pertinent Labs/Diagnostic Test Results:   Results from last 7 days   Lab Units 03/08/20  0506 03/07/20 2044 03/03/20  0628 03/02/20  0508   WBC Thousand/uL 7 84 8 23 5 07 4 04*   HEMOGLOBIN g/dL 11 3* 12 2 13 2 12 1   HEMATOCRIT % 35 0 37 4 40 3 37 4   PLATELETS Thousands/uL 120* 140* 144* 119*   NEUTROS ABS Thousands/µL 5 18 5 74 3 70 3 05         Results from last 7 days   Lab Units 03/08/20  0506 03/07/20 2044 03/04/20  0612 03/03/20  0628 03/02/20  1210   SODIUM mmol/L 126* 133* 137 136 134*   POTASSIUM mmol/L 5 4* 4 9 4 0 4 5 4 9   CHLORIDE mmol/L 92* 102 101 100 100   CO2 mmol/L 26 24 27 27 26   ANION GAP mmol/L 8 7 9 9 8   BUN mg/dL 67* 54* 16 20 25   CREATININE mg/dL 2 14* 1 78* 1 02 0 90 1 13   EGFR ml/min/1 73sq m 24 30 59 68 52   CALCIUM mg/dL 8 8 7 1* 8 8 9 0 8 8   MAGNESIUM mg/dL 2 3 1 7  --   --   --    PHOSPHORUS mg/dL 5 3* 4 3*  --   --   --      Results from last 7 days   Lab Units 03/08/20  0506 03/07/20 2044 03/03/20  1527 03/01/20  1152   AST U/L 26 16  --   --    ALT U/L 26 22  --   --    ALK PHOS U/L 118* 96  --   --    TOTAL PROTEIN g/dL 6 9 5 8*  --   --    ALBUMIN g/dL 3 1* 2 6*  --   --    TOTAL BILIRUBIN mg/dL 0 40 0 33  --   --    AMMONIA umol/L  --   --  <10* 11     Results from last 7 days   Lab Units 03/08/20  0558 03/07/20 2021 03/04/20 0720 03/03/20 2118 03/03/20  1839 03/03/20  1534 03/03/20  1228 03/03/20  0645 03/02/20  2152 03/02/20  1805 03/02/20  1206 03/02/20  0031   POC GLUCOSE mg/dl 105 117 157* 209* 170* 180* 214* 131 178* 155* 191* 104     Results from last 7 days   Lab Units 03/08/20  0506 03/07/20  2044 03/04/20  0612 03/03/20  0628 03/02/20  1210 03/02/20  0508 03/02/20  0025 03/01/20  1807 03/01/20  1153   GLUCOSE RANDOM mg/dL 96 94 150* 148* 215* 104 128 167* 93         Results from last 7 days   Lab Units 03/08/20  0900 03/02/20  1210   HEMOGLOBIN A1C % 5 2 5 2   EAG mg/dl 103 103            Results from last 7 days   Lab Units 03/07/20  2220   PH RUTH  7 288*   PCO2 RUTH mm Hg 49 7   PO2 RUTH mm Hg 143 7*   HCO3 RUTH mmol/L 23 3*   BASE EXC RUTH mmol/L -3 6   O2 CONTENT RUTH ml/dL 16 9   O2 HGB, VENOUS % 96 1*       Results from last 7 days   Lab Units 03/08/20  0506 03/03/20  1527   TSH 3RD GENERATON uIU/mL 1 520 1 495     Results from last 7 days   Lab Units 03/07/20  2111 03/03/20  0628 03/02/20  1210   PROCALCITONIN ng/ml 0 11 0 07 0 10       Results from last 7 days   Lab Units 03/07/20  2112   INFLUENZA A PCR  None Detected   INFLUENZA B PCR  None Detected   RSV PCR  None Detected     EKG 03-07-20  Normal sinus rhythm  Early repolarization  Normal ECG  CXR 03-07-20  pending          ED Treatment:   Medication Administration from 03/07/2020 1954 to 03/07/2020 2320       Date/Time Order Dose Route Action     03/07/2020 2116 LORazepam (ATIVAN) injection 0 5 mg 0 5 mg Intravenous Given        Past Medical History:   Diagnosis Date    Abnormal head CT 7/14/2017    Acute kidney injury ([facility] [address] ) 8/19/2018    Cellulitis 1/10/2017    Closed fracture of multiple ribs of right side 7/14/2017    Degenerative disc disease at L5-S1 level     Diabetes mellitus (HCC)     History of methicillin resistant staphylococcus aureus (MRSA) 08/21/2018    negative nasal culture- isolation and hx of mrsa removed 8/20/2018    Hyperkalemia 4/25/2018    Hypertension     Hypocalcemia 4/25/2018    Hyponatremia 7/14/2017     Present on Admission:   Essential hypertension   Hyperlipidemia   Chronic venous stasis dermatitis of both lower extremities   Ambulatory dysfunction   Occasional tremors   Acute toxic/metabolic encephalopathy      Admitting Diagnosis: Encephalopathy [G93 40]  YVONNE (acute kidney injury) ([facility] [address] ) [N17 9]  Occasional tremors [R25 1]  History of medical problems [Z87 898]  Age/Sex: 62 y o  female  Admission Orders:  Scheduled Medications:    Medications:  heparin (porcine) 5,000 Units Subcutaneous Q8H [address]   insulin glargine 25 Units Subcutaneous HS   insulin lispro 1-6 Units Subcutaneous Q6H [address]     Continuous IV Infusions:     PRN Meds:    HYDROmorphone 0 2 mg Intravenous Q4H PRN   ondansetron 4 mg Intravenous Q6H PRN       IP CONSULT TO NEPHROLOGY  IP CONSULT TO CASE MANAGEMENT  IP CONSULT TO ACUTE PAIN SERVICE  IP CONSULT TO NEUROLOGY   Npo  Blood sugars q 6 hrs  [facility]      Network Utilization Review Department  [email or web address]  ATTENTION: Please call with any questions or concerns to [phone] and carefully listen to the prompts so that you are directed to the right person  All voicemails are confidential   [employer] all requests for admission clinical reviews, approved or denied determinations and any other requests to dedicated fax number below belonging to the campus where the patient is receiving treatment   List of dedicated fax numbers for the Facilities:  FACILITY NAME UR FAX NUMBER   ADMISSION DENIALS (Administrative/Medical Necessity) [phone]   [address]  (Maternity/NICU/Pediatrics) [phone]   Thad Needs [phone]   Kim Kruse [phone]   Luis Armando Mantilla [phone]   Mel Settle [facility] [address] [facility] [address] [place] [address] [facility] [phone]   [address] [place] [address] [phone]

## 2020-03-08 NOTE — PROGRESS NOTES
Progress Note - Juancho Graham 1955, 62 y o  female MRN: [id number]    Unit/Bed#: [facility] 709-01 Encounter: [id number]    Primary Care Provider: Rockie Lombard Tsompanidis   Date and time admitted to hospital: 3/7/2020  7:54 PM      * Acute toxic/metabolic encephalopathy  Assessment & Plan  Patient was recently admitted to [facility] and dc on 3/4 after admission with AMS felt likely multifactorial to UTI, opioids and YVONNE  Presents again with lethargy and tremulousness  · Follow up neurology consultation  During admission few days ago, neuro did not feel that any additional neurodiagnostics such as EEG/MRI were needed  Reglan was decreased to 5 mg TID  Recommended eventual tapering of gabapentin and f/u with outpatient movement disorder team   · Presently afebrile, flu negative, procal negative  · Recently completed treatment for recent UTI yesterday, 3/7  · Currently, none of patient's home pain medications are ordered-need to f/u acute pain recommendations and restart gradually when more awake  · Currently NPO, asked RN to do dysphagia screen to see if she is able to eat  · Suspect polypharmacy plays major role in all of this       Occasional tremors  Assessment & Plan  Recently discharged from [facility] for similar, reportedly has on and off tremors of the hands and legs for approximately 1 year, most recently increasing in frequency  · Neurology saw patient will inpatient [facility], felt to be medication related as well as metabolic/infectious derangement  · Reglan was decreased to 5 mg t i d   · Recommended outpatient referral to the movement disorder clinic  · Reportedly worsening per patient/family upon discharge from [facility], appreciate neurology input here  · ?   Need for neuro imaging    Acute kidney injury [facility])  Assessment & Plan  POA with creatinine of 1 78; upon dc from [facility] few days ago was 1 02  · Worsened today to 2 14  · Avoid nephrotoxins  · Diuretics held currently  · F/u nephrology input     Chronic pain syndrome  Assessment & Plan  Patient uses oxycodone 30 mg q 4 hours p r n  And morphine 100 mg b i d , gabapentin 300 mg daily; prescribed by PCP  · Appears that during her admission to [facility] a few days ago, her oxycodone was adjusted to 50 mg q 4 hours p r n , and morphine was decreased to 100 mg daily  · Presently, given her altered mental status and lethargy, her pain meds are on hold  · Need to monitor for withdrawal  · Appreciate acute pain service consultation, as patient likely has significant component of polypharmacy causing her ongoing tremors and recurrent falls    Hyponatremia  Assessment & Plan  Noted this AM  · Diuretics on hold  · Examines slightly vol overloaded  · Check basic w/u  · Nephrology consult pending     Type 2 diabetes mellitus with hyperglycemia, with long-term current use of insulin [facility])  Assessment & Plan  Lab Results   Component Value Date    HGBA1C 5 2 03/08/2020     Recent Labs     03/07/20 2021 03/08/20  0558   POCGLU 117 105       Blood Sugar Average: Last 72 hrs:  (P) 111   · A1c well controlled at 5 2  · Home meds Lantus 25 units HS with lispro 10 units t i d  With meals  · Hold metformin  · Monitor glucose closely    Hyperlipidemia  Assessment & Plan  · Continue statin when able to take p o  Essential hypertension  Assessment & Plan  · Presently, her home medications including lisinopril, Norvasc and Maxzide all on hold given acute kidney injury and blood pressure is within goal  · Continue to monitor      VTE Pharmacologic Prophylaxis:   Pharmacologic: Heparin  Mechanical VTE Prophylaxis in Place: Yes    Patient Centered Rounds: I have performed bedside rounds with nursing staff today  Discussions with Specialists or Other Care Team Provider: appreciate neurologist input, appreciate nephrology input  Education and Discussions with Family / Patient: patient   at length via phone       Time Spent for Care: 30 minutes  More than 50% of total time spent on counseling and coordination of care as described above  Current Length of Stay: 0 day(s)    Current Patient Status: OBSERVATION  Certification Statement: The patient, admitted on an observation basis, will now require > 2 midnight hospital stay due to electrolyte monitoring     Discharge Plan: none at this time    Code Status: Level 1 - Full Code      Subjective:   Pt slow to respond to questions  Occasionally with upper extremity tremors  Per  this is not close to her baseline  Objective:     Vitals:   Temp (24hrs), Av 5 °F (36 9 °C), Min:98 3 °F (36 8 °C), Max:98 8 °F (37 1 °C)    Temp:  [98 3 °F (36 8 °C)-98 8 °F (37 1 °C)] 98 3 °F (36 8 °C)  HR:  [] 85  Resp:  [16-20] 16  BP: (116-143)/(58-63) 118/62  SpO2:  [93 %-97 %] 95 %  Body mass index is 38 15 kg/m²  Input and Output Summary (last 24 hours): Intake/Output Summary (Last 24 hours) at 3/8/2020 1032  Last data filed at 3/8/2020 0801  Gross per 24 hour   Intake    Output 105 ml   Net -105 ml       Physical Exam:     Physical Exam   Constitutional: She is oriented to person, place, and time  No distress  On nasal cannula, pinpoint pupils    Cardiovascular: Normal rate and regular rhythm  Pulmonary/Chest: No respiratory distress  Abdominal: Soft  Bowel sounds are normal  She exhibits no distension  Musculoskeletal: She exhibits edema  Neurological: She is alert and oriented to person, place, and time  Slow to respond to questions, does not follow commands reliably    Psychiatric:   Flat    Nursing note and vitals reviewed        Additional Data:     Labs:    Results from last 7 days   Lab Units 20  0506   WBC Thousand/uL 7 84   HEMOGLOBIN g/dL 11 3*   HEMATOCRIT % 35 0   PLATELETS Thousands/uL 120*   NEUTROS PCT % 65   LYMPHS PCT % 22   MONOS PCT % 9   EOS PCT % 2     Results from last 7 days   Lab Units 20  0506   POTASSIUM mmol/L 5 4*   CHLORIDE mmol/L 92* CO2 mmol/L 26   BUN mg/dL 67*   CREATININE mg/dL 2 14*   CALCIUM mg/dL 8 8   ALK PHOS U/L 118*   ALT U/L 26   AST U/L 26           * I Have Reviewed All Lab Data Listed Above  * Additional Pertinent Lab Tests Reviewed: All Labs Within Last 24 Hours Reviewed    Imaging:    Imaging Reports Reviewed Today Include: all  Imaging Personally Reviewed by Myself Includes:  none    Recent Cultures (last 7 days):           Last 24 Hours Medication List:     Current Facility-Administered Medications:  heparin (porcine) 5,000 Units Subcutaneous Q8H Nico Metcalf MD   HYDROmorphone 0 2 mg Intravenous Q4H PRN Misty Beltrán MD   insulin glargine 25 Units Subcutaneous HS Misty Beltrán MD   insulin lispro 1-6 Units Subcutaneous Q6H [address] Misty Beltrán MD   ondansetron 4 mg Intravenous Q6H PRN Misty Beltrán MD     Facility-Administered Medications Ordered in Other Encounters:  ferrous sulfate 325 mg Oral Daily With Giovanni Gregory MD        Today, Patient Was Seen By: Carlyn Gallo PA-C    ** Please Note: Dictation voice to text software may have been used in the creation of this document   **

## 2020-03-08 NOTE — H&P
H&P- Danie Hayward 1955, 62 y o  female MRN: [id number]    Unit/Bed#: ED 13 Encounter: [id number]    Primary Care Provider: Melita Watts   Date and time admitted to hospital: 3/7/2020  7:54 PM        * Acute toxic/metabolic encephalopathy  Assessment & Plan  Patient was recently admitted for Klebsiella UTI and was presumed to have toxic metabolic encephalopathy then; current presentation is similar to that however this has been going on and off even prior to the recent admission for said UTI in [facility]  Suspect that encephalopathy may be secondary to overmedication/polypharmacy owing to patient's multiple use of sedating medications including narcotics and gabapentin  As patient is not alert enough; would place patient NPO and hold most of her medications  Any necessary medications placed intravenous  From history and notes from recent admission in [facility]; patient is due to be seen by Neurology for opinion and possible EEG  Would most likely need case management  Acute pain specialist consult regarding use of narcotic medications and appropriate indication  Occasional tremors  Assessment & Plan  Patient with occasional tremors; tic? Awaiting Neurology opinion  Ambulatory dysfunction  Assessment & Plan  Case management  Chronic venous stasis dermatitis of both lower extremities  Assessment & Plan  Patient is on triamterene  However the patient's BUN creatinine is significantly increased from previous; currently diuretic is put on hold  Hyperlipidemia  Assessment & Plan  On simvastatin but currently on hold due to patient's NPO status  At risk for polypharmacy  Assessment & Plan  At this point the patient is sedated; would need review of all her pain medications as well as other medications that could affect mental status       Type 2 diabetes mellitus with hyperglycemia, with long-term current use of insulin [facility])  Assessment & Plan  Lab Results Component Value Date    HGBA1C 5 2 03/02/2020     Currently the patient is on metformin along with insulin Lantus at 25 units at night  She is also on Humalog 10 units t i d  with meals  Because she is NPO, will continue with Lantus for now  Insulin sliding scale every 6 hours Accu-Cheks  Recent Labs     03/07/20 2021   POCGLU 117       Blood Sugar Average: Last 72 hrs:  (P) 117    Essential hypertension  Assessment & Plan  Amlodipine and Zestril put on hold and will continue to monitor  VTE Prophylaxis: Heparin  / sequential compression device   Code Status: Prior full Code as discussed with   POLST: There is no POLST form on file for this patient (pre-hospital)    Anticipated Length of Stay:  Patient will be admitted on an Observation basis with an anticipated length of stay of  less than 2 midnights  Justification for Hospital Stay: Please see detailed plans noted above  Chief Complaint:     Tremors and lethargy  History of Present Illness: Ely Mahan is a 62 y o  female who has a past medical history significant for chronic pain syndrome due to back injury; 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, she is currently on Neurontin along with morphine sulfate 100 mg twice daily (Julita) and occasional use of oxycodone 30 mg every 4 to 6 hours as needed  The patient has had multiple episodes of tremors which according to the patient's  is involuntary but seem to be unclear whether this is coupled with encephalopathy or whether there are times that she is mentally clear exhibiting these tremors  Of note is that in February 29 she was given a GCS of 15 during an emergency room in [facility] visit  She was noted to have these occasional tremors  She was also noted to be hypoxic  Recently earlier this month, the patient was admitted in [facility] for similar episode with along with mental status change    However she was febrile with leukocytosis and the patient was assessed to have acute metabolic encephalopathy secondary to urinary tract infection due to Klebsiella  According to the patient's , she was back to baseline however she was advised that she needed to see Neurology as out patient  Likewise she needed to be seen by pain Specialty in order to  assess the use of pain medications  According to patient's  as well as that she is eating and drinking Gatorade in order to avoid dehydration  However here in the emergency room she was noted to be lethargic and although breathing would sometimes get into on and off episodes of increased alertness (expressed by more regard to examiner although would drift back to sleep) especially when patient is given vigorous stimulation  Noted is that her laboratory studies are again significant for acute kidney insufficiency  She has always been on triamterene hydrochlorothiazide for hypertension and chronic venous insufficiency and edema  At home, she is reported to be vaping as a form of nicotine use  However, according of also to her , her daughter would sometimes give her a THC vape to use as well  Noted is that the patient's current VBG shows 7 29, CO2 of 49 7 with bicarbonate of 23 3; and essentially this shows proper limits of compensation when the values are plugged in the [place] hassle back equation  Review of Systems:  Current review of systems is limited from patient due to mental status    Constitutional:  Denies fever or chills   Eyes:  Denies change in visual acuity   HENT:  Denies nasal congestion or sore throat   Respiratory:  Denies cough or shortness of breath   Cardiovascular:  Denies chest pain or edema   GI:  Denies abdominal pain, nausea, vomiting, bloody stools or diarrhea   :  Denies dysuria   Musculoskeletal:  Denies back pain or joint pain   Integument:  Denies rash   Neurologic:  Denies headache, focal weakness or sensory changes   Endocrine:  Denies polyuria or polydipsia   Lymphatic:  Denies swollen glands   Psychiatric:  Denies depression or anxiety     Past Medical and Surgical History:   Past Medical History:   Diagnosis Date    Abnormal head CT 7/14/2017    Acute kidney injury ([facility] [address] ) 8/19/2018    Cellulitis 1/10/2017    Closed fracture of multiple ribs of right side 7/14/2017    Degenerative disc disease at L5-S1 level     Diabetes mellitus ([facility] [address] )     History of methicillin resistant staphylococcus aureus (MRSA) 08/21/2018    negative nasal culture- isolation and hx of mrsa removed 8/20/2018    Hyperkalemia 4/25/2018    Hypertension     Hypocalcemia 4/25/2018    Hyponatremia 7/14/2017     Past Surgical History:   Procedure Laterality Date    CHOLECYSTECTOMY      JOINT REPLACEMENT      OOPHORECTOMY         Meds/Allergies:    (Not in a hospital admission)  amLODIPine (NORVASC) 10 mg tablet Take 1 tablet (10 mg total) by mouth daily Juliana Pacheco MD Not Ordered   docusate sodium (COLACE) 100 mg capsule Take 1 capsule by mouth 2 (two) times a day Juliana Pacheco MD Not Ordered    Patient taking differently: Take 100 mg by mouth 2 (two) times a day as needed for constipation      DULoxetine (CYMBALTA) 60 mg delayed release capsule Take 60 mg by mouth daily Juliana Pacheco MD Not Ordered   ergocalciferol (VITAMIN D2) 50,000 units Take 50,000 Units by mouth once a week Juliana Pacheco MD Not Ordered   gabapentin (NEURONTIN) 300 mg capsule Take 1 capsule (300 mg total) by mouth daily at bedtime Juliana Pacheco MD Not Ordered    Patient taking differently: Take 300 mg by mouth 2 (two) times a day      HUMALOG KWIKPEN 100 units/mL injection pen Inject 10 Units under the skin 3 (three) times a day with meals Juliana Pacheco MD Not Ordered   insulin glargine (LANTUS) 100 units/mL subcutaneous injection Inject 25 Units under the skin daily at bedtime Juliana Pacheco MD Reordered   Ordered as: insulin glargine (LANTUS) subcutaneous injection 25 Units 0 25 mL - 25 Units, Subcutaneous, Daily at bedtime, First dose on Sat 3/7/20 at [address] [facility]  Do not dilute/mix insulin glargine with any other insulin formulation/solution  **DISPOSE IN 8 GALLON BLACK CONTAINER** Do not hold medication without a physician order  lisinopril (ZESTRIL) 5 mg tablet Take 2 tablets (10 mg total) by mouth daily Sharath Smith MD Not Ordered   metFORMIN (GLUCOPHAGE) 1000 MG tablet Take 0 5 tablets (500 mg total) by mouth 2 (two) times a day with meals Sharath Smith MD Not Ordered   metoclopramide (REGLAN) 10 mg tablet Take 0 5 tablets (5 mg total) by mouth 3 (three) times a day before meals Sharath Smith MD Not Ordered   Mirabegron ER 50 MG TB24 Take by mouth Sharath Smith MD Not Ordered   morphine (Julita) 100 MG 24 hr capsule Take 1 capsule (100 mg total) by mouth daily for 10 daysMax Daily Amount: 100 mg Sharath Smith MD Not Ordered   oxyCODONE (ROXICODONE) 30 MG immediate release tablet Take 0 5 tablets (15 mg total) by mouth every 4 (four) hours as needed for moderate pain for up to 10 daysMax Daily Amount: 90 mg Sharath Smith MD Not Ordered    Patient taking differently: Take 30 mg by mouth every 4 (four) hours as needed for moderate pain      triamterene-hydrochlorothiazide (MAXZIDE) 75-50 MG per tablet Take 1 tablet by mouth daily Sharath Smith MD Not Ordered       Allergies: No Known Allergies  History:  Marital Status: /Civil Union   Occupation:  She is disabled and a former nursing assistant  Disabled because of chronic back pain and injury  Patient Pre-hospital Living Situation:  Lives at home with   Sometimes a grandson would come and visit but does not live with them    Patient Pre-hospital Level of Mobility:  Ambulatory  Patient Pre-hospital Diet Restrictions:  Regular diet  Substance Use History:   Social History     Substance and Sexual Activity   Alcohol Use No     Social History Tobacco Use   Smoking Status Former Smoker    Types: E-Cigarettes   Smokeless Tobacco Never Used     Social History     Substance and Sexual Activity   Drug Use Yes    Types: Marijuana       Family History:  Family History   Problem Relation Age of Onset    Rheum arthritis Mother     Alzheimer's disease Mother     Lupus Mother        Physical Exam:     Vitals:   Blood Pressure: 123/58 (03/07/20 2215)  Pulse: 78 (03/07/20 2215)  Temperature: 98 8 °F (37 1 °C) (03/07/20 1958)  Respirations: 16 (03/07/20 2215)  Weight - Scale: 86 5 kg (190 lb 11 2 oz) (03/07/20 1958)  SpO2: 96 % (03/07/20 2215)    Constitutional:  Well developed, well nourished, no acute distress, non-toxic appearance ; would sometimes regard examiner when given vigorous stimulation  However would drift back to sleep  Otherwise does not look toxic  Eyes:  PERRL, conjunctiva normal   HENT:  Atraumatic, external ears normal, nose normal, oropharynx moist, no pharyngeal exudates  Neck- normal range of motion, no tenderness, supple   Respiratory:  No respiratory distress, normal breath sounds, no rales, no wheezing   Cardiovascular:  Normal rate, normal rhythm, no murmurs, no gallops, no rubs   GI:  Soft, nondistended, globular, normal bowel sounds, nontender, no organomegaly, no mass, no rebound, no guarding   :  No costovertebral angle tenderness   Musculoskeletal:  Bilateral leg swelling with note of stasis dermatitis, no deformities  Back- no tenderness  Integument:  Well hydrated, no rash   Lymphatic:  No lymphadenopathy noted   Neurologic:  Lethargic, would sometimes regard examiner especially on deep stimulation, without preferential movement  When patient's upper extremities put forward and let go would not land on face but on the sides avoiding any facial areas    Psychiatric:  Speech and behavior nil      Lab Results: I have personally reviewed pertinent reports        Results from last 7 days   Lab Units 03/07/20 2044   WBC Thousand/uL 8 23   HEMOGLOBIN g/dL 12 2   HEMATOCRIT % 37 4   PLATELETS Thousands/uL 140*   NEUTROS PCT % 69   LYMPHS PCT % 19   MONOS PCT % 7   EOS PCT % 3     Results from last 7 days   Lab Units 03/07/20  2044   POTASSIUM mmol/L 4 9   CHLORIDE mmol/L 102   CO2 mmol/L 24   BUN mg/dL 54*   CREATININE mg/dL 1 78*   CALCIUM mg/dL 7 1*   ALK PHOS U/L 96   ALT U/L 22   AST U/L 16     Results from last 7 days   Lab Units 02/29/20  2255   INR  1 05           Imaging: I have personally reviewed pertinent reports  Ct Chest Abdomen Pelvis Wo Contrast    Result Date: 3/1/2020  Narrative: CT CHEST, ABDOMEN AND PELVIS WITHOUT IV CONTRAST INDICATION:   ill, abdominal tenderness, hypoxic  COMPARISON:  None  TECHNIQUE: CT examination of the chest, abdomen and pelvis was performed without intravenous contrast   Axial, sagittal, and coronal 2D reformatted images were created from the source data and submitted for interpretation  Radiation dose length product (DLP) for this visit:  0319 [id number] mGy-cm   This examination, like all CT scans performed in the [facility], was performed utilizing techniques to minimize radiation dose exposure, including the use of iterative reconstruction and automated exposure control  Enteric contrast was administered  FINDINGS: CHEST LUNGS:  Lungs are clear  There is no tracheal or endobronchial lesion  PLEURA:  Unremarkable  HEART/GREAT VESSELS:  Unremarkable for patient's age  MEDIASTINUM AND LAKIA:  Unremarkable  CHEST WALL AND LOWER NECK:   Unremarkable  ABDOMEN LIVER/BILIARY TREE:  The liver demonstrates cirrhotic morphology  Within the limitations of this examination there is no evidence of suspicious hepatic mass  No biliary dilatation  Portal vein is patent  GALLBLADDER:  No calcified gallstones  No pericholecystic inflammatory change  SPLEEN:  Unremarkable  PANCREAS:  Unremarkable  ADRENAL GLANDS:  Unremarkable  KIDNEYS/URETERS:  Unremarkable  No hydronephrosis   STOMACH AND BOWEL:  Prominent stool retention APPENDIX:  No findings to suggest appendicitis  ABDOMINOPELVIC CAVITY:  No ascites or free intraperitoneal air  No lymphadenopathy  VESSELS:  Unremarkable for patient's age  PELVIS REPRODUCTIVE ORGANS:  Unremarkable for patient's age  URINARY BLADDER:  Unremarkable  ABDOMINAL WALL/INGUINAL REGIONS:  Unremarkable  OSSEOUS STRUCTURES:  No acute fracture or destructive osseous lesion  Impression: No evidence of acute intra-abdominal or pelvic pathology  Cirrhotic liver morphology  Constipation  Workstation performed: [id number]     Xr Chest Portable    Result Date: 3/2/2020  Narrative: CHEST INDICATION:   persistent hypoxia, large volume resuscitation  COMPARISON:  8/18/2018  EXAM PERFORMED/VIEWS:  XR CHEST PORTABLE FINDINGS: Cardiomediastinal silhouette appears unremarkable  The lungs are clear  No pneumothorax or pleural effusion  Osseous structures appear within normal limits for patient age  Impression: No acute cardiopulmonary disease  Workstation performed: [id number]     Ct Head Without Contrast    Result Date: 3/1/2020  Narrative: CT BRAIN - WITHOUT CONTRAST INDICATION:   fall  COMPARISON:  None  TECHNIQUE:  CT examination of the brain was performed  In addition to axial images, coronal 2D reformatted images were created and submitted for interpretation  Radiation dose length product (DLP) for this visit:  01 41 [id number] mGy-cm   This examination, like all CT scans performed in the [facility], was performed utilizing techniques to minimize radiation dose exposure, including the use of iterative reconstruction and automated exposure control  IMAGE QUALITY:  Diagnostic  FINDINGS: PARENCHYMA:  No intracranial mass, mass effect or midline shift  No CT signs of acute infarction  No acute parenchymal hemorrhage  VENTRICLES AND EXTRA-AXIAL SPACES:  Normal for the patient's age  VISUALIZED ORBITS AND PARANASAL SINUSES:  Unremarkable   CALVARIUM AND EXTRACRANIAL SOFT TISSUES:  Normal      Impression: No acute intracranial abnormality  Workstation performed: [id number]     Ct Spine Cervical Without Contrast    Result Date: 3/1/2020  Narrative: CT CERVICAL SPINE - WITHOUT CONTRAST INDICATION:   fall  COMPARISON:  None  TECHNIQUE:  CT examination of the cervical spine was performed without intravenous contrast   Contiguous axial images were obtained  Sagittal and coronal reconstructions were performed  Radiation dose length product (DLP) for this visit:  429 mGy-cm   This examination, like all CT scans performed in the [facility], was performed utilizing techniques to minimize radiation dose exposure, including the use of iterative reconstruction and automated exposure control  IMAGE QUALITY:  Diagnostic  FINDINGS: ALIGNMENT:  Normal alignment of the cervical spine  No subluxation  VERTEBRAL BODIES:  No fracture  DEGENERATIVE CHANGES:  No significant cervical degenerative changes are noted  PREVERTEBRAL AND PARASPINAL SOFT TISSUES:  Unremarkable  THORACIC INLET:  Normal      Impression: No cervical spine fracture or traumatic malalignment  Workstation performed: [id number]     Vas Lower Limb Venous Duplex Study, Complete Bilateral    Result Date: 3/1/2020  Narrative:  [facility] REPORT CLINICAL: Indications: Bilateral Edema, unspecified [R60 9]  Bilateral Limb Pain [M79 609]  Bilateral lower leg edema and leg pain Operative History: No cardiovascular surgeries  FINDINGS:  Segment  Right            Left              Impression       Impression       CFV      Normal (Patent)  Normal (Patent)     CONCLUSION:  Impression: RIGHT LOWER LIMB: Color flow is noted in the common femoral vein, femoral vein, deep femoral vein and popliteal vein  No gross evidence of superficial thrombophlebitis noted in the portions of the superficial vein visualized  Popliteal, posterior tibial and anterior tibial arterial Doppler waveforms are triphasic    LEFT LOWER LIMB: Color flow is noted in the common femoral vein, femoral vein, and popliteal vein  No gross evidence of superficial thrombophlebitis noted in the portions of the superficial vein visualized  Popliteal, posterior tibial and anterior tibial arterial Doppler waveforms are triphasic  Tech note: technically difficult/limited study due to patient did not want compression or augmentation maneuvers performed due to severe pain  Technical findings given to Jered MONTANEZ at the time of study  SIGNATURE: Electronically Signed by: Elizabeth Cortes MD on 2020-03-01 10:47:04 PM        ** Please Note: Dragon 360 Dictation voice to text software was used in the creation of this document   **

## 2020-03-08 NOTE — ASSESSMENT & PLAN NOTE
Lab Results   Component Value Date    HGBA1C 5 2 03/08/2020     Recent Labs     03/07/20 2021 03/08/20  0558   POCGLU 117 105       Blood Sugar Average: Last 72 hrs:  (P) 111   · A1c well controlled at 5 2  · Home meds Lantus 25 units HS with lispro 10 units t i d   With meals  · Hold metformin  · Monitor glucose closely

## 2020-03-08 NOTE — ASSESSMENT & PLAN NOTE
Patient uses oxycodone 30 mg q 4 hours p r n   And morphine 100 mg b i d , gabapentin 300 mg daily; prescribed by PCP  · Appears that during her admission to [facility] a few days ago, her oxycodone was adjusted to 50 mg q 4 hours p r n , and morphine was decreased to 100 mg daily  · Presently, given her altered mental status and lethargy, her pain meds are on hold  · Need to monitor for withdrawal  · Appreciate acute pain service consultation, as patient likely has significant component of polypharmacy causing her ongoing tremors and recurrent falls

## 2020-03-08 NOTE — ASSESSMENT & PLAN NOTE
Recently discharged from [facility] for [facility], reportedly has on and off tremors of the hands and legs for approximately 1 year, most recently increasing in frequency  · Neurology saw patient will inpatient [facility], felt to be medication related as well as metabolic/infectious derangement  · Reglan was decreased to 5 mg t i d   · Recommended outpatient referral to the movement disorder clinic  · Reportedly worsening per patient/family upon discharge from [facility], appreciate neurology input here  · ?   Need for neuro imaging

## 2020-03-08 NOTE — ASSESSMENT & PLAN NOTE
Lab Results   Component Value Date    HGBA1C 5 2 03/02/2020     Currently the patient is on metformin along with insulin Lantus at 25 units at night  She is also on Humalog 10 units t i d  with meals  Because she is NPO, will continue with Lantus for now  Insulin sliding scale every 6 hours Accu-Cheks    Recent Labs     03/07/20 2021   POCGLU 117       Blood Sugar Average: Last 72 hrs:  (P) 117

## 2020-03-08 NOTE — ASSESSMENT & PLAN NOTE
Patient was recently admitted for Klebsiella UTI and was presumed to have toxic metabolic encephalopathy then; current presentation is similar to that however this has been going on and off even prior to the recent admission for said UTI in [facility]  Suspect that encephalopathy may be secondary to overmedication/polypharmacy owing to patient's multiple use of sedating medications including narcotics and gabapentin  As patient is not alert enough; would place patient NPO and hold most of her medications  Any necessary medications placed intravenous  From history and notes from recent admission in [facility]; patient is due to be seen by Neurology for opinion and possible EEG  Would most likely need case management  Acute pain specialist consult regarding use of narcotic medications and appropriate indication

## 2020-03-08 NOTE — ASSESSMENT & PLAN NOTE
Patient is on triamterene  However the patient's BUN creatinine is significantly increased from previous; currently diuretic is put on hold

## 2020-03-08 NOTE — PLAN OF CARE
Problem: Potential for Falls  Goal: Patient will remain free of falls  Description  INTERVENTIONS:  - Assess patient frequently for physical needs  -  Identify cognitive and physical deficits and behaviors that affect risk of falls    -  Encampment fall precautions as indicated by assessment   - Educate patient/family on patient safety including physical limitations  - Instruct patient to call for assistance with activity based on assessment  - Modify environment to reduce risk of injury  - Consider OT/PT consult to assist with strengthening/mobility  Outcome: Progressing     Problem: Prexisting or High Potential for Compromised Skin Integrity  Goal: Skin integrity is maintained or improved  Description  INTERVENTIONS:  - Identify patients at risk for skin breakdown  - Assess and monitor skin integrity  - Assess and monitor nutrition and hydration status  - Monitor labs   - Assess for incontinence   - Turn and reposition patient  - Assist with mobility/ambulation  - Relieve pressure over bony prominences  - Avoid friction and shearing  - Provide appropriate hygiene as needed including keeping skin clean and dry  - Evaluate need for skin moisturizer/barrier cream  - Collaborate with interdisciplinary team   - Patient/family teaching  - Consider wound care consult   Outcome: Progressing     Problem: NEUROSENSORY - ADULT  Goal: Remains free of injury related to seizures activity  Description  INTERVENTIONS  - Maintain airway, patient safety  and administer oxygen as ordered  - Monitor patient for seizure activity, document and report duration and description of seizure to physician/advanced practitioner  - If seizure occurs,  ensure patient safety during seizure  - Reorient patient post seizure  - Seizure pads on all 4 side rails  - Instruct patient/family to notify RN of any seizure activity including if an aura is experienced  - Instruct patient/family to call for assistance with activity based on nursing assessment  - Administer anti-seizure medications if ordered    Outcome: Progressing  Goal: Achieves maximal functionality and self care  Description  INTERVENTIONS  - Monitor swallowing and airway patency with patient fatigue and changes in neurological status  - Encourage and assist patient to increase activity and self care     - Encourage visually impaired, hearing impaired and aphasic patients to use assistive/communication devices  Outcome: Progressing     Problem: RESPIRATORY - ADULT  Goal: Achieves optimal ventilation and oxygenation  Description  INTERVENTIONS:  - Assess for changes in respiratory status  - Assess for changes in mentation and behavior  - Position to facilitate oxygenation and minimize respiratory effort  - Oxygen administered by appropriate delivery if ordered  - Initiate smoking cessation education as indicated  - Encourage broncho-pulmonary hygiene including cough, deep breathe, Incentive Spirometry  - Assess the need for suctioning and aspirate as needed  - Assess and instruct to report SOB or any respiratory difficulty  - Respiratory Therapy support as indicated  Outcome: Progressing     Problem: GASTROINTESTINAL - ADULT  Goal: Maintains adequate nutritional intake  Description  INTERVENTIONS:  - Monitor percentage of each meal consumed  - Identify factors contributing to decreased intake, treat as appropriate  - Assist with meals as needed  - Monitor I&O, weight, and lab values if indicated  - Obtain nutrition services referral as needed  Outcome: Progressing     Problem: METABOLIC, FLUID AND ELECTROLYTES - ADULT  Goal: Glucose maintained within target range  Description  INTERVENTIONS:  - Monitor Blood Glucose as ordered  - Assess for signs and symptoms of hyperglycemia and hypoglycemia  - Administer ordered medications to maintain glucose within target range  - Assess nutritional intake and initiate nutrition service referral as needed  Outcome: Progressing     Problem: SKIN/TISSUE INTEGRITY - ADULT  Goal: Skin integrity remains intact  Description  INTERVENTIONS  - Identify patients at risk for skin breakdown  - Assess and monitor skin integrity  - Assess and monitor nutrition and hydration status  - Monitor labs (i e  albumin)  - Assess for incontinence   - Turn and reposition patient  - Assist with mobility/ambulation  - Relieve pressure over bony prominences  - Avoid friction and shearing  - Provide appropriate hygiene as needed including keeping skin clean and dry  - Evaluate need for skin moisturizer/barrier cream  - Collaborate with interdisciplinary team (i e  Nutrition, Rehabilitation, etc )   - Patient/family teaching  Outcome: Progressing     Problem: HEMATOLOGIC - ADULT  Goal: Maintains hematologic stability  Description  INTERVENTIONS  - Assess for signs and symptoms of bleeding or hemorrhage  - Monitor labs  - Administer supportive blood products/factors as ordered and appropriate  Outcome: Progressing     Problem: MUSCULOSKELETAL - ADULT  Goal: Maintain or return mobility to safest level of function  Description  INTERVENTIONS:  - Assess patient's ability to carry out ADLs; assess patient's baseline for ADL function and identify physical deficits which impact ability to perform ADLs (bathing, care of mouth/teeth, toileting, grooming, dressing, etc )  - Assess/evaluate cause of self-care deficits   - Assess range of motion  - Assess patient's mobility  - Assess patient's need for assistive devices and provide as appropriate  - Encourage maximum independence but intervene and supervise when necessary  - Involve family in performance of ADLs  - Assess for home care needs following discharge   - Consider OT consult to assist with ADL evaluation and planning for discharge  - Provide patient education as appropriate  Outcome: Progressing  Goal: Maintain proper alignment of affected body part  Description  INTERVENTIONS:  - Support, maintain and protect limb and body alignment  - Provide patient/ family with appropriate education  Outcome: Progressing

## 2020-03-08 NOTE — ASSESSMENT & PLAN NOTE
Patient was recently admitted to [facility] and dc on 3/4 after admission with AMS felt likely multifactorial to UTI, opioids and YVONNE  Presents again with lethargy and tremulousness  · Follow up neurology consultation  During admission few days ago, neuro did not feel that any additional neurodiagnostics such as EEG/MRI were needed  Reglan was decreased to 5 mg TID   Recommended eventual tapering of gabapentin and f/u with outpatient movement disorder team   · Presently afebrile, flu negative, procal negative  · Recently completed treatment for recent UTI yesterday, 3/7  · Currently, none of patient's home pain medications are ordered-need to f/u acute pain recommendations and restart gradually when more awake  · Currently NPO, asked RN to do dysphagia screen to see if she is able to eat  · Suspect polypharmacy plays major role in all of this

## 2020-03-08 NOTE — ED ATTENDING ATTESTATION
3/7/2020  Suly Hayes DO, saw and evaluated the patient  I have discussed the patient with the resident/non-physician practitioner and agree with the resident's/non-physician practitioner's findings, Plan of Care, and MDM as documented in the resident's/non-physician practitioner's note, except where noted  All available labs and Radiology studies were reviewed  I was present for key portions of any procedure(s) performed by the resident/non-physician practitioner and I was immediately available to provide assistance  At this point I agree with the current assessment done in the Emergency Department  I have conducted an independent evaluation of this patient a history and physical is as follows:    63 yo female presents for evaluation of appearing to be dazed  Eyes were open, family was trying to talk to her, she was not responding at the time  After some period of time she started responding again, and they asked if she could walk with her walker but she could not, so they brought her to the ED  Pt only c/o mild HA at this time, generalized in nature, mild  Constant since onset, non radiating  Admitted to ICU at [facility] last week for acute toxic metabolic encephalopathy  Imp: episode of staring off  Generalized weakness  Myoclonic jerking movements  Unclear etiology  Plan: CXR, labs, flu swab, UA     ADDENDUM (2106h 3/7/2020): after speaking with , he informs me that symptoms seemed to start after pt had a THC vape      ED Course         Critical Care Time  Procedures

## 2020-03-08 NOTE — ASSESSMENT & PLAN NOTE
POA with creatinine of 1 78; upon dc from [address] [place] [address] few days ago was 1 02  · Worsened today to 2 14  · Avoid nephrotoxins  · Diuretics held currently  · F/u nephrology input

## 2020-03-08 NOTE — ASSESSMENT & PLAN NOTE
Noted this AM  · Diuretics on hold  · Examines slightly vol overloaded  · Check basic w/u  · Nephrology consult pending

## 2020-03-08 NOTE — ASSESSMENT & PLAN NOTE
· Presently, her home medications including lisinopril, Norvasc and Maxzide all on hold given acute kidney injury and blood pressure is within goal  · Continue to monitor

## 2020-03-09 ENCOUNTER — APPOINTMENT (INPATIENT)
Dept: RADIOLOGY | Facility: HOSPITAL | Age: 65
DRG: 682 | End: 2020-03-09
Payer: COMMERCIAL

## 2020-03-09 LAB
ALBUMIN SERPL BCP-MCNC: 3.3 G/DL (ref 3.5–5)
ALP SERPL-CCNC: 119 U/L (ref 46–116)
ALT SERPL W P-5'-P-CCNC: 25 U/L (ref 12–78)
ANION GAP SERPL CALCULATED.3IONS-SCNC: 4 MMOL/L (ref 4–13)
AST SERPL W P-5'-P-CCNC: 22 U/L (ref 5–45)
BILIRUB SERPL-MCNC: 0.4 MG/DL (ref 0.2–1)
BUN SERPL-MCNC: 51 MG/DL (ref 5–25)
CALCIUM SERPL-MCNC: 9 MG/DL (ref 8.3–10.1)
CHLORIDE SERPL-SCNC: 102 MMOL/L (ref 100–108)
CO2 SERPL-SCNC: 26 MMOL/L (ref 21–32)
CORTIS AM PEAK SERPL-MCNC: 20.5 UG/DL (ref 4.2–22.4)
CREAT SERPL-MCNC: 1.16 MG/DL (ref 0.6–1.3)
GFR SERPL CREATININE-BSD FRML MDRD: 50 ML/MIN/1.73SQ M
GLUCOSE SERPL-MCNC: 113 MG/DL (ref 65–140)
GLUCOSE SERPL-MCNC: 141 MG/DL (ref 65–140)
GLUCOSE SERPL-MCNC: 143 MG/DL (ref 65–140)
GLUCOSE SERPL-MCNC: 163 MG/DL (ref 65–140)
POTASSIUM SERPL-SCNC: 5.2 MMOL/L (ref 3.5–5.3)
PROCALCITONIN SERPL-MCNC: 0.07 NG/ML
PROT SERPL-MCNC: 7.3 G/DL (ref 6.4–8.2)
SODIUM SERPL-SCNC: 132 MMOL/L (ref 136–145)

## 2020-03-09 PROCEDURE — 97167 OT EVAL HIGH COMPLEX 60 MIN: CPT

## 2020-03-09 PROCEDURE — 82533 TOTAL CORTISOL: CPT | Performed by: INTERNAL MEDICINE

## 2020-03-09 PROCEDURE — 99232 SBSQ HOSP IP/OBS MODERATE 35: CPT | Performed by: INTERNAL MEDICINE

## 2020-03-09 PROCEDURE — 80053 COMPREHEN METABOLIC PANEL: CPT | Performed by: INTERNAL MEDICINE

## 2020-03-09 PROCEDURE — 99221 1ST HOSP IP/OBS SF/LOW 40: CPT | Performed by: ANESTHESIOLOGY

## 2020-03-09 PROCEDURE — 92610 EVALUATE SWALLOWING FUNCTION: CPT

## 2020-03-09 PROCEDURE — 70551 MRI BRAIN STEM W/O DYE: CPT

## 2020-03-09 PROCEDURE — 82948 REAGENT STRIP/BLOOD GLUCOSE: CPT

## 2020-03-09 PROCEDURE — 84145 PROCALCITONIN (PCT): CPT | Performed by: INTERNAL MEDICINE

## 2020-03-09 PROCEDURE — 97163 PT EVAL HIGH COMPLEX 45 MIN: CPT

## 2020-03-09 RX ORDER — OXYCODONE HYDROCHLORIDE 10 MG/1
10 TABLET ORAL EVERY 8 HOURS PRN
Status: DISCONTINUED | OUTPATIENT
Start: 2020-03-09 | End: 2020-03-11 | Stop reason: HOSPADM

## 2020-03-09 RX ORDER — OXYCODONE HYDROCHLORIDE 5 MG/1
5 TABLET ORAL EVERY 8 HOURS PRN
Status: DISCONTINUED | OUTPATIENT
Start: 2020-03-09 | End: 2020-03-11 | Stop reason: HOSPADM

## 2020-03-09 RX ORDER — GABAPENTIN 100 MG/1
100 CAPSULE ORAL 2 TIMES DAILY
Status: DISCONTINUED | OUTPATIENT
Start: 2020-03-09 | End: 2020-03-11 | Stop reason: HOSPADM

## 2020-03-09 RX ADMIN — INSULIN LISPRO 1 UNITS: 100 INJECTION, SOLUTION INTRAVENOUS; SUBCUTANEOUS at 18:50

## 2020-03-09 RX ADMIN — INSULIN GLARGINE 25 UNITS: 100 INJECTION, SOLUTION SUBCUTANEOUS at 22:36

## 2020-03-09 RX ADMIN — GABAPENTIN 100 MG: 100 CAPSULE ORAL at 18:49

## 2020-03-09 RX ADMIN — HEPARIN SODIUM 5000 UNITS: 5000 INJECTION INTRAVENOUS; SUBCUTANEOUS at 13:52

## 2020-03-09 RX ADMIN — HEPARIN SODIUM 5000 UNITS: 5000 INJECTION INTRAVENOUS; SUBCUTANEOUS at 22:36

## 2020-03-09 RX ADMIN — HYDROMORPHONE HYDROCHLORIDE 0.2 MG: 1 INJECTION, SOLUTION INTRAMUSCULAR; INTRAVENOUS; SUBCUTANEOUS at 08:39

## 2020-03-09 RX ADMIN — HEPARIN SODIUM 5000 UNITS: 5000 INJECTION INTRAVENOUS; SUBCUTANEOUS at 06:04

## 2020-03-09 NOTE — PLAN OF CARE
Problem: PHYSICAL THERAPY ADULT  Goal: Performs mobility at highest level of function for planned discharge setting  See evaluation for individualized goals  Description  Treatment/Interventions: Functional transfer training, Elevations, LE strengthening/ROM, Therapeutic exercise, Endurance training, Patient/family training, Gait training, Cognitive reorientation, Bed mobility  Equipment Recommended: Katherine De La O       See flowsheet documentation for full assessment, interventions and recommendations  3/9/2020 1426 by Caitlyn Sheriff PT  Note:   Prognosis: Good  Problem List: Decreased strength, Decreased endurance, Impaired balance, Decreased mobility, Decreased coordination, Decreased cognition, Impaired judgement, Decreased safety awareness, Obesity  Assessment: Pt is 62 y o  female seen for high complexity PT evaluation s/p admission to [facility] on 3/8/20 due to multiple episodes of tremor, lethargy and AMS according to patient's   Comorbidities affecting pt's physical performance at time of assessment include: acute kidney injury, chronic pain syndrome, hyponatremia, Type 2 DM, recurrent falls, hyperlipidemia, and essential HTN  Pt is a questionable historian 2* cognition deficits  Per EMR, pt resides with  and daughter rasheeda 1st floor home with 4 CATRACHO  Pt reports perform ADLs independently and uses a RW for ambulation  Upon evaluation, pt present with confusion, poor safety awareness, and impulsive behavior  Required constant VC for safety  Requires mod A for bed mobility, min-mod A for transfers, and min A x 1 for ambulation with RW  Patient's decreased mobility level and increased fall risk is secondary to deficits in safety, cognition, insight into deficits, impulsivity, gait, manuel, standing/ambulatory balance, trunk control, activity tolerance, endurance, and generalized weakness/deconditioning    Current clinical presentation is unstable/unpredictable seen in pt's presentation of ongoing medical management, increased reliance on assistance compared to PLOF, impaired judgement/safety awareness, and significant PMH  Pt to benefit from continued PT tx to address deficits as defined above and maximize level of functional independent mobility and consistency  From PT/mobility standpoint, recommendation at time of d/c would be STR pending progress in order to facilitate return to PLOF  Barriers to Discharge: Inaccessible home environment, Decreased caregiver support     Recommendation: Post acute IP rehab     PT - OK to Discharge: Yes    See flowsheet documentation for full assessment  3/9/2020 1425 by Easton Pena PT  Note:   Prognosis: Good  Problem List: Decreased strength, Decreased endurance, Impaired balance, Decreased mobility, Decreased coordination, Decreased cognition, Impaired judgement, Decreased safety awareness, Obesity  Assessment: Pt is 62 y o  female seen for high complexity PT evaluation s/p admission to [facility] on 3/8/20 due to multiple episodes of tremor, lethargy and AMS according to patient's   Comorbidities affecting pt's physical performance at time of assessment include: acute kidney injury, chronic pain syndrome, hyponatremia, Type 2 DM, recurrent falls, hyperlipidemia, and essential HTN  Pt is a questionable historian 2* cognition deficits  Per EMR, pt resides with  and daughter rasheeda 1st floor home with 4 CATRACHO  Pt reports perform ADLs independently and uses a RW for ambulation  Upon evaluation, pt present with confusion, poor safety awareness, and impulsive behavior  Required constant VC for safety  Requires mod A for bed mobility, min-mod A for transfers, and min A x 1 for ambulation with RW  Patient's decreased mobility level and increased fall risk is secondary to deficits in safety, cognition, insight into deficits, impulsivity, gait, manuel, standing/ambulatory balance, trunk control, activity tolerance, endurance, and generalized weakness/deconditioning  Current clinical presentation is unstable/unpredictable seen in pt's presentation of ongoing medical management, increased reliance on assistance compared to PLOF, impaired judgement/safety awareness, and significant PMH  Pt to benefit from continued PT tx to address deficits as defined above and maximize level of functional independent mobility and consistency  From PT/mobility standpoint, recommendation at time of d/c would be STR pending progress in order to facilitate return to PLOF  Barriers to Discharge: Inaccessible home environment, Decreased caregiver support     Recommendation: Post acute IP rehab     PT - OK to Discharge: Yes    See flowsheet documentation for full assessment

## 2020-03-09 NOTE — ASSESSMENT & PLAN NOTE
POA with creatinine of 1 78; upon dc from [facility] few days ago was 1 02  · Worsened on 3/8 to 2  14   Got small amt of IVF and creatinine improved today  · Avoid nephrotoxins  · Diuretics and lisinopril held currently--SHOULD NOT BE RESUMED ON DISCHARGE  · Appreciate nephrology input

## 2020-03-09 NOTE — ASSESSMENT & PLAN NOTE
Patient uses oxycodone 30 mg q 4 hours p r n   And morphine 100 mg b i d , gabapentin 300 mg daily; prescribed by PCP  · Appears that during her admission to [facility] a few days ago, her oxycodone was adjusted to 50 mg q 4 hours p r n , and morphine was decreased to 100 mg daily  · On admission, iven her altered mental status and lethargy, all her home meds held  · Need to monitor for withdrawal  · Appreciate acute pain service consultation, as patient likely has significant component of polypharmacy causing her ongoing tremors and recurrent falls

## 2020-03-09 NOTE — CONSULTS
Consultation - Acute Pain Service   Juan Galarza 62 y o  female MRN: [id number]  [facility] Encounter: [id number]               Assessment/Plan     Assessment:   62 yr old F admitted for change in mentation, which may be related to YVONNE, UTI, and opioid use  She has ongoing lethargy and tremor since admission  Pt has chronic pain in her legs    I have reviewed the patient's controlled substance dispensing history in the Prescription Drug Monitoring Program before prescribing any controlled substances:    Script filled   Script written  02/12/2020 02/12/2020  OXYCODONE HCL 30 MG TABLET  180 0  30    01/15/2020  01/15/2020  OXYCODONE HCL 30 MG TABLET  180 0  30    01/10/2020  12/18/2019  MORPHINE SULFATE  MG CAP  60 0  30      From above scripts from [facility]: pt prescribed oxycodone 30 mg PO Q 4 hrs PRN recently and MR  mg BID (last script in 1/2020 with no refill)  Both scripts from Dr Zeb Hill [phone]    Currently, all pain medications are held  Pt is OOB to chair in her room, her eyes closed with twitching/jerking movements of her head, and can communicate slowly with repetitive questioning  She admits to chronic pain in neck, back, shoulders, and legs  nephrology team has seen her regarding YVONNE, and has recommended nephrotoxic medications to be placed on hold (I e antihypertensives and diuretics)  Her YVONNE has improved since admission and current Cr 1 1  Nephrology team comfortable with IVF discontinuation and encouraged PO intake  UTI status Improved and treatment  Neurology team has assessed her and recommended decreasing reglan dosing (initially used for gastroparesis diagnosis) as reglan may cause sedation and confusion  Pt to follow up with movement disorder clinic and MRI brain as an outpatient    Spoke with Dr Zeb Hill over the phone  His office has supplied prescriptions for oxycodone and MS ER for this month   He requests that no additional scripts be written for this patient upon discharge  He agrees that patient needs to be seen in his office ASAP after discharge for re-evaluation of her medical comorbidities and dose adjustments of her prescriptions  He is comfortable with her use of oxycodone after her YVONNE resolves  Plan:   - pt is to follow up with Dr Sebastian Bynum ASAP for re-evaluation and for prescription dose readjustment  Pt should not receive additional narcotic prescriptions upon discharge as his office supplied scripts for her this month  - encouraged increase hydration and PO intake  - do not order Morphine ER  Last script was jan 2020 with no refill seen on PADMP  - hold NSAIDs (given YVONNE upon admission)  - may order gabapentin 100 mg PO BID (lower than her 300 mg PO BID dose at home)   - order oxycodone 5-10 mg PO Q 8 hrs PRN mod- severe pain  - discontinue IV dilaudid order  - order tylenol 650 mg PO Q 8 hrs PRN (recent labs show no elevation in LFTs)    APS will continue to follow  Thank you for the consult  History of Present Illness    Admit Date:  3/7/2020  Hospital Day:  1 day  Primary Service:  Hospitalist  Attending Provider:  Fred Delgado MD  Reason for Consult / Principal Problem: chronic pain patient, admitted for lethargy with UTI, YVONNE, opioid use as suspect  Hx and PE limited by: none  HPI: Cristiano Robles is a 62y o  year old female who presents with (see above assessment section)    Inpatient consult to Acute Pain Service  Consult performed by: Thomas Sorenson MD  Consult ordered by:  Victor Manuel Coto MD          Review of Systems    Historical Information   Past Medical History:   Diagnosis Date    Abnormal head CT 7/14/2017    Acute kidney injury ([facility] [address] ) 8/19/2018    Cellulitis 1/10/2017    Closed fracture of multiple ribs of right side 7/14/2017    Degenerative disc disease at L5-S1 level     Diabetes mellitus (HCC)     History of methicillin resistant staphylococcus aureus (MRSA) 08/21/2018    negative nasal culture- isolation and hx of mrsa removed 2018    Hyperkalemia 2018    Hypertension     Hypocalcemia 2018    Hyponatremia 2017     Past Surgical History:   Procedure Laterality Date    CHOLECYSTECTOMY      JOINT REPLACEMENT      OOPHORECTOMY       Social History   E-Cigarette/Vaping    E-Cigarette Use Current Every Day User     Start Date 19     Cartridges/Day 1      E-Cigarette/Vaping Substances    Nicotine Yes     THC Yes      Social History     Substance and Sexual Activity   Alcohol Use Not Currently    Alcohol/week: 0 0 standard drinks    Frequency: Never    Drinks per session: Patient refused    Binge frequency: Never     Social History     Substance and Sexual Activity   Drug Use Yes    Types: Marijuana     Social History     Tobacco Use   Smoking Status Former Smoker    Types: E-Cigarettes    Start date: 1976    Last attempt to quit: 2019    Years since quittin 8   Smokeless Tobacco Never Used     Family History: non-contributory      Meds/Allergies   all current active meds have been reviewed, current meds:   Current Facility-Administered Medications   Medication Dose Route Frequency    heparin (porcine) subcutaneous injection 5,000 Units  5,000 Units Subcutaneous Q8H [facility]    HYDROmorphone (DILAUDID) injection 0 2 mg  0 2 mg Intravenous Q4H PRN    insulin glargine (LANTUS) subcutaneous injection 25 Units 0 25 mL  25 Units Subcutaneous HS    insulin lispro (HumaLOG) 100 units/mL subcutaneous injection 1-6 Units  1-6 Units Subcutaneous Q6H [facility]    ondansetron (ZOFRAN) injection 4 mg  4 mg Intravenous Q6H PRN     Facility-Administered Medications Ordered in Other Encounters   Medication Dose Route Frequency    ferrous sulfate tablet 325 mg  325 mg Oral Daily With Breakfast    and PTA meds:   Prior to Admission Medications   Prescriptions Last Dose Informant Patient Reported? Taking?    DULoxetine (CYMBALTA) 60 mg delayed release capsule 3/7/2020 at Unknown time  Yes Yes   Sig: Take 60 mg by mouth daily   HUMALOG KWIKPEN 100 units/mL injection pen   No No   Sig: Inject 10 Units under the skin 3 (three) times a day with meals   Mirabegron ER 50 MG TB24   Yes No   Sig: Take by mouth   amLODIPine (NORVASC) 10 mg tablet 3/7/2020 at Unknown time  No Yes   Sig: Take 1 tablet (10 mg total) by mouth daily   docusate sodium (COLACE) 100 mg capsule Past Month at Unknown time  No Yes   Sig: Take 1 capsule by mouth 2 (two) times a day   Patient taking differently: Take 100 mg by mouth 2 (two) times a day as needed for constipation    ergocalciferol (VITAMIN D2) 50,000 units Past Week at Unknown time  Yes Yes   Sig: Take 50,000 Units by mouth once a week   gabapentin (NEURONTIN) 300 mg capsule   No No   Sig: Take 1 capsule (300 mg total) by mouth daily at bedtime   Patient taking differently: Take 300 mg by mouth 2 (two) times a day    insulin glargine (LANTUS) 100 units/mL subcutaneous injection   No No   Sig: Inject 25 Units under the skin daily at bedtime   lisinopril (ZESTRIL) 5 mg tablet   No No   Sig: Take 2 tablets (10 mg total) by mouth daily   metFORMIN (GLUCOPHAGE) 1000 MG tablet   No No   Sig: Take 0 5 tablets (500 mg total) by mouth 2 (two) times a day with meals   metoclopramide (REGLAN) 10 mg tablet   No No   Sig: Take 0 5 tablets (5 mg total) by mouth 3 (three) times a day before meals   morphine (Julita) 100 MG 24 hr capsule   No No   Sig: Take 1 capsule (100 mg total) by mouth daily for 10 daysMax Daily Amount: 100 mg   oxyCODONE (ROXICODONE) 30 MG immediate release tablet   No No   Sig: Take 0 5 tablets (15 mg total) by mouth every 4 (four) hours as needed for moderate pain for up to 10 daysMax Daily Amount: 90 mg   Patient taking differently: Take 30 mg by mouth every 4 (four) hours as needed for moderate pain    triamterene-hydrochlorothiazide (MAXZIDE) 75-50 MG per tablet   Yes No   Sig: Take 1 tablet by mouth daily      Facility-Administered Medications: None       No Known Allergies    Objective   Temp:  [98 7 °F (37 1 °C)-99 3 °F (37 4 °C)] 99 2 °F (37 3 °C)  HR:  [] 100  Resp:  [18] 18  BP: (111-135)/(65-86) 135/85    Intake/Output Summary (Last 24 hours) at 3/9/2020 1232  Last data filed at 3/9/2020 0018  Gross per 24 hour   Intake    Output 950 ml   Net -950 ml       Physical Exam    Lab Results:   I have personally reviewed pertinent labs  , CBC:   Lab Results   Component Value Date     (L) 03/08/2020    MPV 9 5 03/08/2020   , CMP:   Lab Results   Component Value Date    SODIUM 132 (L) 03/09/2020    K 5 2 03/09/2020     03/09/2020    CO2 26 03/09/2020    BUN 51 (H) 03/09/2020    CREATININE 1 16 03/09/2020    CALCIUM 9 0 03/09/2020    AST 22 03/09/2020    ALT 25 03/09/2020    ALKPHOS 119 (H) 03/09/2020    EGFR 50 03/09/2020     Imaging Studies: I have personally reviewed pertinent reports  EKG, Pathology, and Other Studies: I have personally reviewed pertinent reports  Counseling / Coordination of Care  Total floor / unit time spent today Level 2 = 40 minutes  Greater than 50% of total time was spent with the patient and / or family counseling and / or coordination of care   A description of the counseling / coordination of care: spoke to patient, called opioid prescriber's office to discuss pain regimen, discussed plan of care with [facility] service

## 2020-03-09 NOTE — PHYSICAL THERAPY NOTE
Physical Therapy Evaluation    Patient Name: Deepthi Hollingsworth    UHINR'P Date: 3/9/2020     Problem List  Principal Problem:    Acute toxic/metabolic encephalopathy  Active Problems:    Essential hypertension    Hyperlipidemia    Recurrent falls    Hyponatremia    Type 2 diabetes mellitus with hyperglycemia, with long-term current use of insulin (HCC)    Chronic pain syndrome    Acute kidney injury ([facility] [address] )    Occasional tremors       Past Medical History  Past Medical History:   Diagnosis Date    Abnormal head CT 7/14/2017    Acute kidney injury ([facility] [address] ) 8/19/2018    Cellulitis 1/10/2017    Closed fracture of multiple ribs of right side 7/14/2017    Degenerative disc disease at L5-S1 level     Diabetes mellitus ([facility] [address] )     History of methicillin resistant staphylococcus aureus (MRSA) 08/21/2018    negative nasal culture- isolation and hx of mrsa removed 8/20/2018    Hyperkalemia 4/25/2018    Hypertension     Hypocalcemia 4/25/2018    Hyponatremia 7/14/2017        Past Surgical History  Past Surgical History:   Procedure Laterality Date    CHOLECYSTECTOMY      JOINT REPLACEMENT      OOPHORECTOMY           03/09/20 0825   Note Type   Note type Eval only   Home Living   Type of Home House  (4 CATRACHO )   Home Layout Two level;Stairs to enter with rails; Able to live on main level with bedroom/bathroom; Performs ADLs on one level   Home Equipment   (Rollator)   Additional Comments Patient is a questionable historian 2* cognition deficits  Inconsistent through out social history  EMR information on home set up is also inconsisent, 1 note says 4 CATRACHO and another note says 1 CATRACHO  Will need to clairfy   Prior Function   Level of [place] Independent with ADLs and functional mobility; Needs assistance with IADLs   Lives With Spouse;Daughter  ((Both works?))   Edda RICARDOLs Needs assistance   Falls in the last 6 months 0  ("At least 2")   Comments Ambulates with RW at baseline  Will need to clairfy level if  or daugther is available to assist    Restrictions/Precautions   Weight Bearing Precautions Per Order No   Braces or Orthoses   (none)   Other Precautions Impulsive;Cognitive; Chair Alarm; Bed Alarm;Pain; Fall Risk;Telemetry;Multiple lines   Cognition   Overall Cognitive Status Impaired   Arousal/Participation Cooperative   Orientation Level Oriented to person;Oriented to place; Disoriented to time;Disoriented to situation   Memory Decreased recall of precautions;Decreased recall of recent events;Decreased short term memory   Following Commands Follows one step commands with increased time or repetition   Comments Pt is pleasant however, confused at times  Reported she met therapist last night however, therapist was not working at the time  Pt required constant VC for safety; impulsive at times  Increased time required for processing/responding to questions   RLE Assessment   RLE Assessment   (Grossly 4/5; reported unable to lift RLE off bed, however lifted RLE with full range when asked to put on pants)   LLE Assessment   LLE Assessment   (4/5)   Bed Mobility   Supine to Sit 3  Moderate assistance   Additional items Assist x 1; Increased time required;Verbal cues   Additional Comments Pt went from supine to sit with mod A x 1 for BLE management and UB support  Sat EOB for approx 5 minutes with close supervision for static sititng balance  Transfers   Sit to Stand 3  Moderate assistance  (Progressed to min A x 1 )   Additional items Assist x 1; Increased time required;Verbal cues   Stand to Sit 4  Minimal assistance   Additional items Assist x 1; Increased time required;Verbal cues   Toilet transfer 4  Minimal assistance   Additional items Assist x 1; Increased time required;Verbal cues; Impulsive;Standard toilet   Additional Comments Pt attempted to pull up on RW multiple times, Required VC for safety and proper hand placement  Required mod A x 1 for transfers from a lower seated surfaces  Subsuquent transfers from a higher surfaces, required less assistance  Ambulation/Elevation   Gait pattern Short stride; Excessively slow; Shuffling   Gait Assistance 4  Minimal assist   Additional items Assist x 1;Verbal cues   Assistive Device Rolling walker   Distance 10 feet x 2   Balance   Static Sitting Fair   Dynamic Sitting Fair   Static Standing Fair -   Dynamic Standing Poor +   Ambulatory Poor +   Endurance Deficit   Endurance Deficit Yes   Endurance Deficit Description fatigue, cognition   Activity Tolerance   Activity Tolerance Patient limited by fatigue   Medical Staff Made Aware OT, CM   Nurse Made Aware Yes, RN Casandra   Assessment   Prognosis Good   Problem List Decreased strength;Decreased endurance; Impaired balance;Decreased mobility; Decreased coordination;Decreased cognition; Impaired judgement;Decreased safety awareness; Obesity   Assessment Pt is 62 y o  female seen for high complexity PT evaluation s/p admission to [facility] on 3/8/20 due to multiple episodes of tremor, lethargy and AMS according to patient's   Comorbidities affecting pt's physical performance at time of assessment include: acute kidney injury, chronic pain syndrome, hyponatremia, Type 2 DM, recurrent falls, hyperlipidemia, and essential HTN  Pt is a questionable historian 2* cognition deficits  Per EMR, pt resides with  and daughter rasheeda 1st floor home with 4 CATRACHO  Pt reports perform ADLs independently and uses a RW for ambulation  Upon evaluation, pt present with confusion, poor safety awareness, and impulsive behavior  Required constant VC for safety  Requires mod A for bed mobility, min-mod A for transfers, and min A x 1 for ambulation with RW    Patient's decreased mobility level and increased fall risk is secondary to deficits in safety, cognition, insight into deficits, impulsivity, gait, manuel, standing/ambulatory balance, trunk control, activity tolerance, endurance, and generalized weakness/deconditioning  Current clinical presentation is unstable/unpredictable seen in pt's presentation of ongoing medical management, increased reliance on assistance compared to PLOF, impaired judgement/safety awareness, and significant PMH  Pt to benefit from continued PT tx to address deficits as defined above and maximize level of functional independent mobility and consistency  From PT/mobility standpoint, recommendation at time of d/c would be STR pending progress in order to facilitate return to PLOF  Barriers to Discharge Inaccessible home environment;Decreased caregiver support   Goals   Patient Goals None stated   [facility] Expiration Date 03/23/20   Short Term Goal #1 In 1-2 weeks, the patient will complete the following 1) Perform all aspect of bed mobility independently 2) Perform functional transfer with LRAD independently  3) Ambulate >150 feet with LRAD at mod I level  4) Negotiate 12 steps with 1 handrail and supervision 5) Patient will improve dynamic standing balance from poor + to fair + in order to perform everyday activities  6) Patient will continue with ongoing rehab services for family education, DME, and D/C planning    Plan   Treatment/Interventions Functional transfer training;Elevations;LE strengthening/ROM; Therapeutic exercise; Endurance training;Patient/family training;Gait training;Cognitive reorientation; Bed mobility   PT Frequency   (3-5x/wk)   Recommendation   Recommendation Post acute IP rehab   Equipment Recommended Walker   PT - OK to Discharge Yes   Additional Comments to rehab only once medically stable   Modified [place] Scale   Modified [place] Scale 4   Barthel Index   Feeding 10   Bathing 0   Grooming Score 0   Dressing Score 5   Bladder Score 10   Bowels Score 10   Toilet Use Score 5   Transfers (Bed/Chair) Score 10   Mobility (Level Surface) Score 0   Stairs Score 0   Barthel Index Score 50       Alyse Ware PT, DPT

## 2020-03-09 NOTE — ASSESSMENT & PLAN NOTE
Lab Results   Component Value Date    HGBA1C 5 2 03/08/2020     Recent Labs     03/08/20  1910 03/08/20  2132 03/09/20  0100 03/09/20  0637   POCGLU 166* 124 113 143*       Blood Sugar Average: Last 72 hrs:  (P) 133 625   · A1c well controlled at 5 2  · Home meds Lantus 25 units HS with lispro 10 units t i d   With meals  · Hold metformin  · Monitor glucose closely

## 2020-03-09 NOTE — ASSESSMENT & PLAN NOTE
Patient was recently admitted to [facility] on [facility] after admission with AMS felt likely multifactorial to UTI, opioids and YVONNE  Presents again with lethargy and tremulousness  · Appreciate neurology input  During admission few days ago, neuro did not feel that any additional neurodiagnostics such as EEG/MRI were needed  MRI Brain ordered, however should not hold discharge and could be done outpatient if workup is otherwise done   · Reglan was decreased to 5 mg TID   Recommended eventual tapering of gabapentin and f/u with outpatient movement disorder team   · Presently afebrile, flu negative, procal negative  · Recently completed treatment for recent UTI yesterday, 3/7  · Currently, none of patient's home pain medications are ordered-need to f/u acute pain recommendations   · Suspect polypharmacy plays major role in all of this; all specialists in agreement  · Seems much improved today

## 2020-03-09 NOTE — PLAN OF CARE
Problem: SLP ADULT - SWALLOWING, IMPAIRED  Goal: Initial SLP swallow eval performed  Outcome: Not Progressing  Note:   Swallowing evaluation completed  Recommend dysphagia level 3 dental soft diet with thin liquids  Encourage slow rate of intake and upright position with po intake

## 2020-03-09 NOTE — PLAN OF CARE
Problem: PHYSICAL THERAPY ADULT  Goal: Performs mobility at highest level of function for planned discharge setting  See evaluation for individualized goals  Description  Treatment/Interventions: Functional transfer training, Elevations, LE strengthening/ROM, Therapeutic exercise, Endurance training, Patient/family training, Gait training, Cognitive reorientation, Bed mobility  Equipment Recommended: Alexi Richardson       See flowsheet documentation for full assessment, interventions and recommendations  Note:   Prognosis: Good  Problem List: Decreased strength, Decreased endurance, Impaired balance, Decreased mobility, Decreased coordination, Decreased cognition, Impaired judgement, Decreased safety awareness, Obesity  Assessment: Pt is 62 y o  female seen for high complexity PT evaluation s/p admission to [facility] on 3/8/20 due to multiple episodes of tremor, lethargy and AMS according to patient's   Comorbidities affecting pt's physical performance at time of assessment include: acute kidney injury, chronic pain syndrome, hyponatremia, Type 2 DM, recurrent falls, hyperlipidemia, and essential HTN  Pt is a questionable historian 2* cognition deficits  Per EMR, pt resides with  and daughter rasheeda 1st floor home with 4 CATRACHO  Pt reports perform ADLs independently and uses a RW for ambulation  Upon evaluation, pt present with confusion, poor safety awareness, and impulsive behavior  Required constant VC for safety  Requires mod A for bed mobility, min-mod A for transfers, and min A x 1 for ambulation with RW  Patient's decreased mobility level and increased fall risk is secondary to deficits in safety, cognition, insight into deficits, impulsivity, gait, manuel, standing/ambulatory balance, trunk control, activity tolerance, endurance, and generalized weakness/deconditioning    Current clinical presentation is unstable/unpredictable seen in pt's presentation of ongoing medical management, increased reliance on assistance compared to PLOF, impaired judgement/safety awareness, and significant PMH  Pt to benefit from continued PT tx to address deficits as defined above and maximize level of functional independent mobility and consistency  From PT/mobility standpoint, recommendation at time of d/c would be STR pending progress in order to facilitate return to PLOF  Barriers to Discharge: Inaccessible home environment, Decreased caregiver support     Recommendation: Post acute IP rehab     PT - OK to Discharge: Yes    See flowsheet documentation for full assessment

## 2020-03-09 NOTE — ASSESSMENT & PLAN NOTE
Noted 3/8  Suspect multifactorial  Is on many medications that cause this   · Diuretics on hold   Should NOT resume triamterene/hctz at ALL on discharge  · S/P IVF bolus   · Nephrology following

## 2020-03-09 NOTE — PLAN OF CARE
Problem: OCCUPATIONAL THERAPY ADULT  Goal: Performs self-care activities at highest level of function for planned discharge setting  See evaluation for individualized goals  Description  Treatment Interventions: ADL retraining, Functional transfer training, UE strengthening/ROM, Endurance training, Cognitive reorientation, Patient/family training, Equipment evaluation/education, Compensatory technique education, Continued evaluation, Activityengagement          See flowsheet documentation for full assessment, interventions and recommendations  Note:   Limitation: Decreased ADL status, Decreased UE strength, Decreased Safe judgement during ADL, Decreased cognition, Decreased endurance, Decreased self-care trans, Decreased high-level ADLs  Prognosis: Fair  Assessment: "Emilia Lang is a 62year old female seen for initial OT evaluation s/p admission to [facility] with multiple episodes of tremors and AMS  Pt dx'd with acute toxic/metabolic encephalopathy  Additional active problems include: YVONNE, chronic pain syndrome, hyponatremia, DM with hyperglycemia, HLD and HTN  Pt with active OT orders and cleared by RN for OT evaluation and OOB mobility  Pt is a poor historian due to cognitive deficits and unable to provide clear home set-up or PLOF  Pt is currently demonstrating the following occupational deficits: eating with set-up, grooming with Claudine, UB bathing with modA, LB bathing with modA, UB dressing with modA, LB dressing with maxA, toileting with modA, bed mobility with modA, functional transfers with modA, and functional mobility with modA  Factors currently limiting pt's independence in these areas include: cognitive deficits, self limiting behaviors, balance, functional mobility, endurance and (?) unsupportive home environment  From an OT standpoint, recommend STR upon d/c    Pt is to continue to benefit from skilled occupational therapy services while in the hospital to maximize functioning and independence with daily activities  See below for OT goals to be addressed 3-5x/wk       OT Discharge Recommendation: Short Term Rehab  OT - OK to Discharge: (yes to STR, no to home)

## 2020-03-09 NOTE — SPEECH THERAPY NOTE
Speech-Language Pathology Bedside Swallow Evaluation      Patient Name: Yaa Jenkins    IBDYE'H Date: 3/9/2020     Problem List  Principal Problem:    Acute toxic/metabolic encephalopathy  Active Problems:    Essential hypertension    Hyperlipidemia    Recurrent falls    Hyponatremia    Type 2 diabetes mellitus with hyperglycemia, with long-term current use of insulin (HCC)    Chronic pain syndrome    Acute kidney injury ([facility] [address] )    Occasional tremors      Past Medical History  Past Medical History:   Diagnosis Date    Abnormal head CT 7/14/2017    Acute kidney injury ([facility] [address] ) 8/19/2018    Cellulitis 1/10/2017    Closed fracture of multiple ribs of right side 7/14/2017    Degenerative disc disease at L5-S1 level     Diabetes mellitus ([facility] [address] )     History of methicillin resistant staphylococcus aureus (MRSA) 08/21/2018    negative nasal culture- isolation and hx of mrsa removed 8/20/2018    Hyperkalemia 4/25/2018    Hypertension     Hypocalcemia 4/25/2018    Hyponatremia 7/14/2017       Past Surgical History  Past Surgical History:   Procedure Laterality Date    CHOLECYSTECTOMY      JOINT REPLACEMENT      OOPHORECTOMY         Summary   Patient presented with a mild oral dysphagia and suspect pharyngeal stage is within functional limits  Suspect acute on chronic swallow dysfunction r/t edentulous state exacerbated by current mental status  Patient with impulsive po intake, slow mastication and bolus formation with regular solids  Patient tolerated thin liquids without overt s/s aspiration  Recommend dysphagia level 3 dental soft diet with thin liquids  Encourage slow rate of intake and upright 90 degrees with intake         Recommended Diet: soft/level 3 diet and thin liquids   Recommended Form of Meds: whole with liquid   Aspiration precautions and swallowing strategies: upright posture, only feed when fully alert, slow rate of feeding, small bites/sips and alternating bites and sips  Other Recommendations/Considerations: frequent oral care        Current Medical Status  Pt is a 62 y o  female who presented to [facility] with a past medical history significant for chronic pain syndrome due to back injury; 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, she is currently on Neurontin along with morphine sulfate 100 mg twice daily (Julita) and occasional use of oxycodone 30 mg every 4 to 6 hours as needed  The patient has had multiple episodes of tremors which according to the patient's  is involuntary but seem to be unclear whether this is coupled with encephalopathy or whether there are times that she is mentally clear exhibiting these tremors  Of note is that in February 29 she was given a GCS of 15 during an emergency room in [facility] visit  She was noted to have these occasional tremors  She was also noted to be hypoxic  Recently earlier this month, the patient was admitted in [facility] for similar episode with along with mental status change  However she was febrile with leukocytosis and the patient was assessed to have acute metabolic encephalopathy secondary to urinary tract infection due to Klebsiella   However here in the emergency room she was noted to be lethargic and although breathing would sometimes get into on and off episodes of increased alertness (expressed by more regard to examiner although would drift back to sleep) especially when patient is given vigorous stimulation  Noted is that her laboratory studies are again significant for acute kidney insufficiency  She has always been on triamterene hydrochlorothiazide for hypertension and chronic venous insufficiency and edema  Patient referred for swallowing evaluation and swallowing evaluation completed at bedside  Current Precautions:  N/A    Past medical history:  DM, MRSA   Please see H&P for details    Special Studies:  CXR: No active pulmonary disease on examination which is somewhat limited secondary to low lung volumes  MRI Pending    Social/Education/Vocational Hx:  Pt lives with family    Swallow Information   Current Risks for Dysphagia & Aspiration: AMS  Current Symptoms/Concerns: change in mental status  Current Diet: regular diet and thin liquids   Baseline Diet: regular diet and thin liquids vs  Dental soft (pateint reported she requires food cut up)      Baseline Assessment   Behavior/Cognition: alert  Speech/Language Status: able to participate in basic conversation and able to follow commands inconsistently  Patient Positioning: upright in chair  Pain Status/Interventions/Response to Interventions:   No report of or nonverbal indications of pain  Swallow Mechanism Exam  Facial: symmetrical  Labial: WFL  Lingual: WFL  Velum: unable to visualize  Mandible: adequate ROM  Dentition: edentulous  Vocal quality:clear/adequate   Volitional Cough: n/a   Respiratory Status:NC  Tracheostomy: n/a      Consistencies Assessed and Performance   Consistencies Administered: thin liquids, puree and hard solids  Materials administered included thin water, apple sauce, toast    Oral Stage: mild dysphagia  Mastication was slow but adequate adequate with the materials administered today  Patient impulsive with po intake requiring occasional verbal cues  Patient with adequate suck from straw and transfer  Patient took puree with adequate manipulation and transfer  Pharyngeal Stage: WFL  Swallow Mechanics:  Swallowing initiation appeared prompt  Laryngeal rise was palpated and judged to be within functional limits  No coughing, throat clearing, change in vocal quality or respiratory status noted today  Esophageal Concerns: none reported    Summary and Recommendations (see above)    Results Reviewed with: patient and RN     Caregiver Education: initiated    I educated patient and RN in basics re: swallowing A/P and means of minimizing aspiration risk and diet modifications  Treatment Recommended: Speech therapy to determine least restrictive diet  Frequency of treatment: 1-2x/wk    Patient Stated Goal:    Dysphagia LTG per SLP  -Patient will demonstrate optimum safety and efficacy of oral intake and swallowing function without overt s/sx of penetration or aspiration for the highest appropriate diet level       Dysphagia STG per SLP  -Patient will tolerate trials of upgraded food and/or liquid texture with no significant s/s of oral or pharyngeal dysphagia including aspiration across 1-3 diagnostic sessions     Speech Therapy Prognosis   Prognosis: good    Prognosis Considerations: age and medical status

## 2020-03-09 NOTE — SOCIAL WORK
Met with pt to discuss the role of CM and to discuss any help pt may need prior to dc  Pt lives with her  Linda Bhatt and daughter Nyasia Niño in a 1st floor home with 4STE  Pt performed ADL's indptly pta, no use of DME  No hx of HHC or rehab  Pt does not drives  Pt reports no hx of mental health or D&A treatment  Pt's PCP is Dr Dennis Carvalho  Pt's prefered pharmacy is [facility] in [place]  Per pt's bedside RN Casandra, pt having some confusion  Message left for pt's  to verify above information  Awaiting call back  Contact: Linda Bhatt () [phone]  No POA or living will  Pt's  will transport home at dc  CM reviewed d/c planning process including the following: identifying help at home, patient preference for d/c planning needs, Discharge Lounge, [facility] Meds to Bed program, availability of treatment team to discuss questions or concerns patient and/or family may have regarding understanding medications and recognizing signs and symptoms once discharged  CM also encouraged patient to follow up with all recommended appointments after discharge  Patient advised of importance for patient and family to participate in managing patients medical well being  Patient/caregiver received discharge checklist  Content reviewed  Patient/caregiver encouraged to participate in discharge plan of care prior to discharge home

## 2020-03-09 NOTE — OCCUPATIONAL THERAPY NOTE
Occupational Therapy Evaluation     Patient Name: Tyesha Bhandari  AVFMK'Y Date: 3/9/2020  Problem List  Principal Problem:    Acute toxic/metabolic encephalopathy  Active Problems:    Essential hypertension    Hyperlipidemia    Recurrent falls    Hyponatremia    Type 2 diabetes mellitus with hyperglycemia, with long-term current use of insulin ([facility])    Chronic pain syndrome    Acute kidney injury ([address] )    Occasional tremors    Past Medical History  Past Medical History:   Diagnosis Date    Abnormal head CT 7/14/2017    Acute kidney injury ([address] ) 8/19/2018    Cellulitis 1/10/2017    Closed fracture of multiple ribs of right side 7/14/2017    Degenerative disc disease at L5-S1 level     Diabetes mellitus ([address] )     History of methicillin resistant staphylococcus aureus (MRSA) 08/21/2018    negative nasal culture- isolation and hx of mrsa removed 8/20/2018    Hyperkalemia 4/25/2018    Hypertension     Hypocalcemia 4/25/2018    Hyponatremia 7/14/2017     Past Surgical History  Past Surgical History:   Procedure Laterality Date    CHOLECYSTECTOMY      JOINT REPLACEMENT      OOPHORECTOMY          03/09/20 0835   Note Type   Note type Eval only   Restrictions/Precautions   Weight Bearing Precautions Per Order No   Other Precautions Cognitive; Impulsive; Chair Alarm; Bed Alarm;Multiple lines;Telemetry; Fall Risk;Pain   Pain Assessment   Pain Assessment 0-10   Pain Score 6   Pain Type Chronic pain   Pain Location Back   Pain Orientation Lower   Home Living   Type of [address] [place] [address] Two level   Additional Comments Pt is a questionable/poor historian due to cognitive deficits  Pt reports that she [facility] her  [facility] [address]  Unclear how accurate pt is, but reports that her  is gone for over 12 hours per day    CM reached out to pt's family to confirm background information   Prior Function   Level of [place] Independent with ADLs and functional mobility   Lives With Spouse;Daughter Receives Help From Family   ADL Assistance Independent   IADLs Needs assistance   Falls in the last 6 months 0   Lifestyle   Autonomy Pt is a questionable historian and reports that she was I with ADLs, required assistance with IADLs, and typically ambulates I'ly with use of rollator  Pt doesn't drive, reports she manages her meds without assist   Reciprocal Relationships , daughter  Unclear how much support they are able to provide   Service to Others retired   Intrinsic Gratification sedentary   Psychosocial   Psychosocial (WDL) X   Patient Behaviors/Mood Flat affect;Irritable   ADL   Eating Assistance 5  Supervision/Setup   Grooming Assistance 4  Minimal Assistance   UB Bathing Assistance 3  Moderate Assistance   LB Bathing Assistance 3  Moderate [address] 3  Moderate Assistance   LB Dressing Assistance 2  Maximal Assistance   LB Dressing Deficit Don/doff L sock; Don/doff R sock; Thread RLE into pants; Thread LLE into pants; Thread RLE into underwear; Thread LLE into underwear   Toileting Assistance  3  Moderate Assistance   Bed Mobility   Supine to Sit 3  Moderate assistance   Additional items Assist x 1; Increased time required;Verbal cues;LE management   Transfers   Sit to Stand 3  Moderate assistance   Additional items Assist x 1; Increased time required;Verbal cues   Stand to Sit 4  Minimal assistance   Additional items Assist x 1; Increased time required;Verbal cues   Toilet transfer 4  Minimal assistance   Additional items Assist x 1; Increased time required;Verbal cues;Standard toilet; Impulsive   Additional Comments Pt with limited carry over of safety cues, requiring max VCs and tactile cues for safe hand placement/positioning throughout session  Unsteady   Functional Mobility   Functional Mobility 3  Moderate assistance   Additional Comments Ax1 with use of rw    Unsteady   Balance   Static Sitting Fair   Dynamic Sitting Fair   Static Standing Fair -   Dynamic Standing Poor + Ambulatory Poor +   Activity Tolerance   Activity Tolerance Patient limited by fatigue   Medical Staff Made Aware PT, CM   Nurse Made Aware Pt cleared by RN Linus Peterson for OT evaluation and OOB mobility   RUE Assessment   RUE Assessment   (4/5 grossly)   LUE Assessment   LUE Assessment   (4/5 grossly)   Cognition   Overall Cognitive Status Impaired   Arousal/Participation Alert; Responsive   Attention Attends with cues to redirect   Orientation Level Oriented to person;Oriented to place  (gen place (hospital but thinks [facility]), 1 week off date)   Memory Decreased recall of recent events;Decreased short term memory;Decreased recall of precautions   Following Commands Follows one step commands with increased time or repetition   Comments Pt is mostly pleasant, but confused and inappropriate at times  Pt is inconsistent with background information and requires frequent cues for safety  Appears self limiting   Assessment   Limitation Decreased ADL status; Decreased UE strength;Decreased Safe judgement during ADL;Decreased cognition;Decreased endurance;Decreased self-care trans;Decreased high-level ADLs   Prognosis Fair   Assessment "Estella Ortiz is a 62year old female seen for initial OT evaluation s/p admission to [facility] with multiple episodes of tremors and AMS  Pt dx'd with acute toxic/metabolic encephalopathy  Additional active problems include: YVONNE, chronic pain syndrome, hyponatremia, DM with hyperglycemia, HLD and HTN  Pt with active OT orders and cleared by RN for OT evaluation and OOB mobility  Pt is a poor historian due to cognitive deficits and unable to provide clear home set-up or PLOF  Pt is currently demonstrating the following occupational deficits: eating with set-up, grooming with Claudine, UB bathing with modA, LB bathing with modA, UB dressing with modA, LB dressing with maxA, toileting with modA, bed mobility with modA, functional transfers with modA, and functional mobility with modA    Factors currently limiting pt's independence in these areas include: cognitive deficits, self limiting behaviors, balance, functional mobility, endurance and (?) unsupportive home environment  From an OT standpoint, recommend STR upon d/c  Pt is to continue to benefit from skilled occupational therapy services while in the hospital to maximize functioning and independence with daily activities  See below for OT goals to be addressed 3-5x/wk  Goals   Patient Goals to use the bathroom   Plan   Treatment Interventions ADL retraining;Functional transfer training;UE strengthening/ROM; Endurance training;Cognitive reorientation;Patient/family training;Equipment evaluation/education; Compensatory technique education;Continued evaluation; Activityengagement   Goal Expiration Date 03/19/20   OT Treatment Day 1   OT Frequency 3-5x/wk   Recommendation   OT Discharge Recommendation Short Term Rehab   OT - OK to Discharge   (yes to STR, no to home)   Barthel Index   Feeding 10   Bathing 0   Grooming Score 0   Dressing Score 5   Bladder Score 5   Bowels Score 5   Toilet Use Score 5   Transfers (Bed/Chair) Score 10   Mobility (Level Surface) Score 0   Stairs Score 0   Barthel Index Score 40     Goals:    Pt will be attentive 100% of the time during ongoing cognitive assessment w/ G participation to assist w/ safe d/c planning/recommendations  Pt will attend to a functional activity for at least 10 minutes with no more than 2 cues for attention/redirection  Pt will improve activity tolerance to G for min 30 min txment sessions for increase engagement in functional tasks  Pt will complete all self care tasks w/ Estella with use of DME/AD as appropriate      Pt will complete toileting w/ mod I w/ G hygiene/thoroughness using DME as needed    Pt will improve functional transfers to Mod I on/off all surfaces using DME as needed w/ G balance/safety     Pt will improve functional mobility during ADL/IADL/leisure tasks to Mod I using DME as needed w/ G balance/safety     Pt will participate in simulated IADL management task to increase independence to Mod I w/ G safety and endurance    Pt will demonstrate G carryover of pt/caregiver education and training as appropriate w/o cues w/ good tolerance to increase safety during functional tasks    Pt will maintain standing upright I'ly or at least 10 minutes while completing a dynamic functional activity with good balance and endurance      Sharon Gaming, MOT, OTR/L

## 2020-03-09 NOTE — PROGRESS NOTES
NEPHROLOGY PROGRESS NOTE    Dangelo Cordova 62 y o  female MRN: [id number]  Unit/Bed#: [facility] 709-01 Encounter: [id number]  Reason for Consult:  Acute renal failure    The patient is sitting in a chair and I went in his this is the 1st time at her after she was confused and she laughed and said she is not confused  Other than that she only reports having chronic pain in her legs  She is in no distress  ASSESSMENT/PLAN:  1  Renal    The patient had acute renal failure looks like when she presented her creatinine was 1 7 and then increased to 2 1  With IV fluids creatinine is declined back to 1 1 so definitely appears like she had some volume depletion  At this point time patient is off fluids she is going to be eating and offers no distress  I personally reviewed the CT scan of her abdomen on 03/01/2020 showing no hydronephrosis or any intraabdominal acute pathology  ACE-inhibitor and diuretic are on hold  Monitor on oral intake  IV fluids have been discontinued    2  Hyponatremia    The concentrations sodium is improving  It is likely multifactorial could be related to acute renal failure which impairs ability to excrete water and she likely may have been volume depleted as well  She was also on a thiazide diuretic which can interfere with maximum urinary dilution  Triamterene HCTZ discontinued  Monitor sodium concentration with next labs as is improving      I have a low suspicion that is related to her Cymbalta so once a corrects this can be resumed and then will monitor  SUBJECTIVE:  Review of Systems   Constitution: Negative for chills, decreased appetite, fever and malaise/fatigue  HENT: Negative  Eyes: Negative  Cardiovascular: Positive for leg swelling  Negative for chest pain, dyspnea on exertion, orthopnea and palpitations  Respiratory: Negative  Negative for cough, hemoptysis, shortness of breath, sputum production and wheezing  Musculoskeletal:        Chronic leg pain  Gastrointestinal: Negative for bloating, abdominal pain, diarrhea, nausea and vomiting  Genitourinary: Negative for bladder incontinence, dysuria, hematuria and incomplete emptying  Neurological: Negative  Psychiatric/Behavioral: Negative  I am not confused       OBJECTIVE:  Current Weight: Weight - Scale: 98 7 kg (217 lb 9 5 oz)  Vitals:Temp (24hrs), Av 1 °F (37 3 °C), Min:98 7 °F (37 1 °C), Max:99 3 °F (37 4 °C)  Current: Temperature: 99 2 °F (37 3 °C)   Blood pressure 135/85, pulse 100, temperature 99 2 °F (37 3 °C), resp  rate 18, height 5' 2" (1 575 m), weight 98 7 kg (217 lb 9 5 oz), SpO2 93 %  , Body mass index is 39 8 kg/m²  Intake/Output Summary (Last 24 hours) at 3/9/2020 1218  Last data filed at 3/9/2020 0018  Gross per 24 hour   Intake    Output 950 ml   Net -950 ml       Physical Exam: /85   Pulse 100   Temp 99 2 °F (37 3 °C)   Resp 18   Ht 5' 2" (1 575 m)   Wt 98 7 kg (217 lb 9 5 oz)   LMP  (LMP Unknown) Comment: post menopause  SpO2 93%   BMI 39 80 kg/m²   Physical Exam   Constitutional: She is oriented to person, place, and time  No distress  HENT:   Head: Atraumatic  Mouth/Throat: Oropharynx is clear and moist    Eyes: Conjunctivae and EOM are normal  No scleral icterus  Neck: Neck supple  No JVD present  Cardiovascular: Normal rate and regular rhythm  Exam reveals no gallop and no friction rub  Positive edema  Pulmonary/Chest: Effort normal and breath sounds normal  No respiratory distress  She has no wheezes  She has no rales  Positive edema  Abdominal: Soft  Bowel sounds are normal  She exhibits no distension  There is no tenderness  There is no rebound  Neurological: She is alert and oriented to person, place, and time  Skin: She is not diaphoretic  Psychiatric: She has a normal mood and affect         Medications:    Current Facility-Administered Medications:     heparin (porcine) subcutaneous injection 5,000 Units, 5,000 Units, Subcutaneous, Q8H [address], 5,000 Units at 03/09/20 0604 **AND** [COMPLETED] Platelet count, , , Once, Rachel Daigle MD    HYDROmorphone (DILAUDID) injection 0 2 mg, 0 2 mg, Intravenous, Q4H PRN, Rachel Daigle MD, 0 2 mg at 03/09/20 0839    insulin glargine (LANTUS) subcutaneous injection 25 Units 0 25 mL, 25 Units, Subcutaneous, HS, Rachel Daigle MD, 25 Units at 03/08/20 2122    insulin lispro (HumaLOG) 100 units/mL subcutaneous injection 1-6 Units, 1-6 Units, Subcutaneous, Q6H [address], 1 Units at 03/08/20 1750 **AND** Fingerstick Glucose (POCT), , , Q6H, Rachel Daigle MD    ondansetron [facility]) injection 4 mg, 4 mg, Intravenous, Q6H PRN, Rachel Daigle MD    Facility-Administered Medications Ordered in Other Encounters:     ferrous sulfate tablet 325 mg, 325 mg, Oral, Daily With Sachi Lucas MD    Laboratory Results:  Lab Results   Component Value Date    WBC 7 84 03/08/2020    HGB 11 3 (L) 03/08/2020    HCT 35 0 03/08/2020    MCV 93 03/08/2020     (L) 03/08/2020     Lab Results   Component Value Date    SODIUM 132 (L) 03/09/2020    K 5 2 03/09/2020     03/09/2020    CO2 26 03/09/2020    BUN 51 (H) 03/09/2020    CREATININE 1 16 03/09/2020    GLUC 141 (H) 03/09/2020    CALCIUM 9 0 03/09/2020     Lab Results   Component Value Date    CALCIUM 9 0 03/09/2020    PHOS 5 3 (H) 03/08/2020     No results found for: LABPROT

## 2020-03-09 NOTE — ASSESSMENT & PLAN NOTE
Recently discharged from LTAC, [facility] within [facility] for similar, reportedly has on and off tremors of the hands and legs for approximately 1 year, most recently increasing in frequency  · Neurology saw patient will inpatient [facility], felt to be medication related as well as metabolic/infectious derangement  · Reglan was decreased to 5 mg t i d   · Recommended outpatient referral to the movement disorder clinic  · Reportedly worsening per patient/family upon discharge from [facility], appreciate neurology input here  · MRI brain ordered however d/w them today and should not hold discharge--could be done as outpatient

## 2020-03-09 NOTE — PLAN OF CARE
Problem: Potential for Falls  Goal: Patient will remain free of falls  Description  INTERVENTIONS:  - Assess patient frequently for physical needs  -  Identify cognitive and physical deficits and behaviors that affect risk of falls    -  [place] fall precautions as indicated by assessment   - Educate patient/family on patient safety including physical limitations  - Instruct patient to call for assistance with activity based on assessment  - Modify environment to reduce risk of injury  - Consider OT/PT consult to assist with strengthening/mobility  Outcome: Progressing     Problem: Prexisting or High Potential for Compromised Skin Integrity  Goal: Skin integrity is maintained or improved  Description  INTERVENTIONS:  - Identify patients at risk for skin breakdown  - Assess and monitor skin integrity  - Assess and monitor nutrition and hydration status  - Monitor labs   - Assess for incontinence   - Turn and reposition patient  - Assist with mobility/ambulation  - Relieve pressure over bony prominences  - Avoid friction and shearing  - Provide appropriate hygiene as needed including keeping skin clean and dry  - Evaluate need for skin moisturizer/barrier cream  - Collaborate with interdisciplinary team   - Patient/family teaching  - Consider wound care consult   Outcome: Progressing     Problem: NEUROSENSORY - ADULT  Goal: Remains free of injury related to seizures activity  Description  INTERVENTIONS  - Maintain airway, patient safety  and administer oxygen as ordered  - Monitor patient for seizure activity, document and report duration and description of seizure to physician/advanced practitioner  - If seizure occurs,  ensure patient safety during seizure  - Reorient patient post seizure  - Seizure pads on all 4 side rails  - Instruct patient/family to notify RN of any seizure activity including if an aura is experienced  - Instruct patient/family to call for assistance with activity based on nursing assessment  - Administer anti-seizure medications if ordered    Outcome: Progressing  Goal: Achieves maximal functionality and self care  Description  INTERVENTIONS  - Monitor swallowing and airway patency with patient fatigue and changes in neurological status  - Encourage and assist patient to increase activity and self care     - Encourage visually impaired, hearing impaired and aphasic patients to use assistive/communication devices  Outcome: Progressing     Problem: RESPIRATORY - ADULT  Goal: Achieves optimal ventilation and oxygenation  Description  INTERVENTIONS:  - Assess for changes in respiratory status  - Assess for changes in mentation and behavior  - Position to facilitate oxygenation and minimize respiratory effort  - Oxygen administered by appropriate delivery if ordered  - Initiate smoking cessation education as indicated  - Encourage broncho-pulmonary hygiene including cough, deep breathe, Incentive Spirometry  - Assess the need for suctioning and aspirate as needed  - Assess and instruct to report SOB or any respiratory difficulty  - Respiratory Therapy support as indicated  Outcome: Progressing     Problem: GASTROINTESTINAL - ADULT  Goal: Maintains adequate nutritional intake  Description  INTERVENTIONS:  - Monitor percentage of each meal consumed  - Identify factors contributing to decreased intake, treat as appropriate  - Assist with meals as needed  - Monitor I&O, weight, and lab values if indicated  - Obtain nutrition services referral as needed  Outcome: Progressing     Problem: METABOLIC, FLUID AND ELECTROLYTES - ADULT  Goal: Glucose maintained within target range  Description  INTERVENTIONS:  - Monitor Blood Glucose as ordered  - Assess for signs and symptoms of hyperglycemia and hypoglycemia  - Administer ordered medications to maintain glucose within target range  - Assess nutritional intake and initiate nutrition service referral as needed  Outcome: Progressing     Problem: SKIN/TISSUE INTEGRITY - ADULT  Goal: Skin integrity remains intact  Description  INTERVENTIONS  - Identify patients at risk for skin breakdown  - Assess and monitor skin integrity  - Assess and monitor nutrition and hydration status  - Monitor labs (i e  albumin)  - Assess for incontinence   - Turn and reposition patient  - Assist with mobility/ambulation  - Relieve pressure over bony prominences  - Avoid friction and shearing  - Provide appropriate hygiene as needed including keeping skin clean and dry  - Evaluate need for skin moisturizer/barrier cream  - Collaborate with interdisciplinary team (i e  Nutrition, Rehabilitation, etc )   - Patient/family teaching  Outcome: Progressing     Problem: HEMATOLOGIC - ADULT  Goal: Maintains hematologic stability  Description  INTERVENTIONS  - Assess for signs and symptoms of bleeding or hemorrhage  - Monitor labs  - Administer supportive blood products/factors as ordered and appropriate  Outcome: Progressing     Problem: MUSCULOSKELETAL - ADULT  Goal: Maintain or return mobility to safest level of function  Description  INTERVENTIONS:  - Assess patient's ability to carry out ADLs; assess patient's baseline for ADL function and identify physical deficits which impact ability to perform ADLs (bathing, care of mouth/teeth, toileting, grooming, dressing, etc )  - Assess/evaluate cause of self-care deficits   - Assess range of motion  - Assess patient's mobility  - Assess patient's need for assistive devices and provide as appropriate  - Encourage maximum independence but intervene and supervise when necessary  - Involve family in performance of ADLs  - Assess for home care needs following discharge   - Consider OT consult to assist with ADL evaluation and planning for discharge  - Provide patient education as appropriate  Outcome: Progressing  Goal: Maintain proper alignment of affected body part  Description  INTERVENTIONS:  - Support, maintain and protect limb and body alignment  - Provide patient/ family with appropriate education  Outcome: Progressing

## 2020-03-09 NOTE — PROGRESS NOTES
Progress Note - Varsha Poe 1955, 62 y o  female MRN: [id number]    [facility] En[facility]: [id number]    Primary Care Provider: David Watts   Date and time admitted to hospital: 3/7/2020  7:54 PM        * Acute toxic/metabolic encephalopathy  Assessment & Plan  Patient was recently admitted to [facility] and dc on 3/4 after admission with AMS felt likely multifactorial to UTI, opioids and YVONNE  Presents again with lethargy and tremulousness  · Appreciate neurology input  During admission few days ago, neuro did not feel that any additional neurodiagnostics such as EEG/MRI were needed  MRI Brain ordered, however should not hold discharge and could be done outpatient if workup is otherwise done   · Reglan was decreased to 5 mg TID   Recommended eventual tapering of gabapentin and f/u with outpatient movement disorder team   · Presently afebrile, flu negative, procal negative  · Recently completed treatment for recent UTI yesterday, 3/7  · Currently, none of patient's home pain medications are ordered-need to f/u acute pain recommendations   · Suspect polypharmacy plays major role in all of this; all specialists in agreement  · Seems much improved today      Occasional tremors  Assessment & Plan  Recently discharged from [facility] for similar, reportedly has on and off tremors of the hands and legs for approximately 1 year, most recently increasing in frequency  · Neurology saw patient will inpatient [facility], felt to be medication related as well as metabolic/infectious derangement  · Reglan was decreased to 5 mg t i d   · Recommended outpatient referral to the movement disorder clinic  · Reportedly worsening per patient/family upon discharge from [facility], appreciate neurology input here  · MRI brain ordered however d/w them today and should not hold discharge--could be done as outpatient     Acute kidney injury [facility])  Assessment & Plan  POA with creatinine of 1 78; upon dc from [facility] few days ago was 1 02  · Worsened on 3/8 to 2  14  Got small amt of IVF and creatinine improved today  · Avoid nephrotoxins  · Diuretics and lisinopril held currently--SHOULD NOT BE RESUMED ON DISCHARGE  · Appreciate nephrology input    Chronic pain syndrome  Assessment & Plan  Patient uses oxycodone 30 mg q 4 hours p r n  And morphine 100 mg b i d , gabapentin 300 mg daily; prescribed by PCP  · Appears that during her admission to [facility] a few days ago, her oxycodone was adjusted to 50 mg q 4 hours p r n , and morphine was decreased to 100 mg daily  · On admission, iven her altered mental status and lethargy, all her home meds held  · Need to monitor for withdrawal  · Appreciate acute pain service consultation, as patient likely has significant component of polypharmacy causing her ongoing tremors and recurrent falls    Hyponatremia  Assessment & Plan  Noted 3/8  Suspect multifactorial  Is on many medications that cause this   · Diuretics on hold  Should NOT resume triamterene/hctz at ALL on discharge  · S/P IVF bolus   · Nephrology following    Type 2 diabetes mellitus with hyperglycemia, with long-term current use of insulin [facility])  Assessment & Plan  Lab Results   Component Value Date    HGBA1C 5 2 03/08/2020     Recent Labs     03/08/20  1910 03/08/20  2132 03/09/20  0100 03/09/20  0637   POCGLU 166* 124 113 143*       Blood Sugar Average: Last 72 hrs:  (P) 133 625   · A1c well controlled at 5 2  · Home meds Lantus 25 units HS with lispro 10 units t i d  With meals  · Hold metformin  · Monitor glucose closely    Recurrent falls  Assessment & Plan  Suspect multifactorial in setting of tremors, electrolyte imbalance and polypharmacy  · Follow up PT/OT recommendations  · Treat as per respective issues above     Hyperlipidemia  Assessment & Plan  · Continue statin when able to take p o      Essential hypertension  Assessment & Plan  · Presently, her home medications including lisinopril, Norvasc and Maxzide all on hold given acute kidney injury and blood pressure is within goal  · Continue to monitor      VTE Pharmacologic Prophylaxis:   Pharmacologic: Heparin  Mechanical VTE Prophylaxis in Place: Yes    Patient Centered Rounds: I have performed bedside rounds with nursing staff today  Discussions with Specialists or Other Care Team Provider: appreciate nephrology and neurology input  Appreciate acute pain service  Education and Discussions with Family / Patient: patient  Tried to call , no answer  Will try again later  Time Spent for Care: 30 minutes  More than 50% of total time spent on counseling and coordination of care as described above  Current Length of Stay: 1 day(s)    Current Patient Status: Inpatient   Certification Statement: The patient will continue to require additional inpatient hospital stay due to needs acute pain input regarding medications, PT/OT     Discharge Plan: tent dc tomorrow pending continued improvement in mental status and acute pain recs  Also need PT/OT    Code Status: Level 1 - Full Code      Subjective:   Doing better today  Wants to go home  OOB In chair  Answers questions a bit quicker  Still c/o occasional tremors of arms but much better  Objective:     Vitals:   Temp (24hrs), Av 1 °F (37 3 °C), Min:98 7 °F (37 1 °C), Max:99 3 °F (37 4 °C)    Temp:  [98 7 °F (37 1 °C)-99 3 °F (37 4 °C)] 99 2 °F (37 3 °C)  HR:  [] 100  Resp:  [18] 18  BP: (111-135)/(65-86) 135/85  SpO2:  [87 %-94 %] 93 %  Body mass index is 39 8 kg/m²  Input and Output Summary (last 24 hours): Intake/Output Summary (Last 24 hours) at 3/9/2020 1111  Last data filed at 3/9/2020 0018  Gross per 24 hour   Intake    Output 950 ml   Net -950 ml       Physical Exam:     Physical Exam   Constitutional: She is oriented to person, place, and time  No distress  Cardiovascular: Normal rate and regular rhythm     Pulmonary/Chest: Effort normal and breath sounds normal    Abdominal: Soft  Bowel sounds are normal  She exhibits no distension  There is no tenderness  Musculoskeletal: She exhibits edema (RUE, b/l LE )  Neurological: She is alert and oriented to person, place, and time  Occasional fine tremor of RUE noted during our conversation    Skin: Skin is warm and dry  Psychiatric:   Flat    Nursing note and vitals reviewed  Additional Data:     Labs:    Results from last 7 days   Lab Units 03/08/20  1811 03/08/20  0506   WBC Thousand/uL  --  7 84   HEMOGLOBIN g/dL  --  11 3*   HEMATOCRIT %  --  35 0   PLATELETS Thousands/uL 131* 120*   NEUTROS PCT %  --  65   LYMPHS PCT %  --  22   MONOS PCT %  --  9   EOS PCT %  --  2     Results from last 7 days   Lab Units 03/09/20  0809   POTASSIUM mmol/L 5 2   CHLORIDE mmol/L 102   CO2 mmol/L 26   BUN mg/dL 51*   CREATININE mg/dL 1 16   CALCIUM mg/dL 9 0   ALK PHOS U/L 119*   ALT U/L 25   AST U/L 22           * I Have Reviewed All Lab Data Listed Above  * Additional Pertinent Lab Tests Reviewed: All Labs Within Last 24 Hours Reviewed    Imaging:    Imaging Reports Reviewed Today Include: all  Imaging Personally Reviewed by Myself Includes:  none    Recent Cultures (last 7 days):           Last 24 Hours Medication List:     Current Facility-Administered Medications:  heparin (porcine) 5,000 Units Subcutaneous Q8H Reno Burden MD   HYDROmorphone 0 2 mg Intravenous Q4H PRN Frank Caraballo MD   insulin glargine 25 Units Subcutaneous HS Frank Caraballo MD   insulin lispro 1-6 Units Subcutaneous Q6H [facility] Frank Caraballo MD   ondansetron 4 mg Intravenous Q6H PRN Frank Caraballo MD     Facility-Administered Medications Ordered in Other Encounters:  ferrous sulfate 325 mg Oral Daily With Yaritza Willson MD        Today, Patient Was Seen By: Titi Jenkins PA-C    ** Please Note: Dictation voice to text software may have been used in the creation of this document   **

## 2020-03-09 NOTE — ASSESSMENT & PLAN NOTE
Suspect multifactorial in setting of tremors, electrolyte imbalance and polypharmacy  · Follow up PT/OT recommendations  · Treat as per respective issues above

## 2020-03-10 LAB
ANION GAP SERPL CALCULATED.3IONS-SCNC: 3 MMOL/L (ref 4–13)
BASOPHILS # BLD AUTO: 0.01 THOUSANDS/ΜL (ref 0–0.1)
BASOPHILS NFR BLD AUTO: 0 % (ref 0–1)
BUN SERPL-MCNC: 33 MG/DL (ref 5–25)
CALCIUM SERPL-MCNC: 9.2 MG/DL (ref 8.3–10.1)
CHLORIDE SERPL-SCNC: 105 MMOL/L (ref 100–108)
CO2 SERPL-SCNC: 28 MMOL/L (ref 21–32)
CREAT SERPL-MCNC: 1.06 MG/DL (ref 0.6–1.3)
EOSINOPHIL # BLD AUTO: 0.09 THOUSAND/ΜL (ref 0–0.61)
EOSINOPHIL NFR BLD AUTO: 2 % (ref 0–6)
ERYTHROCYTE [DISTWIDTH] IN BLOOD BY AUTOMATED COUNT: 13.6 % (ref 11.6–15.1)
GFR SERPL CREATININE-BSD FRML MDRD: 56 ML/MIN/1.73SQ M
GLUCOSE SERPL-MCNC: 104 MG/DL (ref 65–140)
GLUCOSE SERPL-MCNC: 105 MG/DL (ref 65–140)
GLUCOSE SERPL-MCNC: 122 MG/DL (ref 65–140)
GLUCOSE SERPL-MCNC: 127 MG/DL (ref 65–140)
GLUCOSE SERPL-MCNC: 129 MG/DL (ref 65–140)
GLUCOSE SERPL-MCNC: 153 MG/DL (ref 65–140)
HCT VFR BLD AUTO: 35.6 % (ref 34.8–46.1)
HGB BLD-MCNC: 11.8 G/DL (ref 11.5–15.4)
IMM GRANULOCYTES # BLD AUTO: 0.02 THOUSAND/UL (ref 0–0.2)
IMM GRANULOCYTES NFR BLD AUTO: 1 % (ref 0–2)
LYMPHOCYTES # BLD AUTO: 1.01 THOUSANDS/ΜL (ref 0.6–4.47)
LYMPHOCYTES NFR BLD AUTO: 24 % (ref 14–44)
MCH RBC QN AUTO: 30.2 PG (ref 26.8–34.3)
MCHC RBC AUTO-ENTMCNC: 33.1 G/DL (ref 31.4–37.4)
MCV RBC AUTO: 91 FL (ref 82–98)
MONOCYTES # BLD AUTO: 0.35 THOUSAND/ΜL (ref 0.17–1.22)
MONOCYTES NFR BLD AUTO: 8 % (ref 4–12)
NEUTROPHILS # BLD AUTO: 2.71 THOUSANDS/ΜL (ref 1.85–7.62)
NEUTS SEG NFR BLD AUTO: 65 % (ref 43–75)
NRBC BLD AUTO-RTO: 0 /100 WBCS
PLATELET # BLD AUTO: 115 THOUSANDS/UL (ref 149–390)
PMV BLD AUTO: 10 FL (ref 8.9–12.7)
POTASSIUM SERPL-SCNC: 4.6 MMOL/L (ref 3.5–5.3)
RBC # BLD AUTO: 3.91 MILLION/UL (ref 3.81–5.12)
SODIUM SERPL-SCNC: 136 MMOL/L (ref 136–145)
WBC # BLD AUTO: 4.19 THOUSAND/UL (ref 4.31–10.16)

## 2020-03-10 PROCEDURE — 82948 REAGENT STRIP/BLOOD GLUCOSE: CPT

## 2020-03-10 PROCEDURE — 80048 BASIC METABOLIC PNL TOTAL CA: CPT | Performed by: INTERNAL MEDICINE

## 2020-03-10 PROCEDURE — 85025 COMPLETE CBC W/AUTO DIFF WBC: CPT | Performed by: INTERNAL MEDICINE

## 2020-03-10 PROCEDURE — 99232 SBSQ HOSP IP/OBS MODERATE 35: CPT | Performed by: PHYSICIAN ASSISTANT

## 2020-03-10 PROCEDURE — 99232 SBSQ HOSP IP/OBS MODERATE 35: CPT | Performed by: INTERNAL MEDICINE

## 2020-03-10 PROCEDURE — 97530 THERAPEUTIC ACTIVITIES: CPT

## 2020-03-10 RX ORDER — DULOXETIN HYDROCHLORIDE 60 MG/1
60 CAPSULE, DELAYED RELEASE ORAL DAILY
Status: DISCONTINUED | OUTPATIENT
Start: 2020-03-11 | End: 2020-03-11 | Stop reason: HOSPADM

## 2020-03-10 RX ORDER — LISINOPRIL 10 MG/1
10 TABLET ORAL DAILY
Status: DISCONTINUED | OUTPATIENT
Start: 2020-03-11 | End: 2020-03-11 | Stop reason: HOSPADM

## 2020-03-10 RX ORDER — LANOLIN ALCOHOL/MO/W.PET/CERES
3 CREAM (GRAM) TOPICAL
Status: DISCONTINUED | OUTPATIENT
Start: 2020-03-10 | End: 2020-03-11 | Stop reason: HOSPADM

## 2020-03-10 RX ADMIN — HEPARIN SODIUM 5000 UNITS: 5000 INJECTION INTRAVENOUS; SUBCUTANEOUS at 05:29

## 2020-03-10 RX ADMIN — INSULIN LISPRO 1 UNITS: 100 INJECTION, SOLUTION INTRAVENOUS; SUBCUTANEOUS at 11:25

## 2020-03-10 RX ADMIN — MELATONIN 3 MG: at 21:49

## 2020-03-10 RX ADMIN — INSULIN GLARGINE 25 UNITS: 100 INJECTION, SOLUTION SUBCUTANEOUS at 21:49

## 2020-03-10 RX ADMIN — GABAPENTIN 100 MG: 100 CAPSULE ORAL at 17:26

## 2020-03-10 RX ADMIN — GABAPENTIN 100 MG: 100 CAPSULE ORAL at 08:49

## 2020-03-10 RX ADMIN — HEPARIN SODIUM 5000 UNITS: 5000 INJECTION INTRAVENOUS; SUBCUTANEOUS at 21:49

## 2020-03-10 RX ADMIN — OXYCODONE HYDROCHLORIDE 10 MG: 10 TABLET ORAL at 17:26

## 2020-03-10 RX ADMIN — HEPARIN SODIUM 5000 UNITS: 5000 INJECTION INTRAVENOUS; SUBCUTANEOUS at 13:41

## 2020-03-10 NOTE — SOCIAL WORK
A post acute care recommendation was made by your care team for STR  Discussed Freedom of Choice with patient  List of STR facilities given to patient via hard copy  Patient aware the list is custom filtered for them by insurance contracted providers and that [facility]'s post acute providers are designated    She will review and advise of choices

## 2020-03-10 NOTE — SOCIAL WORK
[facility] was informed by Katlin Bartlett PA-C that she spoke to pt's daughter Leward Soulier regarding dc plan and recommendation for rehab  Leward Soulier is agreeable and prefers referrals to [address] [place] [address]  Met with pt to discuss same  Pt is agreeable to rehab and to referrals to [facility] and [facility]  [facility] referrals sent  CM spoke to pt's daughter Leward Soulier to confirm same  PASRR completed and sent via [facility] to [facility] and [facility]

## 2020-03-10 NOTE — PROGRESS NOTES
Progress Note - Sapna Andrew 1955, 62 y o  female MRN: [id number]    [facility]: [id number]    Primary Care Provider: Arleen Watts   Date and time admitted to hospital: 3/7/2020  7:54 PM        * Acute toxic/metabolic encephalopathy  Assessment & Plan  Patient was recently admitted to [facility] and [place] on 3/4 after admission with AMS felt likely multifactorial to UTI, opioids and YVONNE  Presents again with lethargy and tremulousness/myoclonus  Myoclonus has improved  · Appreciate neurology input  During admission few days ago, neuro did not feel that any additional neurodiagnostics such as EEG/MRI were needed  MRI Brain - no acute disease  Symptoms likely secondary to polypharmacy in the setting of YVONNE  · All meds stopped except for gabapentin, dose lowered  Recommended eventual tapering of gabapentin  OK per Nephrology to restart Cymbalta  Will keep off narcotics  f/u with outpatient movement disorder team   · APS recommending prn oxycodone while in house, but would not prescribe narcotics on discharge  Needs pain management follow up as outpatient     · Presently afebrile, flu negative, procal negative  · Recently completed treatment for recent UTI 3/7  · Suspect polypharmacy plays major role in all of this; all specialists in agreement  · Seems much improved today      Occasional tremors  Assessment & Plan  Recently discharged from [facility] for similar, reportedly has on and off tremors of the hands and legs for approximately 1 year, most recently increasing in frequency  · Neurology saw patient will inpatient [facility], felt to be medication related as well as metabolic/infectious derangement  · Reglan was decreased to 5 mg t i d, then discontinued   · Recommended outpatient referral to the movement disorder clinic  · Overall improving     Recurrent falls  Assessment & Plan  Suspect multifactorial in setting of tremors, electrolyte imbalance and polypharmacy  · Follow up PT/OT recommendations - STR    Acute kidney injury [facility])  Assessment & Plan  POA with creatinine of 1 78; upon dc from [facility] few days ago was 1 02  · Worsened on 3/8 to 2  14  Got small amt of IVF and creatinine improved today  · Avoid nephrotoxins  · Appreciate nephrology input    Type 2 diabetes mellitus with hyperglycemia, with long-term current use of insulin [facility])  Assessment & Plan  Lab Results   Component Value Date    HGBA1C 5 2 03/08/2020     Recent Labs     03/09/20  1848 03/10/20  0024 03/10/20  0527 03/10/20  1118   POCGLU 163* 129 104 153*       Blood Sugar Average: Last 72 hrs:  (P) 878 [id number]   · A1c well controlled at 5 2  · Home meds Lantus 25 units HS with lispro 10 units t i d  With meals  · Hold metformin  · Monitor glucose closely    Essential hypertension  Assessment & Plan  · Presently, her home medications including lisinopril, Norvasc and Maxzide all on hold given acute kidney injury and blood pressure is within goal  · BP's starting to rise into 160's  · Will restart lisinopril and montior  · Would not restart triamterene    Hyponatremia  Assessment & Plan  Noted 3/8  Suspect multifactorial  Is on many medications that cause this   · Diuretics on hold  Should NOT resume triamterene/hctz at ALL on discharge  · S/P IVF bolus   · Nephrology following    Chronic pain syndrome  Assessment & Plan  Patient uses oxycodone 30 mg q 4 hours p r n   And morphine 100 mg b i d , gabapentin 300 mg daily; prescribed by PCP  · Appears that during her admission to [facility] a few days ago, her oxycodone was adjusted to 50 mg q 4 hours p r n , and morphine was decreased to 100 mg daily  · On admission, given her altered mental status and lethargy, all her home meds held  · Need to monitor for withdrawal  · Appreciate acute pain service consultation, can use oxycodone prn while admitted    Hyperlipidemia  Assessment & Plan  · Continue statin when able to take p o       VTE Pharmacologic Prophylaxis:   Pharmacologic: Heparin  Mechanical VTE Prophylaxis in Place: Yes    Patient Centered Rounds: I have performed bedside rounds with nursing staff today  Discussions with Specialists or Other Care Team Provider: case management    Education and Discussions with Family / Patient: patient, both daughters called with update    Time Spent for Care: 30 minutes  More than 50% of total time spent on counseling and coordination of care as described above  Current Length of Stay: 2 day(s)    Current Patient Status: Inpatient   Certification Statement: The patient will continue to require additional inpatient hospital stay due to needs STR    Discharge Plan: Hopeful discharge to [facility] in next 24 hours      Code Status: Level 1 - Full Code      Subjective:   Wants to go home    Objective:     Vitals:   Temp (24hrs), Av 8 °F (37 1 °C), Min:97 9 °F (36 6 °C), Max:99 3 °F (37 4 °C)    Temp:  [97 9 °F (36 6 °C)-99 3 °F (37 4 °C)] 97 9 °F (36 6 °C)  HR:  [88-93] 88  Resp:  [16] 16  BP: (161-167)/(88-90) 165/88  SpO2:  [94 %-96 %] 96 %  Body mass index is 34 96 kg/m²  Input and Output Summary (last 24 hours): Intake/Output Summary (Last 24 hours) at 3/10/2020 1605  Last data filed at 3/10/2020 0746  Gross per 24 hour   Intake 240 ml   Output    Net 240 ml       Physical Exam:     Physical Exam   Constitutional: She is oriented to person, place, and time  She appears well-developed and well-nourished  No distress  HENT:   Head: Normocephalic and atraumatic  Cardiovascular: Normal rate and regular rhythm  Exam reveals no friction rub  No murmur heard  Pulmonary/Chest: Effort normal and breath sounds normal  No respiratory distress  She has no wheezes  Abdominal: Soft  Bowel sounds are normal  She exhibits no distension  There is no tenderness  There is no rebound and no guarding  Musculoskeletal: She exhibits no edema     Neurological: She is alert and oriented to person, place, and time  No cranial nerve deficit  Skin: Skin is warm and dry  No rash noted  Psychiatric: She has a normal mood and affect  Cognition and memory are impaired  She expresses impulsivity  Nursing note and vitals reviewed  Additional Data:     Labs:    Results from last 7 days   Lab Units 03/10/20  0538   WBC Thousand/uL 4 19*   HEMOGLOBIN g/dL 11 8   HEMATOCRIT % 35 6   PLATELETS Thousands/uL 115*   NEUTROS PCT % 65   LYMPHS PCT % 24   MONOS PCT % 8   EOS PCT % 2     Results from last 7 days   Lab Units 03/10/20  0538 03/09/20  0809   SODIUM mmol/L 136 132*   POTASSIUM mmol/L 4 6 5 2   CHLORIDE mmol/L 105 102   CO2 mmol/L 28 26   BUN mg/dL 33* 51*   CREATININE mg/dL 1 06 1 16   ANION GAP mmol/L 3* 4   CALCIUM mg/dL 9 2 9 0   ALBUMIN g/dL  --  3 3*   TOTAL BILIRUBIN mg/dL  --  0 40   ALK PHOS U/L  --  119*   ALT U/L  --  25   AST U/L  --  22   GLUCOSE RANDOM mg/dL 105 141*         Results from last 7 days   Lab Units 03/10/20  1118 03/10/20  0527 03/10/20  0024 03/09/20  1848 03/09/20  0637 03/09/20  0100 03/08/20  2132 03/08/20  1910 03/08/20  1614 03/08/20  1130 03/08/20  0558 03/07/20  2021   POC GLUCOSE mg/dl 153* 104 129 163* 143* 113 124 166* 188* 113 105 117     Results from last 7 days   Lab Units 03/08/20  0900   HEMOGLOBIN A1C % 5 2     Results from last 7 days   Lab Units 03/09/20  0809 03/07/20  2111   PROCALCITONIN ng/ml 0 07 0 11           * I Have Reviewed All Lab Data Listed Above  * Additional Pertinent Lab Tests Reviewed:  All Labs Within Last 24 Hours Reviewed    Imaging:    Imaging Reports Reviewed Today Include: MRI brain  Imaging Personally Reviewed by Myself Includes:  none    Recent Cultures (last 7 days):           Last 24 Hours Medication List:     Current Facility-Administered Medications:  gabapentin 100 mg Oral BID Titi Jenkins PA-C   heparin (porcine) 5,000 Units Subcutaneous Q8H [facility] Frank Caraballo MD   insulin glargine 25 Units Subcutaneous HS Josias Lizz Francois MD   insulin lispro 1-6 Units Subcutaneous Q6H [address] Will MD Franny   ondansetron 4 mg Intravenous Q6H PRN Kevin Blanton MD   oxyCODONE 10 mg Oral Q8H PRN Bozena Silva PA-C   oxyCODONE 5 mg Oral Q8H PRN Bozena Silva PA-C     Facility-Administered Medications Ordered in Other Encounters:  ferrous sulfate 325 mg Oral Daily With Matt Flores MD        Today, Patient Was Seen By: Tamika Santillan PA-C    ** Please Note: Dictation voice to text software may have been used in the creation of this document   **

## 2020-03-10 NOTE — RESTORATIVE TECHNICIAN NOTE
Restorative Specialist Mobility Note       Activity: Ambulate in myers, Ambulate in room, Bathroom privileges, Chair, Dangle, Stand at bedside(Educated/encouraged pt to ambulate with assistance 3-4 x's/day  Bed alarm on   Pt callbell, phone/tray within reach )     Assistive Device: Front wheel walker       Thelma BECKMAN, Restorative Technician, [employer] [facility]

## 2020-03-10 NOTE — ASSESSMENT & PLAN NOTE
Lab Results   Component Value Date    HGBA1C 5 2 03/08/2020     Recent Labs     03/09/20  1848 03/10/20  0024 03/10/20  0527 03/10/20  1118   POCGLU 163* 129 104 153*       Blood Sugar Average: Last 72 hrs:  (P) 551 [id number]   · A1c well controlled at 5 2  · Home meds Lantus 25 units HS with lispro 10 units t i d   With meals  · Hold metformin  · Monitor glucose closely

## 2020-03-10 NOTE — PLAN OF CARE
Problem: Potential for Falls  Goal: Patient will remain free of falls  Description  INTERVENTIONS:  - Assess patient frequently for physical needs  -  Identify cognitive and physical deficits and behaviors that affect risk of falls    -  [place] fall precautions as indicated by assessment   - Educate patient/family on patient safety including physical limitations  - Instruct patient to call for assistance with activity based on assessment  - Modify environment to reduce risk of injury  - Consider OT/PT consult to assist with strengthening/mobility  Outcome: Progressing     Problem: Prexisting or High Potential for Compromised Skin Integrity  Goal: Skin integrity is maintained or improved  Description  INTERVENTIONS:  - Identify patients at risk for skin breakdown  - Assess and monitor skin integrity  - Assess and monitor nutrition and hydration status  - Monitor labs   - Assess for incontinence   - Turn and reposition patient  - Assist with mobility/ambulation  - Relieve pressure over bony prominences  - Avoid friction and shearing  - Provide appropriate hygiene as needed including keeping skin clean and dry  - Evaluate need for skin moisturizer/barrier cream  - Collaborate with interdisciplinary team   - Patient/family teaching  - Consider wound care consult   Outcome: Progressing     Problem: NEUROSENSORY - ADULT  Goal: Remains free of injury related to seizures activity  Description  INTERVENTIONS  - Maintain airway, patient safety  and administer oxygen as ordered  - Monitor patient for seizure activity, document and report duration and description of seizure to physician/advanced practitioner  - If seizure occurs,  ensure patient safety during seizure  - Reorient patient post seizure  - Seizure pads on all 4 side rails  - Instruct patient/family to notify RN of any seizure activity including if an aura is experienced  - Instruct patient/family to call for assistance with activity based on nursing assessment  - Administer anti-seizure medications if ordered    Outcome: Progressing  Goal: Achieves maximal functionality and self care  Description  INTERVENTIONS  - Monitor swallowing and airway patency with patient fatigue and changes in neurological status  - Encourage and assist patient to increase activity and self care     - Encourage visually impaired, hearing impaired and aphasic patients to use assistive/communication devices  Outcome: Progressing     Problem: RESPIRATORY - ADULT  Goal: Achieves optimal ventilation and oxygenation  Description  INTERVENTIONS:  - Assess for changes in respiratory status  - Assess for changes in mentation and behavior  - Position to facilitate oxygenation and minimize respiratory effort  - Oxygen administered by appropriate delivery if ordered  - Initiate smoking cessation education as indicated  - Encourage broncho-pulmonary hygiene including cough, deep breathe, Incentive Spirometry  - Assess the need for suctioning and aspirate as needed  - Assess and instruct to report SOB or any respiratory difficulty  - Respiratory Therapy support as indicated  Outcome: Progressing     Problem: GASTROINTESTINAL - ADULT  Goal: Maintains adequate nutritional intake  Description  INTERVENTIONS:  - Monitor percentage of each meal consumed  - Identify factors contributing to decreased intake, treat as appropriate  - Assist with meals as needed  - Monitor I&O, weight, and lab values if indicated  - Obtain nutrition services referral as needed  Outcome: Progressing     Problem: METABOLIC, FLUID AND ELECTROLYTES - ADULT  Goal: Glucose maintained within target range  Description  INTERVENTIONS:  - Monitor Blood Glucose as ordered  - Assess for signs and symptoms of hyperglycemia and hypoglycemia  - Administer ordered medications to maintain glucose within target range  - Assess nutritional intake and initiate nutrition service referral as needed  Outcome: Progressing     Problem: SKIN/TISSUE INTEGRITY - ADULT  Goal: Skin integrity remains intact  Description  INTERVENTIONS  - Identify patients at risk for skin breakdown  - Assess and monitor skin integrity  - Assess and monitor nutrition and hydration status  - Monitor labs (i e  albumin)  - Assess for incontinence   - Turn and reposition patient  - Assist with mobility/ambulation  - Relieve pressure over bony prominences  - Avoid friction and shearing  - Provide appropriate hygiene as needed including keeping skin clean and dry  - Evaluate need for skin moisturizer/barrier cream  - Collaborate with interdisciplinary team (i e  Nutrition, Rehabilitation, etc )   - Patient/family teaching  Outcome: Progressing     Problem: HEMATOLOGIC - ADULT  Goal: Maintains hematologic stability  Description  INTERVENTIONS  - Assess for signs and symptoms of bleeding or hemorrhage  - Monitor labs  - Administer supportive blood products/factors as ordered and appropriate  Outcome: Progressing     Problem: MUSCULOSKELETAL - ADULT  Goal: Maintain or return mobility to safest level of function  Description  INTERVENTIONS:  - Assess patient's ability to carry out ADLs; assess patient's baseline for ADL function and identify physical deficits which impact ability to perform ADLs (bathing, care of mouth/teeth, toileting, grooming, dressing, etc )  - Assess/evaluate cause of self-care deficits   - Assess range of motion  - Assess patient's mobility  - Assess patient's need for assistive devices and provide as appropriate  - Encourage maximum independence but intervene and supervise when necessary  - Involve family in performance of ADLs  - Assess for home care needs following discharge   - Consider OT consult to assist with ADL evaluation and planning for discharge  - Provide patient education as appropriate  Outcome: Progressing  Goal: Maintain proper alignment of affected body part  Description  INTERVENTIONS:  - Support, maintain and protect limb and body alignment  - Provide patient/ family with appropriate education  Outcome: Progressing     Problem: Nutrition/Hydration-ADULT  Goal: Nutrient/Hydration intake appropriate for improving, restoring or maintaining nutritional needs  Description  Monitor and assess patient's nutrition/hydration status for malnutrition  Collaborate with interdisciplinary team and initiate plan and interventions as ordered  Monitor patient's weight and dietary intake as ordered or per policy  Utilize nutrition screening tool and intervene as necessary  Determine patient's food preferences and provide high-protein, high-caloric foods as appropriate       INTERVENTIONS:  - Monitor oral intake, urinary output, labs, and treatment plans  - Assess nutrition and hydration status and recommend course of action  - Evaluate amount of meals eaten  - Assist patient with eating if necessary   - Allow adequate time for meals  - Recommend/ encourage appropriate diets, oral nutritional supplements, and vitamin/mineral supplements  - Order, calculate, and assess calorie counts as needed  - Recommend, monitor, and adjust tube feedings and TPN/PPN based on assessed needs  - Assess need for intravenous fluids  - Provide specific nutrition/hydration education as appropriate  - Include patient/family/caregiver in decisions related to nutrition  Outcome: Progressing

## 2020-03-10 NOTE — ASSESSMENT & PLAN NOTE
· Presently, her home medications including lisinopril, Norvasc and Maxzide all on hold given acute kidney injury and blood pressure is within goal  · BP's starting to rise into 160's  · Will restart lisinopril and montior  · Would not restart triamterene

## 2020-03-10 NOTE — PLAN OF CARE
Problem: PHYSICAL THERAPY ADULT  Goal: Performs mobility at highest level of function for planned discharge setting  See evaluation for individualized goals  Description  Treatment/Interventions: Functional transfer training, Elevations, LE strengthening/ROM, Therapeutic exercise, Endurance training, Patient/family training, Gait training, Cognitive reorientation, Bed mobility  Equipment Recommended: JACKY MENDOZA  [facility]       See flowsheet documentation for full assessment, interventions and recommendations  Outcome: Progressing  Note:   Prognosis: Good  Problem List: Decreased strength, Decreased endurance, Impaired balance, Decreased mobility, Decreased cognition, Impaired judgement, Decreased safety awareness  Assessment: Therapist answered patient's call bell and requested to use the bathroom  Improvement noted in safety but continues to require occasional cues for safety  Somewhat self-limiting at times, as patient demonstrated limited effort to move BLE on/off EOB during bed mobility  Requires min A x 1 for transfers and ambulation  At this time, recommend STR upon discharge once medically stable  Barriers to Discharge: Decreased caregiver support, Inaccessible home environment     Recommendation: Post acute IP rehab     PT - OK to Discharge: Yes    See flowsheet documentation for full assessment

## 2020-03-10 NOTE — PHYSICAL THERAPY NOTE
Physical Therapy Treatment Note   Patient Name: Juan Galarza    BWOZR'B Date: 3/10/2020     Problem List  Principal Problem:    Acute toxic/metabolic encephalopathy  Active Problems:    Essential hypertension    Hyperlipidemia    Recurrent falls    Hyponatremia    Type 2 diabetes mellitus with hyperglycemia, with long-term current use of insulin (HCC)    Chronic pain syndrome    Acute kidney injury ([facility] [address] )    Occasional tremors       Past Medical History  Past Medical History:   Diagnosis Date    Abnormal head CT 7/14/2017    Acute kidney injury ([facility] [address] ) 8/19/2018    Cellulitis 1/10/2017    Closed fracture of multiple ribs of right side 7/14/2017    Degenerative disc disease at L5-S1 level     Diabetes mellitus ([facility] [address] )     History of methicillin resistant staphylococcus aureus (MRSA) 08/21/2018    negative nasal culture- isolation and hx of mrsa removed 8/20/2018    Hyperkalemia 4/25/2018    Hypertension     Hypocalcemia 4/25/2018    Hyponatremia 7/14/2017        Past Surgical History  Past Surgical History:   Procedure Laterality Date    CHOLECYSTECTOMY      JOINT REPLACEMENT      OOPHORECTOMY          03/10/20 1350   Pain Assessment   Pain Assessment 0-10   Pain Score No Pain   Restrictions/Precautions   Weight Bearing Precautions Per Order No   Braces or Orthoses   (none)   Other Precautions Impulsive;Cognitive; Chair Alarm; Bed Alarm; Fall Risk   General   Chart Reviewed Yes   Cognition   Overall Cognitive Status Impaired   Arousal/Participation Cooperative   Attention Attends with cues to redirect   Orientation Level Oriented to person;Oriented to place   Memory Decreased recall of precautions;Decreased recall of recent events   Following Commands Follows one step commands without difficulty   Comments Self-limiting behavior noted, limited effort with some functional mobility  Occasional VC for safety     Subjective   Subjective "I'm waiting for you to move my legs"   Bed Mobility   Supine to Sit 3  Moderate assistance   Additional items Assist x 1; Increased time required;Verbal cues;LE management   Sit to Supine 3  Moderate assistance   Additional items Assist x 1; Increased time required;LE management;Verbal cues   Additional Comments Limited attempt to move BLE on/off EOB  Required mod A x 1 for BLE management   Transfers   Sit to Stand 4  Minimal assistance   Additional items Assist x 1; Increased time required;Verbal cues; Impulsive   Stand to Sit 4  Minimal assistance   Additional items Assist x 1; Increased time required;Verbal cues   Toilet transfer 4  Minimal assistance   Additional items Assist x 1; Increased time required;Verbal cues   Additional Comments Concluded session with patient supine in bed with alarm on  VC for safety and proper use of hand placement   Ambulation/Elevation   Gait pattern Decreased foot clearance;Shuffling; Foward flexed; Excessively slow   Gait Assistance 4  Minimal assist   Additional items Assist x 1;Verbal cues   Assistive Device Rolling walker   Distance 10 feet x 2    Balance   Static Sitting Fair   Dynamic Sitting Fair   Static Standing Fair -   Dynamic Standing Poor +   Ambulatory Poor +   Endurance Deficit   Endurance Deficit Yes   Endurance Deficit Description activity tolerance, fatigue, cognition   Activity Tolerance   Activity Tolerance Patient limited by fatigue   Medical Staff Made Aware CM   Nurse Made Aware Yes, RN Bony   Assessment   Prognosis Good   Problem List Decreased strength;Decreased endurance; Impaired balance;Decreased mobility; Decreased cognition; Impaired judgement;Decreased safety awareness   Assessment Therapist answered patient's call bell and requested to use the bathroom  Improvement noted in safety but continues to require occasional cues for safety  Somewhat self-limiting at times, as patient demonstrated limited effort to move BLE on/off EOB during bed mobility   Requires min A x 1 for transfers and ambulation  At this time, recommend STR upon discharge once medically stable  Barriers to Discharge Decreased caregiver support; Inaccessible home environment   Goals   Patient Goals To use the bathroom   STG Expiration Date 03/23/20   Plan   Treatment/Interventions Functional transfer training; Therapeutic exercise; Endurance training;Cognitive reorientation;Patient/family training;Bed mobility;Gait training   Progress Progressing toward goals   PT Frequency   (3-5x/wk)   Recommendation   Recommendation Post acute IP rehab   Equipment Recommended Walker   PT - OK to Discharge Yes   Additional Comments To rehab once medically stable       Nain Damian PT, DPT

## 2020-03-10 NOTE — ASSESSMENT & PLAN NOTE
Patient was recently admitted to [facility] and [facility] on 3/4 after admission with AMS felt likely multifactorial to UTI, opioids and YVONNE  Presents again with lethargy and tremulousness/myoclonus  Myoclonus has improved  · Appreciate neurology input  During admission few days ago, neuro did not feel that any additional neurodiagnostics such as EEG/MRI were needed  MRI Brain - no acute disease  Symptoms likely secondary to polypharmacy in the setting of YVONNE  · All meds stopped except for gabapentin, dose lowered  Recommended eventual tapering of gabapentin  OK per Nephrology to restart Cymbalta  Will keep off narcotics  f/u with outpatient movement disorder team   · APS recommending prn oxycodone while in house, but would not prescribe narcotics on discharge  Needs pain management follow up as outpatient     · Presently afebrile, flu negative, procal negative  · Recently completed treatment for recent UTI 3/7  · Suspect polypharmacy plays major role in all of this; all specialists in agreement  · Seems much improved today

## 2020-03-10 NOTE — ASSESSMENT & PLAN NOTE
POA with creatinine of 1 78; upon dc from [facility] few days ago was 1 02  · Worsened on 3/8 to 2  14   Got small amt of IVF and creatinine improved today  · Avoid nephrotoxins  · Appreciate nephrology input

## 2020-03-10 NOTE — PROGRESS NOTES
NEPHROLOGY PROGRESS NOTE    Roxanna Chew 62 y o  female MRN: [id number]  Unit/Bed#: [facility] 709-01 Encounter: [id number]  Reason for Consult:  Acute renal failure and hyponatremia    The patient was sleeping when I went into the room  She was very tired and when I went to talk to her she awakened says she was not confused  No other significant changes reported  ASSESSMENT/PLAN:  1  Renal    Patient acute renal failure due to pre renal azotemia creatinine has declined back to 1  Urinalysis from 02/29/2020 showed no blood or protein microscopically so she has no intrinsic kidney disease and at this point the issue is resolved  She has had her ACE-inhibitor held and that can be resumed  I will leave this to discretion of the primary team     2  Hyponatremia    The patient presented with hyponatremia that has corrected to normal   This was likely multifactorial due to acute renal failure from volume depletion with stimulates ADH and also elevated creatinine will impair the ability to excrete water load  She was also on a thiazide diuretic that can interfere with urinary dilution  I do not think this was related to her SSRI as a corrected too quickly  For now would hold thiazide diuretic  I will leave it to the discretion of the primary team whether not they want to resume her SSRI  We will sign off please call if we can be of further assistance  SUBJECTIVE:  Review of Systems   Constitution: Positive for malaise/fatigue  Negative for chills, decreased appetite and fever  HENT: Negative  Eyes: Negative  Cardiovascular: Negative for chest pain, dyspnea on exertion, orthopnea and palpitations  Respiratory: Negative  Negative for cough, shortness of breath, sputum production and wheezing  Musculoskeletal:        Pain in legs  This is chronic  Gastrointestinal: Negative for bloating, abdominal pain, diarrhea, nausea and vomiting  Genitourinary: Negative  Neurological: Negative  OBJECTIVE:  Current Weight: Weight - Scale: 86 7 kg (191 lb 2 2 oz)  Vitals:Temp (24hrs), Av 8 °F (37 1 °C), Min:97 9 °F (36 6 °C), Max:99 3 °F (37 4 °C)  Current: Temperature: 97 9 °F (36 6 °C)   Blood pressure 167/88, pulse 88, temperature 97 9 °F (36 6 °C), resp  rate 18, height 5' 2" (1 575 m), weight 86 7 kg (191 lb 2 2 oz), SpO2 96 %  , Body mass index is 34 96 kg/m²  Intake/Output Summary (Last 24 hours) at 3/10/2020 0920  Last data filed at 3/10/2020 0746  Gross per 24 hour   Intake 360 ml   Output 300 ml   Net 60 ml       Physical Exam: /88   Pulse 88   Temp 97 9 °F (36 6 °C)   Resp 18   Ht 5' 2" (1 575 m)   Wt 86 7 kg (191 lb 2 2 oz)   LMP  (LMP Unknown) Comment: post menopause  SpO2 96%   BMI 34 96 kg/m²   Physical Exam   Constitutional: No distress  HENT:   Head: Atraumatic  Mouth/Throat: Oropharynx is clear and moist    Eyes: Conjunctivae and EOM are normal  No scleral icterus  Neck: Neck supple  No JVD present  Cardiovascular: Normal rate and regular rhythm  Exam reveals no gallop and no friction rub  Pulmonary/Chest: Effort normal and breath sounds normal  No respiratory distress  She has no wheezes  She has no rales  Abdominal: Soft  Bowel sounds are normal  She exhibits no distension  There is no tenderness  There is no rebound  Skin: She is not diaphoretic         Medications:    Current Facility-Administered Medications:     gabapentin (NEURONTIN) capsule 100 mg, 100 mg, Oral, BID, Pamela Whitaker PA-C, 100 mg at 03/10/20 0849    heparin (porcine) subcutaneous injection 5,000 Units, 5,000 Units, Subcutaneous, Q8H [facility], 5,000 Units at 03/10/20 0529 **AND** [COMPLETED] Platelet count, , , Once, Frank Caraballo MD    insulin glargine (LANTUS) subcutaneous injection 25 Units 0 25 mL, 25 Units, Subcutaneous, HS, Frank Caraballo MD, 25 Units at 20 2236    insulin lispro (HumaLOG) 100 units/mL subcutaneous injection 1-6 Units, 1-6 Units, Subcutaneous, Q6H [address], 1 Units at 03/09/20 1850 **AND** Fingerstick Glucose (POCT), , , Q6H, Ghazal Martinez MD    ondansetron [facility] injection 4 mg, 4 mg, Intravenous, Q6H PRN, Ghazal Martinez MD    oxyCODONE (ROXICODONE) immediate release tablet 10 mg, 10 mg, Oral, Q8H PRN, Jef Osullivan PA-C    oxyCODONE (ROXICODONE) IR tablet 5 mg, 5 mg, Oral, Q8H PRN, Jef Osullivan PA-C    Facility-Administered Medications Ordered in Other Encounters:     ferrous sulfate tablet 325 mg, 325 mg, Oral, Daily With Ry Ramirez MD    Laboratory Results:  Lab Results   Component Value Date    WBC 4 19 (L) 03/10/2020    HGB 11 8 03/10/2020    HCT 35 6 03/10/2020    MCV 91 03/10/2020     (L) 03/10/2020     Lab Results   Component Value Date    SODIUM 136 03/10/2020    K 4 6 03/10/2020     03/10/2020    CO2 28 03/10/2020    BUN 33 (H) 03/10/2020    CREATININE 1 06 03/10/2020    GLUC 105 03/10/2020    CALCIUM 9 2 03/10/2020     Lab Results   Component Value Date    CALCIUM 9 2 03/10/2020    PHOS 5 3 (H) 03/08/2020     No results found for: LABPROT

## 2020-03-10 NOTE — ASSESSMENT & PLAN NOTE
Recently discharged from [facility] for similar, reportedly has on and off tremors of the hands and legs for approximately 1 year, most recently increasing in frequency  · Neurology saw patient will inpatient [facility], felt to be medication related as well as metabolic/infectious derangement  · Reglan was decreased to 5 mg t i d, then discontinued   · Recommended outpatient referral to the movement disorder clinic  · Overall improving

## 2020-03-10 NOTE — ASSESSMENT & PLAN NOTE
Patient uses oxycodone 30 mg q 4 hours p r n   And morphine 100 mg b i d , gabapentin 300 mg daily; prescribed by PCP  · Appears that during her admission to [facility] a few days ago, her oxycodone was adjusted to 50 mg q 4 hours p r n , and morphine was decreased to 100 mg daily  · On admission, given her altered mental status and lethargy, all her home meds held  · Need to monitor for withdrawal  · Appreciate acute pain service consultation, can use oxycodone prn while admitted

## 2020-03-10 NOTE — ASSESSMENT & PLAN NOTE
Suspect multifactorial in setting of tremors, electrolyte imbalance and polypharmacy  · Follow up PT/OT recommendations - STR

## 2020-03-10 NOTE — UTILIZATION REVIEW
Continued Stay Review    Date: 3/3/2020                          Current Patient Class: Inp    Current Level of Care: med surg    HPI:64 y o  female initially admitted on 3/4/2020 presents with altered mental status and encephalopathy due to profound metabolic derangements, infectious derangements, hypoxic insult superimposed on her baseline cognitive impairment/significant opioid use  Transferred to ICu on adm  Assessment/Plan: per Attending:    voiced concern for patient's mental status; feeling she is not at baseline  Noting additional tremors/tremulous intermittently  MD exam: lungs with bilateral rales at bases no wheezing or rhonchi  Breath sounds unlabored  HR regular  Noted for edema in lower extremities as evidence for venous statis dermatitis with some edema being chronic  Maintains MD attention during visit  Motor strength 5/5 to all extremities  Cranial nerves 2-12 grossly intact  No pronator drift  On IV antibiotics for UTI positive for Klebsiella sensitive to Cephalosporin  Transition to oral medication tomorrow  Mental status review levels of TSH, B12 levels, ammonia  Consult Neurology due to tremulousness  Neuro recommend decreasing Reglan dose & eventual wean off Gabapentin  No further neuro testing is needed  PT recommendation home PT when medically appropriate  3/3 Neurology:   78-year-old lady with a history of diabetes, hypertension, hyperlipidemia, polyneuropathy, chronic pain syndrome with opioid dependence, presented with toxic metabolic encephalopathy with significant improvement however continues to have persistent tremor which could be related to her longstanding use of Reglan  Agree with current recommendations would recommend to lower the dose of Reglan if possible to 5 mg 3 times a day and taper off eventually for symptoms which could be strain secondary to extrapyramidal side effects of Reglan   Chronic pain: monitoring for withdrawal; as opoid use has been controlled/limited during care       Pertinent Labs/Diagnostic Results:   Results from last 7 days   Lab Units 03/10/20  0538 03/08/20  1811 03/08/20  0506 03/07/20  2044   WBC Thousand/uL 4 19*  --  7 84 8 23   HEMOGLOBIN g/dL 11 8  --  11 3* 12 2   HEMATOCRIT % 35 6  --  35 0 37 4   PLATELETS Thousands/uL 115* 131* 120* 140*   NEUTROS ABS Thousands/µL 2 71  --  5 18 5 74         Results from last 7 days   Lab Units 03/10/20  0538 03/09/20  0809 03/08/20  0506 03/07/20  2044 03/04/20  0612   SODIUM mmol/L 136 132* 126* 133* 137   POTASSIUM mmol/L 4 6 5 2 5 4* 4 9 4 0   CHLORIDE mmol/L 105 102 92* 102 101   CO2 mmol/L 28 26 26 24 27   ANION GAP mmol/L 3* 4 8 7 9   BUN mg/dL 33* 51* 67* 54* 16   CREATININE mg/dL 1 06 1 16 2 14* 1 78* 1 02   EGFR ml/min/1 73sq m 56 50 24 30 59   CALCIUM mg/dL 9 2 9 0 8 8 7 1* 8 8   MAGNESIUM mg/dL  --   --  2 3 1 7  --    PHOSPHORUS mg/dL  --   --  5 3* 4 3*  --      Results from last 7 days   Lab Units 03/09/20  0809 03/08/20  0506 03/07/20  2044 03/03/20  1527   AST U/L 22 26 16  --    ALT U/L 25 26 22  --    ALK PHOS U/L 119* 118* 96  --    TOTAL PROTEIN g/dL 7 3 6 9 5 8*  --    ALBUMIN g/dL 3 3* 3 1* 2 6*  --    TOTAL BILIRUBIN mg/dL 0 40 0 40 0 33  --    AMMONIA umol/L  --   --   --  <10*     Results from last 7 days   Lab Units 03/10/20  1118 03/10/20  0527 03/10/20  0024 03/09/20  1848 03/09/20  0637 03/09/20  0100 03/08/20  2132 03/08/20  1910 03/08/20  1614 03/08/20  1130 03/08/20  0558 03/07/20 2021   POC GLUCOSE mg/dl 153* 104 129 163* 143* 113 124 166* 188* 113 105 117     Results from last 7 days   Lab Units 03/10/20  0538 03/09/20  0809 03/08/20  0506 03/07/20  2044 03/04/20  0612   GLUCOSE RANDOM mg/dL 105 141* 96 94 150*     Results from last 7 days   Lab Units 03/08/20  1837   OSMOLALITY, SERUM mmol/     Results from last 7 days   Lab Units 03/08/20  0900   HEMOGLOBIN A1C % 5 2   EAG mg/dl 103     No results found for: BETA-HYDROXYBUTYRATE       Results from last 7 days   Lab Units 03/07/20  2220   PH RUTH  7 288*   PCO2 RUTH mm Hg 49 7   PO2 RUTH mm Hg 143 7*   HCO3 RUTH mmol/L 23 3*   BASE EXC RUTH mmol/L -3 6   O2 CONTENT RUTH ml/dL 16 9   O2 HGB, VENOUS % 96 1*                         Results from last 7 days   Lab Units 03/08/20  0506 03/03/20  1527   TSH 3RD GENERATON uIU/mL 1 520 1 495     Results from last 7 days   Lab Units 03/09/20  0809 03/07/20  2111   PROCALCITONIN ng/ml 0 07 0 11       Results from last 7 days   Lab Units 03/08/20  1258   SODIUM UR  32     Results from last 7 days   Lab Units 03/07/20  2112   INFLUENZA A PCR  None Detected   INFLUENZA B PCR  None Detected   RSV PCR  None Detected       Vital Signs:   Vitals:Blood pressure (!) 179/84, pulse 87, temperature 98 1 °F (36 7 °C), temperature source Oral, resp  rate 18, weight 90 4 kg (199 lb 4 7 oz), SpO2 95 %  ,Body mass index is 38 92 kg/m²  Medications:   Scheduled Medications:  Current Facility-Administered Medications:  [START ON 3/4/2020] amLODIPine 10 mg Oral Daily   bisacodyl 10 mg Rectal Daily PRN   cefepime 2,000 mg Intravenous Q12H   DULoxetine 60 mg Oral Daily   gabapentin 300 mg Oral HS   heparin (porcine) 5,000 Units Subcutaneous Q8H [address]   insulin glargine 10 Units Subcutaneous HS   insulin lispro 1-6 Units Subcutaneous 4x Daily (AC & HS)   lisinopril 10 mg Oral Daily   [START ON 3/4/2020] metoclopramide 5 mg Oral TID AC   nystatin   Topical BID   oxyCODONE 15 mg Oral Q4H PRN   polyethylene glycol 17 g Oral Daily PRN   pravastatin 40 mg Oral Daily With Dinner   senna-docusate sodium 2 tablet Oral BID   [START ON 3/4/2020] triamterene-hydrochlorothiazide 2 tablet Oral Daily      Facility-Administered Medications Ordered in Other Encounters:  ferrous sulfate 325 mg Oral Daily With Breakfast Charlena Hodgkins, MD      Discharge Plan: tbd  Network Utilization Review Department  [email or web address]  ATTENTION: Please call with any questions or concerns to [phone] and carefully listen to the prompts so that you are directed to the right person  All voicemails are confidential   Mat Bicker all requests for admission clinical reviews, approved or denied determinations and any other requests to dedicated fax number below belonging to the campus where the patient is receiving treatment   List of dedicated fax numbers for the Facilities:  [address] DENIALS (Administrative/Medical Necessity) [phone]   1000  [facility] (Maternity/NICU/Pediatrics) [phone]   Ge Hancock [phone]   [facility] [phone]   [facility] [phone]   Libby Correa [phone]   [address] [place] [address] [phone]   [facility]  [phone]   [address] [facility] [address]  [address] [facility] [place] [address] [facility] [phone]

## 2020-03-11 ENCOUNTER — TELEPHONE (OUTPATIENT)
Dept: OTHER | Facility: OTHER | Age: 65
End: 2020-03-11

## 2020-03-11 VITALS
SYSTOLIC BLOOD PRESSURE: 163 MMHG | HEART RATE: 77 BPM | RESPIRATION RATE: 18 BRPM | DIASTOLIC BLOOD PRESSURE: 72 MMHG | OXYGEN SATURATION: 97 % | WEIGHT: 184.97 LBS | TEMPERATURE: 98.2 F | HEIGHT: 62 IN | BODY MASS INDEX: 34.04 KG/M2

## 2020-03-11 LAB
GLUCOSE SERPL-MCNC: 75 MG/DL (ref 65–140)
GLUCOSE SERPL-MCNC: 96 MG/DL (ref 65–140)

## 2020-03-11 PROCEDURE — 82948 REAGENT STRIP/BLOOD GLUCOSE: CPT

## 2020-03-11 PROCEDURE — 99239 HOSP IP/OBS DSCHRG MGMT >30: CPT | Performed by: PHYSICIAN ASSISTANT

## 2020-03-11 RX ORDER — LANOLIN ALCOHOL/MO/W.PET/CERES
3 CREAM (GRAM) TOPICAL
Refills: 0 | Status: ON HOLD
Start: 2020-03-11 | End: 2021-09-28 | Stop reason: ALTCHOICE

## 2020-03-11 RX ORDER — INSULIN GLARGINE 100 [IU]/ML
20 INJECTION, SOLUTION SUBCUTANEOUS
Refills: 0
Start: 2020-03-11 | End: 2020-03-12

## 2020-03-11 RX ORDER — OXYCODONE HYDROCHLORIDE 5 MG/1
5 TABLET ORAL EVERY 8 HOURS PRN
Qty: 10 TABLET | Refills: 0 | Status: SHIPPED | OUTPATIENT
Start: 2020-03-11 | End: 2020-03-19 | Stop reason: ALTCHOICE

## 2020-03-11 RX ORDER — GABAPENTIN 100 MG/1
100 CAPSULE ORAL 2 TIMES DAILY
Refills: 0
Start: 2020-03-11 | End: 2022-02-23

## 2020-03-11 RX ORDER — INSULIN LISPRO 100 [IU]/ML
8 INJECTION, SOLUTION INTRAVENOUS; SUBCUTANEOUS
Qty: 5 PEN | Refills: 0
Start: 2020-03-11 | End: 2020-03-12

## 2020-03-11 RX ADMIN — OXYCODONE HYDROCHLORIDE 10 MG: 10 TABLET ORAL at 04:27

## 2020-03-11 RX ADMIN — GABAPENTIN 100 MG: 100 CAPSULE ORAL at 09:59

## 2020-03-11 RX ADMIN — LISINOPRIL 10 MG: 10 TABLET ORAL at 09:59

## 2020-03-11 RX ADMIN — DULOXETINE HYDROCHLORIDE 60 MG: 60 CAPSULE, DELAYED RELEASE ORAL at 09:59

## 2020-03-11 RX ADMIN — HEPARIN SODIUM 5000 UNITS: 5000 INJECTION INTRAVENOUS; SUBCUTANEOUS at 06:10

## 2020-03-11 RX ADMIN — OXYCODONE HYDROCHLORIDE 10 MG: 10 TABLET ORAL at 10:08

## 2020-03-11 NOTE — ASSESSMENT & PLAN NOTE
Patient was recently admitted to [facility] and OR on 3/4 after admission with AMS felt likely multifactorial to UTI, opioids and YVONNE  Presents again with lethargy and tremulousness/myoclonus  Myoclonus has improved  · Appreciate neurology input  During admission few days ago, neuro did not feel that any additional neurodiagnostics such as EEG were needed  MRI Brain - no acute disease  Symptoms likely secondary to polypharmacy in the setting of YVONNE  · All meds stopped except for gabapentin, dose lowered  Recommended eventual tapering of gabapentin  OK per Nephrology to restart Cymbalta  Will keep off narcotics  f/u with outpatient movement disorder team   · APS recommending prn oxycodone while in house/rehab to help with any withdrawal symptoms, but would not prescribe narcotics on discharge  Patient does exhibit drug-seeking behavior  Discussed with daughters that once patient is discharged from rehab, the should consider geriatric follow-up to help with behavior modification    · Presently afebrile, flu negative, procal negative  · Recently completed treatment for recent UTI 3/7  · Suspect polypharmacy plays major role in all of this; all specialists in agreement  · Seems much improved today

## 2020-03-11 NOTE — PROGRESS NOTES
Progress Note - Acute Pain Service    Cherelle Zaldivar 62 y o  female MRN: [id number]  [facility] Encounter: [id number]      Assessment:   65y/o F admitted for change in mentation related to YVONNE/UTI and opioid use  Ongoing lethargy and tremor now resolved  Seeing PCP for pain in her legs previously prescribed oxycodone 30mg and morphine ER 100mg tablets  Today pain is controlled, receiving 10mg oxycodone tabs for pain control and acute opioid withdrawal       Plan:   1  Continue 5-10mg oxycodone q8hr prn after being discharged to facility until patient can be seen by long standing pain prescription physician, Dr Tarun Sanchez, to discuss tapering regimen  2  DO NOT take any remaining pills from prior oxycodone/morphine prescription as dose is likely too high for patient  Discussed at length with patient and two daughters in room who verbalized understanding  3  Can increase gabapentin to home dose as patient's YVONNE resolved  4  Continue cymbalta 60mg daily  5  Can Continue melatonin 3mg qHS for sleep  6  Add tylenol PRN 650mg q6hr   7  Ensure patient has appointment scheduled with Dr Tarun Sanchez to transition chronic opioid prescriptions    APS sign off  Thank you for the Consult  Please contact APS ( [phone]) with any further questions    Pain History  24 hour history: Patient visited by family member who brought her home morphine and dispensed to patient without alerting nursing/hospital staff  Discussed with patient at great length to not continue this  Ok to take home medication if deemed appropriate per primary team however team must be alerted of all medications being given to patient from family  No acute issues, patient agitated with medication dosing however would like to eventually wean from chronic opioids      24 hour opoid requirement: 3x 10mg oxycodone    Meds/Allergies   all current active meds have been reviewed    No Known Allergies    Objective     Temp:  [98 2 °F (36 8 °C)-98 5 °F (36 9 °C)] 98 2 °F (36 8 °C)  HR:  [77] 77  Resp:  [16-18] 18  BP: (163-188)/(72-91) 163/72    Physical Exam   Constitutional: She is oriented to person, place, and time  She appears well-developed and well-nourished  HENT:   Head: Normocephalic and atraumatic  Neck: Normal range of motion  Cardiovascular: Normal rate  Pulmonary/Chest: Effort normal    Abdominal: She exhibits no distension  There is no tenderness  Musculoskeletal: She exhibits no edema  Neurological: She is alert and oriented to person, place, and time  Skin: Skin is warm and dry  Psychiatric: She has a normal mood and affect  Her behavior is normal    Nursing note and vitals reviewed  Lab Results: I have personally reviewed pertinent labs  Imaging Studies: I have personally reviewed pertinent reports  EKG, Pathology, and Other Studies: I have personally reviewed pertinent reports        Levels of Care: Level 1 = 15 minutes

## 2020-03-11 NOTE — SOCIAL WORK
CM was informed that pt is medically stable for dc today  CM called pt's insurance; [employer] [phone] and spoke to Vale Harvey who provided pending reference# [id number]  Message left for Shana Lugo at [place] to provide clinical and receive authorization  Shana Lugo states pt is approved for SNF at [facility]  Auth# [id number] for 7 days  Updates to Shana Lugo at phone# [phone] ext [phone]  Fax# [phone]  CM provided Nalini France at [facility] with Jonnie Gomes information  Nalini France states [facility] will transport pt at 2pm  CM notified pt, pt's daughter Ernesto Wilkes, and pt's bedside RN Bobby Hung of dc time  Facility transfer form completed  Chart copy requested

## 2020-03-11 NOTE — ASSESSMENT & PLAN NOTE
Noted 3/8, now resolved  Suspect multifactorial  Is on many medications that cause this   · Diuretics on hold   Should NOT resume triamterene/hctz at ALL on discharge  · S/P IVF bolus   · Nephrology following

## 2020-03-11 NOTE — ASSESSMENT & PLAN NOTE
Patient uses oxycodone 30 mg q 4 hours p r n  And morphine 100 mg b i d , gabapentin 300 mg daily; prescribed by PCP  · Appears that during her admission to [facility] a few days ago, her oxycodone was adjusted to 50 mg q 4 hours p r n , and morphine was decreased to 100 mg daily  · On admission, given her altered mental status and lethargy, all her home meds held  · Continue supportive care for any withdrawal symptoms  · Appreciate acute pain service consultation  · Use oxycodone 5 mg as needed to help with any withdrawal symptoms  Would not discharge home on any narcotics  Home morphine and oxycodone completely discontinued  Patient's pain has been well controlled off these medications  Patient's gabapentin was also decreased, and would consider weaning this off completely as well  · Would recommend nonnarcotic interventions for pain control  Would likely benefit from behavioral therapy as well  · ** also note, 1 of the patient's daughters brought in 1 of the patient's 30 mg oxycodone tablets last night and gave it to her without telling staff  Patient's daughter Landen Arguellesr who is present, will remove patient's narcotics from home so that this does not happen any further

## 2020-03-11 NOTE — TELEPHONE ENCOUNTER
Freddie Root from [facility]/ [phone]/LW: Wei Fierro : [phone]/ New admissions orders

## 2020-03-11 NOTE — DISCHARGE SUMMARY
Discharge- Sapna Andrew 1955, 62 y o  female MRN: [id number]    [facility] Encounter: [id number]    Primary Care Provider: Arleen Watst   Date and time admitted to hospital: 3/7/2020  7:54 PM        * Acute toxic/metabolic encephalopathy  Assessment & Plan  Patient was recently admitted to [facility] and [place] on 3/4 after admission with AMS felt likely multifactorial to UTI, opioids and YVONNE  Presents again with lethargy and tremulousness/myoclonus  Myoclonus has improved  · Appreciate neurology input  During admission few days ago, neuro did not feel that any additional neurodiagnostics such as EEG were needed  MRI Brain - no acute disease  Symptoms likely secondary to polypharmacy in the setting of YVONNE  · All meds stopped except for gabapentin, dose lowered  Recommended eventual tapering of gabapentin  OK per Nephrology to restart Cymbalta  Will keep off narcotics  f/u with outpatient movement disorder team   · APS recommending prn oxycodone while in house/rehab to help with any withdrawal symptoms, but would not prescribe narcotics on discharge  Patient does exhibit drug-seeking behavior  Discussed with daughters that once patient is discharged from rehab, the should consider geriatric follow-up to help with behavior modification    · Presently afebrile, flu negative, procal negative  · Recently completed treatment for recent UTI 3/7  · Suspect polypharmacy plays major role in all of this; all specialists in agreement  · Seems much improved today      Occasional tremors  Assessment & Plan  Recently discharged from [facility] for similar, reportedly has on and off tremors of the hands and legs for approximately 1 year, most recently increasing in frequency  · Neurology saw patient will inpatient [facility], felt to be medication related as well as metabolic/infectious derangement  · Reglan was decreased to 5 mg t i d, then discontinued   · Recommended outpatient referral to the movement disorder clinic  · Overall improving     Recurrent falls  Assessment & Plan  Suspect multifactorial in setting of tremors, electrolyte imbalance and polypharmacy  · Follow up PT/OT recommendations - STR    Acute kidney injury [facility])  Assessment & Plan  POA with creatinine of 1 78; upon dc from [facility] few days ago was 1 02  · Worsened on 3/8 to 2  14  Got small amt of IVF and creatinine improved   · Avoid nephrotoxins  · Appreciate nephrology input    Type 2 diabetes mellitus with hyperglycemia, with long-term current use of insulin [facility])  Assessment & Plan  Lab Results   Component Value Date    HGBA1C 5 2 03/08/2020     Recent Labs     03/10/20  1608 03/10/20  2120 03/11/20  0639 03/11/20  1238   POCGLU 122 127 75 96       Blood Sugar Average: Last 72 hrs:  (P) 319 [id number]   · A1c well controlled at 5 2  · Home meds Lantus 25 units HS with lispro 10 units t i d  With meals  · Hold metformin  · Monitor glucose closely - blood sugars running on low side, will decrease lantus to 20 units and mealtime insulin to 8 units    Essential hypertension  Assessment & Plan  · Presently, her home medications including lisinopril, Norvasc and Maxzide all on hold given acute kidney injury and blood pressure is within goal  · BP's starting to rise into 160's  · Will restart lisinopril and amlodipine and montior  · Would not restart triamterene    Hyponatremia  Assessment & Plan  Noted 3/8, now resolved  Suspect multifactorial  Is on many medications that cause this   · Diuretics on hold  Should NOT resume triamterene/hctz at ALL on discharge  · S/P IVF bolus   · Nephrology following    Chronic pain syndrome  Assessment & Plan  Patient uses oxycodone 30 mg q 4 hours p r n   And morphine 100 mg b i d , gabapentin 300 mg daily; prescribed by PCP  · Appears that during her admission to [facility] a few days ago, her oxycodone was adjusted to 50 mg q 4 hours p r n , and morphine was decreased to 100 mg daily  · On admission, given her altered mental status and lethargy, all her home meds held  · Continue supportive care for any withdrawal symptoms  · Appreciate acute pain service consultation  · Use oxycodone 5 mg as needed to help with any withdrawal symptoms  Would not discharge home on any narcotics  Home morphine and oxycodone completely discontinued  Patient's pain has been well controlled off these medications  Patient's gabapentin was also decreased, and would consider weaning this off completely as well  · Would recommend nonnarcotic interventions for pain control  Would likely benefit from behavioral therapy as well  · ** also note, 1 of the patient's daughters brought in 1 of the patient's 30 mg oxycodone tablets last night and gave it to her without telling staff  Patient's daughter Samson Cook who is present, will remove patient's narcotics from home so that this does not happen any further  Hyperlipidemia  Assessment & Plan  · Continue statin when able to take p o  Discharging Physician / Practitioner: Elis Higuera PA-C  PCP: Nathalie Hodgkins Tsompanidis  Admission Date:   Admission Orders (From admission, onward)     Ordered        03/08/20 1008  Inpatient Admission  Once         03/07/20 2154  Place in Observation  Once                   Discharge Date: 03/11/20    Disposition:      Short Term Rehab or SNF at a [facility] (see below)    For Discharges to [facility]:   · [address] to reach physician and no message left  Reason for Admission: mental status change    Discharge Diagnoses:     Please see assessment and plan section above for further details regarding discharge diagnoses  Resolved Problems  Date Reviewed: 3/11/2020    None          Consultations During Hospital Stay:  · Acute pain service  · Dr Cheryl Cornejo  · Dr Kory Quigley    Procedures Performed:      MRI brain - White matter changes suggestive of chronic microangiopathy    No acute intracranial pathology    CXR - No active pulmonary disease on examination which is somewhat limited secondary to low lung volumes    Medication Adjustments and Discharge Medications:  · Summary of Medication Adjustments made as a result of this hospitalization: All home narcotics discontinued, and Neurontin dose decreased, insulin dose is decreased, triamterene discontinue, Reglan discontinued  · Medication Dosing Tapers - Please refer to Discharge Medication List for details on any medication dosing tapers (if applicable to patient)  · Medications being temporarily held (include recommended restart time):   · Discharge Medication List: See after visit summary for reconciled discharge medications  Wound Care Recommendations:  When applicable, please see wound care section of After Visit Summary  Diet Recommendations at Discharge:  Diet -        Diet Orders   (From admission, onward)             Start     Ordered    03/09/20 1217  Diet Dysphagia/Modified Consistency; Dysphagia 3-Dental Soft; Thin Liquid; Regular House, Lo Phos, Potassium 2 GM  Diet effective now     Question Answer Comment   Diet Type Dysphagia/Modified Consistency    Dysphagia/Modified Consistency Dysphagia 3-Dental Soft    Liquid Modifier Thin Liquid    Other Restriction(s): Regular House    Other Restriction(s): Lo Phos    Other Restriction(s): Potassium 2 GM    RD to adjust diet per protocol? Yes        03/09/20 1217                Instructions for any Catheters / Lines Present at Discharge (including removal date, if applicable):     Significant Findings / Test Results:   · See above    Incidental Findings:   · none     Test Results Pending at Discharge (will require follow up):   · none     Outpatient Tests Requested:  · none    Complications:  none    Hospital Course: Ely Mahan is a 62 y o  female patient who originally presented to the hospital on 3/7/2020 due to lethargy and mild clonus    Patient was found to be in YVONNE as well   Patient was found to have toxic/metabolic encephalopathy secondary to polypharmacy and acute kidney injury  MRI of the brain was negative  Neurology saw the patient in consultation and no further workup was required  Patient's mental status improved over the course of her stay  Her myoclonus has resolved  Patient is still demonstrating some cognitive dysfunction  She will go to short-term rehab  Acute pain service was consulted to address the patient's chronic pain  At this point, her pain appears to be controlled without the use of narcotics  She has p r n  Oxycodone for withdrawal symptoms  She has not used much pain medication over the course of her stay  Would recommend keeping the patient off narcotic medication upon discharge from rehab  Patient does exhibit drug-seeking behavior, and would likely benefit from outpatient behavior modification  Patient's acute kidney injury resolved with IV fluids  Patient was followed by Nephrology  Condition at Discharge: good     Discharge Day Visit / Exam:     Subjective:  Offers no complaints  Not happy about going to rehab  Vitals: Blood Pressure: 163/72 (03/11/20 0807)  Pulse: 77 (03/11/20 0807)  Temperature: 98 2 °F (36 8 °C) (03/11/20 0807)  Temp Source: Oral (03/11/20 0807)  Respirations: 18 (03/11/20 0807)  Height: 5' 2" (157 5 cm) (03/08/20 0008)  Weight - Scale: 83 9 kg (184 lb 15 5 oz) (03/11/20 0600)  SpO2: 97 % (03/11/20 0807)  Exam:   Physical Exam   Constitutional: She is oriented to person, place, and time  She appears well-developed and well-nourished  No distress  HENT:   Head: Normocephalic and atraumatic  Cardiovascular: Normal rate and regular rhythm  Exam reveals no friction rub  No murmur heard  Pulmonary/Chest: Effort normal and breath sounds normal  No respiratory distress  She has no wheezes  Abdominal: Soft  Bowel sounds are normal  She exhibits no distension  There is no tenderness   There is no rebound and no guarding  Musculoskeletal: She exhibits no edema  Neurological: She is alert and oriented to person, place, and time  No cranial nerve deficit  Skin: Skin is warm and dry  No rash noted  Psychiatric: She has a normal mood and affect  Nursing note and vitals reviewed  Discussion with Family: Daughter Landen Merlos updated at bedside  Goals of Care Discussions:  · Code Status at Discharge: Level 1 - Full Code  · Were there any Goals of Care Discussions during Hospitalization?: No  · Results of any General Goals of Care Discussions:    · POLST Completed: No   · If POLST Completed, Summary of POLST Agreement Provided Here:    · OK to Rehospitalize if Needed? Yes    Discharge instructions/Information to patient and family:   See after visit summary section titled Discharge Instructions for information provided to patient and family  Planned Readmission: none      Discharge Statement:  I spent 45 minutes discharging the patient  This time was spent on the day of discharge  I had direct contact with the patient on the day of discharge  Greater than 50% of the total time was spent examining patient, answering all patient questions, arranging and discussing plan of care with patient as well as directly providing post-discharge instructions  Additional time then spent on discharge activities      ** Please Note: This note has been constructed using a voice recognition system **

## 2020-03-11 NOTE — PLAN OF CARE
Problem: Potential for Falls  Goal: Patient will remain free of falls  Description  INTERVENTIONS:  - Assess patient frequently for physical needs  -  Identify cognitive and physical deficits and behaviors that affect risk of falls    -  [place] fall precautions as indicated by assessment   - Educate patient/family on patient safety including physical limitations  - Instruct patient to call for assistance with activity based on assessment  - Modify environment to reduce risk of injury  - Consider OT/PT consult to assist with strengthening/mobility  3/11/2020 1401 by Roverto Ayon RN  Outcome: Adequate for Discharge  3/11/2020 1151 by Roverto Ayon RN  Outcome: Progressing     Problem: Prexisting or High Potential for Compromised Skin Integrity  Goal: Skin integrity is maintained or improved  Description  INTERVENTIONS:  - Identify patients at risk for skin breakdown  - Assess and monitor skin integrity  - Assess and monitor nutrition and hydration status  - Monitor labs   - Assess for incontinence   - Turn and reposition patient  - Assist with mobility/ambulation  - Relieve pressure over bony prominences  - Avoid friction and shearing  - Provide appropriate hygiene as needed including keeping skin clean and dry  - Evaluate need for skin moisturizer/barrier cream  - Collaborate with interdisciplinary team   - Patient/family teaching  - Consider wound care consult   3/11/2020 1401 by Roverto Ayon RN  Outcome: Adequate for Discharge  3/11/2020 1151 by Roverto Ayon RN  Outcome: Progressing     Problem: NEUROSENSORY - ADULT  Goal: Remains free of injury related to seizures activity  Description  INTERVENTIONS  - Maintain airway, patient safety  and administer oxygen as ordered  - Monitor patient for seizure activity, document and report duration and description of seizure to physician/advanced practitioner  - If seizure occurs,  ensure patient safety during seizure  - Reorient patient post seizure  - Seizure pads on all 4 side rails  - Instruct patient/family to notify RN of any seizure activity including if an aura is experienced  - Instruct patient/family to call for assistance with activity based on nursing assessment  - Administer anti-seizure medications if ordered    3/11/2020 1401 by Waylon Rai RN  Outcome: Adequate for Discharge  3/11/2020 1151 by Waylon Rai RN  Outcome: Progressing  Goal: Achieves maximal functionality and self care  Description  INTERVENTIONS  - Monitor swallowing and airway patency with patient fatigue and changes in neurological status  - Encourage and assist patient to increase activity and self care     - Encourage visually impaired, hearing impaired and aphasic patients to use assistive/communication devices  3/11/2020 1401 by Waylon Rai RN  Outcome: Adequate for Discharge  3/11/2020 1151 by Waylon Rai RN  Outcome: Progressing     Problem: RESPIRATORY - ADULT  Goal: Achieves optimal ventilation and oxygenation  Description  INTERVENTIONS:  - Assess for changes in respiratory status  - Assess for changes in mentation and behavior  - Position to facilitate oxygenation and minimize respiratory effort  - Oxygen administered by appropriate delivery if ordered  - Initiate smoking cessation education as indicated  - Encourage broncho-pulmonary hygiene including cough, deep breathe, Incentive Spirometry  - Assess the need for suctioning and aspirate as needed  - Assess and instruct to report SOB or any respiratory difficulty  - Respiratory Therapy support as indicated  3/11/2020 1401 by Waylon Rai RN  Outcome: Adequate for Discharge  3/11/2020 1151 by Waylon Rai RN  Outcome: Progressing     Problem: GASTROINTESTINAL - ADULT  Goal: Maintains adequate nutritional intake  Description  INTERVENTIONS:  - Monitor percentage of each meal consumed  - Identify factors contributing to decreased intake, treat as appropriate  - Assist with meals as needed  - Monitor I&O, weight, and lab values if indicated  - Obtain nutrition services referral as needed  3/11/2020 1401 by Carolina Rivera RN  Outcome: Adequate for Discharge  3/11/2020 1151 by Carolina Rivera RN  Outcome: Progressing     Problem: METABOLIC, FLUID AND ELECTROLYTES - ADULT  Goal: Glucose maintained within target range  Description  INTERVENTIONS:  - Monitor Blood Glucose as ordered  - Assess for signs and symptoms of hyperglycemia and hypoglycemia  - Administer ordered medications to maintain glucose within target range  - Assess nutritional intake and initiate nutrition service referral as needed  3/11/2020 1401 by Carolina Rivera RN  Outcome: Adequate for Discharge  3/11/2020 1151 by Carolina Rivera RN  Outcome: Progressing     Problem: SKIN/TISSUE INTEGRITY - ADULT  Goal: Skin integrity remains intact  Description  INTERVENTIONS  - Identify patients at risk for skin breakdown  - Assess and monitor skin integrity  - Assess and monitor nutrition and hydration status  - Monitor labs (i e  albumin)  - Assess for incontinence   - Turn and reposition patient  - Assist with mobility/ambulation  - Relieve pressure over bony prominences  - Avoid friction and shearing  - Provide appropriate hygiene as needed including keeping skin clean and dry  - Evaluate need for skin moisturizer/barrier cream  - Collaborate with interdisciplinary team (i e  Nutrition, Rehabilitation, etc )   - Patient/family teaching  3/11/2020 1401 by Carolina Rivera RN  Outcome: Adequate for Discharge  3/11/2020 1151 by Carolina Rivera RN  Outcome: Progressing     Problem: HEMATOLOGIC - ADULT  Goal: Maintains hematologic stability  Description  INTERVENTIONS  - Assess for signs and symptoms of bleeding or hemorrhage  - Monitor labs  - Administer supportive blood products/factors as ordered and appropriate  3/11/2020 1401 by Carolina Rivera RN  Outcome: Adequate for Discharge  3/11/2020 1151 by Mary Kate Marten, RN  Outcome: Progressing     Problem: MUSCULOSKELETAL - ADULT  Goal: Maintain or return mobility to safest level of function  Description  INTERVENTIONS:  - Assess patient's ability to carry out ADLs; assess patient's baseline for ADL function and identify physical deficits which impact ability to perform ADLs (bathing, care of mouth/teeth, toileting, grooming, dressing, etc )  - Assess/evaluate cause of self-care deficits   - Assess range of motion  - Assess patient's mobility  - Assess patient's need for assistive devices and provide as appropriate  - Encourage maximum independence but intervene and supervise when necessary  - Involve family in performance of ADLs  - Assess for home care needs following discharge   - Consider OT consult to assist with ADL evaluation and planning for discharge  - Provide patient education as appropriate  3/11/2020 1401 by Mary Kate Botello RN  Outcome: Adequate for Discharge  3/11/2020 1151 by Mary Kate Botello RN  Outcome: Progressing  Goal: Maintain proper alignment of affected body part  Description  INTERVENTIONS:  - Support, maintain and protect limb and body alignment  - Provide patient/ family with appropriate education  3/11/2020 1401 by Mary Kate Botello RN  Outcome: Adequate for Discharge  3/11/2020 1151 by Mary Kate Botello RN  Outcome: Progressing     Problem: Nutrition/Hydration-ADULT  Goal: Nutrient/Hydration intake appropriate for improving, restoring or maintaining nutritional needs  Description  Monitor and assess patient's nutrition/hydration status for malnutrition  Collaborate with interdisciplinary team and initiate plan and interventions as ordered  Monitor patient's weight and dietary intake as ordered or per policy  Utilize nutrition screening tool and intervene as necessary  Determine patient's food preferences and provide high-protein, high-caloric foods as appropriate       INTERVENTIONS:  - Monitor oral intake, urinary output, labs, and treatment plans  - Assess nutrition and hydration status and recommend course of action  - Evaluate amount of meals eaten  - Assist patient with eating if necessary   - Allow adequate time for meals  - Recommend/ encourage appropriate diets, oral nutritional supplements, and vitamin/mineral supplements  - Order, calculate, and assess calorie counts as needed  - Recommend, monitor, and adjust tube feedings and TPN/PPN based on assessed needs  - Assess need for intravenous fluids  - Provide specific nutrition/hydration education as appropriate  - Include patient/family/caregiver in decisions related to nutrition  3/11/2020 1401 by Mitesh Russell RN  Outcome: Adequate for Discharge  3/11/2020 1151 by Mitesh Russell, RN  Outcome: Progressing

## 2020-03-11 NOTE — ASSESSMENT & PLAN NOTE
Lab Results   Component Value Date    HGBA1C 5 2 03/08/2020     Recent Labs     03/10/20  1608 03/10/20  2120 03/11/20  0639 03/11/20  1238   POCGLU 122 127 75 96       Blood Sugar Average: Last 72 hrs:  (P) 233 [id number]   · A1c well controlled at 5 2  · Home meds Lantus 25 units HS with lispro 10 units t i d   With meals  · Hold metformin  · Monitor glucose closely - blood sugars running on low side, will decrease lantus to 20 units and mealtime insulin to 8 units

## 2020-03-11 NOTE — ASSESSMENT & PLAN NOTE
· Presently, her home medications including lisinopril, Norvasc and Maxzide all on hold given acute kidney injury and blood pressure is within goal  · BP's starting to rise into 160's  · Will restart lisinopril and amlodipine and montior  · Would not restart triamterene

## 2020-03-11 NOTE — PLAN OF CARE
Problem: Potential for Falls  Goal: Patient will remain free of falls  Description  INTERVENTIONS:  - Assess patient frequently for physical needs  -  Identify cognitive and physical deficits and behaviors that affect risk of falls    -  [place] fall precautions as indicated by assessment   - Educate patient/family on patient safety including physical limitations  - Instruct patient to call for assistance with activity based on assessment  - Modify environment to reduce risk of injury  - Consider OT/PT consult to assist with strengthening/mobility  Outcome: Progressing     Problem: Prexisting or High Potential for Compromised Skin Integrity  Goal: Skin integrity is maintained or improved  Description  INTERVENTIONS:  - Identify patients at risk for skin breakdown  - Assess and monitor skin integrity  - Assess and monitor nutrition and hydration status  - Monitor labs   - Assess for incontinence   - Turn and reposition patient  - Assist with mobility/ambulation  - Relieve pressure over bony prominences  - Avoid friction and shearing  - Provide appropriate hygiene as needed including keeping skin clean and dry  - Evaluate need for skin moisturizer/barrier cream  - Collaborate with interdisciplinary team   - Patient/family teaching  - Consider wound care consult   Outcome: Progressing     Problem: NEUROSENSORY - ADULT  Goal: Remains free of injury related to seizures activity  Description  INTERVENTIONS  - Maintain airway, patient safety  and administer oxygen as ordered  - Monitor patient for seizure activity, document and report duration and description of seizure to physician/advanced practitioner  - If seizure occurs,  ensure patient safety during seizure  - Reorient patient post seizure  - Seizure pads on all 4 side rails  - Instruct patient/family to notify RN of any seizure activity including if an aura is experienced  - Instruct patient/family to call for assistance with activity based on nursing assessment  - Administer anti-seizure medications if ordered    Outcome: Progressing  Goal: Achieves maximal functionality and self care  Description  INTERVENTIONS  - Monitor swallowing and airway patency with patient fatigue and changes in neurological status  - Encourage and assist patient to increase activity and self care     - Encourage visually impaired, hearing impaired and aphasic patients to use assistive/communication devices  Outcome: Progressing     Problem: RESPIRATORY - ADULT  Goal: Achieves optimal ventilation and oxygenation  Description  INTERVENTIONS:  - Assess for changes in respiratory status  - Assess for changes in mentation and behavior  - Position to facilitate oxygenation and minimize respiratory effort  - Oxygen administered by appropriate delivery if ordered  - Initiate smoking cessation education as indicated  - Encourage broncho-pulmonary hygiene including cough, deep breathe, Incentive Spirometry  - Assess the need for suctioning and aspirate as needed  - Assess and instruct to report SOB or any respiratory difficulty  - Respiratory Therapy support as indicated  Outcome: Progressing     Problem: GASTROINTESTINAL - ADULT  Goal: Maintains adequate nutritional intake  Description  INTERVENTIONS:  - Monitor percentage of each meal consumed  - Identify factors contributing to decreased intake, treat as appropriate  - Assist with meals as needed  - Monitor I&O, weight, and lab values if indicated  - Obtain nutrition services referral as needed  Outcome: Progressing     Problem: METABOLIC, FLUID AND ELECTROLYTES - ADULT  Goal: Glucose maintained within target range  Description  INTERVENTIONS:  - Monitor Blood Glucose as ordered  - Assess for signs and symptoms of hyperglycemia and hypoglycemia  - Administer ordered medications to maintain glucose within target range  - Assess nutritional intake and initiate nutrition service referral as needed  Outcome: Progressing     Problem: SKIN/TISSUE INTEGRITY - ADULT  Goal: Skin integrity remains intact  Description  INTERVENTIONS  - Identify patients at risk for skin breakdown  - Assess and monitor skin integrity  - Assess and monitor nutrition and hydration status  - Monitor labs (i e  albumin)  - Assess for incontinence   - Turn and reposition patient  - Assist with mobility/ambulation  - Relieve pressure over bony prominences  - Avoid friction and shearing  - Provide appropriate hygiene as needed including keeping skin clean and dry  - Evaluate need for skin moisturizer/barrier cream  - Collaborate with interdisciplinary team (i e  Nutrition, Rehabilitation, etc )   - Patient/family teaching  Outcome: Progressing     Problem: HEMATOLOGIC - ADULT  Goal: Maintains hematologic stability  Description  INTERVENTIONS  - Assess for signs and symptoms of bleeding or hemorrhage  - Monitor labs  - Administer supportive blood products/factors as ordered and appropriate  Outcome: Progressing     Problem: MUSCULOSKELETAL - ADULT  Goal: Maintain or return mobility to safest level of function  Description  INTERVENTIONS:  - Assess patient's ability to carry out ADLs; assess patient's baseline for ADL function and identify physical deficits which impact ability to perform ADLs (bathing, care of mouth/teeth, toileting, grooming, dressing, etc )  - Assess/evaluate cause of self-care deficits   - Assess range of motion  - Assess patient's mobility  - Assess patient's need for assistive devices and provide as appropriate  - Encourage maximum independence but intervene and supervise when necessary  - Involve family in performance of ADLs  - Assess for home care needs following discharge   - Consider OT consult to assist with ADL evaluation and planning for discharge  - Provide patient education as appropriate  Outcome: Progressing  Goal: Maintain proper alignment of affected body part  Description  INTERVENTIONS:  - Support, maintain and protect limb and body alignment  - Provide patient/ family with appropriate education  Outcome: Progressing     Problem: Nutrition/Hydration-ADULT  Goal: Nutrient/Hydration intake appropriate for improving, restoring or maintaining nutritional needs  Description  Monitor and assess patient's nutrition/hydration status for malnutrition  Collaborate with interdisciplinary team and initiate plan and interventions as ordered  Monitor patient's weight and dietary intake as ordered or per policy  Utilize nutrition screening tool and intervene as necessary  Determine patient's food preferences and provide high-protein, high-caloric foods as appropriate       INTERVENTIONS:  - Monitor oral intake, urinary output, labs, and treatment plans  - Assess nutrition and hydration status and recommend course of action  - Evaluate amount of meals eaten  - Assist patient with eating if necessary   - Allow adequate time for meals  - Recommend/ encourage appropriate diets, oral nutritional supplements, and vitamin/mineral supplements  - Order, calculate, and assess calorie counts as needed  - Recommend, monitor, and adjust tube feedings and TPN/PPN based on assessed needs  - Assess need for intravenous fluids  - Provide specific nutrition/hydration education as appropriate  - Include patient/family/caregiver in decisions related to nutrition  Outcome: Progressing

## 2020-03-11 NOTE — ASSESSMENT & PLAN NOTE
Recently discharged from [facility] for similar, reportedly has on and off tremors of the hands and legs for approximately 1 year, most recently increasing in frequency  · Neurology saw patient will inpatient Naina Saleh, felt to be medication related as well as metabolic/infectious derangement  · Reglan was decreased to 5 mg t i d, then discontinued   · Recommended outpatient referral to the movement disorder clinic  · Overall improving

## 2020-03-12 ENCOUNTER — NURSING HOME VISIT (OUTPATIENT)
Dept: GERIATRICS | Facility: OTHER | Age: 65
End: 2020-03-12
Payer: COMMERCIAL

## 2020-03-12 VITALS
OXYGEN SATURATION: 95 % | RESPIRATION RATE: 16 BRPM | SYSTOLIC BLOOD PRESSURE: 132 MMHG | HEART RATE: 89 BPM | TEMPERATURE: 98 F | DIASTOLIC BLOOD PRESSURE: 64 MMHG

## 2020-03-12 DIAGNOSIS — Z79.4 TYPE 2 DIABETES MELLITUS WITH HYPERGLYCEMIA, WITH LONG-TERM CURRENT USE OF INSULIN (HCC): Chronic | ICD-10-CM

## 2020-03-12 DIAGNOSIS — R29.6 RECURRENT FALLS: ICD-10-CM

## 2020-03-12 DIAGNOSIS — E11.65 TYPE 2 DIABETES MELLITUS WITH HYPERGLYCEMIA, WITH LONG-TERM CURRENT USE OF INSULIN (HCC): Chronic | ICD-10-CM

## 2020-03-12 DIAGNOSIS — G89.4 CHRONIC PAIN SYNDROME: ICD-10-CM

## 2020-03-12 DIAGNOSIS — N17.9 ACUTE KIDNEY INJURY (HCC): ICD-10-CM

## 2020-03-12 DIAGNOSIS — I10 ESSENTIAL HYPERTENSION: Chronic | ICD-10-CM

## 2020-03-12 DIAGNOSIS — E11.65 TYPE 2 DIABETES MELLITUS WITH HYPERGLYCEMIA, WITHOUT LONG-TERM CURRENT USE OF INSULIN (HCC): ICD-10-CM

## 2020-03-12 DIAGNOSIS — Z71.89 GOALS OF CARE, COUNSELING/DISCUSSION: ICD-10-CM

## 2020-03-12 DIAGNOSIS — R25.1 OCCASIONAL TREMORS: ICD-10-CM

## 2020-03-12 DIAGNOSIS — G93.41 ACUTE METABOLIC ENCEPHALOPATHY: Primary | ICD-10-CM

## 2020-03-12 PROCEDURE — 99306 1ST NF CARE HIGH MDM 50: CPT | Performed by: INTERNAL MEDICINE

## 2020-03-12 RX ORDER — INSULIN GLARGINE 100 [IU]/ML
18 INJECTION, SOLUTION SUBCUTANEOUS
Refills: 0
Start: 2020-03-12 | End: 2020-03-19 | Stop reason: DRUGHIGH

## 2020-03-12 RX ORDER — INSULIN LISPRO 100 [IU]/ML
6 INJECTION, SOLUTION INTRAVENOUS; SUBCUTANEOUS
Qty: 5 PEN | Refills: 0
Start: 2020-03-12 | End: 2020-03-19 | Stop reason: DRUGHIGH

## 2020-03-12 NOTE — ASSESSMENT & PLAN NOTE
Lab Results   Component Value Date    HGBA1C 5 2 03/08/2020     Patient's hemoglobin A1c in the hospital was 5 2   UC low numbers like these in people who might be overmedicated with their diabetic medications or if they have severe anemia  This patient does not have severe anemia her hemoglobin is 12 9  She currently is taking   Lantus 20 units prior to her meals and Humalog 8 units 3 times a day with meals I will need to gradually decrease these dosage is and I will also check a 2:00 a m  Blood sugar to make sure that she is not suffering from hypoglycemia

## 2020-03-12 NOTE — ASSESSMENT & PLAN NOTE
Patient had increased tremors in upper and lower extremities neurology evaluated the patient and felt he could be secondary to polypharmacy  Medications were disc creased and discontinue such as Reglan and the patient was recommended to have a referral to a movement disorder clinic

## 2020-03-12 NOTE — ASSESSMENT & PLAN NOTE
Matters Most:  She wishes to return home with her daughter and get back to her baseline health  Medications:  Patients medications were discussed in detail and was advised to avoid polypharmacy  She was educated that she should limit her use of her PRN oxycodone here at [facility]  The family states that a sibling brought in one of the patient's home narcotic medications when she was leaving the hospital after her medications had been d/c  She was going through withdrawal and the family member wanted to eliminate her symptoms  The patient states that her family will discard her narcotics at home  Her insulin was decreased due to possibility of hypoglycemia and she was educated on this  Mobility:  Patient currently lives in a two-story home with her daughter and , but stays on one level  She ambulates at home without her rolling walker due to tight spaces, but otherwise uses her walker in other areas  Patient sits frequently in a mechanical lift chair at home  Mentation:  Patient states her memory is doing well  She does not manage her own finances, her  does

## 2020-03-12 NOTE — PROGRESS NOTES
[address] [place] [address] History and Physical    NAME: Sima Byrd  AGE: 62 y o  SEX: female [id number]    DATE OF ENCOUNTER: 3/12/2020  [address]  Assessment and Plan     Problem List Items Addressed This Visit        Endocrine    Type 2 diabetes mellitus with hyperglycemia, with long-term current use of insulin (HCC) (Chronic)       Lab Results   Component Value Date    HGBA1C 5 2 03/08/2020     Patient's hemoglobin A1c in the hospital was 5 2   UC low numbers like these in people who might be overmedicated with their diabetic medications or if they have severe anemia  This patient does not have severe anemia her hemoglobin is 12 9  She currently is taking   Lantus 20 units prior to her meals and Humalog 8 units 3 times a day with meals I will need to gradually decrease these dosage is and I will also check a 2:00 a m  Blood sugar to make sure that she is not suffering from hypoglycemia  Relevant Medications    insulin glargine (LANTUS) 100 units/mL subcutaneous injection    HUMALOG KWIKPEN 100 units/mL injection pen       Cardiovascular and Mediastinum    Essential hypertension (Chronic)     Currently on 10mg Lisinopril daily  This should help to protect her kidneys  Nervous and Auditory    Acute toxic/metabolic encephalopathy - Primary     Felt to be multifactorial secondary to urinary tract infection, polypharmacy in the setting of acute kidney injury  They did recommend weaning her off the narcotics at discharge  Genitourinary    Acute kidney injury [facility])     Patient kidney function deteriorated in the hospital his air creatinine went up to 1 78 her baseline creatinine is 1 13 with a GFR of 52              Other    Recurrent falls     Secondary to electrolyte imbalance, polypharmacy, and tremors         Chronic pain syndrome     Patient had been on markedly elevated doses of narcotics with oxycodone 50 mg every 4 hours and morphine 100 mg daily also she was given gabapentin 300 mg a day  The patient's medications were discontinued and she was monitored for withdrawal syndrome  They made it very clear that she would not be discharged home on any narcotics home morphine and oxycodone were completely discontinued and the pain was well controlled off this medications  Patient's gabapentin was also decreased currently she is only on 100 mg twice daily for her neuropathy  Occasional tremors     Patient had increased tremors in upper and lower extremities neurology evaluated the patient and felt he could be secondary to polypharmacy  Medications were disc creased and discontinue such as Reglan and the patient was recommended to have a referral to a movement disorder clinic  Goals of care, counseling/discussion     Matters Most:  She wishes to return home with her daughter and get back to her baseline health  Medications:  Patients medications were discussed in detail and was advised to avoid polypharmacy  She was educated that she should limit her use of her PRN oxycodone here at [facility]  The family states that a sibling brought in one of the patient's home narcotic medications when she was leaving the hospital after her medications had been d/c  She was going through withdrawal and the family member wanted to eliminate her symptoms  The patient states that her family will discard her narcotics at home  Her insulin was decreased due to possibility of hypoglycemia and she was educated on this  Mobility:  Patient currently lives in a two-story home with her daughter and , but stays on one level  She ambulates at home without her rolling walker due to tight spaces, but otherwise uses her walker in other areas  Patient sits frequently in a mechanical lift chair at home  Mentation:  Patient states her memory is doing well  She does not manage her own finances, her  does               Other Visit Diagnoses     Type 2 diabetes mellitus with hyperglycemia, without long-term current use of insulin (HCC)        Relevant Medications    insulin glargine (LANTUS) 100 units/mL subcutaneous injection    HUMALOG KWIKPEN 100 units/mL injection pen          All medications and routine orders were reviewed and updated as needed  Plan discussed with: Patient    Chief Complaint     Chief Complaint   Patient presents with    Confusion        History of Present Illness     Patient is a 62year old  female who had an initial admission to [facility] [place] on 3/7/2020 secondary to increased lethargy and mild clonus and YVONNE  Patient's toxic encephalopathy is attributed to polypharmacy  Patient's narcotic were d/c and it was recommended that she be referred to an acute skilled facility for further rehab  Patient apparently exhibited drug-seeking behavior and will most likely benefit from outpatient behavior modification treatment  Patient hs evidence of CRF stage III with a GFR of 52 and Creatinine of 1 13  Patient's family states she has fallen twice in the past month          HISTORY:  Past Surgical History:   Procedure Laterality Date    CHOLECYSTECTOMY      JOINT REPLACEMENT      OOPHORECTOMY        Past Medical History:   Diagnosis Date    Abnormal head CT 7/14/2017    Acute kidney injury ([address] ) 8/19/2018    Cellulitis 1/10/2017    Closed fracture of multiple ribs of right side 7/14/2017    Degenerative disc disease at L5-S1 level     Diabetes mellitus (HCC)     History of methicillin resistant staphylococcus aureus (MRSA) 08/21/2018    negative nasal culture- isolation and hx of mrsa removed 8/20/2018    Hyperkalemia 4/25/2018    Hypertension     Hypocalcemia 4/25/2018    Hyponatremia 7/14/2017     Family History   Problem Relation Age of Onset    Rheum arthritis Mother     Alzheimer's disease Mother     Lupus Mother      Social History     Socioeconomic History    Marital status: /Civil Union     Spouse name: None  Number of children: None    Years of education: None    Highest education level: None   Occupational History    None   Social Needs    Financial resource strain: None    Food insecurity:     Worry: None     Inability: None    Transportation needs:     Medical: None     Non-medical: None   Tobacco Use    Smoking status: Former Smoker     Types: E-Cigarettes     Start date: 1976     Last attempt to quit: 2019     Years since quittin 9    Smokeless tobacco: Never Used   Substance and Sexual Activity    Alcohol use: Not Currently     Alcohol/week: 0 0 standard drinks     Frequency: Never     Drinks per session: Patient refused     Binge frequency: Never    Drug use: Yes     Types: Marijuana    Sexual activity: Not Currently   Lifestyle    Physical activity:     Days per week: None     Minutes per session: None    Stress: None   Relationships    Social connections:     Talks on phone: None     Gets together: None     Attends Judaism service: None     Active member of club or organization: None     Attends meetings of clubs or organizations: None     Relationship status: None    Intimate partner violence:     Fear of current or ex partner: None     Emotionally abused: None     Physically abused: None     Forced sexual activity: None   Other Topics Concern    None   Social History Narrative    None       Allergies:  No Known Allergies    Review of Systems     Review of Systems   Constitutional: Negative for chills and fever  HENT: Positive for trouble swallowing  Negative for ear pain and hearing loss  Patient has no teeth and has a set of ill-fitting full dentures at home  Difficulty swallowing food until she takes a drink of water  Eyes: Negative  Negative for pain  Patient wears glasses  Respiratory: Negative for shortness of breath  Cardiovascular: Negative for chest pain and palpitations     Gastrointestinal: Negative for abdominal pain, constipation, diarrhea and vomiting  Genitourinary: Negative for dysuria, enuresis, frequency and urgency  Musculoskeletal: Positive for arthralgias  Negative for myalgias  Hip pain   Neurological: Positive for numbness  Negative for weakness  Neuropathy in bilateral feet at night  Psychiatric/Behavioral: Positive for sleep disturbance  Admits to snoring and insomnia  PHQ-9 Depression Screening    PHQ-9:    Frequency of the following problems over the past two weeks:              Medications and orders       Current Outpatient Medications:     amLODIPine (NORVASC) 10 mg tablet, Take 1 tablet (10 mg total) by mouth daily, Disp: 30 tablet, Rfl: 0    DULoxetine (CYMBALTA) 60 mg delayed release capsule, Take 60 mg by mouth daily, Disp: , Rfl:     gabapentin (NEURONTIN) 100 mg capsule, Take 1 capsule (100 mg total) by mouth 2 (two) times a day, Disp: , Rfl: 0    HUMALOG KWIKPEN 100 units/mL injection pen, Inject 6 Units under the skin 3 (three) times a day with meals, Disp: 5 pen, Rfl: 0    insulin glargine (LANTUS) 100 units/mL subcutaneous injection, Inject 18 Units under the skin daily at bedtime, Disp: , Rfl: 0    lisinopril (ZESTRIL) 5 mg tablet, Take 2 tablets (10 mg total) by mouth daily, Disp: , Rfl: 0    melatonin 3 mg, Take 1 tablet (3 mg total) by mouth daily at bedtime, Disp: , Rfl: 0    oxyCODONE (ROXICODONE) 5 mg immediate release tablet, Take 1 tablet (5 mg total) by mouth every 8 (eight) hours as needed for moderate pain for up to 10 daysMax Daily Amount: 15 mg, Disp: 10 tablet, Rfl: 0  No current facility-administered medications for this visit       Facility-Administered Medications Ordered in Other Visits:     ferrous sulfate tablet 325 mg, 325 mg, Oral, Daily With Breakfast, Miranda Miguel MD       Objective     Vitals:   Vitals:    03/12/20 1550   BP: 132/64   Pulse: 89   Resp: 16   Temp: 98 °F (36 7 °C)   SpO2: 95%       Physical Exam   Constitutional: She appears well-developed and well-nourished  No distress  HENT:   Head: Normocephalic and atraumatic  Right Ear: External ear normal    Left Ear: External ear normal    Mouth/Throat: Mucous membranes are dry  She does not have dentures  No teeth noted, patient has dentures but does not wear them  Eyes: Pupils are equal, round, and reactive to light  Conjunctivae are normal    Neck: Carotid bruit is not present  Cardiovascular: Normal rate, regular rhythm, normal heart sounds and intact distal pulses  No murmur heard  Pulmonary/Chest: Effort normal and breath sounds normal  No respiratory distress  She has no wheezes  She has no rales  Abdominal: Soft  Bowel sounds are normal  She exhibits no distension  There is no tenderness  Musculoskeletal: She exhibits no edema  Patient currently in a wheelchair, but has a rolling walker  Neurological: She is alert  A sensory deficit is present  Decreased sensation in bilateral lower extremities  Skin: Skin is warm and dry  She is not diaphoretic  Venous stasis changes of bilateral lower extremities  Psychiatric: She has a normal mood and affect  Her behavior is normal  Thought content normal        Pertinent Laboratory/Diagnostic Studies: The following labs/studies were reviewed please see facility chart for details

## 2020-03-12 NOTE — ASSESSMENT & PLAN NOTE
Patient had been on markedly elevated doses of narcotics with oxycodone 50 mg every 4 hours and morphine 100 mg daily also she was given gabapentin 300 mg a day  The patient's medications were discontinued and she was monitored for withdrawal syndrome  They made it very clear that she would not be discharged home on any narcotics home morphine and oxycodone were completely discontinued and the pain was well controlled off this medications  Patient's gabapentin was also decreased currently she is only on 100 mg twice daily for her neuropathy

## 2020-03-12 NOTE — PATIENT INSTRUCTIONS
1  Check 2am blood sugar for 7 days  2  Decrease Lantus to 18 units  3  Decrease Humalog to 6 units prior to meals  4  Speech therapy evaluation due to difficulty swallowing food  5  The family will talk with  about a 24hr aide to remain with the patient  6  PT and OT

## 2020-03-12 NOTE — ASSESSMENT & PLAN NOTE
Felt to be multifactorial secondary to urinary tract infection, polypharmacy in the setting of acute kidney injury  They did recommend weaning her off the narcotics at discharge

## 2020-03-12 NOTE — ASSESSMENT & PLAN NOTE
Patient kidney function deteriorated in the hospital his air creatinine went up to 1 78 her baseline creatinine is 1 13 with a GFR of 52

## 2020-03-12 NOTE — UTILIZATION REVIEW
Notification of Discharge  This is a Notification of Discharge from our facility [address]  Please be advised that this patient has been discharge from our facility  Below you will find the admission and discharge date and time including the patients disposition  PRESENTATION DATE: 3/7/2020  7:54 PM  OBS ADMISSION DATE:   IP ADMISSION DATE: 3/8/20 1008   DISCHARGE DATE: 3/11/2020  2:40 PM  DISPOSITION: Non [facility] Non [facility]   Admission Orders listed below:  Admission Orders (From admission, onward)     Ordered        03/08/20 1008  Inpatient Admission  Once         03/07/20 2154  Place in Observation  Once                   Please contact the UR Department if additional information is required to close this patient's authorization/case  [address] Utilization Review Department  Main: [phone] x carefully listen to the prompts  All voicemails are confidential   [email or web address]  Send all requests for admission clinical reviews, approved or denied determinations and any other requests to dedicated fax number below belonging to the campus where the patient is receiving treatment   List of dedicated fax numbers:  [address] DENIALS (Administrative/Medical Necessity) [phone]   [address] (Maternity/NICU/Pediatrics) [phone]   Morgan Kwon [phone]   Raymundo Jensen [phone]   Emma Arora [phone]   Grace Kolb [facility] [address] [facility] [phone]   [facility]  [phone]   [address] [place] [address]  [address] [facility] 1000 [facility] [phone]

## 2020-03-14 ENCOUNTER — TELEPHONE (OUTPATIENT)
Dept: OTHER | Facility: OTHER | Age: 65
End: 2020-03-14

## 2020-03-16 ENCOUNTER — NURSING HOME VISIT (OUTPATIENT)
Dept: GERIATRICS | Facility: OTHER | Age: 65
End: 2020-03-16
Payer: COMMERCIAL

## 2020-03-16 VITALS
SYSTOLIC BLOOD PRESSURE: 179 MMHG | RESPIRATION RATE: 20 BRPM | DIASTOLIC BLOOD PRESSURE: 87 MMHG | OXYGEN SATURATION: 95 % | TEMPERATURE: 97.8 F | HEART RATE: 80 BPM

## 2020-03-16 DIAGNOSIS — I10 ESSENTIAL HYPERTENSION: Chronic | ICD-10-CM

## 2020-03-16 DIAGNOSIS — G89.4 CHRONIC PAIN SYNDROME: ICD-10-CM

## 2020-03-16 DIAGNOSIS — Z79.4 TYPE 2 DIABETES MELLITUS WITH HYPERGLYCEMIA, WITH LONG-TERM CURRENT USE OF INSULIN (HCC): Primary | Chronic | ICD-10-CM

## 2020-03-16 DIAGNOSIS — E11.65 TYPE 2 DIABETES MELLITUS WITH HYPERGLYCEMIA, WITH LONG-TERM CURRENT USE OF INSULIN (HCC): Primary | Chronic | ICD-10-CM

## 2020-03-16 DIAGNOSIS — R41.0 DELIRIUM: ICD-10-CM

## 2020-03-16 PROCEDURE — 99309 SBSQ NF CARE MODERATE MDM 30: CPT | Performed by: NURSE PRACTITIONER

## 2020-03-16 NOTE — PROGRESS NOTES
[facility]  [address]  [phone]  [facility] Progress Note  code31      NAME: Ginny Moreno  AGE: 62 y o  SEX: female    DATE OF ENCOUNTER: 3/16/2020    Assessment and Plan     Problem List Items Addressed This Visit        Endocrine    Type 2 diabetes mellitus with hyperglycemia, with long-term current use of insulin ([address] ) - Primary (Chronic)       Lab Results   Component Value Date    HGBA1C 5 2 03/08/2020 ·   Blood sugars running 80s-100  · Will decrease Lantus to 14 units daily   · Decrease novolog to p r n  For BS > 250  · Continue dietary lifestyle modifications            Cardiovascular and Mediastinum    Essential hypertension (Chronic)     · Elevated this morning  · Will add p r n  Hydralazine for SBP>160    · Continue with lisinopril and amlodipine  · If continued doses of hydralazine required would consider increasing lisinopril  Nervous and Auditory    Delirium     · Not exhibiting any signs of delirium at this time  · Thought to be secondary to polypharmacy and UTI  · Narcotics are being titrated off - leave off when discharged  Home  · Notified future providers of concerns the patient had delirium during hospitalization  · Monitor for signs symptoms of cognitive impairmet as delirium is often noted in patients with underlying conditions such as cognitive impairement            Other    Chronic pain syndrome     · Patient doing well on Oxy IR p r n  · Without any signs of withdrawal   (>72 hrs since weaned off meds by hospital)  · OxyIR to auto dc on 3/20  Per H&P, do not dc with narcotics  · Pt using 1-3 doses per day  ·  Referral made for pain management to ensure patient has appointment prior to discharge  · Continue PT OT       · Will not make any changes at this time                  No orders of the defined types were placed in this encounter       - Counseling Documentation: patient was counseled regarding: instructions for management, patient and family education and impressions    Chief Complaint     No chief complaint on file  History of Present Illness     Patient seen for weekly follow-up visit  Patient denies pain at this time  Patient denies CP/SOB/cough  Denies GI/ distress  She does report some loose stools since antibiotics started  She states she does not have any further symptoms of her urinary tract infection  Patient denies any cold symptoms  Patient states that she wants to go home and feels that she is strong enough to do so  She is looking for to going home  Her  called and she is agreeable to allowing him to be updated this time as he is concerned with when she will be going home  He is sitting up appointments for chronic pain management, endocrinology, Neurology, Nephrology  He is also on changing primary care physicians and feels patient will do well as outpatient  Social work notified of 's concerns for discharge  The following portions of the patient's history were reviewed and updated as appropriate: allergies, current medications, past family history, past medical history, past social history, past surgical history and problem list     Review of Systems     Review of Systems   Constitutional: Negative for activity change, appetite change, chills and fatigue  HENT: Negative for congestion  Respiratory: Negative for cough and shortness of breath  Cardiovascular: Negative for chest pain  Gastrointestinal: Positive for diarrhea (Mild loose stool)  Negative for abdominal pain, constipation, nausea and vomiting  Genitourinary: Negative for difficulty urinating  Musculoskeletal: Positive for gait problem  Neurological: Negative for dizziness and light-headedness         Active Problem List     Patient Active Problem List   Diagnosis    Essential hypertension    Hyperlipidemia    Diabetic neuropathy (HCC)    Venous insufficiency (chronic) (peripheral)    Recurrent falls    Hyponatremia    Chronic venous stasis dermatitis of both lower extremities    Delirium    Type 2 diabetes mellitus with hyperglycemia, with long-term current use of insulin (HCC)    Chronic pain syndrome    Peripheral neuropathy    Acute toxic/metabolic encephalopathy    Acute kidney injury ([address] )    Occasional tremors    Goals of care, counseling/discussion       Objective     BP (!) 179/87   Pulse 80   Temp 97 8 °F (36 6 °C)   Resp 20   LMP  (LMP Unknown) Comment: post menopause  SpO2 95%     Physical Exam   Constitutional: She is oriented to person, place, and time  She appears well-developed and well-nourished  No distress  HENT:   Head: Normocephalic  Cardiovascular: Normal rate and normal heart sounds  Exam reveals no gallop and no friction rub  No murmur heard  Pulmonary/Chest: Effort normal and breath sounds normal  No respiratory distress  She has no wheezes  She has no rales  Abdominal: Soft  Bowel sounds are normal  She exhibits no distension  There is no tenderness  There is no rebound  Musculoskeletal: Normal range of motion  Neurological: She is alert and oriented to person, place, and time  Skin: Skin is warm and dry  She is not diaphoretic  Psychiatric: She has a normal mood and affect  Her behavior is normal    Nursing note and vitals reviewed  Pertinent Laboratory/Diagnostic Studies:  Labs reviewed in facility paper chart  Current Medications   Medications were reviewed and updated  Please refer to paper chart for updated list of medications      APRYL Quinonez  3/16/2020 2:27 PM

## 2020-03-16 NOTE — ASSESSMENT & PLAN NOTE
Lab Results   Component Value Date    HGBA1C 5 2 03/08/2020   · Blood sugars running 80s-100  · Will decrease Lantus to 14 units daily   · Decrease novolog to p r n   For BS > 250  · Continue dietary lifestyle modifications

## 2020-03-16 NOTE — ASSESSMENT & PLAN NOTE
· Elevated this morning  · Will add p r n  Hydralazine for SBP>160    · Continue with lisinopril and amlodipine  · If continued doses of hydralazine required would consider increasing lisinopril

## 2020-03-16 NOTE — ASSESSMENT & PLAN NOTE
· Not exhibiting any signs of delirium at this time  · Thought to be secondary to polypharmacy and UTI  · Narcotics are being titrated off - leave off when discharged  Home  · Notified future providers of concerns the patient had delirium during hospitalization      · Monitor for signs symptoms of cognitive impairmet as delirium is often noted in patients with underlying conditions such as cognitive impairement

## 2020-03-16 NOTE — ASSESSMENT & PLAN NOTE
· Patient doing well on Oxy IR p r n  · Without any signs of withdrawal   (>72 hrs since weaned off meds by hospital)  · OxyIR to auto dc on 3/20  Per H&P, do not dc with narcotics  · Pt using 1-3 doses per day  ·  Referral made for pain management to ensure patient has appointment prior to discharge  · Continue PT OT       · Will not make any changes at this time

## 2020-03-17 NOTE — UTILIZATION REVIEW
Notification of Discharge  This is a Notification of Discharge from our facility [address]  Please be advised that this patient has been discharge from our facility  Below you will find the admission and discharge date and time including the patients disposition  PRESENTATION DATE: 2/29/2020 10:25 PM  OBS ADMISSION DATE:   IP ADMISSION DATE: 3/1/20 0041   DISCHARGE DATE: 3/4/2020  1:09 PM  DISPOSITION: Non [facility] Non [facility]   Admission Orders listed below:  Admission Orders (From admission, onward)     Ordered        03/01/20 0042  Inpatient Admission  Once                   Please contact the UR Department if additional information is required to close this patient's authorization/case  [address] [facility] Utilization Review Department  Main: [phone] x carefully listen to the prompts  All voicemails are confidential   [email or web address]  Send all requests for admission clinical reviews, approved or denied determinations and any other requests to dedicated fax number below belonging to the campus where the patient is receiving treatment   List of dedicated fax numbers:  [address] DENIALS (Administrative/Medical Necessity) [phone]   1000  [facility] (Maternity/NICU/Pediatrics) [phone]   Charles Hamilton [phone]   Teresita Riley [phone]   Ki Nones [phone]   [address]  [facility] 15[address] [facility] [phone]   Rigoberto Gutierrez [phone]   220[address] [facility]  [address] [facility] 1000 [facility] [phone]

## 2020-03-18 ENCOUNTER — NURSING HOME VISIT (OUTPATIENT)
Dept: GERIATRICS | Facility: OTHER | Age: 65
End: 2020-03-18
Payer: COMMERCIAL

## 2020-03-18 DIAGNOSIS — Z79.4 TYPE 2 DIABETES MELLITUS WITH HYPERGLYCEMIA, WITH LONG-TERM CURRENT USE OF INSULIN (HCC): Chronic | ICD-10-CM

## 2020-03-18 DIAGNOSIS — E11.65 TYPE 2 DIABETES MELLITUS WITH HYPERGLYCEMIA, WITH LONG-TERM CURRENT USE OF INSULIN (HCC): Chronic | ICD-10-CM

## 2020-03-18 DIAGNOSIS — N17.9 ACUTE KIDNEY INJURY (HCC): ICD-10-CM

## 2020-03-18 DIAGNOSIS — G89.4 CHRONIC PAIN SYNDROME: ICD-10-CM

## 2020-03-18 DIAGNOSIS — R26.2 AMBULATORY DYSFUNCTION: ICD-10-CM

## 2020-03-18 DIAGNOSIS — I10 ESSENTIAL HYPERTENSION: Chronic | ICD-10-CM

## 2020-03-18 DIAGNOSIS — G62.9 PERIPHERAL POLYNEUROPATHY: ICD-10-CM

## 2020-03-18 DIAGNOSIS — G93.41 ACUTE METABOLIC ENCEPHALOPATHY: Primary | ICD-10-CM

## 2020-03-18 PROCEDURE — 99316 NF DSCHRG MGMT 30 MIN+: CPT | Performed by: NURSE PRACTITIONER

## 2020-03-19 ENCOUNTER — TELEPHONE (OUTPATIENT)
Dept: GERIATRICS | Facility: CLINIC | Age: 65
End: 2020-03-19

## 2020-03-19 VITALS — DIASTOLIC BLOOD PRESSURE: 78 MMHG | SYSTOLIC BLOOD PRESSURE: 169 MMHG

## 2020-03-19 PROBLEM — R26.2 AMBULATORY DYSFUNCTION: Status: ACTIVE | Noted: 2020-03-19

## 2020-03-19 RX ORDER — LISINOPRIL 20 MG/1
10 TABLET ORAL DAILY
COMMUNITY

## 2020-03-19 RX ORDER — INSULIN ASPART 100 [IU]/ML
3 INJECTION, SOLUTION INTRAVENOUS; SUBCUTANEOUS
COMMUNITY

## 2020-03-19 NOTE — ASSESSMENT & PLAN NOTE
· Pt pain control adequate throughout rehab with oxyIR 5mg q8h prn  · Reviewed PDMP upon discharge  · Noted fill of oxyIR 30mg on 3/11 (pt entered rehab 3/11)  · This was confirmed by [address]  · Pt states she was unaware of fill  Reviewed with patient that she can not restart prior dose of oxyIR when she has been weaned off  Doing so could cause life threatening consequences  Asked pt to return oxyIR to be destroyed  Pt verbalized understanding and was agreeable to poc  · Dr Rita Chan made aware of PDMP findings and spoke with family  Will be consulting with social work for safe discharge planning including VNA and possible inclusion of AAA  · Pain management referral placed   · Pain management unable to see pt - dr Rita Chan did speak with the company  · Discussed with [facility] discharge team and Dr Rita Chan  Pt not to be discharged with narcotics from this facility  Needs to follow up with pcp  Pt aware  Dr Rita Chan in communication with family

## 2020-03-19 NOTE — ASSESSMENT & PLAN NOTE
· Has resolved  · Thought to have been secondary to polypharmacy at hospital  · Narcotics have been weaned down  · SLUMS  25/30 at rehab  · F/U visit with pcp, neurology, nephrology, pain mgt specialist

## 2020-03-19 NOTE — ASSESSMENT & PLAN NOTE
· Cont pt/ot via home care - taxing effort for pt to leave home secondary to assistive device    · Fall precautions

## 2020-03-19 NOTE — ASSESSMENT & PLAN NOTE
· Elevated during rehab  · Med changes were made during hospitalization  · Increased lisinopril 20mg po daily  · Cont lifestyle and dietary modification  · Cont to monitor

## 2020-03-19 NOTE — PROGRESS NOTES
[facility]  [address]  [phone]  [facility] 26 DISCHARGE Note  code31      NAME: Dangelo Cordova  AGE: 62 y o  SEX: female    DATE OF ENCOUNTER: 3/18/2020    Assessment and Plan     Problem List Items Addressed This Visit        Endocrine    Type 2 diabetes mellitus with hyperglycemia, with long-term current use of insulin (HCC) (Chronic)       Lab Results   Component Value Date    HGBA1C 5 2 03/08/2020 ·   , meal time 100's  · lantus now 14 units qhs  · Prn novolog if BS>250  · Cont dietary and lifestyle modifications  · F/u with endocrine, opthalmology, podiatry  Relevant Medications    insulin glargine (LANTUS SOLOSTAR) 100 units/mL injection pen    Insulin Aspart FlexPen 100 UNIT/ML SOPN       Cardiovascular and Mediastinum    Essential hypertension (Chronic)     · Elevated during rehab  · Med changes were made during hospitalization  · Increased lisinopril 20mg po daily  · Cont lifestyle and dietary modification  · Cont to monitor         Relevant Medications    lisinopril (ZESTRIL) 20 mg tablet       Nervous and Auditory    Peripheral neuropathy     · Cont with cymbalta, gabapentin  · F/u with pain management specialist         Acute toxic/metabolic encephalopathy - Primary     · Has resolved  · Thought to have been secondary to polypharmacy at hospital  · Narcotics have been weaned down  · SLUMS  25/30 at rehab  · F/U visit with pcp, neurology, nephrology, pain mgt specialist            Genitourinary    Acute kidney injury ([facility] [address] )     · GFR 52 on 3/12/2020  · Cont to monitor  · Keep hydrated  · Avoid nsaids/nephrotoxins            Other    Chronic pain syndrome     · Pt pain control adequate throughout rehab with oxyIR 5mg q8h prn  · Reviewed PDMP upon discharge  · Noted fill of oxyIR 30mg on 3/11 (pt entered rehab 3/11)  · This was confirmed by [address]  · Pt states she was unaware of fill    Reviewed with patient that she can not restart prior dose of oxyIR when she has been weaned off  Doing so could cause life threatening consequences  Asked pt to return oxyIR to be destroyed  Pt verbalized understanding and was agreeable to poc  · Dr Kamilla Dupont made aware of PDMP findings and spoke with family  Will be consulting with social work for safe discharge planning including VNA and possible inclusion of AAA  · Pain management referral placed   · Pain management unable to see pt - dr Kamilla Dupont did speak with the company  · Discussed with [facility] discharge team and Dr Kamilla Dupont  Pt not to be discharged with narcotics from this facility  Needs to follow up with pcp  Pt aware  Dr Kamilla Dupont in communication with family  Ambulatory dysfunction     · Cont pt/ot via home care - taxing effort for pt to leave home secondary to assistive device  · Fall precautions               No orders of the defined types were placed in this encounter       - Counseling Documentation: patient was counseled regarding: instructions for management and patient and family education    Chief Complaint     No chief complaint on file  History of Present Illness     Mrs Ronda Oseguera is a 60yo female who came to [facility] after hospitalization secondary encephalopathy secondary to polypharmacy  Multiple medications were discontinued at hospital including stephan, myrbetric, reglan, triamterene-hctz, metformin  OxyIR was decreased and is to dc after 3/19  Pt was admitted to [facility] on 3/11/2020 and will discharge home with  on 3/19/2020  Pt did well during rehab  Her chronic back pain was controlled with therapy and oxyIR 5mg - 1 to 3 doses per day  Pt denied constipation  Pt's blood pressure was mildly elevated so lisinopril was increased  Pt's blood sugars were monitored and tended to be hypoglycemic so lantus was decreased and novolog changed to prn  Pt participated in therapy during stay    Pt initially ambulated 150ft with rollator and close guard  TUG 30sec  Pt progressed to ambulating 100ft with rollator and supervision  TUG 20sec  Pt initially min assist UB/mod assist LB  She progressed to stand by assist UB/mod assist LB  SLUMs 25/30    Pt seen for discharge tamela   Pt states she is doing fine and is ready to go home  Pain is controlled  We discussed that oxyIR is to be discontinued this week, not narcotic script will be sent home  Reviewed that pt should not resume her home narcotic use as doing so could be life threatening  Pt verbalized understanding  Pt denies cp/sob/cough  Denies gi/gu distress  The following portions of the patient's history were reviewed and updated as appropriate: allergies, current medications, past family history, past medical history, past social history, past surgical history and problem list     Review of Systems     Review of Systems   Constitutional: Negative for activity change, appetite change, chills and fatigue  HENT: Negative for congestion  Respiratory: Negative for cough and shortness of breath  Cardiovascular: Negative for chest pain  Gastrointestinal: Negative for abdominal pain, constipation, diarrhea, nausea and vomiting  Genitourinary: Negative for difficulty urinating  Musculoskeletal: Positive for back pain and gait problem  Neurological: Negative for dizziness and light-headedness         Active Problem List     Patient Active Problem List   Diagnosis    Essential hypertension    Hyperlipidemia    Diabetic neuropathy (HCC)    Venous insufficiency (chronic) (peripheral)    Recurrent falls    Hyponatremia    Chronic venous stasis dermatitis of both lower extremities    Delirium    Type 2 diabetes mellitus with hyperglycemia, with long-term current use of insulin (HCC)    Chronic pain syndrome    Peripheral neuropathy    Acute toxic/metabolic encephalopathy    Acute kidney injury ([facility] [address] )    Occasional tremors    Goals of care, counseling/discussion    Ambulatory dysfunction       Objective     /78   LMP  (LMP Unknown) Comment: post menopause    Physical Exam   Constitutional: She is oriented to person, place, and time  She appears well-developed and well-nourished  No distress  HENT:   Head: Normocephalic  Cardiovascular: Normal rate and normal heart sounds  Exam reveals no gallop and no friction rub  No murmur heard  Pulmonary/Chest: Effort normal and breath sounds normal  No respiratory distress  She has no wheezes  She has no rales  Abdominal: Soft  Bowel sounds are normal  She exhibits no distension  There is no tenderness  There is no rebound  Musculoskeletal: Normal range of motion  Neurological: She is alert and oriented to person, place, and time  Skin: Skin is warm and dry  She is not diaphoretic  Psychiatric: She has a normal mood and affect  Her behavior is normal    Nursing note and vitals reviewed  Pertinent Laboratory/Diagnostic Studies:  Labs reviewed in facility paper chart  Current Medications   Medications were reviewed in IMAR, updated in Epic  Rx for lisinopril, gabapentin x2 wks provided  [facility] for oxyIR 30mg on 3/11/2020        Current Outpatient Medications:     Insulin Aspart FlexPen 100 UNIT/ML SOPN, Inject 3 Units under the skin 3 (three) times a day with meals If bs>250, Disp: , Rfl:     insulin glargine (LANTUS SOLOSTAR) 100 units/mL injection pen, Inject 14 Units under the skin daily at bedtime, Disp: , Rfl:     lisinopril (ZESTRIL) 20 mg tablet, Take 20 mg by mouth daily, Disp: , Rfl:     amLODIPine (NORVASC) 10 mg tablet, Take 1 tablet (10 mg total) by mouth daily, Disp: 30 tablet, Rfl: 0    DULoxetine (CYMBALTA) 60 mg delayed release capsule, Take 60 mg by mouth daily, Disp: , Rfl:     gabapentin (NEURONTIN) 100 mg capsule, Take 1 capsule (100 mg total) by mouth 2 (two) times a day, Disp: , Rfl: 0    melatonin 3 mg, Take 1 tablet (3 mg total) by mouth daily at bedtime, Disp: , Rfl: 0  No current facility-administered medications for this visit  Facility-Administered Medications Ordered in Other Visits:     ferrous sulfate tablet 325 mg, 325 mg, Oral, Daily With Breakfast, Rubi Lozoya MD    >30min spent on discharge:  Pt visit, med rec, coordination of care, chart review  PCP update:  Dr Tasha Santiago spoke directly with Dr Melissa Parker who will be assuming pt's care     He also left message for Dr Janet Ponce, [address] [place] [address]  3/19/2020 9:02 AM

## 2020-03-19 NOTE — ASSESSMENT & PLAN NOTE
Lab Results   Component Value Date    HGBA1C 5 2 03/08/2020   · , meal time 100's  · lantus now 14 units qhs  · Prn novolog if BS>250  · Cont dietary and lifestyle modifications  · F/u with endocrine, opthalmology, podiatry

## 2020-04-22 ENCOUNTER — TELEPHONE (OUTPATIENT)
Dept: ENDOCRINOLOGY | Facility: CLINIC | Age: 65
End: 2020-04-22

## 2020-04-22 ENCOUNTER — TELEMEDICINE (OUTPATIENT)
Dept: ENDOCRINOLOGY | Facility: CLINIC | Age: 65
End: 2020-04-22
Payer: COMMERCIAL

## 2020-04-22 DIAGNOSIS — E11.65 TYPE 2 DIABETES MELLITUS WITH HYPERGLYCEMIA, WITH LONG-TERM CURRENT USE OF INSULIN (HCC): Primary | ICD-10-CM

## 2020-04-22 DIAGNOSIS — Z79.4 TYPE 2 DIABETES MELLITUS WITH HYPERGLYCEMIA, WITH LONG-TERM CURRENT USE OF INSULIN (HCC): Primary | ICD-10-CM

## 2020-04-22 DIAGNOSIS — I10 HYPERTENSION GOAL BP (BLOOD PRESSURE) < 140/90: ICD-10-CM

## 2020-04-22 PROCEDURE — 99204 OFFICE O/P NEW MOD 45 MIN: CPT | Performed by: INTERNAL MEDICINE

## 2020-04-30 ENCOUNTER — HOSPITAL ENCOUNTER (EMERGENCY)
Facility: HOSPITAL | Age: 65
Discharge: HOME | End: 2020-04-30
Attending: EMERGENCY MEDICINE | Admitting: EMERGENCY MEDICINE
Payer: COMMERCIAL

## 2020-04-30 VITALS
OXYGEN SATURATION: 97 % | SYSTOLIC BLOOD PRESSURE: 114 MMHG | HEART RATE: 82 BPM | WEIGHT: 218.26 LBS | BODY MASS INDEX: 41.21 KG/M2 | DIASTOLIC BLOOD PRESSURE: 55 MMHG | RESPIRATION RATE: 19 BRPM | HEIGHT: 61 IN | TEMPERATURE: 98.3 F

## 2020-04-30 DIAGNOSIS — R31.9 HEMATURIA: ICD-10-CM

## 2020-04-30 DIAGNOSIS — R53.1 WEAKNESS: Primary | ICD-10-CM

## 2020-04-30 LAB
ALBUMIN SERPL BCP-MCNC: 2.9 G/DL (ref 3.5–5)
ALP SERPL-CCNC: 149 U/L (ref 46–116)
ALT SERPL W P-5'-P-CCNC: 29 U/L (ref 12–78)
ANION GAP SERPL CALCULATED.3IONS-SCNC: 7 MMOL/L (ref 4–13)
AST SERPL W P-5'-P-CCNC: 18 U/L (ref 5–45)
ATRIAL RATE: 82 BPM
BACTERIA UR QL AUTO: ABNORMAL /HPF
BASOPHILS # BLD AUTO: 0.05 THOUSANDS/ΜL (ref 0–0.1)
BASOPHILS NFR BLD AUTO: 1 % (ref 0–1)
BILIRUB SERPL-MCNC: 0.34 MG/DL (ref 0.2–1)
BILIRUB UR QL STRIP: NEGATIVE
BUN SERPL-MCNC: 27 MG/DL (ref 5–25)
CALCIUM SERPL-MCNC: 8.4 MG/DL (ref 8.3–10.1)
CHLORIDE SERPL-SCNC: 98 MMOL/L (ref 100–108)
CLARITY UR: ABNORMAL
CO2 SERPL-SCNC: 29 MMOL/L (ref 21–32)
COLOR UR: YELLOW
CREAT SERPL-MCNC: 1.41 MG/DL (ref 0.6–1.3)
EOSINOPHIL # BLD AUTO: 0.2 THOUSAND/ΜL (ref 0–0.61)
EOSINOPHIL NFR BLD AUTO: 3 % (ref 0–6)
ERYTHROCYTE [DISTWIDTH] IN BLOOD BY AUTOMATED COUNT: 14.5 % (ref 11.6–15.1)
GFR SERPL CREATININE-BSD FRML MDRD: 40 ML/MIN/1.73SQ M
GLUCOSE SERPL-MCNC: 107 MG/DL (ref 65–140)
GLUCOSE UR STRIP-MCNC: NEGATIVE MG/DL
HCT VFR BLD AUTO: 36.7 % (ref 34.8–46.1)
HGB BLD-MCNC: 11.5 G/DL (ref 11.5–15.4)
HGB UR QL STRIP.AUTO: ABNORMAL
IMM GRANULOCYTES # BLD AUTO: 0.04 THOUSAND/UL (ref 0–0.2)
IMM GRANULOCYTES NFR BLD AUTO: 1 % (ref 0–2)
KETONES UR STRIP-MCNC: NEGATIVE MG/DL
LACTATE SERPL-SCNC: 0.9 MMOL/L (ref 0.5–2)
LEUKOCYTE ESTERASE UR QL STRIP: ABNORMAL
LYMPHOCYTES # BLD AUTO: 1.57 THOUSANDS/ΜL (ref 0.6–4.47)
LYMPHOCYTES NFR BLD AUTO: 22 % (ref 14–44)
MCH RBC QN AUTO: 29.9 PG (ref 26.8–34.3)
MCHC RBC AUTO-ENTMCNC: 31.3 G/DL (ref 31.4–37.4)
MCV RBC AUTO: 96 FL (ref 82–98)
MONOCYTES # BLD AUTO: 0.42 THOUSAND/ΜL (ref 0.17–1.22)
MONOCYTES NFR BLD AUTO: 6 % (ref 4–12)
NEUTROPHILS # BLD AUTO: 4.73 THOUSANDS/ΜL (ref 1.85–7.62)
NEUTS SEG NFR BLD AUTO: 67 % (ref 43–75)
NITRITE UR QL STRIP: NEGATIVE
NON-SQ EPI CELLS URNS QL MICRO: ABNORMAL /HPF
NRBC BLD AUTO-RTO: 0 /100 WBCS
P AXIS: 67 DEGREES
PH UR STRIP.AUTO: 5.5 [PH]
PLATELET # BLD AUTO: 176 THOUSANDS/UL (ref 149–390)
PMV BLD AUTO: 9.2 FL (ref 8.9–12.7)
POTASSIUM SERPL-SCNC: 5.3 MMOL/L (ref 3.5–5.3)
PR INTERVAL: 162 MS
PROT SERPL-MCNC: 7.3 G/DL (ref 6.4–8.2)
PROT UR STRIP-MCNC: ABNORMAL MG/DL
QRS AXIS: 49 DEGREES
QRSD INTERVAL: 80 MS
QT INTERVAL: 366 MS
QTC INTERVAL: 420 MS
RBC # BLD AUTO: 3.84 MILLION/UL (ref 3.81–5.12)
RBC #/AREA URNS AUTO: ABNORMAL /HPF
SARS-COV-2 RNA RESP QL NAA+PROBE: NEGATIVE
SODIUM SERPL-SCNC: 134 MMOL/L (ref 136–145)
SP GR UR STRIP.AUTO: 1.01 (ref 1–1.03)
T WAVE AXIS: 66 DEGREES
TROPONIN I SERPL-MCNC: <0.02 NG/ML
UROBILINOGEN UR QL STRIP.AUTO: 0.2 E.U./DL
VENTRICULAR RATE: 79 BPM
WBC # BLD AUTO: 7.01 THOUSAND/UL (ref 4.31–10.16)
WBC #/AREA URNS AUTO: ABNORMAL /HPF

## 2020-04-30 PROCEDURE — 80053 COMPREHEN METABOLIC PANEL: CPT | Performed by: EMERGENCY MEDICINE

## 2020-04-30 PROCEDURE — 87635 SARS-COV-2 COVID-19 AMP PRB: CPT | Performed by: EMERGENCY MEDICINE

## 2020-04-30 PROCEDURE — 96361 HYDRATE IV INFUSION ADD-ON: CPT

## 2020-04-30 PROCEDURE — 84484 ASSAY OF TROPONIN QUANT: CPT | Performed by: EMERGENCY MEDICINE

## 2020-04-30 PROCEDURE — 83605 ASSAY OF LACTIC ACID: CPT | Performed by: EMERGENCY MEDICINE

## 2020-04-30 PROCEDURE — 93010 ELECTROCARDIOGRAM REPORT: CPT | Performed by: INTERNAL MEDICINE

## 2020-04-30 PROCEDURE — 99283 EMERGENCY DEPT VISIT LOW MDM: CPT | Performed by: EMERGENCY MEDICINE

## 2020-04-30 PROCEDURE — 93005 ELECTROCARDIOGRAM TRACING: CPT

## 2020-04-30 PROCEDURE — 81001 URINALYSIS AUTO W/SCOPE: CPT | Performed by: EMERGENCY MEDICINE

## 2020-04-30 PROCEDURE — 96360 HYDRATION IV INFUSION INIT: CPT

## 2020-04-30 PROCEDURE — 36415 COLL VENOUS BLD VENIPUNCTURE: CPT | Performed by: EMERGENCY MEDICINE

## 2020-04-30 PROCEDURE — 99285 EMERGENCY DEPT VISIT HI MDM: CPT

## 2020-04-30 PROCEDURE — 85025 COMPLETE CBC W/AUTO DIFF WBC: CPT | Performed by: EMERGENCY MEDICINE

## 2020-04-30 RX ADMIN — SODIUM CHLORIDE 1000 ML: 0.9 INJECTION, SOLUTION INTRAVENOUS at 19:22

## 2020-05-01 ENCOUNTER — TELEPHONE (OUTPATIENT)
Dept: UROLOGY | Facility: MEDICAL CENTER | Age: 65
End: 2020-05-01

## 2020-05-06 ENCOUNTER — TELEMEDICINE (OUTPATIENT)
Dept: UROLOGY | Facility: CLINIC | Age: 65
End: 2020-05-06
Payer: COMMERCIAL

## 2020-05-06 ENCOUNTER — TELEPHONE (OUTPATIENT)
Dept: UROLOGY | Facility: CLINIC | Age: 65
End: 2020-05-06

## 2020-05-06 DIAGNOSIS — R31.0 GROSS HEMATURIA: Primary | ICD-10-CM

## 2020-05-06 PROCEDURE — 99202 OFFICE O/P NEW SF 15 MIN: CPT | Performed by: PHYSICIAN ASSISTANT

## 2020-05-14 ENCOUNTER — APPOINTMENT (EMERGENCY)
Dept: CT IMAGING | Facility: HOSPITAL | Age: 65
DRG: 551 | End: 2020-05-14
Payer: COMMERCIAL

## 2020-05-14 ENCOUNTER — APPOINTMENT (EMERGENCY)
Dept: ULTRASOUND IMAGING | Facility: HOSPITAL | Age: 65
DRG: 551 | End: 2020-05-14
Payer: COMMERCIAL

## 2020-05-14 ENCOUNTER — HOSPITAL ENCOUNTER (INPATIENT)
Facility: HOSPITAL | Age: 65
LOS: 1 days | Discharge: HOME HEALTH | DRG: 551 | End: 2020-05-16
Attending: EMERGENCY MEDICINE | Admitting: HOSPITALIST
Payer: COMMERCIAL

## 2020-05-14 ENCOUNTER — APPOINTMENT (EMERGENCY)
Dept: RADIOLOGY | Facility: HOSPITAL | Age: 65
DRG: 551 | End: 2020-05-14
Payer: COMMERCIAL

## 2020-05-14 ENCOUNTER — APPOINTMENT (OUTPATIENT)
Dept: RADIOLOGY | Facility: HOSPITAL | Age: 65
DRG: 551 | End: 2020-05-14
Payer: COMMERCIAL

## 2020-05-14 DIAGNOSIS — M25.552 LEFT HIP PAIN: ICD-10-CM

## 2020-05-14 DIAGNOSIS — G89.4 CHRONIC PAIN SYNDROME: ICD-10-CM

## 2020-05-14 DIAGNOSIS — G62.9 PERIPHERAL POLYNEUROPATHY: ICD-10-CM

## 2020-05-14 DIAGNOSIS — R26.2 AMBULATORY DYSFUNCTION: Primary | ICD-10-CM

## 2020-05-14 DIAGNOSIS — M25.559 HIP PAIN: ICD-10-CM

## 2020-05-14 PROBLEM — F12.90 MARIJUANA USE: Status: ACTIVE | Noted: 2020-05-14

## 2020-05-14 LAB
AMPHETAMINES SERPL QL SCN: NEGATIVE
ANION GAP SERPL CALCULATED.3IONS-SCNC: 9 MMOL/L (ref 4–13)
APAP SERPL-MCNC: <2 UG/ML (ref 10–20)
APTT PPP: 33 SECONDS (ref 23–37)
BACTERIA UR QL AUTO: ABNORMAL /HPF
BARBITURATES UR QL: NEGATIVE
BASOPHILS # BLD AUTO: 0.03 THOUSANDS/ΜL (ref 0–0.1)
BASOPHILS NFR BLD AUTO: 0 % (ref 0–1)
BENZODIAZ UR QL: NEGATIVE
BILIRUB UR QL STRIP: NEGATIVE
BUN SERPL-MCNC: 14 MG/DL (ref 5–25)
CALCIUM SERPL-MCNC: 9 MG/DL (ref 8.3–10.1)
CHLORIDE SERPL-SCNC: 102 MMOL/L (ref 100–108)
CLARITY UR: CLEAR
CO2 SERPL-SCNC: 29 MMOL/L (ref 21–32)
COCAINE UR QL: NEGATIVE
COLOR UR: YELLOW
CREAT SERPL-MCNC: 0.89 MG/DL (ref 0.6–1.3)
EOSINOPHIL # BLD AUTO: 0.08 THOUSAND/ΜL (ref 0–0.61)
EOSINOPHIL NFR BLD AUTO: 1 % (ref 0–6)
ERYTHROCYTE [DISTWIDTH] IN BLOOD BY AUTOMATED COUNT: 13.7 % (ref 11.6–15.1)
ETHANOL SERPL-MCNC: <3 MG/DL (ref 0–3)
GFR SERPL CREATININE-BSD FRML MDRD: 69 ML/MIN/1.73SQ M
GLUCOSE SERPL-MCNC: 176 MG/DL (ref 65–140)
GLUCOSE SERPL-MCNC: 198 MG/DL (ref 65–140)
GLUCOSE SERPL-MCNC: 96 MG/DL (ref 65–140)
GLUCOSE UR STRIP-MCNC: NEGATIVE MG/DL
HCT VFR BLD AUTO: 42.3 % (ref 34.8–46.1)
HGB BLD-MCNC: 13.6 G/DL (ref 11.5–15.4)
HGB UR QL STRIP.AUTO: ABNORMAL
IMM GRANULOCYTES # BLD AUTO: 0.03 THOUSAND/UL (ref 0–0.2)
IMM GRANULOCYTES NFR BLD AUTO: 0 % (ref 0–2)
INR PPP: 1.07 (ref 0.84–1.19)
KETONES UR STRIP-MCNC: NEGATIVE MG/DL
LACTATE SERPL-SCNC: 1.4 MMOL/L (ref 0.5–2)
LEUKOCYTE ESTERASE UR QL STRIP: NEGATIVE
LYMPHOCYTES # BLD AUTO: 1.04 THOUSANDS/ΜL (ref 0.6–4.47)
LYMPHOCYTES NFR BLD AUTO: 13 % (ref 14–44)
MCH RBC QN AUTO: 30.4 PG (ref 26.8–34.3)
MCHC RBC AUTO-ENTMCNC: 32.2 G/DL (ref 31.4–37.4)
MCV RBC AUTO: 94 FL (ref 82–98)
METHADONE UR QL: NEGATIVE
MONOCYTES # BLD AUTO: 0.4 THOUSAND/ΜL (ref 0.17–1.22)
MONOCYTES NFR BLD AUTO: 5 % (ref 4–12)
NEUTROPHILS # BLD AUTO: 6.56 THOUSANDS/ΜL (ref 1.85–7.62)
NEUTS SEG NFR BLD AUTO: 81 % (ref 43–75)
NITRITE UR QL STRIP: NEGATIVE
NON-SQ EPI CELLS URNS QL MICRO: ABNORMAL /HPF
NRBC BLD AUTO-RTO: 0 /100 WBCS
OPIATES UR QL SCN: POSITIVE
PCP UR QL: NEGATIVE
PH UR STRIP.AUTO: 6.5 [PH] (ref 4.5–8)
PLATELET # BLD AUTO: 151 THOUSANDS/UL (ref 149–390)
PMV BLD AUTO: 9.8 FL (ref 8.9–12.7)
POTASSIUM SERPL-SCNC: 4.5 MMOL/L (ref 3.5–5.3)
PROT UR STRIP-MCNC: ABNORMAL MG/DL
PROTHROMBIN TIME: 13.3 SECONDS (ref 11.6–14.5)
RBC # BLD AUTO: 4.48 MILLION/UL (ref 3.81–5.12)
RBC #/AREA URNS AUTO: ABNORMAL /HPF
SALICYLATES SERPL-MCNC: <3 MG/DL (ref 3–20)
SODIUM SERPL-SCNC: 140 MMOL/L (ref 136–145)
SP GR UR STRIP.AUTO: 1.01 (ref 1–1.03)
THC UR QL: POSITIVE
TROPONIN I SERPL-MCNC: <0.02 NG/ML
UROBILINOGEN UR QL STRIP.AUTO: 0.2 E.U./DL
WBC # BLD AUTO: 8.14 THOUSAND/UL (ref 4.31–10.16)
WBC #/AREA URNS AUTO: ABNORMAL /HPF

## 2020-05-14 PROCEDURE — 80307 DRUG TEST PRSMV CHEM ANLYZR: CPT | Performed by: PHYSICIAN ASSISTANT

## 2020-05-14 PROCEDURE — 80320 DRUG SCREEN QUANTALCOHOLS: CPT | Performed by: PHYSICIAN ASSISTANT

## 2020-05-14 PROCEDURE — 99285 EMERGENCY DEPT VISIT HI MDM: CPT

## 2020-05-14 PROCEDURE — 80329 ANALGESICS NON-OPIOID 1 OR 2: CPT | Performed by: PHYSICIAN ASSISTANT

## 2020-05-14 PROCEDURE — 84484 ASSAY OF TROPONIN QUANT: CPT | Performed by: PHYSICIAN ASSISTANT

## 2020-05-14 PROCEDURE — 85730 THROMBOPLASTIN TIME PARTIAL: CPT | Performed by: PHYSICIAN ASSISTANT

## 2020-05-14 PROCEDURE — 99220 PR INITIAL OBSERVATION CARE/DAY 70 MINUTES: CPT | Performed by: INTERNAL MEDICINE

## 2020-05-14 PROCEDURE — 87040 BLOOD CULTURE FOR BACTERIA: CPT | Performed by: PHYSICIAN ASSISTANT

## 2020-05-14 PROCEDURE — 93971 EXTREMITY STUDY: CPT

## 2020-05-14 PROCEDURE — 85610 PROTHROMBIN TIME: CPT | Performed by: PHYSICIAN ASSISTANT

## 2020-05-14 PROCEDURE — 72100 X-RAY EXAM L-S SPINE 2/3 VWS: CPT

## 2020-05-14 PROCEDURE — 81001 URINALYSIS AUTO W/SCOPE: CPT

## 2020-05-14 PROCEDURE — 82948 REAGENT STRIP/BLOOD GLUCOSE: CPT

## 2020-05-14 PROCEDURE — 99244 OFF/OP CNSLTJ NEW/EST MOD 40: CPT | Performed by: PHYSICIAN ASSISTANT

## 2020-05-14 PROCEDURE — 70450 CT HEAD/BRAIN W/O DYE: CPT

## 2020-05-14 PROCEDURE — 93005 ELECTROCARDIOGRAM TRACING: CPT

## 2020-05-14 PROCEDURE — 96374 THER/PROPH/DIAG INJ IV PUSH: CPT

## 2020-05-14 PROCEDURE — 36415 COLL VENOUS BLD VENIPUNCTURE: CPT | Performed by: PHYSICIAN ASSISTANT

## 2020-05-14 PROCEDURE — 83605 ASSAY OF LACTIC ACID: CPT | Performed by: PHYSICIAN ASSISTANT

## 2020-05-14 PROCEDURE — 85025 COMPLETE CBC W/AUTO DIFF WBC: CPT | Performed by: PHYSICIAN ASSISTANT

## 2020-05-14 PROCEDURE — 71045 X-RAY EXAM CHEST 1 VIEW: CPT

## 2020-05-14 PROCEDURE — 99284 EMERGENCY DEPT VISIT MOD MDM: CPT | Performed by: PHYSICIAN ASSISTANT

## 2020-05-14 PROCEDURE — 80048 BASIC METABOLIC PNL TOTAL CA: CPT | Performed by: PHYSICIAN ASSISTANT

## 2020-05-14 PROCEDURE — 73502 X-RAY EXAM HIP UNI 2-3 VIEWS: CPT

## 2020-05-14 RX ORDER — POLYETHYLENE GLYCOL 3350 17 G/17G
17 POWDER, FOR SOLUTION ORAL DAILY PRN
Status: DISCONTINUED | OUTPATIENT
Start: 2020-05-14 | End: 2020-05-16 | Stop reason: HOSPADM

## 2020-05-14 RX ORDER — DULOXETIN HYDROCHLORIDE 60 MG/1
60 CAPSULE, DELAYED RELEASE ORAL DAILY
Status: DISCONTINUED | OUTPATIENT
Start: 2020-05-14 | End: 2020-05-16 | Stop reason: HOSPADM

## 2020-05-14 RX ORDER — AMLODIPINE BESYLATE 5 MG/1
10 TABLET ORAL DAILY
Status: DISCONTINUED | OUTPATIENT
Start: 2020-05-14 | End: 2020-05-16 | Stop reason: HOSPADM

## 2020-05-14 RX ORDER — LIDOCAINE 50 MG/G
1 PATCH TOPICAL DAILY
Status: DISCONTINUED | OUTPATIENT
Start: 2020-05-14 | End: 2020-05-16 | Stop reason: HOSPADM

## 2020-05-14 RX ORDER — METOCLOPRAMIDE 10 MG/1
10 TABLET ORAL DAILY
COMMUNITY

## 2020-05-14 RX ORDER — NALOXONE HYDROCHLORIDE 0.4 MG/ML
0.2 INJECTION, SOLUTION INTRAMUSCULAR; INTRAVENOUS; SUBCUTANEOUS ONCE
Status: COMPLETED | OUTPATIENT
Start: 2020-05-14 | End: 2020-05-14

## 2020-05-14 RX ORDER — OXYCODONE HYDROCHLORIDE 5 MG/1
5 TABLET ORAL EVERY 4 HOURS PRN
Status: DISCONTINUED | OUTPATIENT
Start: 2020-05-14 | End: 2020-05-15

## 2020-05-14 RX ORDER — LISINOPRIL 10 MG/1
20 TABLET ORAL DAILY
Status: DISCONTINUED | OUTPATIENT
Start: 2020-05-14 | End: 2020-05-16 | Stop reason: HOSPADM

## 2020-05-14 RX ORDER — ONDANSETRON 2 MG/ML
4 INJECTION INTRAMUSCULAR; INTRAVENOUS EVERY 6 HOURS PRN
Status: DISCONTINUED | OUTPATIENT
Start: 2020-05-14 | End: 2020-05-16 | Stop reason: HOSPADM

## 2020-05-14 RX ORDER — METHYLPREDNISOLONE SODIUM SUCCINATE 125 MG/2ML
60 INJECTION, POWDER, LYOPHILIZED, FOR SOLUTION INTRAMUSCULAR; INTRAVENOUS ONCE
Status: COMPLETED | OUTPATIENT
Start: 2020-05-14 | End: 2020-05-14

## 2020-05-14 RX ORDER — SENNOSIDES 8.6 MG
1 TABLET ORAL DAILY
Status: DISCONTINUED | OUTPATIENT
Start: 2020-05-14 | End: 2020-05-16 | Stop reason: HOSPADM

## 2020-05-14 RX ORDER — GABAPENTIN 100 MG/1
100 CAPSULE ORAL DAILY
Status: DISCONTINUED | OUTPATIENT
Start: 2020-05-14 | End: 2020-05-16 | Stop reason: HOSPADM

## 2020-05-14 RX ORDER — LANOLIN ALCOHOL/MO/W.PET/CERES
3 CREAM (GRAM) TOPICAL
Status: DISCONTINUED | OUTPATIENT
Start: 2020-05-14 | End: 2020-05-16 | Stop reason: HOSPADM

## 2020-05-14 RX ORDER — ACETAMINOPHEN 325 MG/1
975 TABLET ORAL EVERY 8 HOURS SCHEDULED
Status: DISCONTINUED | OUTPATIENT
Start: 2020-05-14 | End: 2020-05-16 | Stop reason: HOSPADM

## 2020-05-14 RX ORDER — INSULIN GLARGINE 100 [IU]/ML
14 INJECTION, SOLUTION SUBCUTANEOUS
Status: DISCONTINUED | OUTPATIENT
Start: 2020-05-14 | End: 2020-05-16 | Stop reason: HOSPADM

## 2020-05-14 RX ORDER — ERGOCALCIFEROL (VITAMIN D2) 1250 MCG
50000 CAPSULE ORAL WEEKLY
Status: ON HOLD | COMMUNITY
End: 2021-09-28 | Stop reason: ALTCHOICE

## 2020-05-14 RX ORDER — PREDNISONE 20 MG/1
40 TABLET ORAL DAILY
Status: DISCONTINUED | OUTPATIENT
Start: 2020-05-15 | End: 2020-05-15

## 2020-05-14 RX ORDER — OXYCODONE HYDROCHLORIDE 5 MG/1
2.5 TABLET ORAL EVERY 4 HOURS PRN
Status: DISCONTINUED | OUTPATIENT
Start: 2020-05-14 | End: 2020-05-15

## 2020-05-14 RX ORDER — DOCUSATE SODIUM 100 MG/1
100 CAPSULE, LIQUID FILLED ORAL 2 TIMES DAILY
Status: DISCONTINUED | OUTPATIENT
Start: 2020-05-14 | End: 2020-05-16 | Stop reason: HOSPADM

## 2020-05-14 RX ADMIN — INSULIN GLARGINE 14 UNITS: 100 INJECTION, SOLUTION SUBCUTANEOUS at 22:04

## 2020-05-14 RX ADMIN — OXYCODONE HYDROCHLORIDE 5 MG: 5 TABLET ORAL at 22:04

## 2020-05-14 RX ADMIN — LIDOCAINE 1 PATCH: 50 PATCH TOPICAL at 15:36

## 2020-05-14 RX ADMIN — MELATONIN 3 MG: at 22:04

## 2020-05-14 RX ADMIN — ACETAMINOPHEN 975 MG: 325 TABLET, FILM COATED ORAL at 22:04

## 2020-05-14 RX ADMIN — DULOXETINE HYDROCHLORIDE 60 MG: 60 CAPSULE, DELAYED RELEASE ORAL at 15:36

## 2020-05-14 RX ADMIN — INSULIN LISPRO 1 UNITS: 100 INJECTION, SOLUTION INTRAVENOUS; SUBCUTANEOUS at 22:05

## 2020-05-14 RX ADMIN — NALOXONE HYDROCHLORIDE 0.2 MG: 0.4 INJECTION, SOLUTION INTRAMUSCULAR; INTRAVENOUS; SUBCUTANEOUS at 11:32

## 2020-05-14 RX ADMIN — ENOXAPARIN SODIUM 40 MG: 40 INJECTION SUBCUTANEOUS at 21:59

## 2020-05-14 RX ADMIN — METHYLPREDNISOLONE SODIUM SUCCINATE 60 MG: 125 INJECTION, POWDER, FOR SOLUTION INTRAMUSCULAR; INTRAVENOUS at 16:48

## 2020-05-15 PROBLEM — M47.816 SPONDYLOSIS OF LUMBAR JOINT: Status: ACTIVE | Noted: 2020-05-15

## 2020-05-15 LAB
ALBUMIN SERPL BCP-MCNC: 3.1 G/DL (ref 3.5–5)
ALP SERPL-CCNC: 161 U/L (ref 46–116)
ALT SERPL W P-5'-P-CCNC: 101 U/L (ref 12–78)
ANION GAP SERPL CALCULATED.3IONS-SCNC: 9 MMOL/L (ref 4–13)
AST SERPL W P-5'-P-CCNC: 115 U/L (ref 5–45)
BILIRUB SERPL-MCNC: 0.46 MG/DL (ref 0.2–1)
BUN SERPL-MCNC: 17 MG/DL (ref 5–25)
CALCIUM SERPL-MCNC: 9.3 MG/DL (ref 8.3–10.1)
CHLORIDE SERPL-SCNC: 101 MMOL/L (ref 100–108)
CO2 SERPL-SCNC: 27 MMOL/L (ref 21–32)
CREAT SERPL-MCNC: 0.78 MG/DL (ref 0.6–1.3)
ERYTHROCYTE [DISTWIDTH] IN BLOOD BY AUTOMATED COUNT: 13.7 % (ref 11.6–15.1)
GFR SERPL CREATININE-BSD FRML MDRD: 81 ML/MIN/1.73SQ M
GLUCOSE P FAST SERPL-MCNC: 156 MG/DL (ref 65–99)
GLUCOSE SERPL-MCNC: 136 MG/DL (ref 65–140)
GLUCOSE SERPL-MCNC: 156 MG/DL (ref 65–140)
GLUCOSE SERPL-MCNC: 160 MG/DL (ref 65–140)
GLUCOSE SERPL-MCNC: 210 MG/DL (ref 65–140)
GLUCOSE SERPL-MCNC: 278 MG/DL (ref 65–140)
HCT VFR BLD AUTO: 41.5 % (ref 34.8–46.1)
HGB BLD-MCNC: 13.2 G/DL (ref 11.5–15.4)
MCH RBC QN AUTO: 29.7 PG (ref 26.8–34.3)
MCHC RBC AUTO-ENTMCNC: 31.8 G/DL (ref 31.4–37.4)
MCV RBC AUTO: 94 FL (ref 82–98)
PLATELET # BLD AUTO: 137 THOUSANDS/UL (ref 149–390)
PMV BLD AUTO: 9.8 FL (ref 8.9–12.7)
POTASSIUM SERPL-SCNC: 4.7 MMOL/L (ref 3.5–5.3)
PROT SERPL-MCNC: 7.8 G/DL (ref 6.4–8.2)
RBC # BLD AUTO: 4.44 MILLION/UL (ref 3.81–5.12)
SODIUM SERPL-SCNC: 137 MMOL/L (ref 136–145)
WBC # BLD AUTO: 5.29 THOUSAND/UL (ref 4.31–10.16)

## 2020-05-15 PROCEDURE — 97167 OT EVAL HIGH COMPLEX 60 MIN: CPT

## 2020-05-15 PROCEDURE — 99213 OFFICE O/P EST LOW 20 MIN: CPT | Performed by: PHYSICIAN ASSISTANT

## 2020-05-15 PROCEDURE — 97163 PT EVAL HIGH COMPLEX 45 MIN: CPT

## 2020-05-15 PROCEDURE — 99232 SBSQ HOSP IP/OBS MODERATE 35: CPT | Performed by: HOSPITALIST

## 2020-05-15 PROCEDURE — 80053 COMPREHEN METABOLIC PANEL: CPT | Performed by: INTERNAL MEDICINE

## 2020-05-15 PROCEDURE — 97116 GAIT TRAINING THERAPY: CPT

## 2020-05-15 PROCEDURE — 97535 SELF CARE MNGMENT TRAINING: CPT

## 2020-05-15 PROCEDURE — 93971 EXTREMITY STUDY: CPT | Performed by: SURGERY

## 2020-05-15 PROCEDURE — 82948 REAGENT STRIP/BLOOD GLUCOSE: CPT

## 2020-05-15 PROCEDURE — 85027 COMPLETE CBC AUTOMATED: CPT | Performed by: INTERNAL MEDICINE

## 2020-05-15 RX ORDER — METHYLPREDNISOLONE 4 MG/1
8 TABLET ORAL DAILY
Status: DISCONTINUED | OUTPATIENT
Start: 2020-05-19 | End: 2020-05-16 | Stop reason: HOSPADM

## 2020-05-15 RX ORDER — METHYLPREDNISOLONE 4 MG/1
4 TABLET ORAL DAILY
Status: DISCONTINUED | OUTPATIENT
Start: 2020-05-20 | End: 2020-05-16 | Stop reason: HOSPADM

## 2020-05-15 RX ORDER — METHYLPREDNISOLONE 4 MG/1
16 TABLET ORAL DAILY
Status: DISCONTINUED | OUTPATIENT
Start: 2020-05-17 | End: 2020-05-16 | Stop reason: HOSPADM

## 2020-05-15 RX ORDER — KETOROLAC TROMETHAMINE 30 MG/ML
15 INJECTION, SOLUTION INTRAMUSCULAR; INTRAVENOUS ONCE
Status: COMPLETED | OUTPATIENT
Start: 2020-05-15 | End: 2020-05-15

## 2020-05-15 RX ORDER — METHYLPREDNISOLONE 4 MG/1
12 TABLET ORAL DAILY
Status: DISCONTINUED | OUTPATIENT
Start: 2020-05-18 | End: 2020-05-16 | Stop reason: HOSPADM

## 2020-05-15 RX ADMIN — GABAPENTIN 100 MG: 100 CAPSULE ORAL at 08:57

## 2020-05-15 RX ADMIN — INSULIN LISPRO 2 UNITS: 100 INJECTION, SOLUTION INTRAVENOUS; SUBCUTANEOUS at 17:29

## 2020-05-15 RX ADMIN — DULOXETINE HYDROCHLORIDE 60 MG: 60 CAPSULE, DELAYED RELEASE ORAL at 08:56

## 2020-05-15 RX ADMIN — LISINOPRIL 20 MG: 10 TABLET ORAL at 08:57

## 2020-05-15 RX ADMIN — MELATONIN 3 MG: at 21:08

## 2020-05-15 RX ADMIN — INSULIN LISPRO 1 UNITS: 100 INJECTION, SOLUTION INTRAVENOUS; SUBCUTANEOUS at 12:45

## 2020-05-15 RX ADMIN — DOCUSATE SODIUM 100 MG: 100 CAPSULE, LIQUID FILLED ORAL at 08:57

## 2020-05-15 RX ADMIN — METHYLPREDNISOLONE 24 MG: 16 TABLET ORAL at 08:56

## 2020-05-15 RX ADMIN — KETOROLAC TROMETHAMINE 15 MG: 30 INJECTION, SOLUTION INTRAMUSCULAR at 12:45

## 2020-05-15 RX ADMIN — ENOXAPARIN SODIUM 40 MG: 40 INJECTION SUBCUTANEOUS at 21:08

## 2020-05-15 RX ADMIN — AMLODIPINE BESYLATE 10 MG: 5 TABLET ORAL at 08:57

## 2020-05-15 RX ADMIN — OXYCODONE HYDROCHLORIDE 5 MG: 5 TABLET ORAL at 06:25

## 2020-05-15 RX ADMIN — ACETAMINOPHEN 975 MG: 325 TABLET, FILM COATED ORAL at 06:20

## 2020-05-15 RX ADMIN — ENOXAPARIN SODIUM 40 MG: 40 INJECTION SUBCUTANEOUS at 08:57

## 2020-05-15 RX ADMIN — INSULIN GLARGINE 14 UNITS: 100 INJECTION, SOLUTION SUBCUTANEOUS at 21:08

## 2020-05-15 RX ADMIN — INSULIN LISPRO 2 UNITS: 100 INJECTION, SOLUTION INTRAVENOUS; SUBCUTANEOUS at 21:08

## 2020-05-16 VITALS
HEIGHT: 63 IN | BODY MASS INDEX: 35.74 KG/M2 | WEIGHT: 201.72 LBS | DIASTOLIC BLOOD PRESSURE: 83 MMHG | HEART RATE: 82 BPM | SYSTOLIC BLOOD PRESSURE: 149 MMHG | RESPIRATION RATE: 16 BRPM | OXYGEN SATURATION: 97 % | TEMPERATURE: 98.7 F

## 2020-05-16 PROBLEM — G93.41 ACUTE METABOLIC ENCEPHALOPATHY: Status: RESOLVED | Noted: 2018-08-19 | Resolved: 2020-05-16

## 2020-05-16 PROBLEM — R26.2 AMBULATORY DYSFUNCTION: Status: RESOLVED | Noted: 2020-03-19 | Resolved: 2020-05-16

## 2020-05-16 LAB
ATRIAL RATE: 119 BPM
GLUCOSE SERPL-MCNC: 132 MG/DL (ref 65–140)
P AXIS: 82 DEGREES
QRS AXIS: 70 DEGREES
QRSD INTERVAL: 76 MS
QT INTERVAL: 312 MS
QTC INTERVAL: 427 MS
T WAVE AXIS: 52 DEGREES
VENTRICULAR RATE: 113 BPM

## 2020-05-16 PROCEDURE — 93010 ELECTROCARDIOGRAM REPORT: CPT | Performed by: INTERNAL MEDICINE

## 2020-05-16 PROCEDURE — 82948 REAGENT STRIP/BLOOD GLUCOSE: CPT

## 2020-05-16 RX ORDER — METHYLPREDNISOLONE 4 MG/1
TABLET ORAL
Qty: 21 TABLET | Refills: 0 | Status: SHIPPED | OUTPATIENT
Start: 2020-05-16 | End: 2020-07-15

## 2020-05-16 RX ADMIN — ACETAMINOPHEN 975 MG: 325 TABLET, FILM COATED ORAL at 07:44

## 2020-05-16 RX ADMIN — DOCUSATE SODIUM 100 MG: 100 CAPSULE, LIQUID FILLED ORAL at 09:27

## 2020-05-16 RX ADMIN — ENOXAPARIN SODIUM 40 MG: 40 INJECTION SUBCUTANEOUS at 09:27

## 2020-05-16 RX ADMIN — LISINOPRIL 20 MG: 10 TABLET ORAL at 09:27

## 2020-05-16 RX ADMIN — GABAPENTIN 100 MG: 100 CAPSULE ORAL at 09:27

## 2020-05-16 RX ADMIN — AMLODIPINE BESYLATE 10 MG: 5 TABLET ORAL at 09:26

## 2020-05-16 RX ADMIN — DULOXETINE HYDROCHLORIDE 60 MG: 60 CAPSULE, DELAYED RELEASE ORAL at 09:27

## 2020-05-16 RX ADMIN — METHYLPREDNISOLONE 20 MG: 16 TABLET ORAL at 09:26

## 2020-05-19 LAB
BACTERIA BLD CULT: NORMAL
BACTERIA BLD CULT: NORMAL

## 2020-05-27 ENCOUNTER — TELEMEDICINE (OUTPATIENT)
Dept: ENDOCRINOLOGY | Facility: CLINIC | Age: 65
End: 2020-05-27
Payer: COMMERCIAL

## 2020-05-27 DIAGNOSIS — Z79.4 TYPE 2 DIABETES MELLITUS WITH HYPERGLYCEMIA, WITH LONG-TERM CURRENT USE OF INSULIN (HCC): Primary | ICD-10-CM

## 2020-05-27 DIAGNOSIS — I10 HYPERTENSION GOAL BP (BLOOD PRESSURE) < 140/90: ICD-10-CM

## 2020-05-27 DIAGNOSIS — E11.65 TYPE 2 DIABETES MELLITUS WITH HYPERGLYCEMIA, WITH LONG-TERM CURRENT USE OF INSULIN (HCC): Primary | ICD-10-CM

## 2020-05-27 PROCEDURE — 99214 OFFICE O/P EST MOD 30 MIN: CPT | Performed by: INTERNAL MEDICINE

## 2020-06-05 ENCOUNTER — TELEMEDICINE (OUTPATIENT)
Dept: UROLOGY | Facility: CLINIC | Age: 65
End: 2020-06-05
Payer: COMMERCIAL

## 2020-06-05 DIAGNOSIS — R31.29 OTHER MICROSCOPIC HEMATURIA: Primary | ICD-10-CM

## 2020-06-05 PROCEDURE — 99213 OFFICE O/P EST LOW 20 MIN: CPT | Performed by: PHYSICIAN ASSISTANT

## 2020-06-29 ENCOUNTER — TRANSCRIBE ORDERS (OUTPATIENT)
Dept: ADMINISTRATIVE | Facility: HOSPITAL | Age: 65
End: 2020-06-29

## 2020-06-29 DIAGNOSIS — G89.29 OTHER CHRONIC PAIN: Primary | ICD-10-CM

## 2020-06-29 DIAGNOSIS — G62.89 DISEASE RELATED PERIPHERAL NEUROPATHY: ICD-10-CM

## 2020-06-29 DIAGNOSIS — Z79.899 ENCOUNTER FOR LONG-TERM (CURRENT) USE OF OTHER MEDICATIONS: ICD-10-CM

## 2020-07-08 ENCOUNTER — TELEPHONE (OUTPATIENT)
Dept: ENDOCRINOLOGY | Facility: CLINIC | Age: 65
End: 2020-07-08

## 2020-07-08 ENCOUNTER — TELEMEDICINE (OUTPATIENT)
Dept: ENDOCRINOLOGY | Facility: CLINIC | Age: 65
End: 2020-07-08
Payer: MEDICARE

## 2020-07-08 DIAGNOSIS — Z79.4 TYPE 2 DIABETES MELLITUS WITH HYPERGLYCEMIA, WITH LONG-TERM CURRENT USE OF INSULIN (HCC): Primary | ICD-10-CM

## 2020-07-08 DIAGNOSIS — I10 HYPERTENSION GOAL BP (BLOOD PRESSURE) < 140/90: ICD-10-CM

## 2020-07-08 DIAGNOSIS — E11.65 TYPE 2 DIABETES MELLITUS WITH HYPERGLYCEMIA, WITH LONG-TERM CURRENT USE OF INSULIN (HCC): Primary | ICD-10-CM

## 2020-07-08 PROCEDURE — 99213 OFFICE O/P EST LOW 20 MIN: CPT | Performed by: INTERNAL MEDICINE

## 2020-07-15 ENCOUNTER — APPOINTMENT (OUTPATIENT)
Dept: RADIOLOGY | Facility: HOSPITAL | Age: 65
End: 2020-07-15
Payer: MEDICARE

## 2020-07-15 ENCOUNTER — HOSPITAL ENCOUNTER (OUTPATIENT)
Facility: HOSPITAL | Age: 65
Setting detail: OBSERVATION
Discharge: HOME | End: 2020-07-16
Attending: EMERGENCY MEDICINE | Admitting: INTERNAL MEDICINE
Payer: MEDICARE

## 2020-07-15 ENCOUNTER — APPOINTMENT (EMERGENCY)
Dept: CT IMAGING | Facility: HOSPITAL | Age: 65
End: 2020-07-15
Payer: MEDICARE

## 2020-07-15 ENCOUNTER — APPOINTMENT (EMERGENCY)
Dept: RADIOLOGY | Facility: HOSPITAL | Age: 65
End: 2020-07-15
Payer: MEDICARE

## 2020-07-15 DIAGNOSIS — G89.4 CHRONIC PAIN SYNDROME: ICD-10-CM

## 2020-07-15 DIAGNOSIS — E87.6 HYPOKALEMIA: ICD-10-CM

## 2020-07-15 DIAGNOSIS — R55 SYNCOPE: Primary | ICD-10-CM

## 2020-07-15 PROBLEM — I25.10 CAD (CORONARY ARTERY DISEASE): Status: ACTIVE | Noted: 2020-07-15

## 2020-07-15 PROBLEM — R16.1 SPLENOMEGALY: Status: ACTIVE | Noted: 2020-07-15

## 2020-07-15 PROBLEM — K74.60 CIRRHOSIS (HCC): Status: ACTIVE | Noted: 2020-07-15

## 2020-07-15 LAB
ALBUMIN SERPL BCP-MCNC: 2.9 G/DL (ref 3.5–5)
ALBUMIN SERPL BCP-MCNC: 3.2 G/DL (ref 3.5–5)
ALP SERPL-CCNC: 76 U/L (ref 46–116)
ALP SERPL-CCNC: 78 U/L (ref 46–116)
ALT SERPL W P-5'-P-CCNC: 24 U/L (ref 12–78)
ALT SERPL W P-5'-P-CCNC: 25 U/L (ref 12–78)
ANION GAP SERPL CALCULATED.3IONS-SCNC: 10 MMOL/L (ref 4–13)
ANION GAP SERPL CALCULATED.3IONS-SCNC: 4 MMOL/L (ref 4–13)
APAP SERPL-MCNC: <2 UG/ML (ref 10–20)
APTT PPP: 28 SECONDS (ref 23–37)
AST SERPL W P-5'-P-CCNC: 29 U/L (ref 5–45)
AST SERPL W P-5'-P-CCNC: 47 U/L (ref 5–45)
ATRIAL RATE: 48 BPM
ATRIAL RATE: 91 BPM
BACTERIA UR QL AUTO: NORMAL /HPF
BASOPHILS # BLD AUTO: 0.01 THOUSANDS/ΜL (ref 0–0.1)
BASOPHILS # BLD AUTO: 0.02 THOUSANDS/ΜL (ref 0–0.1)
BASOPHILS NFR BLD AUTO: 0 % (ref 0–1)
BASOPHILS NFR BLD AUTO: 0 % (ref 0–1)
BILIRUB SERPL-MCNC: 0.38 MG/DL (ref 0.2–1)
BILIRUB SERPL-MCNC: 0.39 MG/DL (ref 0.2–1)
BILIRUB UR QL STRIP: NEGATIVE
BUN SERPL-MCNC: 13 MG/DL (ref 5–25)
BUN SERPL-MCNC: 14 MG/DL (ref 5–25)
CALCIUM SERPL-MCNC: 8.2 MG/DL (ref 8.3–10.1)
CALCIUM SERPL-MCNC: 8.3 MG/DL (ref 8.3–10.1)
CHLORIDE SERPL-SCNC: 104 MMOL/L (ref 100–108)
CHLORIDE SERPL-SCNC: 106 MMOL/L (ref 100–108)
CHOLEST SERPL-MCNC: 156 MG/DL (ref 50–200)
CLARITY UR: CLEAR
CO2 SERPL-SCNC: 23 MMOL/L (ref 21–32)
CO2 SERPL-SCNC: 27 MMOL/L (ref 21–32)
COLOR UR: YELLOW
CREAT SERPL-MCNC: 0.94 MG/DL (ref 0.6–1.3)
CREAT SERPL-MCNC: 1.13 MG/DL (ref 0.6–1.3)
D DIMER PPP FEU-MCNC: 1.01 UG/ML FEU
EOSINOPHIL # BLD AUTO: 0.07 THOUSAND/ΜL (ref 0–0.61)
EOSINOPHIL # BLD AUTO: 0.12 THOUSAND/ΜL (ref 0–0.61)
EOSINOPHIL NFR BLD AUTO: 1 % (ref 0–6)
EOSINOPHIL NFR BLD AUTO: 2 % (ref 0–6)
ERYTHROCYTE [DISTWIDTH] IN BLOOD BY AUTOMATED COUNT: 14.3 % (ref 11.6–15.1)
ERYTHROCYTE [DISTWIDTH] IN BLOOD BY AUTOMATED COUNT: 14.6 % (ref 11.6–15.1)
EST. AVERAGE GLUCOSE BLD GHB EST-MCNC: 134 MG/DL
ETHANOL SERPL-MCNC: <3 MG/DL (ref 0–3)
GFR SERPL CREATININE-BSD FRML MDRD: 52 ML/MIN/1.73SQ M
GFR SERPL CREATININE-BSD FRML MDRD: 65 ML/MIN/1.73SQ M
GLUCOSE P FAST SERPL-MCNC: 140 MG/DL (ref 65–99)
GLUCOSE SERPL-MCNC: 101 MG/DL (ref 65–140)
GLUCOSE SERPL-MCNC: 123 MG/DL (ref 65–140)
GLUCOSE SERPL-MCNC: 140 MG/DL (ref 65–140)
GLUCOSE SERPL-MCNC: 142 MG/DL (ref 65–140)
GLUCOSE SERPL-MCNC: 171 MG/DL (ref 65–140)
GLUCOSE SERPL-MCNC: 231 MG/DL (ref 65–140)
GLUCOSE SERPL-MCNC: 258 MG/DL (ref 65–140)
GLUCOSE UR STRIP-MCNC: NEGATIVE MG/DL
HBA1C MFR BLD: 6.3 %
HCT VFR BLD AUTO: 37.4 % (ref 34.8–46.1)
HCT VFR BLD AUTO: 38.6 % (ref 34.8–46.1)
HDLC SERPL-MCNC: 44 MG/DL
HGB BLD-MCNC: 12.1 G/DL (ref 11.5–15.4)
HGB BLD-MCNC: 12.1 G/DL (ref 11.5–15.4)
HGB UR QL STRIP.AUTO: NEGATIVE
IMM GRANULOCYTES # BLD AUTO: 0.02 THOUSAND/UL (ref 0–0.2)
IMM GRANULOCYTES # BLD AUTO: 0.02 THOUSAND/UL (ref 0–0.2)
IMM GRANULOCYTES NFR BLD AUTO: 0 % (ref 0–2)
IMM GRANULOCYTES NFR BLD AUTO: 0 % (ref 0–2)
INR PPP: 1.06 (ref 0.84–1.19)
KETONES UR STRIP-MCNC: NEGATIVE MG/DL
LACTATE SERPL-SCNC: 1.8 MMOL/L (ref 0.5–2)
LDLC SERPL CALC-MCNC: 94 MG/DL (ref 0–100)
LEUKOCYTE ESTERASE UR QL STRIP: NEGATIVE
LYMPHOCYTES # BLD AUTO: 0.91 THOUSANDS/ΜL (ref 0.6–4.47)
LYMPHOCYTES # BLD AUTO: 1.26 THOUSANDS/ΜL (ref 0.6–4.47)
LYMPHOCYTES NFR BLD AUTO: 18 % (ref 14–44)
LYMPHOCYTES NFR BLD AUTO: 24 % (ref 14–44)
MAGNESIUM SERPL-MCNC: 0.8 MG/DL (ref 1.6–2.6)
MCH RBC QN AUTO: 29.7 PG (ref 26.8–34.3)
MCH RBC QN AUTO: 30.6 PG (ref 26.8–34.3)
MCHC RBC AUTO-ENTMCNC: 31.3 G/DL (ref 31.4–37.4)
MCHC RBC AUTO-ENTMCNC: 32.4 G/DL (ref 31.4–37.4)
MCV RBC AUTO: 95 FL (ref 82–98)
MCV RBC AUTO: 95 FL (ref 82–98)
MONOCYTES # BLD AUTO: 0.3 THOUSAND/ΜL (ref 0.17–1.22)
MONOCYTES # BLD AUTO: 0.32 THOUSAND/ΜL (ref 0.17–1.22)
MONOCYTES NFR BLD AUTO: 6 % (ref 4–12)
MONOCYTES NFR BLD AUTO: 6 % (ref 4–12)
NEUTROPHILS # BLD AUTO: 3.5 THOUSANDS/ΜL (ref 1.85–7.62)
NEUTROPHILS # BLD AUTO: 3.78 THOUSANDS/ΜL (ref 1.85–7.62)
NEUTS SEG NFR BLD AUTO: 68 % (ref 43–75)
NEUTS SEG NFR BLD AUTO: 75 % (ref 43–75)
NITRITE UR QL STRIP: NEGATIVE
NON-SQ EPI CELLS URNS QL MICRO: NORMAL /HPF
NONHDLC SERPL-MCNC: 112 MG/DL
NRBC BLD AUTO-RTO: 0 /100 WBCS
NRBC BLD AUTO-RTO: 0 /100 WBCS
P AXIS: 55 DEGREES
P AXIS: 88 DEGREES
PH UR STRIP.AUTO: 5 [PH] (ref 4.5–8)
PLATELET # BLD AUTO: 105 THOUSANDS/UL (ref 149–390)
PLATELET # BLD AUTO: 131 THOUSANDS/UL (ref 149–390)
PMV BLD AUTO: 10.3 FL (ref 8.9–12.7)
PMV BLD AUTO: 10.7 FL (ref 8.9–12.7)
POTASSIUM SERPL-SCNC: 3.1 MMOL/L (ref 3.5–5.3)
POTASSIUM SERPL-SCNC: 4.6 MMOL/L (ref 3.5–5.3)
POTASSIUM SERPL-SCNC: 5.9 MMOL/L (ref 3.5–5.3)
PR INTERVAL: 152 MS
PR INTERVAL: 154 MS
PROT SERPL-MCNC: 6.5 G/DL (ref 6.4–8.2)
PROT SERPL-MCNC: 6.7 G/DL (ref 6.4–8.2)
PROT UR STRIP-MCNC: ABNORMAL MG/DL
PROTHROMBIN TIME: 13.2 SECONDS (ref 11.6–14.5)
QRS AXIS: 49 DEGREES
QRS AXIS: 52 DEGREES
QRSD INTERVAL: 82 MS
QRSD INTERVAL: 82 MS
QT INTERVAL: 348 MS
QT INTERVAL: 372 MS
QTC INTERVAL: 437 MS
QTC INTERVAL: 457 MS
RBC # BLD AUTO: 3.95 MILLION/UL (ref 3.81–5.12)
RBC # BLD AUTO: 4.08 MILLION/UL (ref 3.81–5.12)
RBC #/AREA URNS AUTO: NORMAL /HPF
SALICYLATES SERPL-MCNC: <3 MG/DL (ref 3–20)
SARS-COV-2 RNA RESP QL NAA+PROBE: NEGATIVE
SODIUM SERPL-SCNC: 135 MMOL/L (ref 136–145)
SODIUM SERPL-SCNC: 139 MMOL/L (ref 136–145)
SP GR UR STRIP.AUTO: 1.01 (ref 1–1.03)
T WAVE AXIS: 39 DEGREES
T WAVE AXIS: 52 DEGREES
TRIGL SERPL-MCNC: 90 MG/DL
TROPONIN I SERPL-MCNC: <0.02 NG/ML
TSH SERPL DL<=0.05 MIU/L-ACNC: 2.09 UIU/ML (ref 0.36–3.74)
UROBILINOGEN UR QL STRIP.AUTO: 0.2 E.U./DL
VENTRICULAR RATE: 91 BPM
VENTRICULAR RATE: 95 BPM
WBC # BLD AUTO: 5.09 THOUSAND/UL (ref 4.31–10.16)
WBC # BLD AUTO: 5.24 THOUSAND/UL (ref 4.31–10.16)
WBC #/AREA URNS AUTO: NORMAL /HPF

## 2020-07-15 PROCEDURE — 80320 DRUG SCREEN QUANTALCOHOLS: CPT | Performed by: EMERGENCY MEDICINE

## 2020-07-15 PROCEDURE — 84484 ASSAY OF TROPONIN QUANT: CPT | Performed by: NURSE PRACTITIONER

## 2020-07-15 PROCEDURE — 81001 URINALYSIS AUTO W/SCOPE: CPT

## 2020-07-15 PROCEDURE — 71045 X-RAY EXAM CHEST 1 VIEW: CPT

## 2020-07-15 PROCEDURE — 85379 FIBRIN DEGRADATION QUANT: CPT | Performed by: EMERGENCY MEDICINE

## 2020-07-15 PROCEDURE — 84132 ASSAY OF SERUM POTASSIUM: CPT | Performed by: EMERGENCY MEDICINE

## 2020-07-15 PROCEDURE — 83735 ASSAY OF MAGNESIUM: CPT | Performed by: NURSE PRACTITIONER

## 2020-07-15 PROCEDURE — 93005 ELECTROCARDIOGRAM TRACING: CPT

## 2020-07-15 PROCEDURE — 99285 EMERGENCY DEPT VISIT HI MDM: CPT

## 2020-07-15 PROCEDURE — 85025 COMPLETE CBC W/AUTO DIFF WBC: CPT | Performed by: NURSE PRACTITIONER

## 2020-07-15 PROCEDURE — 82948 REAGENT STRIP/BLOOD GLUCOSE: CPT

## 2020-07-15 PROCEDURE — 80329 ANALGESICS NON-OPIOID 1 OR 2: CPT | Performed by: EMERGENCY MEDICINE

## 2020-07-15 PROCEDURE — 84484 ASSAY OF TROPONIN QUANT: CPT | Performed by: EMERGENCY MEDICINE

## 2020-07-15 PROCEDURE — 93010 ELECTROCARDIOGRAM REPORT: CPT | Performed by: INTERNAL MEDICINE

## 2020-07-15 PROCEDURE — 96360 HYDRATION IV INFUSION INIT: CPT

## 2020-07-15 PROCEDURE — 83036 HEMOGLOBIN GLYCOSYLATED A1C: CPT | Performed by: NURSE PRACTITIONER

## 2020-07-15 PROCEDURE — 99285 EMERGENCY DEPT VISIT HI MDM: CPT | Performed by: EMERGENCY MEDICINE

## 2020-07-15 PROCEDURE — 85730 THROMBOPLASTIN TIME PARTIAL: CPT | Performed by: EMERGENCY MEDICINE

## 2020-07-15 PROCEDURE — 80061 LIPID PANEL: CPT | Performed by: NURSE PRACTITIONER

## 2020-07-15 PROCEDURE — 71275 CT ANGIOGRAPHY CHEST: CPT

## 2020-07-15 PROCEDURE — 87635 SARS-COV-2 COVID-19 AMP PRB: CPT | Performed by: EMERGENCY MEDICINE

## 2020-07-15 PROCEDURE — 99219 PR INITIAL OBSERVATION CARE/DAY 50 MINUTES: CPT | Performed by: INTERNAL MEDICINE

## 2020-07-15 PROCEDURE — 73600 X-RAY EXAM OF ANKLE: CPT

## 2020-07-15 PROCEDURE — 70450 CT HEAD/BRAIN W/O DYE: CPT

## 2020-07-15 PROCEDURE — 80053 COMPREHEN METABOLIC PANEL: CPT | Performed by: NURSE PRACTITIONER

## 2020-07-15 PROCEDURE — 36415 COLL VENOUS BLD VENIPUNCTURE: CPT | Performed by: EMERGENCY MEDICINE

## 2020-07-15 PROCEDURE — 73502 X-RAY EXAM HIP UNI 2-3 VIEWS: CPT

## 2020-07-15 PROCEDURE — 84443 ASSAY THYROID STIM HORMONE: CPT | Performed by: EMERGENCY MEDICINE

## 2020-07-15 PROCEDURE — 80053 COMPREHEN METABOLIC PANEL: CPT | Performed by: EMERGENCY MEDICINE

## 2020-07-15 PROCEDURE — 85025 COMPLETE CBC W/AUTO DIFF WBC: CPT | Performed by: EMERGENCY MEDICINE

## 2020-07-15 PROCEDURE — 83605 ASSAY OF LACTIC ACID: CPT | Performed by: EMERGENCY MEDICINE

## 2020-07-15 PROCEDURE — 73564 X-RAY EXAM KNEE 4 OR MORE: CPT

## 2020-07-15 PROCEDURE — 96361 HYDRATE IV INFUSION ADD-ON: CPT

## 2020-07-15 PROCEDURE — 85610 PROTHROMBIN TIME: CPT | Performed by: EMERGENCY MEDICINE

## 2020-07-15 RX ORDER — METOCLOPRAMIDE 10 MG/1
10 TABLET ORAL DAILY
Status: DISCONTINUED | OUTPATIENT
Start: 2020-07-15 | End: 2020-07-16 | Stop reason: HOSPADM

## 2020-07-15 RX ORDER — SODIUM CHLORIDE 9 MG/ML
125 INJECTION, SOLUTION INTRAVENOUS CONTINUOUS
Status: DISCONTINUED | OUTPATIENT
Start: 2020-07-15 | End: 2020-07-15

## 2020-07-15 RX ORDER — MAGNESIUM HYDROXIDE/ALUMINUM HYDROXICE/SIMETHICONE 120; 1200; 1200 MG/30ML; MG/30ML; MG/30ML
30 SUSPENSION ORAL EVERY 6 HOURS PRN
Status: DISCONTINUED | OUTPATIENT
Start: 2020-07-15 | End: 2020-07-16 | Stop reason: HOSPADM

## 2020-07-15 RX ORDER — AMLODIPINE BESYLATE 10 MG/1
10 TABLET ORAL DAILY
Status: DISCONTINUED | OUTPATIENT
Start: 2020-07-15 | End: 2020-07-16 | Stop reason: HOSPADM

## 2020-07-15 RX ORDER — OXYCODONE HYDROCHLORIDE 5 MG/1
30 TABLET ORAL EVERY 6 HOURS PRN
Status: ON HOLD | COMMUNITY
End: 2022-02-26 | Stop reason: SDUPTHER

## 2020-07-15 RX ORDER — ACETAMINOPHEN 325 MG/1
650 TABLET ORAL EVERY 6 HOURS PRN
Status: DISCONTINUED | OUTPATIENT
Start: 2020-07-15 | End: 2020-07-16 | Stop reason: HOSPADM

## 2020-07-15 RX ORDER — DULOXETIN HYDROCHLORIDE 30 MG/1
60 CAPSULE, DELAYED RELEASE ORAL DAILY
Status: DISCONTINUED | OUTPATIENT
Start: 2020-07-15 | End: 2020-07-16 | Stop reason: HOSPADM

## 2020-07-15 RX ORDER — ACETAMINOPHEN 325 MG/1
650 TABLET ORAL EVERY 6 HOURS PRN
Status: DISCONTINUED | OUTPATIENT
Start: 2020-07-15 | End: 2020-07-15

## 2020-07-15 RX ORDER — POTASSIUM CHLORIDE 14.9 MG/ML
20 INJECTION INTRAVENOUS ONCE
Status: DISCONTINUED | OUTPATIENT
Start: 2020-07-15 | End: 2020-07-15

## 2020-07-15 RX ORDER — LANOLIN ALCOHOL/MO/W.PET/CERES
3 CREAM (GRAM) TOPICAL
Status: DISCONTINUED | OUTPATIENT
Start: 2020-07-15 | End: 2020-07-16 | Stop reason: HOSPADM

## 2020-07-15 RX ORDER — OXYCODONE HYDROCHLORIDE AND ACETAMINOPHEN 5; 325 MG/1; MG/1
1 TABLET ORAL ONCE
Status: COMPLETED | OUTPATIENT
Start: 2020-07-15 | End: 2020-07-15

## 2020-07-15 RX ORDER — POTASSIUM CHLORIDE 20 MEQ/1
20 TABLET, EXTENDED RELEASE ORAL ONCE
Status: DISCONTINUED | OUTPATIENT
Start: 2020-07-15 | End: 2020-07-15

## 2020-07-15 RX ORDER — POTASSIUM CHLORIDE 20 MEQ/1
40 TABLET, EXTENDED RELEASE ORAL ONCE
Status: COMPLETED | OUTPATIENT
Start: 2020-07-15 | End: 2020-07-15

## 2020-07-15 RX ORDER — ONDANSETRON 2 MG/ML
4 INJECTION INTRAMUSCULAR; INTRAVENOUS EVERY 6 HOURS PRN
Status: DISCONTINUED | OUTPATIENT
Start: 2020-07-15 | End: 2020-07-16 | Stop reason: HOSPADM

## 2020-07-15 RX ORDER — INSULIN GLARGINE 100 [IU]/ML
25 INJECTION, SOLUTION SUBCUTANEOUS
Status: DISCONTINUED | OUTPATIENT
Start: 2020-07-15 | End: 2020-07-16 | Stop reason: HOSPADM

## 2020-07-15 RX ORDER — MAGNESIUM SULFATE HEPTAHYDRATE 40 MG/ML
2 INJECTION, SOLUTION INTRAVENOUS ONCE
Status: COMPLETED | OUTPATIENT
Start: 2020-07-15 | End: 2020-07-15

## 2020-07-15 RX ORDER — LISINOPRIL 20 MG/1
20 TABLET ORAL DAILY
Status: DISCONTINUED | OUTPATIENT
Start: 2020-07-15 | End: 2020-07-16 | Stop reason: HOSPADM

## 2020-07-15 RX ORDER — GABAPENTIN 100 MG/1
100 CAPSULE ORAL DAILY
Status: DISCONTINUED | OUTPATIENT
Start: 2020-07-15 | End: 2020-07-16 | Stop reason: HOSPADM

## 2020-07-15 RX ORDER — SODIUM CHLORIDE, SODIUM LACTATE, POTASSIUM CHLORIDE, CALCIUM CHLORIDE 600; 310; 30; 20 MG/100ML; MG/100ML; MG/100ML; MG/100ML
100 INJECTION, SOLUTION INTRAVENOUS CONTINUOUS
Status: DISPENSED | OUTPATIENT
Start: 2020-07-15 | End: 2020-07-15

## 2020-07-15 RX ORDER — OXYCODONE HYDROCHLORIDE 5 MG/1
5 TABLET ORAL EVERY 4 HOURS PRN
Status: DISCONTINUED | OUTPATIENT
Start: 2020-07-15 | End: 2020-07-16 | Stop reason: HOSPADM

## 2020-07-15 RX ORDER — LIDOCAINE 50 MG/G
2 PATCH TOPICAL DAILY
Status: DISCONTINUED | OUTPATIENT
Start: 2020-07-15 | End: 2020-07-16 | Stop reason: HOSPADM

## 2020-07-15 RX ORDER — OXYCODONE HYDROCHLORIDE 5 MG/1
5 CAPSULE ORAL EVERY 4 HOURS PRN
Status: ON HOLD | COMMUNITY
End: 2020-07-15

## 2020-07-15 RX ADMIN — ACETAMINOPHEN 650 MG: 325 TABLET, FILM COATED ORAL at 08:42

## 2020-07-15 RX ADMIN — LISINOPRIL 20 MG: 10 TABLET ORAL at 08:42

## 2020-07-15 RX ADMIN — OXYCODONE HYDROCHLORIDE AND ACETAMINOPHEN 1 TABLET: 5; 325 TABLET ORAL at 05:33

## 2020-07-15 RX ADMIN — METOCLOPRAMIDE 10 MG: 10 TABLET ORAL at 08:42

## 2020-07-15 RX ADMIN — MELATONIN TAB 3 MG 3 MG: 3 TAB at 21:43

## 2020-07-15 RX ADMIN — MAGNESIUM SULFATE IN WATER 2 G: 40 INJECTION, SOLUTION INTRAVENOUS at 10:33

## 2020-07-15 RX ADMIN — OXYCODONE HYDROCHLORIDE 5 MG: 5 TABLET ORAL at 16:49

## 2020-07-15 RX ADMIN — INSULIN GLARGINE 25 UNITS: 100 INJECTION, SOLUTION SUBCUTANEOUS at 21:43

## 2020-07-15 RX ADMIN — POTASSIUM CHLORIDE 40 MEQ: 1500 TABLET, EXTENDED RELEASE ORAL at 03:19

## 2020-07-15 RX ADMIN — SODIUM CHLORIDE, SODIUM LACTATE, POTASSIUM CHLORIDE, AND CALCIUM CHLORIDE 100 ML/HR: .6; .31; .03; .02 INJECTION, SOLUTION INTRAVENOUS at 06:04

## 2020-07-15 RX ADMIN — INSULIN LISPRO 1 UNITS: 100 INJECTION, SOLUTION INTRAVENOUS; SUBCUTANEOUS at 16:50

## 2020-07-15 RX ADMIN — AMLODIPINE BESYLATE 10 MG: 10 TABLET ORAL at 08:42

## 2020-07-15 RX ADMIN — DULOXETINE HYDROCHLORIDE 60 MG: 60 CAPSULE, DELAYED RELEASE ORAL at 09:06

## 2020-07-15 RX ADMIN — IOHEXOL 85 ML: 350 INJECTION, SOLUTION INTRAVENOUS at 02:25

## 2020-07-15 RX ADMIN — SODIUM CHLORIDE 1000 ML: 0.9 INJECTION, SOLUTION INTRAVENOUS at 00:45

## 2020-07-15 RX ADMIN — GABAPENTIN 100 MG: 100 CAPSULE ORAL at 08:42

## 2020-07-15 NOTE — ASSESSMENT & PLAN NOTE
· Noted on CT scan March 2020 and repeat scan today  · Reports she was a "heavy drinker" until about fifteen years ago  Denies current alcohol use      · Recommend outpatient GI referral  · LFTs stable

## 2020-07-15 NOTE — ASSESSMENT & PLAN NOTE
· Per prior hospitalization, patient was weaned off of narcotic pain medication while hospitalized at [facility] under direction of acute pain service  Will provide one dose of percocet for reported left hip pain, though this appears to be chronic problem    She was offered bursa injection in past and declined   · Otherwise, avoid PRN narcotics

## 2020-07-15 NOTE — ED PROVIDER NOTES
History  Chief Complaint   Patient presents with    Syncope     Per EMS pt reports sitting outside with family, stood up from the chair and had a syncopal episode, falling to the ground with posterior head strike  Pt recovered and had an additional syncopal episode and was assisted to the ground  Pt reports no new pain/complaints  Pt reports smoking pot right before incident  Patient is a 62year old female with syncope tonight after getting up and struck head  Patient then had another syncopal episode shortly after that  No prior syncope  No chest pain or sob  No N/V/D  No GI bleeding  No travel  No cough or fever  No urinary sx  Was last seen in this ED on 5/14/20 for ambulatory dysfunction  [facility] website checked on this patient and last Rx filled was on 6/22/20 for oxycodone for 30 day supply  No headache  Was reportedly smoking marijuana prior to syncope  History provided by:  Patient, spouse and EMS personnel   used: No    Syncope   Associated symptoms: no chest pain, no fever, no headaches, no nausea, no shortness of breath and no vomiting        Prior to Admission Medications   Prescriptions Last Dose Informant Patient Reported? Taking?    DULoxetine (CYMBALTA) 60 mg delayed release capsule   Yes No   Sig: Take 60 mg by mouth daily   Insulin Aspart FlexPen 100 UNIT/ML SOPN   Yes No   Sig: Inject 3 Units under the skin 3 (three) times a day with meals If bs>250   amLODIPine (NORVASC) 10 mg tablet   No No   Sig: Take 1 tablet (10 mg total) by mouth daily   ergocalciferol (ERGOCALCIFEROL) 1 25 MG (06420 UT) capsule   Yes No   Sig: Take 50,000 Units by mouth once a week   gabapentin (NEURONTIN) 100 mg capsule   No No   Sig: Take 1 capsule (100 mg total) by mouth 2 (two) times a day   Patient taking differently: Take 100 mg by mouth daily    insulin glargine (LANTUS SOLOSTAR) 100 units/mL injection pen   Yes No   Sig: Inject 25 Units under the skin daily at bedtime lisinopril (ZESTRIL) 20 mg tablet   Yes No   Sig: Take 20 mg by mouth daily   melatonin 3 mg   No No   Sig: Take 1 tablet (3 mg total) by mouth daily at bedtime   methylPREDNISolone 4 MG tablet therapy pack   No No   Sig: Use as directed on package   metoclopramide (REGLAN) 10 mg tablet   Yes No   Sig: Take 10 mg by mouth daily      Facility-Administered Medications: None       Past Medical History:   Diagnosis Date    Abnormal head CT 2017    Acute kidney injury ([address] ) 2018    Cellulitis 1/10/2017    Closed fracture of multiple ribs of right side 2017    Degenerative disc disease at L5-S1 level     Diabetes mellitus (HCC)     History of methicillin resistant staphylococcus aureus (MRSA) 2018    negative nasal culture- isolation and hx of mrsa removed 2018    Hyperkalemia 2018    Hypertension     Hypocalcemia 2018    Hyponatremia 2017       Past Surgical History:   Procedure Laterality Date    CHOLECYSTECTOMY      JOINT REPLACEMENT      OOPHORECTOMY         Family History   Problem Relation Age of Onset    Rheum arthritis Mother     Alzheimer's disease Mother     Lupus Mother      I have reviewed and agree with the history as documented  E-Cigarette/Vaping    E-Cigarette Use Current Every Day User     Start Date 20     Cartridges/Day 1      E-Cigarette/Vaping Substances    Nicotine Yes     THC Yes      Social History     Tobacco Use    Smoking status: Former Smoker     Types: E-Cigarettes     Start date: 1976     Last attempt to quit: 2019     Years since quittin 2    Smokeless tobacco: Never Used   Substance Use Topics    Alcohol use: Not Currently     Alcohol/week: 0 0 standard drinks     Frequency: Never     Drinks per session: Patient refused     Binge frequency: Never    Drug use: Yes     Types: Marijuana       Review of Systems   Constitutional: Negative for fever  Respiratory: Negative for cough and shortness of breath  Cardiovascular: Positive for syncope  Negative for chest pain  Gastrointestinal: Negative for blood in stool, diarrhea, nausea and vomiting  Genitourinary: Negative for difficulty urinating  Neurological: Positive for syncope  Negative for headaches  All other systems reviewed and are negative  Physical Exam  Physical Exam   Constitutional: She is oriented to person, place, and time  She appears distressed (moderate)  HENT:   Head: Normocephalic  Mucous membranes somewhat dry  Eyes: Pupils are equal, round, and reactive to light  EOM are normal  No scleral icterus  Neck: Normal range of motion  Neck supple  No JVD present  No tracheal deviation present  Cardiovascular: Normal rate, regular rhythm and normal heart sounds  No murmur heard  Pulmonary/Chest: Effort normal and breath sounds normal  No stridor  No respiratory distress  She has no wheezes  She has no rales  Abdominal: Soft  Bowel sounds are normal  There is no tenderness  Musculoskeletal: She exhibits edema (bilateral LE edema)  She exhibits no tenderness (No calf tenderness) or deformity  Neurological: She is alert and oriented to person, place, and time  No focal deficits  Skin: Skin is warm and dry  No rash noted  Psychiatric: She has a normal mood and affect  Nursing note and vitals reviewed        Vital Signs  ED Triage Vitals   Temperature Pulse Respirations Blood Pressure SpO2   07/15/20 0020 07/15/20 0015 07/15/20 0015 07/15/20 0015 07/15/20 0015   97 5 °F (36 4 °C) (!) 49 18 110/50 96 %      Temp Source Heart Rate Source Patient Position - Orthostatic VS BP Location FiO2 (%)   07/15/20 0020 07/15/20 0015 07/15/20 0015 07/15/20 0015 --   Oral Monitor Lying Right arm       Pain Score       --                  Vitals:    07/15/20 0015 07/15/20 0019   BP: 110/50    Pulse: (!) 49 95   Patient Position - Orthostatic VS: Lying          Visual Acuity      ED Medications  Medications   sodium chloride 0 9 % infusion (has no administration in time range)   sodium chloride 0 9 % bolus 1,000 mL (1,000 mL Intravenous New Bag 7/15/20 0045)   iohexol (OMNIPAQUE) 350 MG/ML injection (MULTI-DOSE) 85 mL (85 mL Intravenous Given 7/15/20 0225)   potassium chloride (K-DUR,KLOR-CON) CR tablet 40 mEq (40 mEq Oral Given 7/15/20 0319)       Diagnostic Studies  Results Reviewed     Procedure Component Value Units Date/Time    Potassium [[id number]]  (Abnormal) Collected:  07/15/20 0213    Lab Status:  Final result Specimen:  Blood from Arm, Right Updated:  07/15/20 0252     Potassium 3 1 mmol/L     Novel Coronavirus (Covid-19),PCR [facility] [[id number]]  (Normal) Collected:  07/15/20 0054    Lab Status:  Final result Specimen:  Throat from Nose Updated:  07/15/20 0157     SARS-CoV-2 Negative    Narrative: The specimen collection materials, transport medium, and/or testing methodology utilized in the production of these test results have been proven to be reliable in a limited validation with an abbreviated program under the Emergency Utilization Authorization provided by the FDA  Testing reported as "Presumptive positive" will be confirmed with secondary testing with a reference laboratory to ensure result accuracy  Clinical caution and judgement should be used with the interpretation of these results with consideration of the clinical impression and other laboratory testing  Testing reported as "Positive" or "Negative" has been proven to be accurate according to standard laboratory validation requirements  All testing is performed with control materials showing appropriate reactivity at standard intervals        TSH, 3rd generation with Free T4 reflex [[id number]]  (Normal) Collected:  07/15/20 0039    Lab Status:  Final result Specimen:  Blood from Arm, Right Updated:  07/15/20 0118     TSH 3RD GENERATON 2 088 uIU/mL     Narrative:       Patients undergoing fluorescein dye angiography may retain small amounts of fluorescein in the body for 48-72 hours post procedure  Samples containing fluorescein can produce falsely depressed TSH values  If the patient had this procedure,a specimen should be resubmitted post fluorescein clearance  Lactic acid [[id number]]  (Normal) Collected:  07/15/20 0039    Lab Status:  Final result Specimen:  Blood from Arm, Right Updated:  07/15/20 0114     LACTIC ACID 1 8 mmol/L     Narrative:       Result may be elevated if tourniquet was used during collection      Troponin I [[id number]]  (Normal) Collected:  07/15/20 0039    Lab Status:  Final result Specimen:  Blood from Arm, Right Updated:  07/15/20 0110     Troponin I <0 02 ng/mL     Ethanol [[id number]]  (Normal) Collected:  07/15/20 0039    Lab Status:  Final result Specimen:  Blood from Arm, Right Updated:  07/15/20 0110     Ethanol Lvl <3 mg/dL     Comprehensive metabolic panel [[id number]]  (Abnormal) Collected:  07/15/20 0039    Lab Status:  Final result Specimen:  Blood from Arm, Right Updated:  07/15/20 0109     Sodium 135 mmol/L      Potassium 5 9 mmol/L      Chloride 104 mmol/L      CO2 27 mmol/L      ANION GAP 4 mmol/L      BUN 14 mg/dL      Creatinine 1 13 mg/dL      Glucose 231 mg/dL      Calcium 8 2 mg/dL      AST 47 U/L      ALT 24 U/L      Alkaline Phosphatase 76 U/L      Total Protein 6 5 g/dL      Albumin 2 9 g/dL      Total Bilirubin 0 39 mg/dL      eGFR 52 ml/min/1 73sq m     Narrative:       [place] guidelines for Chronic Kidney Disease (CKD):     Stage 1 with normal or high GFR (GFR > 90 mL/min/1 73 square meters)    Stage 2 Mild CKD (GFR = 60-89 mL/min/1 73 square meters)    Stage 3A Moderate CKD (GFR = 45-59 mL/min/1 73 square meters)    Stage 3B Moderate CKD (GFR = 30-44 mL/min/1 73 square meters)    Stage 4 Severe CKD (GFR = 15-29 mL/min/1 73 square meters)    Stage 5 End Stage CKD (GFR <15 mL/min/1 73 square meters)  Note: GFR calculation is accurate only with a steady state creatinine    Salicylate level [[id number]]  (Abnormal) Collected:  07/15/20 0039    Lab Status:  Final result Specimen:  Blood from Arm, Right Updated:  13/50/04 0600     Salicylate Lvl <3 mg/dL     Acetaminophen level-If concentration is detectable, please discuss with medical  on call   [[id number]]  (Abnormal) Collected:  07/15/20 0039    Lab Status:  Final result Specimen:  Blood from Arm, Right Updated:  07/15/20 0106     Acetaminophen Level <2 ug/mL     D-Dimer [[id number]]  (Abnormal) Collected:  07/15/20 0039    Lab Status:  Final result Specimen:  Blood from Arm, Right Updated:  07/15/20 0105     D-Dimer, Quant 1 01 ug/ml FEU     Protime-INR [[id number]]  (Normal) Collected:  07/15/20 0039    Lab Status:  Final result Specimen:  Blood from Arm, Right Updated:  07/15/20 0101     Protime 13 2 seconds      INR 1 06    APTT [[id number]]  (Normal) Collected:  07/15/20 0039    Lab Status:  Final result Specimen:  Blood from Arm, Right Updated:  07/15/20 0101     PTT 28 seconds     CBC and differential [[id number]]  (Abnormal) Collected:  07/15/20 0039    Lab Status:  Final result Specimen:  Blood from Arm, Right Updated:  07/15/20 0059     WBC 5 24 Thousand/uL      RBC 3 95 Million/uL      Hemoglobin 12 1 g/dL      Hematocrit 37 4 %      MCV 95 fL      MCH 30 6 pg      MCHC 32 4 g/dL      RDW 14 3 %      MPV 10 7 fL      Platelets 212 Thousands/uL      nRBC 0 /100 WBCs      Neutrophils Relative 68 %      Immat GRANS % 0 %      Lymphocytes Relative 24 %      Monocytes Relative 6 %      Eosinophils Relative 2 %      Basophils Relative 0 %      Neutrophils Absolute 3 50 Thousands/µL      Immature Grans Absolute 0 02 Thousand/uL      Lymphocytes Absolute 1 26 Thousands/µL      Monocytes Absolute 0 32 Thousand/µL      Eosinophils Absolute 0 12 Thousand/µL      Basophils Absolute 0 02 Thousands/µL     Fingerstick Glucose (POCT) [[id number]]  (Abnormal) Collected:  07/15/20 0050    Lab Status:  Final result Updated:  07/15/20 0057     POC Glucose 258 mg/dl                  CTA ED chest PE study   ED Interpretation by Maile Phoenix, MD (52/24 [id number])   FINDINGS:      PULMONARY ARTERIAL TREE:  No pulmonary embolus is seen  LUNGS:  Mild bibasilar scarring is similar to the prior study  Dellie Siskin is no evidence of focal consolidation   There is no pneumothorax  PLEURA:  Unremarkable  HEART/GREAT VESSELS:  Coronary artery calcifications are present  Dellie Siskin is mild atherosclerosis of the thoracic aorta  MEDIASTINUM AND LAKIA:  Unremarkable  CHEST WALL AND LOWER NECK:   Unremarkable  VISUALIZED STRUCTURES IN THE UPPER ABDOMEN:  The surface contour of the liver is nodular, suggesting cirrhosis   This is similar to the prior study   The spleen is mildly enlarged, measuring approximately 14 cm AP dimension, similar to the prior study      There has been prior cholecystectomy  OSSEOUS STRUCTURES:  No acute fracture or destructive osseous lesion  Impression:        No evidence of pulmonary embolism is seen  Mild bibasilar scarring appear similar to March 1, 2020  Dellie Siskin is no evidence of focal consolidation  Dellie Siskin is no pneumothorax  The surface contour of the liver is nodular, suggesting cirrhosis   This appears similar to March 1, 2020  Mild splenomegaly, similar to March 1, 2020  Atherosclerosis   Coronary artery disease  Workstation performed: [id number]         Final Result by Gracy Stallworth MD (07/15 8547)      No evidence of pulmonary embolism is seen  Mild bibasilar scarring appear similar to March 1, 2020  There is no evidence of focal consolidation  There is no pneumothorax  The surface contour of the liver is nodular, suggesting cirrhosis  This appears similar to March 1, 2020  Mild splenomegaly, similar to March 1, 2020  Atherosclerosis  Coronary artery disease                    Workstation performed: [id number]         XR chest 1 view portable   ED Interpretation by Regan Gifford MD (07/15 5934)   No acute disease read by me  CT head without contrast   ED Interpretation by Regan Gifford MD (07/15 0727)   FINDINGS:      PARENCHYMA: Decreased attenuation is noted in periventricular and subcortical white matter demonstrating an appearance that is statistically most likely to represent mild microangiopathic change; this appearance is similar when compared to most recent    prior examination  No CT signs of acute infarction   No intracranial mass, mass effect or midline shift   No acute parenchymal hemorrhage  VENTRICLES AND EXTRA-AXIAL SPACES:  Normal for the patient's age  VISUALIZED ORBITS AND PARANASAL SINUSES:  No acute abnormality involving the orbits   Moderate mucosal thickening of the left sphenoid sinus   Mild mucosal thickening of the right sphenoid sinus and right frontal sinus  CALVARIUM AND EXTRACRANIAL SOFT TISSUES:  Normal    Impression:        No acute intracranial abnormality  Workstation performed: [id number]         Final Result by Maco Luna MD (07/15 0229)      No acute intracranial abnormality                    Workstation performed: [id number]                    Procedures  ECG 12 Lead Documentation Only  Date/Time: 7/15/2020 12:28 AM  Performed by: Regan Gifford MD  Authorized by: Regan Gifford MD     Indications / Diagnosis:  Syncope  ECG reviewed by me, the ED Provider: yes    Patient location:  ED  Previous ECG:     Previous ECG:  Compared to current    Comparison ECG info:  5/14/20    Similarity:  Changes noted (not s  tachy now)  Quality:     Tracing quality:  Limited by artifact  Rate:     ECG rate:  91    ECG rate assessment: normal    Rhythm:     Rhythm: sinus rhythm    Ectopy:     Ectopy: bigeminy and PVCs      PVCs:  Frequent  QRS:     QRS axis:  Normal  Conduction:     Conduction: normal    ST segments:     ST segments:  Normal  T waves:     T waves: normal ED Course  ED Course as of Jul 15 0344   Wed Jul 15, 2020   0114 EKG and labs d/w patient  0117 Will recheck K since hemolyzed specimen  Potassium(!): 5 9   0117 CT PE study ordered  D-Dimer, Quant(!): 1 01   0252 K-dur po ordered  Potassium(!): 3 1   0337 CTs d/w patient and  with patient's permission  6195 D/w patient and   EXT SARS-COV-2: Negative       US AUDIT      Most Recent Value   Initial Alcohol Screen: US AUDIT-C    1  How often do you have a drink containing alcohol?  0 Filed at: 07/15/2020 0022   2  How many drinks containing alcohol do you have on a typical day you are drinking? 0 Filed at: 07/15/2020 0022   3a  Male UNDER 65: How often do you have five or more drinks on one occasion? 0 Filed at: 07/15/2020 0022   3b  FEMALE Any Age, or MALE 65+: How often do you have 4 or more drinks on one occassion? 0 Filed at: 07/15/2020 0022   Audit-C Score  0 Filed at: 07/15/2020 0022            HEART Risk Score      Most Recent Value   Heart Score Risk Calculator   History  1 Filed at: 07/15/2020 0111   ECG  0 Filed at: 07/15/2020 0111   Age  1 Filed at: 07/15/2020 0111   Risk Factors  2 Filed at: 07/15/2020 0111   Troponin  0 Filed at: 07/15/2020 0111   HEART Score  4 Filed at: 07/15/2020 0111            QUENTIN/DAST-10      Most Recent Value   How many times in the past year have you    Used an illegal drug or used a prescription medication for non-medical reasons? Daily or Almost Daily Filed at: 07/15/2020 0020   In the past 12 months      1  Have you used drugs other than those required for medical reasons? 1 Filed at: 07/15/2020 0020   2  Do you use more than one drug at a time? 0 Filed at: 07/15/2020 0020   3  Have you had medical problems as a result of your drug use (e g , memory loss, hepatitis, convulsions, bleeding, etc )? 1 Filed at: 07/15/2020 0020   4  Have you had "blackouts" or "flashbacks" as a result of drug use? YesNo  0 Filed at: 07/15/2020 0020   5  Do you ever feel bad or guilty about your drug use? 0 Filed at: 07/15/2020 0020   6  Does your spouse (or parent) ever complain about your involvement with drugs? 1 Filed at: 07/15/2020 0020   7  Have you neglected your family because of your use of drugs? 0 Filed at: 07/15/2020 0020   8  Have you engaged in illegal activities in order to obtain drugs? 0 Filed at: 07/15/2020 0020   9  Have you ever experienced withdrawal symptoms (felt sick) when you stopped taking drugs? 1 Filed at: 07/15/2020 0020   10  Are you always able to stop using drugs when you want to?   1 Filed at: 07/15/2020 0020   DAST-10 Score  (!) 5 Filed at: 07/15/2020 0020          PERC Rule for PE      Most Recent Value   PERC Rule for PE   Age >=50  1 Filed at: 07/15/2020 0105   HR >=100     O2 Sat on room air < 95%     History of PE or DVT     Recent trauma or surgery     Hemoptysis     Exogenous estrogen     Unilateral leg swelling     PERC Rule for PE Results  1 Filed at: 07/15/2020 0105              HUYEN Risk Score      Most Recent Value   Age >= 72  0 Filed at: 07/15/2020 0112   Known CAD (stenosis >= 50%)  0 Filed at: 07/15/2020 0112   Recent (<=24 hrs) Service Angina  0 Filed at: 07/15/2020 0112   ST Deviation >= 0 5 mm  0 Filed at: 07/15/2020 0112   3+ CAD Risk Factors (FHx, HTN, HLP, DM, Smoker)  1 Filed at: 07/15/2020 0112   Aspirin Use Past 7 Days  0 Filed at: 07/15/2020 0112   Elevated Cardiac Markers  0 Filed at: 07/15/2020 0112   HUYEN Risk Score (Calculated)  1 Filed at: 07/15/2020 4648        Wells' Criteria for PE      Most Recent Value   Wells' Criteria for PE   Clinical signs and symptoms of DVT  0 Filed at: 07/15/2020 0105   PE is primary diagnosis or equally likely  3 Filed at: 07/15/2020 0105   HR >100  0 Filed at: 07/15/2020 0105   Immobilization at least 3 days or Surgery in the previous 4 weeks  0 Filed at: 07/15/2020 0105   Previous, objectively diagnosed PE or DVT  0 Filed at: 07/15/2020 0105   Hemoptysis  0 Filed at: 07/15/2020 0105   Malignancy with treatment within 6 months or palliative  0 Filed at: 07/15/2020 0105   Deidra Pry' Criteria Total  3 Filed at: 07/15/2020 0105                  [facility]  Number of Diagnoses or Management Options  Diagnosis management comments: Differential diagnosis including but not limited to: vasovagal syncope, cardiac arrhythmia, MI, ACS, PE, metabolic abnormality, intracranial process, seizure, anemia, doubt GI bleed, dehydration, doubt COVID 19  Amount and/or Complexity of Data Reviewed  Clinical lab tests: ordered and reviewed  Tests in the radiology section of CPT®: ordered and reviewed  Decide to obtain previous medical records or to obtain history from someone other than the patient: yes  Obtain history from someone other than the patient: yes  Review and summarize past medical records: yes  Discuss the patient with other providers: yes  Independent visualization of images, tracings, or specimens: yes          Disposition  Final diagnoses:   Syncope   Hypokalemia     Time reflects when diagnosis was documented in both MDM as applicable and the Disposition within this note     Time User Action Codes Description Comment    7/15/2020  1:06 AM Britni Harness Add [R55] Syncope     7/15/2020  3:39 AM Britni Harness Add [E87 6] Hypokalemia       ED Disposition     ED Disposition Condition Date/Time Comment    Admit Stable Wed Jul 15, 2020  3:43 AM Case was discussed with [facility] PA M Melburn Canavan and the patient's admission status was agreed to be Admission Status: observation status to the service of Dr Saúl Ochoa   Follow-up Information    None         Patient's Medications   Discharge Prescriptions    No medications on file     No discharge procedures on file      PDMP Review       Value Time User    PDMP Reviewed  Yes 7/15/2020 12:10 AM Jose Arnold MD          ED Provider  Electronically Signed by           Jose Arnold MD  07/15/20 9453

## 2020-07-15 NOTE — ASSESSMENT & PLAN NOTE
Lab Results   Component Value Date    HGBA1C 5 2 03/08/2020       Recent Labs     07/15/20  0050   POCGLU 258*       Blood Sugar Average: Last 72 hrs:  (P) 258     · Obtain repeat A1c  · Continue home regimen of lantus 25 units HS   · accucheck and sliding scale

## 2020-07-15 NOTE — UTILIZATION REVIEW
Initial Clinical Review    Admission: Date/Time/Statement: Admission Orders (From admission, onward)     Ordered        07/15/20 0344  Place in Observation  Once                   Orders Placed This Encounter   Procedures    Place in Observation     Telemetry     Standing Status:   Standing     Number of Occurrences:   1     Order Specific Question:   Admitting Physician     Answer:   Jabari Young [[id number]]     Order Specific Question:   Level of Care     Answer:   Med Surg [16]     Order Specific Question:   Bed request comments     Answer:   telemetry     ED Arrival Information     Expected Arrival [address] [facility] of Arrival Escorted By Service Admission Type    - 7/15/2020 00:06 [facility] Emergency    Arrival Complaint    Syncope        Chief Complaint   Patient presents with    Syncope     Per EMS pt reports sitting outside with family, stood up from the chair and had a syncopal episode, falling to the ground with posterior head strike  Pt recovered and had an additional syncopal episode and was assisted to the ground  Pt reports no new pain/complaints  Pt reports smoking pot right before incident  Assessment/Plan: 63 yo female presented to ED from home via EMS as observation for syncope  Patient was sitting in a chair outside stood up and had a syncopal episode fall backward and hit her posterior head shortly after that had another syncopal event  Patient did state prior to event she felt cold and clammy and  was smoking medical marijuana  Past medical history significant for alcohol abuse fifteen years ago with cirrhosis, hypertension, marijuana use, type 2 diabetes, peripheral neuropathy, chronic back pain  On exam MM somewhat dry seems in moderate distress,b/l leg edema   Plan check orthostatic bp's telemetry and supportive care    ED Triage Vitals   Temperature Pulse Respirations Blood Pressure SpO2   07/15/20 0020 07/15/20 0015 07/15/20 0015 07/15/20 0015 07/15/20 0015   97 5 °F (36 4 °C) (!) 49 18 110/50 96 %      Temp Source Heart Rate Source Patient Position - Orthostatic VS BP Location FiO2 (%)   07/15/20 0020 07/15/20 0015 07/15/20 0015 07/15/20 0015 --   Oral Monitor Lying Right arm       Pain Score       07/15/20 0533       8        Wt Readings from Last 1 Encounters:   07/15/20 91 6 kg (201 lb 15 1 oz)     Additional Vital Signs:   Date/Time  Temp  Pulse  Resp  BP  MAP (mmHg)  SpO2  O2 Device  Patient Position - Orthostatic VS   07/15/20 0836    90  16  150/67  96  99 %  None (Room air)  Lying   07/15/20 0020  97 5 °F (36 4 °C)                 07/15/20 0019    95                   Pertinent Labs/Diagnostic Test Results:   Results from last 7 days   Lab Units 07/15/20  0054   SARS-COV-2  Negative     Results from last 7 days   Lab Units 07/15/20  0557 07/15/20  0039   WBC Thousand/uL 5 09 5 24   HEMOGLOBIN g/dL 12 1 12 1   HEMATOCRIT % 38 6 37 4   PLATELETS Thousands/uL 105* 131*   NEUTROS ABS Thousands/µL 3 78 3 50         Results from last 7 days   Lab Units 07/15/20  0557 07/15/20  0213 07/15/20  0039   SODIUM mmol/L 139  --  135*   POTASSIUM mmol/L 4 6 3 1* 5 9*   CHLORIDE mmol/L 106  --  104   CO2 mmol/L 23  --  27   ANION GAP mmol/L 10  --  4   BUN mg/dL 13  --  14   CREATININE mg/dL 0 94  --  1 13   EGFR ml/min/1 73sq m 65  --  52   CALCIUM mg/dL 8 3  --  8 2*     Results from last 7 days   Lab Units 07/15/20  0557 07/15/20  0039   AST U/L 29 47*   ALT U/L 25 24   ALK PHOS U/L 78 76   TOTAL PROTEIN g/dL 6 7 6 5   ALBUMIN g/dL 3 2* 2 9*   TOTAL BILIRUBIN mg/dL 0 38 0 39     Results from last 7 days   Lab Units 07/15/20  0822 07/15/20  0050   POC GLUCOSE mg/dl 101 258*     Results from last 7 days   Lab Units 07/15/20  0557 07/15/20  0039   GLUCOSE RANDOM mg/dL 140 231*     Results from last 7 days   Lab Units 07/15/20  0557 07/15/20  0039   TROPONIN I ng/mL <0 02 <0 02     Results from last 7 days   Lab Units 07/15/20  0039   D-DIMER QUANTITATIVE ug/ml FEU 1 01*     Results from last 7 days   Lab Units 07/15/20  0039   PROTIME seconds 13 2   INR  1 06   PTT seconds 28     Results from last 7 days   Lab Units 07/15/20  0039   TSH 3RD GENERATON uIU/mL 2 088       Results from last 7 days   Lab Units 07/15/20  0039   LACTIC ACID mmol/L 1 8       Results from last 7 days   Lab Units 07/15/20  0847   CLARITY UA  Clear   COLOR UA  Yellow   SPEC GRAV UA  1 015   PH UA  5 0   GLUCOSE UA mg/dl Negative   KETONES UA mg/dl Negative   BLOOD UA  Negative   PROTEIN UA mg/dl 30 (1+)*   NITRITE UA  Negative   BILIRUBIN UA  Negative   UROBILINOGEN UA E U /dl 0 2   LEUKOCYTES UA  Negative   WBC UA /hpf None Seen   RBC UA /hpf None Seen   BACTERIA UA /hpf Occasional   EPITHELIAL CELLS WET PREP /hpf Occasional     Results from last 7 days   Lab Units 07/15/20  0039   ETHANOL LVL mg/dL <3   ACETAMINOPHEN LVL ug/mL <2*   SALICYLATE LVL mg/dL <3*     XR left hip 07-15-20  No acute osseous abnormality  Stable appearance of left hip hemiarthroplasty  XR left ankle  07-15-20  Limited study  No acute osseous abnormality within limitations  XR right knee 07-15-20  No acute osseous abnormality  CTA chest PE study 07-15-20  No evidence of pulmonary embolism is seen  Mild bibasilar scarring appear similar to March 1, 2020  Enoc Has is no evidence of focal consolidation  Enoc Has is no pneumothorax  The surface contour of the liver is nodular, suggesting cirrhosis   This appears similar to March 1, 2020  Mild splenomegaly, similar to March 1, 2020    Atherosclerosis   Coronary artery disease  CXR 07-15-20  NAD  Ct head 07-15-20  WNL  EKG 07-15-20  Rate 91 NSR    ED Treatment:   Medication Administration from 07/15/2020 0006 to 07/15/2020 0956       Date/Time Order Dose Route Action     07/15/2020 0045 sodium chloride 0 9 % bolus 1,000 mL 1,000 mL Intravenous New Bag     07/15/2020 0558 sodium chloride 0 9 % infusion 125 mL/hr Intravenous Not Given     07/15/2020 0225 iohexol (OMNIPAQUE) 350 MG/ML injection (MULTI-DOSE) 85 mL 85 mL Intravenous Given     07/15/2020 0319 potassium chloride (K-DUR,KLOR-CON) CR tablet 40 mEq 40 mEq Oral Given     07/15/2020 0533 oxyCODONE-acetaminophen (PERCOCET) 5-325 mg per tablet 1 tablet 1 tablet Oral Given     07/15/2020 0842 amLODIPine (NORVASC) tablet 10 mg 10 mg Oral Given     07/15/2020 0906 DULoxetine (CYMBALTA) delayed release capsule 60 mg 60 mg Oral Given     07/15/2020 0842 gabapentin (NEURONTIN) capsule 100 mg 100 mg Oral Given     07/15/2020 0842 lisinopril (ZESTRIL) tablet 20 mg 20 mg Oral Given     07/15/2020 0842 metoclopramide (REGLAN) tablet 10 mg 10 mg Oral Given     07/15/2020 0604 lactated ringers infusion 100 mL/hr Intravenous New Bag     07/15/2020 0842 acetaminophen (TYLENOL) tablet 650 mg 650 mg Oral Given     07/15/2020 0842 lidocaine (LIDODERM) 5 % patch 2 patch 2 patch Topical Not Given     07/15/2020 0559 potassium chloride 20 mEq IVPB (premix) 20 mEq Intravenous Not Given     07/15/2020 0559 potassium chloride (K-DUR,KLOR-CON) CR tablet 20 mEq 20 mEq Oral Not Given     07/15/2020 0844 insulin lispro (HumaLOG) 100 units/mL subcutaneous injection 1-6 Units 1 Units Subcutaneous Not Given        Past Medical History:   Diagnosis Date    Abnormal head CT 7/14/2017    Acute kidney injury ([facility] [address] ) 8/19/2018    Cellulitis 1/10/2017    Closed fracture of multiple ribs of right side 7/14/2017    Degenerative disc disease at L5-S1 level     Diabetes mellitus (HCC)     History of methicillin resistant staphylococcus aureus (MRSA) 08/21/2018    negative nasal culture- isolation and hx of mrsa removed 8/20/2018    Hyperkalemia 4/25/2018    Hypertension     Hypocalcemia 4/25/2018    Hyponatremia 7/14/2017     Present on Admission:   Chronic pain syndrome   Essential hypertension   Marijuana use   Peripheral neuropathy      Admitting Diagnosis: Syncope [R55]  Age/Sex: 62 y o  female  Admission Orders:  Scheduled Medications:    Medications:  amLODIPine 10 mg Oral Daily   DULoxetine 60 mg Oral Daily   gabapentin 100 mg Oral Daily   insulin glargine 25 Units Subcutaneous HS   insulin lispro 1-5 Units Subcutaneous HS   insulin lispro 1-6 Units Subcutaneous TID AC   lidocaine 2 patch Topical Daily   lisinopril 20 mg Oral Daily   melatonin 3 mg Oral HS   metoclopramide 10 mg Oral Daily     Continuous IV Infusions:    lactated ringers 100 mL/hr Intravenous Continuous     PRN Meds:    acetaminophen 650 mg Oral Q6H PRN   aluminum-magnesium hydroxide-simethicone 30 mL Oral Q6H PRN   ondansetron 4 mg Intravenous Q6H PRN       Blood sugar ac and hs  Telemetry  Neuro q 2 hr x 24 hrs        Network Utilization Review Department  [email or web address]  ATTENTION: Please call with any questions or concerns to [phone] and carefully listen to the prompts so that you are directed to the right person  All voicemails are confidential   [facility] all requests for admission clinical reviews, approved or denied determinations and any other requests to dedicated fax number below belonging to the campus where the patient is receiving treatment   List of dedicated fax numbers for the Facilities:  [address] [place] [address] DENIALS (Administrative/Medical Necessity) [phone]   [address] [place] [address] (Maternity/NICU/Pediatrics) [phone]   Katie Moreno [phone]   Luciana Wei [phone]   Cullen Opitz [phone]   Pebbles Hassan [phone]   [address] [phone]   [facility]  [phone]   [address] [facility]  [address] [facility] [address] [facility] [phone]

## 2020-07-15 NOTE — ASSESSMENT & PLAN NOTE
· Presents to ED with syncopal event  She reports feeling "cold and clammy" right before passing out  Per ED provider, patient was outside with family, stood up from chair and had syncopal event  + posterior head strike  She complains of left hip, left knee, left ankle pain  No headache  Neurologically intact  Denies chest pain, palpitations, shortness of breath, n/v/d, abdominal pain  · Event occurred while smoking medical marijuana  She does report it was a different strain     · CT head: negative  · D Dimer elevated  · CTA chest: negative PE   · Trend trop   · Monitor telemetry   · IV fluids  · Check ortho BPs

## 2020-07-15 NOTE — H&P
H&P- Sam Heller 1955, 62 y o  female MRN: [id number]    Unit/Bed#: ED 14 Encounter: [id number]    Primary Care Provider: Joann Gutierrez MD   Date and time admitted to hospital: 7/15/2020 12:07 AM        * Syncope  Assessment & Plan  · Presents to ED with syncopal event  She reports feeling "cold and clammy" right before passing out  Per ED provider, patient was outside with family, stood up from chair and had syncopal event  + posterior head strike  She complains of left hip, left knee, left ankle pain  No headache  Neurologically intact  Denies chest pain, palpitations, shortness of breath, n/v/d, abdominal pain  · Event occurred while smoking medical marijuana  She does report it was a different strain  · CT head: negative  · D Dimer elevated  · CTA chest: negative PE   · Trend trop   · Monitor telemetry   · IV fluids  · Check ortho BPs    CAD (coronary artery disease)  Assessment & Plan  · Noted on CT scan  Denies cardiac complaints  · Follow up A1c  Lipid panel pending  Cirrhosis [facility])  Assessment & Plan  · Noted on CT scan March 2020 and repeat scan today  · Reports she was a "heavy drinker" until about fifteen years ago  Denies current alcohol use  · Recommend outpatient GI referral  · LFTs stable    Marijuana use  Assessment & Plan  · Medical marijuana   · Follows with Dr Lis Ramirez for chronic pain    Peripheral neuropathy  Assessment & Plan  · Continue gabapentin   · Chronic lower extremity pain     Chronic pain syndrome  Assessment & Plan  · Per prior hospitalization, patient was weaned off of narcotic pain medication while hospitalized at [facility] under direction of acute pain service  Will provide one dose of percocet for reported left hip pain, though this appears to be chronic problem    She was offered bursa injection in past and declined   · Otherwise, avoid PRN narcotics    Type 2 diabetes mellitus with hyperglycemia, with long-term current use of insulin [facility])  Assessment & Plan  Lab Results   Component Value Date    HGBA1C 5 2 03/08/2020       Recent Labs     07/15/20  0050   POCGLU 258*       Blood Sugar Average: Last 72 hrs:  (P) 258     · Obtain repeat A1c  · Continue home regimen of lantus 25 units HS   · accucheck and sliding scale     Essential hypertension  Assessment & Plan  · Continue home regimen   · Monitor BP closely given syncope    VTE Prophylaxis: Pharmacologic VTE Prophylaxis contraindicated due to low risk  / reason for no mechanical VTE prophylaxis low risk   Code Status: Level 1  POLST: POLST is not applicable to this patient  Discussion with family: patient     Anticipated Length of Stay:  Patient will be admitted on an Observation basis with an anticipated length of stay of  Less than 2 midnights  Justification for Hospital Stay: syncope     Total Time for Visit, including Counseling / Coordination of Care: 45 minutes  Greater than 50% of this total time spent on direct patient counseling and coordination of care  Chief Complaint:   syncope    History of Present Illness: Ce Hartman is a 62 y o  female with past medical history significant for alcohol abuse fifteen years ago with cirrhosis, hypertension, marijuana use, type 2 diabetes, peripheral neuropathy, chronic back pain who presents following syncopal event  She reports feeling cold and clammy immediately before event, otherwise cannot provide other details  Doesn't recall chest pain, sob, palpitations, nausea or vomiting  Event was witnessed per ED provider  Event occurred right after patient was smoking marijuana  Reports it was a different strain  Currently complaining of left hip/knee/ankle pain  Otherwise no other complaints  CT scans as below  Reviewed with patient  She reports not being told about cirrhotic changes noted on CT scan in the past   No longer drinking alcohol  Admit as observation  Check xrays, trop, telemetry, ortho BP       Review of Systems:    Review of Systems   Constitutional: Negative  HENT: Negative  Eyes: Negative  Respiratory: Negative  Cardiovascular: Negative  Gastrointestinal: Negative  Endocrine: Negative  Genitourinary: Negative  Musculoskeletal: Positive for back pain and gait problem  Skin: Negative  Neurological: Positive for syncope  Negative for dizziness, tremors, seizures, facial asymmetry, speech difficulty, weakness, light-headedness, numbness and headaches  Hematological: Negative  Psychiatric/Behavioral: Negative  Past Medical and Surgical History:     Past Medical History:   Diagnosis Date    Abnormal head CT 7/14/2017    Acute kidney injury ([address] ) 8/19/2018    Cellulitis 1/10/2017    Closed fracture of multiple ribs of right side 7/14/2017    Degenerative disc disease at L5-S1 level     Diabetes mellitus (HCC)     History of methicillin resistant staphylococcus aureus (MRSA) 08/21/2018    negative nasal culture- isolation and hx of mrsa removed 8/20/2018    Hyperkalemia 4/25/2018    Hypertension     Hypocalcemia 4/25/2018    Hyponatremia 7/14/2017       Past Surgical History:   Procedure Laterality Date    CHOLECYSTECTOMY      JOINT REPLACEMENT      OOPHORECTOMY         Meds/Allergies:    Prior to Admission medications    Medication Sig Start Date End Date Taking?  Authorizing Provider   amLODIPine (NORVASC) 10 mg tablet Take 1 tablet (10 mg total) by mouth daily 8/25/18   Osmel Michael MD   DULoxetine (CYMBALTA) 60 mg delayed release capsule Take 60 mg by mouth daily    Historical Provider, MD   ergocalciferol (ERGOCALCIFEROL) 1 25 MG (55261 UT) capsule Take 50,000 Units by mouth once a week    Historical Provider, MD   gabapentin (NEURONTIN) 100 mg capsule Take 1 capsule (100 mg total) by mouth 2 (two) times a day  Patient taking differently: Take 100 mg by mouth daily  3/11/20   Lucas Garcia PA-C   Insulin Aspart FlexPen 100 UNIT/ML SOPN Inject 3 Units under the skin 3 (three) times a day with meals If bs>250    Historical Provider, MD   insulin glargine (LANTUS SOLOSTAR) 100 units/mL injection pen Inject 25 Units under the skin daily at bedtime     Historical Provider, MD   lisinopril (ZESTRIL) 20 mg tablet Take 20 mg by mouth daily    Historical Provider, MD   melatonin 3 mg Take 1 tablet (3 mg total) by mouth daily at bedtime 3/11/20   Yakov Kenny PA-C   metoclopramide (REGLAN) 10 mg tablet Take 10 mg by mouth daily    Historical Provider, MD   methylPREDNISolone 4 MG tablet therapy pack Use as directed on package 5/16/20 7/15/20  Karena Garcia MD     I have reviewed home medications with a medical source (PCP, Pharmacy, other)  Allergies: No Known Allergies    Social History:     Marital Status: /Civil Union     Substance Use History:   Social History     Substance and Sexual Activity   Alcohol Use Not Currently    Alcohol/week: 0 0 standard drinks    Frequency: Never    Drinks per session: Patient refused    Binge frequency: Never     Social History     Tobacco Use   Smoking Status Former Smoker    Types: E-Cigarettes    Start date: 1976    Last attempt to quit: 2019    Years since quittin 2   Smokeless Tobacco Never Used     Social History     Substance and Sexual Activity   Drug Use Yes    Types: Marijuana       Family History:    non-contributory    Physical Exam:     Vitals:   Blood Pressure: 110/50 (07/15/20 0015)  Pulse: 95 (07/15/20 0019)  Temperature: 97 5 °F (36 4 °C) (07/15/20 0020)  Temp Source: Oral (07/15/20 0020)  Respirations: 18 (07/15/20 0015)  Weight - Scale: 91 6 kg (201 lb 15 1 oz) (07/15/20 0015)  SpO2: 96 % (07/15/20 0015)    Physical Exam   Constitutional: She is oriented to person, place, and time  No distress  HENT:   Head: Normocephalic and atraumatic  Eyes: Pupils are equal, round, and reactive to light  Conjunctivae and EOM are normal  No scleral icterus  Neck: Normal range of motion  Neck supple  Cardiovascular: Regular rhythm, normal heart sounds and intact distal pulses  Tachycardic, irregular  PVCs noted on monitor  Pulmonary/Chest: Effort normal and breath sounds normal  No respiratory distress  She has no wheezes  Abdominal: Soft  Bowel sounds are normal  She exhibits no distension  There is no tenderness  There is no rebound and no guarding  Musculoskeletal: She exhibits edema (non pitting) and tenderness (b/l lower extremities which she attributes to neuropathy  L>R )  Left hip: She exhibits decreased range of motion and tenderness  She exhibits no swelling, no crepitus and no deformity  Left knee: She exhibits decreased range of motion  She exhibits no swelling, no effusion, no ecchymosis, no deformity and no erythema  Left ankle: She exhibits decreased range of motion  She exhibits no swelling, no ecchymosis, no deformity and normal pulse  Neurological: She is alert and oriented to person, place, and time  Skin: Skin is warm and dry  Capillary refill takes less than 2 seconds  Psychiatric: She has a normal mood and affect  Additional Data:     Lab Results: I have personally reviewed pertinent reports        Results from last 7 days   Lab Units 07/15/20  0039   WBC Thousand/uL 5 24   HEMOGLOBIN g/dL 12 1   HEMATOCRIT % 37 4   PLATELETS Thousands/uL 131*   NEUTROS PCT % 68   LYMPHS PCT % 24   MONOS PCT % 6   EOS PCT % 2     Results from last 7 days   Lab Units 07/15/20  0213 07/15/20  0039   SODIUM mmol/L  --  135*   POTASSIUM mmol/L 3 1* 5 9*   CHLORIDE mmol/L  --  104   CO2 mmol/L  --  27   BUN mg/dL  --  14   CREATININE mg/dL  --  1 13   ANION GAP mmol/L  --  4   CALCIUM mg/dL  --  8 2*   ALBUMIN g/dL  --  2 9*   TOTAL BILIRUBIN mg/dL  --  0 39   ALK PHOS U/L  --  76   ALT U/L  --  24   AST U/L  --  47*   GLUCOSE RANDOM mg/dL  --  231*     Results from last 7 days   Lab Units 07/15/20  0039   INR  1 06     Results from last 7 days   Lab Units 07/15/20  0050   POC GLUCOSE mg/dl 258*         Results from last 7 days   Lab Units 07/15/20  0039   LACTIC ACID mmol/L 1 8       Imaging: I have personally reviewed pertinent reports  CTA ED chest PE study   ED Interpretation by Gilberto Leyden, MD (60/12 3710)   FINDINGS:      PULMONARY ARTERIAL TREE:  No pulmonary embolus is seen  LUNGS:  Mild bibasilar scarring is similar to the prior study  Verta Gisele is no evidence of focal consolidation   There is no pneumothorax  PLEURA:  Unremarkable  HEART/GREAT VESSELS:  Coronary artery calcifications are present  Verta Gisele is mild atherosclerosis of the thoracic aorta  MEDIASTINUM AND LAKIA:  Unremarkable  CHEST WALL AND LOWER NECK:   Unremarkable  VISUALIZED STRUCTURES IN THE UPPER ABDOMEN:  The surface contour of the liver is nodular, suggesting cirrhosis   This is similar to the prior study   The spleen is mildly enlarged, measuring approximately 14 cm AP dimension, similar to the prior study      There has been prior cholecystectomy  OSSEOUS STRUCTURES:  No acute fracture or destructive osseous lesion  Impression:        No evidence of pulmonary embolism is seen  Mild bibasilar scarring appear similar to March 1, 2020  Verta Gisele is no evidence of focal consolidation  Verta Gisele is no pneumothorax  The surface contour of the liver is nodular, suggesting cirrhosis   This appears similar to March 1, 2020  Mild splenomegaly, similar to March 1, 2020  Atherosclerosis   Coronary artery disease  Workstation performed: [id number]         Final Result by Anh Gould MD (07/15 9838)      No evidence of pulmonary embolism is seen  Mild bibasilar scarring appear similar to March 1, 2020  There is no evidence of focal consolidation  There is no pneumothorax  The surface contour of the liver is nodular, suggesting cirrhosis  This appears similar to March 1, 2020        Mild splenomegaly, similar to March 1, 2020  Atherosclerosis  Coronary artery disease  Workstation performed: [id number]         XR chest 1 view portable   ED Interpretation by Jose Toledo MD (07/15 0201)   No acute disease read by me  CT head without contrast   ED Interpretation by Jose Toledo MD (07/15 0234)   FINDINGS:      PARENCHYMA: Decreased attenuation is noted in periventricular and subcortical white matter demonstrating an appearance that is statistically most likely to represent mild microangiopathic change; this appearance is similar when compared to most recent    prior examination  No CT signs of acute infarction   No intracranial mass, mass effect or midline shift   No acute parenchymal hemorrhage  VENTRICLES AND EXTRA-AXIAL SPACES:  Normal for the patient's age  VISUALIZED ORBITS AND PARANASAL SINUSES:  No acute abnormality involving the orbits   Moderate mucosal thickening of the left sphenoid sinus   Mild mucosal thickening of the right sphenoid sinus and right frontal sinus  CALVARIUM AND EXTRACRANIAL SOFT TISSUES:  Normal    Impression:        No acute intracranial abnormality  Workstation performed: [id number]         Final Result by Feliberto Peck MD (07/15 0225)      No acute intracranial abnormality  Workstation performed: [id number]         XR hip/pelv 2-3 vws left if performed    (Results Pending)   XR knee 4+ vw left injury    (Results Pending)   XR ankle 2 vw left    (Results Pending)       EKG, Pathology, and Other Studies Reviewed on Admission:   · EKG: PVCs, bigeminy     Allscripts / Epic Records Reviewed: Yes     ** Please Note: This note has been constructed using a voice recognition system   **

## 2020-07-15 NOTE — PLAN OF CARE
Problem: Potential for Falls  Goal: Patient will remain free of falls  Description  INTERVENTIONS:  - Assess patient frequently for physical needs  -  Identify cognitive and physical deficits and behaviors that affect risk of falls  -  Hadley fall precautions as indicated by assessment   - Educate patient/family on patient safety including physical limitations  - Instruct patient to call for assistance with activity based on assessment  - Modify environment to reduce risk of injury  - Consider OT/PT consult to assist with strengthening/mobility  Outcome: Progressing     Problem: Prexisting or High Potential for Compromised Skin Integrity  Goal: Skin integrity is maintained or improved  Description  INTERVENTIONS:  - Identify patients at risk for skin breakdown  - Assess and monitor skin integrity  - Assess and monitor nutrition and hydration status  - Monitor labs   - Assess for incontinence   - Turn and reposition patient  - Assist with mobility/ambulation  - Relieve pressure over bony prominences  - Avoid friction and shearing  - Provide appropriate hygiene as needed including keeping skin clean and dry  - Evaluate need for skin moisturizer/barrier cream  - Collaborate with interdisciplinary team   - Patient/family teaching  - Consider wound care consult   Outcome: Progressing     Problem: Nutrition/Hydration-ADULT  Goal: Nutrient/Hydration intake appropriate for improving, restoring or maintaining nutritional needs  Description  Monitor and assess patient's nutrition/hydration status for malnutrition  Collaborate with interdisciplinary team and initiate plan and interventions as ordered  Monitor patient's weight and dietary intake as ordered or per policy  Utilize nutrition screening tool and intervene as necessary  Determine patient's food preferences and provide high-protein, high-caloric foods as appropriate       INTERVENTIONS:  - Monitor oral intake, urinary output, labs, and treatment plans  - Assess nutrition and hydration status and recommend course of action  - Evaluate amount of meals eaten  - Assist patient with eating if necessary   - Allow adequate time for meals  - Recommend/ encourage appropriate diets, oral nutritional supplements, and vitamin/mineral supplements  - Order, calculate, and assess calorie counts as needed  - Recommend, monitor, and adjust tube feedings and TPN/PPN based on assessed needs  - Assess need for intravenous fluids  - Provide specific nutrition/hydration education as appropriate  - Include patient/family/caregiver in decisions related to nutrition  Outcome: Progressing

## 2020-07-16 ENCOUNTER — APPOINTMENT (OUTPATIENT)
Dept: NON INVASIVE DIAGNOSTICS | Facility: HOSPITAL | Age: 65
End: 2020-07-16
Payer: MEDICARE

## 2020-07-16 VITALS
OXYGEN SATURATION: 92 % | BODY MASS INDEX: 34.05 KG/M2 | WEIGHT: 192.2 LBS | TEMPERATURE: 98.3 F | RESPIRATION RATE: 18 BRPM | SYSTOLIC BLOOD PRESSURE: 133 MMHG | DIASTOLIC BLOOD PRESSURE: 71 MMHG | HEART RATE: 74 BPM | HEIGHT: 63 IN

## 2020-07-16 LAB
ANION GAP SERPL CALCULATED.3IONS-SCNC: 7 MMOL/L (ref 4–13)
ATRIAL RATE: 51 BPM
BASOPHILS # BLD AUTO: 0.03 THOUSANDS/ΜL (ref 0–0.1)
BASOPHILS NFR BLD AUTO: 0 % (ref 0–1)
BUN SERPL-MCNC: 13 MG/DL (ref 5–25)
CALCIUM SERPL-MCNC: 9 MG/DL (ref 8.3–10.1)
CHLORIDE SERPL-SCNC: 103 MMOL/L (ref 100–108)
CO2 SERPL-SCNC: 29 MMOL/L (ref 21–32)
CREAT SERPL-MCNC: 1.02 MG/DL (ref 0.6–1.3)
EOSINOPHIL # BLD AUTO: 0.16 THOUSAND/ΜL (ref 0–0.61)
EOSINOPHIL NFR BLD AUTO: 2 % (ref 0–6)
ERYTHROCYTE [DISTWIDTH] IN BLOOD BY AUTOMATED COUNT: 14.3 % (ref 11.6–15.1)
GFR SERPL CREATININE-BSD FRML MDRD: 59 ML/MIN/1.73SQ M
GLUCOSE SERPL-MCNC: 132 MG/DL (ref 65–140)
GLUCOSE SERPL-MCNC: 80 MG/DL (ref 65–140)
GLUCOSE SERPL-MCNC: 88 MG/DL (ref 65–140)
GLUCOSE SERPL-MCNC: 88 MG/DL (ref 65–140)
HCT VFR BLD AUTO: 44.3 % (ref 34.8–46.1)
HGB BLD-MCNC: 14.1 G/DL (ref 11.5–15.4)
IMM GRANULOCYTES # BLD AUTO: 0.02 THOUSAND/UL (ref 0–0.2)
IMM GRANULOCYTES NFR BLD AUTO: 0 % (ref 0–2)
LYMPHOCYTES # BLD AUTO: 1.49 THOUSANDS/ΜL (ref 0.6–4.47)
LYMPHOCYTES NFR BLD AUTO: 21 % (ref 14–44)
MAGNESIUM SERPL-MCNC: 2 MG/DL (ref 1.6–2.6)
MCH RBC QN AUTO: 29.9 PG (ref 26.8–34.3)
MCHC RBC AUTO-ENTMCNC: 31.8 G/DL (ref 31.4–37.4)
MCV RBC AUTO: 94 FL (ref 82–98)
MONOCYTES # BLD AUTO: 0.39 THOUSAND/ΜL (ref 0.17–1.22)
MONOCYTES NFR BLD AUTO: 6 % (ref 4–12)
NEUTROPHILS # BLD AUTO: 4.94 THOUSANDS/ΜL (ref 1.85–7.62)
NEUTS SEG NFR BLD AUTO: 71 % (ref 43–75)
NRBC BLD AUTO-RTO: 0 /100 WBCS
P AXIS: 110 DEGREES
PHOSPHATE SERPL-MCNC: 3.6 MG/DL (ref 2.3–4.1)
PLATELET # BLD AUTO: 158 THOUSANDS/UL (ref 149–390)
PMV BLD AUTO: 10.9 FL (ref 8.9–12.7)
POTASSIUM SERPL-SCNC: 3.9 MMOL/L (ref 3.5–5.3)
PR INTERVAL: 160 MS
QRS AXIS: 118 DEGREES
QRSD INTERVAL: 82 MS
QT INTERVAL: 338 MS
QTC INTERVAL: 444 MS
RBC # BLD AUTO: 4.71 MILLION/UL (ref 3.81–5.12)
SODIUM SERPL-SCNC: 139 MMOL/L (ref 136–145)
T WAVE AXIS: 132 DEGREES
VENTRICULAR RATE: 104 BPM
WBC # BLD AUTO: 7.03 THOUSAND/UL (ref 4.31–10.16)

## 2020-07-16 PROCEDURE — 93010 ELECTROCARDIOGRAM REPORT: CPT | Performed by: INTERNAL MEDICINE

## 2020-07-16 PROCEDURE — 99225 PR SBSQ OBSERVATION CARE/DAY 25 MINUTES: CPT | Performed by: INTERNAL MEDICINE

## 2020-07-16 PROCEDURE — 85025 COMPLETE CBC W/AUTO DIFF WBC: CPT | Performed by: INTERNAL MEDICINE

## 2020-07-16 PROCEDURE — 93306 TTE W/DOPPLER COMPLETE: CPT

## 2020-07-16 PROCEDURE — 93306 TTE W/DOPPLER COMPLETE: CPT | Performed by: INTERNAL MEDICINE

## 2020-07-16 PROCEDURE — 84100 ASSAY OF PHOSPHORUS: CPT | Performed by: INTERNAL MEDICINE

## 2020-07-16 PROCEDURE — 80048 BASIC METABOLIC PNL TOTAL CA: CPT | Performed by: INTERNAL MEDICINE

## 2020-07-16 PROCEDURE — 83735 ASSAY OF MAGNESIUM: CPT | Performed by: INTERNAL MEDICINE

## 2020-07-16 PROCEDURE — 82948 REAGENT STRIP/BLOOD GLUCOSE: CPT

## 2020-07-16 RX ORDER — LIDOCAINE 50 MG/G
2 PATCH TOPICAL DAILY
Qty: 30 PATCH | Refills: 0 | Status: ON HOLD | OUTPATIENT
Start: 2020-07-17 | End: 2021-09-28 | Stop reason: ALTCHOICE

## 2020-07-16 RX ADMIN — DULOXETINE HYDROCHLORIDE 60 MG: 60 CAPSULE, DELAYED RELEASE ORAL at 08:50

## 2020-07-16 RX ADMIN — OXYCODONE HYDROCHLORIDE 5 MG: 5 TABLET ORAL at 13:21

## 2020-07-16 RX ADMIN — OXYCODONE HYDROCHLORIDE 5 MG: 5 TABLET ORAL at 16:57

## 2020-07-16 RX ADMIN — METOCLOPRAMIDE 10 MG: 10 TABLET ORAL at 08:50

## 2020-07-16 RX ADMIN — OXYCODONE HYDROCHLORIDE 5 MG: 5 TABLET ORAL at 05:21

## 2020-07-16 RX ADMIN — LISINOPRIL 20 MG: 10 TABLET ORAL at 08:50

## 2020-07-16 RX ADMIN — AMLODIPINE BESYLATE 10 MG: 10 TABLET ORAL at 08:50

## 2020-07-16 RX ADMIN — OXYCODONE HYDROCHLORIDE 5 MG: 5 TABLET ORAL at 00:58

## 2020-07-16 RX ADMIN — GABAPENTIN 100 MG: 100 CAPSULE ORAL at 08:50

## 2020-07-16 NOTE — PROGRESS NOTES
[facility] Internal Medicine - Progress Note  Patient: Nadine Lance 62 y o  female MRN: [id number]  Unit/Bed#: S -01 Encounter: [id number]  Primary Care Provider: Albino Hernandez MD  Date Of Visit: 07/16/20        Assessment & Plan:    * Syncope  Assessment & Plan  · Presents to ED with syncopal event  She reports feeling "cold and clammy" right before passing out  Per ED provider, patient was outside with family, stood up from chair and had syncopal event  + posterior head strike  She complains of left hip, left knee, left ankle pain  No headache  Neurologically intact  Denies chest pain, palpitations, shortness of breath, n/v/d, abdominal pain  · Event occurred while smoking medical marijuana  She does report it was a different strain  · CT head: negative  · D Dimer elevated, however, CTA chest: negative PE   · Serial troponins negative  · Orthostatics negative  · Pending echocardiogram  · Consider diabetes induced autonomic dysfunction in the setting of chronic Gabapentin use for neuropathy    Cirrhosis [facility])  Assessment & Plan  · Noted on CT scan March 2020 and repeat scan today  · Reports she was a "heavy drinker" until about fifteen years ago  Denies current alcohol use  · Recommend outpatient GI referral  · LFTs stable    Marijuana use  Assessment & Plan  · Medical marijuana   · Follows with Dr Jaime Resendiz for chronic pain    Peripheral neuropathy  Assessment & Plan  · Continue Gabapentin   · Chronic lower extremity pain     Chronic pain syndrome  Assessment & Plan  · Per prior hospitalization, patient was weaned off of narcotic pain medication while hospitalized at [facility] under direction of acute pain service  Will provide one dose of percocet for reported left hip pain, though this appears to be chronic problem    She was offered bursa injection in past and declined   · Otherwise, avoid PRN narcotics    Type 2 diabetes mellitus with hyperglycemia, with long-term current use of insulin [facility])  Assessment & Plan  Lab Results   Component Value Date    HGBA1C 6 3 (H) 07/15/2020     · Continue home regimen of lantus 25 units HS   · accucheck and sliding scale     Essential hypertension  Assessment & Plan  · Continue Zestril/Norvasc  · Monitor BP closely given syncope      DVT Prophylaxis:  SCDs       Patient Centered Rounds:  I have performed bedside rounds and discussed plan of care with nursing today  Discussions with Specialists or Other Care Team Provider:  see above assessments if applicable    Education and Discussions with Family / Patient:  Patient at bedside, who will self-update family as needed  Was able to personally update spouse at bedside yesterday  Time Spent for Care:  32 minutes  More than 50% of total time spent on counseling and coordination of care as described above  Current Length of Stay: 0 day(s)    Current Patient Status: Observation   Certification Statement:  Patient will continue to require additional hospital stay due to assessments as noted above  Code Status: Level 1 - Full Code        Subjective:     Seen/examined earlier in the day  Resting in bed bed at the time my encounter  Objective:     Vitals:   Temp (24hrs), Av 8 °F (36 6 °C), Min:97 7 °F (36 5 °C), Max:98 °F (36 7 °C)    Temp:  [97 7 °F (36 5 °C)-98 °F (36 7 °C)] 98 °F (36 7 °C)  HR:  [51-96] 78  Resp:  [16-19] 19  BP: (130-167)/(60-86) 154/86  SpO2:  [93 %-96 %] 96 %  Body mass index is 34 05 kg/m²  Input and Output Summary (last 24 hours):        Intake/Output Summary (Last 24 hours) at 2020 1521  Last data filed at 2020 1242  Gross per 24 hour   Intake 480 ml   Output 1150 ml   Net -670 ml       Physical Exam:     GENERAL:  Weak/fatigued  HEAD:  Normocephalic - atraumatic  EYES: PERRL - EOMI   MOUTH:  Mucosa moist  NECK:  Supple - full range of motion  CARDIAC:  Rate controlled currently - S1/S2 positive  PULMONARY:  Fairly clear to auscultation - nonlabored respirations  ABDOMEN:  Soft - nontender/nondistended - active bowel sounds  MUSCULOSKELETAL:  Motor strength/range of motion intact  NEUROLOGIC:  Alert/oriented at baseline  SKIN:  Chronic wrinkles/blemishes   PSYCHIATRIC:  Mood/affect anxious      Additional Data:     Labs & Recent Cultures:    Results from last 7 days   Lab Units 07/16/20  0517   WBC Thousand/uL 7 03   HEMOGLOBIN g/dL 14 1   HEMATOCRIT % 44 3   PLATELETS Thousands/uL 158   NEUTROS PCT % 71   LYMPHS PCT % 21   MONOS PCT % 6   EOS PCT % 2     Results from last 7 days   Lab Units 07/16/20  0517 07/15/20  0557   POTASSIUM mmol/L 3 9 4 6   CHLORIDE mmol/L 103 106   CO2 mmol/L 29 23   BUN mg/dL 13 13   CREATININE mg/dL 1 02 0 94   CALCIUM mg/dL 9 0 8 3   ALK PHOS U/L  --  78   ALT U/L  --  25   AST U/L  --  29     Results from last 7 days   Lab Units 07/15/20  0039   INR  1 06     Results from last 7 days   Lab Units 07/16/20  1135 07/16/20  0736 07/15/20  2056 07/15/20  1637 07/15/20  1148 07/15/20  0822 07/15/20  0050   POC GLUCOSE mg/dl 88 88 142* 171* 123 101 258*     Results from last 7 days   Lab Units 07/15/20  0039   HEMOGLOBIN A1C % 6 3*     Results from last 7 days   Lab Units 07/15/20  0039   LACTIC ACID mmol/L 1 8                 Last 24 Hours Medication List:     Current Facility-Administered Medications:  acetaminophen 650 mg Oral Q6H PRN Alex Crowder MD   aluminum-magnesium hydroxide-simethicone 30 mL Oral Q6H PRN Ameena Ohs, CRNP   amLODIPine 10 mg Oral Daily Ameena Ohs, CRNP   DULoxetine 60 mg Oral Daily Ameena Ohs, CRNP   gabapentin 100 mg Oral Daily Ameena Ohs, CRNP   insulin glargine 25 Units Subcutaneous HS Ameena Ohs, CRNP   insulin lispro 1-5 Units Subcutaneous HS Ameena Ohs, CRNP   insulin lispro 1-6 Units Subcutaneous TID AC Alex Crowder MD   lidocaine 2 patch Topical Daily Ameena Ohs, CRNP   lisinopril 20 mg Oral Daily Ameena Ohs, CRNP   melatonin 3 mg Oral HS Ameena Ohs, CRNP   metoclopramide 10 mg Oral Daily Ameena Ohs, CRNP   ondansetron 4 mg Intravenous Q6H PRN APRYL Rascon   oxyCODONE 5 mg Oral Q4H PRN Shellie Romo MD     Facility-Administered Medications Ordered in Other Encounters:  ferrous sulfate 325 mg Oral Daily With Breakfast Michael Colon MD                ** Please Note: This note is constructed using a voice recognition dictation system  An occasional wrong word/phrase or sound-a-like substitution may have been picked up by dictation device due to the inherent limitations of voice recognition software  Read the chart carefully and recognize, using reasonable context, where substitutions may have occurred  **

## 2020-07-16 NOTE — UTILIZATION REVIEW
Continued Stay Review    Date: 7/16/20 Thursday                          Current Patient Class: Observation   Current Level of Care: Med Surg    07/15/20 0344  Place in Observation Once     Comments: Telemetry   Transfer Service: General Medicine       Question Answer Comment   Admitting Physician Jabari Young    Level of Care Med Surg    Bed request comments telemetry        07/15/20 0344     HPI:  62year old female with PMHx HTN, alcohol +marijuana abuse with cirrhosis, DM2, peripheral neuropathy,Lt EVER,  chronic back pain  Initially presented to [facility] ED + placed in Observation 7/15/20 at 0344 2nd Syncope - cold and clammy after smoking medical marijuana    Current Assessment/Plan:   Syncope  Assessment & Plan  · Presents to ED with syncopal event  She reports feeling "cold and clammy" right before passing out  Per ED provider, patient was outside with family, stood up from chair and had syncopal event  + posterior head strike  She complains of left hip, left knee, left ankle pain  No headache  Neurologically intact  Denies chest pain, palpitations, shortness of breath, n/v/d, abdominal pain  ? Event occurred while smoking medical marijuana  She does report it was a different strain  · CT head: negative  · D Dimer elevated, however, CTA chest: negative PE   · Serial troponins negative  · Orthostatics negative  · Pending echocardiogram  · Consider diabetes induced autonomic dysfunction in the setting of chronic Gabapentin use for neuropathy      Marijuana use  Assessment & Plan  · Medical marijuana   · Follows with Dr Katherine Torres for chronic pain  ·     Chronic pain syndrome  Assessment & Plan  · Per prior hospitalization, patient was weaned off of narcotic pain medication while hospitalized at [facility] under direction of acute pain service  Will provide one dose of percocet for reported left hip pain, though this appears to be chronic problem    She was offered bursa injection in past and declined   · Otherwise, avoid PRN narcotics    Pertinent Labs/Diagnostic Results:   Results from last 7 days   Lab Units 07/15/20  0054   SARS-COV-2  Negative     Results from last 7 days   Lab Units 07/16/20  0517 07/15/20  0557 07/15/20  0039   WBC Thousand/uL 7 03 5 09 5 24   HEMOGLOBIN g/dL 14 1 12 1 12 1   HEMATOCRIT % 44 3 38 6 37 4   PLATELETS Thousands/uL 158 105* 131*   NEUTROS ABS Thousands/µL 4 94 3 78 3 50     Results from last 7 days   Lab Units 07/16/20  0517 07/15/20  0557 07/15/20  0213 07/15/20  0039   SODIUM mmol/L 139 139  --  135*   POTASSIUM mmol/L 3 9 4 6 3 1* 5 9*   CHLORIDE mmol/L 103 106  --  104   CO2 mmol/L 29 23  --  27   ANION GAP mmol/L 7 10  --  4   BUN mg/dL 13 13  --  14   CREATININE mg/dL 1 02 0 94  --  1 13   EGFR ml/min/1 73sq m 59 65  --  52   CALCIUM mg/dL 9 0 8 3  --  8 2*   MAGNESIUM mg/dL 2 0  --  0 8*  --    PHOSPHORUS mg/dL 3 6  --   --   --      Results from last 7 days   Lab Units 07/15/20  0557 07/15/20  0039   AST U/L 29 47*   ALT U/L 25 24   ALK PHOS U/L 78 76   TOTAL PROTEIN g/dL 6 7 6 5   ALBUMIN g/dL 3 2* 2 9*   TOTAL BILIRUBIN mg/dL 0 38 0 39     Results from last 7 days   Lab Units 07/16/20  0736 07/15/20  2056 07/15/20  1637 07/15/20  1148 07/15/20  0822 07/15/20  0050   POC GLUCOSE mg/dl 88 142* 171* 123 101 258*     Results from last 7 days   Lab Units 07/16/20  0517 07/15/20  0557 07/15/20  0039   GLUCOSE RANDOM mg/dL 80 140 231*     Results from last 7 days   Lab Units 07/15/20  0039   HEMOGLOBIN A1C % 6 3*   EAG mg/dl 134     Results from last 7 days   Lab Units 07/15/20  1026 07/15/20  0557 07/15/20  0039   TROPONIN I ng/mL <0 02 <0 02 <0 02     Results from last 7 days   Lab Units 07/15/20  0039   D-DIMER QUANTITATIVE ug/ml FEU 1 01*     Results from last 7 days   Lab Units 07/15/20  0039   PROTIME seconds 13 2   INR  1 06   PTT seconds 28     Results from last 7 days   Lab Units 07/15/20  0039   TSH 3RD GENERATON uIU/mL 2 088     Results from last 7 days Lab Units 07/15/20  0039   LACTIC ACID mmol/L 1 8     Results from last 7 days   Lab Units 07/15/20  0039   ETHANOL LVL mg/dL <3   ACETAMINOPHEN LVL ug/mL <2*   SALICYLATE LVL mg/dL <3*     CT BRAIN - WITHOUT CONTRAST  No acute intracranial abnormality    CTA - CHEST WITH IV CONTRAST - PULMONARY ANGIOGRAM  No evidence of pulmonary embolism is seen  Mild bibasilar scarring appear similar to March 1, 2020  Patel Quaker is no evidence of focal consolidation  Patel Quaker is no pneumothorax  The surface contour of the liver is nodular, suggesting cirrhosis   This appears similar to March 1, 2020  Mild splenomegaly, similar to March 1, 2020  Atherosclerosis   Coronary artery disease    Vital Signs:  07/16/20 0700  98 °F (36 7 °C)  58  19  167/71  135    None (Room air)    07/15/20 2236  97 7 °F (36 5 °C)  51Abnormal   16  140/65  93  93 %  None (Room air)    07/15/20 1635  97 8 °F (36 6 °C)  73  16  142/65  93  95 %  None (Room air)        I/O    07/14 0701  07/15 0700 07/15 0701  07/16 0700 07/16 0701  -   P O   240 240   I V  (mL/kg)  1000 (11 5)    IV Piggyback 1000 50    Total Intake(mL/kg) 1000 (10 9) 1290 (14 8) 240 (2 8)   Urine (mL/kg/hr)  1200 (0 6)    Total Output  1200    Net +1000 +90 +240         Unmeasured Urine Occurrence  1 x      Diet Gavin/CHO Controlled; Consistent Carbohydrate Diet Level 2 (5 carb servings/75 grams CHO/meal);  Sodium 4 GM     Scheduled Medications:  amLODIPine 10 mg Oral Daily   DULoxetine 60 mg Oral Daily   gabapentin 100 mg Oral Daily   insulin glargine 25 Units Subcutaneous HS   insulin lispro 1-5 Units Subcutaneous HS   insulin lispro 1-6 Units Subcutaneous TID AC   lidocaine 2 patch Topical Daily   lisinopril 20 mg Oral Daily   melatonin 3 mg Oral HS   metoclopramide 10 mg Oral Daily      PRN Meds:  acetaminophen 650 mg Oral Q6H PRN  GIVEN X 1   aluminum-magnesium hydroxide-simethicone 30 mL Oral Q6H PRN   ondansetron 4 mg Intravenous Q6H PRN   oxyCODONE 5 mg Oral Q4H PRN  GIVEN X 3 Discharge Plan: To be determined   Inpatient Case Management following for all discharge needs    Network Utilization Review Department  [email or web address]  ATTENTION: Please call with any questions or concerns to [phone] and carefully listen to the prompts so that you are directed to the right person  All voicemails are confidential   Chelo Delong all requests for admission clinical reviews, approved or denied determinations and any other requests to dedicated fax number below belonging to the campus where the patient is receiving treatment   List of dedicated fax numbers for the Facilities:  [address] DENIALS (Administrative/Medical Necessity) [phone]   [address] (Maternity/NICU/Pediatrics) [phone]   Dario Chandler [phone]   Shreyas Cabrales [phone]   Amauri Blank [phone]   Shannan Walton [phone]   [address] [place] [address] [phone]   [facility] [phone]   [address] [facility] [address] [facility]  [address] [facility] [address] [facility] [phone]

## 2020-07-16 NOTE — PLAN OF CARE
Problem: Potential for Falls  Goal: Patient will remain free of falls  Description  INTERVENTIONS:  - Assess patient frequently for physical needs  -  Identify cognitive and physical deficits and behaviors that affect risk of falls  -  [place] fall precautions as indicated by assessment   - Educate patient/family on patient safety including physical limitations  - Instruct patient to call for assistance with activity based on assessment  - Modify environment to reduce risk of injury  - Consider OT/PT consult to assist with strengthening/mobility  Outcome: Progressing     Problem: Prexisting or High Potential for Compromised Skin Integrity  Goal: Skin integrity is maintained or improved  Description  INTERVENTIONS:  - Identify patients at risk for skin breakdown  - Assess and monitor skin integrity  - Assess and monitor nutrition and hydration status  - Monitor labs   - Assess for incontinence   - Turn and reposition patient  - Assist with mobility/ambulation  - Relieve pressure over bony prominences  - Avoid friction and shearing  - Provide appropriate hygiene as needed including keeping skin clean and dry  - Evaluate need for skin moisturizer/barrier cream  - Collaborate with interdisciplinary team   - Patient/family teaching  - Consider wound care consult   Outcome: Progressing     Problem: Nutrition/Hydration-ADULT  Goal: Nutrient/Hydration intake appropriate for improving, restoring or maintaining nutritional needs  Description  Monitor and assess patient's nutrition/hydration status for malnutrition  Collaborate with interdisciplinary team and initiate plan and interventions as ordered  Monitor patient's weight and dietary intake as ordered or per policy  Utilize nutrition screening tool and intervene as necessary  Determine patient's food preferences and provide high-protein, high-caloric foods as appropriate       INTERVENTIONS:  - Monitor oral intake, urinary output, labs, and treatment plans  - Assess nutrition and hydration status and recommend course of action  - Evaluate amount of meals eaten  - Assist patient with eating if necessary   - Allow adequate time for meals  - Recommend/ encourage appropriate diets, oral nutritional supplements, and vitamin/mineral supplements  - Order, calculate, and assess calorie counts as needed  - Recommend, monitor, and adjust tube feedings and TPN/PPN based on assessed needs  - Assess need for intravenous fluids  - Provide specific nutrition/hydration education as appropriate  - Include patient/family/caregiver in decisions related to nutrition  Outcome: Progressing     Problem: PAIN - ADULT  Goal: Verbalizes/displays adequate comfort level or baseline comfort level  Description  Interventions:  - Encourage patient to monitor pain and request assistance  - Assess pain using appropriate pain scale  - Administer analgesics based on type and severity of pain and evaluate response  - Implement non-pharmacological measures as appropriate and evaluate response  - Consider cultural and social influences on pain and pain management  - Notify physician/advanced practitioner if interventions unsuccessful or patient reports new pain  Outcome: Progressing     Problem: INFECTION - ADULT  Goal: Absence or prevention of progression during hospitalization  Description  INTERVENTIONS:  - Assess and monitor for signs and symptoms of infection  - Monitor lab/diagnostic results  - Monitor all insertion sites, i e  indwelling lines, tubes, and drains  - Monitor endotracheal if appropriate and nasal secretions for changes in amount and color  - [place] appropriate cooling/warming therapies per order  - Administer medications as ordered  - Instruct and encourage patient and family to use good hand hygiene technique  - Identify and instruct in appropriate isolation precautions for identified infection/condition  Outcome: Progressing     Problem: SAFETY ADULT  Goal: Patient will remain free of falls  Description  INTERVENTIONS:  - Assess patient frequently for physical needs  -  Identify cognitive and physical deficits and behaviors that affect risk of falls    -  [place] fall precautions as indicated by assessment   - Educate patient/family on patient safety including physical limitations  - Instruct patient to call for assistance with activity based on assessment  - Modify environment to reduce risk of injury  - Consider OT/PT consult to assist with strengthening/mobility  Outcome: Progressing  Goal: Maintain or return to baseline ADL function  Description  INTERVENTIONS:  -  Assess patient's ability to carry out ADLs; assess patient's baseline for ADL function and identify physical deficits which impact ability to perform ADLs (bathing, care of mouth/teeth, toileting, grooming, dressing, etc )  - Assess/evaluate cause of self-care deficits   - Assess range of motion  - Assess patient's mobility; develop plan if impaired  - Assess patient's need for assistive devices and provide as appropriate  - Encourage maximum independence but intervene and supervise when necessary  - Involve family in performance of ADLs  - Assess for home care needs following discharge   - Consider OT consult to assist with ADL evaluation and planning for discharge  - Provide patient education as appropriate  Outcome: Progressing  Goal: Maintain or return mobility status to optimal level  Description  INTERVENTIONS:  - Assess patient's baseline mobility status (ambulation, transfers, stairs, etc )    - Identify cognitive and physical deficits and behaviors that affect mobility  - Identify mobility aids required to assist with transfers and/or ambulation (gait belt, sit-to-stand, lift, walker, cane, etc )  - [place] fall precautions as indicated by assessment  - Record patient progress and toleration of activity level on Mobility SBAR; progress patient to next Phase/Stage  - Instruct patient to call for assistance with activity based on assessment  - Consider rehabilitation consult to assist with strengthening/weightbearing, etc   Outcome: Progressing     Problem: DISCHARGE PLANNING  Goal: Discharge to home or other facility with appropriate resources  Description  INTERVENTIONS:  - Identify barriers to discharge w/patient and caregiver  - Arrange for needed discharge resources and transportation as appropriate  - Identify discharge learning needs (meds, wound care, etc )  - Arrange for interpretive services to assist at discharge as needed  - Refer to Case Management Department for coordinating discharge planning if the patient needs post-hospital services based on physician/advanced practitioner order or complex needs related to functional status, cognitive ability, or social support system  Outcome: Progressing     Problem: Knowledge Deficit  Goal: Patient/family/caregiver demonstrates understanding of disease process, treatment plan, medications, and discharge instructions  Description  Complete learning assessment and assess knowledge base    Interventions:  - Provide teaching at level of understanding  - Provide teaching via preferred learning methods  Outcome: Progressing     Problem: CARDIOVASCULAR - ADULT  Goal: Maintains optimal cardiac output and hemodynamic stability  Description  INTERVENTIONS:  - Monitor I/O, vital signs and rhythm  - Monitor for S/S and trends of decreased cardiac output  - Administer and titrate ordered vasoactive medications to optimize hemodynamic stability  - Assess quality of pulses, skin color and temperature  - Assess for signs of decreased coronary artery perfusion  - Instruct patient to report change in severity of symptoms  Outcome: Progressing  Goal: Absence of cardiac dysrhythmias or at baseline rhythm  Description  INTERVENTIONS:  - Continuous cardiac monitoring, vital signs, obtain 12 lead EKG if ordered  - Administer antiarrhythmic and heart rate control medications as ordered  - Monitor electrolytes and administer replacement therapy as ordered  Outcome: Progressing

## 2020-07-16 NOTE — PLAN OF CARE
Problem: Potential for Falls  Goal: Patient will remain free of falls  Description  INTERVENTIONS:  - Assess patient frequently for physical needs  -  Identify cognitive and physical deficits and behaviors that affect risk of falls  -  [place] fall precautions as indicated by assessment   - Educate patient/family on patient safety including physical limitations  - Instruct patient to call for assistance with activity based on assessment  - Modify environment to reduce risk of injury  - Consider OT/PT consult to assist with strengthening/mobility  Outcome: Progressing     Problem: Prexisting or High Potential for Compromised Skin Integrity  Goal: Skin integrity is maintained or improved  Description  INTERVENTIONS:  - Identify patients at risk for skin breakdown  - Assess and monitor skin integrity  - Assess and monitor nutrition and hydration status  - Monitor labs   - Assess for incontinence   - Turn and reposition patient  - Assist with mobility/ambulation  - Relieve pressure over bony prominences  - Avoid friction and shearing  - Provide appropriate hygiene as needed including keeping skin clean and dry  - Evaluate need for skin moisturizer/barrier cream  - Collaborate with interdisciplinary team   - Patient/family teaching  - Consider wound care consult   Outcome: Progressing     Problem: Nutrition/Hydration-ADULT  Goal: Nutrient/Hydration intake appropriate for improving, restoring or maintaining nutritional needs  Description  Monitor and assess patient's nutrition/hydration status for malnutrition  Collaborate with interdisciplinary team and initiate plan and interventions as ordered  Monitor patient's weight and dietary intake as ordered or per policy  Utilize nutrition screening tool and intervene as necessary  Determine patient's food preferences and provide high-protein, high-caloric foods as appropriate       INTERVENTIONS:  - Monitor oral intake, urinary output, labs, and treatment plans  - Assess nutrition and hydration status and recommend course of action  - Evaluate amount of meals eaten  - Assist patient with eating if necessary   - Allow adequate time for meals  - Recommend/ encourage appropriate diets, oral nutritional supplements, and vitamin/mineral supplements  - Order, calculate, and assess calorie counts as needed  - Recommend, monitor, and adjust tube feedings and TPN/PPN based on assessed needs  - Assess need for intravenous fluids  - Provide specific nutrition/hydration education as appropriate  - Include patient/family/caregiver in decisions related to nutrition  Outcome: Progressing     Problem: PAIN - ADULT  Goal: Verbalizes/displays adequate comfort level or baseline comfort level  Description  Interventions:  - Encourage patient to monitor pain and request assistance  - Assess pain using appropriate pain scale  - Administer analgesics based on type and severity of pain and evaluate response  - Implement non-pharmacological measures as appropriate and evaluate response  - Consider cultural and social influences on pain and pain management  - Notify physician/advanced practitioner if interventions unsuccessful or patient reports new pain  Outcome: Progressing     Problem: SAFETY ADULT  Goal: Patient will remain free of falls  Description  INTERVENTIONS:  - Assess patient frequently for physical needs  -  Identify cognitive and physical deficits and behaviors that affect risk of falls    -  [place] fall precautions as indicated by assessment   - Educate patient/family on patient safety including physical limitations  - Instruct patient to call for assistance with activity based on assessment  - Modify environment to reduce risk of injury  - Consider OT/PT consult to assist with strengthening/mobility  Outcome: Progressing     Problem: CARDIOVASCULAR - ADULT  Goal: Maintains optimal cardiac output and hemodynamic stability  Description  INTERVENTIONS:  - Monitor I/O, vital signs and rhythm  - Monitor for S/S and trends of decreased cardiac output  - Administer and titrate ordered vasoactive medications to optimize hemodynamic stability  - Assess quality of pulses, skin color and temperature  - Assess for signs of decreased coronary artery perfusion  - Instruct patient to report change in severity of symptoms  Outcome: Progressing

## 2020-07-16 NOTE — ASSESSMENT & PLAN NOTE
· Presents to ED with syncopal event  She reports feeling "cold and clammy" right before passing out  Per ED provider, patient was outside with family, stood up from chair and had syncopal event  + posterior head strike  She complains of left hip, left knee, left ankle pain  No headache  Neurologically intact  Denies chest pain, palpitations, shortness of breath, n/v/d, abdominal pain  · Event occurred while smoking medical marijuana  She does report it was a different strain     · CT head: negative  · D Dimer elevated, however, CTA chest: negative PE   · Serial troponins negative  · Orthostatics negative  · Pending echocardiogram  · Consider diabetes induced autonomic dysfunction in the setting of chronic Gabapentin use for neuropathy

## 2020-07-16 NOTE — ASSESSMENT & PLAN NOTE
· Per prior hospitalization, patient was weaned off of narcotic pain medication while hospitalized at [facility] under direction of acute pain service  Will provide one dose of percocet for reported left hip pain, though this appears to be chronic problem    She was offered bursa injection in past and declined   · Otherwise, avoid PRN narcotics

## 2020-07-16 NOTE — ASSESSMENT & PLAN NOTE
Lab Results   Component Value Date    HGBA1C 6 3 (H) 07/15/2020     · Continue home regimen of lantus 25 units HS   · accucheck and sliding scale

## 2020-08-14 NOTE — PROGRESS NOTES
Name: Mili Islas  Date: 8/13/2020  Medical Record: [id number]    Envelope Number: [id number]    List of Contents (List each item separately in new row):   (1) Lotrimin tube cream 1% (Antifungal)    Admission:  I am responsible for any personal items that are not sent to the safe or pharmacy.  [facility] is not responsible for loss, theft or damage of any property in my possession.      Patient Signature:  ___________________________________________       Date/Time:__________________________    Staff Signature: __________________________________       Date/Time:__________________________    2nd Staff person, if patient is unable/unwilling to sign:      __________________________________________________________       Date/Time: __________________________      Discharge:  [facility] has returned all of my personal belongings:    Patient Signature: ________________________________________     Date/Time: ____________________________________    Staff Signature: ______________________________________     Date/Time:_____________________________________     Virtual Regular Visit      Assessment/Plan:    Problem List Items Addressed This Visit        Endocrine    Type 2 diabetes mellitus with hyperglycemia, with long-term current use of insulin ([facility] [address] ) - Primary (Chronic)      Other Visit Diagnoses     Hypertension goal BP (blood pressure) < 140/90                   Reason for visit is   Chief Complaint   Patient presents with    Virtual Regular Visit        Encounter provider Alicia Mendiola MD    Provider located at [place] [address]  [address] [place] [id number] [phone]      Recent Visits  No visits were found meeting these conditions  Showing recent visits within past 7 days and meeting all other requirements     Future Appointments  No visits were found meeting these conditions  Showing future appointments within next 150 days and meeting all other requirements        The patient was identified by name and date of birth  Blu Rivers was informed that this is a telemedicine visit and that the visit is being conducted through [employer]  My office door was closed  No one else was in the room  She acknowledged consent and understanding of privacy and security of the video platform  The patient has agreed to participate and understands they can discontinue the visit at any time  Patient is aware this is a billable service  Medical Decision Making:      Impression  1  DM2  2  HTN        Recommendations:  ?  Her blood sugars are stable and she self-decreased her lantus dose  Continue lantus 20 units qHS and stop novolog all together as she is rarely using it during the day  Instructed family to send me BG log in 3-4 weeks for review and to call sooner if any BG <80   If has any future daytime hyperglycemia will add a sulfonylurea      HTN- continue ACEi and CCB for now     RTC 2 months   Gris CUENCA            History of Present Illness:   Mrs Alesha Burkett is a 62year old female who presents for DM evaluation-follow up    ? PMH-DM2, HTN, back pain  PSH-oophorectomy, joint replacement, cholecystectomy  FHx-grandmother with diabetes  SHx-vaping, disabled     Type of DM: 2  Age of onset: a few years ago  Microvascular complications: possibly neuropathy  Macrovascular complications: none known         Events since last visit:  On lantus 20 units qHS and novolog on scale 150-200 5 units, 200-250 10 units, 250-300 15 units, >300 20 units     FBG-70-high 100s  Premeal/qHS [id number]  Hypoglycemia- none        ? Review of Systems:      Review of Systems   Constitutional: Negative for appetite change, chills, diaphoresis, fatigue, fever and unexpected weight change  HENT: Negative for congestion, ear pain, hearing loss, rhinorrhea, sinus pressure, sinus pain, sore throat, trouble swallowing and voice change     Eyes: Negative for photophobia, redness and visual disturbance  Respiratory: Negative for apnea, cough, chest tightness, shortness of breath, wheezing and stridor     Cardiovascular: Negative for chest pain, palpitations and leg swelling  Gastrointestinal: Negative for abdominal distention, abdominal pain, constipation, diarrhea, nausea and vomiting  Endocrine: Negative for cold intolerance, heat intolerance, polydipsia, polyphagia and polyuria  Genitourinary: Negative for difficulty urinating, dysuria, flank pain, frequency, hematuria and urgency  Musculoskeletal: Negative for arthralgias, back pain, gait problem, joint swelling and myalgias  Skin: Negative for color change, pallor, rash and wound  Allergic/Immunologic: Negative for immunocompromised state  Neurological: Negative for dizziness, tremors, syncope, weakness, light-headedness and headaches  Hematological: Negative for adenopathy  Does not bruise/bleed easily  Psychiatric/Behavioral: Negative for confusion and sleep disturbance   The patient is not nervous/anxious         Past Medical History:   Diagnosis Date    Abnormal head CT 7/14/2017    Acute kidney injury ([address] ) 8/19/2018    Cellulitis 1/10/2017    Closed fracture of multiple ribs of right side 7/14/2017    Degenerative disc disease at L5-S1 level     Diabetes mellitus (HCC)     History of methicillin resistant staphylococcus aureus (MRSA) 08/21/2018    negative nasal culture- isolation and hx of mrsa removed 8/20/2018    Hyperkalemia 4/25/2018    Hypertension     Hypocalcemia 4/25/2018    Hyponatremia 7/14/2017       Past Surgical History:   Procedure Laterality Date    CHOLECYSTECTOMY      JOINT REPLACEMENT      OOPHORECTOMY         Current Outpatient Medications   Medication Sig Dispense Refill    amLODIPine (NORVASC) 10 mg tablet Take 1 tablet (10 mg total) by mouth daily 30 tablet 0    DULoxetine (CYMBALTA) 60 mg delayed release capsule Take 60 mg by mouth daily      ergocalciferol (ERGOCALCIFEROL) 1 25 MG (08245 UT) capsule Take 50,000 Units by mouth once a week      gabapentin (NEURONTIN) 100 mg capsule Take 1 capsule (100 mg total) by mouth 2 (two) times a day (Patient taking differently: Take 100 mg by mouth daily )  0    Insulin Aspart FlexPen 100 UNIT/ML SOPN Inject 3 Units under the skin 3 (three) times a day with meals If bs>250      insulin glargine (LANTUS SOLOSTAR) 100 units/mL injection pen Inject 25 Units under the skin daily at bedtime       lisinopril (ZESTRIL) 20 mg tablet Take 20 mg by mouth daily      melatonin 3 mg Take 1 tablet (3 mg total) by mouth daily at bedtime  0    methylPREDNISolone 4 MG tablet therapy pack Use as directed on package 21 tablet 0    metoclopramide (REGLAN) 10 mg tablet Take 10 mg by mouth daily       No current facility-administered medications for this visit        Facility-Administered Medications Ordered in Other Visits   Medication Dose Route Frequency Provider Last Rate Last Dose    ferrous sulfate tablet 325 mg  325 mg Oral Daily With Breakfast Alissa Udnerwood MD            No Known Allergies    Video Exam    There were no vitals filed for this visit  Physical Exam   Constitutional: She is oriented to person, place, and time  She appears well-developed and well-nourished  HENT:   Head: Normocephalic and atraumatic  Right Ear: External ear normal    Left Ear: External ear normal    Nose: Nose normal    Eyes: Conjunctivae and EOM are normal  No scleral icterus  Pulmonary/Chest: Effort normal  No respiratory distress  Neurological: She is alert and oriented to person, place, and time  Skin: No rash noted  No erythema  No pallor  Psychiatric: She has a normal mood and affect  Her behavior is normal  Judgment and thought content normal         I spent 20 minutes directly with the patient during this visit      [facility] acknowledges that she has consented to an online visit or consultation  She understands that the online visit is based solely on information provided by her, and that, in the absence of a face-to-face physical evaluation by the physician, the diagnosis she receives is both limited and provisional in terms of accuracy and completeness  This is not intended to replace a full medical face-to-face evaluation by the physician  Griffin Patel understands and accepts these terms

## 2020-10-22 ENCOUNTER — HOSPITAL ENCOUNTER (EMERGENCY)
Facility: HOSPITAL | Age: 65
Discharge: HOME | End: 2020-10-22
Attending: EMERGENCY MEDICINE | Admitting: EMERGENCY MEDICINE
Payer: MEDICARE

## 2020-10-22 VITALS
HEART RATE: 94 BPM | SYSTOLIC BLOOD PRESSURE: 136 MMHG | DIASTOLIC BLOOD PRESSURE: 67 MMHG | OXYGEN SATURATION: 93 % | RESPIRATION RATE: 18 BRPM | TEMPERATURE: 97.8 F

## 2020-10-22 DIAGNOSIS — R11.2 NAUSEA & VOMITING: Primary | ICD-10-CM

## 2020-10-22 LAB
ALBUMIN SERPL BCP-MCNC: 4 G/DL (ref 3.4–4.8)
ALP SERPL-CCNC: 167.4 U/L (ref 35–140)
ALT SERPL W P-5'-P-CCNC: 13 U/L (ref 5–54)
ANION GAP SERPL CALCULATED.3IONS-SCNC: 9 MMOL/L (ref 4–13)
AST SERPL W P-5'-P-CCNC: 22 U/L (ref 15–41)
BASOPHILS # BLD AUTO: 0.02 THOUSANDS/ΜL (ref 0–0.1)
BASOPHILS NFR BLD AUTO: 0 % (ref 0–1)
BETA-HYDROXYBUTYRATE: 0.3 MMOL/L
BILIRUB SERPL-MCNC: 0.46 MG/DL (ref 0.3–1.2)
BUN SERPL-MCNC: 20 MG/DL (ref 6–20)
CALCIUM SERPL-MCNC: 9 MG/DL (ref 8.4–10.2)
CHLORIDE SERPL-SCNC: 100 MMOL/L (ref 96–108)
CO2 SERPL-SCNC: 24 MMOL/L (ref 22–33)
CREAT SERPL-MCNC: 1.21 MG/DL (ref 0.4–1.1)
EOSINOPHIL # BLD AUTO: 0.09 THOUSAND/ΜL (ref 0–0.61)
EOSINOPHIL NFR BLD AUTO: 2 % (ref 0–6)
ERYTHROCYTE [DISTWIDTH] IN BLOOD BY AUTOMATED COUNT: 13.2 % (ref 11.6–15.1)
GFR SERPL CREATININE-BSD FRML MDRD: 47 ML/MIN/1.73SQ M
GLUCOSE SERPL-MCNC: 156 MG/DL (ref 65–140)
HCT VFR BLD AUTO: 40.7 % (ref 34.8–46.1)
HGB BLD-MCNC: 13.1 G/DL (ref 11.5–15.4)
IMM GRANULOCYTES # BLD AUTO: 0.02 THOUSAND/UL (ref 0–0.2)
IMM GRANULOCYTES NFR BLD AUTO: 0 % (ref 0–2)
LIPASE SERPL-CCNC: <6 U/L (ref 13–60)
LYMPHOCYTES # BLD AUTO: 0.68 THOUSANDS/ΜL (ref 0.6–4.47)
LYMPHOCYTES NFR BLD AUTO: 14 % (ref 14–44)
MAGNESIUM SERPL-MCNC: 2.3 MG/DL (ref 1.6–2.6)
MCH RBC QN AUTO: 28.3 PG (ref 26.8–34.3)
MCHC RBC AUTO-ENTMCNC: 32.2 G/DL (ref 31.4–37.4)
MCV RBC AUTO: 88 FL (ref 82–98)
MONOCYTES # BLD AUTO: 0.26 THOUSAND/ΜL (ref 0.17–1.22)
MONOCYTES NFR BLD AUTO: 5 % (ref 4–12)
NEUTROPHILS # BLD AUTO: 3.73 THOUSANDS/ΜL (ref 1.85–7.62)
NEUTS SEG NFR BLD AUTO: 79 % (ref 43–75)
PHOSPHATE SERPL-MCNC: 3.5 MG/DL (ref 2.5–5)
PLATELET # BLD AUTO: 141 THOUSANDS/UL (ref 149–390)
PMV BLD AUTO: 11.4 FL (ref 8.9–12.7)
POTASSIUM SERPL-SCNC: 5.4 MMOL/L (ref 3.5–5)
PROT SERPL-MCNC: 7.3 G/DL (ref 6.4–8.3)
RBC # BLD AUTO: 4.63 MILLION/UL (ref 3.81–5.12)
SODIUM SERPL-SCNC: 133 MMOL/L (ref 133–145)
TROPONIN I SERPL-MCNC: <0.03 NG/ML (ref 0–0.07)
WBC # BLD AUTO: 4.8 THOUSAND/UL (ref 4.31–10.16)

## 2020-10-22 PROCEDURE — 83735 ASSAY OF MAGNESIUM: CPT | Performed by: EMERGENCY MEDICINE

## 2020-10-22 PROCEDURE — 83690 ASSAY OF LIPASE: CPT | Performed by: EMERGENCY MEDICINE

## 2020-10-22 PROCEDURE — 84484 ASSAY OF TROPONIN QUANT: CPT | Performed by: EMERGENCY MEDICINE

## 2020-10-22 PROCEDURE — 99284 EMERGENCY DEPT VISIT MOD MDM: CPT

## 2020-10-22 PROCEDURE — 36415 COLL VENOUS BLD VENIPUNCTURE: CPT | Performed by: EMERGENCY MEDICINE

## 2020-10-22 PROCEDURE — 93005 ELECTROCARDIOGRAM TRACING: CPT

## 2020-10-22 PROCEDURE — 99284 EMERGENCY DEPT VISIT MOD MDM: CPT | Performed by: EMERGENCY MEDICINE

## 2020-10-22 PROCEDURE — 96372 THER/PROPH/DIAG INJ SC/IM: CPT

## 2020-10-22 PROCEDURE — 84100 ASSAY OF PHOSPHORUS: CPT | Performed by: EMERGENCY MEDICINE

## 2020-10-22 PROCEDURE — 96361 HYDRATE IV INFUSION ADD-ON: CPT

## 2020-10-22 PROCEDURE — 85025 COMPLETE CBC W/AUTO DIFF WBC: CPT | Performed by: EMERGENCY MEDICINE

## 2020-10-22 PROCEDURE — 96375 TX/PRO/DX INJ NEW DRUG ADDON: CPT

## 2020-10-22 PROCEDURE — 80053 COMPREHEN METABOLIC PANEL: CPT | Performed by: EMERGENCY MEDICINE

## 2020-10-22 PROCEDURE — 96374 THER/PROPH/DIAG INJ IV PUSH: CPT

## 2020-10-22 PROCEDURE — 82010 KETONE BODYS QUAN: CPT | Performed by: EMERGENCY MEDICINE

## 2020-10-22 RX ORDER — OLANZAPINE 10 MG/1
10 INJECTION, POWDER, LYOPHILIZED, FOR SOLUTION INTRAMUSCULAR ONCE
Status: COMPLETED | OUTPATIENT
Start: 2020-10-22 | End: 2020-10-22

## 2020-10-22 RX ORDER — ONDANSETRON 2 MG/ML
4 INJECTION INTRAMUSCULAR; INTRAVENOUS ONCE
Status: COMPLETED | OUTPATIENT
Start: 2020-10-22 | End: 2020-10-22

## 2020-10-22 RX ORDER — PROMETHAZINE HYDROCHLORIDE 25 MG/1
25 SUPPOSITORY RECTAL EVERY 6 HOURS PRN
Qty: 24 SUPPOSITORY | Refills: 0 | Status: SHIPPED | OUTPATIENT
Start: 2020-10-22 | End: 2022-02-10 | Stop reason: HOSPADM

## 2020-10-22 RX ORDER — METOCLOPRAMIDE HYDROCHLORIDE 5 MG/ML
10 INJECTION INTRAMUSCULAR; INTRAVENOUS ONCE
Status: COMPLETED | OUTPATIENT
Start: 2020-10-22 | End: 2020-10-22

## 2020-10-22 RX ADMIN — OLANZAPINE 10 MG: 10 INJECTION, POWDER, FOR SOLUTION INTRAMUSCULAR at 16:00

## 2020-10-22 RX ADMIN — METOCLOPRAMIDE HYDROCHLORIDE 10 MG: 5 INJECTION INTRAMUSCULAR; INTRAVENOUS at 14:49

## 2020-10-22 RX ADMIN — SODIUM CHLORIDE 1000 ML: 0.9 INJECTION, SOLUTION INTRAVENOUS at 14:42

## 2020-10-22 RX ADMIN — ONDANSETRON 4 MG: 2 INJECTION INTRAMUSCULAR; INTRAVENOUS at 14:44

## 2020-10-24 LAB
ATRIAL RATE: 96 BPM
P AXIS: 77 DEGREES
PR INTERVAL: 195 MS
QRS AXIS: 62 DEGREES
QRSD INTERVAL: 92 MS
QT INTERVAL: 373 MS
QTC INTERVAL: 472 MS
T WAVE AXIS: 61 DEGREES
VENTRICULAR RATE: 96 BPM

## 2020-10-24 PROCEDURE — 93010 ELECTROCARDIOGRAM REPORT: CPT | Performed by: INTERNAL MEDICINE

## 2021-09-01 ENCOUNTER — OFFICE VISIT (OUTPATIENT)
Dept: LAB | Facility: CLINIC | Age: 66
End: 2021-09-01
Payer: MEDICARE

## 2021-09-01 ENCOUNTER — APPOINTMENT (OUTPATIENT)
Dept: LAB | Facility: CLINIC | Age: 66
End: 2021-09-01
Payer: MEDICARE

## 2021-09-01 DIAGNOSIS — N39.41 URGE INCONTINENCE: ICD-10-CM

## 2021-09-01 LAB
ANION GAP SERPL CALCULATED.3IONS-SCNC: 8 MMOL/L (ref 4–13)
BUN SERPL-MCNC: 18 MG/DL (ref 5–25)
CALCIUM SERPL-MCNC: 8.7 MG/DL (ref 8.3–10.1)
CHLORIDE SERPL-SCNC: 107 MMOL/L (ref 100–108)
CO2 SERPL-SCNC: 27 MMOL/L (ref 21–32)
CREAT SERPL-MCNC: 1.61 MG/DL (ref 0.6–1.3)
ERYTHROCYTE [DISTWIDTH] IN BLOOD BY AUTOMATED COUNT: 13.9 % (ref 11.6–15.1)
GFR SERPL CREATININE-BSD FRML MDRD: 34 ML/MIN/1.73SQ M
GLUCOSE SERPL-MCNC: 168 MG/DL (ref 65–140)
HCT VFR BLD AUTO: 40.1 % (ref 34.8–46.1)
HGB BLD-MCNC: 12.5 G/DL (ref 11.5–15.4)
MCH RBC QN AUTO: 28.4 PG (ref 26.8–34.3)
MCHC RBC AUTO-ENTMCNC: 31.2 G/DL (ref 31.4–37.4)
MCV RBC AUTO: 91 FL (ref 82–98)
PLATELET # BLD AUTO: 121 THOUSANDS/UL (ref 149–390)
PMV BLD AUTO: 10.3 FL (ref 8.9–12.7)
POTASSIUM SERPL-SCNC: 4.9 MMOL/L (ref 3.5–5.3)
RBC # BLD AUTO: 4.4 MILLION/UL (ref 3.81–5.12)
SODIUM SERPL-SCNC: 142 MMOL/L (ref 136–145)
WBC # BLD AUTO: 5.5 THOUSAND/UL (ref 4.31–10.16)

## 2021-09-01 PROCEDURE — 80048 BASIC METABOLIC PNL TOTAL CA: CPT

## 2021-09-01 PROCEDURE — 85027 COMPLETE CBC AUTOMATED: CPT

## 2021-09-01 PROCEDURE — 93005 ELECTROCARDIOGRAM TRACING: CPT

## 2021-09-01 PROCEDURE — 36415 COLL VENOUS BLD VENIPUNCTURE: CPT

## 2021-09-02 LAB
ATRIAL RATE: 104 BPM
P AXIS: 67 DEGREES
PR INTERVAL: 162 MS
QRS AXIS: 45 DEGREES
QRSD INTERVAL: 76 MS
QT INTERVAL: 338 MS
QTC INTERVAL: 444 MS
T WAVE AXIS: 57 DEGREES
VENTRICULAR RATE: 104 BPM

## 2021-09-02 PROCEDURE — 93010 ELECTROCARDIOGRAM REPORT: CPT | Performed by: INTERNAL MEDICINE

## 2021-09-15 ENCOUNTER — ANESTHESIA EVENT (OUTPATIENT)
Dept: PERIOP | Facility: HOSPITAL | Age: 66
End: 2021-09-15
Payer: MEDICARE

## 2021-09-20 ENCOUNTER — APPOINTMENT (OUTPATIENT)
Dept: RADIOLOGY | Facility: HOSPITAL | Age: 66
End: 2021-09-20
Payer: MEDICARE

## 2021-09-20 ENCOUNTER — HOSPITAL ENCOUNTER (OUTPATIENT)
Facility: HOSPITAL | Age: 66
Setting detail: OUTPATIENT SURGERY
Discharge: HOME | End: 2021-09-20
Attending: OBSTETRICS & GYNECOLOGY | Admitting: OBSTETRICS & GYNECOLOGY
Payer: MEDICARE

## 2021-09-20 ENCOUNTER — ANESTHESIA (OUTPATIENT)
Dept: PERIOP | Facility: HOSPITAL | Age: 66
End: 2021-09-20
Payer: MEDICARE

## 2021-09-20 VITALS
SYSTOLIC BLOOD PRESSURE: 145 MMHG | RESPIRATION RATE: 16 BRPM | DIASTOLIC BLOOD PRESSURE: 65 MMHG | HEART RATE: 87 BPM | OXYGEN SATURATION: 92 % | TEMPERATURE: 98.1 F

## 2021-09-20 DIAGNOSIS — N39.41 URGE INCONTINENCE: Primary | ICD-10-CM

## 2021-09-20 PROBLEM — Z87.891 EX-SMOKER: Status: ACTIVE | Noted: 2021-09-20

## 2021-09-20 LAB
ANION GAP SERPL CALCULATED.3IONS-SCNC: 8 MMOL/L (ref 4–13)
BUN SERPL-MCNC: 19 MG/DL (ref 5–25)
CALCIUM SERPL-MCNC: 8.5 MG/DL (ref 8.3–10.1)
CHLORIDE SERPL-SCNC: 105 MMOL/L (ref 100–108)
CO2 SERPL-SCNC: 29 MMOL/L (ref 21–32)
CREAT SERPL-MCNC: 1.31 MG/DL (ref 0.6–1.3)
ERYTHROCYTE [DISTWIDTH] IN BLOOD BY AUTOMATED COUNT: 14 % (ref 11.6–15.1)
GFR SERPL CREATININE-BSD FRML MDRD: 43 ML/MIN/1.73SQ M
GLUCOSE P FAST SERPL-MCNC: 85 MG/DL (ref 65–99)
GLUCOSE SERPL-MCNC: 100 MG/DL (ref 65–140)
GLUCOSE SERPL-MCNC: 85 MG/DL (ref 65–140)
HCT VFR BLD AUTO: 41.7 % (ref 34.8–46.1)
HGB BLD-MCNC: 12.9 G/DL (ref 11.5–15.4)
MCH RBC QN AUTO: 28.4 PG (ref 26.8–34.3)
MCHC RBC AUTO-ENTMCNC: 30.9 G/DL (ref 31.4–37.4)
MCV RBC AUTO: 92 FL (ref 82–98)
PLATELET # BLD AUTO: 109 THOUSANDS/UL (ref 149–390)
PMV BLD AUTO: 10 FL (ref 8.9–12.7)
POTASSIUM SERPL-SCNC: 4.6 MMOL/L (ref 3.5–5.3)
RBC # BLD AUTO: 4.54 MILLION/UL (ref 3.81–5.12)
SODIUM SERPL-SCNC: 142 MMOL/L (ref 136–145)
WBC # BLD AUTO: 4.92 THOUSAND/UL (ref 4.31–10.16)

## 2021-09-20 PROCEDURE — 72220 X-RAY EXAM SACRUM TAILBONE: CPT

## 2021-09-20 PROCEDURE — 85027 COMPLETE CBC AUTOMATED: CPT | Performed by: OBSTETRICS & GYNECOLOGY

## 2021-09-20 PROCEDURE — C1883 ADAPT/EXT, PACING/NEURO LEAD: HCPCS | Performed by: OBSTETRICS & GYNECOLOGY

## 2021-09-20 PROCEDURE — 82948 REAGENT STRIP/BLOOD GLUCOSE: CPT

## 2021-09-20 PROCEDURE — 80048 BASIC METABOLIC PNL TOTAL CA: CPT | Performed by: OBSTETRICS & GYNECOLOGY

## 2021-09-20 PROCEDURE — C1778 LEAD, NEUROSTIMULATOR: HCPCS | Performed by: OBSTETRICS & GYNECOLOGY

## 2021-09-20 DEVICE — TINED LEAD KIT
Type: IMPLANTABLE DEVICE | Site: SACRUM | Status: FUNCTIONAL
Brand: AXONICS

## 2021-09-20 DEVICE — PERCUTANEOUS EXTENSION
Type: IMPLANTABLE DEVICE | Status: FUNCTIONAL
Brand: AXONICS

## 2021-09-20 RX ORDER — KETAMINE HCL IN NACL, ISO-OSM 100MG/10ML
SYRINGE (ML) INJECTION AS NEEDED
Status: DISCONTINUED | OUTPATIENT
Start: 2021-09-20 | End: 2021-09-20

## 2021-09-20 RX ORDER — MIDAZOLAM HYDROCHLORIDE 2 MG/2ML
INJECTION, SOLUTION INTRAMUSCULAR; INTRAVENOUS AS NEEDED
Status: DISCONTINUED | OUTPATIENT
Start: 2021-09-20 | End: 2021-09-20

## 2021-09-20 RX ORDER — LABETALOL 20 MG/4 ML (5 MG/ML) INTRAVENOUS SYRINGE
AS NEEDED
Status: DISCONTINUED | OUTPATIENT
Start: 2021-09-20 | End: 2021-09-20

## 2021-09-20 RX ORDER — CEFAZOLIN SODIUM 2 G/50ML
2000 SOLUTION INTRAVENOUS ONCE
Status: COMPLETED | OUTPATIENT
Start: 2021-09-20 | End: 2021-09-20

## 2021-09-20 RX ORDER — FENTANYL CITRATE 50 UG/ML
INJECTION, SOLUTION INTRAMUSCULAR; INTRAVENOUS AS NEEDED
Status: DISCONTINUED | OUTPATIENT
Start: 2021-09-20 | End: 2021-09-20

## 2021-09-20 RX ORDER — OXYCODONE HYDROCHLORIDE 5 MG/1
5 TABLET ORAL EVERY 4 HOURS PRN
Status: CANCELLED | OUTPATIENT
Start: 2021-09-20

## 2021-09-20 RX ORDER — HYDROMORPHONE HCL/PF 1 MG/ML
0.5 SYRINGE (ML) INJECTION
Status: DISCONTINUED | OUTPATIENT
Start: 2021-09-20 | End: 2021-09-20 | Stop reason: HOSPADM

## 2021-09-20 RX ORDER — SODIUM CHLORIDE 9 MG/ML
INJECTION, SOLUTION INTRAVENOUS CONTINUOUS PRN
Status: DISCONTINUED | OUTPATIENT
Start: 2021-09-20 | End: 2021-09-20

## 2021-09-20 RX ORDER — MEPERIDINE HYDROCHLORIDE 25 MG/ML
12.5 INJECTION INTRAMUSCULAR; INTRAVENOUS; SUBCUTANEOUS
Status: DISCONTINUED | OUTPATIENT
Start: 2021-09-20 | End: 2021-09-20 | Stop reason: HOSPADM

## 2021-09-20 RX ORDER — ONDANSETRON 2 MG/ML
4 INJECTION INTRAMUSCULAR; INTRAVENOUS EVERY 6 HOURS PRN
Status: CANCELLED | OUTPATIENT
Start: 2021-09-20

## 2021-09-20 RX ORDER — BUPIVACAINE HYDROCHLORIDE 2.5 MG/ML
INJECTION, SOLUTION EPIDURAL; INFILTRATION; INTRACAUDAL AS NEEDED
Status: DISCONTINUED | OUTPATIENT
Start: 2021-09-20 | End: 2021-09-20 | Stop reason: HOSPADM

## 2021-09-20 RX ORDER — DOCUSATE SODIUM 100 MG/1
100 CAPSULE, LIQUID FILLED ORAL 2 TIMES DAILY PRN
Refills: 0
Start: 2021-09-20 | End: 2022-03-23 | Stop reason: HOSPADM

## 2021-09-20 RX ORDER — LIDOCAINE HYDROCHLORIDE 10 MG/ML
0.5 INJECTION, SOLUTION EPIDURAL; INFILTRATION; INTRACAUDAL; PERINEURAL ONCE AS NEEDED
Status: DISCONTINUED | OUTPATIENT
Start: 2021-09-20 | End: 2021-09-20 | Stop reason: HOSPADM

## 2021-09-20 RX ORDER — SODIUM CHLORIDE, SODIUM LACTATE, POTASSIUM CHLORIDE, CALCIUM CHLORIDE 600; 310; 30; 20 MG/100ML; MG/100ML; MG/100ML; MG/100ML
50 INJECTION, SOLUTION INTRAVENOUS CONTINUOUS
Status: DISCONTINUED | OUTPATIENT
Start: 2021-09-20 | End: 2021-09-20 | Stop reason: HOSPADM

## 2021-09-20 RX ORDER — PROPOFOL 10 MG/ML
INJECTION, EMULSION INTRAVENOUS CONTINUOUS PRN
Status: DISCONTINUED | OUTPATIENT
Start: 2021-09-20 | End: 2021-09-20

## 2021-09-20 RX ORDER — SENNOSIDES 8.6 MG
1300 CAPSULE ORAL EVERY 8 HOURS PRN
Qty: 30 TABLET | Refills: 0
Start: 2021-09-20 | End: 2022-01-07 | Stop reason: HOSPADM

## 2021-09-20 RX ORDER — FENTANYL CITRATE/PF 50 MCG/ML
25 SYRINGE (ML) INJECTION
Status: DISCONTINUED | OUTPATIENT
Start: 2021-09-20 | End: 2021-09-20 | Stop reason: HOSPADM

## 2021-09-20 RX ORDER — ONDANSETRON 2 MG/ML
INJECTION INTRAMUSCULAR; INTRAVENOUS AS NEEDED
Status: DISCONTINUED | OUTPATIENT
Start: 2021-09-20 | End: 2021-09-20

## 2021-09-20 RX ORDER — PROPOFOL 10 MG/ML
INJECTION, EMULSION INTRAVENOUS AS NEEDED
Status: DISCONTINUED | OUTPATIENT
Start: 2021-09-20 | End: 2021-09-20

## 2021-09-20 RX ORDER — ONDANSETRON 2 MG/ML
4 INJECTION INTRAMUSCULAR; INTRAVENOUS ONCE AS NEEDED
Status: DISCONTINUED | OUTPATIENT
Start: 2021-09-20 | End: 2021-09-20 | Stop reason: HOSPADM

## 2021-09-20 RX ORDER — ACETAMINOPHEN 325 MG/1
975 TABLET ORAL EVERY 6 HOURS SCHEDULED
Status: CANCELLED | OUTPATIENT
Start: 2021-09-20

## 2021-09-20 RX ADMIN — Medication 10 MG: at 10:42

## 2021-09-20 RX ADMIN — PROPOFOL 30 MG: 10 INJECTION, EMULSION INTRAVENOUS at 10:44

## 2021-09-20 RX ADMIN — CEFAZOLIN SODIUM 2000 MG: 2 SOLUTION INTRAVENOUS at 10:20

## 2021-09-20 RX ADMIN — LABETALOL 20 MG/4 ML (5 MG/ML) INTRAVENOUS SYRINGE 5 MG: at 11:16

## 2021-09-20 RX ADMIN — ONDANSETRON 4 MG: 2 INJECTION INTRAMUSCULAR; INTRAVENOUS at 11:11

## 2021-09-20 RX ADMIN — MIDAZOLAM 2 MG: 1 INJECTION INTRAMUSCULAR; INTRAVENOUS at 10:32

## 2021-09-20 RX ADMIN — LABETALOL 20 MG/4 ML (5 MG/ML) INTRAVENOUS SYRINGE 5 MG: at 11:35

## 2021-09-20 RX ADMIN — FENTANYL CITRATE 100 MCG: 50 INJECTION INTRAMUSCULAR; INTRAVENOUS at 10:34

## 2021-09-20 RX ADMIN — Medication 10 MG: at 10:36

## 2021-09-20 RX ADMIN — PROPOFOL 40 MCG/KG/MIN: 10 INJECTION, EMULSION INTRAVENOUS at 10:45

## 2021-09-20 RX ADMIN — SODIUM CHLORIDE, SODIUM LACTATE, POTASSIUM CHLORIDE, AND CALCIUM CHLORIDE 50 ML/HR: .6; .31; .03; .02 INJECTION, SOLUTION INTRAVENOUS at 08:03

## 2021-09-20 RX ADMIN — LIDOCAINE HYDROCHLORIDE 50 MG: 20 INJECTION INTRAVENOUS at 10:40

## 2021-09-20 RX ADMIN — SODIUM CHLORIDE: 0.9 INJECTION, SOLUTION INTRAVENOUS at 11:11

## 2021-09-20 NOTE — OP NOTE
OPERATIVE REPORT  PATIENT NAME: Qi Adler    :  1955  MRN: [id number]  Pt Location: [facility] OR ROOM 04    SURGERY DATE: 2021    Surgeon(s) and Role: * Maliha Tate MD - Primary     * Tamika Carbajal MD - Resident, first assisting    Preop Diagnosis:  Urge incontinence [N39 41]  (refractory to other therapies)    Post-Op Diagnosis Codes:   Urge incontinence [N39 41] (refractory to other therapies)    Procedure(s) (LRB):  INSERTION NEUROSTIMULATOR ELECTRODE ARRAY SACRAL NERVE; FLUOROSCOPY; ELECTRONIC ANALYSIS (N/A) - [employer] stage 1    Specimen(s):  * No specimens in log *    Estimated Blood Loss:   Minimal    Drains:  * No LDAs found *    Anesthesia Type:   IV Sedation with Anesthesia    Operative Indications:  Urge incontinence [N39 41] -  (refractory to other therapies)    Operative Findings:  Placement of Neurostimulator Leads in the left upper buttock in the hollow of the ilium  Complications:   None    Procedure and Technique:  Appropriate preoperative antibiotics chosen per ACOG guidelines were given  Bilateral SCDs were placed in the lower extremities for DVT prevention prior to the institution of anesthesia  Patient was properly identified and placed in prone position per OR protocol  IV anesthesia was administered  Pillows were placed under lower abdomen to flatten the sacrum and under shins to allow the toes to dangle freely  Patient was prepped and draped in usual sterile fashion  C-arm was moved into AP position to provide  fluoroscopic guidance of the lead placement to the sacrum  The medial edges of the foramina were identifed and marked with cross hair marks  The C-arm was then moved into the lateral position to identify the S3 foramen  Once the needle entry point was determined approximately 2 cm superior, local injection of dilute Marcaine was administered   A foramen needle was placed in the superior, medial aspect of the S3 foramen and appropriate needle depth was visualized utilizing fluoroscopy  Replacement of needles 2 cm cephalad was made for proper alignment with S3  Proper S3 needle location was also confirmed by direct observation of the lifting of the perineum or bellowing, utilizing the j-hook patient cable and the external test stimulator  The foramen needle stylet was removed and a directional guide was placed through the needle using markers on the guide to assure appropriate depth  The foramen needle was removed by sliding over the directional guide  A small incision was made to allow placement of the directional guide through the skin  The lead introducer with dilator was placed over the directional guide and utilizing fluoroscopic guidance, the lead introducer was advanced until the radiopaque isabel was half-way through the foramen  The dilator was removed along with the directional guide  Using fluoroscopy, the lead with the bent stylet was placed through the introducer until electrodes two and three straddled the anterior surface of the sacrum  All four electrodes were tested, observing constantin utilizing the j-hook patient cable and the external test stimulator  After satisfactory lead positioning was confirmed, the introducer was retracted over the lead under continuous fluoroscopy, deploying the tines into presacral tissue  Retesting of all four electrodes confirming appropriate responses was completed  Potential internal neurostimulator pocket site (on patient's right side) was identified below the iliac crest and lateral to the sacrum  Local was administered and an incision was made into the subcutaneous tissue creating a connection site  Blunt dissection was used to create a small pocket with hemostasis achieved  Tunneling tool with sheath was placed from the lead exit site subcutaneously to the small incised pocket site (on the patient's right)  The tunneling tool was removed and the lead was fed through the sheath, exiting at the pocket site   The sheath was removed  The lead was cleaned and dried  A protective boot was placed over the lead; the lead was inserted into the temporary percutaneous extension with visual confirmation  The four setscrews were tightened with the torque wrench until a single audible click was heard  Using the tunneling tool and sheath, a subcutaneous tunnel was created from the pocket site to 2 cm above and lateral to the ipsilateral pocket site (on the right) and exited at a localized site  The percutaneous extension was placed through the sheath, the sheath removed  The connection components were placed into the incision  The incisions were irrigated with antibiotic solution in sterile water and closed with 3-0 Vicryl subcutaneous sutures and 4-0 Monocryl skin sutures  The skin incisions were covered with Exofin  The wire was secured with Tegaderm dressing  The sponge, needle and instrument count were correct x 2  The patient tolerated the procedure well  She was awakened from anesthesia and transferred to the recovery room in stable condition  Using the external test stimulator, the patient was programmed to the electrode of optimum sensation and provided utilization instructions prior to discharge  Patient will complete a voiding diary during testing period to help document results of this test procedure  Dr Roseanne Matias was present for the entire procedure      Patient Disposition:  PACU     SIGNATURE: Dinorah Villafana MD  DATE: September 20, 2021  TIME: 11:33 AM

## 2021-09-20 NOTE — ANESTHESIA PREPROCEDURE EVALUATION
Procedure:  INSERTION NEUROSTIMULATOR ELECTRODE ARRAY SACRAL NERVE; FLUOROSCOPY; ELECTRONIC ANALYSIS (N/A Bladder)    Relevant Problems   CARDIO   (+) CAD (coronary artery disease)   (+) Essential hypertension   (+) Venous insufficiency (chronic) (peripheral)      ENDO   (+) Type 2 diabetes mellitus with hyperglycemia, with long-term current use of insulin (HCC)      GI/HEPATIC   (+) Cirrhosis (HCC)      MUSCULOSKELETAL   (+) Spondylosis of lumbar joint      NEURO/PSYCH   (+) Chronic pain syndrome      Other   (+) Chronic venous stasis dermatitis of both lower extremities   (+) Ex-smoker   (+) Marijuana use   (+) Peripheral neuropathy   (+) Splenomegaly   (+) Syncope   (+) Urge incontinence        Physical Exam    Airway  Comment: Red tongue from sugar free lozenges  Mallampati score: II  TM Distance: >3 FB  Neck ROM: full     Dental   upper dentures and lower dentures,     Cardiovascular  Rhythm: regular, Rate: normal, Cardiovascular exam normal    Pulmonary  Pulmonary exam normal Breath sounds clear to auscultation,     Other Findings        Anesthesia Plan  ASA Score- 3     Anesthesia Type- IV sedation with anesthesia with ASA Monitors  Additional Monitors:   Airway Plan:           Plan Factors-    Chart reviewed  Existing labs reviewed  Patient summary reviewed  Patient is not a current smoker  Patient instructed to abstain from smoking on day of procedure  Patient did not smoke on day of surgery  Induction- intravenous  Postoperative Plan-     Informed Consent- Anesthetic plan and risks discussed with patient

## 2021-09-20 NOTE — DISCHARGE INSTRUCTIONS
[employer] Post Operative Instructions  The [address] [facility] consists of 4 key parts: What is [employer] SNM Therapy?     ; A small implanted rechargeable Stimulator device that generates mild electrical pulses  ; A long insulated wire that is implanted near the sacral nerve  The wire delivers electrical pulses from the Stimulator to the area of the sacral nerve    ; A small handheld Remote Control device that allows the patient to monitor the Stimulator  ; A wireless  that charges the Stimulator battery  The [employer] has a battery that should last for 15 or more years under expected and worst-case stimulation settings  [address] may feel some pain or discomfort in the first couple of weeks after surgery in the area of the implant as your skin heals  Your doctor may also give you drugs to help with pain  Your doctor or their staff will give you detailed instructions on what to do following your surgery  In the first few weeks after your procedure, limit your activities  Limiting activities will help you avoid the moving of your implanted lead  This helps ensure that therapy will be effective  When cleared by your doctor, you can go back to regular day to day activities  You will need to continue to avoid some extreme activities (see the section of this guide on "Physical Activity Precautions")  Note: Similar to any surgical procedure, there are risks with this procedure  Risks include bleeding, bruising, swelling, and infection  Please discuss the procedure and any concerns with your doctor  What to Expect   Your Stimulator may be turned on when you leave the hospital or very soon thereafter  You may feel a similar sensation as what you felt during test stimulation  It should not be uncomfortable or painful  You should feel a small amount of sensation at all times, and you should increase your stimulation amplitude if you are not feeling sensation  Stimulation should be on for 24 hours per day, 7 days per week  If your therapy feels uncomfortable or painful, your doctor can change the stimulation  It may take more than one try by your doctor to find a stimulation setting that gives you both comfort and good symptom relief  The following items are important for managing your [employer] SNM System:   ; Follow-up appointments   ; Your patient ID card   ; Precautions about physical activity   ; Precautions about medical procedures   ; Precautions about electromagnetic interference   ; Your Remote Control      Recharging your Stimulator Note: The feeling of your stimulation can change over time  Contact your doctor if your stimulation becomes uncomfortable or if your symptoms worsen  Follow-up Visits     You will have regular doctor visits to check on your health and the [address] [facility]  Your doctor will help with problems and may change your stimulation settings  If you want to stop therapy you should discuss this with your doctor  Your doctor may or may not advise removal of the [employer] SNM system  Please bring your Remote Control to your follow-up visits  Physical Activity Precautions   Patients should avoid activities that put the implanted system under extreme stress    ; Avoid rubbing the Stimulator through the skin and activities that require excessive or repetitive twisting, bending, bouncing or stretching  These activities can damage the implanted system resulting in loss of symptom relief and additional surgery  Examples of activities to avoid are gymnastics, mountain biking, and mark diving, skiing and other sports  Less extreme activities should not impact your system, like running, jogging, road biking, swimming, and sexual activity     ; Scuba diving below 10 meters (33 feet) of water or entering hyperbaric chambers above 200kPa should be avoided    ; A perceived increase in stimulation may be caused by electromagnetic interference, postural changes, and other activities  You may find this uncomfortable (a jolting or shocking feeling)  Before engaging in activities that receiving a jolt would be unsafe for you or those around you, lower the stimulation amplitude to the lowest setting and turn off the Neurostimulator

## 2021-09-20 NOTE — NURSING NOTE
Patient ambulated to the restroom with the help of PCA  While patient was in restroom she was throwing paper in trash and cut her R wrist/forearm  Pt received a skin tear at the site  Pt skin tear was cleansed and dressed with a non stick bandage and tegaderm

## 2021-09-26 ENCOUNTER — ANESTHESIA EVENT (OUTPATIENT)
Dept: PERIOP | Facility: HOSPITAL | Age: 66
End: 2021-09-26
Payer: MEDICARE

## 2021-09-28 ENCOUNTER — ANESTHESIA (OUTPATIENT)
Dept: PERIOP | Facility: HOSPITAL | Age: 66
End: 2021-09-28
Payer: MEDICARE

## 2021-09-28 ENCOUNTER — HOSPITAL ENCOUNTER (OUTPATIENT)
Facility: HOSPITAL | Age: 66
Setting detail: OUTPATIENT SURGERY
Discharge: HOME | End: 2021-09-28
Attending: OBSTETRICS & GYNECOLOGY | Admitting: OBSTETRICS & GYNECOLOGY
Payer: MEDICARE

## 2021-09-28 ENCOUNTER — HOSPITAL ENCOUNTER (OUTPATIENT)
Dept: RADIOLOGY | Facility: HOSPITAL | Age: 66
Setting detail: OUTPATIENT SURGERY
Discharge: HOME | End: 2021-09-28
Payer: MEDICARE

## 2021-09-28 VITALS
RESPIRATION RATE: 16 BRPM | HEART RATE: 92 BPM | OXYGEN SATURATION: 96 % | SYSTOLIC BLOOD PRESSURE: 148 MMHG | TEMPERATURE: 97.3 F | WEIGHT: 205 LBS | HEIGHT: 62 IN | DIASTOLIC BLOOD PRESSURE: 90 MMHG | BODY MASS INDEX: 37.73 KG/M2

## 2021-09-28 DIAGNOSIS — N39.41 URGE INCONTINENCE: ICD-10-CM

## 2021-09-28 LAB
GLUCOSE SERPL-MCNC: 92 MG/DL (ref 65–140)
GLUCOSE SERPL-MCNC: 97 MG/DL (ref 65–140)

## 2021-09-28 PROCEDURE — C1787 PATIENT PROGR, NEUROSTIM: HCPCS | Performed by: OBSTETRICS & GYNECOLOGY

## 2021-09-28 PROCEDURE — C1820 GENERATOR NEURO RECHG BAT SY: HCPCS | Performed by: OBSTETRICS & GYNECOLOGY

## 2021-09-28 PROCEDURE — 82948 REAGENT STRIP/BLOOD GLUCOSE: CPT

## 2021-09-28 DEVICE — NEUROSTIMULATOR
Type: IMPLANTABLE DEVICE | Site: BACK | Status: FUNCTIONAL
Brand: AXONICS

## 2021-09-28 RX ORDER — BUPIVACAINE HYDROCHLORIDE AND EPINEPHRINE 2.5; 5 MG/ML; UG/ML
INJECTION, SOLUTION EPIDURAL; INFILTRATION; INTRACAUDAL; PERINEURAL AS NEEDED
Status: DISCONTINUED | OUTPATIENT
Start: 2021-09-28 | End: 2021-09-28 | Stop reason: HOSPADM

## 2021-09-28 RX ORDER — ONDANSETRON 2 MG/ML
INJECTION INTRAMUSCULAR; INTRAVENOUS AS NEEDED
Status: DISCONTINUED | OUTPATIENT
Start: 2021-09-28 | End: 2021-09-28

## 2021-09-28 RX ORDER — FENTANYL CITRATE/PF 50 MCG/ML
25 SYRINGE (ML) INJECTION
Status: DISCONTINUED | OUTPATIENT
Start: 2021-09-28 | End: 2021-09-28 | Stop reason: HOSPADM

## 2021-09-28 RX ORDER — MIDAZOLAM HYDROCHLORIDE 2 MG/2ML
INJECTION, SOLUTION INTRAMUSCULAR; INTRAVENOUS AS NEEDED
Status: DISCONTINUED | OUTPATIENT
Start: 2021-09-28 | End: 2021-09-28

## 2021-09-28 RX ORDER — PROPOFOL 10 MG/ML
INJECTION, EMULSION INTRAVENOUS AS NEEDED
Status: DISCONTINUED | OUTPATIENT
Start: 2021-09-28 | End: 2021-09-28

## 2021-09-28 RX ORDER — LIDOCAINE HYDROCHLORIDE 20 MG/ML
INJECTION, SOLUTION EPIDURAL; INFILTRATION; INTRACAUDAL; PERINEURAL AS NEEDED
Status: DISCONTINUED | OUTPATIENT
Start: 2021-09-28 | End: 2021-09-28

## 2021-09-28 RX ORDER — ONDANSETRON 2 MG/ML
4 INJECTION INTRAMUSCULAR; INTRAVENOUS ONCE AS NEEDED
Status: DISCONTINUED | OUTPATIENT
Start: 2021-09-28 | End: 2021-09-28 | Stop reason: HOSPADM

## 2021-09-28 RX ORDER — CEFAZOLIN SODIUM 2 G/50ML
2000 SOLUTION INTRAVENOUS ONCE
Status: COMPLETED | OUTPATIENT
Start: 2021-09-28 | End: 2021-09-28

## 2021-09-28 RX ORDER — ONDANSETRON 2 MG/ML
4 INJECTION INTRAMUSCULAR; INTRAVENOUS EVERY 6 HOURS PRN
Status: DISCONTINUED | OUTPATIENT
Start: 2021-09-28 | End: 2021-09-28 | Stop reason: HOSPADM

## 2021-09-28 RX ORDER — PROPOFOL 10 MG/ML
INJECTION, EMULSION INTRAVENOUS CONTINUOUS PRN
Status: DISCONTINUED | OUTPATIENT
Start: 2021-09-28 | End: 2021-09-28

## 2021-09-28 RX ORDER — SODIUM CHLORIDE 9 MG/ML
125 INJECTION, SOLUTION INTRAVENOUS CONTINUOUS
Status: DISCONTINUED | OUTPATIENT
Start: 2021-09-28 | End: 2021-09-28

## 2021-09-28 RX ORDER — ACETAMINOPHEN 325 MG/1
975 TABLET ORAL EVERY 6 HOURS PRN
Status: DISCONTINUED | OUTPATIENT
Start: 2021-09-28 | End: 2021-09-28 | Stop reason: HOSPADM

## 2021-09-28 RX ADMIN — SODIUM CHLORIDE 125 ML/HR: 0.9 INJECTION, SOLUTION INTRAVENOUS at 13:39

## 2021-09-28 RX ADMIN — CEFAZOLIN SODIUM 2000 MG: 2 SOLUTION INTRAVENOUS at 14:22

## 2021-09-28 RX ADMIN — ONDANSETRON 4 MG: 2 INJECTION INTRAMUSCULAR; INTRAVENOUS at 14:23

## 2021-09-28 RX ADMIN — SODIUM CHLORIDE 0.2 MCG/KG/HR: 9 INJECTION, SOLUTION INTRAVENOUS at 14:21

## 2021-09-28 RX ADMIN — MIDAZOLAM 2 MG: 1 INJECTION INTRAMUSCULAR; INTRAVENOUS at 14:18

## 2021-09-28 RX ADMIN — PROPOFOL 20 MG: 10 INJECTION, EMULSION INTRAVENOUS at 14:35

## 2021-09-28 RX ADMIN — PROPOFOL 60 MCG/KG/MIN: 10 INJECTION, EMULSION INTRAVENOUS at 14:21

## 2021-09-28 RX ADMIN — LIDOCAINE HYDROCHLORIDE 100 MG: 20 INJECTION, SOLUTION EPIDURAL; INFILTRATION; INTRACAUDAL; PERINEURAL at 14:21

## 2021-09-28 NOTE — OP NOTE
OPERATIVE REPORT  PATIENT NAME: Martha Chamberlain    :  1955  MRN: [id number]  Pt Location: [facility] ROOM 04    SURGERY DATE: 2021    Surgeon(s) and Role: * Connor Higgins MD - Primary     * Christian Vance MD - Resident, observing     Michelle Aggarwal MD - Fellow, first assisting     Preop Diagnosis:  Urge incontinence [N39 41] - refractory    Post-Op Diagnosis Codes:     * Urge incontinence [N39 41] - refractory    Procedure(s) (LRB):  IPG INSERTION AND PROGRAMMING (N/A) - [employer] stage II    Specimen(s):  * No specimens in log *    Estimated Blood Loss:   Minimal    Drains:  * No LDAs found *    Anesthesia Type:   IV Sedation with Anesthesia    Operative Indications:  Urge incontinence [N39 41] - refractory     Operative Findings:  Placement of Neurostimulator (IPG) in the right upper buttock in the hollow of the ilium  Gluteal region ecchymotic with several healed skin abrasions  No open wounds were seen  Complications:   None    Procedure and Technique:  Appropriate preoperative antibiotics chosen per ACOG guidelines were given  Bilateral SCDs were placed in the lower extremities for DVT prevention prior to the institution of anesthesia  The patient was identified in the holding area by the operating room staff and attending physician  She was taken to the operating room where anesthesia was instituted without complications  She was placed in the prone position  Pillows were placed under the lower abdomen and hips to level and flatten the sacrum and allow the toes to dangle freely  The patient was prepped and draped in the usual sterile fashion  Prior lead was identified from exit site in right mid back  The prior neurostimulator pocket site was identified  Local was administered and a 3cm incision was made into the subcutaneous tissue into the prior incision site  Bovie and scissors were used to cut the prior sutures   Sharp and blunt dissection was used to get into the previously created small pocket  The small sterile screwdriver was used to disconnect the lead from the extender  The lead was placed into the battery device and secured  The setscrew was tightened with the provided sterile screwdriver until audible clicks were heard  The extender lead was removed out of the skin incision  The connection components and battery were placed into the incision  The incisions were irrigated with antibiotic solution and closed with several subcutaneous interrupted vicryl sutures through the subcutaneous fat and 4-0 monocryl suture subcuticularly  Incision and puncture site from extender lead was covered with skin glue after irrigation  The sponge, needle and instrument count were correct x 2  The patient tolerated the procedure well  She was awakened from anesthesia and transferred to the recovery room in stable condition  The [employer] industry rep was present in the OR and available in PACU for further programming  Using the external test stimulator, the patient was programmed to the electrode of optimum sensation and provided utilization instructions prior to discharge  The buttock area was also fully cleansed and irrigated prior to moving the patient onto recovery room stretcher        Dr Zita Wood was present for the entire procedure    Patient Disposition:  PACU     SIGNATURE: Kirsten Mccall MD  DATE: September 28, 2021  TIME: 2:50 PM

## 2021-09-28 NOTE — ANESTHESIA PREPROCEDURE EVALUATION
Procedure:  INSERTION NEUROSTIMULATOR; ELECTRONIC ANALYSIS VERSUS REMOVAL OF NEUROSTIMULATOR ELECTRODE ARRAY (N/A Bladder)    Relevant Problems   CARDIO   (+) CAD (coronary artery disease)   (+) Essential hypertension      ENDO   (+) Type 2 diabetes mellitus with hyperglycemia, with long-term current use of insulin (HCC)      GI/HEPATIC   (+) Cirrhosis (HCC)      MUSCULOSKELETAL   (+) Spondylosis of lumbar joint      NEURO/PSYCH   (+) Chronic pain syndrome      Other   (+) Splenomegaly        Physical Exam    Airway    Mallampati score: II  TM Distance: >3 FB  Neck ROM: full     Dental       Cardiovascular  Rhythm: regular, Rate: normal,     Pulmonary  Breath sounds clear to auscultation,     Other Findings        Anesthesia Plan  ASA Score- 3     Anesthesia Type- IV sedation with anesthesia with ASA Monitors  Additional Monitors:   Airway Plan: ETT  Plan Factors-Exercise tolerance (METS): >4 METS  Chart reviewed  Existing labs reviewed  Patient summary reviewed  Patient is not a current smoker  Patient not instructed to abstain from smoking on day of procedure  Patient did not smoke on day of surgery  Obstructive sleep apnea risk education given perioperatively  Induction- intravenous  Postoperative Plan-     Informed Consent- Anesthetic plan and risks discussed with patient

## 2021-09-28 NOTE — DISCHARGE INSTRUCTIONS
[employer] Post Operative Instructions  The [address] [facility] consists of 4 key parts: What is [employer] SNM Therapy? ? A small implanted rechargeable Stimulator device that generates mild electrical pulses  ? A long insulated wire that is implanted near the sacral nerve  The wire delivers electrical pulses from the Stimulator to the area of the sacral nerve  ? A small handheld Remote Control device that allows the patient to monitor the Stimulator  ? A wireless  that charges the Stimulator battery  The [employer] has a battery that should last for 15 or more years under expected and worst-case stimulation settings  [address] may feel some pain or discomfort in the first couple of weeks after surgery in the area of the implant as your skin heals  Your doctor may also give you drugs to help with pain  Your doctor or their staff will give you detailed instructions on what to do following your surgery  In the first few weeks after your procedure, limit your activities  Limiting activities will help you avoid the moving of your implanted lead  This helps ensure that therapy will be effective  When cleared by your doctor, you can go back to regular day to day activities  You will need to continue to avoid some extreme activities (see the section of this guide on "Physical Activity Precautions")  Note: Similar to any surgical procedure, there are risks with this procedure  Risks include bleeding, bruising, swelling, and infection  Please discuss the procedure and any concerns with your doctor  What to Expect   Your Stimulator may be turned on when you leave the hospital or very soon thereafter  You may feel a similar sensation as what you felt during test stimulation  It should not be uncomfortable or painful  You should feel a small amount of sensation at all times, and you should increase your stimulation amplitude if you are not feeling sensation   Stimulation should be on for 24 hours per day, 7 days per week  If your therapy feels uncomfortable or painful, your doctor can change the stimulation  It may take more than one try by your doctor to find a stimulation setting that gives you both comfort and good symptom relief  The following items are important for managing your [employer] SNM System:   ? Follow-up appointments   ? Your patient ID card   ? Precautions about physical activity   ? Precautions about medical procedures   ? Precautions about electromagnetic interference   ? Your Remote Control      Recharging your Stimulator Note: The feeling of your stimulation can change over time  Contact your doctor if your stimulation becomes uncomfortable or if your symptoms worsen  Follow-up Visits     You will have regular doctor visits to check on your health and the [address] [facility]  Your doctor will help with problems and may change your stimulation settings  If you want to stop therapy you should discuss this with your doctor  Your doctor may or may not advise removal of the [employer] SNM system  Please bring your Remote Control to your follow-up visits  Physical Activity Precautions   Patients should avoid activities that put the implanted system under extreme stress  ? Avoid rubbing the Stimulator through the skin and activities that require excessive or repetitive twisting, bending, bouncing or stretching  These activities can damage the implanted system resulting in loss of symptom relief and additional surgery  Examples of activities to avoid are gymnastics, mountain biking, and mark diving, skiing and other sports  Less extreme activities should not impact your system, like running, jogging, road biking, swimming, and sexual activity  ? Scuba diving below 10 meters (33 feet) of water or entering hyperbaric chambers above 200kPa should be avoided     ? A perceived increase in stimulation may be caused by electromagnetic interference, postural changes, and other activities  You may find this uncomfortable (a jolting or shocking feeling)  Before engaging in activities that receiving a jolt would be unsafe for you or those around you, lower the stimulation amplitude to the lowest setting and turn off the Neurostimulator

## 2022-01-03 ENCOUNTER — APPOINTMENT (EMERGENCY)
Dept: RADIOLOGY | Facility: HOSPITAL | Age: 67
DRG: 177 | End: 2022-01-03
Payer: MEDICARE

## 2022-01-03 ENCOUNTER — HOSPITAL ENCOUNTER (INPATIENT)
Facility: HOSPITAL | Age: 67
LOS: 3 days | Discharge: HOME HEALTH | DRG: 177 | End: 2022-01-07
Attending: EMERGENCY MEDICINE | Admitting: INTERNAL MEDICINE
Payer: MEDICARE

## 2022-01-03 ENCOUNTER — LAB REQUISITION (OUTPATIENT)
Dept: LAB | Facility: HOSPITAL | Age: 67
DRG: 177 | End: 2022-01-03
Payer: MEDICARE

## 2022-01-03 ENCOUNTER — APPOINTMENT (EMERGENCY)
Dept: CT IMAGING | Facility: HOSPITAL | Age: 67
DRG: 177 | End: 2022-01-03
Payer: MEDICARE

## 2022-01-03 DIAGNOSIS — J18.9 PNEUMONIA: ICD-10-CM

## 2022-01-03 DIAGNOSIS — R09.02 HYPOXIA: ICD-10-CM

## 2022-01-03 DIAGNOSIS — R41.82 ALTERED MENTAL STATUS: ICD-10-CM

## 2022-01-03 DIAGNOSIS — D64.9 ANEMIA, UNSPECIFIED: ICD-10-CM

## 2022-01-03 DIAGNOSIS — U07.1 COVID-19: Primary | ICD-10-CM

## 2022-01-03 DIAGNOSIS — K74.60 HEPATIC CIRRHOSIS, UNSPECIFIED HEPATIC CIRRHOSIS TYPE, UNSPECIFIED WHETHER ASCITES PRESENT (HCC): ICD-10-CM

## 2022-01-03 DIAGNOSIS — E72.20 HYPERAMMONEMIA (HCC): ICD-10-CM

## 2022-01-03 DIAGNOSIS — R79.89 ELEVATED SERUM CREATININE: ICD-10-CM

## 2022-01-03 DIAGNOSIS — L89.309 PRESSURE INJURY OF SKIN OF BUTTOCK: ICD-10-CM

## 2022-01-03 DIAGNOSIS — R19.5 FECAL OCCULT BLOOD TEST POSITIVE: ICD-10-CM

## 2022-01-03 LAB
ABO GROUP BLD: NORMAL
ABO GROUP BLD: NORMAL
ALBUMIN SERPL BCP-MCNC: 3 G/DL (ref 3.4–4.8)
ALP SERPL-CCNC: 127.7 U/L (ref 35–140)
ALT SERPL W P-5'-P-CCNC: 29 U/L (ref 5–54)
AMMONIA PLAS-SCNC: 50.93 UMOL/L
ANION GAP SERPL CALCULATED.3IONS-SCNC: 5 MMOL/L (ref 4–13)
APAP SERPL-MCNC: <10 UG/ML (ref 10–20)
APTT PPP: 34 SECONDS (ref 23–37)
AST SERPL W P-5'-P-CCNC: 64 U/L (ref 15–41)
BASE EX.OXY STD BLDV CALC-SCNC: 82.2 % (ref 60–80)
BASE EXCESS BLDV CALC-SCNC: -0.5 MMOL/L
BASOPHILS # BLD AUTO: 0.01 THOUSANDS/ΜL (ref 0–0.1)
BASOPHILS NFR BLD AUTO: 0 % (ref 0–1)
BILIRUB SERPL-MCNC: 0.6 MG/DL (ref 0.3–1.2)
BILIRUB UR QL STRIP: NEGATIVE
BLD GP AB SCN SERPL QL: NEGATIVE
BNP SERPL-MCNC: 108.7 PG/ML (ref 1–100)
BUN SERPL-MCNC: 26 MG/DL (ref 6–20)
CA-I BLD-SCNC: 1.08 MMOL/L (ref 1.12–1.32)
CALCIUM ALBUM COR SERPL-MCNC: 8.8 MG/DL (ref 8.3–10.1)
CALCIUM SERPL-MCNC: 8 MG/DL (ref 8.4–10.2)
CARDIAC TROPONIN I PNL SERPL HS: 6 NG/L
CHLORIDE SERPL-SCNC: 98 MMOL/L (ref 96–108)
CK SERPL-CCNC: 51.3 U/L (ref 38–234)
CLARITY UR: CLEAR
CO2 SERPL-SCNC: 28 MMOL/L (ref 22–33)
COLOR UR: YELLOW
CREAT SERPL-MCNC: 1.57 MG/DL (ref 0.4–1.1)
CRP SERPL QL: 0.4 MG/DL (ref 0–1)
D DIMER PPP FEU-MCNC: 0.7 UG/ML FEU
EOSINOPHIL # BLD AUTO: 0.07 THOUSAND/ΜL (ref 0–0.61)
EOSINOPHIL NFR BLD AUTO: 2 % (ref 0–6)
ERYTHROCYTE [DISTWIDTH] IN BLOOD BY AUTOMATED COUNT: 15.7 % (ref 11.6–15.1)
ETHANOL SERPL-MCNC: <10 MG/DL
GFR SERPL CREATININE-BSD FRML MDRD: 34 ML/MIN/1.73SQ M
GLUCOSE SERPL-MCNC: 136 MG/DL (ref 65–140)
GLUCOSE UR STRIP-MCNC: NEGATIVE MG/DL
HCO3 BLDV-SCNC: 26 MMOL/L (ref 24–30)
HCT VFR BLD AUTO: 14 % (ref 34.8–46.1)
HGB BLD-MCNC: 4.4 G/DL (ref 11.5–15.4)
HGB UR QL STRIP.AUTO: NEGATIVE
IMM GRANULOCYTES # BLD AUTO: 0.02 THOUSAND/UL (ref 0–0.2)
IMM GRANULOCYTES NFR BLD AUTO: 0 % (ref 0–2)
INR PPP: 1.21 (ref 0.84–1.19)
KETONES UR STRIP-MCNC: NEGATIVE MG/DL
LACTATE SERPL-SCNC: 0.7 MMOL/L (ref 0–2)
LEUKOCYTE ESTERASE UR QL STRIP: NEGATIVE
LYMPHOCYTES # BLD AUTO: 0.64 THOUSANDS/ΜL (ref 0.6–4.47)
LYMPHOCYTES NFR BLD AUTO: 14 % (ref 14–44)
MAGNESIUM SERPL-MCNC: 2 MG/DL (ref 1.6–2.6)
MCH RBC QN AUTO: 27.3 PG (ref 26.8–34.3)
MCHC RBC AUTO-ENTMCNC: 31.4 G/DL (ref 31.4–37.4)
MCV RBC AUTO: 87 FL (ref 82–98)
MONOCYTES # BLD AUTO: 0.49 THOUSAND/ΜL (ref 0.17–1.22)
MONOCYTES NFR BLD AUTO: 11 % (ref 4–12)
NEUTROPHILS # BLD AUTO: 3.39 THOUSANDS/ΜL (ref 1.85–7.62)
NEUTS SEG NFR BLD AUTO: 73 % (ref 43–75)
NITRITE UR QL STRIP: NEGATIVE
NRBC BLD AUTO-RTO: 0 /100 WBCS
O2 CT BLDV-SCNC: 13.3 ML/DL
PCO2 BLDV: 50.9 MM HG (ref 42–50)
PH BLDV: 7.33 [PH] (ref 7.3–7.4)
PH UR STRIP.AUTO: 5.5 [PH]
PLATELET # BLD AUTO: 107 THOUSANDS/UL (ref 149–390)
PMV BLD AUTO: 11.1 FL (ref 8.9–12.7)
PO2 BLDV: 49.1 MM HG (ref 35–45)
POTASSIUM SERPL-SCNC: 5.3 MMOL/L (ref 3.5–5)
PROT SERPL-MCNC: 6.3 G/DL (ref 6.4–8.3)
PROT UR STRIP-MCNC: ABNORMAL MG/DL
PROTHROMBIN TIME: 15.2 SECONDS (ref 11.6–14.5)
RBC # BLD AUTO: 1.61 MILLION/UL (ref 3.81–5.12)
RH BLD: POSITIVE
RH BLD: POSITIVE
SALICYLATES SERPL-MCNC: <5 MG/DL (ref 6–30)
SODIUM SERPL-SCNC: 131 MMOL/L (ref 133–145)
SP GR UR STRIP.AUTO: 1.02 (ref 1–1.03)
SPECIMEN EXPIRATION DATE: NORMAL
TRIGL SERPL-MCNC: 60.3 MG/DL
UROBILINOGEN UR QL STRIP.AUTO: 0.2 E.U./DL
WBC # BLD AUTO: 4.62 THOUSAND/UL (ref 4.31–10.16)

## 2022-01-03 PROCEDURE — 82330 ASSAY OF CALCIUM: CPT | Performed by: EMERGENCY MEDICINE

## 2022-01-03 PROCEDURE — 82728 ASSAY OF FERRITIN: CPT | Performed by: EMERGENCY MEDICINE

## 2022-01-03 PROCEDURE — 1124F ACP DISCUSS-NO DSCNMKR DOCD: CPT | Performed by: EMERGENCY MEDICINE

## 2022-01-03 PROCEDURE — 82955 ASSAY OF G6PD ENZYME: CPT | Performed by: EMERGENCY MEDICINE

## 2022-01-03 PROCEDURE — 85018 HEMOGLOBIN: CPT | Performed by: EMERGENCY MEDICINE

## 2022-01-03 PROCEDURE — 84478 ASSAY OF TRIGLYCERIDES: CPT | Performed by: EMERGENCY MEDICINE

## 2022-01-03 PROCEDURE — 99285 EMERGENCY DEPT VISIT HI MDM: CPT | Performed by: EMERGENCY MEDICINE

## 2022-01-03 PROCEDURE — 83880 ASSAY OF NATRIURETIC PEPTIDE: CPT | Performed by: EMERGENCY MEDICINE

## 2022-01-03 PROCEDURE — 80143 DRUG ASSAY ACETAMINOPHEN: CPT | Performed by: EMERGENCY MEDICINE

## 2022-01-03 PROCEDURE — 82140 ASSAY OF AMMONIA: CPT | Performed by: EMERGENCY MEDICINE

## 2022-01-03 PROCEDURE — 70450 CT HEAD/BRAIN W/O DYE: CPT

## 2022-01-03 PROCEDURE — 82272 OCCULT BLD FECES 1-3 TESTS: CPT

## 2022-01-03 PROCEDURE — 71045 X-RAY EXAM CHEST 1 VIEW: CPT

## 2022-01-03 PROCEDURE — 99285 EMERGENCY DEPT VISIT HI MDM: CPT

## 2022-01-03 PROCEDURE — 80053 COMPREHEN METABOLIC PANEL: CPT | Performed by: EMERGENCY MEDICINE

## 2022-01-03 PROCEDURE — 80307 DRUG TEST PRSMV CHEM ANLYZR: CPT | Performed by: EMERGENCY MEDICINE

## 2022-01-03 PROCEDURE — 86901 BLOOD TYPING SEROLOGIC RH(D): CPT | Performed by: EMERGENCY MEDICINE

## 2022-01-03 PROCEDURE — 84145 PROCALCITONIN (PCT): CPT | Performed by: EMERGENCY MEDICINE

## 2022-01-03 PROCEDURE — 83605 ASSAY OF LACTIC ACID: CPT | Performed by: EMERGENCY MEDICINE

## 2022-01-03 PROCEDURE — 81001 URINALYSIS AUTO W/SCOPE: CPT | Performed by: EMERGENCY MEDICINE

## 2022-01-03 PROCEDURE — 85025 COMPLETE CBC W/AUTO DIFF WBC: CPT | Performed by: EMERGENCY MEDICINE

## 2022-01-03 PROCEDURE — 36415 COLL VENOUS BLD VENIPUNCTURE: CPT | Performed by: EMERGENCY MEDICINE

## 2022-01-03 PROCEDURE — 87040 BLOOD CULTURE FOR BACTERIA: CPT | Performed by: EMERGENCY MEDICINE

## 2022-01-03 PROCEDURE — 82077 ASSAY SPEC XCP UR&BREATH IA: CPT | Performed by: EMERGENCY MEDICINE

## 2022-01-03 PROCEDURE — 86920 COMPATIBILITY TEST SPIN: CPT

## 2022-01-03 PROCEDURE — 96360 HYDRATION IV INFUSION INIT: CPT

## 2022-01-03 PROCEDURE — 85014 HEMATOCRIT: CPT | Performed by: EMERGENCY MEDICINE

## 2022-01-03 PROCEDURE — 85730 THROMBOPLASTIN TIME PARTIAL: CPT | Performed by: EMERGENCY MEDICINE

## 2022-01-03 PROCEDURE — 86850 RBC ANTIBODY SCREEN: CPT | Performed by: EMERGENCY MEDICINE

## 2022-01-03 PROCEDURE — 86900 BLOOD TYPING SEROLOGIC ABO: CPT | Performed by: EMERGENCY MEDICINE

## 2022-01-03 PROCEDURE — 85379 FIBRIN DEGRADATION QUANT: CPT | Performed by: EMERGENCY MEDICINE

## 2022-01-03 PROCEDURE — 82550 ASSAY OF CK (CPK): CPT | Performed by: EMERGENCY MEDICINE

## 2022-01-03 PROCEDURE — 84484 ASSAY OF TROPONIN QUANT: CPT | Performed by: EMERGENCY MEDICINE

## 2022-01-03 PROCEDURE — 80179 DRUG ASSAY SALICYLATE: CPT | Performed by: EMERGENCY MEDICINE

## 2022-01-03 PROCEDURE — 86140 C-REACTIVE PROTEIN: CPT | Performed by: EMERGENCY MEDICINE

## 2022-01-03 PROCEDURE — 85610 PROTHROMBIN TIME: CPT | Performed by: EMERGENCY MEDICINE

## 2022-01-03 PROCEDURE — 83735 ASSAY OF MAGNESIUM: CPT | Performed by: EMERGENCY MEDICINE

## 2022-01-03 PROCEDURE — 82805 BLOOD GASES W/O2 SATURATION: CPT | Performed by: EMERGENCY MEDICINE

## 2022-01-03 PROCEDURE — 0241U HB NFCT DS VIR RESP RNA 4 TRGT: CPT | Performed by: EMERGENCY MEDICINE

## 2022-01-03 RX ORDER — SODIUM CHLORIDE 9 MG/ML
3 INJECTION INTRAVENOUS
Status: DISCONTINUED | OUTPATIENT
Start: 2022-01-03 | End: 2022-01-07 | Stop reason: HOSPADM

## 2022-01-03 RX ORDER — LACTULOSE 20 G/30ML
20 SOLUTION ORAL ONCE
Status: COMPLETED | OUTPATIENT
Start: 2022-01-03 | End: 2022-01-04

## 2022-01-03 RX ORDER — ACETAMINOPHEN 325 MG/1
975 TABLET ORAL ONCE
Status: COMPLETED | OUTPATIENT
Start: 2022-01-03 | End: 2022-01-03

## 2022-01-03 RX ADMIN — ACETAMINOPHEN 975 MG: 325 TABLET, FILM COATED ORAL at 22:54

## 2022-01-03 RX ADMIN — SODIUM CHLORIDE 1000 ML: 0.9 INJECTION, SOLUTION INTRAVENOUS at 22:46

## 2022-01-04 ENCOUNTER — APPOINTMENT (INPATIENT)
Dept: ULTRASOUND IMAGING | Facility: HOSPITAL | Age: 67
DRG: 177 | End: 2022-01-04
Payer: MEDICARE

## 2022-01-04 PROBLEM — R41.82 ALTERED MENTAL STATUS: Status: ACTIVE | Noted: 2022-01-04

## 2022-01-04 PROBLEM — U07.1 COVID-19: Status: ACTIVE | Noted: 2022-01-04

## 2022-01-04 PROBLEM — J96.01 ACUTE RESPIRATORY FAILURE WITH HYPOXIA (HCC): Status: ACTIVE | Noted: 2022-01-04

## 2022-01-04 PROBLEM — R19.5 FECAL OCCULT BLOOD TEST POSITIVE: Status: ACTIVE | Noted: 2022-01-04

## 2022-01-04 PROBLEM — R79.89 ELEVATED SERUM CREATININE: Status: ACTIVE | Noted: 2022-01-04

## 2022-01-04 PROBLEM — J96.01 ACUTE RESPIRATORY FAILURE WITH HYPOXIA (HCC): Status: RESOLVED | Noted: 2022-01-04 | Resolved: 2022-01-04

## 2022-01-04 PROBLEM — Z79.4 TYPE 2 DIABETES MELLITUS WITH HYPERGLYCEMIA, WITH LONG-TERM CURRENT USE OF INSULIN (HCC): Status: ACTIVE | Noted: 2018-02-20

## 2022-01-04 LAB
2HR DELTA HS TROPONIN: 2 NG/L
4HR DELTA HS TROPONIN: 1 NG/L
ALBUMIN SERPL BCP-MCNC: 2.8 G/DL (ref 3.4–4.8)
ALP SERPL-CCNC: 126.9 U/L (ref 35–140)
ALT SERPL W P-5'-P-CCNC: 41 U/L (ref 5–54)
AMMONIA PLAS-SCNC: 47.5 UMOL/L
AMPHETAMINES SERPL QL SCN: NEGATIVE
ANION GAP SERPL CALCULATED.3IONS-SCNC: 5 MMOL/L (ref 4–13)
AST SERPL W P-5'-P-CCNC: 95 U/L (ref 15–41)
ATRIAL RATE: 86 BPM
ATRIAL RATE: 87 BPM
BACTERIA UR QL AUTO: NORMAL /HPF
BARBITURATES UR QL: NEGATIVE
BENZODIAZ UR QL: NEGATIVE
BILIRUB SERPL-MCNC: 0.39 MG/DL (ref 0.3–1.2)
BUN SERPL-MCNC: 26 MG/DL (ref 6–20)
CALCIUM ALBUM COR SERPL-MCNC: 8.6 MG/DL (ref 8.3–10.1)
CALCIUM SERPL-MCNC: 7.6 MG/DL (ref 8.4–10.2)
CARDIAC TROPONIN I PNL SERPL HS: 7 NG/L
CARDIAC TROPONIN I PNL SERPL HS: 8 NG/L
CHLORIDE SERPL-SCNC: 101 MMOL/L (ref 96–108)
CO2 SERPL-SCNC: 27 MMOL/L (ref 22–33)
COCAINE UR QL: NEGATIVE
CREAT SERPL-MCNC: 1.58 MG/DL (ref 0.4–1.1)
EST. AVERAGE GLUCOSE BLD GHB EST-MCNC: 169 MG/DL
FERRITIN SERPL-MCNC: 23 NG/ML (ref 8–388)
FERRITIN SERPL-MCNC: 31 NG/ML (ref 8–388)
FLUAV RNA RESP QL NAA+PROBE: NEGATIVE
FLUBV RNA RESP QL NAA+PROBE: NEGATIVE
GFR SERPL CREATININE-BSD FRML MDRD: 34 ML/MIN/1.73SQ M
GLUCOSE SERPL-MCNC: 114 MG/DL (ref 65–140)
GLUCOSE SERPL-MCNC: 130 MG/DL (ref 65–140)
GLUCOSE SERPL-MCNC: 156 MG/DL (ref 65–140)
GLUCOSE SERPL-MCNC: 215 MG/DL (ref 65–140)
GLUCOSE SERPL-MCNC: 224 MG/DL (ref 65–140)
GLUCOSE SERPL-MCNC: 229 MG/DL (ref 65–140)
GLUCOSE SERPL-MCNC: 245 MG/DL (ref 65–140)
HBA1C MFR BLD: 7.5 %
HCT VFR BLD AUTO: 30.6 % (ref 34.8–46.1)
HCT VFR BLD AUTO: 30.7 % (ref 34.8–46.1)
HEMOCCULT STL QL IA: POSITIVE
HGB BLD-MCNC: 11.5 G/DL (ref 11.5–15.4)
HGB BLD-MCNC: 12.1 G/DL (ref 11.5–15.4)
HGB BLD-MCNC: 9.6 G/DL (ref 11.5–15.4)
HGB BLD-MCNC: 9.7 G/DL (ref 11.5–15.4)
HGB BLD-MCNC: 9.9 G/DL (ref 11.5–15.4)
IRON SATN MFR SERPL: 21 % (ref 15–50)
IRON SERPL-MCNC: 74 UG/DL (ref 50–170)
METHADONE UR QL: NEGATIVE
NON-SQ EPI CELLS URNS QL MICRO: NORMAL /HPF
OPIATES UR QL SCN: POSITIVE
OTHER STN SPEC: NORMAL
OXYCODONE+OXYMORPHONE UR QL SCN: POSITIVE
P AXIS: 64 DEGREES
P AXIS: 97 DEGREES
PCP UR QL: NEGATIVE
POTASSIUM SERPL-SCNC: 4.6 MMOL/L (ref 3.5–5)
PR INTERVAL: 200 MS
PR INTERVAL: 205 MS
PROCALCITONIN SERPL-MCNC: 0.15 NG/ML
PROT SERPL-MCNC: 5.6 G/DL (ref 6.4–8.3)
QRS AXIS: 45 DEGREES
QRS AXIS: 59 DEGREES
QRSD INTERVAL: 91 MS
QRSD INTERVAL: 92 MS
QT INTERVAL: 379 MS
QT INTERVAL: 381 MS
QTC INTERVAL: 454 MS
QTC INTERVAL: 459 MS
RBC #/AREA URNS AUTO: NORMAL /HPF
RSV RNA RESP QL NAA+PROBE: NEGATIVE
SARS-COV-2 RNA RESP QL NAA+PROBE: POSITIVE
SODIUM SERPL-SCNC: 133 MMOL/L (ref 133–145)
T WAVE AXIS: 30 DEGREES
T WAVE AXIS: 55 DEGREES
THC UR QL: NEGATIVE
TIBC SERPL-MCNC: 353 UG/DL (ref 250–450)
VENTRICULAR RATE: 86 BPM
VENTRICULAR RATE: 87 BPM
WBC #/AREA URNS AUTO: NORMAL /HPF

## 2022-01-04 PROCEDURE — 93005 ELECTROCARDIOGRAM TRACING: CPT

## 2022-01-04 PROCEDURE — 84484 ASSAY OF TROPONIN QUANT: CPT | Performed by: EMERGENCY MEDICINE

## 2022-01-04 PROCEDURE — 94640 AIRWAY INHALATION TREATMENT: CPT

## 2022-01-04 PROCEDURE — 94760 N-INVAS EAR/PLS OXIMETRY 1: CPT

## 2022-01-04 PROCEDURE — 83036 HEMOGLOBIN GLYCOSYLATED A1C: CPT

## 2022-01-04 PROCEDURE — 99223 1ST HOSP IP/OBS HIGH 75: CPT | Performed by: INTERNAL MEDICINE

## 2022-01-04 PROCEDURE — G0328 FECAL BLOOD SCRN IMMUNOASSAY: HCPCS | Performed by: EMERGENCY MEDICINE

## 2022-01-04 PROCEDURE — 82728 ASSAY OF FERRITIN: CPT

## 2022-01-04 PROCEDURE — 85018 HEMOGLOBIN: CPT

## 2022-01-04 PROCEDURE — XW033E5 INTRODUCTION OF REMDESIVIR ANTI-INFECTIVE INTO PERIPHERAL VEIN, PERCUTANEOUS APPROACH, NEW TECHNOLOGY GROUP 5: ICD-10-PCS | Performed by: INTERNAL MEDICINE

## 2022-01-04 PROCEDURE — 83550 IRON BINDING TEST: CPT

## 2022-01-04 PROCEDURE — 82948 REAGENT STRIP/BLOOD GLUCOSE: CPT

## 2022-01-04 PROCEDURE — 93010 ELECTROCARDIOGRAM REPORT: CPT | Performed by: INTERNAL MEDICINE

## 2022-01-04 PROCEDURE — 82140 ASSAY OF AMMONIA: CPT

## 2022-01-04 PROCEDURE — 80053 COMPREHEN METABOLIC PANEL: CPT

## 2022-01-04 PROCEDURE — 36415 COLL VENOUS BLD VENIPUNCTURE: CPT | Performed by: EMERGENCY MEDICINE

## 2022-01-04 PROCEDURE — 83540 ASSAY OF IRON: CPT

## 2022-01-04 PROCEDURE — 76705 ECHO EXAM OF ABDOMEN: CPT

## 2022-01-04 PROCEDURE — 85014 HEMATOCRIT: CPT

## 2022-01-04 RX ORDER — CEFTRIAXONE 1 G/50ML
1000 INJECTION, SOLUTION INTRAVENOUS EVERY 24 HOURS
Status: DISCONTINUED | OUTPATIENT
Start: 2022-01-04 | End: 2022-01-06

## 2022-01-04 RX ORDER — DEXAMETHASONE SODIUM PHOSPHATE 4 MG/ML
6 INJECTION, SOLUTION INTRA-ARTICULAR; INTRALESIONAL; INTRAMUSCULAR; INTRAVENOUS; SOFT TISSUE EVERY 24 HOURS
Status: DISCONTINUED | OUTPATIENT
Start: 2022-01-04 | End: 2022-01-07 | Stop reason: HOSPADM

## 2022-01-04 RX ORDER — ALBUTEROL SULFATE 90 UG/1
2 AEROSOL, METERED RESPIRATORY (INHALATION) EVERY 4 HOURS PRN
Status: DISCONTINUED | OUTPATIENT
Start: 2022-01-04 | End: 2022-01-07 | Stop reason: HOSPADM

## 2022-01-04 RX ORDER — LACTULOSE 20 G/30ML
20 SOLUTION ORAL 2 TIMES DAILY
Status: DISCONTINUED | OUTPATIENT
Start: 2022-01-04 | End: 2022-01-07

## 2022-01-04 RX ORDER — INSULIN GLARGINE 100 [IU]/ML
23 INJECTION, SOLUTION SUBCUTANEOUS
Status: DISCONTINUED | OUTPATIENT
Start: 2022-01-04 | End: 2022-01-07 | Stop reason: HOSPADM

## 2022-01-04 RX ADMIN — CEFTRIAXONE 1000 MG: 1 INJECTION, SOLUTION INTRAVENOUS at 23:34

## 2022-01-04 RX ADMIN — LACTULOSE 20 G: 20 SOLUTION ORAL at 09:53

## 2022-01-04 RX ADMIN — DEXAMETHASONE SODIUM PHOSPHATE 6 MG: 4 INJECTION, SOLUTION INTRA-ARTICULAR; INTRALESIONAL; INTRAMUSCULAR; INTRAVENOUS; SOFT TISSUE at 04:43

## 2022-01-04 RX ADMIN — INSULIN LISPRO 2 UNITS: 100 INJECTION, SOLUTION INTRAVENOUS; SUBCUTANEOUS at 11:41

## 2022-01-04 RX ADMIN — INSULIN LISPRO 3 UNITS: 100 INJECTION, SOLUTION INTRAVENOUS; SUBCUTANEOUS at 21:09

## 2022-01-04 RX ADMIN — RIFAXIMIN 550 MG: 550 TABLET ORAL at 14:46

## 2022-01-04 RX ADMIN — INSULIN GLARGINE 23 UNITS: 100 INJECTION, SOLUTION SUBCUTANEOUS at 23:27

## 2022-01-04 RX ADMIN — ALBUTEROL SULFATE 2 PUFF: 90 AEROSOL, METERED RESPIRATORY (INHALATION) at 21:11

## 2022-01-04 RX ADMIN — RIFAXIMIN 550 MG: 550 TABLET ORAL at 21:11

## 2022-01-04 RX ADMIN — REMDESIVIR 200 MG: 100 INJECTION, POWDER, LYOPHILIZED, FOR SOLUTION INTRAVENOUS at 05:34

## 2022-01-04 RX ADMIN — IPRATROPIUM BROMIDE 2 PUFF: 17 AEROSOL, METERED RESPIRATORY (INHALATION) at 12:02

## 2022-01-04 RX ADMIN — LACTULOSE 20 G: 20 SOLUTION ORAL at 18:04

## 2022-01-04 RX ADMIN — IPRATROPIUM BROMIDE 2 PUFF: 17 AEROSOL, METERED RESPIRATORY (INHALATION) at 21:11

## 2022-01-04 RX ADMIN — INSULIN LISPRO 1 UNITS: 100 INJECTION, SOLUTION INTRAVENOUS; SUBCUTANEOUS at 09:52

## 2022-01-04 RX ADMIN — AZITHROMYCIN MONOHYDRATE 500 MG: 500 INJECTION, POWDER, LYOPHILIZED, FOR SOLUTION INTRAVENOUS at 01:21

## 2022-01-04 RX ADMIN — LACTULOSE 20 G: 20 SOLUTION ORAL at 01:17

## 2022-01-04 RX ADMIN — CEFTRIAXONE 1000 MG: 1 INJECTION, SOLUTION INTRAVENOUS at 01:16

## 2022-01-04 RX ADMIN — INSULIN LISPRO 2 UNITS: 100 INJECTION, SOLUTION INTRAVENOUS; SUBCUTANEOUS at 17:02

## 2022-01-04 RX ADMIN — INSULIN LISPRO 2 UNITS: 100 INJECTION, SOLUTION INTRAVENOUS; SUBCUTANEOUS at 23:31

## 2022-01-04 NOTE — ASSESSMENT & PLAN NOTE
· AST-64, ALT-29  · Per chart review, from alcohol use  Denies any recent alcohol use-- ethanol level less than 10

## 2022-01-04 NOTE — CONSULTS
Physician Requesting Consult: Fausto Coronel MD    Reason for Consult / Principal Problem:  Cirrhosis, fecal occult blood test positive      Assessment/Plan:  71-year-old female with a past medical history of cirrhosis, alcohol abuse, marijuana use, chronic back pain on oxycodone, diabetes mellitus, lower extremity neuropathy, MRSA, hypertension, and cellulitis who presented to [facility] on 01/03 with change in mental status  1  Cirrhosis with coagulopathy  Patient has a history of cirrhosis of the liver secondary alcohol use  Per  patient quit drinking alcohol approximately 20 years ago  Cirrhosis was being managed by her primary care physician in [place]  Per  patient was previously on diuretics but there were recently discontinued due to elevated BUN and creatinine  Bilateral lower extremities edematous and red  Patient does report abdominal distension  Meld score 18   -Monitor meld labs  -Obtain ultrasound of the abdomen to evaluate for ascites  -Start lactulose and Xifaxan  -Monitor ammonia level and mental status  -Recommend consult for Nephrology concerning diuretic therapy secondary to elevated BUN and creatinine   -Obtain AFP    2  Anemia with fecal occult blood test positive  Stool for occult blood positive on admission to ED hemoglobin noted to be 9 7  Patient's baseline hemoglobin 12  Patient currently having no overt signs of GI bleed  Per patient and spouse patient has no bright red blood from rectal area, bright red blood in stool, or black tarry stool  No reports of bloody emesis    Anemia may be secondary to underlying cirrhosis of the liver, stool for occult blood may be false positive for may be due to hemorrhoids     -No urgent need for colonoscopy since patient having no overt signs of GI bleed   -Continue to monitor hemoglobin  -Transfuse blood products as needed to keep hemoglobin greater than 7   -Colonoscopy an EGD can be done as an outpatient after resolution of COVID 19  Will follow  Thank you for the consult  Case discussed with Edwyna Phoenix  HPI: Willie Pedroza is a 73 y o  female  altered mental status and positive COVID-19  This is a 61-year-old female with a past medical history of cirrhosis, alcohol abuse, marijuana use, chronic back pain on oxycodone, diabetes mellitus, lower extremity neuropathy, MRSA, hypertension, and cellulitis who presented to [facility] on 01/03 with change in mental status  Per medical records patient has been brought her into the emergency room because of increased confusion  CT of head negative for acute abnormality  UA normal   Ammonia level 50 93 and elevated on admission and patient received 1 dose of lactulose  Patient short of breath and required O2 at 6 L via nasal cannula  COVID 19 positive on admission to ED  Patient also reported chills and positive temperature 100 4° on admission to ED  Patient denies any chest pain  GI was consulted secondary to cirrhosis and fecal occult blood test positive  Hemoglobin on admission 4 4  Hemoglobin repeated  Repeat hemoglobin 9 7  Hemoglobin this a m  9 9 and stable  Stool for occult blood positive  Spoke to patient has been on phone due to patient having intermittent episodes of confusion  Per patient's  patient has had no signs of GI bleed at home  No report of bright red blood in stool, bright red blood from rectal area, or black tarry stool  No report of bloody emesis  Patient also denies any signs of active GI bleed today  Patient denies nausea, vomiting, acid reflux, heartburn, epigastric or abdominal pain  Patient does report that her abdomen feels distended  Also noted to have bilateral lower extremity edema and redness   reported that patient was previously on diuretics in the past but they were stopped due to elevation of her creatinine level    No family history of gastric or colon cancer  Patient never had an EGD or colonoscopy  Patient currently smokes electronic cigarettes  Patient has a past medical history of alcohol abuse but has not drank in 20 years  Patient does use marijuana occasionally last used 1 year ago      Allergies: No Known Allergies    Medications:  Current Facility-Administered Medications:     albuterol (PROVENTIL HFA,VENTOLIN HFA) inhaler 2 puff, 2 puff, Inhalation, Q4H PRN, Jesús Oliver MD    cefTRIAXone (ROCEPHIN) IVPB (premix in dextrose) 1,000 mg 50 mL, 1,000 mg, Intravenous, Q24H, Carmen Casillas MD, Stopped at 01/04/22 0146    dexamethasone (DECADRON) injection 6 mg, 6 mg, Intravenous, Q24H, Krista Tan PA-C, 6 mg at 01/04/22 0443    insulin glargine (LANTUS) subcutaneous injection 23 Units 0 23 mL, 23 Units, Subcutaneous, HS, Krista Tan PA-C    insulin lispro (HumaLOG) 100 units/mL subcutaneous injection 1-6 Units, 1-6 Units, Subcutaneous, 4 times day, 2 Units at 01/04/22 1141 **AND** Fingerstick Glucose (POCT), , , 4 times day, Krista Tan PA-C    insulin lispro (HumaLOG) 100 units/mL subcutaneous injection 1-6 Units, 1-6 Units, Subcutaneous, HS, Krista Tan PA-C    ipratropium (ATROVENT HFA) inhaler 2 puff, 2 puff, Inhalation, 4x Daily, Jesús Oliver MD, 2 puff at 01/04/22 1202    lactulose oral solution 20 g, 20 g, Oral, BID, APRYL Phan, 20 g at 01/04/22 7998    [COMPLETED] remdesivir (Veklury) 200 mg in sodium chloride 0 9 % 290 mL IVPB, 200 mg, Intravenous, Q24H, 200 mg at 01/04/22 0534 **FOLLOWED BY** [START ON 1/5/2022] remdesivir (Veklury) 100 mg in sodium chloride 0 9 % 270 mL IVPB, 100 mg, Intravenous, Q24H, Krista Bobbye Sitter, PA-C    sodium chloride (PF) 0 9 % injection 3 mL, 3 mL, Intravenous, Q1H PRN, Carmen Casillas MD    Current Outpatient Medications:     acetaminophen (TYLENOL) 650 mg CR tablet, Take 2 tablets (1,300 mg total) by mouth every 8 (eight) hours as needed for mild pain or moderate pain, Disp: 30 tablet, Rfl: 0    amLODIPine (NORVASC) 10 mg tablet, Take 1 tablet (10 mg total) by mouth daily, Disp: 30 tablet, Rfl: 0    docusate sodium (COLACE) 100 mg capsule, Take 1 capsule (100 mg total) by mouth 2 (two) times a day as needed for constipation, Disp: , Rfl: 0    DULoxetine (CYMBALTA) 60 mg delayed release capsule, Take 60 mg by mouth daily, Disp: , Rfl:     gabapentin (NEURONTIN) 100 mg capsule, Take 1 capsule (100 mg total) by mouth 2 (two) times a day (Patient taking differently: Take 300 mg by mouth daily Takes as needed), Disp: , Rfl: 0    Insulin Aspart FlexPen 100 UNIT/ML SOPN, Inject 3 Units under the skin 3 (three) times a day with meals If bs>250, Disp: , Rfl:     insulin glargine (LANTUS SOLOSTAR) 100 units/mL injection pen, Inject 23 Units under the skin daily at bedtime , Disp: , Rfl:     lisinopril (ZESTRIL) 20 mg tablet, Take 10 mg by mouth daily , Disp: , Rfl:     metoclopramide (REGLAN) 10 mg tablet, Take 10 mg by mouth daily, Disp: , Rfl:     oxyCODONE (ROXICODONE) 5 mg immediate release tablet, Take 15 mg by mouth every 6 (six) hours as needed for moderate pain , Disp: , Rfl:     promethazine (PHENERGAN) 25 mg suppository, Insert 1 suppository (25 mg total) into the rectum every 6 (six) hours as needed for nausea or vomiting, Disp: 24 suppository, Rfl: 0    Facility-Administered Medications Ordered in Other Encounters:     ferrous sulfate tablet 325 mg, 325 mg, Oral, Daily With Breakfast, Betty Lew MD    Past Medical history:  Past Medical History:   Diagnosis Date    Abnormal head CT 7/14/2017    Acute kidney injury ([facility] [address] ) 8/19/2018    Cellulitis 1/10/2017    Closed fracture of multiple ribs of right side 7/14/2017    Degenerative disc disease at L5-S1 level     Diabetes mellitus (HCC)     History of methicillin resistant staphylococcus aureus (MRSA) 08/21/2018    negative nasal culture- isolation and hx of mrsa removed 2018    Hyperkalemia 2018    Hypertension     Hypocalcemia 2018    Hyponatremia 2017       Past Surgical History:   Past Surgical History:   Procedure Laterality Date    CHOLECYSTECTOMY      JOINT REPLACEMENT      OOPHORECTOMY      ID INCISION,IMPLANT,NEUROELEC,SACRAL NERVE N/A 2021    Procedure: INSERTION NEUROSTIMULATOR ELECTRODE ARRAY SACRAL NERVE; FLUOROSCOPY; ELECTRONIC ANALYSIS;  Surgeon: Osvaldo Nicholas MD;  Location: [facility];  Service: UroGynecology           SACRAL NERVE STIMULATOR PLACEMENT N/A 2021    Procedure: IPG INSERTION AND PROGRAMMING;  Surgeon: Osvaldo Nicholas MD;  Location: [facility];  Service: UroGynecology              Social history:   Social History     Tobacco Use    Smoking status: Former Smoker     Types: E-Cigarettes     Start date: 1976     Quit date: 2019     Years since quittin 7    Smokeless tobacco: Never Used   Vaping Use    Vaping Use: Every day    Start date: 2019    Substances: Nicotine, THC (not using anymore)   Substance Use Topics    Alcohol use: Not Currently     Alcohol/week: 0 0 standard drinks    Drug use: Not Currently     Types: Marijuana     Comment: last used 1 year ago        Family history:   Family History   Problem Relation Age of Onset    Rheum arthritis Mother     Alzheimer's disease Mother     Lupus Mother         Review of Systems: Review of Systems   Constitutional: Positive for chills and fever  Negative for fatigue  Gastrointestinal: Positive for abdominal distention  Musculoskeletal:        Pain in legs  Neurological: Positive for weakness  Psychiatric/Behavioral: Positive for confusion  All other systems reviewed and are negative  Physical Exam: Physical Exam  Constitutional:       General: She is not in acute distress  HENT:      Head: Normocephalic and atraumatic  Eyes:      General: No scleral icterus    Cardiovascular:      Rate and Rhythm: Normal rate and regular rhythm  Pulses: Normal pulses  Heart sounds: Normal heart sounds  Abdominal:      General: Bowel sounds are normal  There is distension  Palpations: Abdomen is soft  There is no mass  Tenderness: There is no abdominal tenderness  There is no guarding or rebound  Hernia: No hernia is present  Musculoskeletal:      Cervical back: Normal range of motion and neck supple  Right lower leg: Edema present  Left lower leg: Edema present  Comments: BLE red discoloration and painful with minimal touch  Skin:     General: Skin is warm and dry  Coloration: Skin is not jaundiced or pale  Neurological:      Mental Status: She is alert and oriented to person, place, and time  Comments: Confused at times     Psychiatric:         Mood and Affect: Mood normal            Lab Results:   Recent Results (from the past 24 hour(s))   CBC and differential    Collection Time: 01/03/22 10:00 PM   Result Value Ref Range    WBC 4 62 4 31 - 10 16 Thousand/uL    RBC 1 61 (L) 3 81 - 5 12 Million/uL    Hemoglobin 4 4 (LL) 11 5 - 15 4 g/dL    Hematocrit 14 0 (L) 34 8 - 46 1 %    MCV 87 82 - 98 fL    MCH 27 3 26 8 - 34 3 pg    MCHC 31 4 31 4 - 37 4 g/dL    RDW 15 7 (H) 11 6 - 15 1 %    MPV 11 1 8 9 - 12 7 fL    Platelets 718 (L) 199 - 390 Thousands/uL    nRBC 0 /100 WBCs    Neutrophils Relative 73 43 - 75 %    Immat GRANS % 0 0 - 2 %    Lymphocytes Relative 14 14 - 44 %    Monocytes Relative 11 4 - 12 %    Eosinophils Relative 2 0 - 6 %    Basophils Relative 0 0 - 1 %    Neutrophils Absolute 3 39 1 85 - 7 62 Thousands/µL    Immature Grans Absolute 0 02 0 00 - 0 20 Thousand/uL    Lymphocytes Absolute 0 64 0 60 - 4 47 Thousands/µL    Monocytes Absolute 0 49 0 17 - 1 22 Thousand/µL    Eosinophils Absolute 0 07 0 00 - 0 61 Thousand/µL    Basophils Absolute 0 01 0 00 - 0 10 Thousands/µL   Comprehensive metabolic panel    Collection Time: 01/03/22 10:00 PM   Result Value Ref Range    Sodium 131 (L) 133 - 145 mmol/L    Potassium 5 3 (H) 3 5 - 5 0 mmol/L    Chloride 98 96 - 108 mmol/L    CO2 28 22 - 33 mmol/L    ANION GAP 5 4 - 13 mmol/L    BUN 26 (H) 6 - 20 mg/dL    Creatinine 1 57 (H) 0 40 - 1 10 mg/dL    Glucose 136 65 - 140 mg/dL    Calcium 8 0 (L) 8 4 - 10 2 mg/dL    Corrected Calcium 8 8 8 3 - 10 1 mg/dL    AST 64 (H) 15 - 41 U/L    ALT 29 5 - 54 U/L    Alkaline Phosphatase 127 7 35 - 140 U/L    Total Protein 6 3 (L) 6 4 - 8 3 g/dL    Albumin 3 0 (L) 3 4 - 4 8 g/dL    Total Bilirubin 0 60 0 30 - 1 20 mg/dL    eGFR 34 ml/min/1 73sq m   Protime-INR    Collection Time: 01/03/22 10:00 PM   Result Value Ref Range    Protime 15 2 (H) 11 6 - 14 5 seconds    INR 1 21 (H) 0 84 - 1 19   APTT    Collection Time: 01/03/22 10:00 PM   Result Value Ref Range    PTT 34 23 - 37 seconds   HS Troponin 0hr (reflex protocol)    Collection Time: 01/03/22 10:00 PM   Result Value Ref Range    hs TnI 0hr 6 "Refer to ACS Flowchart" - see link ng/L   B-Type Natriuretic Peptide(BNP) CA, GH, [facility] Campuses Only    Collection Time: 01/03/22 10:00 PM   Result Value Ref Range     7 (H) 1 - 100 pg/mL   Magnesium    Collection Time: 01/03/22 10:00 PM   Result Value Ref Range    Magnesium 2 0 1 6 - 2 6 mg/dL   Calcium, ionized    Collection Time: 01/03/22 10:00 PM   Result Value Ref Range    Calcium, Ionized 1 08 (L) 1 12 - 1 32 mmol/L   C-reactive protein    Collection Time: 01/03/22 10:00 PM   Result Value Ref Range    CRP 0 4 0 0 - 1 0 mg/dL   Triglycerides    Collection Time: 01/03/22 10:00 PM   Result Value Ref Range    Triglycerides 60 3 See Comment mg/dL   CK (with reflex to MB)    Collection Time: 01/03/22 10:00 PM   Result Value Ref Range    Total CK 51 3 38 - 234 U/L   D-dimer, quantitative    Collection Time: 01/03/22 10:00 PM   Result Value Ref Range    D-Dimer, Quant 0 70 (H) <0 50 ug/ml FEU   ABO/Rh    Collection Time: 01/03/22 10:00 PM   Result Value Ref Range    ABO Grouping A     Rh Factor Positive    Salicylate level    Collection Time: 01/03/22 10:23 PM   Result Value Ref Range    Salicylate Lvl <6 9 (L) 6 - 30 mg/dL   Acetaminophen level-If concentration is detectable, please discuss with medical  on call      Collection Time: 01/03/22 10:23 PM   Result Value Ref Range    Acetaminophen Level <10 (L) 10 - 20 ug/mL   Blood gas, venous    Collection Time: 01/03/22 10:39 PM   Result Value Ref Range    pH, Surjit 7 326 7 300 - 7 400    pCO2, Surjit 50 9 (H) 42 0 - 50 0 mm Hg    pO2, Surjit 49 1 (H) 35 0 - 45 0 mm Hg    HCO3, Surjit 26 0 24 - 30 mmol/L    Base Excess, Surjit -0 5 mmol/L    O2 Content, Surjit 13 3 ml/dL    O2 HGB, VENOUS 82 2 (H) 60 0 - 80 0 %   Lactic Acid    Collection Time: 01/03/22 10:40 PM   Result Value Ref Range    LACTIC ACID 0 7 0 0 - 2 0 mmol/L   Ethanol    Collection Time: 01/03/22 10:40 PM   Result Value Ref Range    Ethanol Lvl <10 <10 mg/dL   Ammonia    Collection Time: 01/03/22 10:40 PM   Result Value Ref Range    Ammonia 50 93 (H) <=36 umol/L   Type and screen    Collection Time: 01/03/22 10:40 PM   Result Value Ref Range    ABO Grouping A     Rh Factor Positive     Antibody Screen Negative     Specimen Expiration Date [id number]    COVID/FLU/RSV - 2 hour TAT    Collection Time: 01/03/22 11:04 PM    Specimen: Nasopharyngeal Swab; Nares   Result Value Ref Range    SARS-CoV-2 Positive (A) Negative    INFLUENZA A PCR Negative Negative    INFLUENZA B PCR Negative Negative    RSV PCR Negative Negative   Hemoglobin and hematocrit, blood    Collection Time: 01/03/22 11:10 PM   Result Value Ref Range    Hemoglobin 9 7 (L) 11 5 - 15 4 g/dL    Hematocrit 30 7 (L) 34 8 - 46 1 %   Prepare Leukoreduced RBC: 2 Units    Collection Time: 01/03/22 11:34 PM   Result Value Ref Range    Unit Product Code A8344C75     Unit Number [id number]     Unit ABO A     Unit RH POS     Crossmatch Compatible     Unit Dispense Status Crossmatched     Unit Product Volume 350 ml    Unit Product Code I0321Y13     Unit Number [id number]     Unit ABO A     Unit RH POS     Crossmatch Compatible     Unit Dispense Status Crossmatched     Unit Product Volume 350 ml   UA w Reflex to Microscopic w Reflex to Culture    Collection Time: 01/03/22 11:39 PM    Specimen: Urine, Straight Cath   Result Value Ref Range    Color, UA Yellow Yellow    Clarity, UA Clear Clear    Specific [place], UA 1 025 1 001 - 1 030    pH, UA 5 5 5 0, 5 5, 6 0, 6 5, 7 0, 7 5, 8 0    Leukocytes, UA Negative Negative    Nitrite, UA Negative Negative    Protein, UA Trace (A) Negative, Interference- unable to analyze mg/dl    Glucose, UA Negative Negative mg/dl    Ketones, UA Negative Negative mg/dl    Urobilinogen, UA 0 2 0 2, 1 0 E U /dl E U /dl    Bilirubin, UA Negative Negative    Blood, UA Negative Negative   Rapid drug screen, urine    Collection Time: 01/03/22 11:39 PM   Result Value Ref Range    Amph/Meth UR Negative Negative    Barbiturate Ur Negative Negative    Benzodiazepine Urine Negative Negative    Cocaine Urine Negative Negative    Methadone Urine Negative Negative    Opiate Urine Positive (A) Negative    PCP Ur Negative Negative    THC Urine Negative Negative    Oxycodone Urine Positive (A) Negative   Urine Microscopic    Collection Time: 01/03/22 11:39 PM   Result Value Ref Range    RBC, UA 0-5 None Seen, 0-1, 1-2, 2-4, 0-5 /hpf    WBC, UA 0-5 None Seen, 0-1, 1-2, 0-5, 2-4 /hpf    Epithelial Cells Occasional None Seen, Occasional /hpf    Bacteria, UA Occasional None Seen, Occasional /hpf    OTHER OBSERVATIONS Transitional Epithelial Cells    Occult Bloood,Fecal Immunochemical    Collection Time: 01/04/22 12:13 AM   Result Value Ref Range    OCCULT BLD, FECAL IMMUNOLOGICAL Positive (A) Negative   HS Troponin I 2hr    Collection Time: 01/04/22  3:45 AM   Result Value Ref Range    hs TnI 2hr 8  "Refer to ACS Flowchart" - see link ng/L    Delta 2hr hsTnI 2 ng/L   ECG 12 lead    Collection Time: 01/04/22  3:50 AM   Result Value Ref Range    Ventricular Rate 86 BPM    Atrial Rate 86 BPM    ME Interval 205 ms    QRSD Interval 92 ms    QT Interval 379 ms    QTC Interval 454 ms    P [place] 64 degrees    QRS Axis 59 degrees    T Wave Axis 30 degrees   ECG 12 lead    Collection Time: 01/04/22  3:52 AM   Result Value Ref Range    Ventricular Rate 87 BPM    Atrial Rate 87 BPM    ME Interval 200 ms    QRSD Interval 91 ms    QT Interval 381 ms    QTC Interval 459 ms    P Axis 97 degrees    QRS Axis 45 degrees    T Wave Axis 55 degrees   Hemoglobin and hematocrit, blood    Collection Time: 01/04/22  3:58 AM   Result Value Ref Range    Hemoglobin 9 6 (L) 11 5 - 15 4 g/dL    Hematocrit 30 6 (L) 34 8 - 46 1 %   Fingerstick Glucose (POCT)    Collection Time: 01/04/22  4:00 AM   Result Value Ref Range    POC Glucose 130 65 - 140 mg/dl   HS Troponin I 4hr    Collection Time: 01/04/22  5:17 AM   Result Value Ref Range    hs TnI 4hr 7 "Refer to ACS Flowchart" - see link ng/L    Delta 4hr hsTnI 1 ng/L   Hemoglobin A1c w/EAG Estimation (Orders if not completed within the last 90 days)    Collection Time: 01/04/22  5:17 AM   Result Value Ref Range    Hemoglobin A1C 7 5 (H) Normal 3 8-5 6%; PreDiabetic 5 7-6 4%;  Diabetic >=6 5%; Glycemic control for adults with diabetes <7 0% %     mg/dl   Serial Hemoglobin Q6hrs    Collection Time: 01/04/22  5:17 AM   Result Value Ref Range    Hemoglobin 9 9 (L) 11 5 - 15 4 g/dL   Ammonia    Collection Time: 01/04/22  5:17 AM   Result Value Ref Range    Ammonia 47 50 (H) <=36 umol/L   Comprehensive metabolic panel    Collection Time: 01/04/22  5:17 AM   Result Value Ref Range    Sodium 133 133 - 145 mmol/L    Potassium 4 6 3 5 - 5 0 mmol/L    Chloride 101 96 - 108 mmol/L    CO2 27 22 - 33 mmol/L    ANION GAP 5 4 - 13 mmol/L    BUN 26 (H) 6 - 20 mg/dL    Creatinine 1 58 (H) 0 40 - 1 10 mg/dL    Glucose 114 65 - 140 mg/dL    Calcium 7 6 (L) 8 4 - 10 2 mg/dL    Corrected Calcium 8 6 8 3 - 10 1 mg/dL    AST 95 (H) 15 - 41 U/L    ALT 41 5 - 54 U/L Alkaline Phosphatase 126 9 35 - 140 U/L    Total Protein 5 6 (L) 6 4 - 8 3 g/dL    Albumin 2 8 (L) 3 4 - 4 8 g/dL    Total Bilirubin 0 39 0 30 - 1 20 mg/dL    eGFR 34 ml/min/1 73sq m   Iron Saturation %    Collection Time: 01/04/22  5:17 AM   Result Value Ref Range    Iron Saturation 21 15 - 50 %    TIBC 353 250 - 450 ug/dL    Iron 74 50 - 170 ug/dL   Ferritin    Collection Time: 01/04/22  5:17 AM   Result Value Ref Range    Ferritin 31 8 - 388 ng/mL   Fingerstick Glucose (POCT)    Collection Time: 01/04/22  9:24 AM   Result Value Ref Range    POC Glucose 156 (H) 65 - 140 mg/dl   Fingerstick Glucose (POCT)    Collection Time: 01/04/22 11:35 AM   Result Value Ref Range    POC Glucose 229 (H) 65 - 140 mg/dl           Imaging Studies: XR chest 1 view portable    Result Date: 1/4/2022  Narrative: CHEST INDICATION:   sob  COMPARISON:  X-ray 7/15/20 EXAM PERFORMED/VIEWS:  XR CHEST PORTABLE FINDINGS: Cardiomediastinal silhouette appears unremarkable  Prominent interstitial opacities in the right lung Streaky left basal opacities No pneumothorax  No large pleural effusion  Osseous structures appear within normal limits for patient age  Impression: 1  Increased interstitial opacities in the right lung, compatible with COVID-19  Trace pulmonary edema would have a similar appearance 2  Left basal opacities, likely atelectasis The study was marked in EPIC for immediate notification  Workstation performed: [id number]     CT head wo contrast    Result Date: 1/3/2022  Narrative: CT BRAIN - WITHOUT CONTRAST INDICATION:   AMS  COMPARISON:  7/15/2020 TECHNIQUE:  CT examination of the brain was performed  In addition to axial images, sagittal and coronal 2D reformatted images were created and submitted for interpretation  Radiation dose length product (DLP) for this visit:  889 7 mGy-cm     This examination, like all CT scans performed in the [facility], was performed utilizing techniques to minimize radiation dose exposure, including the use of iterative reconstruction and automated exposure control  IMAGE QUALITY:  Diagnostic  FINDINGS: PARENCHYMA:  No intracranial mass, mass effect or midline shift  No CT signs of acute infarction  Hyperdensity within the right occipital lobe series 201 image 21 was also seen on the prior exam and indeterminate  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  VENTRICLES AND EXTRA-AXIAL SPACES:  Normal for the patient's age  VISUALIZED ORBITS AND PARANASAL SINUSES:  Unremarkable  CALVARIUM AND EXTRACRANIAL SOFT TISSUES:  Normal      Impression: No significant interval change from the prior CT  No acute intracranial abnormality  Microangiopathic changes  Workstation performed: [id number]       Problem List:   Patient Active Problem List   Diagnosis    Essential hypertension    Venous insufficiency (chronic) (peripheral)    Chronic venous stasis dermatitis of both lower extremities    Type 2 diabetes mellitus with hyperglycemia, with long-term current use of insulin (HCC)    Chronic pain syndrome    Peripheral neuropathy    Hyperkalemia    Marijuana use    Left hip pain    Spondylosis of lumbar joint    Other microscopic hematuria    Syncope    Cirrhosis ([address] )    Splenomegaly    CAD (coronary artery disease)    Urge incontinence    Ex-smoker    Altered mental status    Acute respiratory failure with hypoxia (HCC)    Fecal occult blood test positive    COVID-19    Elevated serum creatinine         APRYL Levine      Please Note: "This note has been constructed using a voice recognition system  Therefore there may be syntax, spelling, and/or grammatical errors   Please call if you have any questions  "**

## 2022-01-04 NOTE — ED NOTES
Pt had large solid BM, some blood noted to stool       Jannet Alanis, RN  01/04/22 [address], RN  01/04/22 1437

## 2022-01-04 NOTE — ASSESSMENT & PLAN NOTE
· Presented hypoxic, currently requiring 6L NC with sat >90  · Wean as able to main sats >88  · Does not wear oxygen at home  · 2/2 to COVID-19  · See above plan

## 2022-01-04 NOTE — ASSESSMENT & PLAN NOTE
· C/o shortness of breath  Tested positive on home kit  Her husbad has been having subjective fevers, chills, cough over past day  · Meets SIRS criteria for tachycardia and tachypnea   Lactate normal   · CXR: diffuse patchy infiltrate, possible LLL PNA  · Will hold off on further abx until PCL as patient afebrile, no leukocytosis, no sputum  · IV abx: Given ceftriaxone and azithromycin in ED  · IV fluids: 1L NS in ED  · Labs   · PCT pending  · D-dimer: 0 70  · Start on moderate covid pathway  · Obtain ECG  · Remdesivir  · Decadron   · AC: Lovenox   · Respiratory protocol, incentive spirometry

## 2022-01-04 NOTE — ASSESSMENT & PLAN NOTE
Lab Results   Component Value Date    HGBA1C 6 3 (H) 07/15/2020       No results for input(s): POCGLU in the last 72 hours      Blood Sugar Average: Last 72 hrs:  · Update A1c  · Home regimen: Novolog 3 units TID with meals, lantus 23 units hs   · Continue lantus 23 units hs  · SSI with Acu checks AC/HS  · Hypoglycemia protocol

## 2022-01-04 NOTE — RESPIRATORY THERAPY NOTE
RT Protocol Note  Cecelia Mayes 73 y o  female MRN: [id number]  Unit/Bed#: ED OVR 21 Encounter: [id number]    Assessment    Active Problems:    Type 2 diabetes mellitus with hyperglycemia, with long-term current use of insulin (HCC)    Hyperkalemia    Cirrhosis (HCC)    Altered mental status    Acute respiratory failure with hypoxia (HCC)    Fecal occult blood test positive    COVID-19    Elevated serum creatinine      Home Pulmonary Medications:  none       Past Medical History:   Diagnosis Date    Abnormal head CT 2017    Acute kidney injury ([address] ) 2018    Cellulitis 1/10/2017    Closed fracture of multiple ribs of right side 2017    Degenerative disc disease at L5-S1 level     Diabetes mellitus (HCC)     History of methicillin resistant staphylococcus aureus (MRSA) 2018    negative nasal culture- isolation and hx of mrsa removed 2018    Hyperkalemia 2018    Hypertension     Hypocalcemia 2018    Hyponatremia 2017     Social History     Socioeconomic History    Marital status: /Civil Union     Spouse name: None    Number of children: None    Years of education: None    Highest education level: None   Occupational History    None   Tobacco Use    Smoking status: Former Smoker     Types: E-Cigarettes     Start date: 1976     Quit date: 2019     Years since quittin 7    Smokeless tobacco: Never Used   Vaping Use    Vaping Use: Every day    Start date: 2019    Substances: Nicotine, THC (not using anymore)   Substance and Sexual Activity    Alcohol use: Not Currently     Alcohol/week: 0 0 standard drinks    Drug use: Not Currently     Types: Marijuana     Comment: last used 1 year ago     Sexual activity: Not Currently   Other Topics Concern    None   Social History Narrative    None     Social Determinants of Health     Financial Resource Strain: Not on file   Food Insecurity: Not on file   Transportation Needs: Not on file Physical Activity: Not on file   Stress: Not on file   Social Connections: Not on file   Intimate Partner Violence: Not on file   Housing Stability: Not on file       Subjective         Objective    Physical Exam:   Assessment Type: Pre-treatment  General Appearance: Alert,Awake  Respiratory Pattern: Normal  Chest Assessment: Chest expansion symmetrical  Bilateral Breath Sounds: Diminished,Crackles  Location Specific: Yes    Vitals:  Blood pressure 133/75, pulse 93, temperature (!) 97 4 °F (36 3 °C), temperature source Oral, resp  rate 16, SpO2 94 %  Imaging and other studies: I have personally reviewed pertinent reports  Plan    Respiratory Plan: Mild Distress pathway        Resp Comments: (P) Patient assessed per Respiratory Protocol  She has no prior Pulmonary history noted  Current lung sounds are diminished with fine crackles bilaterally in bases  She is presently on a nasal cannula at 3 5 L, SpO2 100%  Atrovent MDI ordered by physician and Respiratory to follow

## 2022-01-04 NOTE — H&P
Nataly Hernandez 46 1955, 73 y o  female MRN: [id number]  Unit/Bed#: ED OVR 21 Encounter: [id number]  Primary Care Provider: Jami Cortes MD   Date and time admitted to hospital: 1/3/2022  8:52 PM    Altered mental status  Assessment & Plan  · A&O x 3 on assessment  No focal neurologic symptoms  Per , this afternoon patient develop confusion and didn't know where she was or what time it is  Denies dysarthria, dizziness, paresthesias, gait abnormalities, hemiparesis, headache, visual changes, ataxia, facial droop  · CT head: negative   · Salicylate level <5, ethanol <10 and acetaminophen <10  Urine tox positive for opiates  · On oxycodone for chronic back pain  · Ammonia 50 93, last value in 2020 was <10  · Received 20g lactulose in ED  · Trend ammonia   · UA negative   · Neuro checks   · Beside swallow assessment prior to starting diet     Fecal occult blood test positive  Assessment & Plan  · Reports some darker stool over past couple days  Denies abdominal pain, hematochezia, hematemesis, N/V/D   · CBC Hgb 4 4, thought to be lab error in ED  Repeat H&H ordered and Hgb 9 6  · Trend H&H   · Blood consent obtained  · Obtain iron panel   · Monitor stool   · GI consult      COVID-19  Assessment & Plan  · C/o shortness of breath  Tested positive on home kit  Her husbad has been having subjective fevers, chills, cough over past day  · Meets SIRS criteria for tachycardia and tachypnea   Lactate normal   · CXR: diffuse patchy infiltrate, possible LLL PNA  · Will hold off on further abx until PCL as patient afebrile, no leukocytosis, no sputum  · IV abx: Given ceftriaxone and azithromycin in ED  · IV fluids: 1L NS in ED  · Labs   · PCT pending  · D-dimer: 0 70  · Start on moderate covid pathway  · Obtain ECG  · Remdesivir  · Decadron   · AC: Lovenox   · Respiratory protocol, incentive spirometry     Acute respiratory failure with hypoxia ([facility] [address] )  Assessment & Plan  · Presented hypoxic, currently requiring 6L NC with sat >90  · Wean as able to main sats >88  · Does not wear oxygen at home  · 2/2 to COVID-19  · See above plan    Cirrhosis [facility])  Assessment & Plan  · AST-64, ALT-29  · Per chart review, from alcohol use  Denies any recent alcohol use-- ethanol level less than 10  Type 2 diabetes mellitus with hyperglycemia, with long-term current use of insulin (HCC)  Assessment & Plan  Lab Results   Component Value Date    HGBA1C 6 3 (H) 07/15/2020       No results for input(s): POCGLU in the last 72 hours  Blood Sugar Average: Last 72 hrs:  · Update A1c  · Home regimen: Novolog 3 units TID with meals, lantus 23 units hs   · Continue lantus 23 units hs  · SSI with Acu checks AC/HS  · Hypoglycemia protocol     Elevated serum creatinine  Assessment & Plan  Cr 1 57  Received 1L IVF bolus in ED  Recheck BMP in am  Unclear baseline     Hyperkalemia  Assessment & Plan  · K 5 3  · Asymptomatic  · Repeat BMP in am      VTE Pharmacologic Prophylaxis:   lovenox   Code Status: Level 1 - Full Code   Discussion with family: Patient declined call to   Anticipated Length of Stay: Patient will be admitted on an inpatient basis with an anticipated length of stay of greater than 2 midnights secondary to altered mental status, FOBT +, COVID-19 with acute hypoxic respiratory failure   Total Time for Visit, including Counseling / Coordination of Care: 70 minutes Greater than 50% of this total time spent on direct patient counseling and coordination of care  Chief Complaint: altered mental status     History of Present Illness: Feng Worthy is a 73 y o  female with a PMH of cirrhosis, alcohol abuse, marijuana use, chronic back pain on oxycodone, and DM who presents with altered mental status, fever, cough  Per , patient became confused in the afternoon and did not know where she was and thought it was the middle the night    CT head negative for acute abnormality  UA normal   Ammonia level elevated, treated with lactulose  Tox panel positive for opioids  She is A&O x3 at time of evaluation  Denies all stroke-like symptoms  Patient also with shortness of breath requiring 6 L nasal cannula, patient tested for COVID and found to be positive  Started on moderate pathway  Denies chest pain, palpitations, wheezing, urinary complaints  In the ED on initial CBC hemoglobin noted to be 4 4  ED provider repeated and hemoglobin 9 6  Will continue to trend  Patient reports darker colored stools over the past few days  Denies hematochezia, hematemesis, abdominal pain, N/V/D  Review of Systems:  Review of Systems   Constitutional: Positive for chills and fever  HENT: Negative for congestion  Eyes: Negative for visual disturbance  Respiratory: Positive for shortness of breath  Negative for wheezing  Cardiovascular: Negative for chest pain, palpitations and leg swelling  Gastrointestinal: Negative for abdominal pain, anal bleeding, diarrhea, nausea, rectal pain and vomiting  Endocrine: Negative for polyuria  Genitourinary: Negative for difficulty urinating, dysuria and urgency  Musculoskeletal: Negative for back pain  Skin: Negative for rash  Neurological: Negative for dizziness, syncope, facial asymmetry, speech difficulty, weakness, light-headedness, numbness and headaches  Psychiatric/Behavioral: Negative for sleep disturbance         Past Medical and Surgical History:   Past Medical History:   Diagnosis Date    Abnormal head CT 7/14/2017    Acute kidney injury ([address] ) 8/19/2018    Cellulitis 1/10/2017    Closed fracture of multiple ribs of right side 7/14/2017    Degenerative disc disease at L5-S1 level     Diabetes mellitus (HCC)     History of methicillin resistant staphylococcus aureus (MRSA) 08/21/2018    negative nasal culture- isolation and hx of mrsa removed 8/20/2018    Hyperkalemia 4/25/2018    Hypertension     Hypocalcemia 4/25/2018    Hyponatremia 7/14/2017       Past Surgical History:   Procedure Laterality Date    CHOLECYSTECTOMY      JOINT REPLACEMENT      OOPHORECTOMY      GA INCISION,IMPLANT,NEUROELEC,SACRAL NERVE N/A 9/20/2021    Procedure: INSERTION NEUROSTIMULATOR ELECTRODE ARRAY SACRAL NERVE; FLUOROSCOPY; ELECTRONIC ANALYSIS;  Surgeon: Velma Mccloud MD;  Location: [facility];  Service: UroGynecology           SACRAL NERVE STIMULATOR PLACEMENT N/A 9/28/2021    Procedure: IPG INSERTION AND PROGRAMMING;  Surgeon: Velma Mccloud MD;  Location: [facility];  Service: UroGynecology              Meds/Allergies:  Prior to Admission medications    Medication Sig Start Date End Date Taking?  Authorizing Provider   acetaminophen (TYLENOL) 650 mg CR tablet Take 2 tablets (1,300 mg total) by mouth every 8 (eight) hours as needed for mild pain or moderate pain 9/20/21   Nettie Cantu MD   amLODIPine (NORVASC) 10 mg tablet Take 1 tablet (10 mg total) by mouth daily 8/25/18   Anh Michael MD   docusate sodium (COLACE) 100 mg capsule Take 1 capsule (100 mg total) by mouth 2 (two) times a day as needed for constipation 9/20/21   Nettie Cantu MD   DULoxetine (CYMBALTA) 60 mg delayed release capsule Take 60 mg by mouth daily    Historical Provider, MD   gabapentin (NEURONTIN) 100 mg capsule Take 1 capsule (100 mg total) by mouth 2 (two) times a day  Patient taking differently: Take 300 mg by mouth daily Takes as needed 3/11/20   Kita MondayTIFFANIE   Insulin Aspart FlexPen 100 UNIT/ML SOPN Inject 3 Units under the skin 3 (three) times a day with meals If bs>250    Historical Provider, MD   insulin glargine (LANTUS SOLOSTAR) 100 units/mL injection pen Inject 23 Units under the skin daily at bedtime     Historical Provider, MD   lisinopril (ZESTRIL) 20 mg tablet Take 10 mg by mouth daily     Historical Provider, MD   metoclopramide (REGLAN) 10 mg tablet Take 10 mg by mouth daily    Historical Provider, MD   oxyCODONE (ROXICODONE) 5 mg immediate release tablet Take 15 mg by mouth every 6 (six) hours as needed for moderate pain     Historical Provider, MD   promethazine (PHENERGAN) 25 mg suppository Insert 1 suppository (25 mg total) into the rectum every 6 (six) hours as needed for nausea or vomiting 10/22/20   Gallo Torres MD     I have reviewed home medications with patient personally  Allergies: No Known Allergies    Social History:  Marital Status: /Civil Union   Occupation:    Patient Pre-hospital Living Situation:  Patient Pre-hospital Level of Mobility: walks  Patient Pre-hospital Diet Restrictions:   Substance Use History:   Social History     Substance and Sexual Activity   Alcohol Use Not Currently    Alcohol/week: 0 0 standard drinks     Social History     Tobacco Use   Smoking Status Former Smoker    Types: E-Cigarettes    Start date: 1976   Solis Jones Quit date: 2019    Years since quittin 7   Smokeless Tobacco Never Used     Social History     Substance and Sexual Activity   Drug Use Not Currently    Types: Marijuana    Comment: last used 1 year ago        Family History:  Family History   Problem Relation Age of Onset    Rheum arthritis Mother     Alzheimer's disease Mother     Lupus Mother        Physical Exam:     Vitals:   Blood Pressure: (!) 148/117 (22)  Pulse: 102 (22)  Temperature: 100 4 °F (38 °C) (22)  Temp Source: Oral (22)  Respirations: 20 (22)  SpO2: 100 % (22)    Physical Exam  Vitals and nursing note reviewed  Constitutional:       General: She is not in acute distress  Appearance: Normal appearance  She is not toxic-appearing  HENT:      Head: Normocephalic and atraumatic  Mouth/Throat:      Mouth: Mucous membranes are dry  Eyes:      Extraocular Movements: Extraocular movements intact  Pupils: Pupils are equal, round, and reactive to light     Cardiovascular:      Rate and Rhythm: Normal rate and regular rhythm  Pulses: Normal pulses  Heart sounds: Normal heart sounds  No murmur heard  Pulmonary:      Effort: Pulmonary effort is normal  No respiratory distress  Breath sounds: Rhonchi (at b/l bases) present  No wheezing  Abdominal:      General: Abdomen is flat  Bowel sounds are normal  There is no distension  Palpations: Abdomen is soft  Tenderness: There is no abdominal tenderness  There is no guarding  Musculoskeletal:      Right lower leg: Edema (chronic venous stasis changes) present  Left lower leg: Edema (chronic venous stasis changes) present  Skin:     General: Skin is warm and dry  Capillary Refill: Capillary refill takes less than 2 seconds  Neurological:      General: No focal deficit present  Mental Status: She is alert and oriented to person, place, and time  Cranial Nerves: Cranial nerves are intact  No cranial nerve deficit, dysarthria or facial asymmetry  Coordination: Romberg sign negative  Finger-Nose-Finger Test normal    Psychiatric:         Mood and Affect: Mood normal          Behavior: Behavior normal          Additional Data:     Lab Results:  Results from last 7 days   Lab Units 01/04/22  0358 01/03/22  2310 01/03/22  2200   WBC Thousand/uL  --   --  4 62   HEMOGLOBIN g/dL 9 6*   < > 4 4*   HEMATOCRIT % 30 6*   < > 14 0*   PLATELETS Thousands/uL  --   --  107*   NEUTROS PCT %  --   --  73   LYMPHS PCT %  --   --  14   MONOS PCT %  --   --  11   EOS PCT %  --   --  2    < > = values in this interval not displayed       Results from last 7 days   Lab Units 01/03/22  2200   SODIUM mmol/L 131*   POTASSIUM mmol/L 5 3*   CHLORIDE mmol/L 98   CO2 mmol/L 28   BUN mg/dL 26*   CREATININE mg/dL 1 57*   ANION GAP mmol/L 5   CALCIUM mg/dL 8 0*   ALBUMIN g/dL 3 0*   TOTAL BILIRUBIN mg/dL 0 60   ALK PHOS U/L 127 7   ALT U/L 29   AST U/L 64*   GLUCOSE RANDOM mg/dL 136     Results from last 7 days   Lab Units 01/03/22  2200   INR  1 21*             Results from last 7 days   Lab Units 01/03/22  2240   LACTIC ACID mmol/L 0 7       Imaging: Reviewed radiology reports from this admission including: chest xray and chest CT scan  XR chest 1 view portable   ED Interpretation by Kaley Winn MD (01/03 2206)   Diffuse patchy infiltrates, possible LLL pneumonia      CT head wo contrast   Final Result by Ebony Minor MD (01/03 2242)   No significant interval change from the prior CT  No acute intracranial abnormality  Microangiopathic changes  Workstation performed: [id number]             EKG and Other Studies Reviewed on Admission:   · EKG: NSR  HR 87  with PVCs    ** Please Note: This note has been constructed using a voice recognition system   **

## 2022-01-04 NOTE — ED PROVIDER NOTES
History  Chief Complaint   Patient presents with    Flu Symptoms     pt reports new onset of tremors/twitching, home COVID test positive, hx of encephalopathy     81-year-old female with history of prior alcohol abuse, cirrhosis, hypertension, marijuana use, chronic back pain on oxycodone, DM, presents to the emergency department for altered mental status, fever, cough  Patient is a somewhat poor historian  She is denying any complaints at this time  I spoke with patient's  over the phone who says since last night she has had cough and subjective fevers  Today has positive home COVID test  This afternoon developed confusion - says she thought it was the middle of the night and didn't know where she was  Denies her complaining of chest pain, SOB, n/v/d, abdominal pain, headache, focal neuro sx, any other complaints  No recent head trauma  He does not believe she has used drugs or ETOH recently  Flu Symptoms  Presenting symptoms: cough and fever    Presenting symptoms: no diarrhea, no headaches, no nausea, no rhinorrhea, no shortness of breath and no vomiting    Associated symptoms: no chills        Prior to Admission Medications   Prescriptions Last Dose Informant Patient Reported? Taking?    DULoxetine (CYMBALTA) 60 mg delayed release capsule   Yes No   Sig: Take 60 mg by mouth daily   Insulin Aspart FlexPen 100 UNIT/ML SOPN   Yes No   Sig: Inject 3 Units under the skin 3 (three) times a day with meals If bs>250   acetaminophen (TYLENOL) 650 mg CR tablet   No No   Sig: Take 2 tablets (1,300 mg total) by mouth every 8 (eight) hours as needed for mild pain or moderate pain   amLODIPine (NORVASC) 10 mg tablet   No No   Sig: Take 1 tablet (10 mg total) by mouth daily   docusate sodium (COLACE) 100 mg capsule   No No   Sig: Take 1 capsule (100 mg total) by mouth 2 (two) times a day as needed for constipation   gabapentin (NEURONTIN) 100 mg capsule   No No   Sig: Take 1 capsule (100 mg total) by mouth 2 (two) times a day   Patient taking differently: Take 300 mg by mouth daily Takes as needed   insulin glargine (LANTUS SOLOSTAR) 100 units/mL injection pen   Yes No   Sig: Inject 23 Units under the skin daily at bedtime    lisinopril (ZESTRIL) 20 mg tablet   Yes No   Sig: Take 10 mg by mouth daily    metoclopramide (REGLAN) 10 mg tablet   Yes No   Sig: Take 10 mg by mouth daily   oxyCODONE (ROXICODONE) 5 mg immediate release tablet   Yes No   Sig: Take 15 mg by mouth every 6 (six) hours as needed for moderate pain    promethazine (PHENERGAN) 25 mg suppository   No No   Sig: Insert 1 suppository (25 mg total) into the rectum every 6 (six) hours as needed for nausea or vomiting      Facility-Administered Medications: None       Past Medical History:   Diagnosis Date    Abnormal head CT 7/14/2017    Acute kidney injury ([facility] [address] ) 8/19/2018    Cellulitis 1/10/2017    Closed fracture of multiple ribs of right side 7/14/2017    Degenerative disc disease at L5-S1 level     Diabetes mellitus (HCC)     History of methicillin resistant staphylococcus aureus (MRSA) 08/21/2018    negative nasal culture- isolation and hx of mrsa removed 8/20/2018    Hyperkalemia 4/25/2018    Hypertension     Hypocalcemia 4/25/2018    Hyponatremia 7/14/2017       Past Surgical History:   Procedure Laterality Date    CHOLECYSTECTOMY      JOINT REPLACEMENT      OOPHORECTOMY      IA INCISION,IMPLANT,NEUROELEC,SACRAL NERVE N/A 9/20/2021    Procedure: INSERTION NEUROSTIMULATOR ELECTRODE ARRAY SACRAL NERVE; FLUOROSCOPY; ELECTRONIC ANALYSIS;  Surgeon: Donna Gordon MD;  Location: [facility];  Service: UroGynecology           SACRAL NERVE STIMULATOR PLACEMENT N/A 9/28/2021    Procedure: IPG INSERTION AND PROGRAMMING;  Surgeon: Donna Gordon MD;  Location: [facility];  Service: UroGynecology              Family History   Problem Relation Age of Onset    Rheum arthritis Mother     Alzheimer's disease Mother     Lupus Mother      I have reviewed and agree with the history as documented  E-Cigarette/Vaping    E-Cigarette Use Current Every Day User     Start Date 20     Cartridges/Day 1     Comments VAPE      E-Cigarette/Vaping Substances    Nicotine Yes     THC Yes not using anymore     Social History     Tobacco Use    Smoking status: Former Smoker     Types: E-Cigarettes     Start date: 1976     Quit date: 2019     Years since quittin 7    Smokeless tobacco: Never Used   Vaping Use    Vaping Use: Every day    Start date: 2019    Substances: Nicotine, THC (not using anymore)   Substance Use Topics    Alcohol use: Not Currently     Alcohol/week: 0 0 standard drinks    Drug use: Not Currently     Types: Marijuana     Comment: last used 1 year ago        Review of Systems   Constitutional: Positive for fever  Negative for chills  HENT: Negative  Negative for rhinorrhea  Eyes: Negative  Respiratory: Positive for cough  Negative for shortness of breath  Cardiovascular: Negative  Negative for chest pain and leg swelling  Gastrointestinal: Negative  Negative for abdominal pain, diarrhea, nausea and vomiting  Genitourinary: Negative  Negative for dysuria, flank pain and frequency  Musculoskeletal: Negative  Negative for back pain and neck pain  Skin: Negative  Negative for rash  Neurological: Negative  Negative for light-headedness and headaches  Psychiatric/Behavioral: Positive for confusion  All other systems reviewed and are negative  Physical Exam  Physical Exam  Vitals and nursing note reviewed  Constitutional:       General: She is not in acute distress  Appearance: She is well-developed  HENT:      Head: Normocephalic and atraumatic  Mouth/Throat:      Mouth: Mucous membranes are moist    Eyes:      Pupils: Pupils are equal, round, and reactive to light  Cardiovascular:      Rate and Rhythm: Normal rate and regular rhythm        Pulses:           Radial pulses are 2+ on the right side and 2+ on the left side  Heart sounds: Normal heart sounds  No murmur heard  No friction rub  No gallop  Pulmonary:      Effort: Pulmonary effort is normal  No respiratory distress  Breath sounds: No stridor  Rhonchi (bilateral) present  No wheezing or rales  Abdominal:      Palpations: Abdomen is soft  Tenderness: There is no abdominal tenderness  There is no guarding or rebound  Musculoskeletal:         General: No swelling or tenderness  Normal range of motion  Cervical back: Normal range of motion and neck supple  Right lower leg: Edema (2+) present  Left lower leg: Edema (2+) present  Comments: Chronic venous stasis changes of bilateral lower extremities   Skin:     General: Skin is warm and dry  Capillary Refill: Capillary refill takes less than 2 seconds  Neurological:      Mental Status: She is alert  Cranial Nerves: No cranial nerve deficit  Comments: Oriented to year and self  Knows she is in a hospital but thinks she is in a [place] hospital  Clear fluent speech           Vital Signs  ED Triage Vitals   Temperature Pulse Respirations Blood Pressure SpO2   01/03/22 2053 01/03/22 2053 01/03/22 2053 01/03/22 2053 01/03/22 2053   100 4 °F (38 °C) 102 20 (!) 148/117 100 %      Temp Source Heart Rate Source Patient Position - Orthostatic VS BP Location FiO2 (%)   01/03/22 2053 01/03/22 2053 01/03/22 2053 01/03/22 2053 --   Oral Monitor Sitting Right arm       Pain Score       01/03/22 2254       Med Not Given for Pain - for MAR use only           Vitals:    01/03/22 2053   BP: (!) 148/117   Pulse: 102   Patient Position - Orthostatic VS: Sitting         Visual Acuity      ED Medications  Medications   sodium chloride (PF) 0 9 % injection 3 mL (has no administration in time range)   lactulose oral solution 20 g (has no administration in time range)   cefTRIAXone (ROCEPHIN) IVPB (premix in dextrose) 1,000 mg 50 mL (has no administration in time range)   azithromycin (ZITHROMAX) 500 mg in sodium chloride 0 9% 250mL IVPB 500 mg (has no administration in time range)   sodium chloride 0 9 % bolus 1,000 mL (0 mL Intravenous Stopped 1/3/22 2341)   acetaminophen (TYLENOL) tablet 975 mg (975 mg Oral Given 1/3/22 5174)       Diagnostic Studies  Results Reviewed     Procedure Component Value Units Date/Time    Occult Bloood,Fecal Immunochemical [[id number]]  (Abnormal) Collected: 01/04/22 0013    Lab Status: Final result Specimen: Stool from Per Rectum Updated: 01/04/22 0020     OCCULT BLD, FECAL IMMUNOLOGICAL Positive    Narrative:        Performed by Fecal Immunochemical Test     Rapid drug screen, urine [[id number]]  (Abnormal) Collected: 01/03/22 2339    Lab Status: Final result Specimen: Urine, Other Updated: 01/04/22 0013     Amph/Meth UR Negative     Barbiturate Ur Negative     Benzodiazepine Urine Negative     Cocaine Urine Negative     Methadone Urine Negative     Opiate Urine Positive     PCP Ur Negative     THC Urine Negative     Oxycodone Urine Positive    Narrative:      Presumptive report  If requested, specimen will be sent to reference lab for confirmation  FOR MEDICAL PURPOSES ONLY  IF CONFIRMATION NEEDED PLEASE CONTACT THE LAB WITHIN 5 DAYS      Drug Screen Cutoff Levels:  AMPHETAMINE/METHAMPHETAMINES  1000 ng/mL  BARBITURATES     200 ng/mL  BENZODIAZEPINES     200 ng/mL  COCAINE      300 ng/mL  METHADONE      300 ng/mL  OPIATES      300 ng/mL  PHENCYCLIDINE     25 ng/mL  THC       50 ng/mL  OXYCODONE      100 ng/mL    Hemoglobin and hematocrit, blood [[id number]]  (Abnormal) Collected: 01/03/22 2310    Lab Status: Final result Specimen: Blood from Arm, Right Updated: 01/04/22 0012     Hemoglobin 9 7 g/dL      Hematocrit 30 7 %     HS Troponin I 4hr [[id number]]     Lab Status: No result Specimen: Blood     UA w Reflex to Microscopic w Reflex to Culture [[id number]]  (Abnormal) Collected: 01/03/22 2339    Lab Status: Final result Specimen: Urine, Straight Cath Updated: 01/03/22 2351     Color, UA Yellow     Clarity, UA Clear     Specific Gravity, UA 1 025     pH, UA 5 5     Leukocytes, UA Negative     Nitrite, UA Negative     Protein, UA Trace mg/dl      Glucose, UA Negative mg/dl      Ketones, UA Negative mg/dl      Urobilinogen, UA 0 2 E U /dl      Bilirubin, UA Negative     Blood, UA Negative    Urine Microscopic [[id number]] Collected: 01/03/22 2339    Lab Status: In process Specimen: Urine, Straight Cath Updated: 01/03/22 2351    Blood culture #1 [[id number]] Collected: 01/03/22 2223    Lab Status: In process Specimen: Blood from Hand, Right Updated: 01/03/22 2348    Blood culture #2 [[id number]] Collected: 01/03/22 2223    Lab Status: In process Specimen: Blood from Arm, Right Updated: 01/03/22 2348    Occult Blood 1-3 Stool, (Diagnostic) [[id number]] Collected: 01/03/22 2325    Lab Status: No result Specimen: Stool from Rectum     COVID/FLU/RSV - 2 hour TAT [[id number]] Collected: 01/03/22 2304    Lab Status: In process Specimen: Nares from Nasopharyngeal Swab Updated: 01/03/22 2316    Lactic Acid [[id number]]  (Normal) Collected: 01/03/22 2240    Lab Status: Final result Specimen: Blood from Arm, Right Updated: 01/03/22 2303     LACTIC ACID 0 7 mmol/L     Narrative:      Result may be elevated if tourniquet was used during collection      Ammonia [[id number]]  (Abnormal) Collected: 01/03/22 2240    Lab Status: Final result Specimen: Blood from Arm, Right Updated: 01/03/22 2303     Ammonia 50 93 umol/L     Ethanol [[id number]]  (Normal) Collected: 01/03/22 2240    Lab Status: Final result Specimen: Blood from Arm, Right Updated: 01/03/22 2303     Ethanol Lvl <73 mg/dL     Salicylate level [[id number]]  (Abnormal) Collected: 01/03/22 2223    Lab Status: Final result Specimen: Blood from Arm, Right Updated: 37/29/70 3856     Salicylate Lvl <6 5 mg/dL     Acetaminophen level-If concentration is detectable, please discuss with medical  on call   [[id number]]  (Abnormal) Collected: 01/03/22 2223    Lab Status: Final result Specimen: Blood from Arm, Right Updated: 01/03/22 2303     Acetaminophen Level <10 ug/mL     Blood gas, venous [[id number]]  (Abnormal) Collected: 01/03/22 2239    Lab Status: Final result Specimen: Blood from Arm, Right Updated: 01/03/22 2247     pH, Surjit 7 326     pCO2, Surjit 50 9 mm Hg      pO2, Surjit 49 1 mm Hg      HCO3, Surjit 26 0 mmol/L      Base Excess, Surjit -0 5 mmol/L      O2 Content, Surjit 13 3 ml/dL      O2 HGB, VENOUS 82 2 %     B-Type Natriuretic Peptide(BNP) CA, GH, EA Campuses Only [[id number]]  (Abnormal) Collected: 01/03/22 2200    Lab Status: Final result Specimen: Blood Updated: 01/03/22 2245      7 pg/mL     HS Troponin 0hr (reflex protocol) [[id number]]  (Normal) Collected: 01/03/22 2200    Lab Status: Final result Specimen: Blood Updated: 01/03/22 2243     hs TnI 0hr 6 ng/L     HS Troponin I 2hr [[id number]]     Lab Status: No result Specimen: Blood     CBC and differential [[id number]]  (Abnormal) Collected: 01/03/22 2200    Lab Status: Final result Specimen: Blood Updated: 01/03/22 2242     WBC 4 62 Thousand/uL      RBC 1 61 Million/uL      Hemoglobin 4 4 g/dL      Hematocrit 14 0 %      MCV 87 fL      MCH 27 3 pg      MCHC 31 4 g/dL      RDW 15 7 %      MPV 11 1 fL      Platelets 372 Thousands/uL      nRBC 0 /100 WBCs      Neutrophils Relative 73 %      Immat GRANS % 0 %      Lymphocytes Relative 14 %      Monocytes Relative 11 %      Eosinophils Relative 2 %      Basophils Relative 0 %      Neutrophils Absolute 3 39 Thousands/µL      Immature Grans Absolute 0 02 Thousand/uL      Lymphocytes Absolute 0 64 Thousands/µL      Monocytes Absolute 0 49 Thousand/µL      Eosinophils Absolute 0 07 Thousand/µL      Basophils Absolute 0 01 Thousands/µL     D-dimer, quantitative [[id number]]  (Abnormal) Collected: 01/03/22 2200    Lab Status: Final result Specimen: Blood Updated: 01/03/22 2239     D-Dimer, Raheem Grant 0 70 ug/ml FEU     Protime-INR [[id number]]  (Abnormal) Collected: 01/03/22 2200    Lab Status: Final result Specimen: Blood Updated: 01/03/22 2238     Protime 15 2 seconds      INR 1 21    APTT [[id number]]  (Normal) Collected: 01/03/22 2200    Lab Status: Final result Specimen: Blood Updated: 01/03/22 2238     PTT 34 seconds     Comprehensive metabolic panel [[id number]]  (Abnormal) Collected: 01/03/22 2200    Lab Status: Final result Specimen: Blood Updated: 01/03/22 2236     Sodium 131 mmol/L      Potassium 5 3 mmol/L      Chloride 98 mmol/L      CO2 28 mmol/L      ANION GAP 5 mmol/L      BUN 26 mg/dL      Creatinine 1 57 mg/dL      Glucose 136 mg/dL      Calcium 8 0 mg/dL      Corrected Calcium 8 8 mg/dL      AST 64 U/L      ALT 29 U/L      Alkaline Phosphatase 127 7 U/L      Total Protein 6 3 g/dL      Albumin 3 0 g/dL      Total Bilirubin 0 60 mg/dL      eGFR 34 ml/min/1 73sq m     Narrative:      National Kid[employer] Disease Foundation guidelines for Chronic Kidney Disease (CKD):     Stage 1 with normal or high GFR (GFR > 90 mL/min/1 73 square meters)    Stage 2 Mild CKD (GFR = 60-89 mL/min/1 73 square meters)    Stage 3A Moderate CKD (GFR = 45-59 mL/min/1 73 square meters)    Stage 3B Moderate CKD (GFR = 30-44 mL/min/1 73 square meters)    Stage 4 Severe CKD (GFR = 15-29 mL/min/1 73 square meters)    Stage 5 End Stage CKD (GFR <15 mL/min/1 73 square meters)  Note: GFR calculation is accurate only with a steady state creatinine    Magnesium [[id number]]  (Normal) Collected: 01/03/22 2200    Lab Status: Final result Specimen: Blood Updated: 01/03/22 2236     Magnesium 2 0 mg/dL     Calcium, ionized [[id number]]  (Abnormal) Collected: 01/03/22 2200    Lab Status: Final result Specimen: Blood Updated: 01/03/22 2236     Calcium, Ionized 1 08 mmol/L     C-reactive protein [[id number]]  (Normal) Collected: 01/03/22 2200    Lab Status: Final result Specimen: Blood Updated: 01/03/22 2236     CRP 0 4 mg/dL Triglycerides [[id number]]  (Normal) Collected: 01/03/22 2200    Lab Status: Final result Specimen: Blood Updated: 01/03/22 2236     Triglycerides 60 3 mg/dL     CK (with reflex to MB) [[id number]]  (Normal) Collected: 01/03/22 2200    Lab Status: Final result Specimen: Blood Updated: 01/03/22 2236     Total CK 51 3 U/L     Ferritin [[id number]] Updated: 01/03/22 2216    Lab Status: In process Specimen: Blood     Procalcitonin with AM Reflex [[id number]] Updated: 01/03/22 2215    Lab Status: In process Specimen: Blood     Glucose 6 phosphate dehydrogenase [[id number]] Updated: 01/03/22 2214    Lab Status: In process Specimen: Blood                  XR chest 1 view portable   ED Interpretation by Patricia Mcgrath MD (01/03 2206)   Diffuse patchy infiltrates, possible LLL pneumonia      CT head wo contrast   Final Result by Vivi Gil MD (01/03 2242)   No significant interval change from the prior CT  No acute intracranial abnormality  Microangiopathic changes  Workstation performed: [id number]                    Procedures  Procedures         ED Course  ED Course as of 01/04/22 0037   Mon Jan 03, 2022 2240 Creatinine(!): 1 57  From 1 31 previously   2241 D-Dimer, Quant(!): 0 70  Likely due to COVID pneumonia  No clinical signs of DVT  2242 C-REACTIVE PROTEIN: 0 4  From 1 31 previously   2243 Hemoglobin(!!): 4 4  Plan to transfuse   2307 Ammonia(!): 50 93  Increased from 10 in 2020                                             MDM  Number of Diagnoses or Management Options  Altered mental status  COVID-19  Hyperammonemia ([facility] [address] )  Hypoxia  Pneumonia  Diagnosis management comments: 16-year-old female with history of prior alcohol abuse, cirrhosis, hypertension, marijuana use, chronic back pain on oxycodone, DM, presents to the emergency department for altered mental status, fever, cough   Within ddx consider sepsis, metabolic derangement, acute intracranial process such as bleed or mass, hyperammonemia, medication effect, uremia, seizure/post-ictal state  Will obtain labs and imaging to assess for these etiologies  Final assessment: Workup suggestive of COVID-19 pneumonia, possible LLL pneumonia as well  Started IV abx and fluids  She does have an elevated ammonia of 50 from 10 previously so started lactulose  Initial Hgb reported as 4 4, however, patient does not clinically appear acutely anemic and per patient and  has not had any obvious sources of blood loss such as black or bloody stools or easy bruising  Repeat Hgb is 9 7  Discussed with admitting physician who agrees to accept patient for further management  Patient remains stable throughout ED course  Disposition  Final diagnoses:   COVID-19   Pneumonia   Hypoxia   Altered mental status   Hyperammonemia ([facility] [address] )     Time reflects when diagnosis was documented in both MDM as applicable and the Disposition within this note     Time User Action Codes Description Comment    1/4/2022 12:32 AM MinorYon Add [U07 1] COVID-19     1/4/2022 12:32 AM Minor, Carmen Add [J18 9] Pneumonia     1/4/2022 12:32 AM Vinny Carmen Add [R09 02] Hypoxia     1/4/2022 12:32 AM Minor, Carmen Add [R41 82] Altered mental status     1/4/2022 12:32 AM Minor, Carmen Add [E72 20] Hyperammonemia [facility])       ED Disposition     ED Disposition Condition Date/Time Comment    Admit Stable Tue Jan 4, 2022 12:37 AM Case was discussed with JEFFERY and the patient's admission status was agreed to be Admission Status: inpatient status to the service of [facility]   Follow-up Information    None         Patient's Medications   Discharge Prescriptions    No medications on file       No discharge procedures on file      PDMP Review       Value Time User    PDMP Reviewed  Yes 7/15/2020 12:10 AM Gilberto Leyden, MD          ED Provider  Electronically Signed by           Allie Menjivar MD  01/04/22 8006

## 2022-01-04 NOTE — ASSESSMENT & PLAN NOTE
· A&O x 3 on assessment  No focal neurologic symptoms  Per , this afternoon patient develop confusion and didn't know where she was or what time it is  Denies dysarthria, dizziness, paresthesias, gait abnormalities, hemiparesis, headache, visual changes, ataxia, facial droop  · CT head: negative   · Salicylate level <5, ethanol <10 and acetaminophen <10   Urine tox positive for opiates  · On oxycodone for chronic back pain  · Ammonia 50 93, last value in 2020 was <10  · Received 20g lactulose in ED  · Trend ammonia   · UA negative   · Neuro checks   · Beside swallow assessment prior to starting diet

## 2022-01-04 NOTE — ASSESSMENT & PLAN NOTE
· Reports some darker stool over past couple days  Denies abdominal pain, hematochezia, hematemesis, N/V/D   · CBC Hgb 4 4, thought to be lab error in ED   Repeat H&H ordered and Hgb 9 6  · Trend H&H   · Blood consent obtained  · Obtain iron panel   · Monitor stool   · GI consult

## 2022-01-05 LAB
ABO GROUP BLD BPU: NORMAL
ABO GROUP BLD BPU: NORMAL
AFP-TM SERPL-MCNC: 2.3 NG/ML (ref 0.5–8)
ALBUMIN SERPL BCP-MCNC: 3.3 G/DL (ref 3.4–4.8)
ALP SERPL-CCNC: 151.5 U/L (ref 35–140)
ALT SERPL W P-5'-P-CCNC: 55 U/L (ref 5–54)
ANION GAP SERPL CALCULATED.3IONS-SCNC: 6 MMOL/L (ref 4–13)
AST SERPL W P-5'-P-CCNC: 83 U/L (ref 15–41)
BILIRUB SERPL-MCNC: 0.34 MG/DL (ref 0.3–1.2)
BPU ID: NORMAL
BPU ID: NORMAL
BUN SERPL-MCNC: 21 MG/DL (ref 6–20)
CALCIUM ALBUM COR SERPL-MCNC: 8.9 MG/DL (ref 8.3–10.1)
CALCIUM SERPL-MCNC: 8.3 MG/DL (ref 8.4–10.2)
CHLORIDE SERPL-SCNC: 102 MMOL/L (ref 96–108)
CO2 SERPL-SCNC: 28 MMOL/L (ref 22–33)
CREAT SERPL-MCNC: 1.16 MG/DL (ref 0.4–1.1)
CROSSMATCH: NORMAL
CROSSMATCH: NORMAL
ERYTHROCYTE [DISTWIDTH] IN BLOOD BY AUTOMATED COUNT: 15.2 % (ref 11.6–15.1)
G6PD BLD QN: 256 U/10E12 RBC (ref 127–427)
GFR SERPL CREATININE-BSD FRML MDRD: 49 ML/MIN/1.73SQ M
GLUCOSE SERPL-MCNC: 143 MG/DL (ref 65–140)
GLUCOSE SERPL-MCNC: 176 MG/DL (ref 65–140)
GLUCOSE SERPL-MCNC: 189 MG/DL (ref 65–140)
GLUCOSE SERPL-MCNC: 212 MG/DL (ref 65–140)
GLUCOSE SERPL-MCNC: 248 MG/DL (ref 65–140)
GLUCOSE SERPL-MCNC: 302 MG/DL (ref 65–140)
HCT VFR BLD AUTO: 36.5 % (ref 34.8–46.1)
HGB BLD-MCNC: 11.6 G/DL (ref 11.5–15.4)
INR PPP: 1.3 (ref 0.84–1.19)
MCH RBC QN AUTO: 27.2 PG (ref 26.8–34.3)
MCHC RBC AUTO-ENTMCNC: 31.8 G/DL (ref 31.4–37.4)
MCV RBC AUTO: 86 FL (ref 82–98)
PLATELET # BLD AUTO: 87 THOUSANDS/UL (ref 149–390)
PMV BLD AUTO: 10.1 FL (ref 8.9–12.7)
POTASSIUM SERPL-SCNC: 4.4 MMOL/L (ref 3.5–5)
PROCALCITONIN SERPL-MCNC: 0.14 NG/ML
PROT SERPL-MCNC: 6.6 G/DL (ref 6.4–8.3)
PROTHROMBIN TIME: 16 SECONDS (ref 11.6–14.5)
RBC # BLD AUTO: 2.86 X10E6/UL (ref 3.77–5.28)
RBC # BLD AUTO: 4.26 MILLION/UL (ref 3.81–5.12)
SODIUM SERPL-SCNC: 136 MMOL/L (ref 133–145)
UNIT DISPENSE STATUS: NORMAL
UNIT DISPENSE STATUS: NORMAL
UNIT PRODUCT CODE: NORMAL
UNIT PRODUCT CODE: NORMAL
UNIT PRODUCT VOLUME: 350 ML
UNIT PRODUCT VOLUME: 350 ML
UNIT RH: NORMAL
UNIT RH: NORMAL
WBC # BLD AUTO: 2.54 THOUSAND/UL (ref 4.31–10.16)

## 2022-01-05 PROCEDURE — 85610 PROTHROMBIN TIME: CPT | Performed by: NURSE PRACTITIONER

## 2022-01-05 PROCEDURE — 82948 REAGENT STRIP/BLOOD GLUCOSE: CPT

## 2022-01-05 PROCEDURE — 80053 COMPREHEN METABOLIC PANEL: CPT | Performed by: NURSE PRACTITIONER

## 2022-01-05 PROCEDURE — 36415 COLL VENOUS BLD VENIPUNCTURE: CPT | Performed by: NURSE PRACTITIONER

## 2022-01-05 PROCEDURE — 84145 PROCALCITONIN (PCT): CPT | Performed by: EMERGENCY MEDICINE

## 2022-01-05 PROCEDURE — 85027 COMPLETE CBC AUTOMATED: CPT | Performed by: NURSE PRACTITIONER

## 2022-01-05 PROCEDURE — 97163 PT EVAL HIGH COMPLEX 45 MIN: CPT

## 2022-01-05 PROCEDURE — 92610 EVALUATE SWALLOWING FUNCTION: CPT

## 2022-01-05 PROCEDURE — 94640 AIRWAY INHALATION TREATMENT: CPT

## 2022-01-05 PROCEDURE — 99232 SBSQ HOSP IP/OBS MODERATE 35: CPT | Performed by: PHYSICIAN ASSISTANT

## 2022-01-05 PROCEDURE — 94760 N-INVAS EAR/PLS OXIMETRY 1: CPT

## 2022-01-05 PROCEDURE — 82105 ALPHA-FETOPROTEIN SERUM: CPT | Performed by: NURSE PRACTITIONER

## 2022-01-05 RX ORDER — OXYCODONE HYDROCHLORIDE 5 MG/1
5 TABLET ORAL
Status: DISCONTINUED | OUTPATIENT
Start: 2022-01-05 | End: 2022-01-05

## 2022-01-05 RX ORDER — GUAIFENESIN 600 MG
600 TABLET, EXTENDED RELEASE 12 HR ORAL EVERY 12 HOURS SCHEDULED
Status: DISCONTINUED | OUTPATIENT
Start: 2022-01-05 | End: 2022-01-07 | Stop reason: HOSPADM

## 2022-01-05 RX ORDER — LIDOCAINE 50 MG/G
1 PATCH TOPICAL DAILY
Status: DISCONTINUED | OUTPATIENT
Start: 2022-01-05 | End: 2022-01-05

## 2022-01-05 RX ORDER — OXYCODONE HYDROCHLORIDE 5 MG/1
5 TABLET ORAL EVERY 6 HOURS PRN
Status: DISCONTINUED | OUTPATIENT
Start: 2022-01-05 | End: 2022-01-07 | Stop reason: HOSPADM

## 2022-01-05 RX ORDER — OXYCODONE HYDROCHLORIDE 10 MG/1
10 TABLET ORAL EVERY 6 HOURS PRN
Status: DISCONTINUED | OUTPATIENT
Start: 2022-01-05 | End: 2022-01-07 | Stop reason: HOSPADM

## 2022-01-05 RX ADMIN — DICLOFENAC SODIUM 2 G: 10 GEL TOPICAL at 09:44

## 2022-01-05 RX ADMIN — INSULIN GLARGINE 23 UNITS: 100 INJECTION, SOLUTION SUBCUTANEOUS at 22:06

## 2022-01-05 RX ADMIN — INSULIN LISPRO 1 UNITS: 100 INJECTION, SOLUTION INTRAVENOUS; SUBCUTANEOUS at 09:48

## 2022-01-05 RX ADMIN — IPRATROPIUM BROMIDE 2 PUFF: 17 AEROSOL, METERED RESPIRATORY (INHALATION) at 08:50

## 2022-01-05 RX ADMIN — GUAIFENESIN 600 MG: 600 TABLET ORAL at 20:50

## 2022-01-05 RX ADMIN — RIFAXIMIN 550 MG: 550 TABLET ORAL at 20:52

## 2022-01-05 RX ADMIN — DICLOFENAC SODIUM 2 G: 10 GEL TOPICAL at 22:59

## 2022-01-05 RX ADMIN — INSULIN LISPRO 3 UNITS: 100 INJECTION, SOLUTION INTRAVENOUS; SUBCUTANEOUS at 15:49

## 2022-01-05 RX ADMIN — IPRATROPIUM BROMIDE 2 PUFF: 17 AEROSOL, METERED RESPIRATORY (INHALATION) at 15:15

## 2022-01-05 RX ADMIN — CEFTRIAXONE 1000 MG: 1 INJECTION, SOLUTION INTRAVENOUS at 22:59

## 2022-01-05 RX ADMIN — INSULIN LISPRO 1 UNITS: 100 INJECTION, SOLUTION INTRAVENOUS; SUBCUTANEOUS at 20:02

## 2022-01-05 RX ADMIN — OXYCODONE HYDROCHLORIDE 10 MG: 10 TABLET ORAL at 20:51

## 2022-01-05 RX ADMIN — INSULIN LISPRO 1 UNITS: 100 INJECTION, SOLUTION INTRAVENOUS; SUBCUTANEOUS at 22:06

## 2022-01-05 RX ADMIN — IPRATROPIUM BROMIDE 2 PUFF: 17 AEROSOL, METERED RESPIRATORY (INHALATION) at 20:50

## 2022-01-05 RX ADMIN — OXYCODONE HYDROCHLORIDE 5 MG: 5 TABLET ORAL at 18:23

## 2022-01-05 RX ADMIN — DEXAMETHASONE SODIUM PHOSPHATE 6 MG: 4 INJECTION, SOLUTION INTRA-ARTICULAR; INTRALESIONAL; INTRAMUSCULAR; INTRAVENOUS; SOFT TISSUE at 06:02

## 2022-01-05 RX ADMIN — DICLOFENAC SODIUM 2 G: 10 GEL TOPICAL at 00:20

## 2022-01-05 RX ADMIN — REMDESIVIR 100 MG: 100 INJECTION, POWDER, LYOPHILIZED, FOR SOLUTION INTRAVENOUS at 06:04

## 2022-01-05 RX ADMIN — OXYCODONE HYDROCHLORIDE 5 MG: 5 TABLET ORAL at 00:21

## 2022-01-05 RX ADMIN — RIFAXIMIN 550 MG: 550 TABLET ORAL at 09:45

## 2022-01-05 NOTE — ASSESSMENT & PLAN NOTE
· Cr 1 57 on admission --> 1 58 --> 1 16 today  · Unclear baseline creatinine, last results in September 2021 creatinine 1 3-1 6  · Received 1L IVF bolus in ED  · Has improved  · Obtain BMP outpatient on discharge to monitor renal function 10

## 2022-01-05 NOTE — SPEECH THERAPY NOTE
Speech-Language Pathology Bedside Swallow Evaluation      Patient Name: Nadine Lance    VUXQV'[id number] Date: 1/5/2022     Problem List  Principal Problem:    Altered mental status  Active Problems:    Type 2 diabetes mellitus with hyperglycemia, with long-term current use of insulin (HCC)    Hyperkalemia    Cirrhosis ([facility] [address] )    Acute respiratory failure with hypoxia (HCC)    Fecal occult blood test positive    COVID-19    Elevated serum creatinine      Past Medical History  Past Medical History:   Diagnosis Date    Abnormal head CT 7/14/2017    Acute kidney injury ([facility] [address] ) 8/19/2018    Cellulitis 1/10/2017    Closed fracture of multiple ribs of right side 7/14/2017    Degenerative disc disease at L5-S1 level     Diabetes mellitus (HCC)     History of methicillin resistant staphylococcus aureus (MRSA) 08/21/2018    negative nasal culture- isolation and hx of mrsa removed 8/20/2018    Hyperkalemia 4/25/2018    Hypertension     Hypocalcemia 4/25/2018    Hyponatremia 7/14/2017       Past Surgical History  Past Surgical History:   Procedure Laterality Date    CHOLECYSTECTOMY      JOINT REPLACEMENT      OOPHORECTOMY      NH INCISION,IMPLANT,NEUROELEC,SACRAL NERVE N/A 9/20/2021    Procedure: INSERTION NEUROSTIMULATOR ELECTRODE ARRAY SACRAL NERVE; FLUOROSCOPY; ELECTRONIC ANALYSIS;  Surgeon: Yasmine Low MD;  Location: [facility];  Service: UroGynecology           SACRAL NERVE STIMULATOR PLACEMENT N/A 9/28/2021    Procedure: IPG INSERTION AND PROGRAMMING;  Surgeon: Yasmine Low MD;  Location: [facility];  Service: UroGynecology              Summary   Pt presented as edentulous but with functional appearing oral and pharyngeal stage swallowing skills with materials administered today  Recommended Diet: regular diet and thin liquids   Recommended Form of Meds: aas desired       Current Medical Status  Nadine Lance is a 73 y o   F c PMH of cirrhosis, alcohol abuse (sober x20 years per report), marijuana use, chronic back pain on oxycodone, diabetes mellitus, LE neuropathy, MRSA, hypertension, and cellulitis who presented to [facility] on 01/03 with change in mental status/increased confusion  CT of head negative for acute abnormality  UA normal   Ammonia level 50 93 and elevated on admission and patient received 1 dose of lactulose  Patient short of breath and required O2 at 6 L via nasal cannula  COVID 19 positive on admission to ED  Patient also reported chills and positive temperature 100 4°   GI was consulted secondary to cirrhosis and fecal occult blood test positive  Hemoglobin on admission 4 4  Hemoglobin repeated  Repeat hemoglobin 9 7  Hemoglobin this a m  9 9 and stable  SLP Swallowing Evaluation ordered at this time  Current Precautions:  Fall, Aiarborne/Contact (Covid)    Allergies:  No known food allergies    Past medical history:  Please see H&P for details    Special Studies:  1/4/22  abdomen: No ascites  1/3/22 CXR:   1  Increased interstitial opacities in the right lung, compatible with COVID-19  Trace pulmonary edema would have a similar appearance  2  Left basal opacities, likely atelectasis    Social/Education/Vocational Hx:  Pt lives with family/    Swallow Information   Current Risks for Dysphagia & Aspiration: respiratory challenge 2* Covid; pt also edentulous  Current Diet: NPO   Baseline Diet: "Soft food" thin liquid      Baseline Assessment   Behavior/Cognition: alert  Speech/Language Status: able to participate in conversation  Patient Positioning: upright in bed  Pain Status/Interventions/Response to Interventions:  No report of or nonverbal indications of pain         Swallow Mechanism Exam  Facial: symmetrical  Labial: WFL  Lingual: WFL  Mandible: adequate ROM  Dentition: edentulous  Vocal quality:clear/adequate   Respiratory Status: on RA         Consistencies Assessed and Performance   Materials administered included applesauce, turkey sandwich, water    Oral Stage:   Mastication was adequate with the materials administered today  Bolus formation and transfer were functional with no significant oral residue noted  No overt s/s reduced oral control  Pharyngeal Stage:   Swallow Mechanics:  Swallowing initiation appeared prompt  Laryngeal rise was palpated and judged to be within functional limits  No coughing, throat clearing, change in vocal quality or respiratory status noted today       Esophageal Concerns: none reported    Summary and Recommendations (see above)    Results Reviewed with: patient and CRNP     Treatment Recommended: None at this time

## 2022-01-05 NOTE — ASSESSMENT & PLAN NOTE
Lab Results   Component Value Date    HGBA1C 7 5 (H) 01/04/2022       Recent Labs     01/04/22  2100 01/04/22  2314 01/05/22  0946 01/05/22  1101   POCGLU 245* 215* 189* 212*       Blood Sugar Average: Last 72 hrs:  · (P) 200Update A1c  · Home regimen: Novolog 3 units TID with meals, lantus 23 units hs   · Continue lantus 23 units hs  · SSI with Acu checks AC/HS  · Elevated blood glucose likely in setting of steroid use  · Hypoglycemia protocol

## 2022-01-05 NOTE — ASSESSMENT & PLAN NOTE
· C/o shortness of breath  Tested positive on home kit  Her husbad has been having subjective fevers, chills, cough over past day  · Meets SIRS criteria for tachycardia and tachypnea, both have since resolved   Lactate normal   · CXR: diffuse patchy infiltrate, possible LLL PNA  · Will hold off on further abx until PCL as patient afebrile, no leukocytosis, no sputum  · IV abx: Given ceftriaxone and azithromycin in ED  · IV fluids: 1L NS in ED  · Labs   · PCT yesterday normal, today's pending  · D-dimer: 0 70  · Start on moderate covid pathway  · ECG unremarkable  · Remdesivir  · Decadron   · AC: Lovenox   · Respiratory protocol, incentive spirometry

## 2022-01-05 NOTE — PHYSICAL THERAPY NOTE
PHYSICAL THERAPY EVALUATION NOTE          Patient Name: Shari Duran  ZSHNF'D Date: 2022       AGE:   73 y o  Mrn:   [id number]  ADMIT DX:  Flu-like symptoms [R68 89]    Past Medical History:   Diagnosis Date    Abnormal head CT 2017    Acute kidney injury ([address] ) 2018    Cellulitis 1/10/2017    Closed fracture of multiple ribs of right side 2017    Degenerative disc disease at L5-S1 level     Diabetes mellitus (HCC)     History of methicillin resistant staphylococcus aureus (MRSA) 2018    negative nasal culture- isolation and hx of mrsa removed 2018    Hyperkalemia 2018    Hypertension     Hypocalcemia 2018    Hyponatremia 2017     Length Of Stay: 1  PHYSICAL THERAPY EVALUATION :   Patient's identity confirmed via 2 patient identifiers (full name and ) at start of session       22 1447   PT Last Visit   PT Visit Date 22   Note Type   Note type Evaluation   Pain Assessment   Pain Assessment Tool FLACC   Pain Location/Orientation Location: Generalized; Location: Back  (Pt reports chronic pain)   Pain Onset/Description Onset: Ongoing   Effect of Pain on Daily Activities limits tolerance to functional mobility   Hospital Pain Intervention(s) Repositioned; Ambulation/increased activity   Pain Rating: FLACC (Rest) - Face 0   Pain Rating: FLACC (Rest) - Legs 0   Pain Rating: FLACC (Rest) - Activity 0   Pain Rating: FLACC (Rest) - Cry 1   Pain Rating: FLACC (Rest) - Consolability 0   Score: FLACC (Rest) 1   Pain Rating: FLACC (Activity) - Face 0   Pain Rating: FLACC (Activity) - Legs 0   Pain Rating: FLACC (Activity) - Activity 0   Pain Rating: FLACC (Activity) - Cry 1   Pain Rating: FLACC (Activity) - Consolability 0   Score: FLACC (Activity) 1   Restrictions/Precautions   Weight Bearing Precautions Per Order No   Other Precautions Contact/isolation; Airborne/isolation;Cognitive; Fall Risk;Pain   Home Living   Type of [facility] Two level;Performs ADLs on one level; Able to live on main level with bedroom/bathroom;Stairs to enter with rails  (4-5 CATRACHO)   [address] [facility]  (rollator walker)   Additional Comments Pt reports residing in a 2 level house w/ 4-5 CATRACHO and 1st floor set-up  At baseline, pt ambulates independently w/ rollator walker, requires assistance w/ ADLs/IADLs  Pt denies h/o recent falls  Pt reports sleeping in a recliner chair    Prior Function   Level of Assumption Needs assistance with IADLs  (Mod I w/ mobility using rollator walker)   Lives With Spouse;Daughter  (daugther's significant other)   Receives Help From Family   ADL Assistance Needs assistance   IADLs Needs assistance   Falls in the last 6 months 0   General   Additional Pertinent History COVID-19 (+); pt's SpO2 remained >90% on RA during mobility   Family/Caregiver Present No   Cognition   Overall Cognitive Status Impaired   Arousal/Participation Alert   Orientation Level Oriented X4  (oriented to month/year)   Memory Decreased recall of recent events;Decreased recall of precautions   Following Commands Follows one step commands with increased time or repetition   Comments Pt ID via name and ; pt agreeable to PT eval   Strength RLE   R Ankle Dorsiflexion 3+/5   R Ankle Plantar Flexion 3+/5   Strength LLE   L Ankle Dorsiflexion 3+/5   L Ankle Plantar Flexion 3+/5   Vision-Basic Assessment   Current Vision Wears glasses all the time   Light Touch   RLE Light Touch Grossly intact   LLE Light Touch Grossly intact   Bed Mobility   Supine to Sit 3  Moderate assistance   Additional items Assist x 1; Increased time required;Verbal cues;LE management   Sit to Supine 3  Moderate assistance   Additional items Assist x 1; Increased time required;Verbal cues;LE management   Additional Comments Pt able to maintain static sitting balance at EOB w/ supervision and no evidence of LOB   Transfers   Sit to Stand 4 Minimal assistance   Additional items Assist x 1; Increased time required;Verbal cues   Stand to Sit 4  Minimal assistance   Additional items Assist x 1; Increased time required;Verbal cues   Ambulation/Elevation   Gait pattern Wide WERNER; Decreased foot clearance; Forward Flexion; Inconsistent manuel; Short stride; Excessively slow   Gait Assistance 4  Minimal assist   Additional items Assist x 1;Verbal cues   Assistive Device Rolling walker   Distance 10'   Stair Management Assistance Not tested   Ambulation/Elevation Additional Comments VC for RW negotiation   Balance   Static Sitting Fair +   Dynamic Sitting Fair   Static Standing Fair -  (w/ RW)   Dynamic Standing Poor +  (w/ RW)   Ambulatory Poor +  (w/ RW)   Endurance Deficit   Endurance Deficit Yes   Endurance Deficit Description pt w/ decreased activity tolerance   Activity Tolerance   Activity Tolerance Patient limited by fatigue   Medical Staff Made Aware Contacted by FIDELIA Fraser   Nurse Made Aware ANDREINA Felipe   Assessment   Prognosis Fair   Problem List Decreased strength;Decreased endurance; Impaired balance;Decreased mobility; Decreased cognition;Decreased safety awareness; Obesity;Pain;Decreased skin integrity   Assessment Valentina Phillips is a 73 y o  Female who presents to [address] [place] [address] on 1/3/2022 from home due to + home COVID test and diagnosis of AMS  Orders for PT eval and treat received, w/ activity orders of ambulate patient w/ contact and airborne isolation precautions due to pt COVID-19 (+)  Pt presents w/ comorbidities of HTN, chronic back pain, DM, and personal factors including: mobilizing w/ assistive device, stair(s) to enter home, inability to navigate level surfaces w/o external assistance, decreased cognition, inability to perform IADLs and inability to perform ADLs  At baseline, pt mobilizes independently w/ rollator walker, and reports 0 falls in the last 6 months   Upon evaluation, pt presents w/ the following deficits: weakness, impaired balance, decreased endurance, pain limiting functional mobility and gait deviations  Pt requires Mod Ax1 for bed mobility, Min Ax1 for transfers, and Min Ax1 for gait  Pt's clinical presentation is unstable/unpredictable due to need for assist w/ all phases of mobility when usually mobilizing independently, decreased safety awareness, impaired balance, ongoing medical monitoring/management, and need for input for mobility technique/safety  Pt is at an increased risk of falls due to decreased safety awareness, impaired balance, and requirement of assistance w/ all aspects of functional mobility  Given the above findings, DC rec: post acute rehab vs HHPT pending progress w/ mobility and step trial  During this admission, pt would benefit from continued skilled inpatient PT in the acute care setting in order to address the abovementioned deficits to maximize function and mobility before DC from acute care  Barriers to Discharge Inaccessible home environment   Goals   Patient Goals To watch This is Us   STG Expiration Date 01/15/22   Short Term Goal #1 Pt will: perform bed mobility independently to decrease caregiver burden; perform transfers independently to increase OOB mobility; ambulate at least 76' w/ LRAD and Mod I to increase pt's ambulatory endurance; negotiate 5 steps w/ railing and supervision to facilitate return to previous living environment; increase LE strength by 1 grade to increase pt's tolerance to physical activity; increase balance ratings by 1 grade to decrease pt's risk of falls   PT Treatment Day 0   Plan   Treatment/Interventions Functional transfer training;LE strengthening/ROM; Elevations; Therapeutic exercise; Endurance training;Patient/family training;Equipment eval/education; Bed mobility;Gait training   PT Frequency Other (Comment)  (4-5x/wk)   Recommendation   PT Discharge Recommendation Post acute rehabilitation services  (vs HHPT pending progress w/ mobility and step trial)   Equipment Recommended Ghazal Asp Package Recommended Wheeled walker   Change/add to Conexus-IT? No   Additional Comments DC rec: post acute rehab vs HHPT pending progress w/ mobility and step trial   AM-PAC Basic Mobility Inpatient   Turning in Bed Without Bedrails 3   Lying on Back to Sitting on Edge of Flat Bed 2   Moving Bed to Chair 3   Standing Up From Chair 3   Walk in Room 3   Climb 3-5 Stairs 2   Basic Mobility Inpatient Raw Score 16   Basic Mobility Standardized Score 38 32   Highest Level Of Mobility   -HL Goal 5: Stand one or more mins   JH-HLM Highest Level of Mobility 6: Walk 10 steps or more   JH-HLM Goal Achieved Yes   End of Consult   Patient Position at End of Consult Supine; All needs within reach  [facility] elevated on ED stretcher)       The patient's AM-PAC Basic Mobility Inpatient Short Form Raw Score is 16  A Raw score of less than or equal to 16 suggests the patient may benefit from discharge to post-acute rehabilitation services  Please also refer to the recommendation of the Physical Therapist for safe discharge planning      Pt would benefit from skilled inpatient PT during this admission in order to facilitate progress towards goals to maximize functional independence    DC rec: post acute rehab vs HHPT pending progress w/ mobility and step trial      Alfreda Ibarra, PT, DPT  01/05/22

## 2022-01-05 NOTE — ED NOTES
Upon entering room to give patient her requested prn pain medicine, pt states "get me up, I have to use the bathroom", pt given 5mg oxycodone perscribed and she stated, "and I know that is not my 30mg oxy", I told patient I gave her the dose ordered by the provider        Jonnathan Barron RN  01/05/22 [id number]

## 2022-01-05 NOTE — ASSESSMENT & PLAN NOTE
· Reports some darker stool over past couple days  Denies abdominal pain, hematochezia, hematemesis, N/V/D   · CBC Hgb 4 4, thought to be lab error in ED   Repeat H&H ordered and Hgb 9 6  · Hemoglobin has since been stable  · Blood consent obtained  · Iron panel normal  · GI following

## 2022-01-05 NOTE — ED NOTES
Pt stating, " I want more pain medicine, I know that is what the doctor ordered, but if I don't more I will sign myself out", will let provider know     Remy Nunez RN  01/05/22 [id number]

## 2022-01-05 NOTE — ASSESSMENT & PLAN NOTE
· A&O x 3 on assessment  No focal neurologic symptoms  Per , this afternoon patient develop confusion and didn't know where she was or what time it is  Denies dysarthria, dizziness, paresthesias, gait abnormalities, hemiparesis, headache, visual changes, ataxia, facial droop  · CT head: negative   · Salicylate level <5, ethanol <10 and acetaminophen <10   Urine tox positive for opiates  · On oxycodone for chronic back pain  · Ammonia 50 93 --> 47 5 yesterday  · Last value in 2020 was <10  · Received 20g lactulose in ED, refused lactulose today due to frequent BMs  · Trend ammonia   · UA negative   · Neuro checks    · Speech evaluated, diet advanced  · Mental status has improved, A&Ox4  · PT/OT evaluated, recommending rehab, case management on board

## 2022-01-05 NOTE — PROGRESS NOTES
Ion 46  Progress Note - Domingo Butler 1955, 73 y o  female MRN: [id number]  Unit/Bed#: ED OVR 21 Encounter: [id number]  Primary Care Provider: Tanya Barton MD   Date and time admitted to hospital: 1/3/2022  8:52 PM    * Altered mental status  Assessment & Plan  · A&O x 3 on assessment  No focal neurologic symptoms  Per , this afternoon patient develop confusion and didn't know where she was or what time it is  Denies dysarthria, dizziness, paresthesias, gait abnormalities, hemiparesis, headache, visual changes, ataxia, facial droop  · CT head: negative   · Salicylate level <5, ethanol <10 and acetaminophen <10  Urine tox positive for opiates  · On oxycodone for chronic back pain  · Ammonia 50 93 --> 47 5 yesterday  · Last value in 2020 was <10  · Received 20g lactulose in ED, refused lactulose today due to frequent BMs  · Trend ammonia   · UA negative   · Neuro checks    · Speech evaluated, diet advanced  · Mental status has improved, A&Ox4  · PT/OT evaluated, recommending rehab, case management on board    Elevated serum creatinine  Assessment & Plan  · Cr 1 57 on admission --> 1 58 --> 1 16 today  · Unclear baseline creatinine, last results in September 2021 creatinine 1 3-1 6  · Received 1L IVF bolus in ED  · Has improved  · Obtain BMP outpatient on discharge to monitor renal function    COVID-19  Assessment & Plan  · C/o shortness of breath  Tested positive on home kit  Her husbad has been having subjective fevers, chills, cough over past day  · Meets SIRS criteria for tachycardia and tachypnea, both have since resolved   Lactate normal   · CXR: diffuse patchy infiltrate, possible LLL PNA  · Will hold off on further abx until PCL as patient afebrile, no leukocytosis, no sputum  · IV abx: Given ceftriaxone and azithromycin in ED  · IV fluids: 1L NS in ED  · Labs   · PCT yesterday normal, today's pending  · D-dimer: 0 70  · Start on moderate covid pathway  · ECG unremarkable  · Remdesivir  · Decadron   · AC: Lovenox   · Respiratory protocol, incentive spirometry     Fecal occult blood test positive  Assessment & Plan  · Reports some darker stool over past couple days  Denies abdominal pain, hematochezia, hematemesis, N/V/D   · CBC Hgb 4 4, thought to be lab error in ED  Repeat H&H ordered and Hgb 9 6  · Hemoglobin has since been stable  · Blood consent obtained  · Iron panel normal  · GI following    Acute respiratory failure with hypoxia (HCC)  Assessment & Plan  · Presented hypoxic, required 6L NC with sat >90 on admission, weaned down to room air with sats >92%  · Does not wear oxygen at home  · 2/2 to COVID-19  · See above plan    Cirrhosis [facility])  Assessment & Plan  · AST-64, ALT-29  · Per chart review, from alcohol use  Denies any recent alcohol use-- ethanol level less than 10  · Ultrasound abdomen did not show ascites  · GI following:  · Start lactulose and Xifaxan  · Outpatient follow-up with GI to manage cirrhosis  · Patient refused lactulose this a m , GI aware  Will discharge with lactulose and Xifaxan    Hyperkalemia  Assessment & Plan  · K 5 3 on admission, has since stabilized, potassium 4 4 today  · Monitor BMP    Type 2 diabetes mellitus with hyperglycemia, with long-term current use of insulin [facility])  Assessment & Plan  Lab Results   Component Value Date    HGBA1C 7 5 (H) 01/04/2022       Recent Labs     01/04/22  2100 01/04/22  2314 01/05/22  0946 01/05/22  1101   POCGLU 245* 215* 189* 212*       Blood Sugar Average: Last 72 hrs:  · (P) 200Update A1c  · Home regimen: Novolog 3 units TID with meals, lantus 23 units hs   · Continue lantus 23 units hs  · SSI with Acu checks AC/HS  · Elevated blood glucose likely in setting of steroid use  · Hypoglycemia protocol       VTE Pharmacologic Prophylaxis:   Moderate Risk (Score 3-4) - Pharmacological DVT Prophylaxis Contraindicated  Sequential Compression Devices Ordered      Patient Centered Rounds: I performed bedside rounds with nursing staff today  Discussions with Specialists or Other Care Team Provider:  GI, case management    Education and Discussions with Family / Patient: Updated  () via phone  Time Spent for Care: 45 minutes  More than 50% of total time spent on counseling and coordination of care as described above  Current Length of Stay: 1 day(s)  Current Patient Status: Inpatient   Certification Statement: The patient will continue to require additional inpatient hospital stay due to Rehab placement  Discharge Plan: Anticipate discharge in 24-48 hrs to rehab facility  Code Status: Level 1 - Full Code    Subjective:   Patient is seen at bedside this a m , is alert oriented x4, clinically much improved since admission  Denies abdominal pain, nausea, vomiting, diarrhea  Does report multiple loose BMs since starting lactulose, stated that she refuses to keep taking it due to excessive bowel movements  Objective:     Vitals:   Temp (24hrs), Av 2 °F (36 8 °C), Min:97 5 °F (36 4 °C), Max:98 7 °F (37 1 °C)    Temp:  [97 5 °F (36 4 °C)-98 7 °F (37 1 °C)] 98 7 °F (37 1 °C)  HR:  [86-93] 88  Resp:  [13-20] 16  BP: (141-157)/(72-93) 155/72  SpO2:  [92 %-97 %] 94 %  There is no height or weight on file to calculate BMI  Input and Output Summary (last 24 hours):   No intake or output data in the 24 hours ending 22 1625    Physical Exam:   Physical Exam  Constitutional:       General: She is not in acute distress  Appearance: Normal appearance  She is not ill-appearing, toxic-appearing or diaphoretic  Cardiovascular:      Rate and Rhythm: Normal rate and regular rhythm  Pulses: Normal pulses  Heart sounds: Normal heart sounds  Pulmonary:      Effort: Pulmonary effort is normal  No respiratory distress  Breath sounds: Normal breath sounds  Abdominal:      General: Bowel sounds are normal  There is no distension        Palpations: Abdomen is soft       Tenderness: There is no abdominal tenderness  Musculoskeletal:         General: No swelling or tenderness  Skin:     General: Skin is warm and dry  Neurological:      General: No focal deficit present  Mental Status: She is alert and oriented to person, place, and time  Psychiatric:         Mood and Affect: Mood normal          Behavior: Behavior normal           Additional Data:     Labs:  Results from last 7 days   Lab Units 01/05/22  0958 01/03/22  2310 01/03/22  2200   WBC Thousand/uL 2 54*   < > 4 62   HEMOGLOBIN g/dL 11 6   < > 4 4*   HEMATOCRIT % 36 5   < > 14 0*   PLATELETS Thousands/uL 87*   < > 107*   NEUTROS PCT %  --   --  73   LYMPHS PCT %  --   --  14   MONOS PCT %  --   --  11   EOS PCT %  --   --  2    < > = values in this interval not displayed       Results from last 7 days   Lab Units 01/05/22  0558   SODIUM mmol/L 136   POTASSIUM mmol/L 4 4   CHLORIDE mmol/L 102   CO2 mmol/L 28   BUN mg/dL 21*   CREATININE mg/dL 1 16*   ANION GAP mmol/L 6   CALCIUM mg/dL 8 3*   ALBUMIN g/dL 3 3*   TOTAL BILIRUBIN mg/dL 0 34   ALK PHOS U/L 151 5*   ALT U/L 55*   AST U/L 83*   GLUCOSE RANDOM mg/dL 143*     Results from last 7 days   Lab Units 01/05/22  0558   INR  1 30*     Results from last 7 days   Lab Units 01/05/22  1519 01/05/22  1101 01/05/22  0946 01/04/22  2314 01/04/22  2100 01/04/22  1657 01/04/22  1135 01/04/22  0924 01/04/22  0400   POC GLUCOSE mg/dl 248* 212* 189* 215* 245* 224* 229* 156* 130     Results from last 7 days   Lab Units 01/04/22  0517   HEMOGLOBIN A1C % 7 5*     Results from last 7 days   Lab Units 01/04/22  2058 01/03/22  2240   LACTIC ACID mmol/L  --  0 7   PROCALCITONIN ng/ml 0 15  --        Lines/Drains:  Invasive Devices  Report    Peripheral Intravenous Line            Peripheral IV 01/03/22 Right Hand 1 day    Peripheral IV 01/04/22 Left Hand 1 day                      Imaging: Reviewed radiology reports from this admission including: chest xray, CT head and ultrasound(s)    Recent Cultures (last 7 days):   Results from last 7 days   Lab Units 01/03/22  2223   BLOOD CULTURE  No Growth at 24 hrs  No Growth at 24 hrs  Last 24 Hours Medication List:   Current Facility-Administered Medications   Medication Dose Route Frequency Provider Last Rate    albuterol  2 puff Inhalation Q4H PRN Gorge Murguia MD      cefTRIAXone  1,000 mg Intravenous Q24H Carmen Casillas MD Stopped (01/05/22 0300)    dexamethasone  6 mg Intravenous Q24H Krista Guo, PA-LISSETH      Diclofenac Sodium  2 g Topical 4x Daily FIDELIA Ibarra-C      insulin glargine  23 Units Subcutaneous HS Krista Guo, PA-C      insulin lispro  1-6 Units Subcutaneous 4 times day Lashaun Hardeman, PA-C      insulin lispro  1-6 Units Subcutaneous HS Krista Guo, PA-C      ipratropium  2 puff Inhalation TID Mike Escobedo MD      lactulose  20 g Oral BID APRYL Phan      oxyCODONE  5 mg Oral Q6H PRN Krista Guo PA-C      remdesivir  100 mg Intravenous Q24H FIDELIA Ibarra-LISSETH      rifaximin  550 mg Oral Q12H [address] APRYL Phan      sodium chloride (PF)  3 mL Intravenous Q1H PRN Carmen Casillas MD       Facility-Administered Medications Ordered in Other Encounters   Medication Dose Route Frequency Provider Last Rate    ferrous sulfate  325 mg Oral Daily With Sophie Lawrence MD          Today, Patient Was Seen By: Bartolo Zambrano PA-C    **Please Note: This note may have been constructed using a voice recognition system  **

## 2022-01-05 NOTE — ED NOTES
Pt up to bedside commode; had small bowel movement mixed with urine; pt back to bed; 2L NC O2 discontinued at this point; O2 on RA as documented this same time     Manfred Vasquez RN  01/05/22 [id number]

## 2022-01-05 NOTE — ASSESSMENT & PLAN NOTE
· Presented hypoxic, required 6L NC with sat >90 on admission, weaned down to room air with sats >92%  · Does not wear oxygen at home  · 2/2 to COVID-19  · See above plan

## 2022-01-05 NOTE — PLAN OF CARE
Problem: PHYSICAL THERAPY ADULT  Goal: Performs mobility at highest level of function for planned discharge setting  See evaluation for individualized goals  Description: Treatment/Interventions: Functional transfer training,LE strengthening/ROM,Elevations,Therapeutic exercise,Endurance training,Patient/family training,Equipment eval/education,Bed mobility,Gait training  Equipment Recommended: Livia Stinson       See flowsheet documentation for full assessment, interventions and recommendations  1/5/2022 1536 by Stevie Cotto PT  Note: Prognosis: Fair  Problem List: Decreased strength,Decreased endurance,Impaired balance,Decreased mobility,Decreased cognition,Decreased safety awareness,Obesity,Pain,Decreased skin integrity  Assessment: Thad Ruiz is a 73 y o  Female who presents to [address] [place] [address] on 1/3/2022 from home due to + home COVID test and diagnosis of AMS  Orders for PT eval and treat received, w/ activity orders of ambulate patient w/ contact and airborne isolation precautions due to pt COVID-19 (+)  Pt presents w/ comorbidities of HTN, chronic back pain, DM, and personal factors including: mobilizing w/ assistive device, stair(s) to enter home, inability to navigate level surfaces w/o external assistance, decreased cognition, inability to perform IADLs and inability to perform ADLs  At baseline, pt mobilizes independently w/ rollator walker, and reports 0 falls in the last 6 months  Upon evaluation, pt presents w/ the following deficits: weakness, impaired balance, decreased endurance, pain limiting functional mobility and gait deviations  Pt requires Mod Ax1 for bed mobility, Min Ax1 for transfers, and Min Ax1 for gait  Pt's clinical presentation is unstable/unpredictable due to need for assist w/ all phases of mobility when usually mobilizing independently, decreased safety awareness, impaired balance, ongoing medical monitoring/management, and need for input for mobility technique/safety   Pt is at an increased risk of falls due to decreased safety awareness, impaired balance, and requirement of assistance w/ all aspects of functional mobility  Given the above findings, DC rec: post acute rehab vs HHPT pending progress w/ mobility and step trial  During this admission, pt would benefit from continued skilled inpatient PT in the acute care setting in order to address the abovementioned deficits to maximize function and mobility before DC from acute care  Barriers to Discharge: Inaccessible home environment        PT Discharge Recommendation: Post acute rehabilitation services (vs HHPT pending progress w/ mobility and step trial)          See flowsheet documentation for full assessment

## 2022-01-05 NOTE — ASSESSMENT & PLAN NOTE
· AST-64, ALT-29  · Per chart review, from alcohol use  Denies any recent alcohol use-- ethanol level less than 10  · Ultrasound abdomen did not show ascites  · GI following:  · Start lactulose and Xifaxan  · Outpatient follow-up with GI to manage cirrhosis  · Patient refused lactulose this a m , GI aware   Will discharge with lactulose and Xifaxan

## 2022-01-06 PROBLEM — L89.309 PRESSURE INJURY OF SKIN OF BUTTOCK: Status: ACTIVE | Noted: 2022-01-06

## 2022-01-06 LAB
AMMONIA PLAS-SCNC: 45.92 UMOL/L
BASOPHILS # BLD AUTO: 0.01 THOUSANDS/ΜL (ref 0–0.1)
BASOPHILS NFR BLD AUTO: 0 % (ref 0–1)
EOSINOPHIL # BLD AUTO: 0.01 THOUSAND/ΜL (ref 0–0.61)
EOSINOPHIL NFR BLD AUTO: 0 % (ref 0–6)
ERYTHROCYTE [DISTWIDTH] IN BLOOD BY AUTOMATED COUNT: 15.3 % (ref 11.6–15.1)
GLUCOSE SERPL-MCNC: 111 MG/DL (ref 65–140)
GLUCOSE SERPL-MCNC: 136 MG/DL (ref 65–140)
GLUCOSE SERPL-MCNC: 142 MG/DL (ref 65–140)
GLUCOSE SERPL-MCNC: 167 MG/DL (ref 65–140)
GLUCOSE SERPL-MCNC: 190 MG/DL (ref 65–140)
GLUCOSE SERPL-MCNC: 199 MG/DL (ref 65–140)
GLUCOSE SERPL-MCNC: 210 MG/DL (ref 65–140)
HCT VFR BLD AUTO: 39 % (ref 34.8–46.1)
HGB BLD-MCNC: 12.7 G/DL (ref 11.5–15.4)
IMM GRANULOCYTES # BLD AUTO: 0.01 THOUSAND/UL (ref 0–0.2)
IMM GRANULOCYTES NFR BLD AUTO: 0 % (ref 0–2)
LYMPHOCYTES # BLD AUTO: 1.14 THOUSANDS/ΜL (ref 0.6–4.47)
LYMPHOCYTES NFR BLD AUTO: 22 % (ref 14–44)
MCH RBC QN AUTO: 27.1 PG (ref 26.8–34.3)
MCHC RBC AUTO-ENTMCNC: 32.6 G/DL (ref 31.4–37.4)
MCV RBC AUTO: 83 FL (ref 82–98)
MONOCYTES # BLD AUTO: 0.43 THOUSAND/ΜL (ref 0.17–1.22)
MONOCYTES NFR BLD AUTO: 9 % (ref 4–12)
NEUTROPHILS # BLD AUTO: 3.48 THOUSANDS/ΜL (ref 1.85–7.62)
NEUTS SEG NFR BLD AUTO: 69 % (ref 43–75)
NRBC BLD AUTO-RTO: 0 /100 WBCS
PLATELET # BLD AUTO: 135 THOUSANDS/UL (ref 149–390)
PMV BLD AUTO: 10.2 FL (ref 8.9–12.7)
RBC # BLD AUTO: 4.69 MILLION/UL (ref 3.81–5.12)
WBC # BLD AUTO: 5.08 THOUSAND/UL (ref 4.31–10.16)

## 2022-01-06 PROCEDURE — 82140 ASSAY OF AMMONIA: CPT | Performed by: PHYSICIAN ASSISTANT

## 2022-01-06 PROCEDURE — 97167 OT EVAL HIGH COMPLEX 60 MIN: CPT

## 2022-01-06 PROCEDURE — 94760 N-INVAS EAR/PLS OXIMETRY 1: CPT

## 2022-01-06 PROCEDURE — 97110 THERAPEUTIC EXERCISES: CPT

## 2022-01-06 PROCEDURE — 85025 COMPLETE CBC W/AUTO DIFF WBC: CPT | Performed by: PHYSICIAN ASSISTANT

## 2022-01-06 PROCEDURE — 97535 SELF CARE MNGMENT TRAINING: CPT

## 2022-01-06 PROCEDURE — 99232 SBSQ HOSP IP/OBS MODERATE 35: CPT | Performed by: INTERNAL MEDICINE

## 2022-01-06 PROCEDURE — 82948 REAGENT STRIP/BLOOD GLUCOSE: CPT

## 2022-01-06 PROCEDURE — 94640 AIRWAY INHALATION TREATMENT: CPT

## 2022-01-06 PROCEDURE — 97530 THERAPEUTIC ACTIVITIES: CPT

## 2022-01-06 PROCEDURE — 99232 SBSQ HOSP IP/OBS MODERATE 35: CPT | Performed by: PHYSICIAN ASSISTANT

## 2022-01-06 RX ORDER — DULOXETIN HYDROCHLORIDE 60 MG/1
60 CAPSULE, DELAYED RELEASE ORAL DAILY
Status: DISCONTINUED | OUTPATIENT
Start: 2022-01-07 | End: 2022-01-07 | Stop reason: HOSPADM

## 2022-01-06 RX ORDER — GABAPENTIN 100 MG/1
100 CAPSULE ORAL 3 TIMES DAILY PRN
Status: DISCONTINUED | OUTPATIENT
Start: 2022-01-06 | End: 2022-01-07 | Stop reason: HOSPADM

## 2022-01-06 RX ORDER — AMLODIPINE BESYLATE 10 MG/1
10 TABLET ORAL DAILY
Status: DISCONTINUED | OUTPATIENT
Start: 2022-01-06 | End: 2022-01-07 | Stop reason: HOSPADM

## 2022-01-06 RX ADMIN — INSULIN LISPRO 2 UNITS: 100 INJECTION, SOLUTION INTRAVENOUS; SUBCUTANEOUS at 21:44

## 2022-01-06 RX ADMIN — RIFAXIMIN 550 MG: 550 TABLET ORAL at 08:43

## 2022-01-06 RX ADMIN — LACTULOSE 20 G: 20 SOLUTION ORAL at 18:18

## 2022-01-06 RX ADMIN — IPRATROPIUM BROMIDE 2 PUFF: 17 AEROSOL, METERED RESPIRATORY (INHALATION) at 20:21

## 2022-01-06 RX ADMIN — AMLODIPINE BESYLATE 10 MG: 10 TABLET ORAL at 08:42

## 2022-01-06 RX ADMIN — GUAIFENESIN 600 MG: 600 TABLET ORAL at 08:43

## 2022-01-06 RX ADMIN — DICLOFENAC SODIUM 2 G: 10 GEL TOPICAL at 18:19

## 2022-01-06 RX ADMIN — ENOXAPARIN SODIUM 30 MG: 30 INJECTION SUBCUTANEOUS at 15:43

## 2022-01-06 RX ADMIN — OXYCODONE HYDROCHLORIDE 5 MG: 5 TABLET ORAL at 08:43

## 2022-01-06 RX ADMIN — INSULIN GLARGINE 23 UNITS: 100 INJECTION, SOLUTION SUBCUTANEOUS at 21:43

## 2022-01-06 RX ADMIN — DEXAMETHASONE SODIUM PHOSPHATE 6 MG: 4 INJECTION, SOLUTION INTRA-ARTICULAR; INTRALESIONAL; INTRAMUSCULAR; INTRAVENOUS; SOFT TISSUE at 05:31

## 2022-01-06 RX ADMIN — OXYCODONE HYDROCHLORIDE 10 MG: 10 TABLET ORAL at 21:43

## 2022-01-06 RX ADMIN — INSULIN LISPRO 2 UNITS: 100 INJECTION, SOLUTION INTRAVENOUS; SUBCUTANEOUS at 14:16

## 2022-01-06 RX ADMIN — RIFAXIMIN 550 MG: 550 TABLET ORAL at 21:42

## 2022-01-06 RX ADMIN — GUAIFENESIN 600 MG: 600 TABLET ORAL at 21:42

## 2022-01-06 RX ADMIN — ENOXAPARIN SODIUM 30 MG: 30 INJECTION SUBCUTANEOUS at 21:42

## 2022-01-06 RX ADMIN — REMDESIVIR 100 MG: 100 INJECTION, POWDER, LYOPHILIZED, FOR SOLUTION INTRAVENOUS at 05:32

## 2022-01-06 RX ADMIN — LACTULOSE 20 G: 20 SOLUTION ORAL at 08:43

## 2022-01-06 RX ADMIN — DICLOFENAC SODIUM 2 G: 10 GEL TOPICAL at 11:49

## 2022-01-06 RX ADMIN — ALBUTEROL SULFATE 2 PUFF: 90 AEROSOL, METERED RESPIRATORY (INHALATION) at 20:19

## 2022-01-06 RX ADMIN — INSULIN LISPRO 2 UNITS: 100 INJECTION, SOLUTION INTRAVENOUS; SUBCUTANEOUS at 09:58

## 2022-01-06 RX ADMIN — DICLOFENAC SODIUM 2 G: 10 GEL TOPICAL at 14:10

## 2022-01-06 RX ADMIN — IPRATROPIUM BROMIDE 2 PUFF: 17 AEROSOL, METERED RESPIRATORY (INHALATION) at 15:30

## 2022-01-06 RX ADMIN — OXYCODONE HYDROCHLORIDE 10 MG: 10 TABLET ORAL at 02:45

## 2022-01-06 NOTE — ASSESSMENT & PLAN NOTE
· AST-64, ALT-29  · Per chart review, from alcohol use  Denies any recent alcohol use-- ethanol level less than 10     · Ultrasound abdomen did not show ascites  · GI following:  · Continue lactulose and Xifaxan  · Outpatient follow-up with GI to manage cirrhosis  · Patient with multiple BMs/diarrhea, rectal tube placed last night due to multiple BMs and skin breakdown present on buttocks

## 2022-01-06 NOTE — ASSESSMENT & PLAN NOTE
· C/o shortness of breath  Tested positive on home kit  Her husbad has been having subjective fevers, chills, cough over past day  · Patient was vaccinated with [employer] in June  · Meets SIRS criteria for tachycardia and tachypnea, both have since resolved   Lactate normal   · CXR: diffuse patchy infiltrate, possible LLL PNA  · Will hold off on further abx as patient afebrile, no leukocytosis, no sputum  · IV abx: Given ceftriaxone and azithromycin in ED  · IV fluids: 1L NS in ED  · Labs   · PCT x2 normal  · D-dimer: 0 70  · Mild COVID pathway:  · ECG unremarkable  · Received 4 days Remdesivir and Decadron treatment per protocol, will not need to continue on discharge  · AC: Lovenox   · Respiratory function stable, has been on room air

## 2022-01-06 NOTE — ASSESSMENT & PLAN NOTE
· Reports some darker stool over past couple days  Denies abdominal pain, hematochezia, hematemesis, N/V/D   · CBC Hgb 4 4, thought to be lab error in ED   Repeat H&H ordered and Hgb 9 6  · Blood consent obtained  · Hemoglobin has since been stable, no signs/symptoms of active bleed at this time  · Iron panel normal  · GI following

## 2022-01-06 NOTE — ED NOTES
Pt threw dirty diaper on floor  Pt cleaned, new diaper applied, call bell in reach        Saran Galloway RN  01/05/22 [id number]

## 2022-01-06 NOTE — ED NOTES
Pt incontinent of bm, assisted to bedside commode  Bedding changed, pt cleaned  Pt assisted back to bed  Call bell in reach        Pau Mccracken RN  01/05/22 [id number]

## 2022-01-06 NOTE — ASSESSMENT & PLAN NOTE
· Present prior to admission per patient's , rectal tube placed as patient has multiple BMs in open wounds, pictures as above  · Continue wound care on discharge

## 2022-01-06 NOTE — ASSESSMENT & PLAN NOTE
· A&O x 3 on assessment  No focal neurologic symptoms  Per , this afternoon patient develop confusion and didn't know where she was or what time it is  Denies dysarthria, dizziness, paresthesias, gait abnormalities, hemiparesis, headache, visual changes, ataxia, facial droop  · CT head: negative   · Salicylate level <5, ethanol <10 and acetaminophen <10  Urine tox positive for opiates  · On oxycodone for chronic back pain  · Ammonia levels trending down:  50 93 --> 47 5 --> 45 92 today  · Last value in 2020 was <10  · Received 20g lactulose in ED, refused lactulose yesterday due to frequent BMs  · Trend ammonia   · UA negative   · Neuro checks    · Home Cymbalta and gabapentin were initially held, will resume, has chronic neuropathy  · Speech evaluated, diet advanced, tolerated well    · Mental status has improved, A&Ox4  · PT/OT evaluated, recommending rehab vs [facility], patient requesting [facility], case management on board

## 2022-01-06 NOTE — PROGRESS NOTES
Ion 46  Progress Note - Rajesh Espinopiter 1955, 73 y o  female MRN: [id number]  Unit/Bed#: -01 Encounter: [id number]  Primary Care Provider: Heidy Munoz MD   Date and time admitted to hospital: 1/3/2022  8:52 PM    * Altered mental status  Assessment & Plan  · A&O x 3 on assessment  No focal neurologic symptoms  Per , this afternoon patient develop confusion and didn't know where she was or what time it is  Denies dysarthria, dizziness, paresthesias, gait abnormalities, hemiparesis, headache, visual changes, ataxia, facial droop  · CT head: negative   · Salicylate level <5, ethanol <10 and acetaminophen <10  Urine tox positive for opiates  · On oxycodone for chronic back pain  · Ammonia levels trending down:  50 93 --> 47 5 --> 45 92 today  · Last value in 2020 was <10  · Received 20g lactulose in ED, refused lactulose yesterday due to frequent BMs  · Trend ammonia   · UA negative   · Neuro checks    · Home Cymbalta and gabapentin were initially held, will resume, has chronic neuropathy  · Speech evaluated, diet advanced  · Mental status has improved, A&Ox4  · PT/OT evaluated, recommending rehab, case management on board    Pressure injury of skin of buttock  Assessment & Plan          · Present prior to admission per patient's , rectal tube placed as patient has multiple BMs in open wounds, pictures as above  · Wound care consult    Elevated serum creatinine  Assessment & Plan  · Cr 1 57 on admission --> 1 58 --> 1 16 today  · Unclear baseline creatinine, last results in September 2021 creatinine 1 3-1 6  · Received 1L IVF bolus in ED  · Has improved  · Obtain BMP outpatient on discharge to monitor renal function    COVID-19  Assessment & Plan  · C/o shortness of breath  Tested positive on home kit  Her husbad has been having subjective fevers, chills, cough over past day     · Patient was vaccinated with Flomio in June  · Meets SIRS criteria for tachycardia and tachypnea, both have since resolved  Lactate normal   · CXR: diffuse patchy infiltrate, possible LLL PNA  · Will hold off on further abx as patient afebrile, no leukocytosis, no sputum  · IV abx: Given ceftriaxone and azithromycin in ED  · IV fluids: 1L NS in ED  · Labs   · PCT x2 normal  · D-dimer: 0 70  · Started on moderate covid pathway  · ECG unremarkable  · Remdesivir  · Decadron   · AC: Lovenox   · Respiratory protocol, incentive spirometry     Fecal occult blood test positive  Assessment & Plan  · Reports some darker stool over past couple days  Denies abdominal pain, hematochezia, hematemesis, N/V/D   · CBC Hgb 4 4, thought to be lab error in ED  Repeat H&H ordered and Hgb 9 6  · Blood consent obtained  · Hemoglobin has since been stable, no signs/symptoms of active bleed at this time  · Iron panel normal  · GI following    Acute respiratory failure with hypoxia (HCC)  Assessment & Plan  · Presented hypoxic, required 6L NC with sat >90 on admission, weaned down to room air with sats >92%  · Does not wear oxygen at home  · 2/2 to COVID-19  · See above plan    Cirrhosis [facility])  Assessment & Plan  · AST-64, ALT-29  · Per chart review, from alcohol use  Denies any recent alcohol use-- ethanol level less than 10     · Ultrasound abdomen did not show ascites  · GI following:  · Continue lactulose and Xifaxan  · Outpatient follow-up with GI to manage cirrhosis  · Patient with multiple BMs/diarrhea, rectal tube placed last night due to multiple BMs and skin breakdown present on buttocks    Hyperkalemia  Assessment & Plan  · K 5 3 on admission, has since stabilized  · Monitor BMP    Type 2 diabetes mellitus with hyperglycemia, with long-term current use of insulin [facility])  Assessment & Plan  Lab Results   Component Value Date    HGBA1C 7 5 (H) 01/04/2022       Recent Labs     01/06/22  0714 01/06/22  0916 01/06/22  1119 01/06/22  1409   POCGLU 111 199* 167* 210*       Blood Sugar Average: Last 72 hrs:  · (P) 465 [id number] A1c  · Home regimen: Novolog 3 units TID with meals, lantus 23 units hs   · Continue lantus 23 units hs  · SSI with Acu checks AC/HS  · Elevated blood glucose likely in setting of steroid use  · Hypoglycemia protocol       VTE Pharmacologic Prophylaxis: VTE Score: 5 High Risk (Score >/= 5) - Pharmacological DVT Prophylaxis Ordered: enoxaparin (Lovenox)  Sequential Compression Devices Ordered  Patient Centered Rounds: I performed bedside rounds with nursing staff today  Discussions with Specialists or Other Care Team Provider:  Case Management    Education and Discussions with Family / Patient: Updated  () via phone  Time Spent for Care: 45 minutes  More than 50% of total time spent on counseling and coordination of care as described above  Current Length of Stay: 2 day(s)  Current Patient Status: Inpatient   Certification Statement: The patient will continue to require additional inpatient hospital stay due to Pending rehab placement  Discharge Plan: Anticipate discharge in 24-48 hrs to rehab facility  Code Status: Level 1 - Full Code    Subjective:   Patient is seen at bedside this a m , is much more alert and oriented since admission, does not have any current complaints  Did have significant lower extremity pain due to chronic neuropathy last night    Objective:     Vitals:   Temp (24hrs), Av 9 °F (36 6 °C), Min:97 3 °F (36 3 °C), Max:98 6 °F (37 °C)    Temp:  [97 3 °F (36 3 °C)-98 6 °F (37 °C)] 97 7 °F (36 5 °C)  HR:  [] 114  Resp:  [11-20] 20  BP: (148-169)/(67-95) 155/82  SpO2:  [94 %-99 %] 99 %  Body mass index is 35 28 kg/m²  Input and Output Summary (last 24 hours): Intake/Output Summary (Last 24 hours) at 2022 1450  Last data filed at 2022 0201  Gross per 24 hour   Intake --   Output 400 ml   Net -400 ml       Physical Exam:   Physical Exam  Constitutional:       General: She is not in acute distress  Appearance: Normal appearance  She is not ill-appearing, toxic-appearing or diaphoretic  Cardiovascular:      Rate and Rhythm: Normal rate and regular rhythm  Pulses: Normal pulses  Heart sounds: Normal heart sounds  Pulmonary:      Effort: Pulmonary effort is normal  No respiratory distress  Breath sounds: Normal breath sounds  Abdominal:      General: Bowel sounds are normal  There is no distension  Palpations: Abdomen is soft  Tenderness: There is no abdominal tenderness  Genitourinary:     Comments: Rectal tube present  Musculoskeletal:         General: Swelling and tenderness present  Comments: Chronic, bilateral lower extremity nonpitting edema  Very tender to palpation, with neuropathy   Skin:     General: Skin is warm  Coloration: Skin is not jaundiced or pale  Findings: Lesion present  Comments: See media below for skin lesions on buttocks   Neurological:      General: No focal deficit present  Mental Status: She is alert and oriented to person, place, and time     Psychiatric:         Mood and Affect: Mood normal          Behavior: Behavior normal                    Additional Data:     Labs:  Results from last 7 days   Lab Units 01/06/22  0552   WBC Thousand/uL 5 08   HEMOGLOBIN g/dL 12 7   HEMATOCRIT % 39 0   PLATELETS Thousands/uL 135*   NEUTROS PCT % 69   LYMPHS PCT % 22   MONOS PCT % 9   EOS PCT % 0     Results from last 7 days   Lab Units 01/05/22  0558   SODIUM mmol/L 136   POTASSIUM mmol/L 4 4   CHLORIDE mmol/L 102   CO2 mmol/L 28   BUN mg/dL 21*   CREATININE mg/dL 1 16*   ANION GAP mmol/L 6   CALCIUM mg/dL 8 3*   ALBUMIN g/dL 3 3*   TOTAL BILIRUBIN mg/dL 0 34   ALK PHOS U/L 151 5*   ALT U/L 55*   AST U/L 83*   GLUCOSE RANDOM mg/dL 143*     Results from last 7 days   Lab Units 01/05/22  0558   INR  1 30*     Results from last 7 days   Lab Units 01/06/22  1409 01/06/22  1119 01/06/22  0916 01/06/22  0714 01/05/22  2233 01/05/22  1942 01/05/22  1519 01/05/22  1101 01/05/22  0946 01/04/22  2314 01/04/22  2100 01/04/22  1657   POC GLUCOSE mg/dl 210* 167* 199* 111 302* 176* 248* 212* 189* 215* 245* 224*     Results from last 7 days   Lab Units 01/04/22  0517   HEMOGLOBIN A1C % 7 5*     Results from last 7 days   Lab Units 01/05/22  0601 01/04/22  2058 01/03/22  2240   LACTIC ACID mmol/L  --   --  0 7   PROCALCITONIN ng/ml 0 14 0 15  --        Lines/Drains:  Invasive Devices  Report    Peripheral Intravenous Line            Peripheral IV 01/04/22 Left Hand 2 days    Peripheral IV 01/06/22 Left;Ventral (anterior) Wrist <1 day                Imaging: Reviewed radiology reports from this admission including: chest xray, CT head and ultrasound(s)    Recent Cultures (last 7 days):   Results from last 7 days   Lab Units 01/03/22  2223   BLOOD CULTURE  No Growth at 24 hrs  No Growth at 24 hrs         Last 24 Hours Medication List:   Current Facility-Administered Medications   Medication Dose Route Frequency Provider Last Rate    albuterol  2 puff Inhalation Q4H PRN Hiren Kaye MD      amLODIPine  10 mg Oral Daily Marlin Michaels PA-C      dexamethasone  6 mg Intravenous Q24H Krista Cheung PA-C      Diclofenac Sodium  2 g Topical 4x Daily Krista Cheung PA-C      [START ON 1/7/2022] DULoxetine  60 mg Oral Daily Marlin Michaels PA-C      enoxaparin  30 mg Subcutaneous Q12H [facility] Marlin Michaels PA-C      gabapentin  100 mg Oral TID PRN Marlin Michaels PA-C      guaiFENesin  600 mg Oral Q12H [facility] Marlin Michaels PA-C      insulin glargine  23 Units Subcutaneous HS Krista Cheung PA-C      insulin lispro  1-6 Units Subcutaneous 4 times day Earning Gallagher PA-C      insulin lispro  1-6 Units Subcutaneous HS Krista Cheung PA-C      ipratropium  2 puff Inhalation TID Yadi Meeks MD      lactulose  20 g Oral BID APRYL Phan      oxyCODONE  10 mg Oral Q6H PRN Stephanie Ursula Navarro MD      oxyCODONE  5 mg Oral Q6H PRN Newt Phoenix, PA-C      remdesivir  100 mg Intravenous Q24H Krista Gagnon PA-C      rifaximin  550 mg Oral Q12H [address] APRYL Phan      sodium chloride (PF)  3 mL Intravenous Q1H PRN Carmen Casillas MD       Facility-Administered Medications Ordered in Other Encounters   Medication Dose Route Frequency Provider Last Rate    ferrous sulfate  325 mg Oral Daily With Shade Rodriguez MD          Today, Patient Was Seen By: Hair Elizabeth PA-C    **Please Note: This note may have been constructed using a voice recognition system  **

## 2022-01-06 NOTE — PROGRESS NOTES
Progress Note - [facility]  Radha Morales 73 y o  female MRN: [id number]    Unit/Bed#: -01 Encounter: [id number]      Assessment/Plan:  21-year-old female with a past medical history of cirrhosis, alcohol abuse, marijuana use, chronic back pain on oxycodone, diabetes mellitus, lower extremity neuropathy, MRSA, hypertension, and cellulitis who presented to [facility] on 01/03 with change in mental status      1  Cirrhosis with coagulopathy and hepatic encephalopathy  Patient has a history of cirrhosis of the liver secondary alcohol use  Per  patient quit drinking alcohol approximately 20 years ago  Cirrhosis was being managed by her primary care physician in [place]  Per  patient was previously on diuretics but there were recently discontinued due to elevated BUN and creatinine  Bilateral lower extremities edematous and red  Patient does report abdominal distension  Meld score 18  AFP 2 3  Reinforce to patient need to comply with medication regimen concerning cirrhosis and hepatic encephalopathy, patient verbalized understanding   -Monitor meld labs  -Ultrasound of the abdomen showed no ascites  -Continue lactulose and Xifaxan  -May titrate lactulose for 2-3 bowel movements daily  -Monitor mental status  -Recommend consult for Nephrology concerning diuretic therapy secondary to elevated BUN and creatinine      2  Anemia with fecal occult blood test positive  Stool for occult blood positive on admission to ED hemoglobin noted to be 9 7  Patient's baseline hemoglobin 12  Patient currently having no overt signs of GI bleed  Per patient and spouse patient has no bright red blood from rectal area, bright red blood in stool, or black tarry stool  No reports of bloody emesis  Anemia may be secondary to underlying cirrhosis of the liver, stool for occult blood may be false positive for may be due to hemorrhoids    No signs of active GI bleed, no bright red blood in stool, bright red blood from rectal area, or black tarry stool  Hemoglobin 12 7 and stable today     -No urgent need for colonoscopy since patient having no overt signs of GI bleed   -Continue to monitor hemoglobin  -Transfuse blood products as needed to keep hemoglobin greater than 7   -Colonoscopy and EGD can be done as an outpatient after resolution of COVID 19  Subjective:   Sitting in bed in no acute distress  Tolerating diet  Denies nausea, vomiting, acid reflux, heartburn, epigastric or abdominal pain  Denies bright red blood in stool bright red blood from rectal area or black tarry stool  Denies shortness of breath or chest pain  Objective:     Vitals: Blood pressure 155/82, pulse (!) 114, temperature 97 7 °F (36 5 °C), temperature source Tympanic, resp  rate 20, height 5' 2" (1 575 m), weight 87 5 kg (192 lb 14 4 oz), SpO2 99 %  ,Body mass index is 35 28 kg/m²  Intake/Output Summary (Last 24 hours) at 1/6/2022 1505  Last data filed at 1/6/2022 0201  Gross per 24 hour   Intake --   Output 400 ml   Net -400 ml       Physical Exam:   Constitutional:       General: She is not in acute distress  HENT:      Head: Normocephalic and atraumatic  Eyes:      General: No scleral icterus  Cardiovascular:      Rate and Rhythm: Normal rate and regular rhythm  Pulses: Normal pulses  Heart sounds: Normal heart sounds  Abdominal:      General: Bowel sounds are normal  There is distension  Palpations: Abdomen is soft  There is no mass  Tenderness: There is no abdominal tenderness  There is no guarding or rebound  Hernia: No hernia is present  Musculoskeletal:      Cervical back: Normal range of motion and neck supple  Right lower leg: Edema present  Left lower leg: Edema present  Comments: BLE red discoloration and painful with minimal touch  Skin:     General: Skin is warm and dry  Coloration: Skin is not jaundiced or pale     Neurological:      Mental Status: She is alert and oriented to person, place, and time  Comments: Confused at times     Psychiatric:         Mood and Affect: Mood normal       Invasive Devices  Report    Peripheral Intravenous Line            Peripheral IV 01/04/22 Left Hand 2 days    Peripheral IV 01/06/22 Left;Ventral (anterior) Wrist <1 day                Current Facility-Administered Medications:     albuterol (PROVENTIL HFA,VENTOLIN HFA) inhaler 2 puff, 2 puff, Inhalation, Q4H PRN, Kaia Arenas MD, 2 puff at 01/04/22 2111    amLODIPine (NORVASC) tablet 10 mg, 10 mg, Oral, Daily, Ana Lilia Fuentes PA-C, 10 mg at 01/06/22 2533    dexamethasone (DECADRON) injection 6 mg, 6 mg, Intravenous, Q24H, Krista Núñez PA-C, 6 mg at 01/06/22 0531    Diclofenac Sodium (VOLTAREN) 1 % topical gel 2 g, 2 g, Topical, 4x Daily, Krista Núñez PA-C, 2 g at 01/06/22 1410    [START ON 1/7/2022] DULoxetine (CYMBALTA) delayed release capsule 60 mg, 60 mg, Oral, Daily, Ana Lilia Fuentes PA-C    enoxaparin (LOVENOX) subcutaneous injection 30 mg, 30 mg, Subcutaneous, Q12H [facility], Ana Lilia Fuentes PA-C    gabapentin (NEURONTIN) capsule 100 mg, 100 mg, Oral, TID PRN, Anyi Fuentes PA-C    guaiFENesin (MUCINEX) 12 hr tablet 600 mg, 600 mg, Oral, Q12H TANNER, Ana Lilia Fuentes PA-C, 600 mg at 01/06/22 0843    insulin glargine (LANTUS) subcutaneous injection 23 Units 0 23 mL, 23 Units, Subcutaneous, HS, Krista Núñez PA-C, 23 Units at 01/05/22 2206    insulin lispro (HumaLOG) 100 units/mL subcutaneous injection 1-6 Units, 1-6 Units, Subcutaneous, 4 times day, 2 Units at 01/06/22 1416 **AND** Fingerstick Glucose (POCT), , , 4 times day, Krista Núñez PA-C    insulin lispro (HumaLOG) 100 units/mL subcutaneous injection 1-6 Units, 1-6 Units, Subcutaneous, HS, Krista Núñez PA-C, 1 Units at 01/05/22 2206    ipratropium (ATROVENT HFA) inhaler 2 puff, 2 puff, Inhalation, TID, Yvonne Briscoe MD, 2 puff at 01/05/22 2050    lactulose oral solution 20 g, 20 g, Oral, BID, APRYL Phan, 20 g at 01/06/22 0843    oxyCODONE (ROXICODONE) immediate release tablet 10 mg, 10 mg, Oral, Q6H PRN, Amira Cardoso MD, 10 mg at 01/06/22 0245    oxyCODONE (ROXICODONE) IR tablet 5 mg, 5 mg, Oral, Q6H PRN, Jerzy Goel PA-C, 5 mg at 01/06/22 4426    [COMPLETED] remdesivir (Veklury) 200 mg in sodium chloride 0 9 % 290 mL IVPB, 200 mg, Intravenous, Q24H, 200 mg at 01/04/22 0534 **FOLLOWED BY** remdesivir (Veklury) 100 mg in sodium chloride 0 9 % 270 mL IVPB, 100 mg, Intravenous, Q24H, Krista Drew PA-C, 100 mg at 01/06/22 0532    rifaximin (XIFAXAN) tablet 550 mg, 550 mg, Oral, Q12H [address], APRYL Phan, 550 mg at 01/06/22 0843    sodium chloride (PF) 0 9 % injection 3 mL, 3 mL, Intravenous, Q1H PRN, Carmen Casillas MD    Facility-Administered Medications Ordered in Other Encounters:     ferrous sulfate tablet 325 mg, 325 mg, Oral, Daily With Breakfast, Simon Wu MD    Lab Results:   Recent Results (from the past 24 hour(s))   Fingerstick Glucose (POCT)    Collection Time: 01/05/22  3:19 PM   Result Value Ref Range    POC Glucose 248 (H) 65 - 140 mg/dl   Fingerstick Glucose (POCT)    Collection Time: 01/05/22  7:42 PM   Result Value Ref Range    POC Glucose 176 (H) 65 - 140 mg/dl   Fingerstick Glucose (POCT)    Collection Time: 01/05/22 10:33 PM   Result Value Ref Range    POC Glucose 302 (H) 65 - 140 mg/dl   CBC and differential    Collection Time: 01/06/22  5:52 AM   Result Value Ref Range    WBC 5 08 4 31 - 10 16 Thousand/uL    RBC 4 69 3 81 - 5 12 Million/uL    Hemoglobin 12 7 11 5 - 15 4 g/dL    Hematocrit 39 0 34 8 - 46 1 %    MCV 83 82 - 98 fL    MCH 27 1 26 8 - 34 3 pg    MCHC 32 6 31 4 - 37 4 g/dL    RDW 15 3 (H) 11 6 - 15 1 %    MPV 10 2 8 9 - 12 7 fL    Platelets 419 (L) 978 - 390 Thousands/uL    nRBC 0 /100 WBCs    Neutrophils Relative 69 43 - 75 %    Immat GRANS % 0 0 - 2 %    Lymphocytes Relative 22 14 - 44 %    Monocytes Relative 9 4 - 12 %    Eosinophils Relative 0 0 - 6 %    Basophils Relative 0 0 - 1 %    Neutrophils Absolute 3 48 1 85 - 7 62 Thousands/µL    Immature Grans Absolute 0 01 0 00 - 0 20 Thousand/uL    Lymphocytes Absolute 1 14 0 60 - 4 47 Thousands/µL    Monocytes Absolute 0 43 0 17 - 1 22 Thousand/µL    Eosinophils Absolute 0 01 0 00 - 0 61 Thousand/µL    Basophils Absolute 0 01 0 00 - 0 10 Thousands/µL   Ammonia    Collection Time: 01/06/22  5:52 AM   Result Value Ref Range    Ammonia 45 92 (H) <=36 umol/L   Fingerstick Glucose (POCT)    Collection Time: 01/06/22  7:14 AM   Result Value Ref Range    POC Glucose 111 65 - 140 mg/dl   Fingerstick Glucose (POCT)    Collection Time: 01/06/22  9:16 AM   Result Value Ref Range    POC Glucose 199 (H) 65 - 140 mg/dl   Fingerstick Glucose (POCT)    Collection Time: 01/06/22 11:19 AM   Result Value Ref Range    POC Glucose 167 (H) 65 - 140 mg/dl   Fingerstick Glucose (POCT)    Collection Time: 01/06/22  2:09 PM   Result Value Ref Range    POC Glucose 210 (H) 65 - 140 mg/dl             Imaging Studies: XR chest 1 view portable    Result Date: 1/4/2022  Narrative: CHEST INDICATION:   sob  COMPARISON:  X-ray 7/15/20 EXAM PERFORMED/VIEWS:  XR CHEST PORTABLE FINDINGS: Cardiomediastinal silhouette appears unremarkable  Prominent interstitial opacities in the right lung Streaky left basal opacities No pneumothorax  No large pleural effusion  Osseous structures appear within normal limits for patient age  Impression: 1  Increased interstitial opacities in the right lung, compatible with COVID-19  Trace pulmonary edema would have a similar appearance 2  Left basal opacities, likely atelectasis The study was marked in EPIC for immediate notification  Workstation performed: [id number]     CT head wo contrast    Result Date: 1/3/2022  Narrative: CT BRAIN - WITHOUT CONTRAST INDICATION:   AMS   COMPARISON:  7/15/2020 TECHNIQUE: CT examination of the brain was performed  In addition to axial images, sagittal and coronal 2D reformatted images were created and submitted for interpretation  Radiation dose length product (DLP) for this visit:  889 7 mGy-cm   This examination, like all CT scans performed in the [facility], was performed utilizing techniques to minimize radiation dose exposure, including the use of iterative reconstruction and automated exposure control  IMAGE QUALITY:  Diagnostic  FINDINGS: PARENCHYMA:  No intracranial mass, mass effect or midline shift  No CT signs of acute infarction  Hyperdensity within the right occipital lobe series 201 image 21 was also seen on the prior exam and indeterminate  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  VENTRICLES AND EXTRA-AXIAL SPACES:  Normal for the patient's age  VISUALIZED ORBITS AND PARANASAL SINUSES:  Unremarkable  CALVARIUM AND EXTRACRANIAL SOFT TISSUES:  Normal      Impression: No significant interval change from the prior CT  No acute intracranial abnormality  Microangiopathic changes  Workstation performed: [id number]     US abdomen limited    Result Date: 1/4/2022  Narrative: ABDOMEN ULTRASOUND, LIMITED, FOUR QUADRANT SURVEY INDICATION:    Hx cirrhosis, abdomen distention, ascites  Covid 19 positive  COMPARISON:  CT scan 3/1/2020 TECHNIQUE: Real-time ultrasound of the abdominal cavity was performed with a curvilinear transducer with standard grey scale imaging techniques  FINDINGS: No ascites  Impression: No ascites  Workstation performed: [id number]           APRYL Longo      Please Note: "This note has been constructed using a voice recognition system  Therefore there may be syntax, spelling, and/or grammatical errors   Please call if you have any questions  "**

## 2022-01-06 NOTE — ASSESSMENT & PLAN NOTE
· Cr 1 57 on admission --> 1 58 --> 1 16 --> 1 13 today, stable  · Unclear baseline creatinine, last results in September 2021 creatinine 1 3-1 6  · Received 1L IVF bolus in ED  · Has improved  · Obtain BMP outpatient on discharge to monitor renal function

## 2022-01-06 NOTE — ASSESSMENT & PLAN NOTE
Lab Results   Component Value Date    HGBA1C 7 5 (H) 01/04/2022       Recent Labs     01/05/22  2233 01/06/22  0714 01/06/22  0916 01/06/22  1119   POCGLU 302* 111 199* 167*       Blood Sugar Average: Last 72 hrs:  · (P) 200 [id number] A1c  · Home regimen: Novolog 3 units TID with meals, lantus 23 units hs   · Continue lantus 23 units hs  · SSI with Acu checks AC/HS  · Elevated blood glucose likely in setting of steroid use  · Hypoglycemia protocol

## 2022-01-06 NOTE — ASSESSMENT & PLAN NOTE
· A&O x 3 on assessment  No focal neurologic symptoms  Per , this afternoon patient develop confusion and didn't know where she was or what time it is  Denies dysarthria, dizziness, paresthesias, gait abnormalities, hemiparesis, headache, visual changes, ataxia, facial droop  · CT head: negative   · Salicylate level <5, ethanol <10 and acetaminophen <10   Urine tox positive for opiates  · On oxycodone for chronic back pain  · Ammonia levels trending down:  50 93 --> 47 5 --> 45 92 today  · Last value in 2020 was <10  · Received 20g lactulose in ED, refused lactulose yesterday due to frequent BMs  · Trend ammonia   · UA negative   · Neuro checks    · Home Cymbalta and gabapentin were initially held, will resume, has chronic neuropathy  · Speech evaluated, diet advanced  · Mental status has improved, A&Ox4  · PT/OT evaluated, recommending rehab, case management on board

## 2022-01-06 NOTE — ASSESSMENT & PLAN NOTE
Lab Results   Component Value Date    HGBA1C 7 5 (H) 01/04/2022       Recent Labs     01/06/22  0714 01/06/22  0916 01/06/22  1119 01/06/22  1409   POCGLU 111 199* 167* 210*       Blood Sugar Average: Last 72 hrs:  · (P) 383 [id number] A1c  · Home regimen: Novolog 3 units TID with meals, lantus 23 units hs   · Continue lantus 23 units hs  · SSI with Acu checks AC/HS  · Elevated blood glucose likely in setting of steroid use  · Hypoglycemia protocol

## 2022-01-06 NOTE — OCCUPATIONAL THERAPY NOTE
Occupational Therapy Evaluation     Patient Name: Radha Morales  NBBWT'H Date: 1/6/2022  Problem List  Principal Problem:    Altered mental status  Active Problems:    Type 2 diabetes mellitus with hyperglycemia, with long-term current use of insulin (HCC)    Hyperkalemia    Cirrhosis (HCC)    Acute respiratory failure with hypoxia (HCC)    Fecal occult blood test positive    COVID-19    Elevated serum creatinine    Pressure injury of skin of buttock    Past Medical History  Past Medical History:   Diagnosis Date    Abnormal head CT 7/14/2017    Acute kidney injury ([address] ) 8/19/2018    Cellulitis 1/10/2017    Closed fracture of multiple ribs of right side 7/14/2017    Degenerative disc disease at L5-S1 level     Diabetes mellitus (HCC)     History of methicillin resistant staphylococcus aureus (MRSA) 08/21/2018    negative nasal culture- isolation and hx of mrsa removed 8/20/2018    Hyperkalemia 4/25/2018    Hypertension     Hypocalcemia 4/25/2018    Hyponatremia 7/14/2017     Past Surgical History  Past Surgical History:   Procedure Laterality Date    CHOLECYSTECTOMY      JOINT REPLACEMENT      OOPHORECTOMY      DE INCISION,IMPLANT,NEUROELEC,SACRAL NERVE N/A 9/20/2021    Procedure: INSERTION NEUROSTIMULATOR ELECTRODE ARRAY SACRAL NERVE; FLUOROSCOPY; ELECTRONIC ANALYSIS;  Surgeon: Emily Guillen MD;  Location[facility];  Service: UroGynecology           SACRAL NERVE STIMULATOR PLACEMENT N/A 9/28/2021    Procedure: IPG INSERTION AND PROGRAMMING;  Surgeon: Emily Guillen MD;  [facility];  Service: UroGynecology                01/06/22 1456   OT Last Visit   OT Visit Date 01/06/22  (Thursday)   Note Type   Note type Evaluation  (and tx note)   Restrictions/Precautions   Weight Bearing Precautions Per Order No   Other Precautions Contact/isolation; Airborne/isolation;Droplet precautions   Pain Assessment   Pain Assessment Tool 0-10   Pain Score No Pain   Home Living   Type of Home House   Home Layout Two level;Performs ADLs on one level; Able to live on main level with bedroom/bathroom;Stairs to enter with rails; Other (Comment)  (4-5 CATRACHO)   Bathroom Shower/Tub Tub/shower unit   Bathroom Toilet Standard   Bathroom Equipment   (pt reports no DME, shower chair)   [facility]; Other (Comment)  (rolaltor walker)   Additional Comments Pt reports living w/ spouse, daughter and hermelinda's significant other in [address] [place] [address] w/ first floor set-up  Sleeps in recliner chair   Prior Function   Level of [place] Needs assistance with IADLs   Lives With Spouse;Daughter;Family   Receives Help From Family   ADL Assistance Needs assistance  (A w/ bathing, LBD, and toileting)   IADLs Needs assistance   Falls in the last 6 months 0   Vocational Retired   Comments Pt reports using rollator at baseline for functional mobility and needs assistance w/ ADL / IADL  Pt reports I w/ functional mobility and has assist to manage stairs in / out of home  Pt added that her  miguel rolanda help her lift her leg   Lifestyle   Autonomy Pt reports using rollator w/ out assistance for functional mobility at baseline w/ first floor set- up  Assist w/ ADL/ IADL and sleeps in recliner lift chair   Reciprocal Relationships Supportive , daughter  Pt reports having 5 children and 11 grand children   Service to Others 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[place], [place] [address] St Pt reports enjoying watching her shows on [employer] including [employer]   Psychosocial   Patient Behaviors/Mood Cooperative   Subjective   Subjective "I feel like I need to shit"   ADL   Where Assessed Chair   Eating Assistance 7  Independent   Eating Deficit Setup   Grooming Assistance 6  Modified Independent  (seated in chair after set- up)   Grooming Deficit Setup; Increased time to complete   UB Bathing Assistance   (anticipate S based on fxal obs skills seated)   LB Bathing Assistance Unable to assess   UB Dressing Assistance 6  Modified independent   UB Dressing Deficit Setup; Increased time to complete   LB Dressing Assistance 2  Maximal Assistance   LB Dressing Deficit Don/doff R sock; Don/doff L sock  (pt reports needing assistance at baseline)   [address]  2  Maximal Assistance  (incontinent of bowel)   Toileting Deficit Clothing management up;Clothing management down;Perineal hygiene   Additional Comments on room air O2 sats >90% throughout session   Bed Mobility   Supine to Sit Unable to assess   Sit to Supine Unable to assess   Additional Comments Pt seated OOB in chair upon arrival and post eval w/ needs met, call bell in reach   Transfers   Sit to Stand 5  Supervision   Additional items Assist x 1; Armrests; Increased time required   Stand to Sit 5  Supervision   Additional items Assist x 1; Increased time required;Armrests   Additional items   (commode over standard toilet in bathroom)   Functional Mobility   Functional Mobility 5  Supervision   Additional Comments w/ in room w/ out LOB   Additional items Rolling walker   Balance   Static Sitting Good   Static Standing Fair   Ambulatory Fair -   Activity Tolerance   Activity Tolerance Patient limited by fatigue   Medical Staff Made Aware spoke to PT, [address] per RNRuben appropriate to see pt   RUE Assessment   RUE Assessment WFL   RUE Strength   RUE Overall Strength Within Functional Limits - able to perform ADL tasks with strength   LUE Assessment   LUE Assessment WFL   LUE Strength   LUE Overall Strength Within Functional Limits - able to perform ADL tasks with strength   Hand Function   Gross Motor Coordination Functional   Fine Motor Coordination Functional   Sensation   Light Touch Not tested   Sharp/Dull Not tested   Vision-Basic Assessment   Current Vision Wears glasses all the time   Cognition   Overall Cognitive Status   (questionabl insight into deficits)   Arousal/Participation Alert; Cooperative   Attention Attends with cues to redirect   Orientation Level Oriented X4   Memory Decreased recall of recent events; Other (Comment); Decreased long term memory  (details, timeline events)   Following Commands Follows multistep commands with increased time or repetition   Comments Identified pt by full name and birthdate  Alert and oriented  Able to follow directions and communicate wants / needs w/ min cue for redirection / attention to task  Able to provide home set- up and demonstrated appropriate long term recall   Assessment   Limitation Decreased ADL status; Decreased endurance;Decreased high-level ADLs   Assessment Pt is a 73yo female admitted to [facility] on 1/3/22  Pt presents w/ altered mental status and significant PMH Impacting her occupational performance including DM, L5-S1 degenerative disc disease, diabetic neuropathy, R rib fx, L hip fx, HTN  Prior to admit, home COVID-19 test +  Pt w/ active OT orders and activity orders  Personal factors impacting performance includes inability to complete IADL's, difficulty completing ADLs, difficulty managing stairs, multi - level home environment, assist required at baseline  Pt reports living w/ spouse, daughter and daughter's significant other in [place] w/ 4-5 [place] PTA  Pt reports first floor set- up and sleeps in recliner lift chair  Pt reports needing assistance w/ IADLs, bathing, LBD, and toileting using rollator for functional mobility  Upon eval, pt alert and oriented  Able to participate in conversation w/ cues to redirect / sustain attention  Pt required S to complete sit <> stand and functional mobility using RW  O2 sats >90% throughout on room air  Pt required max A to complete toileting, max A to complete LBD, mod I to complete UBD seated, and mod I to complete grooming seated   Pt presents w/ decreased activity tolerance, decreased endurance, decreased standing tolerance impacting her I w/dressing, bathing, functional mobility, functional transfers, activity engagement, clothing mgmt  Pt completing ADL below baseline level of I and would benefit from OT while in acute care to address deficits  Recommend DC home to PLOF w/ family support / assist when medically stable using AD for functional mobility and commode / shower chair  Will continue to follow pt in acute care  Goals   Patient Goals Pt stated that she would like to return home and watch her shows on [employer]   Plan   Treatment Interventions ADL retraining;Functional transfer training; Endurance training;Patient/family training;Equipment evaluation/education; Activityengagement   Goal Expiration Date 01/13/22   OT Frequency 2-3x/wk   Additional Treatment Session   Start Time 6933   End Time 2031   Treatment Assessment Pt seen for skilled OT tx session day 1 following eval focusing on challenging activity tolerance and activity engagement  Pt agreeable to participate in session reporting that she feels urge to use bathroom  Pt engaged in functional mobility using RW w/ S to bathroom  Pt completed toilet transfer w/ S to commode over standard toilet in bathroom  Pt able to manage gown but required max A w/ personal hygiene after voiding  Pt seated OOB In chair at end of session w/ needs met, call bell in reach  Continue to recommend [address] w/ family support / assist when medically stable for discharge   Will continue to follow   Additional Treatment Day 1  (Thursday)   Recommendation   OT Discharge Recommendation No rehabilitation needs  (Home w/ family support / assist )   Equipment Recommended Shower/Tub chair with back ($);Bedside commode   Commode Type Standard   AM-PAC Daily Activity Inpatient   Lower Body Dressing 2   Bathing 2   Toileting 2   Upper Body Dressing 4   Grooming 4   Eating 4   Daily Activity Raw Score 18   Daily Activity Standardized Score (Calc for Raw Score >=11) 38 66   AM-PAC Applied Cognition Inpatient   Following a Speech/Presentation 3   Understanding Ordinary Conversation 4   Taking Medications 3 Remembering Where Things Are Placed or Put Away 4   Remembering List of 4-5 Errands 3   Taking Care of Complicated Tasks 3   Applied Cognition Raw Score 20   Applied Cognition Standardized Score 41 76   Barthel Index   Feeding 10   Bathing 0   Grooming Score 5   Dressing Score 5   Bladder Score 10   Bowels Score 0   Toilet Use Score 5   Transfers (Bed/Chair) Score 10   Mobility (Level Surface) Score 0   Stairs Score 0   Barthel Index Score 45   Modified Keasbey Scale   Modified Keasbey Scale 3     The patient's raw score on the AM-PAC Daily Activity inpatient short form is 18, standardized score is 38 66, less than 39 4  Patients at this level are likely to benefit from discharge to post-acute rehabilitation services  Please refer to the recommendation of the Occupational Therapist for safe discharge planning  OT DC recommendation does not coincide w/ AMPA score  Pt required A w/ ADL at baseline and use of rollator for functional mobility  Pt completing functional mobility at /near baseline level of I w/ decreased endurance   Recommend DC Home w/ continued family support / assist  Will continue to follow    Pt goals to be met by 1/13/22:  -Pt will demonstrate good attention and understanding EC tech to max I w/ ADLs and improve engagement to return home w/ family support / assist    -Pt will consistently follow 2 step directions during ADLs w/ good recall and no cues/ prompts to improve engagement and I w/ ADLs    -Pt will consistently engage in functional mobility using AD w/ mod I to / from bathroom to max I w/ ADLs    -Pt will consistently complete functional transfers to all surfaces w/ mod I using AD, DME as needed to return home w/ family    Wilber Mendoza OTR/L

## 2022-01-06 NOTE — ASSESSMENT & PLAN NOTE
· C/o shortness of breath  Tested positive on home kit  Her husbad has been having subjective fevers, chills, cough over past day  · Meets SIRS criteria for tachycardia and tachypnea, both have since resolved   Lactate normal   · CXR: diffuse patchy infiltrate, possible LLL PNA  · Will hold off on further abx as patient afebrile, no leukocytosis, no sputum  · IV abx: Given ceftriaxone and azithromycin in ED  · IV fluids: 1L NS in ED  · Labs   · PCT x2 normal  · D-dimer: 0 70  · Started on moderate covid pathway  · ECG unremarkable  · Remdesivir  · Decadron   · AC: Lovenox   · Respiratory protocol, incentive spirometry

## 2022-01-06 NOTE — ASSESSMENT & PLAN NOTE
· AST-64, ALT-29  · Per chart review, from alcohol use  Denies any recent alcohol use-- ethanol level less than 10  · Ultrasound abdomen did not show ascites  · GI following:  · Continue lactulose and Xifaxan  · Outpatient follow-up with GI to manage cirrhosis  · Patient with multiple BMs/diarrhea, rectal tube placed 1/5 due to multiple BMs and skin breakdown present on buttocks  Rectal tube has since been removed, BMs have slowed down, stable

## 2022-01-06 NOTE — PLAN OF CARE
Problem: PHYSICAL THERAPY ADULT  Goal: Performs mobility at highest level of function for planned discharge setting  See evaluation for individualized goals  Description: Treatment/Interventions: Functional transfer training,LE strengthening/ROM,Elevations,Therapeutic exercise,Endurance training,Patient/family training,Equipment eval/education,Bed mobility,Gait training  Equipment Recommended: Humera Kothari       See flowsheet documentation for full assessment, interventions and recommendations  Outcome: Not Progressing  Note: Prognosis: Guarded  Problem List: Decreased strength,Decreased endurance,Impaired balance,Decreased mobility,Decreased cognition,Decreased safety awareness,Obesity,Pain,Decreased skin integrity,Decreased range of motion  Assessment: pt shows significant decline in mobility status from previous session w/ inability to participate in transfer and ambulation training  pt stated this was related to fatigue and concern regarding dislodging rectal tube  pt needed mod assist for rolling and repositioning in bed  frequent rest breaks were needed w/ activity  pt is at risk for falling due to physical and safety awareness deficits  continued inpatient PT is needed to reduce fall risk  dishcarge recommendation is for inpatient rehab to maximize level of functional independence  Barriers to Discharge: Inaccessible home environment        PT Discharge Recommendation: Post acute rehabilitation services          See flowsheet documentation for full assessment

## 2022-01-06 NOTE — ASSESSMENT & PLAN NOTE
· Cr 1 57 on admission --> 1 58 --> 1 16 today  · Unclear baseline creatinine, last results in September 2021 creatinine 1 3-1 6  · Received 1L IVF bolus in ED  · Has improved  · Obtain BMP outpatient on discharge to monitor renal function

## 2022-01-06 NOTE — ED NOTES
Eboni Castaneda made aware pt stating she needs more pain medication that she takes "Oxy 30 at home"        Anne Eddy RN  01/05/22 5609

## 2022-01-06 NOTE — PLAN OF CARE
Problem: OCCUPATIONAL THERAPY ADULT  Goal: Performs self-care activities at highest level of function for planned discharge setting  See evaluation for individualized goals  Description: Treatment Interventions: ADL retraining,Functional transfer training,Endurance training,Patient/family training,Equipment evaluation/education,Activityengagement  Equipment Recommended: Shower/Tub chair with back ($),Bedside commode       See flowsheet documentation for full assessment, interventions and recommendations  Note: Limitation: Decreased ADL status,Decreased endurance,Decreased high-level ADLs     Assessment: Pt is a 73yo female admitted to [facility] on 1/3/22  Pt presents w/ altered mental status and significant PMH Impacting her occupational performance including DM, L5-S1 degenerative disc disease, diabetic neuropathy, R rib fx, L hip fx, HTN  Prior to admit, home COVID-19 test +  Pt w/ active OT orders and activity orders  Personal factors impacting performance includes inability to complete IADL's, difficulty completing ADLs, difficulty managing stairs, multi - level home environment, assist required at baseline  Pt reports living w/ spouse, daughter and daughter's significant other in [place] w/ 4-5 [place] PTA  Pt reports first floor set- up and sleeps in recliner lift chair  Pt reports needing assistance w/ IADLs, bathing, LBD, and toileting using rollator for functional mobility  Upon eval, pt alert and oriented  Able to participate in conversation w/ cues to redirect / sustain attention  Pt required S to complete sit <> stand and functional mobility using RW  O2 sats >90% throughout on room air  Pt required max A to complete toileting, max A to complete LBD, mod I to complete UBD seated, and mod I to complete grooming seated   Pt presents w/ decreased activity tolerance, decreased endurance, decreased standing tolerance impacting her I w/dressing, bathing, functional mobility, functional transfers, activity engagement, clothing mgmt  Pt completing ADL below baseline level of I and would benefit from OT while in acute care to address deficits  Recommend DC home to PLOF w/ family support / assist when medically stable using AD for functional mobility and commode / shower chair  Will continue to follow pt in acute care        OT Discharge Recommendation: No rehabilitation needs (Home w/ family support / assist )

## 2022-01-06 NOTE — ASSESSMENT & PLAN NOTE
· Present prior to admission per patient's , rectal tube placed as patient has multiple BMs in open wounds, pictures as above  · Wound care consult

## 2022-01-06 NOTE — PHYSICAL THERAPY NOTE
PHYSICAL THERAPY TREATMENT NOTE    Patient Name: Bj Couch  HLQUYAlbertE Date: 1/6/2022 01/06/22 1025   PT Last Visit   PT Visit Date 01/06/22   Pain Assessment   Pain Assessment Tool 0-10   Pain Score 10 - Worst Possible Pain   Pain Location/Orientation Location: Back   Restrictions/Precautions   Other Precautions Contact/isolation; Airborne/isolation; Bed Alarm;Multiple lines; Fall Risk;Pain  ([place])   General   Chart Reviewed Yes   Additional Pertinent History room air resting pulse ox 96% and 73 BPM, active 93% and 85 BPM    Family/Caregiver Present No   Cognition   Arousal/Participation Lethargic   Attention Attends with cues to redirect   Orientation Level Oriented to person; Other (Comment)  (pt was identified w/ full name, birth date)   Following Commands Follows one step commands with increased time or repetition   Subjective   Subjective pt seen supine in bed  cuing was needed for task focus  pt stated having back pain  Bed Mobility   Rolling R 3  Moderate assistance   Additional items Assist x 1;Bedrails; Increased time required;Verbal cues;LE management  (for trunk/LE positioning)   Rolling L 3  Moderate assistance   Additional items Assist x 1;Bedrails; Increased time required;Verbal cues;LE management  (for bedrail use, LE positioning)   Supine to Sit Unable to assess  (pt refused to attempt)   Additional Comments pt refused to attempt additional mobility training due to fatigue and fear of "pulling out the butt plug" referring to rectal tube  Balance   Static Sitting Zero   Activity Tolerance   Activity Tolerance Patient limited by fatigue;Patient limited by pain   Nurse Made Aware spoke to Ana Lilia Carbone AP   Equipment Use   Comments ankle pumps 15 x2  quad sets 10 x2  heel slides and supine hip abduction 5 each  Assessment   Prognosis Guarded   Problem List Decreased strength;Decreased endurance; Impaired balance;Decreased mobility; Decreased cognition;Decreased safety awareness; Obesity;Pain;Decreased skin integrity; Decreased range of motion   Assessment pt shows significant decline in mobility status from previous session w/ inability to participate in transfer and ambulation training  pt stated this was related to fatigue and concern regarding dislodging rectal tube  pt needed mod assist for rolling and repositioning in bed  frequent rest breaks were needed w/ activity  pt is at risk for falling due to physical and safety awareness deficits  continued inpatient PT is needed to reduce fall risk  dishcarge recommendation is for inpatient rehab to maximize level of functional independence  Goals   Patient Goals go to the store  do things for myself  go home  STG Expiration Date 01/15/22   Short Term Goal #1 Pt will: perform bed mobility independently to decrease caregiver burden; perform transfers independently to increase OOB mobility; ambulate at least 76' w/ LRAD and Mod I to increase pt's ambulatory endurance; negotiate 5 steps w/ railing and supervision to facilitate return to previous living environment; increase LE strength by 1 grade to increase pt's tolerance to physical activity; increase balance ratings by 1 grade to decrease pt's risk of falls   PT Treatment Day 1   Plan   Treatment/Interventions Functional transfer training;LE strengthening/ROM; Therapeutic exercise; Endurance training;Elevations; Patient/family training;Cognitive reorientation;Equipment eval/education; Bed mobility;Gait training   Progress Slow progress, decreased activity tolerance   PT Frequency Other (Comment)  (4 to 5x/week)   Recommendation   PT Discharge Recommendation Post acute rehabilitation services   Equipment Recommended [facility] Recommended Wheeled walker   Change/add to [employer]?  No   AM-PAC Basic Mobility Inpatient   Turning in Bed Without Bedrails 2   Lying on Back to Sitting on Edge of Flat Bed 1 Moving Bed to Chair 1   Standing Up From Chair 1   Walk in Room 1   Climb 3-5 Stairs 1   Basic Mobility Inpatient Raw Score 7   Turning Head Towards Sound 4   Follow Simple Instructions 3   Low Function Basic Mobility Raw Score 14   Low Function Basic Mobility Standardized Score 22 01   Highest Level Of Mobility   JH-HLM Goal 2: Bed activities/Dependent transfer   JH-HLM Highest Level of Mobility 2: Bed activities/Dependent transfer   JH-HLM Goal Achieved Yes   End of Consult   Patient Position at End of Consult Supine;Bed/Chair alarm activated; All needs within reach     The patient's AM-PAC Basic Mobility Inpatient Short Form Raw Score is 16  A Raw score of less than or equal to 16 suggests the patient may benefit from discharge to post-acute rehabilitation services  Please also refer to the recommendation of the Physical Therapist for safe discharge planning  Skilled inpatient PT recommended while in hospital to progress pt toward treatment goals      Berry Mcknight, PT

## 2022-01-06 NOTE — PLAN OF CARE
Problem: Potential for Falls  Goal: Patient will remain free of falls  Description: INTERVENTIONS:  - Educate patient/family on patient safety including physical limitations  - Instruct patient to call for assistance with activity   - Consult OT/PT to assist with strengthening/mobility   - Keep Call bell within reach  - Keep bed low and locked with side rails adjusted as appropriate  - Keep care items and personal belongings within reach  - Initiate and maintain comfort rounds  - Make Fall Risk Sign visible to staff  - Offer Toileting every  2  Hours, in advance of need  - Initiate/Maintain  bed and chair  alarm  - Apply yellow socks and bracelet for high fall risk patients  - Consider moving patient to room near nurses station  Outcome: Progressing     Problem: MOBILITY - ADULT  Goal: Maintain or return to baseline ADL function  Description: INTERVENTIONS:  -  Assess patient's ability to carry out ADLs; assess patient's baseline for ADL function and identify physical deficits which impact ability to perform ADLs (bathing, care of mouth/teeth, toileting, grooming, dressing, etc )  - Assess/evaluate cause of self-care deficits   - Assess range of motion  - Assess patient's mobility; develop plan if impaired  - Assess patient's need for assistive devices and provide as appropriate  - Encourage maximum independence but intervene and supervise when necessary  - Involve family in performance of ADLs  - Assess for home care needs following discharge   - Consider OT consult to assist with ADL evaluation and planning for discharge  - Provide patient education as appropriate  Outcome: Progressing  Goal: Maintains/Returns to pre admission functional level  Description: INTERVENTIONS:  - Perform BMAT or MOVE assessment daily    - Set and communicate daily mobility goal to care team and patient/family/caregiver     - Collaborate with rehabilitation services on mobility goals if consulted  - Perform Range of Motion  3  times a day   - Reposition patient every  2 hours    - Dangle patient 3  times a day  - Stand patient 3  times a day  - Ambulate patient  3  times a day  - Out of bed to chair 3  times a day   - Out of bed for meals  3  times a day  - Out of bed for toileting  - Record patient progress and toleration of activity level   Outcome: Progressing     Problem: Prexisting or High Potential for Compromised Skin Integrity  Goal: Skin integrity is maintained or improved  Description: INTERVENTIONS:  - Identify patients at risk for skin breakdown  - Assess and monitor skin integrity  - Assess and monitor nutrition and hydration status  - Monitor labs   - Assess for incontinence   - Turn and reposition patient  - Assist with mobility/ambulation  - Relieve pressure over bony prominences  - Avoid friction and shearing  - Provide appropriate hygiene as needed including keeping skin clean and dry  - Evaluate need for skin moisturizer/barrier cream  - Collaborate with interdisciplinary team   - Patient/family teaching  - Consider wound care consult   Outcome: Progressing

## 2022-01-07 VITALS
TEMPERATURE: 97.8 F | WEIGHT: 192.9 LBS | DIASTOLIC BLOOD PRESSURE: 82 MMHG | HEART RATE: 72 BPM | HEIGHT: 62 IN | OXYGEN SATURATION: 98 % | RESPIRATION RATE: 11 BRPM | BODY MASS INDEX: 35.5 KG/M2 | SYSTOLIC BLOOD PRESSURE: 172 MMHG

## 2022-01-07 LAB
ALBUMIN SERPL BCP-MCNC: 3.2 G/DL (ref 3.4–4.8)
ALP SERPL-CCNC: 122.3 U/L (ref 35–140)
ALT SERPL W P-5'-P-CCNC: 32 U/L (ref 5–54)
ANION GAP SERPL CALCULATED.3IONS-SCNC: 6 MMOL/L (ref 4–13)
AST SERPL W P-5'-P-CCNC: 47 U/L (ref 15–41)
BASOPHILS # BLD AUTO: 0 THOUSANDS/ΜL (ref 0–0.1)
BASOPHILS NFR BLD AUTO: 0 % (ref 0–1)
BILIRUB SERPL-MCNC: 0.34 MG/DL (ref 0.3–1.2)
BUN SERPL-MCNC: 22 MG/DL (ref 6–20)
CALCIUM ALBUM COR SERPL-MCNC: 9 MG/DL (ref 8.3–10.1)
CALCIUM SERPL-MCNC: 8.4 MG/DL (ref 8.4–10.2)
CHLORIDE SERPL-SCNC: 102 MMOL/L (ref 96–108)
CO2 SERPL-SCNC: 32 MMOL/L (ref 22–33)
CREAT SERPL-MCNC: 1.13 MG/DL (ref 0.4–1.1)
EOSINOPHIL # BLD AUTO: 0.02 THOUSAND/ΜL (ref 0–0.61)
EOSINOPHIL NFR BLD AUTO: 1 % (ref 0–6)
ERYTHROCYTE [DISTWIDTH] IN BLOOD BY AUTOMATED COUNT: 15.4 % (ref 11.6–15.1)
GFR SERPL CREATININE-BSD FRML MDRD: 51 ML/MIN/1.73SQ M
GLUCOSE SERPL-MCNC: 128 MG/DL (ref 65–140)
GLUCOSE SERPL-MCNC: 219 MG/DL (ref 65–140)
GLUCOSE SERPL-MCNC: 233 MG/DL (ref 65–140)
GLUCOSE SERPL-MCNC: 78 MG/DL (ref 65–140)
HCT VFR BLD AUTO: 37.3 % (ref 34.8–46.1)
HGB BLD-MCNC: 11.9 G/DL (ref 11.5–15.4)
IMM GRANULOCYTES # BLD AUTO: 0.01 THOUSAND/UL (ref 0–0.2)
IMM GRANULOCYTES NFR BLD AUTO: 0 % (ref 0–2)
INR PPP: 1.21 (ref 0.84–1.19)
LYMPHOCYTES # BLD AUTO: 1.18 THOUSANDS/ΜL (ref 0.6–4.47)
LYMPHOCYTES NFR BLD AUTO: 33 % (ref 14–44)
MCH RBC QN AUTO: 26.9 PG (ref 26.8–34.3)
MCHC RBC AUTO-ENTMCNC: 31.9 G/DL (ref 31.4–37.4)
MCV RBC AUTO: 84 FL (ref 82–98)
MONOCYTES # BLD AUTO: 0.34 THOUSAND/ΜL (ref 0.17–1.22)
MONOCYTES NFR BLD AUTO: 9 % (ref 4–12)
NEUTROPHILS # BLD AUTO: 2.08 THOUSANDS/ΜL (ref 1.85–7.62)
NEUTS SEG NFR BLD AUTO: 57 % (ref 43–75)
NRBC BLD AUTO-RTO: 0 /100 WBCS
PLATELET # BLD AUTO: 110 THOUSANDS/UL (ref 149–390)
PMV BLD AUTO: 10.9 FL (ref 8.9–12.7)
POTASSIUM SERPL-SCNC: 3.9 MMOL/L (ref 3.5–5)
PROT SERPL-MCNC: 6.6 G/DL (ref 6.4–8.3)
PROTHROMBIN TIME: 15.1 SECONDS (ref 11.6–14.5)
RBC # BLD AUTO: 4.43 MILLION/UL (ref 3.81–5.12)
SODIUM SERPL-SCNC: 140 MMOL/L (ref 133–145)
WBC # BLD AUTO: 3.63 THOUSAND/UL (ref 4.31–10.16)

## 2022-01-07 PROCEDURE — 85025 COMPLETE CBC W/AUTO DIFF WBC: CPT | Performed by: PHYSICIAN ASSISTANT

## 2022-01-07 PROCEDURE — 80053 COMPREHEN METABOLIC PANEL: CPT | Performed by: PHYSICIAN ASSISTANT

## 2022-01-07 PROCEDURE — 94760 N-INVAS EAR/PLS OXIMETRY 1: CPT

## 2022-01-07 PROCEDURE — 94640 AIRWAY INHALATION TREATMENT: CPT

## 2022-01-07 PROCEDURE — 99239 HOSP IP/OBS DSCHRG MGMT >30: CPT | Performed by: PHYSICIAN ASSISTANT

## 2022-01-07 PROCEDURE — 82948 REAGENT STRIP/BLOOD GLUCOSE: CPT

## 2022-01-07 PROCEDURE — 85610 PROTHROMBIN TIME: CPT | Performed by: NURSE PRACTITIONER

## 2022-01-07 RX ORDER — GUAIFENESIN 600 MG
600 TABLET, EXTENDED RELEASE 12 HR ORAL EVERY 12 HOURS SCHEDULED
Qty: 20 TABLET | Refills: 0 | Status: SHIPPED | OUTPATIENT
Start: 2022-01-07 | End: 2022-02-23

## 2022-01-07 RX ORDER — LACTULOSE 20 G/30ML
20 SOLUTION ORAL DAILY
Status: DISCONTINUED | OUTPATIENT
Start: 2022-01-07 | End: 2022-01-07

## 2022-01-07 RX ORDER — LACTULOSE 20 G/30ML
20 SOLUTION ORAL 2 TIMES DAILY
Status: DISCONTINUED | OUTPATIENT
Start: 2022-01-07 | End: 2022-01-07 | Stop reason: HOSPADM

## 2022-01-07 RX ORDER — LACTULOSE 20 G/30ML
20 SOLUTION ORAL 2 TIMES DAILY
Qty: 1892 ML | Refills: 0 | Status: SHIPPED | OUTPATIENT
Start: 2022-01-07 | End: 2022-02-26 | Stop reason: HOSPADM

## 2022-01-07 RX ADMIN — INSULIN LISPRO 3 UNITS: 100 INJECTION, SOLUTION INTRAVENOUS; SUBCUTANEOUS at 14:06

## 2022-01-07 RX ADMIN — REMDESIVIR 100 MG: 100 INJECTION, POWDER, LYOPHILIZED, FOR SOLUTION INTRAVENOUS at 05:09

## 2022-01-07 RX ADMIN — RIFAXIMIN 550 MG: 550 TABLET ORAL at 09:08

## 2022-01-07 RX ADMIN — OXYCODONE HYDROCHLORIDE 10 MG: 10 TABLET ORAL at 09:29

## 2022-01-07 RX ADMIN — GUAIFENESIN 600 MG: 600 TABLET ORAL at 09:08

## 2022-01-07 RX ADMIN — IPRATROPIUM BROMIDE 2 PUFF: 17 AEROSOL, METERED RESPIRATORY (INHALATION) at 10:03

## 2022-01-07 RX ADMIN — DULOXETINE HYDROCHLORIDE 60 MG: 60 CAPSULE, DELAYED RELEASE ORAL at 09:08

## 2022-01-07 RX ADMIN — ENOXAPARIN SODIUM 30 MG: 30 INJECTION SUBCUTANEOUS at 11:16

## 2022-01-07 RX ADMIN — AMLODIPINE BESYLATE 10 MG: 10 TABLET ORAL at 09:08

## 2022-01-07 RX ADMIN — DEXAMETHASONE SODIUM PHOSPHATE 6 MG: 4 INJECTION, SOLUTION INTRA-ARTICULAR; INTRALESIONAL; INTRAMUSCULAR; INTRAVENOUS; SOFT TISSUE at 05:09

## 2022-01-07 NOTE — DISCHARGE INSTR - AVS FIRST PAGE
You will need to follow up with GI outpatient to manage your liver cirrhosis  You will start new medications rifaximin and lactulose for your liver cirrhosis  Monitor your bowel movements, and for 2-3 bowel movements per day  Called your pharmacy, rifaximin $25 for month supply    Get blood work (BMP) within around 1 week from discharge to monitor your kidney function

## 2022-01-07 NOTE — PLAN OF CARE
Problem: Potential for Falls  Goal: Patient will remain free of falls  Description: INTERVENTIONS:  - Educate patient/family on patient safety including physical limitations  - Instruct patient to call for assistance with activity   - Consult OT/PT to assist with strengthening/mobility   - Keep Call bell within reach  - Keep bed low and locked with side rails adjusted as appropriate  - Keep care items and personal belongings within reach  - Initiate and maintain comfort rounds  - Make Fall Risk Sign visible to staff  - Offer Toileting every 2 Hours, in advance of need  - Initiate/Maintain bed alarm  - Obtain necessary fall risk management equipment:   - Apply yellow socks and bracelet for high fall risk patients  - Consider moving patient to room near nurses station  Outcome: Progressing     Problem: MOBILITY - ADULT  Goal: Maintain or return to baseline ADL function  Description: INTERVENTIONS:  -  Assess patient's ability to carry out ADLs; assess patient's baseline for ADL function and identify physical deficits which impact ability to perform ADLs (bathing, care of mouth/teeth, toileting, grooming, dressing, etc )  - Assess/evaluate cause of self-care deficits   - Assess range of motion  - Assess patient's mobility; develop plan if impaired  - Assess patient's need for assistive devices and provide as appropriate  - Encourage maximum independence but intervene and supervise when necessary  - Involve family in performance of ADLs  - Assess for home care needs following discharge   - Consider OT consult to assist with ADL evaluation and planning for discharge  - Provide patient education as appropriate  Outcome: Progressing  Goal: Maintains/Returns to pre admission functional level  Description: INTERVENTIONS:  - Perform BMAT or MOVE assessment daily    - Set and communicate daily mobility goal to care team and patient/family/caregiver     - Collaborate with rehabilitation services on mobility goals if consulted  - Perform Range of Motion 2 times a day  - Reposition patient every 2 hours    - Dangle patient 2 times a day  - Stand patient 2 times a day  - Ambulate patient 2 times a day  - Out of bed to chair 2 times a day   - Out of bed for meals 2 times a day  - Out of bed for toileting  - Record patient progress and toleration of activity level   Outcome: Progressing     Problem: Prexisting or High Potential for Compromised Skin Integrity  Goal: Skin integrity is maintained or improved  Description: INTERVENTIONS:  - Identify patients at risk for skin breakdown  - Assess and monitor skin integrity  - Assess and monitor nutrition and hydration status  - Monitor labs   - Assess for incontinence   - Turn and reposition patient  - Assist with mobility/ambulation  - Relieve pressure over bony prominences  - Avoid friction and shearing  - Provide appropriate hygiene as needed including keeping skin clean and dry  - Evaluate need for skin moisturizer/barrier cream  - Collaborate with interdisciplinary team   - Patient/family teaching  - Consider wound care consult   Outcome: Progressing

## 2022-01-07 NOTE — DISCHARGE SUMMARY
Ion 46  Discharge- Shari Duran 1955, 73 y o  female MRN: [id number]  Unit/Bed#: -01 Encounter: [id number]  Primary Care Provider: Don Guido MD   Date and time admitted to hospital: 1/3/2022  8:52 PM    * Altered mental status  Assessment & Plan  · A&O x 3 on assessment  No focal neurologic symptoms  Per , this afternoon patient develop confusion and didn't know where she was or what time it is  Denies dysarthria, dizziness, paresthesias, gait abnormalities, hemiparesis, headache, visual changes, ataxia, facial droop  · CT head: negative   · Salicylate level <5, ethanol <10 and acetaminophen <10  Urine tox positive for opiates  · On oxycodone for chronic back pain  · Ammonia levels trending down:  50 93 --> 47 5 --> 45 92 today  · Last value in 2020 was <10  · Received 20g lactulose in ED, refused lactulose yesterday due to frequent BMs  · Trend ammonia   · UA negative   · Neuro checks    · Home Cymbalta and gabapentin were initially held, will resume, has chronic neuropathy  · Speech evaluated, diet advanced, tolerated well  · Mental status has improved, A&Ox4  · PT/OT evaluated, recommending rehab vs [facility], patient requesting [facility], case management on board    Pressure injury of skin of buttock  Assessment & Plan          · Present prior to admission per patient's , rectal tube placed as patient has multiple BMs in open wounds, pictures as above  · Continue wound care on discharge    Elevated serum creatinine  Assessment & Plan  · Cr 1 57 on admission --> 1 58 --> 1 16 --> 1 13 today, stable  · Unclear baseline creatinine, last results in September 2021 creatinine 1 3-1 6  · Received 1L IVF bolus in ED  · Has improved  · Obtain BMP outpatient on discharge to monitor renal function    COVID-19  Assessment & Plan  · C/o shortness of breath  Tested positive on home kit  Her husbad has been having subjective fevers, chills, cough over past day  · Patient was vaccinated with Sling in June  · Meets SIRS criteria for tachycardia and tachypnea, both have since resolved  Lactate normal   · CXR: diffuse patchy infiltrate, possible LLL PNA  · Will hold off on further abx as patient afebrile, no leukocytosis, no sputum  · IV abx: Given ceftriaxone and azithromycin in ED  · IV fluids: 1L NS in ED  · Labs   · PCT x2 normal  · D-dimer: 0 70  · Mild COVID pathway:  · ECG unremarkable  · Received 4 days Remdesivir and Decadron treatment per protocol, will not need to continue on discharge  · AC: Lovenox   · Respiratory function stable, has been on room air    Fecal occult blood test positive  Assessment & Plan  · Reports some darker stool over past couple days  Denies abdominal pain, hematochezia, hematemesis, N/V/D   · CBC Hgb 4 4, thought to be lab error in ED  Repeat H&H ordered and Hgb 9 6  · Blood consent obtained  · Hemoglobin has since been stable, no signs/symptoms of active bleed at this time  · Iron panel normal  · GI following    Acute respiratory failure with hypoxia (HCC)  Assessment & Plan  · Presented hypoxic, required 6L NC with sat >90 on admission, weaned down to room air with sats >92%  · Does not wear oxygen at home  · 2/2 to COVID-19  · See above plan    Cirrhosis [facility])  Assessment & Plan  · AST-64, ALT-29  · Per chart review, from alcohol use  Denies any recent alcohol use-- ethanol level less than 10  · Ultrasound abdomen did not show ascites  · GI following:  · Continue lactulose and Xifaxan  · Outpatient follow-up with GI to manage cirrhosis  · Patient with multiple BMs/diarrhea, rectal tube placed 1/5 due to multiple BMs and skin breakdown present on buttocks  Rectal tube has since been removed, BMs have slowed down, stable      Hyperkalemia  Assessment & Plan  · K 5 3 on admission, has since stabilized    Type 2 diabetes mellitus with hyperglycemia, with long-term current use of insulin [facility])  Assessment & Plan  Lab Results   Component Value Date    HGBA1C 7 5 (H) 01/04/2022       Recent Labs     01/06/22  0714 01/06/22  0916 01/06/22  1119 01/06/22  1409   POCGLU 111 199* 167* 210*       Blood Sugar Average: Last 72 hrs:  · (P) 143 [id number] A1c  · Home regimen: Novolog 3 units TID with meals, lantus 23 units hs   · Continue lantus 23 units hs  · SSI with Acu checks AC/HS  · Elevated blood glucose likely in setting of steroid use  · Hypoglycemia protocol     Medical Problems             Resolved Problems  Date Reviewed: 1/7/2022    None              Discharging Physician / Practitioner: Nayeli Penaloza PA-C  PCP: Yeimy Carrillo MD  Admission Date:   Admission Orders (From admission, onward)     Ordered        01/04/22 0036  INPATIENT ADMISSION  Once                      Discharge Date: 01/07/22    Consultations During Hospital Stay:  · GI    Procedures Performed:   · None    Significant Findings / Test Results:   · CT head 1/3: No significant interval change from the prior CT  No acute intracranial abnormality  Microangiopathic changes  · Chest x-ray 1/3: Increased interstitial opacities in the right lung, compatible with COVID-19  Trace pulmonary edema would have a similar appearance  Left basal opacities, likely atelectasis  · Ultrasound abdomen 1/4:  No ascites  Incidental Findings:   · None     Test Results Pending at Discharge (will require follow up): · Final blood culture results     Outpatient Tests Requested:  · Follow-up with GI outpatient for management of cirrhosis  · Follow-up with PCP for continued management of chronic conditions  · Obtain BMP on discharge to monitor kidney function    Complications:  None    Reason for Admission:  Altered mental status    Hospital Course: Kasie Paez is a 73 y o  female patient who originally presented to the hospital on 1/3/2022 due to altered mental status in setting of liver cirrhosis, COVID-19 infection    Patient presented to ED with confusion, ammonia levels elevated  Required 6 L NC O2 on arrival, COVID positive in the ED was previously vaccinated with Pfizer vaccine in June 2021  Additionally, FOBT positive in ED, significant anemia noted with hemoglobin 4 4, likely lab error as repeat hemoglobin 9 6  Also with elevated serum creatinine on admission  GI consulted for elevated ammonia levels/liver cirrhosis and anemia, started on lactulose and rifaximin  Patient received IV ceftriaxone and azithromycin and fluids in ED, started on remdesivir and Decadron per COVID protocol  Throughout course of admission, patient's mental status had significantly improved with lactulose, ammonia had trended down, ultrasound abdomen without ascites  Patient had frequent bowel movements/diarrhea, unfortunately has chronic pressure ulcers on buttocks that were present prior to admission per patient's  that were constantly becoming contaminated, required rectal tube placement briefly on 1/6 to maintain skin/wounds  Wound Care consulted, to follow-up with wound care on discharge  PT/OT evaluated, initially considered rehab when rectal tube was in place, however rectal tube was able to be removed, bowel movements had decreased and stabilized  Patient's respiratory status had significantly improved, weaned off on room air  Hemoglobin had remained stable, no signs of active bleed  Renal function had improved with IV fluids, to obtain BMP outpatient when acute illness resolves  Stable for discharge home with [facility], to follow-up with GI outpatient and continue lactulose and rifaximin on discharge  Please see above list of diagnoses and related plan for additional information  Condition at Discharge: stable    Discharge Day Visit / Exam:   Subjective:  Patient is seen at bedside this a m , no acute complaints at this time, less bowel movements overnight from previously, rectal tube removed    Patient states she feels stable for discharge home, prefers [facility] over rehab,  agreeable  Vitals: Blood Pressure: (!) 172/82 (01/07/22 0744)  Pulse: 72 (01/07/22 0744)  Temperature: 97 8 °F (36 6 °C) (01/07/22 0744)  Temp Source: Tympanic (01/07/22 0744)  Respirations: (!) 11 (01/07/22 0744)  Height: 5' 2" (157 5 cm) (01/05/22 2228)  Weight - Scale: 87 5 kg (192 lb 14 4 oz) (01/05/22 2228)  SpO2: 98 % (01/07/22 1003)  Exam:   Physical Exam  Constitutional:       General: She is not in acute distress  Appearance: Normal appearance  She is not ill-appearing or toxic-appearing  Cardiovascular:      Rate and Rhythm: Normal rate and regular rhythm  Pulses: Normal pulses  Heart sounds: Normal heart sounds  Pulmonary:      Effort: Pulmonary effort is normal  No respiratory distress  Breath sounds: Normal breath sounds  Abdominal:      General: Bowel sounds are normal  There is no distension  Palpations: Abdomen is soft  Tenderness: There is no abdominal tenderness  Musculoskeletal:         General: Swelling and tenderness present  Comments: Mild bilateral nonpitting lower extremity edema, at baseline  Moderate to severe tenderness to palpation of bilateral lower extremities, neuropathy at baseline  Skin:     General: Skin is warm  Coloration: Skin is not jaundiced or pale  Neurological:      General: No focal deficit present  Mental Status: She is alert and oriented to person, place, and time  Psychiatric:         Mood and Affect: Mood normal          Behavior: Behavior normal           Discussion with Family: Updated  () via phone  Discharge instructions/Information to patient and family:   See after visit summary for information provided to patient and family  Provisions for Follow-Up Care:  See after visit summary for information related to follow-up care and any pertinent home health orders         Disposition:   Home with [facility] (Reminder: Complete face to face encounter)    Planned Readmission:  None Discharge Statement:  I spent 45 minutes discharging the patient  This time was spent on the day of discharge  I had direct contact with the patient on the day of discharge  Greater than 50% of the total time was spent examining patient, answering all patient questions, arranging and discussing plan of care with patient as well as directly providing post-discharge instructions  Additional time then spent on discharge activities  Discharge Medications:  See after visit summary for reconciled discharge medications provided to patient and/or family        **Please Note: This note may have been constructed using a voice recognition system**

## 2022-01-07 NOTE — DISCHARGE INSTR - OTHER ORDERS
1  Cleanse b/l buttocks, sacrum and ischial areas with soap and water, pat dry, and apply calazime cream TID and PRN  2  Apply skin nourishing cream the entire skin daily for moisture  3  Turn and reposition patient every  2 hours   4  Elevate heels off of bed with pillows to offload pressure   5  Apply EHOB waffle cushion to chair when OOB, if able  6    Apply hydraguard to b/l heels BID and PRN for prevention and protection

## 2022-01-07 NOTE — WOUND OSTOMY CARE
Progress Note - Wound   Rogers Moon 73 y o  female MRN: [id number]  Unit/Bed#: -01 Encounter: [id number]      Assessment:  Wound care consulted for assessment of wounds to the sacrum/buttocks  Patient admitted with altered mental status and is COVID-19+  History of - cirrhosis, DM, alcohol use, and back pain  Patient seen in bed with the primary RN  Alert and oriented x 4, cooperative and agreeable for the assessment  Minimal assist of one with turning for the assessment  Patient incontinent of bowel at time of the assessment, shaunna-care rendered  Foam wedges in use for repositioning  Patient with frequent incontinent episodes due to lactulose administration  Patient reports she is continent of urine  Assist with care  B/l heels are intact with no redness or wounds  1  R posterior forearm is intact with well adhered dry scabbing and ecchymosis  No open wounds, redness or drainage  Okay to keep EDISON  2  B/l lower extremities are intact with chronic venous stasis changes  Dry scaling of the skin and hemosiderin staining  Areas of well adhered dry scabbing noted  No open aspects, redness, warmth or drainage noted  Recommend skin nourishing cream       Mid sacrum and R buttock wounds pictured together  Both wounds present in admission wound images  3  Mid sacrum - POA - MASD/IAD  Located at the base of the deep sacral intergluteal crease  Wound bed is linear shaped, partial thickness, 100% moist pink/red tissue  Edges fragile and attached, no maceration  Shaunna-wound is intact with blanchable pink/hyperpigmented skin  4 R buttock - POA - stage 2 pressure injury  Suspect mixed etiology of pressure and moisture  Oval shaped, partial thickness wound, 100% moist pink/red tissue  Edges fragile and attached, no maceration  Shaunna-wound is intact with blanchable pink/hyperpigmented skin      Due to incontinence will recommend protective barrier cream       The generalized skin to the b/l buttocks/sacrum/ischium is intact with blanchable pink/hyperpigmented skin, suspect is related to chronic pressure/moisture exposure to the skin  L ischium is intact with area of healed blanchable scarring  All aspects are blanchable and non-tender  Educated patient on the importance of frequent offloading of pressure via turning, repositioning and weight-shifting  Verbalized understanding of plan of care  No induration, fluctuance, odor, warmth/temperature differences, redness, or purulence noted to the above noted wounds and skin areas assessed  Patient tolerated well- denies pain to areas assessed and no s/s of non-verbal pain or discomfort observed during the encounter  Primary nurse aware of plan of care  See flow sheets for more detailed assessment findings  Will follow along  Discussed assessment findings, and plan of care/recommendations with SLIM AP  Plan:   1  Cleanse b/l buttocks, sacrum and ischial areas with soap and water, pat dry, and apply calazime cream TID and PRN  2  Apply skin nourishing cream the entire skin daily for moisture  3  Turn and reposition patient every  2 hours   4  Elevate heels off of bed with pillows to offload pressure   5  Apply EHOB waffle cushion to chair when OOB, if able  6  Apply hydraguard to b/l heels BID and PRN for prevention and protection  7  Wound care will follow along with patient weekly, please call or tiger text with questions and concerns    Objective:    Vitals: Blood pressure (!) 172/82, pulse 72, temperature 97 8 °F (36 6 °C), temperature source Tympanic, resp  rate (!) 11, height 5' 2" (1 575 m), weight 87 5 kg (192 lb 14 4 oz), SpO2 97 %  ,Body mass index is 35 28 kg/m²  Wound 09/20/21 MASD Sacrum Mid (Active)   Wound Image   01/07/22 0854   Wound Description Beefy red;Pink 01/07/22 0854   Pressure Injury Stage 2 01/06/22 0800   Shaunna-wound Assessment Dry; Intact;Fragile; Hyperpigmented;Pink 01/07/22 0854   Wound Length (cm) 3 5 cm 01/07/22 0854   Wound Width (cm) 0 3 cm 01/07/22 0854   Wound Depth (cm) 0 1 cm 01/07/22 0854   Wound Surface Area (cm^2) 1 05 cm^2 01/07/22 0854   Wound Volume (cm^3) 0 105 cm^3 01/07/22 0854   Calculated Wound Volume (cm^3) 0 11 cm^3 01/07/22 0854   Tunneling 0 cm 01/07/22 0854   Tunneling in depth located at 0 01/07/22 0854   Undermining 0 01/07/22 0854   Undermining is depth extending from 0 01/07/22 0854   Drainage Amount Scant 01/07/22 0854   Drainage Description Serosanguineous 01/07/22 0854   Non-staged Wound Description Partial thickness 01/07/22 0854   Treatments Cleansed;Site care 01/07/22 0854   Dressing Protective barrier 01/07/22 0854   Wound packed? No 01/07/22 0854   Packing- # removed 0 01/07/22 0854   Packing- # inserted 0 01/07/22 0854   Patient Tolerance Tolerated well 01/07/22 0854   Wound 01/07/22 Pressure Injury Buttocks Right (Active)   Wound Description Beefy red 01/07/22 0900   Pressure Injury Stage 2 01/07/22 0900   Shaunna-wound Assessment Dry; Intact;Fragile; Hyperpigmented;Pink 01/07/22 0900   Wound Length (cm) 0 9 cm 01/07/22 0900   Wound Width (cm) 0 5 cm 01/07/22 0900   Wound Depth (cm) 0 1 cm 01/07/22 0900   Wound Surface Area (cm^2) 0 45 cm^2 01/07/22 0900   Wound Volume (cm^3) 0 045 cm^3 01/07/22 0900   Calculated Wound Volume (cm^3) 0 04 cm^3 01/07/22 0900   Tunneling 0 cm 01/07/22 0900   Tunneling in depth located at 0 01/07/22 0900   Undermining 0 01/07/22 0900   Undermining is depth extending from 0 01/07/22 0900   Drainage Amount Scant 01/07/22 0900   Drainage Description Serosanguineous 01/07/22 0900   Non-staged Wound Description Partial thickness 01/07/22 0900   Treatments Cleansed;Site care 01/07/22 0900   Dressing Protective barrier 01/07/22 0900   Wound packed? No 01/07/22 0900   Packing- # removed 0 01/07/22 0900   Packing- # inserted 0 01/07/22 0900   Patient Tolerance Tolerated well 01/07/22 0900       Recommendations written as orders  AVS updated    Belinda Guzman ANDREINA BSN [employer]

## 2022-01-07 NOTE — CASE MANAGEMENT
Case Management Assessment & Discharge Planning Note    Patient name Shari Duran  Location /-75 MRN [id number]  : 1955 Date 2022       Current Admission Date: 1/3/2022  Current Admission Diagnosis:Altered mental status   Patient Active Problem List    Diagnosis Date Noted    Pressure injury of skin of buttock 2022    Altered mental status 2022    Acute respiratory failure with hypoxia ([facility] ) 2022    Fecal occult blood test positive 2022    COVID-19 2022    Elevated serum creatinine 2022    Urge incontinence 2021    Ex-smoker 2021    Syncope 07/15/2020    Cirrhosis ([facility] ) 07/15/2020    Splenomegaly 07/15/2020    CAD (coronary artery disease) 07/15/2020    Other microscopic hematuria 2020    Spondylosis of lumbar joint 05/15/2020    Marijuana use 2020    Left hip pain 2020    Hyperkalemia 2018    Chronic pain syndrome 2018    Peripheral neuropathy 2018    Type 2 diabetes mellitus with hyperglycemia, with long-term [facility] 2018    Chronic venous stasis dermatitis of both lower extremities 2017    Venous insufficiency (chronic) (peripheral) 2017    Essential hypertension       LOS (days): 3  Geometric Mean LOS (GMLOS) (days): 5 40  Days to GMLOS:1 8     OBJECTIVE:    Risk of Unplanned Readmission Score: 23         Current admission status: Inpatient       Preferred Pharmacy:   [address] [place] [address], [address]  [address] [facility] [address]  Phone: [phone] Fax: [phone]    Primary Care Provider: Don Guido MD    Primary Insurance: MEDICARE  Secondary Insurance: [facility]:  [address] [facility] Representative - Spouse   Primary Phone: [phone] (Mobile)  Home Phone: [phone]               Advance Directives  Does patient have a Health Care POA?: No  Was patient offered paperwork?: Yes (paperwork provided)  Does patient currently have a Health Care decision maker?: Yes, please see Health Care Proxy section (spouse, Mansi Brown)  Does patient have Advance Directives?: No  Was patient offered paperwork?: Yes (paperwork provided)  Primary Contact: spouse, Ramona Mata      Patient Information  Admitted from[de-identified] Home  Mental Status: Alert  During Assessment patient was accompanied by: Not accompanied during assessment  Assessment information provided by[de-identified] Spouse  Primary Caregiver: Family  Caregiver's Name[de-identified] Diane Rodríguez Relationship to Patient[de-identified] Family Member  Caregiver's Telephone Number[de-identified] [phone]  Support Systems: Spouse/significant other,Daughter  [place] of Residence: [address] [place] [address] do you live in?: [place] entry access options   Select all that apply : No steps to enter home  Type of Current Residence: 2 story home  Upon entering residence, is there a bedroom on the main floor (no further steps)?: No (but pt sleeps in recliner/lift chair on 1st floor)  A bedroom is located on the following floor levels of residence (select all that apply):: 2nd Floor  Upon entering residence, is there a bathroom on the main floor (no further steps)?: Yes  Number of steps to 2nd floor from main floor: One Flight  In the last 12 months, was there a time when you were not able to pay the mortgage or rent on time?: No  In the last 12 months, was there a time when you did not have a steady place to sleep or slept in a shelter (including now)?: No  Homeless/housing insecurity resource given?: N/A  Living Arrangements: Lives w/ Spouse/significant other,Lives w/ Daughter    Activities of Daily Living Prior to Admission  Functional Status: Assistance  Completes ADLs independently?: No  Level of ADL dependence: Assistance  Ambulates independently?: Yes  Does patient use assisted devices?: Yes  Assisted Devices (DME) used: Laura Mas  Does patient currently own DME?: Yes  What DME does the patient currently own?: Jan Hawthorne  Does patient have a history of Outpatient Therapy (PT/OT)?: No  Does the patient have a history of Short-Term Rehab?: Yes (in [address])  Does patient have a history of HHC?: Yes Jule Koyanagi)  Does patient currently have [facility]?: No      Patient Information Continued  Income Source: Pension/residential  Does patient have prescription coverage?: Yes  Within the past 12 months, you worried that your food would run out before you got the money to buy more : Never true  Within the past 12 months, the food you bought just didnt last and you didnt have money to get more : Never true  Food insecurity resource given?: N/A  Does patient receive dialysis treatments?: No  Does patient have a history of substance abuse?: No  Does patient have a history of Mental Health Diagnosis?: No      Means of Transportation  Means of Transport to Appts[de-identified] Family transport  In the past 12 months, has lack of transportation kept you from medical appointments or from getting medications?: No  In the past 12 months, has lack of transportation kept you from meetings, work, or from getting things needed for daily living?: No  Was application for public transport provided?: N/A    DISCHARGE DETAILS:    Discharge planning discussed with[de-identified] s/w spouse Paez Enzocarol ann by phone  Freedom of Choice: Yes  Comments - Freedom of Choice: therapy is recommending home with [facility] f/u  pt is known to [facility] but if they are unable to accept, spouse is agreeable to blanket referrals  CM contacted family/caregiver?: Yes  Were Treatment Team discharge recommendations reviewed with patient/caregiver?: Yes  Did patient/caregiver verbalize understanding of patient care needs?: Yes  Were patient/caregiver advised of the risks associated with not following Treatment Team discharge recommendations?: Yes    Contacts  Patient Contacts: spouse, [address]         Is the patient interested in [facility] at discharge?: Yes  [address] requested[de-identified] [address] Agency Name[de-identified] [address] Provider[de-identified] PCP  Home Health Services Needed[de-identified] Evaluate Functional Status and Safety,Strengthening/Theraputic Exercises to Improve Function,Other (comment),Wound/Ostomy Care (assess resp status  assess skin/sacrum  S&S to report/meds)  Homebound Criteria Met[de-identified] Requires the Assistance of Another Person for Safe Ambulation or to Leave the Home,Uses an Assist Device (i e  cane, walker, etc)  Supporting Clincal Findings[de-identified] Limited Endurance,Fatigues Easliy in Short Distances    DME Referral Provided  Referral made for DME?: No    Other Referral/Resources/Interventions Provided:  Interventions: [facility]      Treatment Team Recommendation: [facility] [address]  Discharge Destination Plan[de-identified] Home with Renzo at Discharge : Family      await response from Elisa (Date):: 01/07/22  IMM Given to[de-identified] Family (s/w spouse on phone)             **addendum 1550  [facility] is unable to accept   1st homecare will be following post hospitalization

## 2022-01-09 LAB
BACTERIA BLD CULT: NORMAL
BACTERIA BLD CULT: NORMAL

## 2022-01-21 ENCOUNTER — TELEPHONE (OUTPATIENT)
Dept: GASTROENTEROLOGY | Facility: CLINIC | Age: 67
End: 2022-01-21

## 2022-01-21 NOTE — TELEPHONE ENCOUNTER
Received order in [address] from [facility]  Pt would be a new pt to us  Called pt, however, voicemail box full  Will call pt again in one week

## 2022-02-03 ENCOUNTER — APPOINTMENT (EMERGENCY)
Dept: RADIOLOGY | Facility: HOSPITAL | Age: 67
DRG: 871 | End: 2022-02-03
Payer: MEDICARE

## 2022-02-03 ENCOUNTER — APPOINTMENT (EMERGENCY)
Dept: CT IMAGING | Facility: HOSPITAL | Age: 67
DRG: 871 | End: 2022-02-03
Payer: MEDICARE

## 2022-02-03 ENCOUNTER — HOSPITAL ENCOUNTER (INPATIENT)
Facility: HOSPITAL | Age: 67
LOS: 7 days | Discharge: HOME HEALTH | DRG: 871 | End: 2022-02-10
Attending: EMERGENCY MEDICINE | Admitting: INTERNAL MEDICINE
Payer: MEDICARE

## 2022-02-03 DIAGNOSIS — R41.82 AMS (ALTERED MENTAL STATUS): Primary | ICD-10-CM

## 2022-02-03 DIAGNOSIS — R29.898 LEFT ARM WEAKNESS: ICD-10-CM

## 2022-02-03 DIAGNOSIS — Z91.81 HISTORY OF RECENT FALL: ICD-10-CM

## 2022-02-03 PROBLEM — J18.9 PNEUMONIA: Status: ACTIVE | Noted: 2022-02-03

## 2022-02-03 PROBLEM — J96.02 ACUTE RESPIRATORY FAILURE WITH HYPOXIA AND HYPERCAPNIA (HCC): Status: ACTIVE | Noted: 2022-02-03

## 2022-02-03 PROBLEM — A41.9 SEPSIS (HCC): Status: ACTIVE | Noted: 2022-02-03

## 2022-02-03 PROBLEM — G93.41 METABOLIC ENCEPHALOPATHY: Status: ACTIVE | Noted: 2022-01-04

## 2022-02-03 PROBLEM — J96.01 ACUTE RESPIRATORY FAILURE WITH HYPOXIA AND HYPERCAPNIA (HCC): Status: ACTIVE | Noted: 2022-02-03

## 2022-02-03 PROBLEM — N17.9 AKI (ACUTE KIDNEY INJURY) (HCC): Status: ACTIVE | Noted: 2022-02-03

## 2022-02-03 LAB
2HR DELTA HS TROPONIN: 2 NG/L
4HR DELTA HS TROPONIN: 6 NG/L
ALBUMIN SERPL BCP-MCNC: 2.6 G/DL (ref 3.5–5)
ALP SERPL-CCNC: 129 U/L (ref 46–116)
ALT SERPL W P-5'-P-CCNC: 18 U/L (ref 12–78)
AMMONIA PLAS-SCNC: 36 UMOL/L (ref 11–35)
ANION GAP SERPL CALCULATED.3IONS-SCNC: 5 MMOL/L (ref 4–13)
ANISOCYTOSIS BLD QL SMEAR: PRESENT
APTT PPP: 33 SECONDS (ref 23–37)
ARTERIAL PATENCY WRIST A: YES
AST SERPL W P-5'-P-CCNC: 21 U/L (ref 5–45)
ATRIAL RATE: 116 BPM
BACTERIA UR QL AUTO: ABNORMAL /HPF
BASE EXCESS BLDA CALC-SCNC: 1 MMOL/L
BASOPHILS # BLD MANUAL: 0 THOUSAND/UL (ref 0–0.1)
BASOPHILS NFR MAR MANUAL: 0 % (ref 0–1)
BILIRUB SERPL-MCNC: 0.52 MG/DL (ref 0.2–1)
BILIRUB UR QL STRIP: NEGATIVE
BODY TEMPERATURE: 37 DEGREES FEHRENHEIT
BUN SERPL-MCNC: 23 MG/DL (ref 5–25)
CALCIUM ALBUM COR SERPL-MCNC: 9.1 MG/DL (ref 8.3–10.1)
CALCIUM SERPL-MCNC: 8 MG/DL (ref 8.3–10.1)
CARDIAC TROPONIN I PNL SERPL HS: 11 NG/L
CARDIAC TROPONIN I PNL SERPL HS: 5 NG/L
CARDIAC TROPONIN I PNL SERPL HS: 7 NG/L
CHLORIDE SERPL-SCNC: 100 MMOL/L (ref 100–108)
CLARITY UR: CLEAR
CO2 SERPL-SCNC: 27 MMOL/L (ref 21–32)
COLOR UR: YELLOW
CREAT SERPL-MCNC: 1.89 MG/DL (ref 0.6–1.3)
CRP SERPL QL: 11 MG/L
D DIMER PPP FEU-MCNC: 1.64 UG/ML FEU
EOSINOPHIL # BLD MANUAL: 0 THOUSAND/UL (ref 0–0.4)
EOSINOPHIL NFR BLD MANUAL: 0 % (ref 0–6)
ERYTHROCYTE [DISTWIDTH] IN BLOOD BY AUTOMATED COUNT: 17.3 % (ref 11.6–15.1)
FLUAV RNA RESP QL NAA+PROBE: NEGATIVE
FLUBV RNA RESP QL NAA+PROBE: NEGATIVE
GFR SERPL CREATININE-BSD FRML MDRD: 27 ML/MIN/1.73SQ M
GLUCOSE SERPL-MCNC: 135 MG/DL (ref 65–140)
GLUCOSE SERPL-MCNC: 154 MG/DL (ref 65–140)
GLUCOSE UR STRIP-MCNC: NEGATIVE MG/DL
HCO3 BLDA-SCNC: 27.4 MMOL/L (ref 22–28)
HCT VFR BLD AUTO: 35.1 % (ref 34.8–46.1)
HGB BLD-MCNC: 10.5 G/DL (ref 11.5–15.4)
HGB UR QL STRIP.AUTO: NEGATIVE
INR PPP: 1.14 (ref 0.84–1.19)
IPAP: 12
KETONES UR STRIP-MCNC: NEGATIVE MG/DL
LACTATE SERPL-SCNC: 1.8 MMOL/L (ref 0.5–2)
LEUKOCYTE ESTERASE UR QL STRIP: NEGATIVE
LYMPHOCYTES # BLD AUTO: 0.58 THOUSAND/UL (ref 0.6–4.47)
LYMPHOCYTES # BLD AUTO: 6 % (ref 14–44)
MCH RBC QN AUTO: 26.6 PG (ref 26.8–34.3)
MCHC RBC AUTO-ENTMCNC: 29.9 G/DL (ref 31.4–37.4)
MCV RBC AUTO: 89 FL (ref 82–98)
MONOCYTES # BLD AUTO: 0.19 THOUSAND/UL (ref 0–1.22)
MONOCYTES NFR BLD: 2 % (ref 4–12)
MUCOUS THREADS UR QL AUTO: ABNORMAL
NEUTROPHILS # BLD MANUAL: 8.75 THOUSAND/UL (ref 1.85–7.62)
NEUTS BAND NFR BLD MANUAL: 8 % (ref 0–8)
NEUTS SEG NFR BLD AUTO: 83 % (ref 43–75)
NITRITE UR QL STRIP: NEGATIVE
NON VENT- BIPAP: ABNORMAL
NON-SQ EPI CELLS URNS QL MICRO: ABNORMAL /HPF
NT-PROBNP SERPL-MCNC: 1794 PG/ML
O2 CT BLDA-SCNC: 14.1 ML/DL (ref 16–23)
OXYHGB MFR BLDA: 93.9 % (ref 94–97)
P AXIS: 82 DEGREES
PCO2 BLDA: 52.2 MM HG (ref 36–44)
PEEP MAX SETTING VENT: 6 CM[H2O]
PH BLDA: 7.34 [PH] (ref 7.35–7.45)
PH UR STRIP.AUTO: 5.5 [PH]
PLATELET BLD QL SMEAR: ABNORMAL
PMV BLD AUTO: 10.2 FL (ref 8.9–12.7)
PO2 BLDA: 84.6 MM HG (ref 75–129)
POIKILOCYTOSIS BLD QL SMEAR: PRESENT
POLYCHROMASIA BLD QL SMEAR: PRESENT
POTASSIUM SERPL-SCNC: 5.5 MMOL/L (ref 3.5–5.3)
POTASSIUM SERPL-SCNC: 6.1 MMOL/L (ref 3.5–5.3)
PR INTERVAL: 152 MS
PROCALCITONIN SERPL-MCNC: 1.42 NG/ML
PROT SERPL-MCNC: 6 G/DL (ref 6.4–8.2)
PROT UR STRIP-MCNC: ABNORMAL MG/DL
PROTHROMBIN TIME: 14.6 SECONDS (ref 11.6–14.5)
QRS AXIS: 49 DEGREES
QRSD INTERVAL: 76 MS
QT INTERVAL: 310 MS
QTC INTERVAL: 422 MS
RBC # BLD AUTO: 3.95 MILLION/UL (ref 3.81–5.12)
RBC #/AREA URNS AUTO: ABNORMAL /HPF
RSV RNA RESP QL NAA+PROBE: NEGATIVE
SARS-COV-2 RNA RESP QL NAA+PROBE: POSITIVE
SODIUM SERPL-SCNC: 132 MMOL/L (ref 136–145)
SP GR UR STRIP.AUTO: >=1.03 (ref 1–1.03)
SPECIMEN SOURCE: ABNORMAL
T WAVE AXIS: 60 DEGREES
UROBILINOGEN UR QL STRIP.AUTO: 0.2 E.U./DL
VARIANT LYMPHS # BLD AUTO: 1 %
VENT BIPAP FIO2: 80 %
VENTRICULAR RATE: 111 BPM
WBC # BLD AUTO: 9.61 THOUSAND/UL (ref 4.31–10.16)
WBC #/AREA URNS AUTO: ABNORMAL /HPF

## 2022-02-03 PROCEDURE — 36415 COLL VENOUS BLD VENIPUNCTURE: CPT | Performed by: EMERGENCY MEDICINE

## 2022-02-03 PROCEDURE — 84484 ASSAY OF TROPONIN QUANT: CPT | Performed by: EMERGENCY MEDICINE

## 2022-02-03 PROCEDURE — 85007 BL SMEAR W/DIFF WBC COUNT: CPT | Performed by: EMERGENCY MEDICINE

## 2022-02-03 PROCEDURE — 83605 ASSAY OF LACTIC ACID: CPT | Performed by: EMERGENCY MEDICINE

## 2022-02-03 PROCEDURE — 82948 REAGENT STRIP/BLOOD GLUCOSE: CPT

## 2022-02-03 PROCEDURE — 85379 FIBRIN DEGRADATION QUANT: CPT | Performed by: INTERNAL MEDICINE

## 2022-02-03 PROCEDURE — 82805 BLOOD GASES W/O2 SATURATION: CPT | Performed by: EMERGENCY MEDICINE

## 2022-02-03 PROCEDURE — 87040 BLOOD CULTURE FOR BACTERIA: CPT | Performed by: EMERGENCY MEDICINE

## 2022-02-03 PROCEDURE — 99291 CRITICAL CARE FIRST HOUR: CPT

## 2022-02-03 PROCEDURE — G1004 CDSM NDSC: HCPCS

## 2022-02-03 PROCEDURE — 85049 AUTOMATED PLATELET COUNT: CPT

## 2022-02-03 PROCEDURE — 85027 COMPLETE CBC AUTOMATED: CPT | Performed by: EMERGENCY MEDICINE

## 2022-02-03 PROCEDURE — 85730 THROMBOPLASTIN TIME PARTIAL: CPT | Performed by: EMERGENCY MEDICINE

## 2022-02-03 PROCEDURE — 82140 ASSAY OF AMMONIA: CPT | Performed by: FAMILY MEDICINE

## 2022-02-03 PROCEDURE — 93010 ELECTROCARDIOGRAM REPORT: CPT | Performed by: INTERNAL MEDICINE

## 2022-02-03 PROCEDURE — 36600 WITHDRAWAL OF ARTERIAL BLOOD: CPT

## 2022-02-03 PROCEDURE — 84145 PROCALCITONIN (PCT): CPT | Performed by: EMERGENCY MEDICINE

## 2022-02-03 PROCEDURE — 84132 ASSAY OF SERUM POTASSIUM: CPT | Performed by: INTERNAL MEDICINE

## 2022-02-03 PROCEDURE — 80053 COMPREHEN METABOLIC PANEL: CPT | Performed by: EMERGENCY MEDICINE

## 2022-02-03 PROCEDURE — 86140 C-REACTIVE PROTEIN: CPT | Performed by: INTERNAL MEDICINE

## 2022-02-03 PROCEDURE — 99285 EMERGENCY DEPT VISIT HI MDM: CPT | Performed by: EMERGENCY MEDICINE

## 2022-02-03 PROCEDURE — 94002 VENT MGMT INPAT INIT DAY: CPT

## 2022-02-03 PROCEDURE — 0241U HB NFCT DS VIR RESP RNA 4 TRGT: CPT | Performed by: EMERGENCY MEDICINE

## 2022-02-03 PROCEDURE — 93005 ELECTROCARDIOGRAM TRACING: CPT

## 2022-02-03 PROCEDURE — 70450 CT HEAD/BRAIN W/O DYE: CPT

## 2022-02-03 PROCEDURE — 84132 ASSAY OF SERUM POTASSIUM: CPT

## 2022-02-03 PROCEDURE — 94760 N-INVAS EAR/PLS OXIMETRY 1: CPT

## 2022-02-03 PROCEDURE — 96361 HYDRATE IV INFUSION ADD-ON: CPT

## 2022-02-03 PROCEDURE — 85610 PROTHROMBIN TIME: CPT | Performed by: EMERGENCY MEDICINE

## 2022-02-03 PROCEDURE — 71045 X-RAY EXAM CHEST 1 VIEW: CPT

## 2022-02-03 PROCEDURE — 81001 URINALYSIS AUTO W/SCOPE: CPT | Performed by: EMERGENCY MEDICINE

## 2022-02-03 PROCEDURE — 96365 THER/PROPH/DIAG IV INF INIT: CPT

## 2022-02-03 PROCEDURE — 83880 ASSAY OF NATRIURETIC PEPTIDE: CPT | Performed by: EMERGENCY MEDICINE

## 2022-02-03 RX ORDER — DOCUSATE SODIUM 100 MG/1
100 CAPSULE, LIQUID FILLED ORAL 2 TIMES DAILY PRN
Status: DISCONTINUED | OUTPATIENT
Start: 2022-02-03 | End: 2022-02-04

## 2022-02-03 RX ORDER — ACETAMINOPHEN 325 MG/1
650 TABLET ORAL EVERY 6 HOURS PRN
Status: DISCONTINUED | OUTPATIENT
Start: 2022-02-03 | End: 2022-02-04

## 2022-02-03 RX ORDER — DULOXETIN HYDROCHLORIDE 60 MG/1
60 CAPSULE, DELAYED RELEASE ORAL DAILY
Status: DISCONTINUED | OUTPATIENT
Start: 2022-02-04 | End: 2022-02-04

## 2022-02-03 RX ORDER — HEPARIN SODIUM 5000 [USP'U]/ML
5000 INJECTION, SOLUTION INTRAVENOUS; SUBCUTANEOUS EVERY 8 HOURS SCHEDULED
Status: DISCONTINUED | OUTPATIENT
Start: 2022-02-03 | End: 2022-02-10 | Stop reason: HOSPADM

## 2022-02-03 RX ORDER — AMLODIPINE BESYLATE 5 MG/1
10 TABLET ORAL DAILY
Status: DISCONTINUED | OUTPATIENT
Start: 2022-02-04 | End: 2022-02-03

## 2022-02-03 RX ORDER — OXYBUTYNIN CHLORIDE 5 MG/1
10 TABLET, EXTENDED RELEASE ORAL DAILY
Status: DISCONTINUED | OUTPATIENT
Start: 2022-02-04 | End: 2022-02-03

## 2022-02-03 RX ORDER — MIRABEGRON 50 MG/1
50 TABLET, FILM COATED, EXTENDED RELEASE ORAL DAILY
COMMUNITY

## 2022-02-03 RX ORDER — CALCIUM GLUCONATE 20 MG/ML
1 INJECTION, SOLUTION INTRAVENOUS ONCE
Status: COMPLETED | OUTPATIENT
Start: 2022-02-03 | End: 2022-02-03

## 2022-02-03 RX ORDER — SODIUM CHLORIDE 9 MG/ML
75 INJECTION, SOLUTION INTRAVENOUS CONTINUOUS
Status: DISCONTINUED | OUTPATIENT
Start: 2022-02-03 | End: 2022-02-04

## 2022-02-03 RX ORDER — INSULIN GLARGINE 100 [IU]/ML
20 INJECTION, SOLUTION SUBCUTANEOUS
Status: DISCONTINUED | OUTPATIENT
Start: 2022-02-03 | End: 2022-02-04

## 2022-02-03 RX ORDER — LACTULOSE 20 G/30ML
20 SOLUTION ORAL 2 TIMES DAILY
Status: DISCONTINUED | OUTPATIENT
Start: 2022-02-03 | End: 2022-02-04

## 2022-02-03 RX ORDER — ALBUTEROL SULFATE 90 UG/1
2 AEROSOL, METERED RESPIRATORY (INHALATION) EVERY 4 HOURS PRN
Status: DISCONTINUED | OUTPATIENT
Start: 2022-02-03 | End: 2022-02-10 | Stop reason: HOSPADM

## 2022-02-03 RX ORDER — GUAIFENESIN 600 MG
600 TABLET, EXTENDED RELEASE 12 HR ORAL 2 TIMES DAILY
Status: DISCONTINUED | OUTPATIENT
Start: 2022-02-03 | End: 2022-02-10 | Stop reason: HOSPADM

## 2022-02-03 RX ORDER — DEXTROSE MONOHYDRATE 25 G/50ML
25 INJECTION, SOLUTION INTRAVENOUS ONCE
Status: COMPLETED | OUTPATIENT
Start: 2022-02-03 | End: 2022-02-03

## 2022-02-03 RX ORDER — SODIUM CHLORIDE 9 MG/ML
3 INJECTION INTRAVENOUS
Status: DISCONTINUED | OUTPATIENT
Start: 2022-02-03 | End: 2022-02-10 | Stop reason: HOSPADM

## 2022-02-03 RX ORDER — ONDANSETRON 2 MG/ML
4 INJECTION INTRAMUSCULAR; INTRAVENOUS EVERY 4 HOURS PRN
Status: DISCONTINUED | OUTPATIENT
Start: 2022-02-03 | End: 2022-02-10 | Stop reason: HOSPADM

## 2022-02-03 RX ORDER — LACTULOSE 20 G/30ML
20 SOLUTION ORAL DAILY
Status: DISCONTINUED | OUTPATIENT
Start: 2022-02-04 | End: 2022-02-03

## 2022-02-03 RX ORDER — METOCLOPRAMIDE 10 MG/1
10 TABLET ORAL DAILY
Status: DISCONTINUED | OUTPATIENT
Start: 2022-02-04 | End: 2022-02-03

## 2022-02-03 RX ADMIN — LACTULOSE 200 G: 10 SOLUTION ORAL; RECTAL at 22:35

## 2022-02-03 RX ADMIN — SODIUM CHLORIDE 50 ML/HR: 0.9 INJECTION, SOLUTION INTRAVENOUS at 22:29

## 2022-02-03 RX ADMIN — RIFAXIMIN 550 MG: 550 TABLET ORAL at 23:43

## 2022-02-03 RX ADMIN — SODIUM CHLORIDE 1000 ML: 0.9 INJECTION, SOLUTION INTRAVENOUS at 19:50

## 2022-02-03 RX ADMIN — HEPARIN SODIUM 5000 UNITS: 5000 INJECTION INTRAVENOUS; SUBCUTANEOUS at 22:03

## 2022-02-03 RX ADMIN — CALCIUM GLUCONATE 1 G: 20 INJECTION, SOLUTION INTRAVENOUS at 21:52

## 2022-02-03 RX ADMIN — SODIUM BICARBONATE 50 MEQ: 84 INJECTION INTRAVENOUS at 21:52

## 2022-02-03 RX ADMIN — INSULIN GLARGINE 20 UNITS: 100 INJECTION, SOLUTION SUBCUTANEOUS at 23:32

## 2022-02-03 RX ADMIN — INSULIN HUMAN 10 UNITS: 100 INJECTION, SOLUTION PARENTERAL at 21:52

## 2022-02-03 RX ADMIN — SODIUM CHLORIDE 1000 ML: 0.9 INJECTION, SOLUTION INTRAVENOUS at 17:46

## 2022-02-03 RX ADMIN — CEFTRIAXONE SODIUM 1000 MG: 10 INJECTION, POWDER, FOR SOLUTION INTRAVENOUS at 17:45

## 2022-02-03 RX ADMIN — DEXTROSE MONOHYDRATE 25 ML: 25 INJECTION, SOLUTION INTRAVENOUS at 21:52

## 2022-02-03 RX ADMIN — DOXYCYCLINE 100 MG: 100 INJECTION, POWDER, LYOPHILIZED, FOR SOLUTION INTRAVENOUS at 23:40

## 2022-02-03 NOTE — ED PROVIDER NOTES
History  Chief Complaint   Patient presents with    Syncope     Per EMS patient had syncopal episode, and patient states near syncope episode  Patient is a 28-year-old female with past medical history significant for cirrhosis secondary to alcohol abuse, Type 2 DM, peripheral neuropathy and HTN who presents due to AMS  Per , patient was sitting in chair earlier today around 2pm and was taking a nap  When she awoke, he noted her left arm tremors and patient was confused when asked "what was wrong and why her arm was shaking"   denies any LOC or seizure like activity  Of note,  reports approximately 2 weeks ago patient was ambulating and unfortunately tripped over her bedroom plan get and subsequently fell face forward  That time patient states she did LOC but quickly arose and him and his daughter helped patient low back in bed  Patient did have notable trauma to the face with residual hematoma of the chin, unfortunately, family did not bring her in for further evaluation after this episode  Upon evaluation in ED, patient satting mid [de-identified] with non-rebreather, had fixed gaze with notable generalized tremors  However, patient is alert and able to answer directed questions  She is not oriented to person or place  Patient was recently hospitalized from 1/3-1/7/22 secondary to COVID-19 infection  History provided by:  Spouse     Prior to Admission Medications   Prescriptions Last Dose Informant Patient Reported? Taking?    DULoxetine (CYMBALTA) 60 mg delayed release capsule   Yes Yes   Sig: Take 60 mg by mouth daily   Insulin Aspart FlexPen 100 UNIT/ML SOPN   Yes Yes   Sig: Inject 3 Units under the skin 3 (three) times a day with meals If bs>250   Mirabegron ER (Myrbetriq) 50 MG TB24  Self Yes Yes   Sig: Take 50 mg by mouth in the morning   amLODIPine (NORVASC) 10 mg tablet   No Yes   Sig: Take 1 tablet (10 mg total) by mouth daily   docusate sodium (COLACE) 100 mg capsule   No Yes Sig: Take 1 capsule (100 mg total) by mouth 2 (two) times a day as needed for constipation   gabapentin (NEURONTIN) 100 mg capsule Not Taking at Unknown time  No No   Sig: Take 1 capsule (100 mg total) by mouth 2 (two) times a day   Patient not taking: Reported on 2/3/2022    guaiFENesin (MUCINEX) 600 mg 12 hr tablet Not Taking at Unknown time  No No   Sig: Take 1 tablet (600 mg total) by mouth every 12 (twelve) hours   Patient not taking: Reported on 2/3/2022    insulin glargine (LANTUS SOLOSTAR) 100 units/mL injection pen  Outside Facility (Specify) Yes Yes   Sig: Inject 20 Units under the skin daily at bedtime     lactulose 20 g/30 mL Not Taking at Unknown time  No No   Sig: Take 30 mL (20 g total) by mouth 2 (two) times a day   Patient not taking: Reported on 2/3/2022    lisinopril (ZESTRIL) 20 mg tablet   Yes Yes   Sig: Take 10 mg by mouth daily    metoclopramide (REGLAN) 10 mg tablet   Yes Yes   Sig: Take 10 mg by mouth daily   oxyCODONE (ROXICODONE) 5 mg immediate release tablet   Yes Yes   Sig: Take 15 mg by mouth every 6 (six) hours as needed for moderate pain Per patient  patient has been taking 30mg PO 6 times a day      promethazine (PHENERGAN) 25 mg suppository Not Taking at Unknown time  No No   Sig: Insert 1 suppository (25 mg total) into the rectum every 6 (six) hours as needed for nausea or vomiting   Patient not taking: Reported on 2/3/2022    rifaximin (XIFAXAN) 550 mg tablet Not Taking at Unknown time  No No   Sig: Take 1 tablet (550 mg total) by mouth every 12 (twelve) hours   Patient not taking: Reported on 2/3/2022       Facility-Administered Medications: None       Past Medical History:   Diagnosis Date    Abnormal head CT 7/14/2017    Acute kidney injury ([facility] [address] ) 8/19/2018    Cellulitis 1/10/2017    Closed fracture of multiple ribs of right side 7/14/2017    Degenerative disc disease at L5-S1 level     Diabetes mellitus (HCC)     History of methicillin resistant staphylococcus aureus (MRSA) 2018    negative nasal culture- isolation and hx of mrsa removed 2018    Hyperkalemia 2018    Hypertension     Hypocalcemia 2018    Hyponatremia 2017       Past Surgical History:   Procedure Laterality Date    CHOLECYSTECTOMY      JOINT REPLACEMENT      OOPHORECTOMY      IN INCISION,IMPLANT,NEUROELEC,SACRAL NERVE N/A 2021    Procedure: INSERTION NEUROSTIMULATOR ELECTRODE ARRAY SACRAL NERVE; FLUOROSCOPY; ELECTRONIC ANALYSIS;  Surgeon: Dorene Pressley MD;  Location: [facility];  Service: UroGynecology           SACRAL NERVE STIMULATOR PLACEMENT N/A 2021    Procedure: IPG INSERTION AND PROGRAMMING;  Surgeon: Dorene Pressley MD;  Location: [facility];  Service: UroGynecology              Family History   Problem Relation Age of Onset    Rheum arthritis Mother     Alzheimer's disease Mother     Lupus Mother      I have reviewed and agree with the history as documented      E-Cigarette/Vaping    E-Cigarette Use Current Every Day User     Start Date 19     Cartridges/Day 1     Comments VAPE      E-Cigarette/Vaping Substances    Nicotine Yes     THC Yes not using anymore     Social History     Tobacco Use    Smoking status: Former Smoker     Types: E-Cigarettes     Start date: 1976     Quit date: 2019     Years since quittin 8    Smokeless tobacco: Never Used   Vaping Use    Vaping Use: Every day    Start date: 2019    Substances: Nicotine, THC (not using anymore)   Substance Use Topics    Alcohol use: Not Currently     Alcohol/week: 0 0 standard drinks    Drug use: Not Currently     Types: Marijuana     Comment: last used 1 year ago         Review of Systems   Unable to perform ROS: Acuity of condition (Patient only localizes pain to left upper thigh)       Physical Exam  ED Triage Vitals [22 1633]   Temperature Pulse Respirations Blood Pressure SpO2   98 6 °F (37 °C) (!) 110 22 94/50 (!) 85 %      Temp Source Heart Rate Source Patient Position - Orthostatic VS BP Location FiO2 (%)   Oral Monitor Lying Left arm --      Pain Score       No Pain             Orthostatic Vital Signs  Vitals:    02/03/22 1720 02/03/22 1900 02/03/22 1913 02/03/22 1930   BP: 96/55  90/54 97/52   Pulse: 103 92 90 88   Patient Position - Orthostatic VS: Lying   Lying       Physical Exam  HENT:      Head:      Comments: Hematoma of the chin  No tongue bit/trauma noted     Right Ear: External ear normal       Left Ear: External ear normal    Eyes:      General: No scleral icterus  Comments: Lag of PERRL   Cardiovascular:      Rate and Rhythm: Normal rate and regular rhythm  Heart sounds: Normal heart sounds  Pulmonary:      Breath sounds: Normal breath sounds  Comments: On rebreather  Abdominal:      General: Abdomen is flat  Bowel sounds are normal    Genitourinary:     Comments: Wound on left buttock with minimal blood present  Musculoskeletal:      Comments: Chronic venous stasis b/l  Dry scaling skin noted   Neurological:      Mental Status: She is alert  She is disoriented  Comments: Unable to complete a full neurological exam due to acuity of condition ( patient not able to fully follow commands)     Psychiatric:         Mood and Affect: Mood normal          ED Medications  Medications   sodium chloride (PF) 0 9 % injection 3 mL (has no administration in time range)   sodium chloride 0 9 % bolus 1,000 mL (0 mL Intravenous Stopped 2/3/22 1949)     Followed by   sodium chloride 0 9 % bolus 1,000 mL (1,000 mL Intravenous New Bag 2/3/22 1950)   ceftriaxone (ROCEPHIN) 1 g/50 mL in dextrose IVPB (0 mg Intravenous Stopped 2/3/22 1815)       Diagnostic Studies  Results Reviewed     Procedure Component Value Units Date/Time    C-reactive protein [[id number]]     Lab Status: No result Specimen: Blood     D-dimer, quantitative [[id number]] Collected: 02/03/22 1701    Lab Status:  In process Specimen: Blood from Hand, Right Updated: 02/03/22 2035 Potassium [[id number]]     Lab Status: No result Specimen: Blood     Blood culture #1 [[id number]] Collected: 02/03/22 1701    Lab Status: Preliminary result Specimen: Blood from Arm, Right Updated: 02/03/22 2002     Blood Culture Received in Microbiology Lab  Culture in Progress  Blood culture #2 [[id number]] Collected: 02/03/22 1701    Lab Status: Preliminary result Specimen: Blood from Hand, Right Updated: 02/03/22 2002     Blood Culture Received in Microbiology Lab  Culture in Progress  Procalcitonin with AM Reflex [[id number]]  (Abnormal) Collected: 02/03/22 1701    Lab Status: Final result Specimen: Blood from Hand, Right Updated: 02/03/22 1951     Procalcitonin 1 42 ng/ml     Procalcitonin Reflex [[id number]]     Lab Status: No result Specimen: Blood     HS Troponin I 2hr [[id number]]  (Normal) Collected: 02/03/22 1903    Lab Status: Final result Specimen: Blood from Hand, Right Updated: 02/03/22 1950     hs TnI 2hr 7 ng/L      Delta 2hr hsTnI 2 ng/L     Urine Microscopic [[id number]]  (Abnormal) Collected: 02/03/22 1753    Lab Status: Final result Specimen: Urine, Straight Cath Updated: 02/03/22 1911     RBC, UA 0-1 /hpf      WBC, UA 1-2 /hpf      Epithelial Cells Occasional /hpf      Bacteria, UA None Seen /hpf      MUCUS THREADS Occasional    CBC and differential [[id number]]  (Abnormal) Collected: 02/03/22 1701    Lab Status: Final result Specimen: Blood from Hand, Right Updated: 02/03/22 1901     WBC 9 61 Thousand/uL      RBC 3 95 Million/uL      Hemoglobin 10 5 g/dL      Hematocrit 35 1 %      MCV 89 fL      MCH 26 6 pg      MCHC 29 9 g/dL      RDW 17 3 %      MPV 10 2 fL      Platelets --    Narrative: This is an appended report  These results have been appended to a previously verified report      HS Troponin I 4hr [[id number]]     Lab Status: No result Specimen: Blood     Manual Differential(PHLEBS Do Not Order) [[id number]]  (Abnormal) Collected: 02/03/22 1701    Lab Status: Final result Specimen: Blood from Hand, Right Updated: 02/03/22 1856     Segmented % 83 %      Bands % 8 %      Lymphocytes % 6 %      Monocytes % 2 %      Eosinophils, % 0 %      Basophils % 0 %      Atypical Lymphocytes % 1 %      Absolute Neutrophils 8 75 Thousand/uL      Lymphocytes Absolute 0 58 Thousand/uL      Monocytes Absolute 0 19 Thousand/uL      Eosinophils Absolute 0 00 Thousand/uL      Basophils Absolute 0 00 Thousand/uL      Total Counted --     Anisocytosis Present     Poikilocytes Present     Polychromasia Present     Platelet Estimate Unable to Estimate due to clumped platelets    COVID/FLU/RSV - 2 hour TAT [[id number]]  (Abnormal) Collected: 02/03/22 1701    Lab Status: Final result Specimen: Nares from Nose Updated: 02/03/22 1839     SARS-CoV-2 Positive     INFLUENZA A PCR Negative     INFLUENZA B PCR Negative     RSV PCR Negative    Narrative:      FOR PEDIATRIC PATIENTS - copy/paste COVID Guidelines URL to browser: [email or web address]    SARS-CoV-2 assay is a Nucleic Acid Amplification assay intended for the  qualitative detection of nucleic acid from SARS-CoV-2 in nasopharyngeal  swabs  Results are for the presumptive identification of SARS-CoV-2 RNA  Positive results are indicative of infection with SARS-CoV-2, the virus  causing COVID-19, but do not rule out bacterial infection or co-infection  with other viruses  Laboratories within the [place] and its  territories are required to report all positive results to the appropriate  public health authorities  Negative results do not preclude SARS-CoV-2  infection and should not be used as the sole basis for treatment or other  patient management decisions  Negative results must be combined with  clinical observations, patient history, and epidemiological information  This test has not been FDA cleared or approved      This test has been authorized by FDA under an Emergency Use Authorization  (EUA)  This test is only authorized for the duration of time the  declaration that circumstances exist justifying the authorization of the  emergency use of an in vitro diagnostic tests for detection of SARS-CoV-2  virus and/or diagnosis of COVID-19 infection under section 564(b)(1) of  the Act, 21 U  S C  190OEU-1(B)(9), unless the authorization is terminated  or revoked sooner  The test has been validated but independent review by FDA  and CLIA is pending  Test performed using [facility] ([facility] GeneXpert: This RT-PCR assay targets N2,  a region unique to SARS-CoV-2  A conserved region in the E-gene was chosen  for pan-Sarbecovirus detection which includes SARS-CoV-2      Ammonia [[id number]]  (Abnormal) Collected: 02/03/22 1730    Lab Status: Final result Specimen: Blood from Hand, Right Updated: 02/03/22 1805     Ammonia 36 umol/L     UA w Reflex to Microscopic w Reflex to Culture [[id number]]  (Abnormal) Collected: 02/03/22 1753    Lab Status: Final result Specimen: Urine, Straight Cath Updated: 02/03/22 1801     Color, UA Yellow     Clarity, UA Clear     Specific Gravity, UA >=1 030     pH, UA 5 5     Leukocytes, UA Negative     Nitrite, UA Negative     Protein, UA 30 (1+) mg/dl      Glucose, UA Negative mg/dl      Ketones, UA Negative mg/dl      Urobilinogen, UA 0 2 E U /dl      Bilirubin, UA Negative     Blood, UA Negative    Blood gas, arterial [[id number]]  (Abnormal) Collected: 02/03/22 1740    Lab Status: Final result Specimen: Blood, Arterial from Brachial, Right Updated: 02/03/22 1756     pH, Arterial 7 338     pCO2, Arterial 52 2 mm Hg      pO2, Arterial 84 6 mm Hg      HCO3, Arterial 27 4 mmol/L      Base Excess, Arterial 1 0 mmol/L      O2 Content, Arterial 14 1 mL/dL      O2 HGB,Arterial  93 9 %      SOURCE Brachial, Right     BARRETT TEST Yes     Temperature 37 Degrees Fehrenheit      Non Vent type BIPAP BIPAP     IPAP 12     EPAP 6     BIPAP fio2 80 %     HS Troponin 0hr (reflex protocol) [[id number]]  (Normal) Collected: 02/03/22 1701    Lab Status: Final result Specimen: Blood from Hand, Right Updated: 02/03/22 1742     hs TnI 0hr 5 ng/L     Lactic Acid [[id number]]  (Normal) Collected: 02/03/22 1701    Lab Status: Final result Specimen: Blood from Hand, Right Updated: 02/03/22 1742     LACTIC ACID 1 8 mmol/L     Narrative:      Result may be elevated if tourniquet was used during collection      NT-BNP PRO [[id number]]  (Abnormal) Collected: 02/03/22 1701    Lab Status: Final result Specimen: Blood from Hand, Right Updated: 02/03/22 1739     NT-proBNP 1,794 pg/mL     Comprehensive metabolic panel [[id number]]  (Abnormal) Collected: 02/03/22 1701    Lab Status: Final result Specimen: Blood from Hand, Right Updated: 02/03/22 1733     Sodium 132 mmol/L      Potassium 5 5 mmol/L      Chloride 100 mmol/L      CO2 27 mmol/L      ANION GAP 5 mmol/L      BUN 23 mg/dL      Creatinine 1 89 mg/dL      Glucose 135 mg/dL      Calcium 8 0 mg/dL      Corrected Calcium 9 1 mg/dL      AST 21 U/L      ALT 18 U/L      Alkaline Phosphatase 129 U/L      Total Protein 6 0 g/dL      Albumin 2 6 g/dL      Total Bilirubin 0 52 mg/dL      eGFR 27 ml/min/1 73sq m     Narrative:      National Kidney Disease Foundation guidelines for Chronic Kidney Disease (CKD):     Stage 1 with normal or high GFR (GFR > 90 mL/min/1 73 square meters)    Stage 2 Mild CKD (GFR = 60-89 mL/min/1 73 square meters)    Stage 3A Moderate CKD (GFR = 45-59 mL/min/1 73 square meters)    Stage 3B Moderate CKD (GFR = 30-44 mL/min/1 73 square meters)    Stage 4 Severe CKD (GFR = 15-29 mL/min/1 73 square meters)    Stage 5 End Stage CKD (GFR <15 mL/min/1 73 square meters)  Note: GFR calculation is accurate only with a steady state creatinine    Protime-INR [[id number]]  (Abnormal) Collected: 02/03/22 1701    Lab Status: Final result Specimen: Blood from Hand, Right Updated: 02/03/22 1731     Protime 14 6 seconds      INR 1 14    APTT [[id number]]  (Normal) Collected: 02/03/22 1701    Lab Status: Final result Specimen: Blood from Hand, Right Updated: 02/03/22 1731     PTT 33 seconds     Fingerstick Glucose (POCT) [[id number]]  (Abnormal) Collected: 02/03/22 1627    Lab Status: Final result Updated: 02/03/22 1632     POC Glucose 154 mg/dl                  CT head without contrast   Final Result by Genia Bhandari MD (02/03 1742)      No acute intracranial abnormality  Workstation performed: [id number]         XR chest 1 view portable   Final Result by Janis Cody MD (38/35 0914)   Left lung base, possibly milder right lung base airspace disease  Possible left effusion findings are most suspicious for infection  Workstation performed: [id number]               Procedures  ECG 12 Lead Documentation Only    Date/Time: 2/3/2022 7:10 PM  Performed by: Bri Brown MD  Authorized by: Bri Brown MD     Indications / Diagnosis:  Syncope  ECG reviewed by me, the ED Provider: yes    Patient location:  ED  Previous ECG:     Previous ECG:  Compared to current    Similarity:  No change  Interpretation:     Interpretation: normal    Rate:     ECG rate assessment: normal    Rhythm:     Rhythm: sinus tachycardia    Ectopy:     Ectopy: none    QRS:     QRS axis:  Normal  ST segments:     ST segments:  Normal  T waves:     T waves: normal            ED Course                             SBIRT 20yo+      Most Recent Value   SBIRT (24 yo +)    In order to provide better care to our patients, we are screening all of our patients for alcohol and drug use  Would it be okay to ask you these screening questions? Yes Filed at: 02/03/2022 1741   Initial Alcohol Screen: US AUDIT-C     1  How often do you have a drink containing alcohol? 0 Filed at: 02/03/2022 1741   2  How many drinks containing alcohol do you have on a typical day you are drinking? 0 Filed at: 02/03/2022 1741   3b  FEMALE Any Age, or MALE 65+:  How often do you have 4 or more drinks on one occassion? 0 Filed at: 02/03/2022 1741   Audit-C Score 0 Filed at: 02/03/2022 1741   QUENTIN: How many times in the past year have you    Used an illegal drug or used a prescription medication for non-medical reasons? Never Filed at: 02/03/2022 1741                [facility]  Number of Diagnoses or Management Options  AMS (altered mental status): new and requires workup  Diagnosis management comments: Patient presents via EMS from home with AMS, per  patient awakened from a nap and he noted she was experiencing left hand tremors but state why her arm was shaking  Patient on presentation to ED with acute respiratory failure originally on non-rebreather satting mid 80s  Subsequently, placed on Bipap 12/6, now satting 97%  Will obtain CBC, CMP, CT head and CXR for AMS  Patient started on IV empiric abx therapy with rocephin  CXR- showing Left lung base, possibly milder right lung base airspace disease  Possible left effusion findings are most suspicious for infection  Acute respiratory failure likely in the setting of CAP  Patient was started on IV abx and requires further admission for treatment       Amount and/or Complexity of Data Reviewed  Clinical lab tests: ordered  Tests in the radiology section of CPT®: ordered        Disposition  Final diagnoses:   AMS (altered mental status)     Time reflects when diagnosis was documented in both MDM as applicable and the Disposition within this note     Time User Action Codes Description Comment    2/3/2022  5:27 PM Cindy [facility]-Baptiste [facility] Add [R41 [id number]] AMS (altered mental status)       ED Disposition     ED Disposition Condition Date/Time Comment    Admit Stable u Feb 3, 2022  8:38 PM Case was discussed with JEFFERY and the patient's admission status was agreed to be Admission Status: inpatient status to the service of Dr Ayleen Paris           Follow-up Information    None         Patient's Medications   Discharge Prescriptions    No medications on file     No discharge procedures on file  PDMP Review       Value Time User    PDMP Reviewed  Yes 1/4/2022 11:50 PM Tresa Escobar PA-C           ED Provider  Attending physically available and evaluated Willie Pedroza I managed the patient along with the ED Attending      Electronically Signed by         Yousif Paulino MD  02/03/22 2039

## 2022-02-04 ENCOUNTER — HOSPITAL ENCOUNTER (INPATIENT)
Dept: VASCULAR ULTRASOUND | Facility: HOSPITAL | Age: 67
DRG: 871 | End: 2022-02-04
Payer: MEDICARE

## 2022-02-04 PROBLEM — N18.9 ACUTE KIDNEY INJURY SUPERIMPOSED ON CKD (HCC): Status: ACTIVE | Noted: 2022-02-03

## 2022-02-04 LAB
ALBUMIN SERPL BCP-MCNC: 2.1 G/DL (ref 3.5–5)
ALP SERPL-CCNC: 107 U/L (ref 46–116)
ALT SERPL W P-5'-P-CCNC: 17 U/L (ref 12–78)
AMMONIA PLAS-SCNC: 32 UMOL/L (ref 11–35)
ANION GAP SERPL CALCULATED.3IONS-SCNC: 2 MMOL/L (ref 4–13)
AST SERPL W P-5'-P-CCNC: 25 U/L (ref 5–45)
BASE EX.OXY STD BLDV CALC-SCNC: 85.1 % (ref 60–80)
BASE EXCESS BLDA CALC-SCNC: 0 MMOL/L (ref -2–3)
BASE EXCESS BLDV CALC-SCNC: 2 MMOL/L
BILIRUB SERPL-MCNC: 0.4 MG/DL (ref 0.2–1)
BUN SERPL-MCNC: 27 MG/DL (ref 5–25)
CALCIUM ALBUM COR SERPL-MCNC: 9.2 MG/DL (ref 8.3–10.1)
CALCIUM SERPL-MCNC: 7.7 MG/DL (ref 8.3–10.1)
CHLORIDE SERPL-SCNC: 104 MMOL/L (ref 100–108)
CK SERPL-CCNC: 47 U/L (ref 26–192)
CO2 SERPL-SCNC: 30 MMOL/L (ref 21–32)
CREAT SERPL-MCNC: 1.68 MG/DL (ref 0.6–1.3)
FIO2 GAS DIL.REBREATH: 40 L
GFR SERPL CREATININE-BSD FRML MDRD: 31 ML/MIN/1.73SQ M
GLUCOSE SERPL-MCNC: 107 MG/DL (ref 65–140)
GLUCOSE SERPL-MCNC: 199 MG/DL (ref 65–140)
GLUCOSE SERPL-MCNC: 252 MG/DL (ref 65–140)
GLUCOSE SERPL-MCNC: 306 MG/DL (ref 65–140)
GLUCOSE SERPL-MCNC: 48 MG/DL (ref 65–140)
GLUCOSE SERPL-MCNC: 67 MG/DL (ref 65–140)
GLUCOSE SERPL-MCNC: 73 MG/DL (ref 65–140)
GLUCOSE SERPL-MCNC: 89 MG/DL (ref 65–140)
GLUCOSE SERPL-MCNC: 96 MG/DL (ref 65–140)
GLUCOSE SERPL-MCNC: 97 MG/DL (ref 65–140)
HCO3 BLDA-SCNC: 26.9 MMOL/L (ref 22–28)
HCO3 BLDV-SCNC: 29.3 MMOL/L (ref 24–30)
HCT VFR BLD CALC: 27 % (ref 34.8–46.1)
HGB BLDA-MCNC: 9.2 G/DL (ref 11.5–15.4)
L PNEUMO1 AG UR QL IA.RAPID: NEGATIVE
O2 CT BLDV-SCNC: 12.4 ML/DL
PCO2 BLD: 28 MMOL/L (ref 21–32)
PCO2 BLD: 51.8 MM HG (ref 36–44)
PCO2 BLDV: 60.3 MM HG (ref 42–50)
PH BLD: 7.32 [PH] (ref 7.35–7.45)
PH BLDV: 7.3 [PH] (ref 7.3–7.4)
PLATELET # BLD AUTO: 119 THOUSANDS/UL (ref 149–390)
PMV BLD AUTO: 9.8 FL (ref 8.9–12.7)
PO2 BLD: 89 MM HG (ref 75–129)
PO2 BLDV: 55.6 MM HG (ref 35–45)
POTASSIUM BLD-SCNC: 4.8 MMOL/L (ref 3.5–5.3)
POTASSIUM SERPL-SCNC: 5 MMOL/L (ref 3.5–5.3)
POTASSIUM SERPL-SCNC: 5.2 MMOL/L (ref 3.5–5.3)
PROCALCITONIN SERPL-MCNC: 4.56 NG/ML
PROT SERPL-MCNC: 5.6 G/DL (ref 6.4–8.2)
S PNEUM AG UR QL: NEGATIVE
SAO2 % BLD FROM PO2: 96 % (ref 60–85)
SODIUM BLD-SCNC: 135 MMOL/L (ref 136–145)
SODIUM SERPL-SCNC: 136 MMOL/L (ref 136–145)
SPECIMEN SOURCE: ABNORMAL

## 2022-02-04 PROCEDURE — 94760 N-INVAS EAR/PLS OXIMETRY 1: CPT

## 2022-02-04 PROCEDURE — 82140 ASSAY OF AMMONIA: CPT | Performed by: NURSE PRACTITIONER

## 2022-02-04 PROCEDURE — 36600 WITHDRAWAL OF ARTERIAL BLOOD: CPT

## 2022-02-04 PROCEDURE — 87449 NOS EACH ORGANISM AG IA: CPT | Performed by: NURSE PRACTITIONER

## 2022-02-04 PROCEDURE — 80053 COMPREHEN METABOLIC PANEL: CPT | Performed by: NURSE PRACTITIONER

## 2022-02-04 PROCEDURE — 93308 TTE F-UP OR LMTD: CPT | Performed by: INTERNAL MEDICINE

## 2022-02-04 PROCEDURE — NC001 PR NO CHARGE: Performed by: INTERNAL MEDICINE

## 2022-02-04 PROCEDURE — 82947 ASSAY GLUCOSE BLOOD QUANT: CPT

## 2022-02-04 PROCEDURE — 82948 REAGENT STRIP/BLOOD GLUCOSE: CPT

## 2022-02-04 PROCEDURE — 82550 ASSAY OF CK (CPK): CPT

## 2022-02-04 PROCEDURE — 97163 PT EVAL HIGH COMPLEX 45 MIN: CPT

## 2022-02-04 PROCEDURE — 94003 VENT MGMT INPAT SUBQ DAY: CPT

## 2022-02-04 PROCEDURE — 84145 PROCALCITONIN (PCT): CPT | Performed by: NURSE PRACTITIONER

## 2022-02-04 PROCEDURE — 82803 BLOOD GASES ANY COMBINATION: CPT

## 2022-02-04 PROCEDURE — 97167 OT EVAL HIGH COMPLEX 60 MIN: CPT

## 2022-02-04 PROCEDURE — 82805 BLOOD GASES W/O2 SATURATION: CPT | Performed by: INTERNAL MEDICINE

## 2022-02-04 PROCEDURE — 84295 ASSAY OF SERUM SODIUM: CPT

## 2022-02-04 PROCEDURE — 99291 CRITICAL CARE FIRST HOUR: CPT | Performed by: NURSE PRACTITIONER

## 2022-02-04 PROCEDURE — 85014 HEMATOCRIT: CPT

## 2022-02-04 PROCEDURE — 97530 THERAPEUTIC ACTIVITIES: CPT

## 2022-02-04 PROCEDURE — 84132 ASSAY OF SERUM POTASSIUM: CPT

## 2022-02-04 RX ORDER — DEXTROSE MONOHYDRATE 25 G/50ML
INJECTION, SOLUTION INTRAVENOUS
Status: COMPLETED
Start: 2022-02-04 | End: 2022-02-04

## 2022-02-04 RX ORDER — LACTULOSE 20 G/30ML
20 SOLUTION ORAL 2 TIMES DAILY
Status: DISCONTINUED | OUTPATIENT
Start: 2022-02-04 | End: 2022-02-05

## 2022-02-04 RX ORDER — ACETAMINOPHEN 325 MG/1
975 TABLET ORAL EVERY 6 HOURS PRN
Status: DISCONTINUED | OUTPATIENT
Start: 2022-02-04 | End: 2022-02-10 | Stop reason: HOSPADM

## 2022-02-04 RX ADMIN — HEPARIN SODIUM 5000 UNITS: 5000 INJECTION INTRAVENOUS; SUBCUTANEOUS at 05:52

## 2022-02-04 RX ADMIN — INSULIN LISPRO 2 UNITS: 100 INJECTION, SOLUTION INTRAVENOUS; SUBCUTANEOUS at 23:24

## 2022-02-04 RX ADMIN — HEPARIN SODIUM 5000 UNITS: 5000 INJECTION INTRAVENOUS; SUBCUTANEOUS at 21:39

## 2022-02-04 RX ADMIN — AZITHROMYCIN MONOHYDRATE 500 MG: 500 INJECTION, POWDER, LYOPHILIZED, FOR SOLUTION INTRAVENOUS at 04:40

## 2022-02-04 RX ADMIN — RIFAXIMIN 550 MG: 550 TABLET ORAL at 20:47

## 2022-02-04 RX ADMIN — RIFAXIMIN 550 MG: 550 TABLET ORAL at 11:38

## 2022-02-04 RX ADMIN — LACTULOSE 20 G: 20 SOLUTION ORAL at 19:41

## 2022-02-04 RX ADMIN — INSULIN LISPRO 8 UNITS: 100 INJECTION, SOLUTION INTRAVENOUS; SUBCUTANEOUS at 19:00

## 2022-02-04 RX ADMIN — DEXTROSE MONOHYDRATE 25 ML: 25 INJECTION, SOLUTION INTRAVENOUS at 06:02

## 2022-02-04 RX ADMIN — HEPARIN SODIUM 5000 UNITS: 5000 INJECTION INTRAVENOUS; SUBCUTANEOUS at 14:20

## 2022-02-04 RX ADMIN — GUAIFENESIN 600 MG: 600 TABLET, EXTENDED RELEASE ORAL at 08:22

## 2022-02-04 RX ADMIN — LACTULOSE 20 G: 20 SOLUTION ORAL at 11:38

## 2022-02-04 RX ADMIN — CEFTRIAXONE SODIUM 2000 MG: 10 INJECTION, POWDER, FOR SOLUTION INTRAVENOUS at 20:46

## 2022-02-04 RX ADMIN — GUAIFENESIN 600 MG: 600 TABLET, EXTENDED RELEASE ORAL at 19:41

## 2022-02-04 NOTE — ASSESSMENT & PLAN NOTE
· 2/3 CXR Left lung base, possibly milder right lung base airspace disease   Possible left effusion findings are most suspicious for infection  Lab Results   Component Value Date    NTBNP 1,794 (H) 02/03/2022     Lab Results   Component Value Date    WBC 9 61 02/03/2022    WBC 3 63 (L) 01/07/2022    WBC 5 08 01/06/2022    PROCALCITONI 4 56 (H) 02/04/2022    PROCALCITONI 1 42 (H) 02/03/2022    PROCALCITONI 0 14 01/05/2022   -   Lab Results   Component Value Date    CRP 11 0 (H) 02/03/2022    CRP 0 4 01/03/2022   -   -   · Of note patient had recent COVID+ admission 1/3  · COVID + swab in ER on admission  ?   -   o Plan:   - COVID+ in ED but was COVID positive 1 month ago so expected and contact precautions have been removed   Passed bedside swallow eval   · Continue guaifenesin   · Continue albuterol  · OOB as tolerated, encourage mobility  · Aspiration precautions  · Strep pneumoniae and Legionella urine antigens negative

## 2022-02-04 NOTE — PHYSICAL THERAPY NOTE
PHYSICAL THERAPY EVALUATION NOTE    Patient Name: Sharon Castro  QFFWP'A Date: 2/4/2022  AGE:   73 y o  Mrn:   [id number]  ADMIT DX:  Unresponsive [R41 89]  AMS (altered mental status) [R41 82]    Past Medical History:   Diagnosis Date    Abnormal head CT 7/14/2017    Acute kidney injury ([address] ) 8/19/2018    Cellulitis 1/10/2017    Closed fracture of multiple ribs of right side 7/14/2017    Degenerative disc disease at L5-S1 level     Diabetes mellitus (HCC)     History of methicillin resistant staphylococcus aureus (MRSA) 08/21/2018    negative nasal culture- isolation and hx of mrsa removed 8/20/2018    Hyperkalemia 4/25/2018    Hypertension     Hypocalcemia 4/25/2018    Hyponatremia 7/14/2017     Length Of Stay: 1  PHYSICAL THERAPY EVALUATION :   02/04/22 1057   PT Last Visit   PT Visit Date 02/04/22   Pain Assessment   Pain Assessment Tool 0-10   Pain Score No Pain   Restrictions/Precautions   Other Precautions Chair Alarm; Bed Alarm;Multiple lines;Telemetry;O2;Fall Risk   Home Living   Type of Home House   Home Layout Two level;Performs ADLs on one level; Able to live on main level with bedroom/bathroom; Other (Comment)  (4 to 5 CATRACHO)   Additional Comments lives w/ spouse and daughter  ambulates w/ rollator  receives assist w/ ADLs and IADLs  1 fall since previous discharge from hospital  no history of supplemental oxygen use  General   Additional Pertinent History 2L oxygen via nasal cannula  resting pulse ox 95% and 83 BPM, active 91% and 90 BPM    Family/Caregiver Present No   Cognition   Arousal/Participation Cooperative   Orientation Level Oriented to person; Other (Comment)  (pt was identified w/ full name, birth date)   Following Commands Follows one step commands with increased time or repetition   Subjective   Subjective pt seen supine in bed  agreed to PT eval  denied pain  pt states needing to use the bathroom  RUE Assessment   RUE Assessment WFL   LUE Assessment   LUE Assessment WFL   RLE Assessment   RLE Assessment WFL  (3 to 3+/5)   LLE Assessment   LLE Assessment WFL  (3 to 3+/5)   Light Touch   RLE Light Touch Grossly intact   LLE Light Touch Grossly intact   Bed Mobility   Supine to Sit 3  Moderate assistance   Additional items Assist x 1;HOB elevated; Bedrails; Increased time required;Verbal cues;LE management  (for trunk/LE positioning)   Transfers   Sit to Stand 4  Minimal assistance   Additional items Assist x 1; Increased time required;Verbal cues  (for LE positioning)   Stand to Sit 4  Minimal assistance   Additional items Assist x 1; Increased time required   Stand pivot 4  Minimal assistance   Additional items Assist x 1; Increased time required;Verbal cues  (for walker positioning)   Ambulation/Elevation   Gait pattern Not appropriate   Assistive Device Rolling walker   Balance   Static Sitting Fair +   Static Standing Poor +  (w/ roller walker)   Ambulatory Zero   Activity Tolerance   Activity Tolerance Patient limited by fatigue   Nurse Made Aware spoke to [facility] NSG, Hermila OT, Anali CM   Assessment   Prognosis Fair   Problem List Decreased strength;Decreased endurance; Impaired balance;Decreased mobility; Decreased safety awareness  (LE edema)   Assessment Pt presents with altered mental status  Dx: hepatic encephalopathy, pneumonia, YVONNE on CKD, sepsis, and chronic pain syndrome  order placed for PT eval and tx, w/ activity order of up w/ A  pt presents w/ comorbidities of cirrhosis, DM, chronic pain syndrome, HTN, cellulitis, rib fxs, and COVID and personal factors of mobilizing w/ assistive device, stair(s) to enter home and positive fall history  pt presents w/ weakness, decreased endurance, impaired balance, decreased safety awareness and fall risk   these impairments are evident in findings from physical examination (weakness), mobility assessment (need for min to mod assist w/ all phases of mobility when usually mobilizing independently, inability to ambulate and need for cueing for mobility technique), and Barthel Index: 50/100  pt needed input for mobility and breathing technique  pt is at risk for falls due to physical and safety awareness deficits  pt's clinical presentation is unstable/unpredictable (evident in need for assist w/ all phases of mobility when usually mobilizing independently, need for supplemental oxygen in order to maintain oxygen saturation and need for input for mobility technique/safety)  pt needs inpatient PT tx to improve mobility deficits and progress mobility training as appropriate  discharge recommendation is for home w/ family support and home PT to reduce fall risk and maximize level of functional independence  Goals   Patient Goals go home   STG Expiration Date 02/14/22   Short Term Goal #1 pt will: Increase bilateral LE strength 1/2 grade to facilitate independent mobility, Perform all bed mobility tasks modified independent to decrease fall risk factors, Perform all transfers modified independent to improve independence, Ambulate 150 ft  with roller walker w/ supervision w/o LOB to improve level of function, Navigate 4 stairs w/ minx1 with unilateral handrail to facilitate return to previous living environment, Increase ambulatory balance 1 grade to decrease risk for falls, Complete exercise program w/ less than 25% input from therapist to increase strength and endurance, Tolerate 3 hr OOB to faciliate upright tolerance and Improve Barthel Index score to 75 or greater to facilitate independence   PT Treatment Day 1   Plan   Treatment/Interventions Functional transfer training;LE strengthening/ROM; Elevations; Therapeutic exercise; Endurance training;Patient/family training;Equipment eval/education; Bed mobility;Gait training   PT Frequency 4-6x/wk   Recommendation   PT Discharge Recommendation Home with home health rehabilitation  (and family support)   Additional Comments recommend roller walker use w/ mobility   AM-PAC Basic Mobility Inpatient   Turning in Bed Without Bedrails 4   Lying on Back to Sitting on Edge of Flat Bed 2   Moving Bed to Chair 3   Standing Up From Chair 3   Walk in Room 1   Climb 3-5 Stairs 1   Basic Mobility Inpatient Raw Score 14   Basic Mobility Standardized Score 35 55   Highest Level Of Mobility   [facility] Goal 4: Move to chair/commode   -[facility] [place] Highest Level of Mobility 4: Move to chair/commode   -[facility] [place] Goal Achieved Yes   Barthel Index   Feeding 10   Bathing 0   Grooming Score 5   Dressing Score 5   Bladder Score 5   Bowels Score 10   Toilet Use Score 5   Transfers (Bed/Chair) Score 10   Mobility (Level Surface) Score 0   Stairs Score 0   Barthel Index Score 50   Additional Treatment Session   Start Time 1057   End Time 1107   Treatment Assessment Therapist provided education to pt for mobility technique including transfers and roller walker management  Education was provided due to findings from evaluation  Occasional repetition was needed for carryover to be noted  Sit<---> stand and stand pivot transfers w/ supervision  pt was unable to ambulate  pt declined further mobility training due to fatigue  Pt was found to have improvement after education w/ decreased level of assist needed to maintain safety  Pt continues to be a fall risk  continued inpatient PT tx is indicated to reduce fall risk factors  Equipment Use rolller walker   Additional Treatment Day 1   End of Consult   Patient Position at End of Consult Bedside chair;Bed/Chair alarm activated; All needs within reach     The patient's AM-PAC Basic Mobility Inpatient Short Form Raw Score is 14  A Raw score of less than or equal to 16 suggests the patient may benefit from discharge to post-acute rehabilitation services  Please also refer to the recommendation of the Physical Therapist for safe discharge planning   As pt has assist from family available at home and good rehab potential, she is recommended for return home w/ family support and home PT  Skilled PT recommended while in hospital and upon DC to progress pt toward treatment goals       Rochelle Palma, PT

## 2022-02-04 NOTE — ASSESSMENT & PLAN NOTE
· Likely multifocal 2/2 sepsis, chronic opioid use, YVONNE, hepatic encephalopathy  · CT head:negative     Plan:    restart rifaximin and lactulose PO when able to take safely  - Titrate lactulose to 2-3 bowel movements daily  - Pending UDS  · Avoid sedating medications  · Optimize electrolytes  · Avoid hypoglycemia

## 2022-02-04 NOTE — ASSESSMENT & PLAN NOTE
· POA  · Initial potassium 5 5, repeat 6 1 - slightly hemolyzed, 2nd repeat 5    Hyperkalemia   Lab Results   Component Value Date    K 5 2 02/04/2022    K 5 0 02/03/2022    K 6 1 (H) 02/03/2022     Lab Results   Component Value Date    SODIUM 136 02/04/2022    K 5 2 02/04/2022     02/04/2022    CO2 30 02/04/2022    BUN 27 (H) 02/04/2022    CREATININE 1 68 (H) 02/04/2022    GLUC 67 02/04/2022    CALCIUM 7 7 (L) 02/04/2022       No fluids at this time since got 2L in ED and looked fluid overloaded  Electrolyte more WNL   Continue to monitor electrolytes

## 2022-02-04 NOTE — ASSESSMENT & PLAN NOTE
Lab Results   Component Value Date    HGBA1C 7 5 (H) 01/04/2022       Recent Labs     02/03/22  1627 02/04/22  0055 02/04/22  0400 02/04/22  0629   POCGLU 154* 96 73 97       Blood Sugar Average: Last 72 hrs:  (P) 105      o Home dose:  20 units at bedtime, 3 units t i d    Plan:    D/c lantus (due to BG 70s this AM)   On SSI w/ accu checks    Avoid hypoglycemia    Goal glucose 140-180

## 2022-02-04 NOTE — PLAN OF CARE
Problem: PHYSICAL THERAPY ADULT  Goal: Performs mobility at highest level of function for planned discharge setting  See evaluation for individualized goals  Description: Treatment/Interventions: Functional transfer training,LE strengthening/ROM,Elevations,Therapeutic exercise,Endurance training,Patient/family training,Equipment eval/education,Bed mobility,Gait training          See flowsheet documentation for full assessment, interventions and recommendations  Outcome: Progressing  Note: Prognosis: Fair  Problem List: Decreased strength,Decreased endurance,Impaired balance,Decreased mobility,Decreased safety awareness (LE edema)  Assessment: Pt presents with altered mental status  Dx: hepatic encephalopathy, pneumonia, YVONNE on CKD, sepsis, and chronic pain syndrome  order placed for PT eval and tx, w/ activity order of up w/ A  pt presents w/ comorbidities of cirrhosis, DM, chronic pain syndrome, HTN, cellulitis, rib fxs, and COVID and personal factors of mobilizing w/ assistive device, stair(s) to enter home and positive fall history  pt presents w/ weakness, decreased endurance, impaired balance, decreased safety awareness and fall risk  these impairments are evident in findings from physical examination (weakness), mobility assessment (need for min to mod assist w/ all phases of mobility when usually mobilizing independently, inability to ambulate and need for cueing for mobility technique), and Barthel Index: 50/100  pt needed input for mobility and breathing technique  pt is at risk for falls due to physical and safety awareness deficits  pt's clinical presentation is unstable/unpredictable (evident in need for assist w/ all phases of mobility when usually mobilizing independently, need for supplemental oxygen in order to maintain oxygen saturation and need for input for mobility technique/safety)  pt needs inpatient PT tx to improve mobility deficits and progress mobility training as appropriate  discharge recommendation is for home w/ family support and home PT to reduce fall risk and maximize level of functional independence  PT Discharge Recommendation: Home with home health rehabilitation (and family support)          See flowsheet documentation for full assessment

## 2022-02-04 NOTE — ASSESSMENT & PLAN NOTE
· Found unresponsive and hypoxic at home  On arrival to ER on NRB mask had SpO2 85% with tachypnea and increased work of breathing  She was placed on BiPAP 12/6 with improvement in work of breathing and SpO2  ABG on BiPAP revealed partially compensated respiratory acidosis with pH 7 33  Due to mental status being greatly improved, she was placed on nasal cannula  Following further IVF she again became lethargic with increased work of breathing    She was placed back on BiPAP and critical care was asked to evaluate the patient  · Patient transferred to Step Down Level 1  · Continue BiPAP for now, attempt to wean off  · Optimize electrolytes  · Diurese as able  · Bedside POCUS to evaluate pleural effusion -- may need to consider thoracentesis  · Titrate FiO2 for SpO2 > 92%  · NPO while on BiPAP

## 2022-02-04 NOTE — ASSESSMENT & PLAN NOTE
· POA: Patient has a history of liver cirrhosis 2/2 alcohol abuse  Reportedly patient has abstained from EtOH for the last 20 years  · Recently admitted 1/3/22 for decreased mental status and elevated NH3 at 50    She was treated with lactulose and rifaximin with improvement in her mental status    Lab Results   Component Value Date    AMMONIA 32 02/04/2022    AMMONIA 36 (H) 02/03/2022    AMMONIA 45 92 (H) 01/06/2022   ·   - S/p lactulose enema in ER  Lab Results   Component Value Date    INR 1 14 02/03/2022    INR 1 21 (H) 01/07/2022    INR 1 30 (H) 01/05/2022    AST 25 02/04/2022    AST 21 02/03/2022    ALT 17 02/04/2022    ALT 18 02/03/2022     - Child-Thomas Score 9   - MELD-Na: 16 (<2% 90 day mortality)    Plan  · Plan to restart rifaximin and lactulose PO when able to take safely  · Titrate lactulose to 2-3 bowel movements daily

## 2022-02-04 NOTE — ASSESSMENT & PLAN NOTE
· Holding gabapentin and oxycodone 2/2 metabolic encephalopathy/NPO  · PDMP reviewed, recent prescription for oxycodone 1/31/22

## 2022-02-04 NOTE — H&P
[address] 1955, 73 y o  female MRN: [id number]  Unit/Bed#: ED 08 Encounter: [id number]  Primary Care Provider: Noe Villa MD   Date and time admitted to hospital: 2/3/2022  4:25 PM    * Acute respiratory failure with hypoxia and hypercapnia (HCC)  Assessment & Plan  · O2 saturation in mid 80's on NRB on presentation from EMS  · Recently discharged from hospital from [facility] pneumonia (Tested + 1/3/22)   · Concern for PE given hypoxia and sinus tachycardia  · Patient notably lethargic   · Tachypnea POA  · Initiated on BiPAP 12/6 and saturations corrected to 95-97%   · Weaned O2 down to 6L   · Critical care evaluated, plan to use BiPAP overnight  · Initial ABG showed pH of 7 338 with pCO2 of 52 2       Plan:   · Continue BiPAP overnight  · Supplemental oxygen  with goal O2 sat >90%   · Monitor O2 saturation  · F/u lower limb US   · F/u VQ scan to r/o PE  · Treatment for pneumonia as below  · Recheck ABG in AM    Sepsis [facility])  Assessment & Plan  POA as evidenced by tachycardia, leukocytosis (elevated form patient baseline), tachypnea    Suspected source: Pneumonia    Recent Labs     02/03/22  1701   WBC 9 61     Recent Labs     02/03/22  1701   LACTICACID 1 8       BP 98/54 (BP Location: Left arm)   Pulse 88   Temp 98 6 °F (37 °C) (Oral)   Resp 18   Ht 5' 2" (1 575 m)   Wt 88 2 kg (194 lb 7 1 oz)   LMP  (LMP Unknown) Comment: post menopause  SpO2 94%   BMI 35 56 kg/m²     XR chest 1 view portable    Result Date: 2/3/2022  Impression: Left lung base, possibly milder right lung base airspace disease  Possible left effusion findings are most suspicious for infection  Workstation performed: [id number]     CT head without contrast    Result Date: 2/3/2022  Impression: No acute intracranial abnormality   Workstation performed: [id number]       XR chest 1 view portable    Result Date: 2/3/2022  Impression Left lung base, possibly milder right lung base airspace disease  Possible left effusion findings are most suspicious for infection  Workstation performed: [id number]      Plan:   Clx: Blood and sputum pending   Procalcitonin 1 42, f/u AM value   Abx: ceftriaxone and doxycycline IV    IVF: normal saline at 75 mL/hour      Pneumonia  Assessment & Plan  Symptoms: shortness of breath at rest, anorexia and confusion    Lab Results   Component Value Date    SARSCOV2 Positive (A) 02/03/2022    SARSCOV2 Positive (A) 01/03/2022       XR chest 1 view portable    Result Date: 2/3/2022  Impression: Left lung base, possibly milder right lung base airspace disease  Possible left effusion findings are most suspicious for infection  CT head without contrast    Result Date: 2/3/2022  Impression: No acute intracranial abnormality        Recent Labs     02/03/22  1701   WBC 9 61     Recent Labs     02/03/22  1701   PROCALCITONI 1 42*        Plan:  · F/u urine Strep, Legionella, Sputum cultures  · Procalcitonin tomorrow a m   · CBC with Diff Tomorrow AM  · Ceftriaxone 1 g IV q  24 hours  · Doxycycline IV   · Guaifenesin 600 mg q 12 hours; increase as needed to 1200 mg q12 h  · Incentive spirometry      Metabolic encephalopathy  Assessment & Plan  · In setting of acute hypercapnic hypoxic respiratory failure secondary to pneumonia meeting sepsis criteria vs hepatic encephalopathy vs opioid side effect   · Patient reports that she has been taking 30 mg oxycodone q6 hours at home  · Presented in setting of ARF and placed on BiPAP with some improvement in mentation  · Noted history of cirrhosis with ammonia measured at 36 POA     Plan:   · Management of hypoxia as above  · BiPAP HS   · Resumed home lactulose and rifaximin  · Gave one dose lactulose enema on admission as patient on BiPAP; continue to assess ability to tolerate oral medications    · Monitor bowel movements  · Hold PRN narcotics  · Pneumonia management as above      Cirrhosis ([address] )  Assessment & Plan  · Documented history   · Hepatic encephalopathy possibly contributing to altered mental status  · Ammonia level 36 down from 50 on prior admission one month ago    Plan:  · Monitor CMP  · Treatment for suspected component of hepatic encephalopathy as below    Hyperkalemia  Assessment & Plan  Recent Labs     02/03/22  1701 02/03/22  2108   K 5 5* 6 1*       Workup:  · Etiology: Unclear, iImpaired renal function noted on intake    Plan:  · 1 amp of D50 followed by 10 units IV regular insulin   · Monitor Potassium overnight and correct as indicated   · Holding off on Lasix/Kayexalate for now  · Check BMP tomorrow a m  Type 2 diabetes mellitus with hyperglycemia, with long-term current use of insulin [facility])  Assessment & Plan  Lab Results   Component Value Date    HGBA1C 7 5 (H) 01/04/2022       Recent Labs     02/03/22  1627   POCGLU 154*       Blood Sugar Average: Last 72 hrs:  (P) 154     Home Regimen: Lantus HS with SSI    Plan:   Hold oral antihyperglycemics   Diet Consistent CHO Level 2 (5 CHO servings/75g CHO per meal)   Insulin regimen  o Lantus 20 units HS  o SSI while inpatient  o Insulin correction ACHS   Goal -180 while admitted, adjusting insulin regimen as appropriate   Glucose checks: AC HS   Monitor for hypoglycemia and treat per protocol      Essential hypertension  Assessment & Plan  Blood Pressure: 98/54    History of hypertension managed on lisinopril and amlodipine  Well controlled    Plan:   Continue amlodipine   Medications held: lisinopril   Monitor blood pressure      YVONNE (acute kidney injury) [facility])  Assessment & Plan  Recent Labs     02/03/22  1701   CREATININE 1 89*   EGFR 27     Estimated Creatinine Clearance: 30 6 mL/min (A) (by C-G formula based on SCr of 1 89 mg/dL (H))       POA1 89; (baseline 1 2-1 4)   Likely prerenal in setting of decreased oral intake and acute infection    Plan:   UA w/ microscopic, Urinary retention protocol, Bladder scan, Daily weights, I/O   IV Fluids: normal saline at 75 mL/hr   Monitor BMP daily and observe for downward trend of creatinine   Avoid hypoperfusion of the kidneys, minimize nephrotoxins   RAAS Blockers/Diuretics held: lisinopril      VTE Pharmacologic Prophylaxis: VTE Score: 5 High Risk (Score >/= 5) - Pharmacological DVT Prophylaxis Ordered: heparin  Sequential Compression Devices Ordered  Code Status: Level 1 - Full Code   Discussion with family: Updated  () via phone  Anticipated Length of Stay: Patient will be admitted on an inpatient basis with an anticipated length of stay of greater than 2 midnights secondary to Sepsis, pneumonia, acute hypoxic hypercapnic respiratory failure  Chief Complaint: Altered mental status    History of Present Illness: Blu Rivers is a 73 y o  female with a PMH of cirrhosis, type 2 diabetes, chronic pain syndrome, hypertension and recent hospitalization for COVID pneumonia who presents with altered mental status  Saturating in low 80's on NRB on arrival  Found to be in acute hypoxic hypercapnic respiratory failure  ABG showed pH 7 34 and pCO2 52 2  Initiated on BiPAP with some improvement  F/u ABG pending  CXR showed LLL consolidation concerning for pneumonia  Patient initiated and continued on broad-spectrum IV antibiotics  History of cirrhosis also concerning for hepatic encephalopathy  Ammonia level measured at 36  Continued lactulose and rifaximin for admission  Patient meeting SIRS criteria for sepsis with tachypnea, tachycardia and WBC count elevated from patient baseline  Also noted to have YVONNE with cre of 1 89 from baseline 1 2-1 4 and hyperkalemia for which patient is now s/p dextrose and insulin  Repeat K+ pending  Initiated gentle hydration  Critical care evaluated patient in ED   Admitted to [facility] service as stepdown level 2 for further workup and management    Review of Systems:  Review of Systems   Constitutional: Positive for activity change and fatigue  Negative for appetite change, chills, diaphoresis, fever and unexpected weight change  HENT: Negative  Eyes: Negative  Respiratory: Positive for shortness of breath and wheezing  Negative for apnea, cough, choking, chest tightness and stridor  Cardiovascular: Positive for leg swelling  Negative for chest pain and palpitations  Gastrointestinal: Positive for diarrhea  Negative for anal bleeding, constipation, nausea, rectal pain and vomiting  Endocrine: Positive for cold intolerance  Negative for heat intolerance, polydipsia, polyphagia and polyuria  Genitourinary: Positive for dysuria, frequency and urgency  Negative for decreased urine volume, dyspareunia, enuresis, flank pain and vaginal pain  Musculoskeletal: Positive for arthralgias, back pain, gait problem and joint swelling  Negative for myalgias, neck pain and neck stiffness  Skin: Negative  Allergic/Immunologic: Negative  Neurological: Positive for weakness  Negative for dizziness, seizures, syncope, facial asymmetry, speech difficulty, light-headedness and headaches  Hematological: Negative  Psychiatric/Behavioral: Positive for confusion and decreased concentration  Negative for agitation, behavioral problems and dysphoric mood         Past Medical and Surgical History:   Past Medical History:   Diagnosis Date    Abnormal head CT 7/14/2017    Acute kidney injury ([address] ) 8/19/2018    Cellulitis 1/10/2017    Closed fracture of multiple ribs of right side 7/14/2017    Degenerative disc disease at L5-S1 level     Diabetes mellitus (HCC)     History of methicillin resistant staphylococcus aureus (MRSA) 08/21/2018    negative nasal culture- isolation and hx of mrsa removed 8/20/2018    Hyperkalemia 4/25/2018    Hypertension     Hypocalcemia 4/25/2018    Hyponatremia 7/14/2017       Past Surgical History:   Procedure Laterality Date    CHOLECYSTECTOMY      JOINT REPLACEMENT      OOPHORECTOMY      TN INCISION,IMPLANT,NEUROELEC,SACRAL NERVE N/A 9/20/2021    Procedure: INSERTION NEUROSTIMULATOR ELECTRODE ARRAY SACRAL NERVE; FLUOROSCOPY; ELECTRONIC ANALYSIS;  Surgeon: Adarsh Ndiaye MD;  Location: [facility];  Service: UroGynecology          [address] N/A 9/28/2021    Procedure: IPG INSERTION AND PROGRAMMING;  Surgeon: Adarsh Ndiaye MD;  Location: [facility];  Service: UroGynecology              Meds/Allergies:  Prior to Admission medications    Medication Sig Start Date End Date Taking? Authorizing Provider   amLODIPine (NORVASC) 10 mg tablet Take 1 tablet (10 mg total) by mouth daily 8/25/18  Yes Bladimir Michael MD   docusate sodium (COLACE) 100 mg capsule Take 1 capsule (100 mg total) by mouth 2 (two) times a day as needed for constipation 9/20/21  Yes Kun Mills MD   DULoxetine (CYMBALTA) 60 mg delayed release capsule Take 60 mg by mouth daily   Yes Historical Provider, MD   Insulin Aspart FlexPen 100 UNIT/ML SOPN Inject 3 Units under the skin 3 (three) times a day with meals If bs>250   Yes Historical Provider, MD   insulin glargine (LANTUS SOLOSTAR) 100 units/mL injection pen Inject 20 Units under the skin daily at bedtime     Yes Historical Provider, MD   lisinopril (ZESTRIL) 20 mg tablet Take 10 mg by mouth daily    Yes Historical Provider, MD   metoclopramide (REGLAN) 10 mg tablet Take 10 mg by mouth daily   Yes Historical Provider, MD   Mirabegron ER (Myrbetriq) 50 MG TB24 Take 50 mg by mouth in the morning   Yes Historical Provider, MD   oxyCODONE (ROXICODONE) 5 mg immediate release tablet Take 15 mg by mouth every 6 (six) hours as needed for moderate pain Per patient  patient has been taking 30mg PO 6 times a day      Yes Historical Provider, MD   gabapentin (NEURONTIN) 100 mg capsule Take 1 capsule (100 mg total) by mouth 2 (two) times a day  Patient not taking: Reported on 2/3/2022  3/11/20   Marnie Mas PA-C   guaiFENesin (MUCINEX) 600 mg 12 hr tablet Take 1 tablet (600 mg total) by mouth every 12 (twelve) hours  Patient not taking: Reported on 2/3/2022  1/7/22   Mk Fuentes PA-C   lactulose 20 g/30 mL Take 30 mL (20 g total) by mouth 2 (two) times a day  Patient not taking: Reported on 2/3/2022  1/7/22   Leonardo Ramos PA-C   promethazine (PHENERGAN) 25 mg suppository Insert 1 suppository (25 mg total) into the rectum every 6 (six) hours as needed for nausea or vomiting  Patient not taking: Reported on 2/3/2022  10/22/20   Bruce Mendieta MD   rifaximin [facility] 550 mg tablet Take 1 tablet (550 mg total) by mouth every 12 (twelve) hours  Patient not taking: Reported on 2/3/2022  1/7/22 2/6/22  Leonardo Ramos PA-C     I have reviewed home medications with patient personally      Allergies: No Known Allergies    Social History:  Marital Status: /Civil Union   Patient Pre-hospital Living Situation: Home  Patient Pre-hospital Level of Mobility: unable to be assessed at time of evaluation  Patient Pre-hospital Diet Restrictions: None  Substance Use History:   Social History     Substance and Sexual Activity   Alcohol Use Not Currently    Alcohol/week: 0 0 standard drinks     Social History     Tobacco Use   Smoking Status Former Smoker    Types: E-Cigarettes    Start date: 1976   Jhon Christensen Quit date: 2019    Years since quittin 8   Smokeless Tobacco Never Used     Social History     Substance and Sexual Activity   Drug Use Not Currently    Types: Marijuana    Comment: last used 1 year ago        Family History:  Family History   Problem Relation Age of Onset    Rheum arthritis Mother     Alzheimer's disease Mother     Lupus Mother        Physical Exam:     Vitals:   Blood Pressure: 98/54 (22 2300)  Pulse: 88 (22 2300)  Temperature: 98 6 °F (37 °C) (22)  Temp Source: Oral (22)  Respirations: 18 (220)  Height: 5' 2" (157 5 cm) (22)  Weight - Scale: 88 2 kg (194 lb 7 1 oz) (22 1633)  SpO2: 94 % (02/03/22 2300)    Physical Exam  Vitals and nursing note reviewed  Constitutional:       General: She is not in acute distress  Appearance: She is well-developed  She is ill-appearing  She is not diaphoretic  HENT:      Head: Normocephalic and atraumatic  Mouth/Throat:      Mouth: Mucous membranes are moist       Pharynx: Oropharynx is clear  Eyes:      Conjunctiva/sclera: Conjunctivae normal    Cardiovascular:      Rate and Rhythm: Regular rhythm  Tachycardia present  Pulses: Normal pulses  Heart sounds: No murmur heard  Pulmonary:      Effort: Pulmonary effort is normal  No respiratory distress  Breath sounds: No stridor  Wheezing and rhonchi present  No rales  Chest:      Chest wall: No tenderness  Abdominal:      General: Abdomen is flat  There is no distension  Palpations: Abdomen is soft  There is no mass  Tenderness: There is abdominal tenderness  There is no guarding  Musculoskeletal:         General: Swelling present  No tenderness, deformity or signs of injury  Cervical back: Neck supple  No rigidity  Right lower leg: Edema present  Left lower leg: Edema present  Skin:     General: Skin is warm and dry  Capillary Refill: Capillary refill takes less than 2 seconds  Coloration: Skin is jaundiced  Findings: Rash present  No erythema  Neurological:      General: No focal deficit present  Mental Status: She is alert  She is disoriented  Sensory: No sensory deficit  Motor: Weakness present        Coordination: Coordination normal    Psychiatric:         Mood and Affect: Mood normal         Additional Data:     Lab Results:  Results from last 7 days   Lab Units 02/03/22  1701   WBC Thousand/uL 9 61   HEMOGLOBIN g/dL 10 5*   HEMATOCRIT % 35 1   BANDS PCT % 8   LYMPHO PCT % 6*   MONO PCT % 2*   EOS PCT % 0     Results from last 7 days   Lab Units 02/03/22  2108 02/03/22  1701 02/03/22  1701   SODIUM mmol/L  --   --  132*   POTASSIUM mmol/L 6 1*   < > 5 5*   CHLORIDE mmol/L  --   --  100   CO2 mmol/L  --   --  27   BUN mg/dL  --   --  23   CREATININE mg/dL  --   --  1 89*   ANION GAP mmol/L  --   --  5   CALCIUM mg/dL  --   --  8 0*   ALBUMIN g/dL  --   --  2 6*   TOTAL BILIRUBIN mg/dL  --   --  0 52   ALK PHOS U/L  --   --  129*   ALT U/L  --   --  18   AST U/L  --   --  21   GLUCOSE RANDOM mg/dL  --   --  135    < > = values in this interval not displayed  Results from last 7 days   Lab Units 02/03/22  1701   INR  1 14     Results from last 7 days   Lab Units 02/03/22  1627   POC GLUCOSE mg/dl 154*         Results from last 7 days   Lab Units 02/03/22  1701   LACTIC ACID mmol/L 1 8   PROCALCITONIN ng/ml 1 42*       Imaging: Reviewed radiology reports from this admission including: chest xray and CT head  CT head without contrast   Final Result by Gabbie Jama MD (02/03 1742)      No acute intracranial abnormality  Workstation performed: [id number]         XR chest 1 view portable   Final Result by Buddy Gunter MD ([id number])   Left lung base, possibly milder right lung base airspace disease  Possible left effusion findings are most suspicious for infection  Workstation performed: [id number]         VAS lower limb venous duplex study, complete bilateral    (Results Pending)   NM inpatient order    (Results Pending)       EKG and Other Studies Reviewed on Admission:   · EKG: Sinus Tachycardia    ** Please Note: This note has been constructed using a voice recognition system   **

## 2022-02-04 NOTE — ASSESSMENT & PLAN NOTE
Symptoms: shortness of breath at rest, anorexia and confusion    Lab Results   Component Value Date    SARSCOV2 Positive (A) 02/03/2022    SARSCOV2 Positive (A) 01/03/2022       XR chest 1 view portable    Result Date: 2/3/2022  Impression: Left lung base, possibly milder right lung base airspace disease  Possible left effusion findings are most suspicious for infection  CT head without contrast    Result Date: 2/3/2022  Impression: No acute intracranial abnormality        Recent Labs     02/03/22  1701   WBC 9 61     Recent Labs     02/03/22  1701   PROCALCITONI 1 42*        Plan:  · Ceftriaxone 1 g IV q  24 hours  · Doxycycline IV   · Guaifenesin 600 mg q 12 hours; increase as needed to 1200 mg q12 h  · Incentive spirometry

## 2022-02-04 NOTE — ASSESSMENT & PLAN NOTE
Recent Labs     02/03/22  1701   CREATININE 1 89*   EGFR 27     Estimated Creatinine Clearance: 30 6 mL/min (A) (by C-G formula based on SCr of 1 89 mg/dL (H))   POA1 89; (baseline 1 2-1 4)   Likely prerenal in setting of decreased oral intake and acute infection    Plan:   UA w/ microscopic, Urinary retention protocol, Bladder scan, Daily weights, I/O   Resolved after IVF   Avoid hypoperfusion of the kidneys, minimize nephrotoxins   RAAS Blockers/Diuretics held: lisinopril

## 2022-02-04 NOTE — PROGRESS NOTES
[facility]  ICU Transfer Note - Valentina Phillips 1955, 73 y o  female MRN: [id number]  Unit/Bed#: ICU 10 Encounter: [id number]  Primary Care Provider: Karin Haney MD   Date and time admitted to hospital: 2/3/2022  4:25 PM    * Acute respiratory failure with hypoxia and hypercapnia (HCC)  Assessment & Plan  · POA: Found unresponsive and hypoxic at home  On arrival to ER on NRB mask had SpO2 85% with tachypnea and increased work of breathing  She was placed on BiPAP 12/6 with improvement in work of breathing and SpO2  ABG on BiPAP revealed partially compensated respiratory acidosis with pH 7 33  Due to mental status being greatly improved, she was placed on nasal cannula  Following further IVF she again became lethargic with increased work of breathing  She was placed back on BiPAP and critical care was asked to evaluate the patient     - ABG on BiPAP showed:    7 323/51 8/89/26 9 (consistent w/ respiratory acidosis)   Winter's formula shows CO2 should be around 46-50 indicating a compensatory mechanism   - CXR: Left lung base, possibly milder right lung base airspace disease  Possible left effusion findings are most suspicious for infection  Lab Results   Component Value Date    DDIMER 1 64 (H) 02/03/2022    DDIMER 0 70 (H) 01/03/2022   -   o Plan:    Initially on BiPAP but was able to wean down to NC   Diurese if needed   Consider bedside U/S to evaluate for pleural effusion (?)   Titrate FiO2 to SpO2 >92%   On diabetic diet while on nasal cannula     Sepsis (HCC)  Assessment & Plan  · POA AEB: tachypnea, tachycardia, YVONNE Likely due to pneumonia in the setting CXR findings and bands and U/S findings    o Lactic Acid 1 8 (within normal limits)  Lab Results   Component Value Date    PROCALCITONI 4 56 (H) 02/04/2022    PROCALCITONI 1 42 (H) 02/03/2022   o   o Plan:  · CXR Left lung base, possibly milder right lung base airspace disease    Possible left effusion findings are most suspicious for infection    · U/S of lungs showed findings consistent w/ pneumonia  R lung without consolidation or effusion, > 4 B Lines  L lung with dense consolidation at L base, no effusion, 3-4 B Lines  · UA bland  · Blood cultures pending      Plan   · Strep pneumoniae and Legionella urine antigens negative so azithro was d/c  · pending sputum specimen  · on ceftriaxone day 2  · De-escalate antibiotics as able  · Continue to follow blood cultures   · Trend WBC and fever curve    Pneumonia  Assessment & Plan  · 2/3 CXR Left lung base, possibly milder right lung base airspace disease   Possible left effusion findings are most suspicious for infection  Lab Results   Component Value Date    NTBNP 1,794 (H) 02/03/2022     Lab Results   Component Value Date    WBC 9 61 02/03/2022    WBC 3 63 (L) 01/07/2022    WBC 5 08 01/06/2022    PROCALCITONI 4 56 (H) 02/04/2022    PROCALCITONI 1 42 (H) 02/03/2022    PROCALCITONI 0 14 01/05/2022   -   Lab Results   Component Value Date    CRP 11 0 (H) 02/03/2022    CRP 0 4 01/03/2022   -   -   · Of note patient had recent COVID+ admission 1/3  · COVID + swab in ER on admission  ?   -   o Plan:   - COVID+ in ED but was COVID positive 1 month ago so expected and contact precautions have been removed   Passed bedside swallow eval   · Continue guaifenesin   · Continue albuterol  · OOB as tolerated, encourage mobility  · Aspiration precautions  · Strep pneumoniae and Legionella urine antigens negative    Acute kidney injury superimposed on CKD [facility])  Assessment & Plan  Lab Results   Component Value Date    CREATININE 1 68 (H) 02/04/2022    CREATININE 1 89 (H) 02/03/2022    CREATININE 1 13 (H) 01/07/2022    CREATININE 1 16 (H) 01/05/2022    EGFR 31 02/04/2022    EGFR 27 02/03/2022    EGFR 51 01/07/2022   o    o CKD stage 3A, baseline creatine 1 1-1 2   - Had hx of YVONNE on admission 1/3/22  - Had 2L of NS in ER      Plan:   - holding lisinopril/of her nephrotoxic agents  - holding for the IV fluids for now  - consider Lasix in the setting of volume overload if renal function worsens although creatinine is slowly trending down (not on lasix outpatient last received diuretic in 5868)     Metabolic encephalopathy  Assessment & Plan  · Likely multifocal 2/2 sepsis, chronic opioid use, YVONNE, hepatic encephalopathy  · CT head:negative     Plan:    restart rifaximin and lactulose PO when able to take safely  - Titrate lactulose to 2-3 bowel movements daily  - Pending UDS  · Avoid sedating medications  · Optimize electrolytes  · Avoid hypoglycemia    Cirrhosis ([address] )  Assessment & Plan  · POA: Patient has a history of liver cirrhosis 2/2 alcohol abuse  Reportedly patient has abstained from EtOH for the last 20 years  · Recently admitted 1/3/22 for decreased mental status and elevated NH3 at 51    She was treated with lactulose and rifaximin with improvement in her mental status    Lab Results   Component Value Date    AMMONIA 32 02/04/2022    AMMONIA 36 (H) 02/03/2022    AMMONIA 45 92 (H) 01/06/2022   ·   - S/p lactulose enema in ER  Lab Results   Component Value Date    INR 1 14 02/03/2022    INR 1 21 (H) 01/07/2022    INR 1 30 (H) 01/05/2022    AST 25 02/04/2022    AST 21 02/03/2022    ALT 17 02/04/2022    ALT 18 02/03/2022     - Child-Thomas Score 9   - MELD-Na: 16 (<2% 90 day mortality)    Plan  · Plan to restart rifaximin and lactulose PO when able to take safely  · Titrate lactulose to 2-3 bowel movements daily    Hyperkalemia  Assessment & Plan  · POA  · Initial potassium 5 5, repeat 6 1 - slightly hemolyzed, 2nd repeat 5    Hyperkalemia   Lab Results   Component Value Date    K 5 2 02/04/2022    K 5 0 02/03/2022    K 6 1 (H) 02/03/2022     Lab Results   Component Value Date    SODIUM 136 02/04/2022    K 5 2 02/04/2022     02/04/2022    CO2 30 02/04/2022    BUN 27 (H) 02/04/2022    CREATININE 1 68 (H) 02/04/2022    GLUC 67 02/04/2022    CALCIUM 7 7 (L) 02/04/2022 No fluids at this time since got 2L in ED and looked fluid overloaded  Electrolyte more WNL   Continue to monitor electrolytes    Chronic pain syndrome  Assessment & Plan  · Holding gabapentin and oxycodone 2/2 metabolic encephalopathy/NPO  · PDMP reviewed, recent prescription for oxycodone 1/31/22    Type 2 diabetes mellitus with hyperglycemia, with long-term current use of insulin [facility])  Assessment & Plan  Lab Results   Component Value Date    HGBA1C 7 5 (H) 01/04/2022       Recent Labs     02/03/22  1627 02/04/22  0055 02/04/22  0400 02/04/22  0629   POCGLU 154* 96 73 97       Blood Sugar Average: Last 72 hrs:  (P) 105      o Home dose:  20 units at bedtime, 3 units t i d  Plan:    D/c lantus (due to BG 70s this AM)   On SSI w/ accu checks    Avoid hypoglycemia    Goal glucose 140-180    Essential hypertension  Assessment & Plan  · Currently normotensive  · Restart amlodipine tomorrow as hemodynamically stable today 02/04  · Holding lisinopril due to YVONNE  · VS per protocol      ----------------------------------------------------------------------------------------  HPI/24hr events: Gely Jordan a 73 y  o  female who presents with PMHx significant for HTN, CKD 2 to 3, liver cirrhosis, DM2, chronic back pain 2/2 spondylosis of lumbar joint on opioids, and recent COVID infection 1/3 with admission 1/3 to 1/7   Earlier today she was found by family to have increased lethargy and work of breathing   On arrival to the ER she was tachypneic, tachycardic, and hypoxic on non-rebreather with SpO2 85%   She was placed on BiPAP secondary to increased work of breathing with improvement in her work of breathing and SpO2   Post BiPAP ABG revealed mild respiratory acidosis, lactic 1 8, ammonia 36, YVONNE, WBC count 9 6 with 8% bands, and hyperkalemia 5 5   Chest x-ray revealed bilateral lower lobe infusions with possible left-sided pleural effusion   Blood cultures were obtained and she was started on broad spectrum antibiotics   She initially tolerated coming off BiPAP well, until she received additional IV fluids in developed lethargy and increased work of breathing  Russell Ayon was placed back on BiPAP and Critical Care was asked to re-evaluate patient  Russell Ayon will be transferred to step-down level 1 under the medical Critical Care      Had BG of 7 and got an amp of Dextrose 50     Not on BiPAP (originally /, rate: 15, FiO2: 40)  anymore on 2L NC  Hospital day 1, ICU day 1  Patient appropriate for transfer out of the ICU today?: YES  Disposition: Transfer to Med-Surg   Code Status: Level 1 - Full Code  ---------------------------------------------------------------------------------------  SUBJECTIVE  Patient was lethargic this AM and did not have any complaints  She did realize she was in a hospital and knew her name but she thought that it was   She also did not know what she was in the hospital for an initially thought it was due to getting COVID again    Patient seems to be encephalopathic still     No complaints      Review of Systems  Review of systems was unable to be performed secondary to confusion  ---------------------------------------------------------------------------------------  OBJECTIVE    Vitals   Vitals:    22 0230 22 0356 22 0744 22 1127   BP: 111/58  112/59 131/60   BP Location: Left arm  Left arm Left arm   Pulse: 86  91 87   Resp:    Temp:   98 °F (36 7 °C) 98 °F (36 7 °C)   TempSrc:   Axillary Axillary   SpO2: 96%  92% 94%   Weight:  87 2 kg (192 lb 3 9 oz)     Height:  5' 2" (1 575 m)       Temp (24hrs), Av 2 °F (36 8 °C), Min:98 °F (36 7 °C), Max:98 6 °F (37 °C)  Current: Temperature: 98 °F (36 7 °C)          Respiratory:  SpO2: SpO2: 94 %  Nasal Cannula O2 Flow Rate (L/min): 2 L/min    Invasive/non-invasive ventilation settings   Respiratory  Report   Lab Data (Last 4 hours)    None         O2/Vent Data (Last 4 hours)    None              Physical Exam  Vitals and nursing note reviewed  Constitutional:       General: She is not in acute distress  Appearance: She is well-developed  HENT:      Head: Normocephalic and atraumatic  Eyes:      Conjunctiva/sclera: Conjunctivae normal    Cardiovascular:      Rate and Rhythm: Normal rate and regular rhythm  Heart sounds: No murmur heard        Pulmonary:      Effort: Pulmonary effort is normal  No respiratory distress  Comments: On 2L NC   Noted rhonchi in bilateral bases   Abdominal:      Palpations: Abdomen is soft  Tenderness: There is no abdominal tenderness  Genitourinary:     Comments: Noted lympedema in bilat legs that she states is chronic  1+ edema in feet bilat  Musculoskeletal:      Cervical back: Neck supple  Right lower leg: Edema present  Left lower leg: Edema present  Skin:     General: Skin is warm and dry  Neurological:      Mental Status: She is alert        Comments: Noted asterixis in bilateral UE   No clonus appreciated  Oriented to person and place               Laboratory and Diagnostics:  Results from last 7 days   Lab Units 02/04/22  0032 02/03/22  2339 02/03/22  1701   WBC Thousand/uL  --   --  9 61   HEMOGLOBIN g/dL  --   --  10 5*   I STAT HEMOGLOBIN g/dl 9 2*  --   --    HEMATOCRIT %  --   --  35 1   HEMATOCRIT, ISTAT % 27*  --   --    PLATELETS Thousands/uL  --  119*  --    BANDS PCT %  --   --  8   MONO PCT %  --   --  2*     Results from last 7 days   Lab Units 02/04/22  0507 02/04/22  0032 02/03/22  2339 02/03/22  2108 02/03/22  1701   SODIUM mmol/L 136  --   --   --  132*   POTASSIUM mmol/L 5 2  --  5 0 6 1* 5 5*   CHLORIDE mmol/L 104  --   --   --  100   CO2 mmol/L 30  --   --   --  27   CO2, I-STAT mmol/L  --  28  --   --   --    ANION GAP mmol/L 2*  --   --   --  5   BUN mg/dL 27*  --   --   --  23   CREATININE mg/dL 1 68*  --   --   --  1 89*   CALCIUM mg/dL 7 7*  --   --   --  8 0*   GLUCOSE RANDOM mg/dL 67  --   --   --  135   ALT U/L 17 --   --   --  18   AST U/L 25  --   --   --  21   ALK PHOS U/L 107  --   --   --  129*   ALBUMIN g/dL 2 1*  --   --   --  2 6*   TOTAL BILIRUBIN mg/dL 0 40  --   --   --  0 52          Results from last 7 days   Lab Units 02/03/22  1701   INR  1 14   PTT seconds 33          Results from last 7 days   Lab Units 02/03/22  1701   LACTIC ACID mmol/L 1 8     ABG:  Results from last 7 days   Lab Units 02/03/22  1740   PH ART  7 338*   PCO2 ART mm Hg 52 2*   PO2 ART mm Hg 84 6   HCO3 ART mmol/L 27 4   BASE EXC ART mmol/L 1 0   ABG SOURCE  Brachial, Right     VBG:  Results from last 7 days   Lab Units 02/04/22  0822 02/03/22  1740   PH RUTH  7 304  --    PCO2 RUTH mm Hg 60 3*  --    PO2 RUTH mm Hg 55 6*  --    HCO3 RUTH mmol/L 29 3  --    BASE EXC RUTH mmol/L 2 0  --    ABG SOURCE   --  Brachial, Right     Results from last 7 days   Lab Units 02/04/22  0507 02/03/22  1701   PROCALCITONIN ng/ml 4 56* 1 42*       Micro  Results from last 7 days   Lab Units 02/04/22  0423 02/03/22  1701   BLOOD CULTURE   --  Received in Microbiology Lab  Culture in Progress  Received in Microbiology Lab  Culture in Progress  LEGIONELLA URINARY ANTIGEN  Negative  --    STREP PNEUMONIAE ANTIGEN, URINE  Negative  --        EKG: No acute events   Imaging: I have personally reviewed pertinent reports  Intake and Output  I/O       02/02 0701 02/03 0700 02/03 0701  02/04 0700 02/04 0701  02/05 0700    P  O   0 240    I V  (mL/kg)  158 3 (1 8)     IV Piggyback  2450     Total Intake(mL/kg)  2608 3 (29 9) 240 (2 8)    Urine (mL/kg/hr)  250     Stool  0     Total Output  250     Net  +2358 3 +240           Unmeasured Stool Occurrence  0 x           Height and Weights   Height: 5' 2" (157 5 cm)     Body mass index is 35 16 kg/m²    Weight (last 2 days)     Date/Time Weight    02/04/22 0356 87 2 (192 24)    02/03/22 1633 88 2 (194 45)            Nutrition       Diet Orders   (From admission, onward)             Start     Ordered    02/04/22 1007  Diet Cardiovascular; Cardiac; Fluid Restriction 1500 ML  Diet effective now        Comments: Nursing, notify provider if patient fails bedside swallow eval and make patient NPO   References:    Nutrtion Support Algorithm Enteral vs  Parenteral   Question Answer Comment   Diet Type Cardiovascular    Cardiac Cardiac    Other Restriction(s): Fluid Restriction 1500 ML    RD to adjust diet per protocol?  Yes        02/04/22 1006    02/04/22 0633  Room Service  Once        Question:  Type of Service  Answer:  Room Service - Appropriate with Assistance    02/04/22 8324                  Active Medications  Scheduled Meds:  Current Facility-Administered Medications   Medication Dose Route Frequency Provider Last Rate    albuterol  2 puff Inhalation Q4H PRN APRYL Seo      cefTRIAXone  2,000 mg Intravenous Q24H Darian Ritchie DO      guaiFENesin  600 mg Oral BID APRYL Seo      heparin (porcine)  5,000 Units Subcutaneous [facility],  [address]      insulin lispro  1-6 Units Subcutaneous Q6H [address] [place] APRYL Long      lactulose  20 g Oral BID [place], [place]      ondansetron  4 mg Intravenous Q4H PRN APRYL Seo      rifaximin  550 mg Oral Q12H [address] [place], [place]      sodium chloride (PF)  3 mL Intravenous Q1H PRN APRYL Seo       Facility-Administered Medications Ordered in Other Encounters   Medication Dose Route Frequency Provider Last Rate    ferrous sulfate  325 mg Oral Daily With Breakfast Joanne Covington MD       Continuous Infusions:     PRN Meds:   albuterol, 2 puff, Q4H PRN  ondansetron, 4 mg, Q4H PRN  sodium chloride (PF), 3 mL, Q1H PRN        Invasive Devices Review  Invasive Devices  Report    Peripheral Intravenous Line            Peripheral IV 02/03/22 Right Wrist <1 day                  ---------------------------------------------------------------------------------------  Advance Directive and Living Will: Power of :    POLST:    ---------------------------------------------------------------------------------------  Care Time Delivered:   Upon my evaluation, this patient had a high probability of imminent or life-threatening deterioration due to acute respiratory failure/sepsis, which required my direct attention, intervention, and personal management  I have personally provided 30 minutes of critical care time, exclusive of procedures, teaching, family meetings, and any prior time recorded by providers other than myself  Lashon Perdomo 89, DO      Portions of the record may have been created with voice recognition software  Occasional wrong word or "sound a like" substitutions may have occurred due to the inherent limitations of voice recognition software    Read the chart carefully and recognize, using context, where substitutions have occurred

## 2022-02-04 NOTE — ASSESSMENT & PLAN NOTE
· POA  · 2/3 CXR Left lung base, possibly milder right lung base airspace disease  Possible left effusion findings are most suspicious for infection    · proBNP 1800  · Started on ceftriaxone and azithromycin -- will continue  · No previous history of MDRO noted on chart review  · Of note patient had recent COVID+ admission 1/3  · COVID + swab in ER on admission  · Given initial onset of COVID 1 month ago, will hold off on COVID treatment pathway  · Continue guaifenesin when able to take po safely  · Continue albuteral  · OOB as tolerated, encourage mobility  · Aspiration precautions  · Nursing bedside swallow eval when able to remain off BiPAP  · Strep pneumoniae and Legionella urine antigens pending

## 2022-02-04 NOTE — ASSESSMENT & PLAN NOTE
· O2 saturation in mid 80's on NRB on presentation from EMS  · Recently discharged from hospital from [facility] pneumonia (Tested + 1/3/22)   · Concern for PE given hypoxia and sinus tachycardia  · Patient notably lethargic   · Tachypnea POA  · Initiated on BiPAP 12/6 and saturations corrected to 95-97%   · Weaned O2 down to 6L   · Critical care evaluated, plan to use BiPAP overnight  · Initial ABG showed pH of 7 338 with pCO2 of 52 2       Plan:   · Continue BiPAP overnight  · Supplemental oxygen  with goal O2 sat >90%   · Monitor O2 saturation  · F/u lower limb US   · F/u VQ scan to r/o PE  · Treatment for pneumonia as below  · Recheck ABG in AM

## 2022-02-04 NOTE — ASSESSMENT & PLAN NOTE
Recent Labs     02/03/22  1701 02/03/22  2108   K 5 5* 6 1*       Workup:  · Etiology: Unclear, iImpaired renal function noted on intake    Plan:  · 1 amp of D50 followed by 10 units IV regular insulin   · Monitor Potassium overnight and correct as indicated   · Holding off on Lasix/Kayexalate for now  · Check BMP tomorrow a m

## 2022-02-04 NOTE — PLAN OF CARE
Problem: OCCUPATIONAL THERAPY ADULT  Goal: Performs self-care activities at highest level of function for planned discharge setting  See evaluation for individualized goals  Description: Treatment Interventions: ADL retraining,Functional transfer training,Endurance training,UE strengthening/ROM,Patient/family training,Equipment evaluation/education,Compensatory technique education,Continued evaluation,Energy conservation,Activityengagement  Equipment Recommended: Bedside commode,Shower/Tub chair with back ($)       See flowsheet documentation for full assessment, interventions and recommendations  Note: Limitation: Decreased ADL status,Decreased UE strength,Decreased endurance,Decreased self-care trans,Decreased high-level ADLs     Assessment: Pt is a 72 yo female [facility]/3/22  Pt presented following syncopal episode and significant PMH impacting her occupational performance includes DM II, chronic pain syndrome, HTN, recent admission for COVID pneumonia, DM II, R rib fractures, cellulitis, sacral nerve stimulator (2021)  Pt w/ active OT orders and activity orders  Personal factors impacting performance includes inability to complete IADLs, difficulty managing steps, difficulty completing ADLs, assist required at baseline, recent admission  Pt recently admitted and DC home on 1/7/2022 COVID_19 +  Pt reports living w/ spouse, daughter in [facility] w/ first floor set- up  Pt reports 4-5 CATRACHO and sleeps in recliner chair  Pt reports needing assistance w/ LBD, bathing and IADLs; uses rollator for functional mobility  Upon eval, pt alert and oriented to person, place, and generally to situation  Pt required mod A to complete bed mobility  Pt required total A to complete LBD and min A to complete UBD, and min A to complete toileting  Pt required min A to complete grooming while seated   Pt presents w/ decreased activity tolerance, decreased endurance, decreased standing balance, decreased standing tolerance, generalized weakness / deconditioning, questionable insight into deficits impacting her I w/ dressing, bathing, oral hygiene, functional mobility, functional transfers, activity engagement, clothing mgmt, medical management  Pt completing ADL below baseline level of I and would benefit from OT while in acute care to address deficits  Recommend DC home w/ family support / assist and Home OT   Will continue to follow     OT Discharge Recommendation: Home with home health rehabilitation

## 2022-02-04 NOTE — ASSESSMENT & PLAN NOTE
Lab Results   Component Value Date    CREATININE 1 68 (H) 02/04/2022    CREATININE 1 89 (H) 02/03/2022    CREATININE 1 13 (H) 01/07/2022    CREATININE 1 16 (H) 01/05/2022    EGFR 31 02/04/2022    EGFR 27 02/03/2022    EGFR 51 01/07/2022   o    o CKD stage 3A, baseline creatine 1 1-1 2   - Had hx of YVONNE on admission 1/3/22  - Had 2L of NS in ER      Plan:   - holding lisinopril/of her nephrotoxic agents  - holding for the IV fluids for now  - consider Lasix in the setting of volume overload if renal function worsens although creatinine is slowly trending down (not on lasix outpatient last received diuretic in 2020)

## 2022-02-04 NOTE — QUICK NOTE
Spoke with patient's  and updated them on the patient's current condition, care management plan, and goals  All questions were answered to their satisfaction

## 2022-02-04 NOTE — OCCUPATIONAL THERAPY NOTE
Occupational Therapy Evaluation     Patient Name: Domingo Butler  GTLKA'O Date: 2/4/2022  Problem List  Principal Problem:    Acute respiratory failure with hypoxia and hypercapnia ([facility] )  Active Problems:    Essential hypertension    Type 2 diabetes mellitus with hyperglycemia, with long-term current use of insulin (HCC)    Chronic pain syndrome    Hyperkalemia    Cirrhosis (HCC)    Metabolic encephalopathy    Acute kidney injury superimposed on CKD ([facility] )    Pneumonia    Sepsis ([facility] )    Past Medical History  Past Medical History:   Diagnosis Date    Abnormal head CT 7/14/2017    Acute kidney injury ([facility] ) 8/19/2018    Cellulitis 1/10/2017    Closed fracture of multiple ribs of right side 7/14/2017    Degenerative disc disease at L5-S1 level     Diabetes mellitus (HCC)     History of methicillin resistant staphylococcus aureus (MRSA) 08/21/2018    negative nasal culture- isolation and hx of mrsa removed 8/20/2018    Hyperkalemia 4/25/2018    Hypertension     Hypocalcemia 4/25/2018    Hyponatremia 7/14/2017     Past Surgical History  Past Surgical History:   Procedure Laterality Date    CHOLECYSTECTOMY      JOINT REPLACEMENT      OOPHORECTOMY      ID INCISION,IMPLANT,NEUROELEC,SACRAL NERVE N/A 9/20/2021    Procedure: INSERTION NEUROSTIMULATOR ELECTRODE ARRAY SACRAL NERVE; FLUOROSCOPY; ELECTRONIC ANALYSIS;  Surgeon: Elizabeth Maldonado MD;  [facility]: [facility];  Service: UroGynecology           SACRAL NERVE STIMULATOR PLACEMENT N/A 9/28/2021    Procedure: IPG INSERTION AND PROGRAMMING;  Surgeon: Elizabeth Maldonado MD;  [facility];  Service: UroGynecology               02/04/22 1105   OT Last Visit   OT Visit Date 02/04/22  (Friday)   Note Type   Note type Evaluation   Restrictions/Precautions   Weight Bearing Precautions Per Order No   Other Precautions Chair Alarm; Bed Alarm;Multiple lines;Telemetry;O2;Fall Risk   Pain Assessment   Pain Assessment Tool 0-10   Pain Score No Pain   Home Living Type of Home House; Other (Comment)  (4-5 [address])   Home Layout Two level;Performs ADLs on one level; Able to live on main level with bedroom/bathroom;Stairs to enter with rails   Bathroom Shower/Tub Tub/shower unit   Bathroom Toilet Standard   [address]   Bathroom Accessibility Accessible via walker   [address] [facility],[address]; Other (Comment)  (rollator)   Additional Comments Pt reports living w/ , Mansi Brown; daughter, and daughter's significant other in [address] w/ 1st floor set- up  Pt reports sleeping in recliner chair   Prior Function   Level of Bly Needs assistance with IADLs   Lives With Spouse;Daughter;Family   Receives Help From Family   ADL Assistance Needs assistance   IADLs Needs assistance   Falls in the last 6 months 1 to 4  (1 since recent DC home)   Vocational Retired   Comments Pt reports sleeping in recliner chair at [facility] and uses rollator for [employer] w/ out assistance  Pt needs assistance w/ LBD and bathing from family  Pt reports no O2 at baseline   Lifestyle   Autonomy Pt reports needing assistance w/ LB ADL, bathing and IADLs at baseline  Uses rollator for functional mobilty w/ out assistance   Reciprocal Relationships Pt reports living w/ family in [address] [facility]   Service to Others 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[place] [address] on [facility] 12 reports enjoying watching tv/ [employer] and especially enjoy [employer]   Subjective   Subjective "I need to use the bathroom"   ADL   Where Assessed Edge of bed  (vs commode)   Eating Assistance 6  Modified independent   Eating Deficit Setup   Grooming Assistance 4  Minimal Assistance  (seated on commode to comb hair)   Grooming Deficit Setup;Supervision/safety; Increased time to complete   UB Bathing Assistance Unable to assess   LB Bathing Assistance Unable to assess   UB Dressing Assistance 4  Minimal Assistance   UB Dressing Deficit Setup;Verbal cueing;Supervision/safety; Increased time to complete;Pull around back  (after set- up; limited due to + lines)   LB Dressing Assistance 1  Total Assistance   LB Dressing Deficit Don/doff L sock; Don/doff R sock; Setup  (pt reports A at baseline)   Rex Rivera Deficit Setup;Steadying; Increased time to complete;Verbal cueing;Perineal hygiene; Bedside commode   Additional Comments on 2 L O2 via NC   Bed Mobility   Supine to Sit 3  Moderate assistance   Additional items Assist x 1;HOB elevated; Bedrails; Increased time required;Verbal cues;LE management   Sit to Supine Unable to assess   Additional Comments Pt seated OOB in chair post eval w/ needs met, call bell in reach and chair alarm activated   Transfers   Sit to Stand 4  Minimal assistance   Additional items Assist x 1; Increased time required;Verbal cues  (pulling up on walker)   Stand to Sit 4  Minimal assistance   Additional items Assist x 1; Increased time required;Verbal cues; Bedrails;Armrests  (cues for hand placement)   Stand pivot 4  Minimal assistance   Additional items Assist x 1; Increased time required;Verbal cues  (using RW)   Additional Comments Educated pt on hand placement to push up from surface   Prefers to pull up on walker   Functional Mobility   Functional Mobility 4  Minimal assistance   Additional Comments min A w/ + time short distances using RW   Additional items Rolling walker   Balance   Static Sitting Fair +   Static Standing Fair -   Ambulatory Poor +   Activity Tolerance   Activity Tolerance Patient limited by fatigue   Medical Staff Made Aware care coordination w/ PTClark  12  per RNTamir appropriate to see pt   RUE Assessment   RUE Assessment   (WFL to complete ADLs)   RUE Strength   RUE Overall Strength Within Functional Limits - able to perform ADL tasks with strength   LUE Assessment   LUE Assessment   (WFL to complete ADLs)   LUE Strength   LUE Overall Strength Within Functional Limits - able to perform ADL tasks with strength   Hand Function   Gross Motor Coordination Functional   Fine Motor Coordination Functional  (R hand dominance)   Sensation   Light Touch   (hypersensitive to light touch B LE)   Additional Comments + edema B LE   Cognition   Overall Cognitive Status Impaired   Arousal/Participation Alert; Cooperative   Attention Attends with cues to redirect   Orientation Level Oriented to person;Oriented to place  (aware in hospital)   Memory Decreased recall of recent events  (details, timeline)   Following Commands Follows multistep commands with increased time or repetition   Comments Identified pt by full name and birthdate  Pt known to this therapist from previous admission  Pt able to confrim, provide social history  Oriented to person, place, and aware in hospital  Limited recall details/ timeline events  Questional insight into deficits / medical non-compliance   Assessment   Limitation Decreased ADL status; Decreased UE strength;Decreased endurance;Decreased self-care trans;Decreased high-level ADLs   Assessment Pt is a 72 yo female admitted to [facility] on 2/3/22  Pt presented following syncopal episode and significant PMH impacting her occupational performance includes DM II, chronic pain syndrome, HTN, recent admission for COVID pneumonia, DM II, R rib fractures, cellulitis, sacral nerve stimulator (2021)  Pt w/ active OT orders and activity orders  Personal factors impacting performance includes inability to complete IADLs, difficulty managing steps, difficulty completing ADLs, assist required at baseline, recent admission  Pt recently admitted and DC home on 1/7/2022 COVID_19 +  Pt reports living w/ spouse, daughter in  [address] [facility] w/ first floor set- up  Pt reports 4-5 [address] and sleeps in recliner chair  Pt reports needing assistance w/ LBD, bathing and IADLs; uses rollator for functional mobility  Upon eval, pt alert and oriented to person, place, and generally to situation  Pt required mod A to complete bed mobility   Pt required total A to complete LBD and min A to complete UBD, and min A to complete toileting  Pt required min A to complete grooming while seated  Pt presents w/ decreased activity tolerance, decreased endurance, decreased standing balance, decreased standing tolerance, generalized weakness / deconditioning, questionable insight into deficits impacting her I w/ dressing, bathing, oral hygiene, functional mobility, functional transfers, activity engagement, clothing mgmt, medical management  Pt completing ADL below baseline level of I and would benefit from OT while in acute care to address deficits  Recommend DC home w/ family support / assist and Home OT  Will continue to follow   Goals   Patient Goals Pt stated that she would like to go home   Plan   Treatment Interventions ADL retraining;Functional transfer training; Endurance training;UE strengthening/ROM; Patient/family training;Equipment evaluation/education; Compensatory technique education;Continued evaluation; Energy conservation; Activityengagement   Goal Expiration Date 02/14/22   OT Frequency 2-3x/wk   Recommendation   OT Discharge Recommendation Home with home health rehabilitation   Equipment Recommended Bedside commode; Shower/Tub chair with back ($)   Commode Type Standard   Additional Comments  using RW vs rollator   AM-PAC Daily Activity Inpatient   Lower Body Dressing 2   Bathing 2   Toileting 3   Upper Body Dressing 3   Grooming 3   Eating 4   Daily Activity Raw Score 17   Daily Activity Standardized Score (Calc for Raw Score >=11) 37 26   AM-PAC Applied Cognition Inpatient   Following a Speech/Presentation 2   Understanding Ordinary Conversation 4   Taking Medications 2   Remembering Where Things Are Placed or Put Away 3   Remembering List of 4-5 Errands 2   Taking Care of Complicated Tasks 2   Applied Cognition Raw Score 15   Applied Cognition Standardized Score 33 54   Barthel Index   Feeding 10   Bathing 0   Grooming Score 0   Dressing Score 5 Bladder Score 5   Bowels Score 5   Toilet Use Score 5   Transfers (Bed/Chair) Score 10   Mobility (Level Surface) Score 0   Stairs Score 0   Barthel Index Score 40   Modified [place] Scale   Modified Rosibel Scale 4      The patient's raw score on the AM-PAC Daily Activity inpatient short form is 17, standardized score is 37 26, less than 39 4  Patients at this level are likely to benefit from discharge to post-acute rehabilitation services  Please refer to the recommendation of the Occupational Therapist for safe discharge planning  OT DC recommendation does not coincide w/ [facility] score  Recommend DC home w/ continued family assist and Home OT  Pt required A w/ LB ADLs and bathing at baseline           Pt goals to be met by 2/14/22:  -Pt will demonstrate good attention and understanding EC tech to max I w/ ADLs    -Pt will complete functional transfers to bed, chair, and toilet using AD, DME as needed w/ S to max I w/ ADLs and return home    -Pt will demonstrate improved functional standing tolerance for at least 5 minutes w/ at least fair + balance while engaged in ADLs to max I and return home to baseline level of I    -Pt will complete UBD w/ mod I after set- up    -Pt will demonstrate improved activity tolerance to participate in ADLs for at least 10 minutes seated w/ out rest break or signs/ symptoms of fatigue to return home    -Pt will consistently engage in functional mobility using AD to / from bathroom w/ S to max I w/ ADLs    Elma Prather, OTR/L

## 2022-02-04 NOTE — QUICK NOTE
Progress Note - Triage Asssessment   Michelle Nickerson 73 y o  female MRN: [id number]    Time Called ( Time): 1941  Date Called: 02/03/22  Room#: ED 8  Person requesting evaluation: Dr Cindy Jean-Baptiste    Situation:    Patient with previous COVID+ 1/3  At that time she had been admitted for mental status change in the setting of cirrhosis of the liver and COVID infection requiring 6L nasal cannula  Today she presents to the ER with decreased mental status and was noted to be hypercarbic/hypoxic on arrival  CXR noted LLL consolidation  She has again tested + for COVID, which is likely not reinfection at this point  She was initially placed on BiPAP 12/6 80% and was titrated down to 12/6 60%  On my assessment, her mental status and respiratory status was much improved from previously reported  BiPAP was removed and patient was placed on 6L NC  She tolerated this well with SpO2 92-94% with no respiratory distress  Acute hypoxic and hypercarbic respiratory failure 2/2 suspected bacterial pneumonia  Interventions:   Continue abx for LLL consolidation, follow cultures  Given initial COVID+ 1 month ago does not meet criteria for remdesivir  Titrate FiO2 for SpO2 > 90%  Would recommend placing patient on BiPAP qHS            Triage Assessment:     Patient can be admitted to med-surg level of care    Recommendations discussed with Dr Cindy Jean-Baptiste

## 2022-02-04 NOTE — ASSESSMENT & PLAN NOTE
Lab Results   Component Value Date    HGBA1C 7 5 (H) 01/04/2022       Recent Labs     02/03/22  1627 02/04/22  0055   POCGLU 154* 96       Blood Sugar Average: Last 72 hrs:  (P) 125     · PTA: lantus and novolog  · Holding lantus due to NPO  · accuchecks q6h with SSI coverage  · Avoid hypoglycemia  · Goal blood glucose 140-180

## 2022-02-04 NOTE — ASSESSMENT & PLAN NOTE
· Currently normotensive  · Holding amlodipine due to NPO  · Holding lisinopril due to YVONNE  · VS per protocol

## 2022-02-04 NOTE — RESPIRATORY THERAPY NOTE
RT Protocol Note  Valentina Phillips 73 y o  female MRN: [id number]  Unit/Bed#: ED 08 Encounter: [id number]    Assessment    Active Problems:    Acute respiratory failure with hypoxia and hypercapnia (HCC)      Home Pulmonary Medications:  n/a       Past Medical History:   Diagnosis Date    Abnormal head CT 2017    Acute kidney injury ([address] ) 2018    Cellulitis 1/10/2017    Closed fracture of multiple ribs of right side 2017    Degenerative disc disease at L5-S1 level     Diabetes mellitus (HCC)     History of methicillin resistant staphylococcus aureus (MRSA) 2018    negative nasal culture- isolation and hx of mrsa removed 2018    Hyperkalemia 2018    Hypertension     Hypocalcemia 2018    Hyponatremia 2017     Social History     Socioeconomic History    Marital status: /Civil Union     Spouse name: None    Number of children: None    Years of education: None    Highest education level: None   Occupational History    None   Tobacco Use    Smoking status: Former Smoker     Types: E-Cigarettes     Start date: 1976     Quit date: 2019     Years since quittin 8    Smokeless tobacco: Never Used   Vaping Use    Vaping Use: Every day    Start date: 2019    Substances: Nicotine, THC (not using anymore)   Substance and Sexual Activity    Alcohol use: Not Currently     Alcohol/week: 0 0 standard drinks    Drug use: Not Currently     Types: Marijuana     Comment: last used 1 year ago     Sexual activity: Not Currently   Other Topics Concern    None   Social History Narrative    None     Social Determinants of Health     Financial Resource Strain: Not on file   Food Insecurity: No Food Insecurity    Worried About Running Out of Food in the Last Year: Never true    Dileep of Food in the Last Year: Never true   Transportation Needs: No Transportation Needs    Lack of Transportation (Medical): No    Lack of Transportation (Non-Medical):  No Physical Activity: Not on file   Stress: Not on file   Social Connections: Not on file   Intimate Partner Violence: Not on file   Housing Stability: Unknown    Unable to Pay for Housing in the Last Year: No    Number of Places Lived in the Last Year: Not on file    Unstable Housing in the Last Year: No       Subjective         Objective    Physical Exam:   Assessment Type: Assess only  General Appearance: Awake  Respiratory Pattern: Normal  Chest Assessment: Chest expansion symmetrical  Bilateral Breath Sounds: Diminished    Vitals:  Blood pressure 111/53, pulse 92, temperature 98 6 °F (37 °C), temperature source Oral, resp  rate 18, height 5' 2" (1 575 m), weight 88 2 kg (194 lb 7 1 oz), SpO2 95 %  Results from last 7 days   Lab Units 02/03/22  1740   PH ART  7 338*   PCO2 ART mm Hg 52 2*   PO2 ART mm Hg 84 6   HCO3 ART mmol/L 27 4   BASE EXC ART mmol/L 1 0   O2 CONTENT ART mL/dL 14 1*   O2 HGB, ARTERIAL % 93 9*   ABG SOURCE  Brachial, Right   BARRETT TEST  Yes       Imaging and other studies: I have personally reviewed pertinent reports  Plan    Respiratory Plan: No distress/Pulmonary history        Resp Comments: Pt assessed per protocol  Pt has no past pulm hx, does not take pulm meds at home  Pt COVID +  Scheduled nebs not indicated at this time  Will order prn albuterol mdi  Will continue to monitor

## 2022-02-04 NOTE — ASSESSMENT & PLAN NOTE
· Documented history   · Hepatic encephalopathy possibly contributing to altered mental status  · Ammonia level 36 down from 50 on prior admission one month ago    Plan:  · Resumed home lactulose and rifaximin  · Gave one dose lactulose enema on admission as patient on BiPAP; continue to assess ability to tolerate oral medications    · Monitor bowel movements  · Monitor CMP

## 2022-02-04 NOTE — ASSESSMENT & PLAN NOTE
· Patient has a history of liver cirrhosis 2/2 alcohol abuse  Reportedly patient has abstained from EtOH for the last 20 years  · Recently admitted 1/3/22 for decreased mental status and elevated NH3 at 50  She was treated with lactulose and rifaximin with improvement in her mental status  · NH3 36  · Received lactulose enema in the ER  · Plan to restart rifaximin and lactulose PO when able to take safely  · Titrate lactulose to 2-3 bowel movements daily  · INR 1 14, AST 21, ALT 18, Alk Phos 129  · MELD-Na 19  · On exam appears volume overloaded, sodium 132 -- received IVF NS  Will hold further IVF for now

## 2022-02-04 NOTE — ASSESSMENT & PLAN NOTE
· Currently normotensive  · Restart amlodipine tomorrow as hemodynamically stable today 02/04  · Holding lisinopril due to YVONNE  · VS per protocol

## 2022-02-04 NOTE — ASSESSMENT & PLAN NOTE
· O2 saturation in mid 80's on NRB on presentation from EMS  · Recently discharged from hospital from COVID pneumonia (Tested + 1/3/22)   · Initiated on BiPAP 12/6 and saturations corrected to 95-97%   · Initial ABG showed pH of 7 338 with pCO2 of 52 2   · CXR bilateral basilar pneumonia  PCT elevated  Most likely due to Pneumonia  Clinical improvement after treatment for pneumonia  No pleuritic chest pain etc to suggest possible PE  Venous doppler attempted by vascular tech  Pt couldn't even tolerate the transducer touching her legs

## 2022-02-04 NOTE — ASSESSMENT & PLAN NOTE
Symptoms: shortness of breath at rest, anorexia and confusion    Lab Results   Component Value Date    SARSCOV2 Positive (A) 02/03/2022    SARSCOV2 Positive (A) 01/03/2022       XR chest 1 view portable    Result Date: 2/3/2022  Impression: Left lung base, possibly milder right lung base airspace disease  Possible left effusion findings are most suspicious for infection  CT head without contrast    Result Date: 2/3/2022  Impression: No acute intracranial abnormality        Recent Labs     02/03/22  1701   WBC 9 61     Recent Labs     02/03/22  1701   PROCALCITONI 1 42*        Plan:  · F/u urine Strep, Legionella, Sputum cultures  · Procalcitonin tomorrow a m   · CBC with Diff Tomorrow AM  · Ceftriaxone 1 g IV q  24 hours  · Doxycycline IV   · Guaifenesin 600 mg q 12 hours; increase as needed to 1200 mg q12 h  · Incentive spirometry

## 2022-02-04 NOTE — ASSESSMENT & PLAN NOTE
· In setting of acute hypercapnic hypoxic respiratory failure secondary to pneumonia meeting sepsis criteria vs hepatic encephalopathy vs opioid side effect   · Patient reports that she has been taking 30 mg oxycodone q6 hours at home  · Presented in setting of ARF and placed on BiPAP with some improvement in mentation  · Noted history of cirrhosis with ammonia measured at 36 POA   · Now fully alert and wake

## 2022-02-04 NOTE — ASSESSMENT & PLAN NOTE
· Documented history   · Ammonia level 36 down from 50 on prior admission one month ago  · Innumerous BM today per nursing  Will hold lactulose  Repeat Ammonia in Am  She is alert and awake   Continue Xifaximin

## 2022-02-04 NOTE — ASSESSMENT & PLAN NOTE
POA as evidenced by tachycardia, leukocytosis (elevated form patient baseline), tachypnea    Suspected source: Pneumonia   Clx: Blood and sputum NTD   Trend PCT   Abx: ceftriaxone and doxycycline IV

## 2022-02-04 NOTE — ASSESSMENT & PLAN NOTE
· /70     · Continue amlodipine   Medications held: lisinopril for YVONNE    Monitor blood pressure

## 2022-02-04 NOTE — ASSESSMENT & PLAN NOTE
Recent Labs     02/03/22  1701   CREATININE 1 89*   EGFR 27     Estimated Creatinine Clearance: 30 6 mL/min (A) (by C-G formula based on SCr of 1 89 mg/dL (H))       POA1 89; (baseline 1 2-1 4)   Likely prerenal in setting of decreased oral intake and acute infection    Plan:   UA w/ microscopic, Urinary retention protocol, Bladder scan, Daily weights, I/O   IV Fluids: normal saline at 75 mL/hr   Monitor BMP daily and observe for downward trend of creatinine   Avoid hypoperfusion of the kidneys, minimize nephrotoxins   RAAS Blockers/Diuretics held: lisinopril

## 2022-02-04 NOTE — ASSESSMENT & PLAN NOTE
· POA  · Initial potassium 5 5, repeat 6 1 - slightly hemolyzed, 2nd repeat 5  · No need for temporizing measures  · CMP in am

## 2022-02-04 NOTE — ASSESSMENT & PLAN NOTE
Lab Results   Component Value Date    HGBA1C 7 5 (H) 01/04/2022       Recent Labs     02/03/22  1627   POCGLU 154*       Blood Sugar Average: Last 72 hrs:  (P) 154     Home Regimen: Lantus HS with SSI    Plan:   Hold oral antihyperglycemics   Diet Consistent CHO Level 2 (5 CHO servings/75g CHO per meal)   Insulin regimen at home :Lantus 20 units HS  o SSI while inpatient  o Insulin correction ACHS

## 2022-02-04 NOTE — PROGRESS NOTES
[facility]  ICU Acceptance / Transfer Note - Julio Cesar Hill 1955, 73 y o  female MRN: [id number]  Unit/Bed#: ICU 10 Encounter: [id number]  Primary Care Provider: Lavinia Riedel, MD   Date and time admitted to hospital: 2/3/2022  4:25 PM    * Acute respiratory failure with hypoxia and hypercapnia ([address] )  Assessment & Plan  · Found unresponsive and hypoxic at home  On arrival to ER on NRB mask had SpO2 85% with tachypnea and increased work of breathing  She was placed on BiPAP 12/6 with improvement in work of breathing and SpO2  ABG on BiPAP revealed partially compensated respiratory acidosis with pH 7 33  Due to mental status being greatly improved, she was placed on nasal cannula  Following further IVF she again became lethargic with increased work of breathing  She was placed back on BiPAP and critical care was asked to evaluate the patient  · Patient transferred to Step Down Level 1  · Continue BiPAP for now, attempt to wean off  · Optimize electrolytes  · Diurese as able  · Bedside POCUS to evaluate pleural effusion -- may need to consider thoracentesis  · Titrate FiO2 for SpO2 > 92%  · NPO while on BiPAP    Sepsis (HCC)  Assessment & Plan  · POA AEB: tachypnea, tachycardia, YVONNE  · CXR Left lung base, possibly milder right lung base airspace disease  Possible left effusion findings are most suspicious for infection    · UA bland  · ABD exam benign  · Lactic acid 1 8  · No further need to trend  · procalcitonin 1 42  · Continue to trend  · Blood cultures obtained in ER -- follow  · UA bland  · Obtain sputum specimen  · Strep pneumoniae and Legionella urine antigens pending  · Started on ceftriaxone and azithromycin -- will continue  · No history of MDRO noted on chart review  · De-escalate antibiotics as able  · Trend WBC and fever curve    Acute kidney injury superimposed on CKD [facility])  Assessment & Plan  Lab Results   Component Value Date    EGFR 27 02/03/2022    EGFR 51 01/07/2022    EGFR 49 01/05/2022    CREATININE 1 89 (H) 02/03/2022    CREATININE 1 13 (H) 01/07/2022    CREATININE 1 16 (H) 01/05/2022     · CKD 2-3  Baseline creatinine 1 1-1 2  On admission 1 89  · She had a recent YVONNE with her last admission 1/3/22  · YVONNE likely 2/2 pre-renal due to poor po intake, and ATN due to lisinopril  · Hold lisinopril  · Received 2+ L of NS in ER, holding further IVF for now  · Avoid hypotension/nephrotoxins  · CMP pending this am  · Consider lasix in setting of volume overload if renal function will tolerate  · Not currently on diuretics as outpatient    Metabolic encephalopathy  Assessment & Plan  · Likely multifocal 2/2 sepsis, chronic opioid use, YVONNE, hepatic encephalopathy  · CT head:negative  · Check UDS  · Follow serial neuro checks  · Avoid sedating medications  · Optimize electrolytes  · Avoid hypoglycemia    Pneumonia  Assessment & Plan  · POA  · 2/3 CXR Left lung base, possibly milder right lung base airspace disease  Possible left effusion findings are most suspicious for infection    · proBNP 1800  · Started on ceftriaxone and azithromycin -- will continue  · No previous history of MDRO noted on chart review  · Of note patient had recent COVID+ admission 1/3  · COVID + swab in ER on admission  · Given initial onset of COVID 1 month ago, will hold off on COVID treatment pathway  · Continue guaifenesin when able to take po safely  · Continue albuteral  · OOB as tolerated, encourage mobility  · Aspiration precautions  · Nursing bedside swallow eval when able to remain off BiPAP  · Strep pneumoniae and Legionella urine antigens pending    Hyperkalemia  Assessment & Plan  · POA  · Initial potassium 5 5, repeat 6 1 - slightly hemolyzed, 2nd repeat 5  · No need for temporizing measures  · CMP in am    Type 2 diabetes mellitus with hyperglycemia, with long-term current use of insulin [facility])  Assessment & Plan  Lab Results   Component Value Date    HGBA1C 7 5 (H) 01/04/2022 Recent Labs     02/03/22  1627 02/04/22  0055   POCGLU 154* 96       Blood Sugar Average: Last 72 hrs:  (P) 125     · PTA: lantus and novolog  · Holding lantus due to NPO  · accuchecks q6h with SSI coverage  · Avoid hypoglycemia  · Goal blood glucose 140-180    Cirrhosis (HCC)  Assessment & Plan  · Patient has a history of liver cirrhosis 2/2 alcohol abuse  Reportedly patient has abstained from EtOH for the last 20 years  · Recently admitted 1/3/22 for decreased mental status and elevated NH3 at 50  She was treated with lactulose and rifaximin with improvement in her mental status  · NH3 36  · Received lactulose enema in the ER  · Plan to restart rifaximin and lactulose PO when able to take safely  · Titrate lactulose to 2-3 bowel movements daily  · INR 1 14, AST 21, ALT 18, Alk Phos 129  · MELD-Na 19  · On exam appears volume overloaded, sodium 132 -- received IVF NS  Will hold further IVF for now  Essential hypertension  Assessment & Plan  · Currently normotensive  · Holding amlodipine due to NPO  · Holding lisinopril due to YVONNE  · VS per protocol    Chronic pain syndrome  Assessment & Plan  · Holding gabapentin and oxycodone 2/2 metabolic encephalopathy/NPO  · PDMP reviewed, recent prescription for oxycodone 1/31/22        -------------------------------------------------------------------------------------------------------------  Chief Complaint: lethargy/weakness    History of Present Illness     Sadaf Cheung is a 73 y o  female who presents with PMHx significant for HTN, CKD 2 to 3, liver cirrhosis, DM2, chronic back pain 2/2 spondylosis of lumbar joint on opioids, and recent COVID infection 1/3 with admission 1/3 to 1/7  Earlier today she was found by family to have increased lethargy and work of breathing  On arrival to the ER she was tachypneic, tachycardic, and hypoxic on non-rebreather with SpO2 85%    She was placed on BiPAP secondary to increased work of breathing with improvement in her work of breathing and SpO2  Post BiPAP ABG revealed mild respiratory acidosis, lactic 1 8, ammonia 36, YVONNE, WBC count 9 6 with 8% bands, and hyperkalemia 5 5  Chest x-ray revealed bilateral lower lobe infusions with possible left-sided pleural effusion  Blood cultures were obtained and she was started on broad spectrum antibiotics  She initially tolerated coming off BiPAP well, until she received additional IV fluids in developed lethargy and increased work of breathing  She was placed back on BiPAP and Critical Care was asked to re-evaluate patient  She will be transferred to step-down level 1 under the medical Critical Care  History obtained from chart review and the patient   -------------------------------------------------------------------------------------------------------------  Dispo: Transfer to Stepdown Level 1     Code Status: Level 1 - Full Code  --------------------------------------------------------------------------------------------------------------  Review of Systems   Constitutional: Positive for fatigue  Negative for chills and fever  HENT: Negative  Eyes: Negative  Respiratory: Positive for shortness of breath  Negative for cough, chest tightness and wheezing  Cardiovascular: Positive for leg swelling  Negative for chest pain and palpitations  Gastrointestinal: Positive for abdominal distention  Negative for abdominal pain, diarrhea, nausea and vomiting  Endocrine: Negative  Genitourinary: Positive for decreased urine volume  Negative for difficulty urinating, dysuria, frequency and urgency  Musculoskeletal: Negative for arthralgias and myalgias  Skin: Negative for pallor, rash and wound  Allergic/Immunologic: Negative  Neurological: Negative for dizziness, syncope, weakness and light-headedness  Hematological: Negative  Psychiatric/Behavioral: Negative for confusion, decreased concentration and hallucinations  The patient is not nervous/anxious  A 12-point, complete review of systems was reviewed and negative except as stated above     Physical Exam  Vitals and nursing note reviewed  Constitutional:       General: She is not in acute distress  Appearance: She is obese  She is ill-appearing  She is not toxic-appearing  Interventions: Face mask in place  HENT:      Head: Normocephalic and atraumatic  Right Ear: External ear normal       Left Ear: External ear normal       Nose: Nose normal  No congestion or rhinorrhea  Mouth/Throat:      Mouth: Mucous membranes are moist       Pharynx: Oropharynx is clear  No oropharyngeal exudate or posterior oropharyngeal erythema  Eyes:      General: No scleral icterus  Extraocular Movements: Extraocular movements intact  Conjunctiva/sclera: Conjunctivae normal       Pupils: Pupils are equal, round, and reactive to light  Neck:      Vascular: No carotid bruit  Cardiovascular:      Rate and Rhythm: Normal rate and regular rhythm  Pulses:           Radial pulses are 2+ on the right side and 2+ on the left side  Dorsalis pedis pulses are 2+ on the right side and 2+ on the left side  Heart sounds: S1 normal and S2 normal  No murmur heard  No friction rub  No gallop  Pulmonary:      Effort: Tachypnea present  No accessory muscle usage or respiratory distress  Breath sounds: Examination of the right-lower field reveals decreased breath sounds  Examination of the left-lower field reveals decreased breath sounds  Decreased breath sounds present  No wheezing, rhonchi or rales  Abdominal:      General: Abdomen is protuberant  There is no distension  Palpations: Abdomen is soft  Tenderness: There is no abdominal tenderness  Musculoskeletal:      Cervical back: Normal range of motion and neck supple  Right lower le+ Pitting Edema present  Left lower le+ Pitting Edema present  Lymphadenopathy:      Cervical: No cervical adenopathy  Skin:     General: Skin is warm and dry  Capillary Refill: Capillary refill takes less than 2 seconds  Coloration: Skin is pale  Findings: No bruising, erythema or rash  Neurological:      Mental Status: She is lethargic and disoriented  GCS: GCS eye subscore is 3  GCS verbal subscore is 4  GCS motor subscore is 6  Cranial Nerves: Cranial nerves are intact  No cranial nerve deficit or dysarthria  Sensory: Sensation is intact  No sensory deficit  Psychiatric:         Mood and Affect: Mood is not anxious  Behavior: Behavior is cooperative  Judgment: Judgment is not impulsive        --------------------------------------------------------------------------------------------------------------  Vitals:   Vitals:    02/04/22 0100 02/04/22 0200 02/04/22 0230 02/04/22 0356   BP: 107/59 114/59 111/58    BP Location: Left arm Left arm Left arm    Pulse: 86 86 86    Resp: 20 20 20    Temp:       TempSrc:       SpO2: 98% 97% 96%    Weight:    87 2 kg (192 lb 3 9 oz)   Height:    5' 2" (1 575 m)     Temp  Min: 98 6 °F (37 °C)  Max: 98 6 °F (37 °C)     Height: 5' 2" (157 5 cm)  Body mass index is 35 16 kg/m²      Laboratory and Diagnostics:  Results from last 7 days   Lab Units 02/04/22 0032 02/03/22 2339 02/03/22  1701   WBC Thousand/uL  --   --  9 61   HEMOGLOBIN g/dL  --   --  10 5*   I STAT HEMOGLOBIN g/dl 9 2*  --   --    HEMATOCRIT %  --   --  35 1   HEMATOCRIT, ISTAT % 27*  --   --    PLATELETS Thousands/uL  --  119*  --    BANDS PCT %  --   --  8   MONO PCT %  --   --  2*     Results from last 7 days   Lab Units 02/04/22 0032 02/03/22 2339 02/03/22  2108 02/03/22  1701   SODIUM mmol/L  --   --   --  132*   POTASSIUM mmol/L  --  5 0 6 1* 5 5*   CHLORIDE mmol/L  --   --   --  100   CO2 mmol/L  --   --   --  27   CO2, I-STAT mmol/L 28  --   --   --    ANION GAP mmol/L  --   --   --  5   BUN mg/dL  --   --   --  23   CREATININE mg/dL  --   --   --  1 89*   CALCIUM mg/dL --   --   --  8 0*   GLUCOSE RANDOM mg/dL  --   --   --  135   ALT U/L  --   --   --  18   AST U/L  --   --   --  21   ALK PHOS U/L  --   --   --  129*   ALBUMIN g/dL  --   --   --  2 6*   TOTAL BILIRUBIN mg/dL  --   --   --  0 52          Results from last 7 days   Lab Units 02/03/22  1701   INR  1 14   PTT seconds 33          Results from last 7 days   Lab Units 02/03/22  1701   LACTIC ACID mmol/L 1 8     ABG:  Results from last 7 days   Lab Units 02/03/22  1740   PH ART  7 338*   PCO2 ART mm Hg 52 2*   PO2 ART mm Hg 84 6   HCO3 ART mmol/L 27 4   BASE EXC ART mmol/L 1 0   ABG SOURCE  Brachial, Right     VBG:  Results from last 7 days   Lab Units 02/03/22  1740   ABG SOURCE  Brachial, Right     Results from last 7 days   Lab Units 02/03/22  1701   PROCALCITONIN ng/ml 1 42*       Micro:  Results from last 7 days   Lab Units 02/03/22  1701   BLOOD CULTURE  Received in Microbiology Lab  Culture in Progress  Received in Microbiology Lab  Culture in Progress  EKG: sinus rhythm on telemetry  Imaging: I have personally reviewed pertinent reports     and I have personally reviewed pertinent films in PACS      Historical Information   Past Medical History:   Diagnosis Date    Abnormal head CT 7/14/2017    Acute kidney injury ([facility] [address] ) 8/19/2018    Cellulitis 1/10/2017    Closed fracture of multiple ribs of right side 7/14/2017    Degenerative disc disease at L5-S1 level     Diabetes mellitus ([facility] [address] )     History of methicillin resistant staphylococcus aureus (MRSA) 08/21/2018    negative nasal culture- isolation and hx of mrsa removed 8/20/2018    Hyperkalemia 4/25/2018    Hypertension     Hypocalcemia 4/25/2018    Hyponatremia 7/14/2017     Past Surgical History:   Procedure Laterality Date    CHOLECYSTECTOMY      JOINT REPLACEMENT      OOPHORECTOMY      IN INCISION,IMPLANT,NEUROELEC,SACRAL NERVE N/A 9/20/2021    Procedure: INSERTION NEUROSTIMULATOR ELECTRODE ARRAY SACRAL NERVE; FLUOROSCOPY; ELECTRONIC ANALYSIS;  Surgeon: Lashon Paige MD;  Location: [facility];  Service: UroGynecology           SACRAL NERVE STIMULATOR PLACEMENT N/A 2021    Procedure: IPG INSERTION AND PROGRAMMING;  Surgeon: Lashon Paige MD;  Location: [facility];  Service: UroGynecology            Social History   Social History     Substance and Sexual Activity   Alcohol Use Not Currently    Alcohol/week: 0 0 standard drinks     Social History     Substance and Sexual Activity   Drug Use Not Currently    Types: Marijuana    Comment: last used 1 year ago      Social History     Tobacco Use   Smoking Status Former Smoker    Types: E-Cigarettes    Start date: 1976   Constantino Abt Quit date: 2019    Years since quittin 8   Smokeless Tobacco Never Used     Exercise History: needs assistance  Family History:   Family History   Problem Relation Age of Onset    Rheum arthritis Mother     Alzheimer's disease Mother     Lupus Mother      I have reviewed this patient's family history and commented on sigificant items within the HPI      Medications:  Current Facility-Administered Medications   Medication Dose Route Frequency    albuterol (PROVENTIL HFA,VENTOLIN HFA) inhaler 2 puff  2 puff Inhalation Q4H PRN    ceftriaxone (ROCEPHIN) 1 g/50 mL in dextrose IVPB  1,000 mg Intravenous Q24H    And    azithromycin (ZITHROMAX) 500 mg in sodium chloride 0 9% 250mL IVPB 500 mg  500 mg Intravenous Q24H    guaiFENesin (MUCINEX) 12 hr tablet 600 mg  600 mg Oral BID    heparin (porcine) subcutaneous injection 5,000 Units  5,000 Units Subcutaneous Q8H [facility]    insulin lispro (HumaLOG) 100 units/mL subcutaneous injection 1-6 Units  1-6 Units Subcutaneous Q6H [facility]    ondansetron (ZOFRAN) injection 4 mg  4 mg Intravenous Q4H PRN    sodium chloride (PF) 0 9 % injection 3 mL  3 mL Intravenous Q1H PRN     Facility-Administered Medications Ordered in Other Encounters   Medication Dose Route Frequency    ferrous sulfate tablet 325 mg  325 mg Oral Daily With Breakfast     Home medications:  Prior to Admission Medications   Prescriptions Last Dose Informant Patient Reported? Taking? DULoxetine (CYMBALTA) 60 mg delayed release capsule   Yes Yes   Sig: Take 60 mg by mouth daily   Insulin Aspart FlexPen 100 UNIT/ML SOPN   Yes Yes   Sig: Inject 3 Units under the skin 3 (three) times a day with meals If bs>250   Mirabegron ER (Myrbetriq) 50 MG TB24  Self Yes Yes   Sig: Take 50 mg by mouth in the morning   amLODIPine (NORVASC) 10 mg tablet   No Yes   Sig: Take 1 tablet (10 mg total) by mouth daily   docusate sodium (COLACE) 100 mg capsule   No Yes   Sig: Take 1 capsule (100 mg total) by mouth 2 (two) times a day as needed for constipation   gabapentin (NEURONTIN) 100 mg capsule Not Taking at Unknown time  No No   Sig: Take 1 capsule (100 mg total) by mouth 2 (two) times a day   Patient not taking: Reported on 2/3/2022    guaiFENesin (MUCINEX) 600 mg 12 hr tablet Not Taking at Unknown time  No No   Sig: Take 1 tablet (600 mg total) by mouth every 12 (twelve) hours   Patient not taking: Reported on 2/3/2022    insulin glargine (LANTUS SOLOSTAR) 100 units/mL injection pen  Outside Facility (Specify) Yes Yes   Sig: Inject 20 Units under the skin daily at bedtime     lactulose 20 g/30 mL Not Taking at Unknown time  No No   Sig: Take 30 mL (20 g total) by mouth 2 (two) times a day   Patient not taking: Reported on 2/3/2022    lisinopril (ZESTRIL) 20 mg tablet   Yes Yes   Sig: Take 10 mg by mouth daily    metoclopramide (REGLAN) 10 mg tablet   Yes Yes   Sig: Take 10 mg by mouth daily   oxyCODONE (ROXICODONE) 5 mg immediate release tablet   Yes Yes   Sig: Take 15 mg by mouth every 6 (six) hours as needed for moderate pain Per patient  patient has been taking 30mg PO 6 times a day      promethazine (PHENERGAN) 25 mg suppository Not Taking at Unknown time  No No   Sig: Insert 1 suppository (25 mg total) into the rectum every 6 (six) hours as needed for nausea or vomiting   Patient not taking: Reported on 2/3/2022    rifaximin (XIFAXAN) 550 mg tablet Not Taking at Unknown time  No No   Sig: Take 1 tablet (550 mg total) by mouth every 12 (twelve) hours   Patient not taking: Reported on 2/3/2022       Facility-Administered Medications: None     Allergies:  No Known Allergies  ------------------------------------------------------------------------------------------------------------  Advance Directive and Living Will:      Power of :    POLST:    ------------------------------------------------------------------------------------------------------------  Care Time Delivered:   Upon my evaluation, this patient had a high probability of imminent or life-threatening deterioration due to Acute hypoxic and hypercapnic respiratory failure 2/2 pneumonia, sepsis, YVONNE on CKD, acute metabolic encephalopathy, which required my direct attention, intervention, and personal management  I have personally provided 30 minutes (0110 to 0140) of critical care time, exclusive of procedures, teaching, family meetings, and any prior time recorded by providers other than myself  APRYL Harden        Portions of the record may have been created with voice recognition software  Occasional wrong word or "sound a like" substitutions may have occurred due to the inherent limitations of voice recognition software    Read the chart carefully and recognize, using context, where substitutions have occurred

## 2022-02-04 NOTE — ASSESSMENT & PLAN NOTE
· In setting of acute hypercapnic hypoxic respiratory failure secondary to pneumonia meeting sepsis criteria vs hepatic encephalopathy vs opioid side effect   · Patient reports that she has been taking 30 mg oxycodone q6 hours at home  · Presented in setting of ARF and placed on BiPAP with some improvement in mentation  · Noted history of cirrhosis with ammonia measured at 36 POA     Plan:   · Management of hypoxia as above  · BiPAP HS   · Lactulose for cirrhosis  · Hold PRN narcotics  · Pneumonia management as above

## 2022-02-04 NOTE — ASSESSMENT & PLAN NOTE
· Likely multifocal 2/2 sepsis, chronic opioid use, YVONNE, hepatic encephalopathy  · CT head:negative  · Check UDS  · Follow serial neuro checks  · Avoid sedating medications  · Optimize electrolytes  · Avoid hypoglycemia

## 2022-02-04 NOTE — ASSESSMENT & PLAN NOTE
POA as evidenced by tachycardia, leukocytosis (elevated form patient baseline), tachypnea    Suspected source: Pneumonia    Recent Labs     02/03/22  1701   WBC 9 61     Recent Labs     02/03/22  1701   LACTICACID 1 8       BP 98/54 (BP Location: Left arm)   Pulse 88   Temp 98 6 °F (37 °C) (Oral)   Resp 18   Ht 5' 2" (1 575 m)   Wt 88 2 kg (194 lb 7 1 oz)   LMP  (LMP Unknown) Comment: post menopause  SpO2 94%   BMI 35 56 kg/m²     XR chest 1 view portable    Result Date: 2/3/2022  Impression: Left lung base, possibly milder right lung base airspace disease  Possible left effusion findings are most suspicious for infection  Workstation performed: [id number]     CT head without contrast    Result Date: 2/3/2022  Impression: No acute intracranial abnormality  Workstation performed: [id number]       XR chest 1 view portable    Result Date: 2/3/2022  Impression Left lung base, possibly milder right lung base airspace disease  Possible left effusion findings are most suspicious for infection    Workstation performed: [id number]      Plan:   Clx: Blood and sputum pending   Procalcitonin 1 42, f/u AM value   Abx: ceftriaxone and doxycycline IV    IVF: normal saline at 75 mL/hour

## 2022-02-04 NOTE — ASSESSMENT & PLAN NOTE
· POA: Found unresponsive and hypoxic at home  On arrival to ER on NRB mask had SpO2 85% with tachypnea and increased work of breathing  She was placed on BiPAP 12/6 with improvement in work of breathing and SpO2  ABG on BiPAP revealed partially compensated respiratory acidosis with pH 7 33  Due to mental status being greatly improved, she was placed on nasal cannula  Following further IVF she again became lethargic with increased work of breathing  She was placed back on BiPAP and critical care was asked to evaluate the patient     - ABG on BiPAP showed:    7 323/51 8/89/26 9 (consistent w/ respiratory acidosis)   Winter's formula shows CO2 should be around 46-50 indicating a compensatory mechanism   - CXR: Left lung base, possibly milder right lung base airspace disease  Possible left effusion findings are most suspicious for infection    Lab Results   Component Value Date    DDIMER 1 64 (H) 02/03/2022    DDIMER 0 70 (H) 01/03/2022   -   o Plan:    Initially on BiPAP but was able to wean down to NC   Diurese if needed   Consider bedside U/S to evaluate for pleural effusion (?)   Titrate FiO2 to SpO2 >92%   On diabetic diet while on nasal cannula

## 2022-02-04 NOTE — ASSESSMENT & PLAN NOTE
· POA AEB: tachypnea, tachycardia, YVONNE Likely due to pneumonia in the setting CXR findings and bands and U/S findings    o Lactic Acid 1 8 (within normal limits)  Lab Results   Component Value Date    PROCALCITONI 4 56 (H) 02/04/2022    PROCALCITONI 1 42 (H) 02/03/2022   o   o Plan:  · CXR Left lung base, possibly milder right lung base airspace disease  Possible left effusion findings are most suspicious for infection    · U/S of lungs showed findings consistent w/ pneumonia  R lung without consolidation or effusion, > 4 B Lines  L lung with dense consolidation at L base, no effusion, 3-4 B Lines  · UA bland  · Blood cultures pending      Plan   · Strep pneumoniae and Legionella urine antigens negative so azithro was d/c  · pending sputum specimen  · on ceftriaxone day 2  · De-escalate antibiotics as able  · Continue to follow blood cultures   · Trend WBC and fever curve

## 2022-02-04 NOTE — ASSESSMENT & PLAN NOTE
Recent Labs     02/03/22  1701 02/03/22  2108   K 5 5* 6 1*       Workup:  · Etiology: Unclear, iImpaired renal function noted on intake  · Normalized now after IVF

## 2022-02-04 NOTE — ASSESSMENT & PLAN NOTE
Lab Results   Component Value Date    HGBA1C 7 5 (H) 01/04/2022       Recent Labs     02/03/22  1627   POCGLU 154*       Blood Sugar Average: Last 72 hrs:  (P) 154     Home Regimen: Lantus HS with SSI    Plan:   Hold oral antihyperglycemics   Diet Consistent CHO Level 2 (5 CHO servings/75g CHO per meal)   Insulin regimen  o Lantus 20 units HS  o SSI while inpatient  o Insulin correction ACHS   Goal BG  while admitted, adjusting insulin regimen as appropriate   Glucose checks: AC HS   Monitor for hypoglycemia and treat per protocol

## 2022-02-04 NOTE — ASSESSMENT & PLAN NOTE
Lab Results   Component Value Date    EGFR 27 02/03/2022    EGFR 51 01/07/2022    EGFR 49 01/05/2022    CREATININE 1 89 (H) 02/03/2022    CREATININE 1 13 (H) 01/07/2022    CREATININE 1 16 (H) 01/05/2022     · CKD 2-3  Baseline creatinine 1 1-1 2    On admission 1 89  · She had a recent YVONNE with her last admission 1/3/22  · YVONNE likely 2/2 pre-renal due to poor po intake, and ATN due to lisinopril  · Hold lisinopril  · Received 2+ L of NS in ER, holding further IVF for now  · Avoid hypotension/nephrotoxins  · CMP pending this am  · Consider lasix in setting of volume overload if renal function will tolerate  · Not currently on diuretics as outpatient

## 2022-02-04 NOTE — ASSESSMENT & PLAN NOTE
· POA AEB: tachypnea, tachycardia, YVONNE  · CXR Left lung base, possibly milder right lung base airspace disease  Possible left effusion findings are most suspicious for infection    · UA bland  · ABD exam benign  · Lactic acid 1 8  · No further need to trend  · procalcitonin 1 42  · Continue to trend  · Blood cultures obtained in ER -- follow  · UA bland  · Obtain sputum specimen  · Strep pneumoniae and Legionella urine antigens pending  · Started on ceftriaxone and azithromycin -- will continue  · No history of MDRO noted on chart review  · De-escalate antibiotics as able  · Trend WBC and fever curve

## 2022-02-04 NOTE — ASSESSMENT & PLAN NOTE
Blood Pressure: 101/55    History of hypertension managed on lisinopril and amlodipine  Well controlled    Plan:   Continue amlodipine   Medications held: lisinopril   Monitor blood pressure

## 2022-02-05 ENCOUNTER — APPOINTMENT (INPATIENT)
Dept: VASCULAR ULTRASOUND | Facility: HOSPITAL | Age: 67
DRG: 871 | End: 2022-02-05
Payer: MEDICARE

## 2022-02-05 PROBLEM — E87.5 HYPERKALEMIA: Status: RESOLVED | Noted: 2018-04-25 | Resolved: 2022-02-05

## 2022-02-05 LAB
ANION GAP SERPL CALCULATED.3IONS-SCNC: 7 MMOL/L (ref 4–13)
BASOPHILS # BLD AUTO: 0.04 THOUSANDS/ΜL (ref 0–0.1)
BASOPHILS NFR BLD AUTO: 0 % (ref 0–1)
BUN SERPL-MCNC: 22 MG/DL (ref 5–25)
CALCIUM SERPL-MCNC: 8.4 MG/DL (ref 8.3–10.1)
CHLORIDE SERPL-SCNC: 98 MMOL/L (ref 100–108)
CO2 SERPL-SCNC: 28 MMOL/L (ref 21–32)
CREAT SERPL-MCNC: 1.2 MG/DL (ref 0.6–1.3)
EOSINOPHIL # BLD AUTO: 0.13 THOUSAND/ΜL (ref 0–0.61)
EOSINOPHIL NFR BLD AUTO: 1 % (ref 0–6)
ERYTHROCYTE [DISTWIDTH] IN BLOOD BY AUTOMATED COUNT: 17.1 % (ref 11.6–15.1)
GFR SERPL CREATININE-BSD FRML MDRD: 47 ML/MIN/1.73SQ M
GLUCOSE SERPL-MCNC: 169 MG/DL (ref 65–140)
GLUCOSE SERPL-MCNC: 212 MG/DL (ref 65–140)
GLUCOSE SERPL-MCNC: 267 MG/DL (ref 65–140)
GLUCOSE SERPL-MCNC: 73 MG/DL (ref 65–140)
GLUCOSE SERPL-MCNC: 93 MG/DL (ref 65–140)
HCT VFR BLD AUTO: 36.2 % (ref 34.8–46.1)
HGB BLD-MCNC: 11 G/DL (ref 11.5–15.4)
IMM GRANULOCYTES # BLD AUTO: 0.04 THOUSAND/UL (ref 0–0.2)
IMM GRANULOCYTES NFR BLD AUTO: 0 % (ref 0–2)
LYMPHOCYTES # BLD AUTO: 1.27 THOUSANDS/ΜL (ref 0.6–4.47)
LYMPHOCYTES NFR BLD AUTO: 14 % (ref 14–44)
MCH RBC QN AUTO: 26.7 PG (ref 26.8–34.3)
MCHC RBC AUTO-ENTMCNC: 30.4 G/DL (ref 31.4–37.4)
MCV RBC AUTO: 88 FL (ref 82–98)
MONOCYTES # BLD AUTO: 0.54 THOUSAND/ΜL (ref 0.17–1.22)
MONOCYTES NFR BLD AUTO: 6 % (ref 4–12)
NEUTROPHILS # BLD AUTO: 7.09 THOUSANDS/ΜL (ref 1.85–7.62)
NEUTS SEG NFR BLD AUTO: 79 % (ref 43–75)
NRBC BLD AUTO-RTO: 0 /100 WBCS
PLATELET # BLD AUTO: 171 THOUSANDS/UL (ref 149–390)
PMV BLD AUTO: 10.1 FL (ref 8.9–12.7)
POTASSIUM SERPL-SCNC: 4.5 MMOL/L (ref 3.5–5.3)
PROCALCITONIN SERPL-MCNC: 2.37 NG/ML
RBC # BLD AUTO: 4.12 MILLION/UL (ref 3.81–5.12)
SODIUM SERPL-SCNC: 133 MMOL/L (ref 136–145)
WBC # BLD AUTO: 9.11 THOUSAND/UL (ref 4.31–10.16)

## 2022-02-05 PROCEDURE — 84145 PROCALCITONIN (PCT): CPT

## 2022-02-05 PROCEDURE — 99232 SBSQ HOSP IP/OBS MODERATE 35: CPT | Performed by: INTERNAL MEDICINE

## 2022-02-05 PROCEDURE — 82948 REAGENT STRIP/BLOOD GLUCOSE: CPT

## 2022-02-05 PROCEDURE — 80048 BASIC METABOLIC PNL TOTAL CA: CPT

## 2022-02-05 PROCEDURE — 85025 COMPLETE CBC W/AUTO DIFF WBC: CPT

## 2022-02-05 RX ORDER — LACTULOSE 20 G/30ML
10 SOLUTION ORAL DAILY
Status: DISCONTINUED | OUTPATIENT
Start: 2022-02-06 | End: 2022-02-06

## 2022-02-05 RX ORDER — AMLODIPINE BESYLATE 5 MG/1
5 TABLET ORAL DAILY
Status: DISCONTINUED | OUTPATIENT
Start: 2022-02-05 | End: 2022-02-07

## 2022-02-05 RX ORDER — GABAPENTIN 100 MG/1
100 CAPSULE ORAL 2 TIMES DAILY
Status: DISCONTINUED | OUTPATIENT
Start: 2022-02-05 | End: 2022-02-10 | Stop reason: HOSPADM

## 2022-02-05 RX ORDER — SACCHAROMYCES BOULARDII 250 MG
250 CAPSULE ORAL 2 TIMES DAILY
Status: DISCONTINUED | OUTPATIENT
Start: 2022-02-05 | End: 2022-02-10 | Stop reason: HOSPADM

## 2022-02-05 RX ORDER — OXYCODONE HYDROCHLORIDE 5 MG/1
5 TABLET ORAL EVERY 4 HOURS PRN
Status: DISCONTINUED | OUTPATIENT
Start: 2022-02-05 | End: 2022-02-06

## 2022-02-05 RX ORDER — DULOXETIN HYDROCHLORIDE 30 MG/1
30 CAPSULE, DELAYED RELEASE ORAL DAILY
Status: DISCONTINUED | OUTPATIENT
Start: 2022-02-05 | End: 2022-02-10 | Stop reason: HOSPADM

## 2022-02-05 RX ADMIN — HEPARIN SODIUM 5000 UNITS: 5000 INJECTION INTRAVENOUS; SUBCUTANEOUS at 22:05

## 2022-02-05 RX ADMIN — Medication 250 MG: at 18:29

## 2022-02-05 RX ADMIN — HEPARIN SODIUM 5000 UNITS: 5000 INJECTION INTRAVENOUS; SUBCUTANEOUS at 05:26

## 2022-02-05 RX ADMIN — GUAIFENESIN 600 MG: 600 TABLET, EXTENDED RELEASE ORAL at 08:19

## 2022-02-05 RX ADMIN — RIFAXIMIN 550 MG: 550 TABLET ORAL at 08:19

## 2022-02-05 RX ADMIN — INSULIN LISPRO 4 UNITS: 100 INJECTION, SOLUTION INTRAVENOUS; SUBCUTANEOUS at 18:30

## 2022-02-05 RX ADMIN — RIFAXIMIN 550 MG: 550 TABLET ORAL at 22:05

## 2022-02-05 RX ADMIN — AMLODIPINE BESYLATE 5 MG: 5 TABLET ORAL at 18:29

## 2022-02-05 RX ADMIN — HEPARIN SODIUM 5000 UNITS: 5000 INJECTION INTRAVENOUS; SUBCUTANEOUS at 18:29

## 2022-02-05 RX ADMIN — INSULIN LISPRO 3 UNITS: 100 INJECTION, SOLUTION INTRAVENOUS; SUBCUTANEOUS at 22:06

## 2022-02-05 RX ADMIN — GABAPENTIN 100 MG: 100 CAPSULE ORAL at 18:29

## 2022-02-05 RX ADMIN — GUAIFENESIN 600 MG: 600 TABLET, EXTENDED RELEASE ORAL at 18:29

## 2022-02-05 RX ADMIN — INSULIN LISPRO 2 UNITS: 100 INJECTION, SOLUTION INTRAVENOUS; SUBCUTANEOUS at 12:57

## 2022-02-05 RX ADMIN — DULOXETINE HYDROCHLORIDE 30 MG: 30 CAPSULE, DELAYED RELEASE ORAL at 18:29

## 2022-02-05 NOTE — PROGRESS NOTES
[facility]  Progress Note - Farrah Human 1955, 73 y o  female MRN: [id number]  Unit/Bed#: S -01 Encounter: [id number]  Primary Care Provider: Lester Gallagher MD   Date and time admitted to hospital: 2/3/2022  4:25 PM    Sepsis [facility])  Assessment & Plan  POA as evidenced by tachycardia, leukocytosis (elevated form patient baseline), tachypnea    Suspected source: Pneumonia   Clx: Blood and sputum NTD   Trend PCT   Abx: ceftriaxone and doxycycline IV       Pneumonia  Assessment & Plan  Symptoms: shortness of breath at rest, anorexia and confusion    Lab Results   Component Value Date    SARSCOV2 Positive (A) 02/03/2022    SARSCOV2 Positive (A) 01/03/2022       XR chest 1 view portable    Result Date: 2/3/2022  Impression: Left lung base, possibly milder right lung base airspace disease  Possible left effusion findings are most suspicious for infection  CT head without contrast    Result Date: 2/3/2022  Impression: No acute intracranial abnormality  Recent Labs     02/03/22  1701   WBC 9 61     Recent Labs     02/03/22  1701   PROCALCITONI 1 42*        Plan:  · Ceftriaxone 1 g IV q  24 hours  · Doxycycline IV   · Guaifenesin 600 mg q 12 hours; increase as needed to 1200 mg q12 h  · Incentive spirometry      Acute kidney injury superimposed on CKD [facility])  Assessment & Plan  Recent Labs     02/03/22  1701   CREATININE 1 89*   EGFR 27     Estimated Creatinine Clearance: 30 6 mL/min (A) (by C-G formula based on SCr of 1 89 mg/dL (H))   POA1 89; (baseline 1 2-1 4)   Likely prerenal in setting of decreased oral intake and acute infection    Plan:   UA w/ microscopic, Urinary retention protocol, Bladder scan, Daily weights, I/O   Resolved after IVF   Avoid hypoperfusion of the kidneys, minimize nephrotoxins   RAAS Blockers/Diuretics held: lisinopril      Metabolic encephalopathy  Assessment & Plan  · In setting of acute hypercapnic hypoxic respiratory failure secondary to pneumonia meeting sepsis criteria vs hepatic encephalopathy vs opioid side effect   · Patient reports that she has been taking 30 mg oxycodone q6 hours at home  · Presented in setting of ARF and placed on BiPAP with some improvement in mentation  · Noted history of cirrhosis with ammonia measured at 36 POA   · Now fully alert and wake        Cirrhosis ([address] )  Assessment & Plan  · Documented history   · Ammonia level 36 down from 50 on prior admission one month ago  · Innumerous BM today per nursing  Will hold lactulose  Repeat Ammonia in Am  She is alert and awake  Continue Xifaximin       Type 2 diabetes mellitus with hyperglycemia, with long-term current use of insulin [facility])  Assessment & Plan  Lab Results   Component Value Date    HGBA1C 7 5 (H) 01/04/2022       Recent Labs     02/03/22  1627   POCGLU 154*       Blood Sugar Average: Last 72 hrs:  (P) 154     Home Regimen: Lantus HS with SSI    Plan:   Hold oral antihyperglycemics   Diet Consistent CHO Level 2 (5 CHO servings/75g CHO per meal)   Insulin regimen at home :Lantus 20 units HS  o SSI while inpatient  o Insulin correction ACHS      Essential hypertension  Assessment & Plan  · /70  · Continue amlodipine   Medications held: lisinopril for YVONNE    Monitor blood pressure      * Acute respiratory failure with hypoxia and hypercapnia (HCC)  Assessment & Plan  · O2 saturation in mid 80's on NRB on presentation from EMS  · Recently discharged from hospital from COVID pneumonia (Tested + 1/3/22)   · Initiated on BiPAP 12/6 and saturations corrected to 95-97%   · Initial ABG showed pH of 7 338 with pCO2 of 52 2   · CXR bilateral basilar pneumonia  PCT elevated  Most likely due to Pneumonia  Clinical improvement after treatment for pneumonia  No pleuritic chest pain etc to suggest possible PE  Venous doppler attempted by vascular tech  Pt couldn't even tolerate the transducer touching her legs          Hyperkalemia-resolved as of 2022  Assessment & Plan  Recent Labs     22  1701 22  2108   K 5 5* 6 1*       Workup:  · Etiology: Unclear, iImpaired renal function noted on intake  · Normalized now after IVF      VTE Pharmacologic Prophylaxis:   Pharmacologic: Heparin  Mechanical VTE Prophylaxis in Place: Yes    Patient Centered Rounds:     Discussions with Specialists or Other Care Team Provider:     Education and Discussions with Family / Patient: Spoke with Pt's  at length     Time Spent for Care: 20 minutes  More than 50% of total time spent on counseling and coordination of care as described above  Current Length of Stay: 2 day(s)    Current Patient Status: Inpatient   Certification Statement: The patient will continue to require additional inpatient hospital stay due to Close monitoring/PT and OT eval     Discharge Plan: Pending PT and OT eval     Code Status: Level 1 - Full Code      Subjective:   Pt ia awake and alert this morning  Denies abd pain  C/o multiple diarrhea after taking lactulose  Pt has not been on lactulose at home for the same reason  Objective:     Vitals:   Temp (24hrs), Av 1 °F (36 7 °C), Min:98 °F (36 7 °C), Max:98 2 °F (36 8 °C)    Temp:  [98 °F (36 7 °C)-98 2 °F (36 8 °C)] 98 2 °F (36 8 °C)  HR:  [] 102  Resp:  [15-19] 19  BP: (141-170)/(70-80) 170/80  SpO2:  [90 %-94 %] 94 %  Body mass index is 35 16 kg/m²  Input and Output Summary (last 24 hours): Intake/Output Summary (Last 24 hours) at 2022 1438  Last data filed at 2022 1300  Gross per 24 hour   Intake 590 ml   Output 1150 ml   Net -560 ml       Physical Exam:     Physical Exam  Constitutional:       General: She is not in acute distress  Appearance: She is not ill-appearing, toxic-appearing or diaphoretic  Eyes:      General:         Right eye: No discharge  Left eye: No discharge  Cardiovascular:      Rate and Rhythm: Normal rate and regular rhythm  Pulses: Normal pulses  Heart sounds: No murmur heard  Pulmonary:      Effort: Pulmonary effort is normal  No respiratory distress  Breath sounds: Normal breath sounds  No wheezing  Abdominal:      General: Abdomen is flat  Bowel sounds are normal  There is no distension  Palpations: Abdomen is soft  Musculoskeletal:         General: No swelling  Skin:     General: Skin is warm  Neurological:      Mental Status: She is oriented to person, place, and time  Psychiatric:         Mood and Affect: Mood normal          Additional Data:     Labs:    Results from last 7 days   Lab Units 02/05/22  0525   WBC Thousand/uL 9 11   HEMOGLOBIN g/dL 11 0*   HEMATOCRIT % 36 2   PLATELETS Thousands/uL 171   NEUTROS PCT % 79*   LYMPHS PCT % 14   MONOS PCT % 6   EOS PCT % 1     Results from last 7 days   Lab Units 02/05/22  0524 02/04/22  0507 02/04/22  0507 02/04/22  0032 02/03/22  1701   POTASSIUM mmol/L 4 5   < > 5 2  --    < >   CHLORIDE mmol/L 98*   < > 104  --    < >   CO2 mmol/L 28   < > 30  --    < >   CO2, I-STAT mmol/L  --   --   --  28  --    BUN mg/dL 22   < > 27*  --    < >   CREATININE mg/dL 1 20   < > 1 68*  --    < >   CALCIUM mg/dL 8 4   < > 7 7*  --    < >   ALK PHOS U/L  --   --  107  --    < >   ALT U/L  --   --  17  --    < >   AST U/L  --   --  25  --    < >   GLUCOSE, ISTAT mg/dl  --   --   --  89  --     < > = values in this interval not displayed  Results from last 7 days   Lab Units 02/03/22  1701   INR  1 14         Recent Cultures (last 7 days):     Results from last 7 days   Lab Units 02/04/22  0423 02/03/22  1701   BLOOD CULTURE   --  No Growth at 24 hrs  No Growth at 24 hrs     LEGIONELLA URINARY ANTIGEN  Negative  --        Last 24 Hours Medication List:   Current Facility-Administered Medications   Medication Dose Route Frequency Provider Last Rate    acetaminophen  975 mg Oral Q6H PRN Almita Christie PA-C      albuterol  2 puff Inhalation Q4H PRN Pillo Campos DO      amLODIPine  5 mg Oral Daily Parish Toscano MD      cefTRIAXone  2,000 mg Intravenous Q24H [employer], DO 2,000 mg (02/04/22 2046)    DULoxetine  30 mg Oral Daily Parish Toscano MD      gabapentin  100 mg Oral BID Parish Toscano MD      guaiFENesin  600 mg Oral BID [employer], DO      heparin (porcine)  5,000 Units Subcutaneous Q8H [address], DO      insulin lispro  1-6 Units Subcutaneous HS Yuliya Oquendo PAIGNACIO      insulin lispro  2-12 Units Subcutaneous TID  Yuliya Oquendo [place]      [START ON 2/6/2022] lactulose  10 g Oral Daily Parish Toscano MD      ondansetron  4 mg Intravenous Q4H PRN [employer], DO      oxyCODONE  5 mg Oral Q4H PRN Parish Toscano MD      rifaximin  550 mg Oral Q12H [address] Parish Toscano MD      saccharomyces boulardii  250 mg Oral BID Parish Toscano MD      sodium chloride (PF)  3 mL Intravenous Q1H PRN Amira Campos DO       Facility-Administered Medications Ordered in Other Encounters   Medication Dose Route Frequency Provider Last Rate    ferrous sulfate  325 mg Oral Daily With Breakfast Sheyla Montoya MD          Today, Patient Was Seen By: Parish Toscano MD    ** Please Note: Dictation voice to text software may have been used in the creation of this document   **

## 2022-02-05 NOTE — PLAN OF CARE
Problem: Potential for Falls  Goal: Patient will remain free of falls  Description: INTERVENTIONS:  - Educate patient/family on patient safety including physical limitations  - Instruct patient to call for assistance with activity   - Consult OT/PT to assist with strengthening/mobility   - Keep Call bell within reach  - Keep bed low and locked with side rails adjusted as appropriate  - Keep care items and personal belongings within reach  - Initiate and maintain comfort rounds  - Make Fall Risk Sign visible to staff  - Offer Toileting every 2 Hours, in advance of need  - Initiate/Maintain Bed/ Chair alarm  - Obtain necessary fall risk management equipment: Alarms  - Apply yellow socks and bracelet for high fall risk patients  - Consider moving patient to room near nurses station  Outcome: Progressing     Problem: MOBILITY - ADULT  Goal: Maintain or return to baseline ADL function  Description: INTERVENTIONS:  -  Assess patient's ability to carry out ADLs; assess patient's baseline for ADL function and identify physical deficits which impact ability to perform ADLs (bathing, care of mouth/teeth, toileting, grooming, dressing, etc )  - Assess/evaluate cause of self-care deficits   - Assess range of motion  - Assess patient's mobility; develop plan if impaired  - Assess patient's need for assistive devices and provide as appropriate  - Encourage maximum independence but intervene and supervise when necessary  - Involve family in performance of ADLs  - Assess for home care needs following discharge   - Consider OT consult to assist with ADL evaluation and planning for discharge  - Provide patient education as appropriate  Outcome: Progressing  Goal: Maintains/Returns to pre admission functional level  Description: INTERVENTIONS:  - Perform BMAT or MOVE assessment daily    - Set and communicate daily mobility goal to care team and patient/family/caregiver     - Collaborate with rehabilitation services on mobility goals if consulted  - Perform Range of Motion 2 times a day  - Reposition patient every 2 hours    - Dangle patient 2 times a day  - Stand patient 2 times a day  - Ambulate patient 2 times a day  - Out of bed to chair 2 times a day   - Out of bed for meals 2 times a day  - Out of bed for toileting  - Record patient progress and toleration of activity level   Outcome: Progressing     Problem: Prexisting or High Potential for Compromised Skin Integrity  Goal: Skin integrity is maintained or improved  Description: INTERVENTIONS:  - Identify patients at risk for skin breakdown  - Assess and monitor skin integrity  - Assess and monitor nutrition and hydration status  - Monitor labs   - Assess for incontinence   - Turn and reposition patient  - Assist with mobility/ambulation  - Relieve pressure over bony prominences  - Avoid friction and shearing  - Provide appropriate hygiene as needed including keeping skin clean and dry  - Evaluate need for skin moisturizer/barrier cream  - Collaborate with interdisciplinary team   - Patient/family teaching  - Consider wound care consult   Outcome: Progressing

## 2022-02-05 NOTE — PLAN OF CARE
Problem: Potential for Falls  Goal: Patient will remain free of falls  Description: INTERVENTIONS:  - Educate patient/family on patient safety including physical limitations  - Instruct patient to call for assistance with activity   - Consult OT/PT to assist with strengthening/mobility   - Keep Call bell within reach  - Keep bed low and locked with side rails adjusted as appropriate  - Keep care items and personal belongings within reach  - Initiate and maintain comfort rounds  - Make Fall Risk Sign visible to staff  - Offer Toileting every 2 Hours, in advance of need  - Initiate/Maintain Bed/ Chair alarm  - Obtain necessary fall risk management equipment: Alarms  - Apply yellow socks and bracelet for high fall risk patients  - Consider moving patient to room near nurses station  Outcome: Progressing     Problem: MOBILITY - ADULT  Goal: Maintain or return to baseline ADL function  Description: INTERVENTIONS:  -  Assess patient's ability to carry out ADLs; assess patient's baseline for ADL function and identify physical deficits which impact ability to perform ADLs (bathing, care of mouth/teeth, toileting, grooming, dressing, etc )  - Assess/evaluate cause of self-care deficits   - Assess range of motion  - Assess patient's mobility; develop plan if impaired  - Assess patient's need for assistive devices and provide as appropriate  - Encourage maximum independence but intervene and supervise when necessary  - Involve family in performance of ADLs  - Assess for home care needs following discharge   - Consider OT consult to assist with ADL evaluation and planning for discharge  - Provide patient education as appropriate  Outcome: Progressing  Goal: Maintains/Returns to pre admission functional level  Description: INTERVENTIONS:  - Perform BMAT or MOVE assessment daily    - Set and communicate daily mobility goal to care team and patient/family/caregiver     - Collaborate with rehabilitation services on mobility goals if consulted  - Perform Range of Motion 2 times a day  - Reposition patient every 2 hours  - Dangle patient 2 times a day  - Stand patient 2 times a day  - Ambulate patient 2 times a day  - Out of bed to chair 2 times a day   - Out of bed for meals 2 times a day  - Out of bed for toileting  - Record patient progress and toleration of activity level   Outcome: Progressing     Problem: Prexisting or High Potential for Compromised Skin Integrity  Goal: Skin integrity is maintained or improved  Description: INTERVENTIONS:  - Identify patients at risk for skin breakdown  - Assess and monitor skin integrity  - Assess and monitor nutrition and hydration status  - Monitor labs   - Assess for incontinence   - Turn and reposition patient  - Assist with mobility/ambulation  - Relieve pressure over bony prominences  - Avoid friction and shearing  - Provide appropriate hygiene as needed including keeping skin clean and dry  - Evaluate need for skin moisturizer/barrier cream  - Collaborate with interdisciplinary team   - Patient/family teaching  - Consider wound care consult   Outcome: Progressing     Problem: Nutrition/Hydration-ADULT  Goal: Nutrient/Hydration intake appropriate for improving, restoring or maintaining nutritional needs  Description: Monitor and assess patient's nutrition/hydration status for malnutrition  Collaborate with interdisciplinary team and initiate plan and interventions as ordered  Monitor patient's weight and dietary intake as ordered or per policy  Utilize nutrition screening tool and intervene as necessary  Determine patient's food preferences and provide high-protein, high-caloric foods as appropriate       INTERVENTIONS:  - Monitor oral intake, urinary output, labs, and treatment plans  - Assess nutrition and hydration status and recommend course of action  - Evaluate amount of meals eaten  - Assist patient with eating if necessary   - Allow adequate time for meals  - Recommend/ encourage appropriate diets, oral nutritional supplements, and vitamin/mineral supplements  - Order, calculate, and assess calorie counts as needed  - Recommend, monitor, and adjust tube feedings and TPN/PPN based on assessed needs  - Assess need for intravenous fluids  - Provide specific nutrition/hydration education as appropriate  - Include patient/family/caregiver in decisions related to nutrition  Outcome: Progressing

## 2022-02-05 NOTE — CASE MANAGEMENT
Case Management Assessment & Discharge Planning Note    Patient name Kelvin Museamento  Location S /S -21 MRN [id number]  : 1955 Date 2022       Current Admission Date: 2/3/2022  Current Admission Diagnosis:Acute respiratory failure with hypoxia and hypercapnia [facility])   Patient Active Problem List    Diagnosis Date Noted    [facility] and [facility] ) 2022    Acute kidney injury superimposed on CKD ([facility] ) 2022    Pneumonia 2022    Sepsis ([facility] ) 2022    Pressure injury of skin of buttock     Metabolic encephalopathy     Acute [facility] failure with hypoxia [facility] ) 2022    Fecal occult blood test positive 2022    COVID-19 2022    Elevated serum creatinine 2022    Urge incontinence 2021    Ex-smoker 2021    Syncope 07/15/2020    Cirrhosis ([facility] ) 07/15/2020    Splenomegaly 07/15/2020    CAD (coronary artery disease) 07/15/2020    Other microscopic hematuria 2020    Spondylosis of lumbar joint 05/15/2020    Marijuana use 2020    Left hip pain 2020    Hyperkalemia 2018    Chronic pain syndrome 2018    Peripheral neuropathy 2018    Type 2 diabetes mellitus with hyperglycemia, with long-term current use of insulin ([facility] ) 2018    Chronic venous stasis dermatitis of both lower extremities 2017    Venous insufficiency (chronic) (peripheral) 2017    Essential hypertension       LOS (days): 2  Geometric Mean LOS (GMLOS) (days): 4 80  Days to GMLOS:3 2     OBJECTIVE:  PATIENT READMITTED TO HOSPITAL  Risk of Unplanned Readmission Score: 20         Current admission status: Inpatient  Referral Reason: VNA    Preferred Pharmacy:   [address] [place] [address], [address]  [address] [facility] [address]  Phone: [phone] Fax: [phone]    Primary Care Provider: Scar Lee Alex Vazquez MD    Primary Insurance: MEDICARE  Secondary Insurance: [address]:  [address] [facility] Representative - Spouse   Primary Phone: [phone] (Mobile)  Home Phone: [phone]               Advance Directives  Does patient have a [address]?: No  Was patient offered paperwork?: Yes  Does patient currently have a Health Care decision maker?: Yes, please see Health Care Proxy section  Does patient have Advance Directives?: No  Was patient offered paperwork?: Yes  Primary Contact: , Skye Gupta         Readmission Root Cause  30 Day Readmission: Yes  Who directed you to return to the hospital?: Family  Did you understand whom to contact if you had questions or problems?: Yes  Did you get your prescriptions before you left the hospital?: No  Reason[de-identified] Preference for own pharmacy  Were you able to get your prescriptions filled when you left the hospital?: Yes  Did you take your medications as prescribed?: Yes  Were you able to get to your follow-up appointments?: No  Reason[de-identified] Readmitted prior to appointment  During previous admission, was a post-acute recommendation made?: Yes  What post-acute resources were offered?: Corine Smith  Patient was readmitted due to: syncope, sats in 80's, AMS  Action Plan: pt will return home with  and daughter as support with [address] [place] [address]     Patient Information  Admitted from[de-identified] Home  Mental Status: Confused  During Assessment patient was accompanied by: Not accompanied during assessment  Assessment information provided by[de-identified] Spouse  Primary Caregiver: Family  Caregiver's Name[de-identified] Skye Gupta,   Caregiver's Relationship to Patient[de-identified] Family Member  Support Systems: Spouse/significant other,Daughter,Family members  [place] of Residence: [address] [facility] do you live in?: [place] entry access options   Select all that apply : No steps to enter home  Type of Current Residence: 2 story home  Upon entering residence, is there a bedroom on the main floor (no further steps)?: No (pt sleeps in recliner)  A bedroom is located on the following floor levels of residence (select all that apply):: 2nd Floor  Upon entering residence, is there a bathroom on the main floor (no further steps)?: No  Indicate which floors of current residence have a bathroom (select all the apply):: 2nd Floor  Number of steps to 2nd floor from main floor: One Flight  In the last 12 months, was there a time when you were not able to pay the mortgage or rent on time?: No  In the last 12 months, was there a time when you did not have a steady place to sleep or slept in a shelter (including now)?: No  Living Arrangements: Lives w/ Spouse/significant other  Is patient a ?: No    Activities of Daily Living Prior to Admission  Functional Status: Assistance  Completes ADLs independently?: No  Level of ADL dependence: Assistance  Ambulates independently?: Yes  Does patient use assisted devices?: Yes  Assisted Devices (DME) used: Rollator  Does patient currently own DME?: Yes  What DME does the patient currently own?: Rollator  Does patient have a history of Outpatient Therapy (PT/OT)?: No  Does the patient have a history of Short-Term Rehab?: Yes  Does patient have a history of HHC?: Yes ([address] [place] [address], [facility] )  Does patient currently have [facility]?: Yes    Current Home Health Care  Type of Current Home Care Services: Home OT,Home PT,Nurse visit  [address] [place][de-identified] Other (please enter name in comment) ([facility])  [facility] Provider[de-identified] PCP    Patient Information Continued  Income Source: SSI/SSD  Does patient have prescription coverage?: Yes  Within the past 12 months, you worried that your food would run out before you got the money to buy more : Never true  Within the past 12 months, the food you bought just didnt last and you didnt have money to get more : Never true  Food insecurity resource given?: N/A  Does patient receive dialysis treatments?: No  Does patient have a history of substance abuse?: No  Does patient have a history of Mental Health Diagnosis?: No         Means of Transportation  Means of Transport to Appts[de-identified] Family transport  In the past 12 months, has lack of transportation kept you from medical appointments or from getting medications?: No  In the past 12 months, has lack of transportation kept you from meetings, work, or from getting things needed for daily living?: No  Was application for public transport provided?: N/A        DISCHARGE DETAILS:    Discharge planning discussed with[de-identified] family  Freedom of Choice: Yes  Comments - Freedom of Choice: CM reviewed with family the care team recommendations for VNA services at ID  Pt is current with [address] [place] [address] and family would like for them to resume care for pt    CM contacted family/caregiver?: Yes  Were Treatment Team discharge recommendations reviewed with patient/caregiver?: Yes  Did patient/caregiver verbalize understanding of patient care needs?: Yes  Were patient/caregiver advised of the risks associated with not following Treatment Team discharge recommendations?: Yes    Contacts  Patient Contacts: spouse, Jennifer Hung  Relationship to Patient[de-identified] Family  Contact Method: Phone  Reason/Outcome: Continuity of [address] Planning,Referral,Emergency [address]         Is the patient interested in [facility] at discharge?: Yes  [address] requested[de-identified] [place] Name[de-identified] Other ([address] [place] [address])  [address] Provider[de-identified] PCP  Home Health Services Needed[de-identified] Other (comment),Strengthening/Theraputic Exercises to Improve Function,Wound/Ostomy Care,Gait/ADL Training,Evaluate Functional Status and Safety,Diabetes Management  Homebound Criteria Met[de-identified] Uses an Assist Device (i e  cane, walker, etc),Immunosuppressed  Supporting Clincal Findings[de-identified] Limited Endurance    DME Referral Provided  Referral made for DME?: No    Other Referral/Resources/Interventions Provided:  Interventions: HHC  Referral Comments: Referral has been made to [address] [place], [place] [address]c for [facility] as pt is current with their services  FAmily would like to have the services continued           Treatment Team Recommendation: Home with [address] [facility]  Discharge Destination Plan[de-identified] Home with [address] [facility]

## 2022-02-05 NOTE — PLAN OF CARE
Problem: Potential for Falls  Goal: Patient will remain free of falls  Description: INTERVENTIONS:  - Educate patient/family on patient safety including physical limitations  - Instruct patient to call for assistance with activity   - Consult OT/PT to assist with strengthening/mobility   - Keep Call bell within reach  - Keep bed low and locked with side rails adjusted as appropriate  - Keep care items and personal belongings within reach  - Initiate and maintain comfort rounds  - Make Fall Risk Sign visible to staff  - Offer Toileting every  Hours, in advance of need  - Initiate/Maintain alarm  - Obtain necessary fall risk management equipment:   - Apply yellow socks and bracelet for high fall risk patients  - Consider moving patient to room near nurses station  Outcome: Progressing     Problem: MOBILITY - ADULT  Goal: Maintain or return to baseline ADL function  Description: INTERVENTIONS:  -  Assess patient's ability to carry out ADLs; assess patient's baseline for ADL function and identify physical deficits which impact ability to perform ADLs (bathing, care of mouth/teeth, toileting, grooming, dressing, etc )  - Assess/evaluate cause of self-care deficits   - Assess range of motion  - Assess patient's mobility; develop plan if impaired  - Assess patient's need for assistive devices and provide as appropriate  - Encourage maximum independence but intervene and supervise when necessary  - Involve family in performance of ADLs  - Assess for home care needs following discharge   - Consider OT consult to assist with ADL evaluation and planning for discharge  - Provide patient education as appropriate  Outcome: Progressing  Goal: Maintains/Returns to pre admission functional level  Description: INTERVENTIONS:  - Perform BMAT or MOVE assessment daily    - Set and communicate daily mobility goal to care team and patient/family/caregiver     - Collaborate with rehabilitation services on mobility goals if consulted  - Perform Range of Motio times a day  - Reposition patient every  hours    - Dangle patient  times a day  - Stand patient  times a day  - Ambulate patient  times a day  - Out of bed to chair  times a day   - Out of bed for meals  times a day  - Out of bed for toileting  - Record patient progress and toleration of activity level   Outcome: Progressing     Problem: Prexisting or High Potential for Compromised Skin Integrity  Goal: Skin integrity is maintained or improved  Description: INTERVENTIONS:  - Identify patients at risk for skin breakdown  - Assess and monitor skin integrity  - Assess and monitor nutrition and hydration status  - Monitor labs   - Assess for incontinence   - Turn and reposition patient  - Assist with mobility/ambulation  - Relieve pressure over bony prominences  - Avoid friction and shearing  - Provide appropriate hygiene as needed including keeping skin clean and dry  - Evaluate need for skin moisturizer/barrier cream  - Collaborate with interdisciplinary team   - Patient/family teaching  - Consider wound care consult   Outcome: Progressing

## 2022-02-06 ENCOUNTER — APPOINTMENT (INPATIENT)
Dept: CT IMAGING | Facility: HOSPITAL | Age: 67
DRG: 871 | End: 2022-02-06
Payer: MEDICARE

## 2022-02-06 PROBLEM — R29.898 LEFT ARM WEAKNESS: Status: ACTIVE | Noted: 2022-02-06

## 2022-02-06 PROBLEM — D64.9 ANEMIA: Status: ACTIVE | Noted: 2022-02-06

## 2022-02-06 LAB
ALBUMIN SERPL BCP-MCNC: 2.4 G/DL (ref 3.5–5)
ALP SERPL-CCNC: 131 U/L (ref 46–116)
ALT SERPL W P-5'-P-CCNC: 20 U/L (ref 12–78)
AMMONIA PLAS-SCNC: 17 UMOL/L (ref 11–35)
ANION GAP SERPL CALCULATED.3IONS-SCNC: 5 MMOL/L (ref 4–13)
AST SERPL W P-5'-P-CCNC: 25 U/L (ref 5–45)
BASOPHILS # BLD AUTO: 0.02 THOUSANDS/ΜL (ref 0–0.1)
BASOPHILS NFR BLD AUTO: 0 % (ref 0–1)
BILIRUB SERPL-MCNC: 0.49 MG/DL (ref 0.2–1)
BUN SERPL-MCNC: 13 MG/DL (ref 5–25)
CALCIUM ALBUM COR SERPL-MCNC: 9.6 MG/DL (ref 8.3–10.1)
CALCIUM SERPL-MCNC: 8.3 MG/DL (ref 8.3–10.1)
CHLORIDE SERPL-SCNC: 103 MMOL/L (ref 100–108)
CO2 SERPL-SCNC: 28 MMOL/L (ref 21–32)
CREAT SERPL-MCNC: 1.02 MG/DL (ref 0.6–1.3)
EOSINOPHIL # BLD AUTO: 0.08 THOUSAND/ΜL (ref 0–0.61)
EOSINOPHIL NFR BLD AUTO: 2 % (ref 0–6)
ERYTHROCYTE [DISTWIDTH] IN BLOOD BY AUTOMATED COUNT: 17.2 % (ref 11.6–15.1)
GFR SERPL CREATININE-BSD FRML MDRD: 57 ML/MIN/1.73SQ M
GLUCOSE SERPL-MCNC: 143 MG/DL (ref 65–140)
GLUCOSE SERPL-MCNC: 158 MG/DL (ref 65–140)
GLUCOSE SERPL-MCNC: 179 MG/DL (ref 65–140)
GLUCOSE SERPL-MCNC: 316 MG/DL (ref 65–140)
GLUCOSE SERPL-MCNC: 83 MG/DL (ref 65–140)
GLUCOSE SERPL-MCNC: 95 MG/DL (ref 65–140)
HCT VFR BLD AUTO: 31.8 % (ref 34.8–46.1)
HGB BLD-MCNC: 9.7 G/DL (ref 11.5–15.4)
IMM GRANULOCYTES # BLD AUTO: 0.03 THOUSAND/UL (ref 0–0.2)
IMM GRANULOCYTES NFR BLD AUTO: 1 % (ref 0–2)
LYMPHOCYTES # BLD AUTO: 1.02 THOUSANDS/ΜL (ref 0.6–4.47)
LYMPHOCYTES NFR BLD AUTO: 20 % (ref 14–44)
MCH RBC QN AUTO: 26.5 PG (ref 26.8–34.3)
MCHC RBC AUTO-ENTMCNC: 30.5 G/DL (ref 31.4–37.4)
MCV RBC AUTO: 87 FL (ref 82–98)
MONOCYTES # BLD AUTO: 0.28 THOUSAND/ΜL (ref 0.17–1.22)
MONOCYTES NFR BLD AUTO: 5 % (ref 4–12)
NEUTROPHILS # BLD AUTO: 3.78 THOUSANDS/ΜL (ref 1.85–7.62)
NEUTS SEG NFR BLD AUTO: 72 % (ref 43–75)
NRBC BLD AUTO-RTO: 0 /100 WBCS
PLATELET # BLD AUTO: 147 THOUSANDS/UL (ref 149–390)
PMV BLD AUTO: 9.6 FL (ref 8.9–12.7)
POTASSIUM SERPL-SCNC: 4.6 MMOL/L (ref 3.5–5.3)
PROCALCITONIN SERPL-MCNC: 1.38 NG/ML
PROT SERPL-MCNC: 6.3 G/DL (ref 6.4–8.2)
RBC # BLD AUTO: 3.66 MILLION/UL (ref 3.81–5.12)
SODIUM SERPL-SCNC: 136 MMOL/L (ref 136–145)
WBC # BLD AUTO: 5.21 THOUSAND/UL (ref 4.31–10.16)

## 2022-02-06 PROCEDURE — 82948 REAGENT STRIP/BLOOD GLUCOSE: CPT

## 2022-02-06 PROCEDURE — 80053 COMPREHEN METABOLIC PANEL: CPT | Performed by: INTERNAL MEDICINE

## 2022-02-06 PROCEDURE — 85025 COMPLETE CBC W/AUTO DIFF WBC: CPT | Performed by: INTERNAL MEDICINE

## 2022-02-06 PROCEDURE — 99232 SBSQ HOSP IP/OBS MODERATE 35: CPT | Performed by: INTERNAL MEDICINE

## 2022-02-06 PROCEDURE — 72125 CT NECK SPINE W/O DYE: CPT

## 2022-02-06 PROCEDURE — 82140 ASSAY OF AMMONIA: CPT | Performed by: INTERNAL MEDICINE

## 2022-02-06 PROCEDURE — G1004 CDSM NDSC: HCPCS

## 2022-02-06 PROCEDURE — 84145 PROCALCITONIN (PCT): CPT

## 2022-02-06 RX ORDER — PANTOPRAZOLE SODIUM 40 MG/1
40 TABLET, DELAYED RELEASE ORAL
Status: DISCONTINUED | OUTPATIENT
Start: 2022-02-06 | End: 2022-02-10 | Stop reason: HOSPADM

## 2022-02-06 RX ORDER — OXYCODONE HYDROCHLORIDE 10 MG/1
10 TABLET ORAL EVERY 4 HOURS PRN
Status: DISCONTINUED | OUTPATIENT
Start: 2022-02-06 | End: 2022-02-07

## 2022-02-06 RX ADMIN — INSULIN LISPRO 1 UNITS: 100 INJECTION, SOLUTION INTRAVENOUS; SUBCUTANEOUS at 22:33

## 2022-02-06 RX ADMIN — OXYCODONE HYDROCHLORIDE 5 MG: 5 TABLET ORAL at 06:09

## 2022-02-06 RX ADMIN — OXYCODONE HYDROCHLORIDE 10 MG: 10 TABLET ORAL at 22:33

## 2022-02-06 RX ADMIN — HEPARIN SODIUM 5000 UNITS: 5000 INJECTION INTRAVENOUS; SUBCUTANEOUS at 17:03

## 2022-02-06 RX ADMIN — GABAPENTIN 100 MG: 100 CAPSULE ORAL at 10:47

## 2022-02-06 RX ADMIN — INSULIN LISPRO 8 UNITS: 100 INJECTION, SOLUTION INTRAVENOUS; SUBCUTANEOUS at 12:47

## 2022-02-06 RX ADMIN — INSULIN LISPRO 2 UNITS: 100 INJECTION, SOLUTION INTRAVENOUS; SUBCUTANEOUS at 10:49

## 2022-02-06 RX ADMIN — CEFTRIAXONE SODIUM 2000 MG: 10 INJECTION, POWDER, FOR SOLUTION INTRAVENOUS at 17:04

## 2022-02-06 RX ADMIN — RIFAXIMIN 550 MG: 550 TABLET ORAL at 22:33

## 2022-02-06 RX ADMIN — GUAIFENESIN 600 MG: 600 TABLET, EXTENDED RELEASE ORAL at 10:47

## 2022-02-06 RX ADMIN — DULOXETINE HYDROCHLORIDE 30 MG: 30 CAPSULE, DELAYED RELEASE ORAL at 10:47

## 2022-02-06 RX ADMIN — RIFAXIMIN 550 MG: 550 TABLET ORAL at 10:47

## 2022-02-06 RX ADMIN — PANTOPRAZOLE SODIUM 40 MG: 40 TABLET, DELAYED RELEASE ORAL at 12:47

## 2022-02-06 RX ADMIN — GUAIFENESIN 600 MG: 600 TABLET, EXTENDED RELEASE ORAL at 17:04

## 2022-02-06 RX ADMIN — OXYCODONE HYDROCHLORIDE 10 MG: 10 TABLET ORAL at 17:04

## 2022-02-06 RX ADMIN — HEPARIN SODIUM 5000 UNITS: 5000 INJECTION INTRAVENOUS; SUBCUTANEOUS at 04:57

## 2022-02-06 RX ADMIN — ACETAMINOPHEN 975 MG: 325 TABLET, FILM COATED ORAL at 17:04

## 2022-02-06 RX ADMIN — Medication 250 MG: at 17:04

## 2022-02-06 RX ADMIN — AMLODIPINE BESYLATE 5 MG: 5 TABLET ORAL at 10:47

## 2022-02-06 RX ADMIN — GABAPENTIN 100 MG: 100 CAPSULE ORAL at 17:04

## 2022-02-06 RX ADMIN — Medication 250 MG: at 10:47

## 2022-02-06 RX ADMIN — OXYCODONE HYDROCHLORIDE 5 MG: 5 TABLET ORAL at 12:47

## 2022-02-06 RX ADMIN — HEPARIN SODIUM 5000 UNITS: 5000 INJECTION INTRAVENOUS; SUBCUTANEOUS at 22:33

## 2022-02-06 NOTE — PROGRESS NOTES
[facility]  Progress Note - Radha Morales 1955, 73 y o  female MRN: [id number]  Unit/Bed#: S -01 Encounter: [id number]  Primary Care Provider: Quincy Valle MD   Date and time admitted to hospital: 2/3/2022  4:25 PM    * Acute respiratory failure with hypoxia and hypercapnia (HCC)  Assessment & Plan  · O2 saturation in mid 80's on NRB on presentation from EMS  · Recently discharged from hospital from COVID pneumonia (Tested + 1/3/22)   · Initiated on BiPAP 12/6 and saturations corrected to 95-97%   · Initial ABG showed pH of 7 338 with pCO2 of 52 2   · CXR bilateral basilar pneumonia  PCT elevated  Most likely due to Pneumonia  Clinical improvement after treatment for pneumonia  No pleuritic chest pain etc to suggest possible PE  Venous doppler attempted by vascular tech  Pt couldn't even tolerate the transducer touching her legs  Pneumonia  Assessment & Plan  Symptoms: shortness of breath at rest, anorexia and confusion    Lab Results   Component Value Date    SARSCOV2 Positive (A) 02/03/2022    SARSCOV2 Positive (A) 01/03/2022   CXR: Left lung base, possibly milder right lung base airspace disease  Possible left effusion findings are most suspicious for infection  Recent Labs     02/04/22  0507 02/05/22  0524 02/06/22  0628   PROCALCITONI 4 56* 2 37* 1 38*        Plan:  · Consider bacterial pneumonia given elevated PCT  · Continue Ceftriaxone 1 g IV q  24 hours/Doxycycline IV   · Guaifenesin 600 mg q 12 hours        Sepsis ([address] )  Assessment & Plan  POA as evidenced by tachycardia, leukocytosis (elevated form patient baseline), tachypnea    Suspected source: Pneumonia   Clx: Blood and sputum NTD   Trend PCT   Abx: ceftriaxone and doxycycline IV       Left arm weakness  Assessment & Plan  Pt's  reported her left arm weakness and numbness since her fall two weeks ago  She was found falling face down and was awake at the time   Family put her back to bed and didn't seek medical attention at the time  Will obtain CT C spine  Ct head 2/3 negative     Metabolic encephalopathy  Assessment & Plan  · POA In setting of acute hypercapnic hypoxic respiratory failure secondary to pneumonia meeting sepsis criteria vs hepatic encephalopathy vs opioid side effect   · Patient reports that she has been taking 30 mg oxycodone q6 hours at home  · Noted history of cirrhosis with ammonia measured at 39 POA , level now down to normal  · Now Awake and alert/baseline        Cirrhosis ([facility] )  Assessment & Plan  · Documented history   · Ammonia level 36 down from 50 on prior admission one month ago  · She has not been taking Lactulose due to intractable diarrhea  Continue Xifaximin       Chronic pain syndrome  Assessment & Plan  Takes Oxycodone 30 mg 6 times a day for chronic back pain  Oxycodone held initially due to concern for mental status  Resumed at lower dose  5 mg q4 hours prn  Pain appears controlled  Anemia  Assessment & Plan  Mild hgb drop noted  Will check occult blood  Monitor CBC      VTE Pharmacologic Prophylaxis:   Pharmacologic: Heparin  Mechanical VTE Prophylaxis in Place: Yes    Patient Centered Rounds: I have performed bedside rounds with nursing staff today  Discussions with Specialists or Other Care Team Provider:     Education and Discussions with Family / Patient: Attempted to call pt's / I spoke with Pt's      Time Spent for Care: 20 minutes  More than 50% of total time spent on counseling and coordination of care as described above  Current Length of Stay: 3 day(s)    Current Patient Status: Inpatient   Certification Statement: The patient will continue to require additional inpatient hospital stay due to Waiting for medical stablization /pending PT evaluation     Discharge Plan: Pending PT and OT    Code Status: Level 1 - Full Code      Subjective:   Pt reports feeling better today as her diarrhea had stopped   She is alert and awake  C/o left arm weakness and pain since her fall 2 weeks ago  She was not evaluated at the time     Objective:     Vitals:   Temp (24hrs), Av 1 °F (36 7 °C), Min:97 8 °F (36 6 °C), Max:98 7 °F (37 1 °C)    Temp:  [97 8 °F (36 6 °C)-98 7 °F (37 1 °C)] 97 9 °F (36 6 °C)  HR:  [103-106] 103  Resp:  [17-18] 18  BP: (160-168)/(76-94) 160/90  SpO2:  [94 %-97 %] 94 %  Body mass index is 35 16 kg/m²  Input and Output Summary (last 24 hours): Intake/Output Summary (Last 24 hours) at 2022 1225  Last data filed at 2022 1008  Gross per 24 hour   Intake 940 ml   Output 1130 ml   Net -190 ml       Physical Exam:     Physical Exam  Constitutional:       General: She is not in acute distress  Appearance: She is not ill-appearing, toxic-appearing or diaphoretic  Eyes:      General:         Right eye: No discharge  Left eye: No discharge  Cardiovascular:      Pulses: Normal pulses  Heart sounds: Normal heart sounds  Pulmonary:      Effort: Pulmonary effort is normal  No respiratory distress  Abdominal:      General: Bowel sounds are normal  There is no distension  Palpations: Abdomen is soft  Tenderness: There is no abdominal tenderness  Musculoskeletal:         General: Swelling present  Skin:     Coloration: Skin is not jaundiced  Neurological:      Mental Status: She is oriented to person, place, and time  Motor: Weakness present  Psychiatric:         Mood and Affect: Mood normal          Behavior: Behavior normal          Thought Content:  Thought content normal          Additional Data:     Labs:    Results from last 7 days   Lab Units 22  0628   WBC Thousand/uL 5 21   HEMOGLOBIN g/dL 9 7*   HEMATOCRIT % 31 8*   PLATELETS Thousands/uL 147*   NEUTROS PCT % 72   LYMPHS PCT % 20   MONOS PCT % 5   EOS PCT % 2     Results from last 7 days   Lab Units 22  0628 22  0507 22  0032   POTASSIUM mmol/L 4 6   < >  --    CHLORIDE mmol/L 103 < >  --    CO2 mmol/L 28   < >  --    CO2, I-STAT mmol/L  --   --  28   BUN mg/dL 13   < >  --    CREATININE mg/dL 1 02   < >  --    CALCIUM mg/dL 8 3   < >  --    ALK PHOS U/L 131*   < >  --    ALT U/L 20   < >  --    AST U/L 25   < >  --    GLUCOSE, ISTAT mg/dl  --   --  89    < > = values in this interval not displayed  Results from last 7 days   Lab Units 02/03/22  1701   INR  1 14         Recent Cultures (last 7 days):     Results from last 7 days   Lab Units 02/04/22  0423 02/03/22  1701   BLOOD CULTURE   --  No Growth at 48 hrs  No Growth at 48 hrs     LEGIONELLA URINARY ANTIGEN  Negative  --        Last 24 Hours Medication List:   Current Facility-Administered Medications   Medication Dose Route Frequency Provider Last Rate    acetaminophen  975 mg Oral Q6H PRN Thad Pena PA-C      albuterol  2 puff Inhalation Q4H PRN Pillo Campos DO      amLODIPine  5 mg Oral Daily Jessie Iyer MD      cefTRIAXone  2,000 mg Intravenous Q24H [facility], DO 2,000 mg (02/04/22 2046)    DULoxetine  30 mg Oral Daily Jessie Iyer MD      gabapentin  100 mg Oral BID Jessie Iyer MD      guaiFENesin  600 mg Oral BID [facility], DO      heparin (porcine)  5,000 Units Subcutaneous [facility] [address], DO      insulin lispro  1-6 Units Subcutaneous HS Yuliya Oquendo PA-C      insulin lispro  2-12 Units Subcutaneous TID AC Yuliya Oquendo PA-C      ondansetron  4 mg Intravenous Q4H PRN Pillo Campos DO      oxyCODONE  5 mg Oral Q4H PRN Jessie Iyer MD      pantoprazole  40 mg Oral Early Morning Jessie Iyer MD      rifaximin  550 mg Oral Q12H Michela Gonzales MD      saccharomyces boulardii  250 mg Oral BID Jessie Iyer MD      sodium chloride (PF)  3 mL Intravenous Q1H PRN Quinn Campos DO       Facility-Administered Medications Ordered in Other Encounters   Medication Dose Route Frequency Provider Last Rate    ferrous sulfate  325 mg Oral Daily With Phoebe Martinez MD          Today, Patient Was Seen By: Pricila Funes MD    ** Please Note: Dictation voice to text software may have been used in the creation of this document   **

## 2022-02-06 NOTE — ASSESSMENT & PLAN NOTE
Takes Oxycodone 30 mg 6 times a day for chronic back pain  Oxycodone held initially due to concern for mental status  Resumed at lower dose  5 mg q4 hours prn  Pain appears controlled

## 2022-02-06 NOTE — ASSESSMENT & PLAN NOTE
Pt's  reported her left arm weakness and numbness since her fall two weeks ago  She was found falling face down and was awake at the time  Family put her back to bed and didn't seek medical attention at the time  Will obtain CT C spine   Ct head 2/3 negative

## 2022-02-06 NOTE — ASSESSMENT & PLAN NOTE
· POA In setting of acute hypercapnic hypoxic respiratory failure secondary to pneumonia meeting sepsis criteria vs hepatic encephalopathy vs opioid side effect   · Patient reports that she has been taking 30 mg oxycodone q6 hours at home  · Noted history of cirrhosis with ammonia measured at 39 POA , level now down to normal  · Now Awake and alert/baseline

## 2022-02-06 NOTE — CASE MANAGEMENT
Case Management Discharge Planning Note    Patient name Chaz Gill  Location S /S -77 MRN [id number]  : 1955 Date 2022       Current Admission Date: 2/3/2022  Current Admission Diagnosis:Acute respiratory failure with hypoxia and hypercapnia [facility])   Patient Active Problem List    Diagnosis Date Noted    Left arm weakness 2022    Anemia 2022    Acute [facility] and [facility] 20[facility]    Acute kidney injury superimposed on CKD ([facility] ) 2022    Pneumonia 2022    Sepsis ([facility] ) 2022    Pressure injury of skin of buttock     Metabolic encephalopathy     Acute respiratory failure with hypoxia ([facility] ) 2022    Fecal occult blood test positive 2022    COVID-19 2022    Elevated serum creatinine 2022    Urge incontinence 2021    Ex-smoker 2021    Syncope 07/15/2020    Cirrhosis ([facility] ) 07/15/2020    Splenomegaly 07/15/2020    CAD (coronary artery disease) 07/15/2020    Other microscopic hematuria 2020    Spondylosis of lumbar joint 05/15/2020    Marijuana use 2020    Left hip pain 2020    Chronic pain syndrome 2018    Peripheral neuropathy 2018    Type 2 diabetes mellitus with hyperglycemia, with long-term current use of insulin ([place] [address] ) 2018    Chronic venous stasis dermatitis of both lower extremities 2017    Venous insufficiency (chronic) (peripheral) 2017    Essential hypertension       LOS (days): 3  Geometric Mean LOS (GMLOS) (days): 4 80  Days to GMLOS:2     OBJECTIVE:  Risk of Unplanned Readmission Score: 19         Current admission status: Inpatient   Preferred Pharmacy:   [address], [address]  [address] [facility] [address]  Phone: [phone] Fax: [phone]    Primary Care Provider: Max Sarmiento MD    Primary Insurance: MEDICARE  Secondary Insurance: [employer]    DISCHARGE DETAILS:     [address] reports they are not able to take pt back as her  was not nice to one of the nurses when she was visiting and she stated she was afraid  They also explained the pt and family would not open the door for services when appointments have been made  Pt states she does not understand however she would like additional referrals made to other agencies  [facility]  is able to see pt  AVS will be sent to them once order has been written

## 2022-02-06 NOTE — ASSESSMENT & PLAN NOTE
Symptoms: shortness of breath at rest, anorexia and confusion    Lab Results   Component Value Date    SARSCOV2 Positive (A) 02/03/2022    SARSCOV2 Positive (A) 01/03/2022   CXR: Left lung base, possibly milder right lung base airspace disease  Possible left effusion findings are most suspicious for infection  Recent Labs     02/04/22  0507 02/05/22  0524 02/06/22  0628   PROCALCITONI 4 56* 2 37* 1 38*        Plan:  · Consider bacterial pneumonia given elevated PCT     · Continue Ceftriaxone 1 g IV q  24 hours/Doxycycline IV   · Guaifenesin 600 mg q 12 hours

## 2022-02-06 NOTE — ASSESSMENT & PLAN NOTE
· Documented history   · Ammonia level 36 down from 50 on prior admission one month ago  · She has not been taking Lactulose due to intractable diarrhea   Continue Xifaximin

## 2022-02-07 ENCOUNTER — APPOINTMENT (INPATIENT)
Dept: RADIOLOGY | Facility: HOSPITAL | Age: 67
DRG: 871 | End: 2022-02-07
Payer: MEDICARE

## 2022-02-07 PROBLEM — Z91.81 HISTORY OF RECENT FALL: Status: ACTIVE | Noted: 2022-02-07

## 2022-02-07 LAB
GLUCOSE SERPL-MCNC: 138 MG/DL (ref 65–140)
GLUCOSE SERPL-MCNC: 151 MG/DL (ref 65–140)
GLUCOSE SERPL-MCNC: 168 MG/DL (ref 65–140)
GLUCOSE SERPL-MCNC: 194 MG/DL (ref 65–140)

## 2022-02-07 PROCEDURE — 99222 1ST HOSP IP/OBS MODERATE 55: CPT | Performed by: PSYCHIATRY & NEUROLOGY

## 2022-02-07 PROCEDURE — 82948 REAGENT STRIP/BLOOD GLUCOSE: CPT

## 2022-02-07 PROCEDURE — 97116 GAIT TRAINING THERAPY: CPT

## 2022-02-07 PROCEDURE — 97530 THERAPEUTIC ACTIVITIES: CPT

## 2022-02-07 PROCEDURE — 71110 X-RAY EXAM RIBS BIL 3 VIEWS: CPT

## 2022-02-07 PROCEDURE — 99232 SBSQ HOSP IP/OBS MODERATE 35: CPT | Performed by: INTERNAL MEDICINE

## 2022-02-07 RX ORDER — AMLODIPINE BESYLATE 10 MG/1
10 TABLET ORAL DAILY
Status: DISCONTINUED | OUTPATIENT
Start: 2022-02-08 | End: 2022-02-10 | Stop reason: HOSPADM

## 2022-02-07 RX ORDER — LABETALOL 20 MG/4 ML (5 MG/ML) INTRAVENOUS SYRINGE
10 EVERY 6 HOURS PRN
Status: DISCONTINUED | OUTPATIENT
Start: 2022-02-07 | End: 2022-02-10 | Stop reason: HOSPADM

## 2022-02-07 RX ADMIN — OXYCODONE HYDROCHLORIDE 15 MG: 10 TABLET ORAL at 13:49

## 2022-02-07 RX ADMIN — Medication 250 MG: at 17:11

## 2022-02-07 RX ADMIN — HEPARIN SODIUM 5000 UNITS: 5000 INJECTION INTRAVENOUS; SUBCUTANEOUS at 21:41

## 2022-02-07 RX ADMIN — Medication 1 PACKET: at 09:42

## 2022-02-07 RX ADMIN — RIFAXIMIN 550 MG: 550 TABLET ORAL at 09:09

## 2022-02-07 RX ADMIN — OXYCODONE HYDROCHLORIDE 10 MG: 10 TABLET ORAL at 09:21

## 2022-02-07 RX ADMIN — GABAPENTIN 100 MG: 100 CAPSULE ORAL at 17:11

## 2022-02-07 RX ADMIN — Medication 250 MG: at 09:09

## 2022-02-07 RX ADMIN — GABAPENTIN 100 MG: 100 CAPSULE ORAL at 09:10

## 2022-02-07 RX ADMIN — INSULIN LISPRO 1 UNITS: 100 INJECTION, SOLUTION INTRAVENOUS; SUBCUTANEOUS at 21:41

## 2022-02-07 RX ADMIN — GUAIFENESIN 600 MG: 600 TABLET, EXTENDED RELEASE ORAL at 09:10

## 2022-02-07 RX ADMIN — OXYCODONE HYDROCHLORIDE 15 MG: 10 TABLET ORAL at 18:28

## 2022-02-07 RX ADMIN — PANTOPRAZOLE SODIUM 40 MG: 40 TABLET, DELAYED RELEASE ORAL at 05:08

## 2022-02-07 RX ADMIN — DULOXETINE HYDROCHLORIDE 30 MG: 30 CAPSULE, DELAYED RELEASE ORAL at 09:17

## 2022-02-07 RX ADMIN — INSULIN LISPRO 2 UNITS: 100 INJECTION, SOLUTION INTRAVENOUS; SUBCUTANEOUS at 12:24

## 2022-02-07 RX ADMIN — INSULIN LISPRO 2 UNITS: 100 INJECTION, SOLUTION INTRAVENOUS; SUBCUTANEOUS at 09:07

## 2022-02-07 RX ADMIN — CEFTRIAXONE SODIUM 2000 MG: 10 INJECTION, POWDER, FOR SOLUTION INTRAVENOUS at 17:11

## 2022-02-07 RX ADMIN — HEPARIN SODIUM 5000 UNITS: 5000 INJECTION INTRAVENOUS; SUBCUTANEOUS at 14:38

## 2022-02-07 RX ADMIN — HEPARIN SODIUM 5000 UNITS: 5000 INJECTION INTRAVENOUS; SUBCUTANEOUS at 05:08

## 2022-02-07 RX ADMIN — OXYCODONE HYDROCHLORIDE 10 MG: 10 TABLET ORAL at 02:44

## 2022-02-07 RX ADMIN — GUAIFENESIN 600 MG: 600 TABLET, EXTENDED RELEASE ORAL at 17:11

## 2022-02-07 RX ADMIN — AMLODIPINE BESYLATE 5 MG: 5 TABLET ORAL at 09:09

## 2022-02-07 NOTE — PLAN OF CARE
Problem: Potential for Falls  Goal: Patient will remain free of falls  Description: INTERVENTIONS:  - Educate patient/family on patient safety including physical limitations  - Instruct patient to call for assistance with activity   - Consult OT/PT to assist with strengthening/mobility   - Keep Call bell within reach  - Keep bed low and locked with side rails adjusted as appropriate  - Keep care items and personal belongings within reach  - Initiate and maintain comfort rounds  - Make Fall Risk Sign visible to staff  - Offer Toileting every 2 Hours, in advance of need  - Initiate/Maintain Bed/ Chair alarm  - Obtain necessary fall risk management equipment: Alarms  - Apply yellow socks and bracelet for high fall risk patients  - Consider moving patient to room near nurses station  Outcome: Progressing     Problem: MOBILITY - ADULT  Goal: Maintain or return to baseline ADL function  Description: INTERVENTIONS:  -  Assess patient's ability to carry out ADLs; assess patient's baseline for ADL function and identify physical deficits which impact ability to perform ADLs (bathing, care of mouth/teeth, toileting, grooming, dressing, etc )  - Assess/evaluate cause of self-care deficits   - Assess range of motion  - Assess patient's mobility; develop plan if impaired  - Assess patient's need for assistive devices and provide as appropriate  - Encourage maximum independence but intervene and supervise when necessary  - Involve family in performance of ADLs  - Assess for home care needs following discharge   - Consider OT consult to assist with ADL evaluation and planning for discharge  - Provide patient education as appropriate  Outcome: Progressing  Goal: Maintains/Returns to pre admission functional level  Description: INTERVENTIONS:  - Perform BMAT or MOVE assessment daily    - Set and communicate daily mobility goal to care team and patient/family/caregiver     - Collaborate with rehabilitation services on mobility goals if consulted  - Perform Range of Motion 2 times a day  - Reposition patient every 2 hours  - Dangle patient 2 times a day  - Stand patient 2 times a day  - Ambulate patient 2 times a day  - Out of bed to chair 2 times a day   - Out of bed for meals 2 times a day  - Out of bed for toileting  - Record patient progress and toleration of activity level   Outcome: Progressing     Problem: Prexisting or High Potential for Compromised Skin Integrity  Goal: Skin integrity is maintained or improved  Description: INTERVENTIONS:  - Identify patients at risk for skin breakdown  - Assess and monitor skin integrity  - Assess and monitor nutrition and hydration status  - Monitor labs   - Assess for incontinence   - Turn and reposition patient  - Assist with mobility/ambulation  - Relieve pressure over bony prominences  - Avoid friction and shearing  - Provide appropriate hygiene as needed including keeping skin clean and dry  - Evaluate need for skin moisturizer/barrier cream  - Collaborate with interdisciplinary team   - Patient/family teaching  - Consider wound care consult   Outcome: Progressing     Problem: Nutrition/Hydration-ADULT  Goal: Nutrient/Hydration intake appropriate for improving, restoring or maintaining nutritional needs  Description: Monitor and assess patient's nutrition/hydration status for malnutrition  Collaborate with interdisciplinary team and initiate plan and interventions as ordered  Monitor patient's weight and dietary intake as ordered or per policy  Utilize nutrition screening tool and intervene as necessary  Determine patient's food preferences and provide high-protein, high-caloric foods as appropriate       INTERVENTION  - Monitor oral intake, urinary output, labs, and treatment plans  - Assess nutrition and hydration status and recommend course of action  - Evaluate amount of meals eaten  - Assist patient with eating if necessary   - Allow adequate time for meals  - Recommend/ encourage appropriate diets, oral nutritional supplements, and vitamin/mineral supplements  - Order, calculate, and assess calorie counts as needed  - Recommend, monitor, and adjust tube feedings and TPN/PPN based on assessed needs  - Assess need for intravenous fluids  - Provide specific nutrition/hydration education as appropriate  - Include patient/family/caregiver in decisions related to nutrition  Outcome: Progressing

## 2022-02-07 NOTE — ASSESSMENT & PLAN NOTE
· POA In setting of acute hypercapnic hypoxic respiratory failure secondary to pneumonia meeting sepsis criteria vs hepatic encephalopathy vs opioid side effect   · Patient and her  report that she has been taking 30 mg oxycodone q6 hours at home  · Ammonia measured at 39 POA , level now down to normal  · Now Awake and alert/baseline

## 2022-02-07 NOTE — ASSESSMENT & PLAN NOTE
Takes Oxycodone 30 mg 6 times a day for chronic back pain  Oxycodone held initially due to concern for mental status  Resumed at lower dose   Monitor for tolerance  Pain appears controlled

## 2022-02-07 NOTE — DISCHARGE INSTR - OTHER ORDERS
Wound/Skin Care Plan:   1  For left upper posterior thigh and gluteal cleft wounds (butt crack): Cleanse and dry gently  Apply zinc oxide to wounds three times a day and as needed  If zinc unavailable, apply Calazime or similar moisture barrier cream such as Desitin  Unable to wear a bordered foam dressing due to incontinence  Also apply zinc to bilateral buttocks/sacrum also  2  For right lower leg, anterior aspect wound: Cleanse with normal saline  Apply small sized Dermagran or similar contact layer dressing  Unfold the square and place on wound bed  Cover with dry gauze and secure with gauze roll and tape  Change every other day and as needed  3  Apply Hydraguard or similar emollient lotion to left lower leg once a day  Apply to right lower leg at time of every other day dressing changes  4  Pressure redistribution cushion in chair   5  Encourage patient to turn and reposition     6  Patient declined to heave her heels offloaded because "that will make it really hard for me to get out of bed to the bedside commode "

## 2022-02-07 NOTE — PHYSICAL THERAPY NOTE
PHYSICAL THERAPY NOTE    Patient Name: Shari KWONG'S Date: 22 1320   PT Last Visit   PT Visit Date 22   Note Type   Note Type Treatment   Pain Assessment   Pain Assessment Tool 0-10   Pain Score 7   Pain Location/Orientation Location: Back   Restrictions/Precautions   Weight Bearing Precautions Per Order No   Other Precautions Chair Alarm; Bed Alarm;Multiple lines;Telemetry;O2;Fall Risk   General   Family/Caregiver Present No   Subjective   Subjective Patient supine in bed and is agreeable to participate in therapy session  Pt identifers obtained from name &   Bed Mobility   Supine to Sit 4  Minimal assistance   Additional items Assist x 1;HOB elevated; Bedrails; Increased time required;Verbal cues;LE management   Sit to Supine 4  Minimal assistance   Additional items Assist x 1; Increased time required;Verbal cues;LE management   Additional Comments Pt supine in bed post session with bed alarm engaged, call bell and belongings in reach  Transfers   Sit to Stand 4  Minimal assistance   Additional items Assist x 1; Armrests; Increased time required;Verbal cues   Stand to Sit 4  Minimal assistance   Additional items Assist x 1; Armrests; Verbal cues; Increased time required   Toilet transfer 4  Minimal assistance   Additional items Assist x 1; Armrests; Increased time required;Verbal cues; Commode   Additional Comments Standing tolerance x 90 seconds with B UE support for bathroom hygenie and CGA for steadying   Ambulation/Elevation   Gait pattern Forward Flexion;Decreased foot clearance; Excessively slow; Step to   Gait Assistance 4  Minimal assist   Additional items Assist x 1;Verbal cues   Assistive Device Rolling walker  (personal rollator)   Distance 2 steps x 4   Balance   Static Sitting Fair +   Static Standing Fair -   Ambulatory Poor +   Activity Tolerance   Activity Tolerance Patient limited by fatigue   Nurse Made Aware Spoke to ANDREINA Thompson    Assessment   Prognosis Fair   Problem List Decreased strength;Decreased endurance; Impaired balance;Decreased mobility; Decreased safety awareness  (LE edema)   Assessment Patient agreeable to participate in therapy session however requires increased time and encouragement to initate mobility  Supine<>sit and sit<>stand with consistent min ax1 throughout session and verbal instruction for hand placement and body positioning  Standing tolerance x 90 seconds with B UE support for bathroom hygenie and CGA for steadying  Pt able to ambulate few steps x 4 trials with rollator and min ax1  Pt remains limited secondary to fatigue and pain  Pt requires increased time for all mobility trials with frequent rest breaks and slow movements  Continue to focus on OOB mobility with progression of transfers and ambulation as appropriate and able  Goals   Patient Goals to go home   STG Expiration Date 02/14/22   PT Treatment Day 2   Plan   Treatment/Interventions Functional transfer training;LE strengthening/ROM; Elevations; Therapeutic exercise; Endurance training;Patient/family training;Equipment eval/education; Bed mobility;Gait training;Spoke to nursing   PT Frequency 4-6x/wk   Recommendation   PT Discharge Recommendation Home with home health rehabilitation  (and family support)   AM-PAC Basic Mobility Inpatient   Turning in Bed Without Bedrails 4   Lying on Back to Sitting on Edge of Flat Bed 3   Moving Bed to Chair 3   Standing Up From Chair 3   Walk in Room 2   Climb 3-5 Stairs 1   Basic Mobility Inpatient Raw Score 16   Basic Mobility Standardized Score 38 32   Highest Level Of Mobility   JH-HLM Goal 5: Stand one or more mins   JH-HLM Highest Level of Mobility 5: Stand (1 or more minutes)   JH-HLM Goal Achieved Yes   End of Consult   Patient Position at End of Consult Supine;Bed/Chair alarm activated; All needs within reach  (declined sitting OOB in recliner)       Raven Lewis PTA

## 2022-02-07 NOTE — ASSESSMENT & PLAN NOTE
· Documented history   · Ammonia level 36 down from 50 on prior admission one month ago  · She has not been taking Lactulose due to profuse diarrhea whenever she takes lactulose  Continue Xifaximin   · Ammonia level nl   Monitor

## 2022-02-07 NOTE — ASSESSMENT & PLAN NOTE
POA as evidenced by tachycardia, leukocytosis (elevated form patient baseline), tachypnea  Suspected source: Pneumonia   Clx: Blood and sputum NTD   PCD trending down    Abx: ceftriaxone and doxycycline IV

## 2022-02-07 NOTE — ASSESSMENT & PLAN NOTE
Pt's  reported her left arm weakness and numbness since her fall two weeks ago  She was found facing down on the floor  and was awake at the time  Family put her back to bed and didn't seek medical attention at the time  Ct head 2/3 negative  CT C spine negative for acute  LUE remains weak with sensory deficit  Will appreciate neurology eval  May need MRI c spine or brachial plexus?

## 2022-02-07 NOTE — ASSESSMENT & PLAN NOTE
Again fall 2 weeks ago   Pt now c/o right sided rib pain since her fall  Will check rib series  PT/OT following   Fall precaution

## 2022-02-07 NOTE — ASSESSMENT & PLAN NOTE
Symptoms: shortness of breath at rest, anorexia and confusion    Lab Results   Component Value Date    SARSCOV2 Positive (A) 02/03/2022    SARSCOV2 Positive (A) 01/03/2022   CXR: Left lung base, possibly milder right lung base airspace disease  Possible left effusion findings are most suspicious for infection  Recent Labs     02/05/22  0524 02/06/22  0628   PROCALCITONI 2 37* 1 38*        Plan:  · Consider bacterial pneumonia given elevated PCT     · Continue Ceftriaxone 1 g IV q  24 hours/Doxycycline IV   · Guaifenesin 600 mg q 12 hours

## 2022-02-07 NOTE — CONSULTS
[facility]  Neurology Consult- Eula Quezada 1955, 73 y o  female   MRN: [id number] Unit/Bed#: S -01 Encounter: [id number]    Inpatient consult to Neurology  Consult performed by: APRYL Renee  Consult ordered by: Susana Wilhelm MD      Reason for Consult / Principal Problem:  Left arm weakness  Hx and PE limited by:  None  Review of previous medical records was completed  Family, specifically the patient's , was not present at the bedside for history and examination    Left arm weakness  Assessment & Plan  Neurology is asked to see this 23-year-old chronically ill right-hand-dominant 23-year-old woman who has no reported history of prior neurologic disease  She has a history of hypertension, dyslipidemia, type 2 diabetes with neuropathy, chronic pain syndrome, severe bilateral venous stasis in her lower extremities, ambulatory dysfunction related to previous hip pain and fracture  This patient has been an inpatient now for 4 days after she presented on the 3rd for hypoxia and near syncope  Neurology is asked to see her for complaint of isolated left arm weakness  See the HPI below but the patient reports that the weakness in the arm started a few days after she had a mechanical fall tripping over blankets in her bedroom as she was on the way to the bathroom with her rolling walker  The residual ecchymosis on her chin, which is now by her report nearly 8day-old, suggest that she may have had little defensive protection from her outstretched arms and that her head, particularly chin/jaw took the brunt of the fall suggesting that she may have sustained a hyper extension C-spine injury  Her exam and her report do not suggest any other concurrent or simultaneous lateralizing finding suggesting a stroke  She has a low platelet count at baseline likely related to her cirrhosis which would also less than her stroke risk    We will wait for her MRI of the C-spine and go from there  It is less likely that this would be a small stroke although she does have risk factors for them  However she is thrombocytopenic with only 120,000 platelets and that with somewhat decrease her risk  Internal Medicine is agreement with the plan so far  Pending the results of this patient's workup this patient may not need to see Neurology post discharge  HPI: Chaz Gill is a right handed  73 y o  female who  neurology is asked to see for complaints of L arm weakness that has been present now for several days  The patient is able to report her history and does concur with what is available in document in the chart  She reports that possibly as many as 10 or 14 days ago she was on her way to the bathroom when she apparently tripped on the blankets from the bed onto the floor  Apparently when she fell she went forward rather quickly and she struck the floor 1st with her chin quite forcefully  She reports she had significant ecchymosis on her chin and the residual now is clearly diminished from what it was previously  She reports that at the time that she fell she was able to get back up right away with assistance she had no loss of consciousness he did not strike her head it was not fainting or weakness it was strictly a mechanical fall  She reports that she noticed a few days after that that she feels that her left arm has been becoming weak and since that weakness started she has not had any "real  Improvement"  She was admitted here back on the 3rd for complaints of hypoxia and near syncope  Since then again she reports she has had no improvement in her left arm weakness  ROS: 12 system cued query: In regards to the arm she reports that previous to the fall she was able to certainly elevate her left arm as high unequal to the right arm  She had good muscle movement in each of the associated muscle groups    She reports now she has no pain in it she reports that just is not as strong as it used to be  She reports that she is not a headache lady ordinarily  No visual changes or disturbances no new chest pain she reports however she was certainly short of breath the 1st day that she was here and she reports that she generally feeling better in that regard  She reports she has generally significant ambulatory difficulties and she reports that she uses a rolling walker  She reports that she has osteoarthritis in the region of her left hip from a previous fall  She reports she has terrible neuropathy in her bilateral lower extremities and that she does not walk a lot she uses a rolling walker  Historical Information     Past Medical History:   Diagnosis Date    Abnormal head CT 7/14/2017    Acute kidney injury ([address] ) 8/19/2018    Cellulitis 1/10/2017    Closed fracture of multiple ribs of right side 7/14/2017    Degenerative disc disease at L5-S1 level     Diabetes mellitus (HCC)     History of methicillin resistant staphylococcus aureus (MRSA) 08/21/2018    negative nasal culture- isolation and hx of mrsa removed 8/20/2018    Hyperkalemia 4/25/2018    Hypertension     Hypocalcemia 4/25/2018    Hyponatremia 7/14/2017     Past Surgical History:   Procedure Laterality Date    CHOLECYSTECTOMY      JOINT REPLACEMENT      OOPHORECTOMY      ND INCISION,IMPLANT,NEUROELEC,SACRAL NERVE N/A 9/20/2021    Procedure: INSERTION NEUROSTIMULATOR ELECTRODE ARRAY SACRAL NERVE; FLUOROSCOPY; ELECTRONIC ANALYSIS;  Surgeon: Danay Scales MD;  Location: [facility];  Service: UroGynecology           SACRAL NERVE STIMULATOR PLACEMENT N/A 9/28/2021    Procedure: IPG INSERTION AND PROGRAMMING;  Surgeon: Danay Scales MD;  Location: [facility];  Service: UroGynecology              Social History :  She lives with her  of several years  She is a nonsmoker no alcohol no recreational is          Family History:   Family History   Problem Relation Age of Onset    Rheum arthritis Mother     Alzheimer's disease Mother     Lupus Mother          No Known Allergies  Meds:all current active meds have been reviewed    Scheduled Meds:  Medication Dose Route Frequency    acetaminophen  975 mg Oral Q6H PRN    albuterol  2 puff Inhalation Q4H PRN     amLODIPine  10 mg Oral Daily    cefTRIAXone  2,000 mg Intravenous Q24H    DULoxetine  30 mg Oral Daily    gabapentin  100 mg Oral BID    guaiFENesin  600 mg Oral BID    heparin (porcine)  5,000 Units Subcutaneous Q8H [address]    insulin lispro  1-6 Units Subcutaneous HS    insulin lispro  2-12 Units Subcutaneous TID AC    Labetalol HCl  10 mg Intravenous Q6H PRN    ondansetron  4 mg Intravenous Q4H PRN    oxyCODONE  15 mg Oral Q4H PRN    pantoprazole  40 mg Oral Early Morning    psyllium  1 packet Oral Daily    saccharomyces boulardii  250 mg Oral BID    sodium chloride (PF)  3 mL Intravenous Q1H PRN     Facility-Administered Medications Ordered in Other Encounters   Medication Dose Route Frequency Provider Last Rate    ferrous sulfate  325 mg Oral Daily With Breakfast Kirill Hui MD       PRN Meds:   acetaminophen    albuterol    Labetalol HCl    ondansetron    oxyCODONE    sodium chloride (PF)      Physical Exam:   Objective   Vitals:Blood pressure 170/84, pulse 100, temperature 98 3 °F (36 8 °C), temperature source Oral, resp  rate 18, height 5' 2" (1 575 m), weight 83 kg (182 lb 15 7 oz), SpO2 92 %  ,Body mass index is 33 47 kg/m²  Patient was examined in bed there is no family present  General:  Initially sleeping, she is an obese lady appears older than stated age and cooperative  Head: Normocephalic, without obvious abnormality, she is noted to have a prominent residual ecchymotic area surrounding her chin  She reports it is much diminished compared to a few days ago    Oral exam: lips, mucosa, and tongue moist;   Neck: no carotid bruit,   Lungs: clear to auscultation ant  bilaterally  Heart: regular rate and rhythm, S1, S2 normal, no murmur appreciated,   Abdomen: soft, +BS, obese  Extremities:  She has multiple lesions superficial over both the bilateral upper extremities consistent with superficial bruising  She has significant venous stasis chronic appearing lesions on both lower extremities  Neurologic:   Mental status: Alert, oriented, thought content appropriate, she was able to reverse order months of the year she calculate is sum of quarters  There was no dysarthria or aphasia  CN Exam:  Cranial nerve exam was within normal limits  BRANDON, EOM's I, VF full, Gaze conjugate No sensory or motor lateralizations (No PP on face), Hearing I B, CNIX-XII I B  Motor: full power, age appropriate x right upper limb, her left upper extremity had diminished power particularly at the deltoid as well as at the  and abduction extension of her digits  Biceps and triceps even her wrist flexion and extension were all very reasonable and near equal to the right  She had generally good dorsiflexion bilaterally she was unable to generate a straight leg lift with either low extremity, she was unable to comfortably flex or extend either knee at this point either  Sensory:  Gross intact  X upper limbs,  not PP tested at her request at this time and her lower extremities were not examined or palpated all at her request   Cerebellar:  No cerebellar dysfunction with traditional maneuvers on the right her modified maneuvers on the left given her weakness     DTR's:  Somewhat diminished there was no significant asymmetry on her upper extremities appreciated  Plantars:  Mute bilaterally  Gait:  I did not gait her at this time  She has a bedside commode and rolling walker present  Lab Results:   I have personally reviewed pertinent reports    , CBC:   Results from last 7 days   Lab Units 02/06/22  0628 02/05/22  0525 02/04/22  0032 02/03/22  2339 02/03/22  1701   WBC Thousand/uL 5 21 9 11  --   --  9 61   RBC Million/uL 3 66* 4 12  --   --  3 95   HEMOGLOBIN g/dL 9 7* 11 0*  --   --  10 5*   I STAT HEMOGLOBIN g/dl  --   --  9 2*  --   --    HEMATOCRIT % 31 8* 36 2  --   --  35 1   HEMATOCRIT, ISTAT %  --   --  27*  --   --    MCV fL 87 88  --   --  89   PLATELETS Thousands/uL 147* 171  --  119*  --    , BMP/CMP:   Results from last 7 days   Lab Units 02/06/22  0628 02/05/22  0524 02/04/22  0507 02/04/22  0032 02/03/22  2108 02/03/22  1701 02/03/22  1701   SODIUM mmol/L 136 133* 136  --   --    < > 132*   POTASSIUM mmol/L 4 6 4 5 5 2  --    < >   < > 5 5*   CHLORIDE mmol/L 103 98* 104  --   --    < > 100   CO2 mmol/L 28 28 30  --   --    < > 27   CO2, I-STAT mmol/L  --   --   --  28  --   --   --    BUN mg/dL 13 22 27*  --   --    < > 23   CREATININE mg/dL 1 02 1 20 1 68*  --   --    < > 1 89*   GLUCOSE, ISTAT mg/dl  --   --   --  89  --   --   --    CALCIUM mg/dL 8 3 8 4 7 7*  --   --    < > 8 0*   AST U/L 25  --  25  --   --   --  21   ALT U/L 20  --  17  --   --   --  18   ALK PHOS U/L 131*  --  107  --   --   --  129*   EGFR ml/min/1 73sq m 57 47 31  --   --    < > 27    < > = values in this interval not displayed  , Vitamin B12:   , HgBA1C:   , TSH:   , Coagulation:   Results from last 7 days   Lab Units 02/03/22  1701   INR  1 14   , Lipid Profile:        Imaging Studies: I have reviewed both her CT scan and CT of her neck that were done  They are both largely within normal limits  Radiology reports the same  radiology does note that she does have multilevel small disc bulges and mild canal narrowing predominantly at the C5-6 level and that there was also foraminal stenosis on the left at C5-6 and on the right at C4-5  Counseling / Coordination of Care  Total time spent today Greater than 50 minutes  Greater than 50% of total time was spent with the patient and / or family counseling and / or coordination of care  A description of the counseling / coordination of care:  All of the above was discussed with the patient at the bedside  She had several questions I believe they were all answered to her satisfaction within the limits of the workup at this time  She was also discussed with Internal Medicine  Dictation voice to text software has been used in the creation of this document  Please consider this in light of any contextual or grammatical errors

## 2022-02-07 NOTE — PLAN OF CARE
Problem: PHYSICAL THERAPY ADULT  Goal: Performs mobility at highest level of function for planned discharge setting  See evaluation for individualized goals  Description: Treatment/Interventions: Functional transfer training,LE strengthening/ROM,Elevations,Therapeutic exercise,Endurance training,Patient/family training,Equipment eval/education,Bed mobility,Gait training          See flowsheet documentation for full assessment, interventions and recommendations  Outcome: Progressing  Note: Prognosis: Fair  Problem List: Decreased strength,Decreased endurance,Impaired balance,Decreased mobility,Decreased safety awareness (LE edema)  Assessment: Patient agreeable to participate in therapy session however requires increased time and encouragement to initate mobility  Supine<>sit and sit<>stand with consistent min ax1 throughout session and verbal instruction for hand placement and body positioning  Standing tolerance x 90 seconds with B UE support for bathroom hygenie and CGA for steadying  Pt able to ambulate few steps x 4 trials with rollator and min ax1  Pt remains limited secondary to fatigue and pain  Pt requires increased time for all mobility trials with frequent rest breaks and slow movements  Continue to focus on OOB mobility with progression of transfers and ambulation as appropriate and able  PT Discharge Recommendation: Home with home health rehabilitation (and family support)          See flowsheet documentation for full assessment

## 2022-02-07 NOTE — ASSESSMENT & PLAN NOTE
Neurology is asked to see this 27-year-old chronically ill right-hand-dominant 27-year-old woman who has no reported history of prior neurologic disease  She has a history of hypertension, dyslipidemia, type 2 diabetes with neuropathy, chronic pain syndrome, severe bilateral venous stasis in her lower extremities, ambulatory dysfunction related to previous hip pain and fracture  This patient has been an inpatient now for 4 days after she presented on the 3rd for hypoxia and near syncope  Neurology is asked to see her for complaint of isolated left arm weakness  See the HPI below but the patient reports that the weakness in the arm started a few days after she had a mechanical fall tripping over blankets in her bedroom as she was on the way to the bathroom with her rolling walker  The residual ecchymosis on her chin, which is now by her report nearly 8day-old, suggest that she may have had little defensive protection from her outstretched arms and that her head, particularly chin/jaw took the brunt of the fall suggesting that she may have sustained a hyper extension C-spine injury  Her exam and her report do not suggest any other concurrent or simultaneous lateralizing finding suggesting a stroke  She has a low platelet count at baseline likely related to her cirrhosis which would also less than her stroke risk  We will wait for her MRI of the C-spine and go from there  It is less likely that this would be a small stroke although she does have risk factors for them  However she is thrombocytopenic with only 120,000 platelets and that with somewhat decrease her risk  Internal Medicine is agreement with the plan so far

## 2022-02-08 LAB
BACTERIA BLD CULT: NORMAL
BACTERIA BLD CULT: NORMAL
GLUCOSE SERPL-MCNC: 156 MG/DL (ref 65–140)
GLUCOSE SERPL-MCNC: 163 MG/DL (ref 65–140)
GLUCOSE SERPL-MCNC: 181 MG/DL (ref 65–140)
GLUCOSE SERPL-MCNC: 190 MG/DL (ref 65–140)
GLUCOSE SERPL-MCNC: 57 MG/DL (ref 65–140)

## 2022-02-08 PROCEDURE — 82948 REAGENT STRIP/BLOOD GLUCOSE: CPT

## 2022-02-08 PROCEDURE — 99232 SBSQ HOSP IP/OBS MODERATE 35: CPT | Performed by: HOSPITALIST

## 2022-02-08 RX ORDER — OXYCODONE HYDROCHLORIDE 10 MG/1
20 TABLET ORAL EVERY 4 HOURS PRN
Status: DISCONTINUED | OUTPATIENT
Start: 2022-02-08 | End: 2022-02-10 | Stop reason: HOSPADM

## 2022-02-08 RX ORDER — LISINOPRIL 10 MG/1
10 TABLET ORAL DAILY
Status: DISCONTINUED | OUTPATIENT
Start: 2022-02-09 | End: 2022-02-10 | Stop reason: HOSPADM

## 2022-02-08 RX ORDER — LORAZEPAM 2 MG/ML
0.5 INJECTION INTRAMUSCULAR ONCE
Status: DISCONTINUED | OUTPATIENT
Start: 2022-02-08 | End: 2022-02-10 | Stop reason: HOSPADM

## 2022-02-08 RX ADMIN — DULOXETINE HYDROCHLORIDE 30 MG: 30 CAPSULE, DELAYED RELEASE ORAL at 08:08

## 2022-02-08 RX ADMIN — HEPARIN SODIUM 5000 UNITS: 5000 INJECTION INTRAVENOUS; SUBCUTANEOUS at 05:53

## 2022-02-08 RX ADMIN — HEPARIN SODIUM 5000 UNITS: 5000 INJECTION INTRAVENOUS; SUBCUTANEOUS at 14:22

## 2022-02-08 RX ADMIN — GABAPENTIN 100 MG: 100 CAPSULE ORAL at 17:40

## 2022-02-08 RX ADMIN — AMLODIPINE BESYLATE 10 MG: 10 TABLET ORAL at 08:07

## 2022-02-08 RX ADMIN — Medication 250 MG: at 17:39

## 2022-02-08 RX ADMIN — INSULIN LISPRO 2 UNITS: 100 INJECTION, SOLUTION INTRAVENOUS; SUBCUTANEOUS at 07:45

## 2022-02-08 RX ADMIN — Medication 250 MG: at 08:08

## 2022-02-08 RX ADMIN — OXYCODONE HYDROCHLORIDE 15 MG: 10 TABLET ORAL at 03:34

## 2022-02-08 RX ADMIN — HEPARIN SODIUM 5000 UNITS: 5000 INJECTION INTRAVENOUS; SUBCUTANEOUS at 21:50

## 2022-02-08 RX ADMIN — OXYCODONE HYDROCHLORIDE 20 MG: 10 TABLET ORAL at 11:46

## 2022-02-08 RX ADMIN — OXYCODONE HYDROCHLORIDE 20 MG: 10 TABLET ORAL at 21:50

## 2022-02-08 RX ADMIN — GABAPENTIN 100 MG: 100 CAPSULE ORAL at 08:07

## 2022-02-08 RX ADMIN — INSULIN LISPRO 2 UNITS: 100 INJECTION, SOLUTION INTRAVENOUS; SUBCUTANEOUS at 11:47

## 2022-02-08 RX ADMIN — GUAIFENESIN 600 MG: 600 TABLET, EXTENDED RELEASE ORAL at 08:07

## 2022-02-08 RX ADMIN — OXYCODONE HYDROCHLORIDE 15 MG: 10 TABLET ORAL at 07:43

## 2022-02-08 RX ADMIN — OXYCODONE HYDROCHLORIDE 20 MG: 10 TABLET ORAL at 17:40

## 2022-02-08 RX ADMIN — INSULIN LISPRO 2 UNITS: 100 INJECTION, SOLUTION INTRAVENOUS; SUBCUTANEOUS at 17:41

## 2022-02-08 RX ADMIN — INSULIN LISPRO 2 UNITS: 100 INJECTION, SOLUTION INTRAVENOUS; SUBCUTANEOUS at 21:51

## 2022-02-08 RX ADMIN — CEFTRIAXONE SODIUM 2000 MG: 10 INJECTION, POWDER, FOR SOLUTION INTRAVENOUS at 17:42

## 2022-02-08 NOTE — ASSESSMENT & PLAN NOTE
Takes Oxycodone 30 mg 6 times a day for chronic back pain  Oxycodone held initially due to concern for mental status  Resumed at lower dose    Monitor for tolerance  Pain not well controlled--> increased oral oxycodone to 20mg [id number]

## 2022-02-08 NOTE — ASSESSMENT & PLAN NOTE
Symptoms: shortness of breath at rest, anorexia and confusion    Lab Results   Component Value Date    SARSCOV2 Positive (A) 02/03/2022    SARSCOV2 Positive (A) 01/03/2022   CXR: Left lung base, possibly milder right lung base airspace disease  Possible left effusion findings are most suspicious for infection  Recent Labs     02/06/22  0628   PROCALCITONI 1 38*        Plan:  · Consider bacterial pneumonia given elevated PCT     · Continue Ceftriaxone 1 g IV q  24 hours/Doxycycline IV   · Guaifenesin 600 mg q 12 hours

## 2022-02-08 NOTE — PROGRESS NOTES
[facility]  Progress Note - Sam Heller 1955, 73 y o  female MRN: [id number]  Unit/Bed#: S -01 Encounter: [id number]  Primary Care Provider: Joann Gutierrez MD   Date and time admitted to hospital: 2/3/2022  4:25 PM    Sepsis [facility])  Assessment & Plan  POA as evidenced by tachycardia, leukocytosis (elevated form patient baseline), tachypnea  Suspected source: Pneumonia   Clx: Blood and sputum NTD   PCD trending down    Abx: ceftriaxone and doxycycline day 4       Pneumonia  Assessment & Plan  Symptoms: shortness of breath at rest, anorexia and confusion    Lab Results   Component Value Date    SARSCOV2 Positive (A) 02/03/2022    SARSCOV2 Positive (A) 01/03/2022   CXR: Left lung base, possibly milder right lung base airspace disease  Possible left effusion findings are most suspicious for infection  Recent Labs     02/06/22  0628   PROCALCITONI 1 38*        Plan:  · Consider bacterial pneumonia given elevated PCT  · Continue Ceftriaxone 1 g IV q  24 hours/Doxycycline IV   · Guaifenesin 600 mg q 12 hours        Metabolic encephalopathy  Assessment & Plan  · POA In setting of acute hypercapnic hypoxic respiratory failure secondary to pneumonia meeting sepsis criteria vs hepatic encephalopathy vs opioid side effect   · Patient and her  report that she has been taking 30 mg oxycodone q6 hours at home  · Ammonia measured at 39 POA , level now down to normal  · Now Awake and alert/baseline          Chronic pain syndrome  Assessment & Plan  Takes Oxycodone 30 mg 6 times a day for chronic back pain  Oxycodone held initially due to concern for mental status  Resumed at lower dose    Monitor for tolerance  Pain not well controlled--> increased oral oxycodone to 20mg [id number]      * Acute respiratory failure with hypoxia and hypercapnia (HCC)  Assessment & Plan  · O2 saturation in mid 80's on NRB on presentation from EMS  · Recently discharged from hospital from COVID pneumonia (Tested + 1/3/22)   · Initiated on BiPAP 12/6 and saturations corrected to 95-97%   · Initial ABG showed pH of 7 338 with pCO2 of 52 2   · CXR bilateral basilar pneumonia  PCT elevated  Most likely due to Pneumonia  Clinical improvement after treatment for pneumonia  No pleuritic chest pain etc to suggest possible PE  Venous doppler attempted by vascular tech  Pt couldn't even tolerate the transducer touching her legs  History of recent fall  Assessment & Plan  Again fall 2 weeks ago  Pt now c/o right sided rib pain since her fall  XR ribs: no fx   PT/OT following --> HH   Fall precaution    Anemia  Assessment & Plan  Mild hgb drop noted  Will check occult blood  Monitor CBC    Acute kidney injury superimposed on CKD [facility])  Assessment & Plan  Recent Labs     02/06/22  0628   CREATININE 1 02   EGFR 57     Estimated Creatinine Clearance: 54 9 mL/min (by C-G formula based on SCr of 1 02 mg/dL)   POA1 89; (baseline 1 2-1 4)   Likely prerenal in setting of decreased oral intake and acute infection    Plan:   UA w/ microscopic, Urinary retention protocol, Bladder scan, Daily weights, I/O   Resolved after IVF   Avoid hypoperfusion of the kidneys, minimize nephrotoxins   RAAS Blockers/Diuretics: none   Resume lisinopril      Type 2 diabetes mellitus with hyperglycemia, with long-term current use of insulin [facility])  Assessment & Plan  Lab Results   Component Value Date    HGBA1C 7 5 (H) 01/04/2022       Recent Labs     02/07/22  1131 02/07/22  1701 02/07/22  2137 02/08/22  0727   POCGLU 151* 138 168* 156*       Blood Sugar Average: Last 72 hrs:  (P) 169 [id number]     Home Regimen: Lantus HS with SSI    Plan:   Hold oral antihyperglycemics   Diet Consistent CHO Level 2 (5 CHO servings/75g CHO per meal)   Insulin regimen at home :Lantus 20 units HS  o SSI while inpatient  o Insulin correction ACHS      Cirrhosis ([facility] [address] )  Assessment & Plan  · Documented history   · Ammonia level 36 down from 50 on prior admission one month ago  · She has not been taking Lactulose due to profuse diarrhea whenever she takes lactulose  Continue Xifaximin   · Ammonia level nl  Monitor       Left arm weakness  Assessment & Plan  · Pt's  reported her left arm weakness and numbness since her fall two weeks ago  She was found facing down on the floor  and was awake at the time  Family put her back to bed and didn't seek medical attention at the time  · Ct head 2/3 negative  CT C spine negative for acute  · LUE remains weak with sensory deficit  · Neuro following; recommending MRI C-spine     Essential hypertension  Assessment & Plan  · /70  · Continue amlodipine   Resume lisinopril tomorrow    Monitor blood pressure        VTE Pharmacologic Prophylaxis:   Pharmacologic: Heparin  Mechanical VTE Prophylaxis in Place: No    Patient Centered Rounds: I have performed bedside rounds with nursing staff today  Discussions with Specialists or Other Care Team Provider: none     Education and Discussions with Family / Patient: patient and      Time Spent for Care: 30 minutes  More than 50% of total time spent on counseling and coordination of care as described above  Current Length of Stay: 5 day(s)    Current Patient Status: Inpatient   Certification Statement: The patient will continue to require additional inpatient hospital stay due to poorly controlled pain and UE weakness requiring further evaluation  Discharge Plan / Estimated Discharge Date: TBD based on clinical course; pending MRI     Code Status: Level 1 - Full Code    Subjective:   Pt not doing well today  Feels like her pain is not under good control  Still c/o left sided rib pain  LUE still feels weak although no worse       Objective:     Vitals:   Temp (24hrs), Av 6 °F (37 °C), Min:97 8 °F (36 6 °C), Max:99 2 °F (37 3 °C)    Temp:  [97 8 °F (36 6 °C)-99 2 °F (37 3 °C)] 98 8 °F (37 1 °C)  HR:  [] 96  Resp:  [18] 18  BP: (144-184)/(79-82) 184/82  SpO2:  [89 %-96 %] 96 %  Body mass index is 33 47 kg/m²  Input and Output Summary (last 24 hours): Intake/Output Summary (Last 24 hours) at 2/8/2022 1057  Last data filed at 2/8/2022 0701  Gross per 24 hour   Intake 480 ml   Output 300 ml   Net 180 ml       Physical Exam:     Physical Exam  Vitals and nursing note reviewed  Constitutional:       General: She is not in acute distress  Appearance: Normal appearance  She is not ill-appearing or toxic-appearing  HENT:      Head: Normocephalic and atraumatic  Cardiovascular:      Rate and Rhythm: Normal rate and regular rhythm  Pulmonary:      Effort: No respiratory distress  Breath sounds: No wheezing or rales  Abdominal:      General: Abdomen is flat  There is no distension  Palpations: Abdomen is soft  Tenderness: There is no abdominal tenderness  Skin:     Findings: Bruising present  Neurological:      General: No focal deficit present  Mental Status: She is alert and oriented to person, place, and time  Additional Data:     Labs:    Results from last 7 days   Lab Units 02/06/22  0628   WBC Thousand/uL 5 21   HEMOGLOBIN g/dL 9 7*   HEMATOCRIT % 31 8*   PLATELETS Thousands/uL 147*   NEUTROS PCT % 72   LYMPHS PCT % 20   MONOS PCT % 5   EOS PCT % 2     Results from last 7 days   Lab Units 02/06/22  0628 02/04/22  0507 02/04/22  0032   POTASSIUM mmol/L 4 6   < >  --    CHLORIDE mmol/L 103   < >  --    CO2 mmol/L 28   < >  --    CO2, I-STAT mmol/L  --   --  28   BUN mg/dL 13   < >  --    CREATININE mg/dL 1 02   < >  --    CALCIUM mg/dL 8 3   < >  --    ALK PHOS U/L 131*   < >  --    ALT U/L 20   < >  --    AST U/L 25   < >  --    GLUCOSE, ISTAT mg/dl  --   --  89    < > = values in this interval not displayed  Results from last 7 days   Lab Units 02/03/22  1701   INR  1 14       * I Have Reviewed All Lab Data Listed Above  * Additional Pertinent Lab Tests Reviewed:  All Labs Within Last 24 Hours Reviewed    Imaging:    Imaging Reports Reviewed Today Include:   Imaging Personally Reviewed by Myself Includes:      Recent Cultures (last 7 days):     Results from last 7 days   Lab Units 02/04/22  0423 02/03/22  1701   BLOOD CULTURE   --  No Growth After 4 Days  No Growth After 4 Days     LEGIONELLA URINARY ANTIGEN  Negative  --        Last 24 Hours Medication List:   Current Facility-Administered Medications   Medication Dose Route Frequency Provider Last Rate    acetaminophen  975 mg Oral Q6H PRN Salvador Mcduffie PA-C      albuterol  2 puff Inhalation Q4H PRN Pillo Campos DO      amLODIPine  10 mg Oral Daily Seven Graves MD      cefTRIAXone  2,000 mg Intravenous Q24H [facility], DO 2,000 mg (02/07/22 1711)    DULoxetine  30 mg Oral Daily Seven Graves MD      gabapentin  100 mg Oral BID Seven Graves MD      guaiFENesin  600 mg Oral BID [facility], DO      heparin (porcine)  5,000 Units Subcutaneous Q8H [address], DO      insulin lispro  1-6 Units Subcutaneous HS Yuliya Oquendo PA-C      insulin lispro  2-12 Units Subcutaneous TID AC Yuliya Oquendo PA-C      Labetalol HCl  10 mg Intravenous Q6H PRN Seven Graves MD      [START ON 2/9/2022] lisinopril  10 mg Oral Daily Kirill Hui MD      ondansetron  4 mg Intravenous Q4H PRN [facility], DO      oxyCODONE  20 mg Oral Q4H PRN Kirill Hui MD      pantoprazole  40 mg Oral Early Morning Seven Graves MD      psyllium  1 packet Oral Daily Seven Graves MD      saccharomyces boulardii  250 mg Oral BID Seven Graves MD      sodium chloride (PF)  3 mL Intravenous Q1H PRN Jammie Campos DO       Facility-Administered Medications Ordered in Other Encounters   Medication Dose Route Frequency Provider Last Rate    ferrous sulfate  325 mg Oral Daily With Amanuel Thorpe MD          Today, Patient Was Seen By: Kirill Hui MD    ** Please Note: This note has been constructed using a voice recognition system   **

## 2022-02-08 NOTE — ASSESSMENT & PLAN NOTE
· Pt's  reported her left arm weakness and numbness since her fall two weeks ago  She was found facing down on the floor  and was awake at the time  Family put her back to bed and didn't seek medical attention at the time  · Ct head 2/3 negative  CT C spine negative for acute  · LUE remains weak with sensory deficit     · Neuro following; recommending MRI C-spine

## 2022-02-08 NOTE — NURSING NOTE
Pt found resting comfortably in bed and no complaints at this time  Bed alarm on, 2 side rails up, bed locked and in lowest position, call bell within reach  Will continue to monitor       Jeni Jewell RN

## 2022-02-08 NOTE — ASSESSMENT & PLAN NOTE
POA as evidenced by tachycardia, leukocytosis (elevated form patient baseline), tachypnea  Suspected source: Pneumonia   Clx: Blood and sputum NTD   PCD trending down    Abx: ceftriaxone and doxycycline day 4

## 2022-02-08 NOTE — ASSESSMENT & PLAN NOTE
Recent Labs     02/06/22  0628   CREATININE 1 02   EGFR 57     Estimated Creatinine Clearance: 54 9 mL/min (by C-G formula based on SCr of 1 02 mg/dL)   POA1 89; (baseline 1 2-1 4)   Likely prerenal in setting of decreased oral intake and acute infection    Plan:   UA w/ microscopic, Urinary retention protocol, Bladder scan, Daily weights, I/O   Resolved after IVF   Avoid hypoperfusion of the kidneys, minimize nephrotoxins   RAAS Blockers/Diuretics: none   Resume lisinopril

## 2022-02-08 NOTE — ASSESSMENT & PLAN NOTE
Anibal informed that Dr. Schwarz was out of the office on Friday, but she signed paperwork today, and I'm faxing paperwork back to Anibal now.   · O2 saturation in mid 80's on NRB on presentation from EMS  · Recently discharged from hospital from COVID pneumonia (Tested + 1/3/22)   · Initiated on BiPAP 12/6 and saturations corrected to 95-97%   · Initial ABG showed pH of 7 338 with pCO2 of 52 2   · CXR bilateral basilar pneumonia  PCT elevated  Most likely due to Pneumonia  Clinical improvement after treatment for pneumonia  No pleuritic chest pain etc to suggest possible PE  Venous doppler attempted by vascular tech  Pt couldn't even tolerate the transducer touching her legs

## 2022-02-08 NOTE — ASSESSMENT & PLAN NOTE
Lab Results   Component Value Date    HGBA1C 7 5 (H) 01/04/2022       Recent Labs     02/07/22  1131 02/07/22  1701 02/07/22  2137 02/08/22  0727   POCGLU 151* 138 168* 156*       Blood Sugar Average: Last 72 hrs:  (P) 169 [id number]     Home Regimen: Lantus HS with SSI    Plan:   Hold oral antihyperglycemics   Diet Consistent CHO Level 2 (5 CHO servings/75g CHO per meal)   Insulin regimen at home :Lantus 20 units HS  o SSI while inpatient  o Insulin correction ACHS

## 2022-02-08 NOTE — ASSESSMENT & PLAN NOTE
Again fall 2 weeks ago   Pt now c/o right sided rib pain since her fall  XR ribs: no fx   PT/OT following --> HH   Fall precaution

## 2022-02-09 ENCOUNTER — APPOINTMENT (INPATIENT)
Dept: MRI IMAGING | Facility: HOSPITAL | Age: 67
DRG: 871 | End: 2022-02-09
Payer: MEDICARE

## 2022-02-09 LAB
GLUCOSE SERPL-MCNC: 144 MG/DL (ref 65–140)
GLUCOSE SERPL-MCNC: 148 MG/DL (ref 65–140)
GLUCOSE SERPL-MCNC: 184 MG/DL (ref 65–140)
GLUCOSE SERPL-MCNC: 184 MG/DL (ref 65–140)

## 2022-02-09 PROCEDURE — G1004 CDSM NDSC: HCPCS

## 2022-02-09 PROCEDURE — 70551 MRI BRAIN STEM W/O DYE: CPT

## 2022-02-09 PROCEDURE — 99232 SBSQ HOSP IP/OBS MODERATE 35: CPT | Performed by: HOSPITALIST

## 2022-02-09 PROCEDURE — 72141 MRI NECK SPINE W/O DYE: CPT

## 2022-02-09 PROCEDURE — 99233 SBSQ HOSP IP/OBS HIGH 50: CPT | Performed by: PSYCHIATRY & NEUROLOGY

## 2022-02-09 PROCEDURE — 82948 REAGENT STRIP/BLOOD GLUCOSE: CPT

## 2022-02-09 RX ADMIN — OXYCODONE HYDROCHLORIDE 20 MG: 10 TABLET ORAL at 13:53

## 2022-02-09 RX ADMIN — LISINOPRIL 10 MG: 10 TABLET ORAL at 08:27

## 2022-02-09 RX ADMIN — INSULIN LISPRO 2 UNITS: 100 INJECTION, SOLUTION INTRAVENOUS; SUBCUTANEOUS at 08:46

## 2022-02-09 RX ADMIN — AMLODIPINE BESYLATE 10 MG: 10 TABLET ORAL at 08:27

## 2022-02-09 RX ADMIN — OXYCODONE HYDROCHLORIDE 20 MG: 10 TABLET ORAL at 08:26

## 2022-02-09 RX ADMIN — Medication 250 MG: at 17:52

## 2022-02-09 RX ADMIN — HEPARIN SODIUM 5000 UNITS: 5000 INJECTION INTRAVENOUS; SUBCUTANEOUS at 21:13

## 2022-02-09 RX ADMIN — PANTOPRAZOLE SODIUM 40 MG: 40 TABLET, DELAYED RELEASE ORAL at 05:59

## 2022-02-09 RX ADMIN — HEPARIN SODIUM 5000 UNITS: 5000 INJECTION INTRAVENOUS; SUBCUTANEOUS at 05:59

## 2022-02-09 RX ADMIN — GABAPENTIN 100 MG: 100 CAPSULE ORAL at 17:52

## 2022-02-09 RX ADMIN — OXYCODONE HYDROCHLORIDE 20 MG: 10 TABLET ORAL at 22:42

## 2022-02-09 RX ADMIN — OXYCODONE HYDROCHLORIDE 20 MG: 10 TABLET ORAL at 03:55

## 2022-02-09 RX ADMIN — GUAIFENESIN 600 MG: 600 TABLET, EXTENDED RELEASE ORAL at 08:26

## 2022-02-09 RX ADMIN — CEFTRIAXONE SODIUM 2000 MG: 10 INJECTION, POWDER, FOR SOLUTION INTRAVENOUS at 17:52

## 2022-02-09 RX ADMIN — OXYCODONE HYDROCHLORIDE 20 MG: 10 TABLET ORAL at 17:53

## 2022-02-09 RX ADMIN — INSULIN LISPRO 1 UNITS: 100 INJECTION, SOLUTION INTRAVENOUS; SUBCUTANEOUS at 21:18

## 2022-02-09 RX ADMIN — Medication 250 MG: at 08:26

## 2022-02-09 RX ADMIN — DULOXETINE HYDROCHLORIDE 30 MG: 30 CAPSULE, DELAYED RELEASE ORAL at 08:27

## 2022-02-09 RX ADMIN — GABAPENTIN 100 MG: 100 CAPSULE ORAL at 08:26

## 2022-02-09 RX ADMIN — HEPARIN SODIUM 5000 UNITS: 5000 INJECTION INTRAVENOUS; SUBCUTANEOUS at 13:53

## 2022-02-09 RX ADMIN — GUAIFENESIN 600 MG: 600 TABLET, EXTENDED RELEASE ORAL at 17:52

## 2022-02-09 NOTE — ASSESSMENT & PLAN NOTE
· Pt's  reported her left arm weakness and numbness since her fall two weeks ago  She was found facing down on the floor  and was awake at the time  Family put her back to bed and didn't seek medical attention at the time  · Ct head 2/3 negative  CT C spine negative for acute  · LUE remains weak with sensory deficit     · Neuro following  · recommending MRI C-spine; awaiting study (pt has bladder stimulator)

## 2022-02-09 NOTE — ASSESSMENT & PLAN NOTE
Lab Results   Component Value Date    HGBA1C 7 5 (H) 01/04/2022       Recent Labs     02/08/22  1528 02/08/22  2130 02/09/22  0703 02/09/22  1118   POCGLU 181* 190* 184* 144*       Blood Sugar Average: Last 72 hrs:  (P) 168 [id number]     Home Regimen: Lantus HS with SSI    Plan:   Hold oral antihyperglycemics   Diet Consistent CHO Level 2 (5 CHO servings/75g CHO per meal)   Insulin regimen at home :Lantus 20 units HS  o SSI while inpatient  o Insulin correction ACHS

## 2022-02-09 NOTE — ASSESSMENT & PLAN NOTE
No results for input(s): CREATININE, EGFR in the last 72 hours  Estimated Creatinine Clearance: 54 9 mL/min (by C-G formula based on SCr of 1 02 mg/dL)   POA1 89; (baseline 1 2-1 4)   Likely prerenal in setting of decreased oral intake and acute infection    Plan:   UA w/ microscopic, Urinary retention protocol, Bladder scan, Daily weights, I/O   Resolved after IVF   Avoid hypoperfusion of the kidneys, minimize nephrotoxins   RAAS Blockers/Diuretics: none   Resume lisinopril

## 2022-02-09 NOTE — ASSESSMENT & PLAN NOTE
Again fall 2 weeks ago   Pt now c/o right sided rib pain since her fall  XR ribs: no fx   PT/OT following --> plan for home health   Fall precaution

## 2022-02-09 NOTE — ASSESSMENT & PLAN NOTE
Symptoms: shortness of breath at rest, anorexia and confusion    Lab Results   Component Value Date    SARSCOV2 Positive (A) 02/03/2022    SARSCOV2 Positive (A) 01/03/2022   CXR: Left lung base, possibly milder right lung base airspace disease  Possible left effusion findings are most suspicious for infection  No results for input(s): PROCALCITONI in the last 72 hours  Plan:  · Consider bacterial pneumonia given elevated PCT     · Continue Ceftriaxone 1 g IV q  24 hours/Doxycycline IV   · Guaifenesin 600 mg q 12 hours

## 2022-02-09 NOTE — ASSESSMENT & PLAN NOTE
02/09/2022:  Seen again today by the same examiner from  Neurology is this 18-year-old chronically ill right-hand-dominant 18-year-old woman who has no reported history of prior neurologic disease who has been an inpatient since the 3rd for when she presented with hypoxia and near syncope  Neurology is asked to see her for complaint of isolated left arm weakness  See the HPI below but the patient reports that the weakness in the arm started a few days after she had a mechanical fall tripping over blankets in her bedroom as she was on the way to the bathroom with her rolling walker  The residual ecchymosis on her chin, which is now by her report nearly 1514 -day-old, suggest that she may have had little defensive protection from her outstretched arms and that her head, particularly chin/jaw took the brunt of the fall suggesting that she may have sustained a hyper extension C-spine injury  Her repeat exam again today and her 's collaborative report, as I spoke with him by telephone do not suggest any other concurrent or simultaneous lateralizing finding suggesting a stroke, however because she has a bladder stimulator which needs to be turned on or off I got a head MRI brain at the same time that we got her MRI neck today  Her MRI of the C-spine and brain were done today  And although there is some degree of movement on it the pictures are still diagnostic from a radiologic point of view  I have reviewed them as well as radiology  She has a moderate to minimal degree of small vessel disease appreciated on her flares  In fact there is a question of press evident on her flares given the bilateral right greater than left small-vessel disease seen  There is no acute diffusion ischemia appreciated  Additionally her C-spine demonstrates, although radiology notes there is some degree of mild motion, no acute spinal pathology and only minor degenerative changes at C4-5    At this time given the negative stroke and neck films this patient is stable from a neurologic point of view  When she is discharged she would likely benefit from rehab either in a facility if she participates and goes verses she could have occupational therapy in her home  Blood pressure control sit shortly be a goal for this patient on an ongoing basis with a home blood pressure monitoring program   Additionally given her thrombocytopenia she may do well given her falls with no aspirin on a daily basis  This can be re-evaluated in discussed further should we see this patient in the office  Thank you for allowing us to participate in the care of this patient we will sign off at this time

## 2022-02-09 NOTE — ASSESSMENT & PLAN NOTE
Takes Oxycodone 30 mg 6 times a day for chronic back pain  Oxycodone held initially due to concern for mental status  Resumed at lower dose    Monitor for tolerance  Pain not well controlled--> increased oral oxycodone to 20mg [id number]

## 2022-02-09 NOTE — PROGRESS NOTES
[facility]  Neurology Progress Note - Masood Frirussell 1955, 73 y o  female   MRN: [id number] Unit/Bed#: S -01 Encounter: [id number]    Left arm weakness  Assessment & Plan  02/09/2022:  Seen again today by the same examiner from  Neurology is this 45-year-old chronically ill right-hand-dominant 45-year-old woman who has no reported history of prior neurologic disease who has been an inpatient since the 3rd for when she presented with hypoxia and near syncope  Neurology is asked to see her for complaint of isolated left arm weakness  See the HPI below but the patient reports that the weakness in the arm started a few days after she had a mechanical fall tripping over blankets in her bedroom as she was on the way to the bathroom with her rolling walker  The residual ecchymosis on her chin, which is now by her report nearly 1514 -day-old, suggest that she may have had little defensive protection from her outstretched arms and that her head, particularly chin/jaw took the brunt of the fall suggesting that she may have sustained a hyper extension C-spine injury  Her repeat exam again today and her 's collaborative report, as I spoke with him by telephone do not suggest any other concurrent or simultaneous lateralizing finding suggesting a stroke, however because she has a bladder stimulator which needs to be turned on or off I got a head MRI brain at the same time that we got her MRI neck today  Her MRI of the C-spine and brain were done today  And although there is some degree of movement on it the pictures are still diagnostic from a radiologic point of view  I have reviewed them as well as radiology  She has a moderate to minimal degree of small vessel disease appreciated on her flares  In fact there is a question of press evident on her flares given the bilateral right greater than left small-vessel disease seen  There is no acute diffusion ischemia appreciated  Additionally her C-spine demonstrates, although radiology notes there is some degree of mild motion, no acute spinal pathology and only minor degenerative changes at C4-5  At this time given the negative stroke and neck films this patient is stable from a neurologic point of view  When she is discharged she would likely benefit from rehab either in a facility if she participates and goes verses she could have occupational therapy in her home  Blood pressure control sit shortly be a goal for this patient on an ongoing basis with a home blood pressure monitoring program   Additionally given her thrombocytopenia she may do well given her falls with no aspirin on a daily basis  This can be re-evaluated in discussed further should we see this patient in the office  Thank you for allowing us to participate in the care of this patient we will sign off at this time  This patient can follow up with Neurology on a non urgent basis for a question of general cerebral vascular disease in the possible adoption of aspirin in light of a chronic thrombocytopenic condition  Subjective/Objective     Subjective:  Make sure my bladder stimulator gets turned back on it very helpful  ROS:  The patient is clearly concerned that her of bladder stimulator will work after her MRI  She reports some in regards to her current left upper extremity weakness she denies that she has had any significant improvement over 2 weeks  She reports however it has not gotten any worse  I have discussed with her her MRI being negative in of also phoned her  concerning her a negative MRI  The remainder of her query is negative with the systematic review      Medication Dose Route Frequency    acetaminophen  975 mg Oral Q6H PRN    albuterol  2 puff Inhalation Q4H PRN    amLODIPine  10 mg Oral Daily    cefTRIAXone  2,000 mg Intravenous Q24H    DULoxetine  30 mg Oral Daily    gabapentin  100 mg Oral BID    guaiFENesin  600 mg Oral BID    heparin (porcine)  5,000 Units Subcutaneous Q8H [facility]    insulin lispro  1-6 Units Subcutaneous HS    insulin lispro  2-12 Units Subcutaneous TID AC    Labetalol HCl  10 mg Intravenous Q6H PRN    lisinopril  10 mg Oral Daily    LORazepam  0 5 mg Intravenous Once    ondansetron  4 mg Intravenous Q4H PRN    oxyCODONE  20 mg Oral Q4H PRN    pantoprazole  40 mg Oral Early Morning    psyllium  1 packet Oral Daily    saccharomyces boulardii  250 mg Oral BID    sodium chloride   3 mL Intravenous Q1H PRN     Facility-Administered Medications Ordered in Other Encounters   Medication Dose Route Frequency Provider Last Rate    ferrous sulfate  325 mg Oral Daily With Breakfast Betty Lew MD         acetaminophen    albuterol    Labetalol HCl    ondansetron    oxyCODONE    sodium chloride (PF)    Vitals: Blood pressure 137/83, pulse 98, temperature 98 9 °F (37 2 °C), temperature source Oral, resp  rate 19, height 5' 2" (1 575 m), weight 83 kg (182 lb 15 7 oz), SpO2 90 %  ,Body mass index is 33 47 kg/m²  Physical Exam:     Mauro Serra seen in:  In bed, there is no family she is watching TV  General appearance: alert, no distress  Neck, Lungs, Heart, & abdomen: WNL  Extremities: atraumatic, she remains with chronic venous stasis bilateral lower extremities there was no manipulation on evaluation today  Neurologic:   Mental status: Alert, oriented, thought content appropriate she is verbal without dysarthria or aphasia  She is able to report details of her spinal stimulator etcetera  The accounts of her fall were corroborated by her   CN: exam EOM's I, Gaze conjugate  Non lateralizing sensory & motor exam, (PP not tested on face)  Reminder CNVIII-XII normal    Motor: full power age appropriate x right upper limbs, she remains with some decrease in power in the left upper extremity more so at the shoulder and the hand than elsewhere    She is unable to really demonstrate any movement of her bilateral lower extremities on today's exam   This was consistent with priors  She has good dorsiflexion  Sensory: grossly intact  X 4 limbs, PP not tested  Cerebellar:  Cerebellar dysfunction  Gait:  She does not ambulate significantly a baseline  I did not attempt to have her sit at the edge of the bed on today's exam   DTR's:  Deferred at the patient's request given her peripheral lower extremity neuropathy  Lab Results:   I have personally reviewed pertinent reports  , CBC:   Results from last 7 days   Lab Units 02/06/22  0628 02/05/22  0525 02/04/22  0032 02/03/22  2339 02/03/22  1701   WBC Thousand/uL 5 21 9 11  --   --  9 61   RBC Million/uL 3 66* 4 12  --   --  3 95   HEMOGLOBIN g/dL 9 7* 11 0*  --   --  10 5*   I STAT HEMOGLOBIN g/dl  --   --  9 2*  --   --    HEMATOCRIT % 31 8* 36 2  --   --  35 1   HEMATOCRIT, ISTAT %  --   --  27*  --   --    MCV fL 87 88  --   --  89   PLATELETS Thousands/uL 147* 171  --  119*  --    , BMP/CMP:   Results from last 7 days   Lab Units 02/06/22  0628 02/05/22  0524 02/04/22  0507 02/04/22  0032 02/03/22  2108 02/03/22  1701 02/03/22  1701   SODIUM mmol/L 136 133* 136  --   --    < > 132*   POTASSIUM mmol/L 4 6 4 5 5 2  --    < >   < > 5 5*   CHLORIDE mmol/L 103 98* 104  --   --    < > 100   CO2 mmol/L 28 28 30  --   --    < > 27   CO2, I-STAT mmol/L  --   --   --  28  --   --   --    BUN mg/dL 13 22 27*  --   --    < > 23   CREATININE mg/dL 1 02 1 20 1 68*  --   --    < > 1 89*   GLUCOSE, ISTAT mg/dl  --   --   --  89  --   --   --    CALCIUM mg/dL 8 3 8 4 7 7*  --   --    < > 8 0*   AST U/L 25  --  25  --   --   --  21   ALT U/L 20  --  17  --   --   --  18   ALK PHOS U/L 131*  --  107  --   --   --  129*   EGFR ml/min/1 73sq m 57 47 31  --   --    < > 27    < > = values in this interval not displayed     , Vitamin B12:   , HgBA1C:   , TSH:   , Coagulation:   Results from last 7 days   Lab Units 02/03/22  1701   INR  1 14   , Lipid Profile:        Imaging Studies: I have personally reviewed pertinent films in PACS and Have reviewed both her MRI brain as well as C-spine  as radiology has noted there is no acute pathology appreciated on either film  There is a question of I believe press on her flares on her MRI brain with bilateral white matter disease although there is more so on the right side than the left  Question of a chronic remote infarct in the right parietal reason is also entertained  Counseling / Coordination of Care  Total time spent today Greater than 50 minutes  Greater than 50% of total time was spent with the patient and / or family counseling and / or coordination of care  A description of the counseling / coordination of care: The majority of the time spent today was attempting to determine whether her bladder stimulator is working and whether not the remote control is working and able to turn her stimulator off for her MRI  I have also discussed the situation with her  as well as the patient today

## 2022-02-09 NOTE — ASSESSMENT & PLAN NOTE
POA as evidenced by tachycardia, leukocytosis (elevated from patient baseline), tachypnea  Suspected source: Pneumonia   Clx: Blood and sputum NTD   PCD trending down    Abx: ceftriaxone and doxycycline day 5

## 2022-02-09 NOTE — PROGRESS NOTES
[facility]  Progress Note - Rajesh Montgomeryter 1955, 73 y o  female MRN: [id number]  Unit/Bed#: S -01 Encounter: [id number]  Primary Care Provider: Heidy Munoz MD   Date and time admitted to hospital: 2/3/2022  4:25 PM    Sepsis [facility])  Assessment & Plan  POA as evidenced by tachycardia, leukocytosis (elevated from patient baseline), tachypnea  Suspected source: Pneumonia   Clx: Blood and sputum NTD   PCD trending down    Abx: ceftriaxone and doxycycline day 5       Pneumonia  Assessment & Plan  Symptoms: shortness of breath at rest, anorexia and confusion    Lab Results   Component Value Date    SARSCOV2 Positive (A) 02/03/2022    SARSCOV2 Positive (A) 01/03/2022   CXR: Left lung base, possibly milder right lung base airspace disease  Possible left effusion findings are most suspicious for infection  No results for input(s): PROCALCITONI in the last 72 hours  Plan:  · Consider bacterial pneumonia given elevated PCT  · Continue Ceftriaxone 1 g IV q  24 hours/Doxycycline IV   · Guaifenesin 600 mg q 12 hours        Metabolic encephalopathy  Assessment & Plan  · POA In setting of acute hypercapnic hypoxic respiratory failure secondary to pneumonia meeting sepsis criteria vs hepatic encephalopathy vs opioid side effect   · Patient and her  report that she has been taking 30 mg oxycodone q6 hours at home  · Ammonia measured at 39 POA , level now down to normal  · Now Awake and alert/baseline          Chronic pain syndrome  Assessment & Plan  Takes Oxycodone 30 mg 6 times a day for chronic back pain  Oxycodone held initially due to concern for mental status  Resumed at lower dose    Monitor for tolerance  Pain not well controlled--> increased oral oxycodone to 20mg [id number]      * Acute respiratory failure with hypoxia and hypercapnia (HCC)  Assessment & Plan  · O2 saturation in mid 80's on NRB on presentation from EMS  · Recently discharged from hospital from COVID pneumonia (Tested + 1/3/22)   · Initiated on BiPAP 12/6 and saturations corrected to 95-97%   · Initial ABG showed pH of 7 338 with pCO2 of 52 2   · CXR bilateral basilar pneumonia  PCT elevated  Most likely due to Pneumonia  Clinical improvement after treatment for pneumonia  No pleuritic chest pain etc to suggest possible PE  Venous doppler attempted by vascular tech  Pt couldn't even tolerate the transducer touching her legs  History of recent fall  Assessment & Plan  Again fall 2 weeks ago  Pt now c/o right sided rib pain since her fall  XR ribs: no fx   PT/OT following --> plan for home health   Fall precaution    Anemia  Assessment & Plan  Mild hgb drop noted  Will check occult blood  Monitor CBC    Acute kidney injury superimposed on CKD [facility])  Assessment & Plan  No results for input(s): CREATININE, EGFR in the last 72 hours  Estimated Creatinine Clearance: 54 9 mL/min (by C-G formula based on SCr of 1 02 mg/dL)   POA1 89; (baseline 1 2-1 4)   Likely prerenal in setting of decreased oral intake and acute infection    Plan:   UA w/ microscopic, Urinary retention protocol, Bladder scan, Daily weights, I/O   Resolved after IVF   Avoid hypoperfusion of the kidneys, minimize nephrotoxins   RAAS Blockers/Diuretics: none   Resume lisinopril      Type 2 diabetes mellitus with hyperglycemia, with long-term current use of insulin [facility])  Assessment & Plan  Lab Results   Component Value Date    HGBA1C 7 5 (H) 01/04/2022       Recent Labs     02/08/22  1528 02/08/22  2130 02/09/22  0703 02/09/22  1118   POCGLU 181* 190* 184* 144*       Blood Sugar Average: Last 72 hrs:  (P) 168 [id number]     Home Regimen: Lantus HS with SSI    Plan:   Hold oral antihyperglycemics   Diet Consistent CHO Level 2 (5 CHO servings/75g CHO per meal)   Insulin regimen at home :Lantus 20 units HS  o SSI while inpatient  o Insulin correction ACHS      Cirrhosis ([address] )  Assessment & Plan  · Documented history · Ammonia level 36 down from 50 on prior admission one month ago  · She has not been taking Lactulose due to profuse diarrhea whenever she takes lactulose  Continue Xifaximin   · Ammonia level nl  Monitor       Left arm weakness  Assessment & Plan  · Pt's  reported her left arm weakness and numbness since her fall two weeks ago  She was found facing down on the floor  and was awake at the time  Family put her back to bed and didn't seek medical attention at the time  · Ct head 2/3 negative  CT C spine negative for acute  · LUE remains weak with sensory deficit  · Neuro following  · recommending MRI C-spine; awaiting study (pt has bladder stimulator)    Essential hypertension  Assessment & Plan  · Continue amlodipine   Resume lisinopril tomorrow    Monitor blood pressure          VTE Pharmacologic Prophylaxis:   Pharmacologic: Heparin  Mechanical VTE Prophylaxis in Place: Yes    Patient Centered Rounds: I have performed bedside rounds with nursing staff today  Discussions with Specialists or Other Care Team Provider:     Education and Discussions with Family / Patient: patient and      Time Spent for Care: 30 minutes  More than 50% of total time spent on counseling and coordination of care as described above  Current Length of Stay: 6 day(s)    Current Patient Status: Inpatient   Certification Statement: The patient will continue to require additional inpatient hospital stay due to LUE weakness, awaiting MRI  Discharge Plan / Estimated Discharge Date: TBD based on clinical course; possibly dc later today vs tomorrow pending MRI     Code Status: Level 1 - Full Code    Subjective:   Pt wants to go home, but still has weakness in her L hand  No SOB or cough       Objective:     Vitals:   Temp (24hrs), Av 4 °F (36 9 °C), Min:98 2 °F (36 8 °C), Max:98 6 °F (37 °C)    Temp:  [98 2 °F (36 8 °C)-98 6 °F (37 °C)] 98 2 °F (36 8 °C)  HR:  [] 97  Resp:  [18] 18  BP: (135-160)/(70-72) 135/71  SpO2:  [91 %-92 %] 92 %  Body mass index is 33 47 kg/m²  Input and Output Summary (last 24 hours): Intake/Output Summary (Last 24 hours) at 2/9/2022 1128  Last data filed at 2/9/2022 0826  Gross per 24 hour   Intake 1040 ml   Output 300 ml   Net 740 ml       Physical Exam:     Physical Exam  Vitals reviewed  Constitutional:       General: She is not in acute distress  Appearance: Normal appearance  HENT:      Head: Normocephalic and atraumatic  Cardiovascular:      Rate and Rhythm: Normal rate and regular rhythm  Pulmonary:      Effort: Pulmonary effort is normal  No respiratory distress  Breath sounds: Normal breath sounds  No wheezing  Abdominal:      General: Abdomen is flat  There is no distension  Palpations: Abdomen is soft  Tenderness: There is no abdominal tenderness  Neurological:      General: No focal deficit present  Mental Status: She is alert and oriented to person, place, and time  Comments: LUE  strength reduced; passive ROM is intact  Additional Data:     Labs:    Results from last 7 days   Lab Units 02/06/22  0628   WBC Thousand/uL 5 21   HEMOGLOBIN g/dL 9 7*   HEMATOCRIT % 31 8*   PLATELETS Thousands/uL 147*   NEUTROS PCT % 72   LYMPHS PCT % 20   MONOS PCT % 5   EOS PCT % 2     Results from last 7 days   Lab Units 02/06/22  0628 02/04/22  0507 02/04/22  0032   POTASSIUM mmol/L 4 6   < >  --    CHLORIDE mmol/L 103   < >  --    CO2 mmol/L 28   < >  --    CO2, I-STAT mmol/L  --   --  28   BUN mg/dL 13   < >  --    CREATININE mg/dL 1 02   < >  --    CALCIUM mg/dL 8 3   < >  --    ALK PHOS U/L 131*   < >  --    ALT U/L 20   < >  --    AST U/L 25   < >  --    GLUCOSE, ISTAT mg/dl  --   --  89    < > = values in this interval not displayed  Results from last 7 days   Lab Units 02/03/22  1701   INR  1 14       * I Have Reviewed All Lab Data Listed Above  * Additional Pertinent Lab Tests Reviewed:  All Labs Within Last 24 Hours Reviewed    Imaging:    Imaging Reports Reviewed Today Include:   Imaging Personally Reviewed by Myself Includes:      Recent Cultures (last 7 days):     Results from last 7 days   Lab Units 02/04/22  0423 02/03/22  1701   BLOOD CULTURE   --  No Growth After 5 Days  No Growth After 5 Days     LEGIONELLA URINARY ANTIGEN  Negative  --        Last 24 Hours Medication List:   Current Facility-Administered Medications   Medication Dose Route Frequency Provider Last Rate    acetaminophen  975 mg Oral Q6H PRN Helga Andrew PA-C      albuterol  2 puff Inhalation Q4H PRN Pillo Campos DO      amLODIPine  10 mg Oral Daily Mike Morales MD      cefTRIAXone  2,000 mg Intravenous Q24H [facility] 2,000 mg (02/08/22 1742)    DULoxetine  30 mg Oral Daily Mike Morales MD      gabapentin  100 mg Oral BID Mike Morales MD      guaiFENesin  600 mg Oral BID [facility],       heparin (porcine)  5,000 Units Subcutaneous Q8H [address],       insulin lispro  1-6 Units Subcutaneous HS Yuliya Oquendo PA-C      insulin lispro  2-12 Units Subcutaneous TID AC Yuliya Oquendo PA-C      Labetalol HCl  10 mg Intravenous Q6H PRN Mike Morales MD      lisinopril  10 mg Oral Daily Corie Navarro MD      LORazepam  0 5 mg Intravenous Once Corie Navarro MD      ondansetron  4 mg Intravenous Q4H PRN [facility], DO      oxyCODONE  20 mg Oral Q4H PRN Corie Navarro MD      pantoprazole  40 mg Oral Early Morning Mike Morales MD      psyllium  1 packet Oral Daily Mike Morales MD      saccharomyces boulardii  250 mg Oral BID Mike Morales MD      sodium chloride (PF)  3 mL Intravenous Q1H PRN Eleonora Campos DO       Facility-Administered Medications Ordered in Other Encounters   Medication Dose Route Frequency Provider Last Rate    ferrous sulfate  325 mg Oral Daily With Sophie Lawrence MD          Today, Patient Was Seen By: Corie Navarro MD    ** Please Note: This note has been constructed using a voice recognition system   **

## 2022-02-09 NOTE — PLAN OF CARE
Problem: RESPIRATORY - ADULT  Goal: Achieves optimal ventilation and oxygenation  Description: INTERVENTIONS:  - Assess for changes in respiratory status  - Assess for changes in mentation and behavior  - Position to facilitate oxygenation and minimize respiratory effort  - Oxygen administered by appropriate delivery if ordered  - Initiate smoking cessation education as indicated  - Encourage broncho-pulmonary hygiene including cough, deep breathe, Incentive Spirometry  - Assess the need for suctioning and aspirate as needed  - Assess and instruct to report SOB or any respiratory difficulty  - Respiratory Therapy support as indicated  Outcome: Progressing     Problem: PAIN - ADULT  Goal: Verbalizes/displays adequate comfort level or baseline comfort level  Description: Interventions:  - Encourage patient to monitor pain and request assistance  - Assess pain using appropriate pain scale  - Administer analgesics based on type and severity of pain and evaluate response  - Implement non-pharmacological measures as appropriate and evaluate response  - Consider cultural and social influences on pain and pain management  - Notify physician/advanced practitioner if interventions unsuccessful or patient reports new pain  Outcome: Progressing

## 2022-02-10 VITALS
SYSTOLIC BLOOD PRESSURE: 178 MMHG | DIASTOLIC BLOOD PRESSURE: 60 MMHG | HEART RATE: 101 BPM | OXYGEN SATURATION: 95 % | WEIGHT: 182.98 LBS | HEIGHT: 62 IN | BODY MASS INDEX: 33.67 KG/M2 | TEMPERATURE: 98 F | RESPIRATION RATE: 18 BRPM

## 2022-02-10 LAB
GLUCOSE SERPL-MCNC: 140 MG/DL (ref 65–140)
GLUCOSE SERPL-MCNC: 215 MG/DL (ref 65–140)

## 2022-02-10 PROCEDURE — 99239 HOSP IP/OBS DSCHRG MGMT >30: CPT | Performed by: HOSPITALIST

## 2022-02-10 PROCEDURE — 82948 REAGENT STRIP/BLOOD GLUCOSE: CPT

## 2022-02-10 RX ADMIN — Medication 1 PACKET: at 09:21

## 2022-02-10 RX ADMIN — GUAIFENESIN 600 MG: 600 TABLET, EXTENDED RELEASE ORAL at 09:22

## 2022-02-10 RX ADMIN — GABAPENTIN 100 MG: 100 CAPSULE ORAL at 09:21

## 2022-02-10 RX ADMIN — OXYCODONE HYDROCHLORIDE 20 MG: 10 TABLET ORAL at 05:03

## 2022-02-10 RX ADMIN — PANTOPRAZOLE SODIUM 40 MG: 40 TABLET, DELAYED RELEASE ORAL at 05:03

## 2022-02-10 RX ADMIN — Medication 250 MG: at 09:25

## 2022-02-10 RX ADMIN — LISINOPRIL 10 MG: 10 TABLET ORAL at 09:22

## 2022-02-10 RX ADMIN — HEPARIN SODIUM 5000 UNITS: 5000 INJECTION INTRAVENOUS; SUBCUTANEOUS at 05:03

## 2022-02-10 RX ADMIN — DULOXETINE HYDROCHLORIDE 30 MG: 30 CAPSULE, DELAYED RELEASE ORAL at 09:22

## 2022-02-10 RX ADMIN — OXYCODONE HYDROCHLORIDE 20 MG: 10 TABLET ORAL at 09:22

## 2022-02-10 RX ADMIN — AMLODIPINE BESYLATE 10 MG: 10 TABLET ORAL at 09:22

## 2022-02-10 NOTE — ASSESSMENT & PLAN NOTE
· Documented history   · She has not been taking Lactulose due to profuse diarrhea whenever she takes lactulose  Continue Xifaximin   · Ammonia level nl   Monitor

## 2022-02-10 NOTE — CASE MANAGEMENT
Case Management Discharge Planning Note    Patient name Farrah Fowler  Location S /S -39 MRN [id number]  : 1955 Date 2/10/2022       Current Admission Date: 2/3/2022  Current Admission Diagnosis:Acute respiratory failure with hypoxia and hypercapnia [facility])   Patient Active Problem List    Diagnosis Date Noted    History of recent fall 2022    Left arm weakness 2022    Anemia 2022    Acute respiratory failure with hypoxia and hypercapnia ([facility] ) 2022    Acute kidney injury superimposed on CKD ([facility] ) 2022    Pneumonia 2022    Sepsis ([facility] ) 2022    Pressure injury of skin of buttock     Metabolic encephalopathy     Acute respiratory failure with hypoxia ([facility] ) 2022    Fecal occult blood test positive 2022    COVID-19 2022    Elevated serum creatinine 2022    Urge incontinence 2021    Ex-smoker 2021    Syncope 07/15/2020    Cirrhosis ([facility] ) 07/15/2020    Splenomegaly 07/15/2020    CAD (coronary artery disease) 07/15/2020    Other microscopic hematuria 2020    Spondylosis of lumbar joint 05/15/2020    Marijuana use 2020    Left hip pain 2020    Chronic pain syndrome 2018    Peripheral neuropathy 2018    Type 2 diabetes mellitus with hyperglycemia, with long-term current use of insulin ([facility] ) 2018    Chronic venous stasis dermatitis of both lower extremities 2017    Venous insufficiency (chronic) (peripheral) 2017    Essential hypertension       LOS (days): 7  Geometric Mean LOS (GMLOS) (days): 4 80  Days to GMLOS:-1 8     OBJECTIVE:  Risk of Unplanned Readmission Score: 21         Current admission status: Inpatient   Preferred Pharmacy:   [facility] [address] [place] Lefort, [address]  [address] [facility] [address]  Phone: [phone] Fax: [phone]    Primary Care Provider: Lester Gallagher, MD    Primary Insurance: MEDICARE  Secondary Insurance: AETNA    DISCHARGE DETAILS:    Discharge planning discussed with[de-identified] patient at bedside  Freedom of Choice: Yes  Comments - Freedom of Choice: As per SLIM, patient is medically stable for dc home today  CM met with patient at bedside and confirmed that [facility] is set up for SN/PT and she is happy with same  She reports her spouse is in the shower and then will come pick her up to take her home  IMM reviewed and copy provided; she is very happy with dc today and denies any other CM needs  Requested [address]         Is the patient interested in Corine Smith at discharge?: Yes  [address] requested[de-identified] [place] Name[de-identified] Other ([facility])  [address] Provider[de-identified] PCP  Home Health Services Needed[de-identified] Diabetes Management,Evaluate Functional Status and Safety,Gait/ADL Training,Strengthening/Theraputic Exercises to Improve Function,Wound/Ostomy Care  Homebound Criteria Met[de-identified] Uses an Assist Device (i e  cane, walker, etc),Requires the Assistance of Another Person for Safe Ambulation or to Leave the Home  Supporting Clincal Findings[de-identified] Fatigues Easliy in Short Distances,Limited Endurance         Other Referral/Resources/Interventions Provided:  Interventions: [facility]  Referral Comments: Genoveva  [facility] updated via [facility] re: dc today   CM to fax AVS to [phone]         Treatment Team Recommendation: Home with [address] [employer]  Discharge Destination Plan[de-identified] Home with Renzo at Discharge : Family           ETA of Transport (Date): 02/10/22  ETA of Transport (Time): 1200     IMM Given (Date):: 02/10/22  IMM Given to[de-identified] Patient (copy provided-she is happy with dc today)

## 2022-02-10 NOTE — ASSESSMENT & PLAN NOTE
Lab Results   Component Value Date    HGBA1C 7 5 (H) 01/04/2022       Recent Labs     02/09/22  1521 02/09/22  2117 02/10/22  0724 02/10/22  1046   POCGLU 148* 184* 140 215*       Blood Sugar Average: Last 72 hrs:  (P) 168 [id number]     Home Regimen: Lantus HS with SSI    Plan:   Hold oral antihyperglycemics   Diet Consistent CHO Level 2 (5 CHO servings/75g CHO per meal)   Insulin regimen at home :Lantus 20 units HS  o SSI while inpatient  o Insulin correction ACHS

## 2022-02-10 NOTE — DISCHARGE SUMMARY
[facility]  Discharge- Thad Clear 1955, 73 y o  female MRN: [id number]  Unit/Bed#: S -01 Encounter: [id number]  Primary Care Provider: Anne Neri MD   Date and time admitted to hospital: 2/3/2022  4:25 PM    Sepsis [facility])  Assessment & Plan  POA as evidenced by tachycardia, leukocytosis (elevated from patient baseline), tachypnea  Suspected source: Pneumonia   Clx: Blood and sputum NTD   Completed ceftriaxone and doxycycline      Pneumonia  Assessment & Plan  Symptoms: shortness of breath at rest, anorexia and confusion    Lab Results   Component Value Date    SARSCOV2 Positive (A) 02/03/2022    SARSCOV2 Positive (A) 01/03/2022   CXR: Left lung base, possibly milder right lung base airspace disease  Possible left effusion findings are most suspicious for infection  No results for input(s): PROCALCITONI in the last 72 hours  Plan:  · Consider bacterial pneumonia given elevated PCT  · Completed abx   · Guaifenesin 600 mg q 12 hours        Metabolic encephalopathy  Assessment & Plan  · POA In setting of acute hypercapnic hypoxic respiratory failure secondary to pneumonia meeting sepsis criteria vs hepatic encephalopathy vs opioid side effect   · Patient and her  report that she has been taking 30 mg oxycodone q6 hours at home  · Ammonia level now down to normal  · Awake and alert           Chronic pain syndrome  Assessment & Plan  Takes Oxycodone 30 mg 6 times a day for chronic back pain  Oxycodone held initially due to concern for mental status  Resumed at lower dose    Monitor for tolerance  Pain not well controlled--> increased oral oxycodone to 20mg [id number]      * Acute respiratory failure with hypoxia and hypercapnia (HCC)  Assessment & Plan  · O2 saturation in mid 80's on NRB on presentation from EMS  · Recently discharged from hospital from [facility] pneumonia (Tested + 1/3/22)   · Initiated on BiPAP 12/6 and saturations corrected to 95-97% · Initial ABG showed pH of 7 338 with pCO2 of 52 2   · CXR bilateral basilar pneumonia  PCT elevated  Most likely due to Pneumonia  Clinical improvement after treatment for pneumonia  No pleuritic chest pain etc to suggest possible PE  Venous doppler attempted by vascular tech  Pt couldn't even tolerate the transducer touching her legs  History of recent fall  Assessment & Plan  Again fall 2 weeks ago  Pt now c/o right sided rib pain since her fall  XR ribs: no fx   PT/OT following --> plan for home health   Fall precaution    Anemia  Assessment & Plan  Mild hgb drop noted  Monitor CBC    Acute kidney injury superimposed on CKD [facility])  Assessment & Plan  No results for input(s): CREATININE, EGFR in the last 72 hours  Estimated Creatinine Clearance: 54 9 mL/min (by C-G formula based on SCr of 1 02 mg/dL)   POA1 89; (baseline 1 2-1 4)   Likely prerenal in setting of decreased oral intake and acute infection    Plan:   UA w/ microscopic, Urinary retention protocol, Bladder scan, Daily weights, I/O   Resolved after IVF  Avoid hypoperfusion of the kidneys, minimize nephrotoxins   RAAS Blockers/Diuretics: none   Resume lisinopril      Type 2 diabetes mellitus with hyperglycemia, with long-term current use of insulin [facility])  Assessment & Plan  Lab Results   Component Value Date    HGBA1C 7 5 (H) 01/04/2022       Recent Labs     02/09/22  1521 02/09/22  2117 02/10/22  0724 02/10/22  1046   POCGLU 148* 184* 140 215*       Blood Sugar Average: Last 72 hrs:  (P) 168 [id number]     Home Regimen: Lantus HS with SSI    Plan:   Hold oral antihyperglycemics   Diet Consistent CHO Level 2 (5 CHO servings/75g CHO per meal)   Insulin regimen at home :Lantus 20 units HS  o SSI while inpatient  o Insulin correction ACHS      Cirrhosis ([address] )  Assessment & Plan  · Documented history   · She has not been taking Lactulose due to profuse diarrhea whenever she takes lactulose   Continue Xifaximin   · Ammonia level nl  Monitor       Left arm weakness  Assessment & Plan  · Pt's  reported her left arm weakness and numbness since her fall two weeks ago  She was found facing down on the floor  and was awake at the time  Family put her back to bed and didn't seek medical attention at the time  · Ct head 2/3 negative  CT C spine negative for acute  · LUE remains weak with sensory deficit  · Neuro following  · recommending MRI C-spine; awaiting study (pt has bladder stimulator)    Essential hypertension  Assessment & Plan  · Continue amlodipine   Resume lisinopril tomorrow    Monitor blood pressure      Discharging Physician / Practitioner: Ana Barreto MD  PCP: Citlalli Macias MD  Admission Date:   Admission Orders (From admission, onward)     Ordered        02/03/22 2104  Inpatient Admission  Once                      Discharge Date: 02/10/22    Medical Problems             Resolved Problems  Date Reviewed: 2/4/2022          Resolved    Hyperkalemia 2/5/2022     Resolved by  Dory Castellon MD                Consultations During Hospital Stay:  · neurology    Procedures Performed:   · None     Significant Findings / Test Results:   · MRI brain  IMPRESSION  White matter changes suggestive of chronic microangiopathy  No acute intracranial pathology  Image quality degraded by patient motion artifact  · MRI c-spine:  IMPRESSION  Image quality significantly degraded by patient motion artifact  Although no gross cord signal abnormality is seen  Subtle cord abnormality would be missed by this technique  Mild degenerative changes are noted at C4-5 and C5-6  No cord compression  CT H  IMPRESSION:     No acute intracranial abnormality      Incidental Findings:   · None      Test Results Pending at Discharge (will require follow up): · None      Outpatient Tests Requested:  · None     Complications:  None     Reason for Admission:      Hospital Course:      Bj Couch is a 73 y o  female patient with cirrhosis, type 2 diabetes, chronic pain with opioid dependence c who originally presented to the hospital on 2/3/2022 due to altered mental status  Patient recently been hospitalized for COVID  Patient was hypoxic on arrival with VBG showing a normal pH with chronic elevation pCO2  Chest x-ray on admission showed left lower lobe consolidation concerning for pneumonia  Patient was started on antibiotics  Patient initially required BiPAP but improved fairly quickly so was transitioned to nasal cannula  However, patient's work of breathing again worsened and critical Care was asked to evaluate  Given her ongoing symptoms patient was transferred to ICU  Patient was transferred out ICU the following day after her breathing improved  Mental status also gradually improved and she completed a 7 day course of abx while admitted  Of note, patient's  raise concern regarding left hand weakness that occurred after a fall at home  For the reason Neurology was consulted patient underwent MRI of the brain and C-spine  It did not show any disc herniation or nerve impingement to explain her symptoms  Thought to be MSK in origin or peripheral nerve injury after her fall  Recommended continue outpatient therapy  Patient seen evaluated by PT/OT recommended home health services  Patient was discharged in stable condition  Please see above list of diagnoses and related plan for additional information  Condition at Discharge: fair     Discharge Day Visit / Exam:     Subjective:  Feels well  Anxious to be discharged home  No new complaints    Vitals: Blood Pressure: (!) 178/60 (02/10/22 0700)  Pulse: 101 (02/10/22 0700)  Temperature: 98 °F (36 7 °C) (02/10/22 0700)  Temp Source: Oral (02/10/22 0700)  Respirations: 18 (02/10/22 0700)  Height: 5' 2" (157 5 cm) (02/04/22 0356)  Weight - Scale: 83 kg (182 lb 15 7 oz) (02/09/22 0600)  SpO2: 95 % (02/10/22 0700)  Exam:   Physical Exam  Vitals and nursing note reviewed  Constitutional:       General: She is not in acute distress  Appearance: Normal appearance  She is obese  She is not ill-appearing  HENT:      Head: Normocephalic and atraumatic  Cardiovascular:      Rate and Rhythm: Normal rate and regular rhythm  Heart sounds: No murmur heard  Pulmonary:      Effort: Pulmonary effort is normal  No respiratory distress  Breath sounds: Normal breath sounds  Abdominal:      General: Abdomen is flat  There is no distension  Palpations: Abdomen is soft  Tenderness: There is no abdominal tenderness  Musculoskeletal:         General: No swelling or tenderness  Skin:     General: Skin is warm and dry  Coloration: Skin is not jaundiced  Neurological:      General: No focal deficit present  Mental Status: She is alert and oriented to person, place, and time  Psychiatric:         Mood and Affect: Mood normal          Behavior: Behavior normal        Discharge instructions/Information to patient and family:   See after visit summary for information provided to patient and family  Provisions for Follow-Up Care:  See after visit summary for information related to follow-up care and any pertinent home health orders  Disposition:     Home with [facility] Services (Reminder: Complete face to face encounter)    Planned Readmission: none      Discharge Statement:  I spent 40 minutes discharging the patient  This time was spent on the day of discharge  I had direct contact with the patient on the day of discharge  Greater than 50% of the total time was spent examining patient, answering all patient questions, arranging and discussing plan of care with patient as well as directly providing post-discharge instructions  Additional time then spent on discharge activities  Discharge Medications:  See after visit summary for reconciled discharge medications provided to patient and family        ** Please Note: This note has been constructed using a voice recognition system **

## 2022-02-10 NOTE — ASSESSMENT & PLAN NOTE
· POA In setting of acute hypercapnic hypoxic respiratory failure secondary to pneumonia meeting sepsis criteria vs hepatic encephalopathy vs opioid side effect   · Patient and her  report that she has been taking 30 mg oxycodone q6 hours at home  · Ammonia level now down to normal  · Awake and alert

## 2022-02-10 NOTE — PLAN OF CARE
Problem: Potential for Falls  Goal: Patient will remain free of falls  Description: INTERVENTIONS:  - Educate patient/family on patient safety including physical limitations  - Instruct patient to call for assistance with activity   - Consult OT/PT to assist with strengthening/mobility   - Keep Call bell within reach  - Keep bed low and locked with side rails adjusted as appropriate  - Keep care items and personal belongings within reach  - Initiate and maintain comfort rounds  - Make Fall Risk Sign visible to staff  - Offer Toileting every 2 Hours, in advance of need  - Initiate/Maintain Bed/ Chair alarm  - Obtain necessary fall risk management equipment: Alarms  - Apply yellow socks and bracelet for high fall risk patients  - Consider moving patient to room near nurses station  Outcome: Completed     Problem: MOBILITY - ADULT  Goal: Maintain or return to baseline ADL function  Description: INTERVENTIONS:  -  Assess patient's ability to carry out ADLs; assess patient's baseline for ADL function and identify physical deficits which impact ability to perform ADLs (bathing, care of mouth/teeth, toileting, grooming, dressing, etc )  - Assess/evaluate cause of self-care deficits   - Assess range of motion  - Assess patient's mobility; develop plan if impaired  - Assess patient's need for assistive devices and provide as appropriate  - Encourage maximum independence but intervene and supervise when necessary  - Involve family in performance of ADLs  - Assess for home care needs following discharge   - Consider OT consult to assist with ADL evaluation and planning for discharge  - Provide patient education as appropriate  Outcome: Completed  Goal: Maintains/Returns to pre admission functional level  Description: INTERVENTIONS:  - Perform BMAT or MOVE assessment daily    - Set and communicate daily mobility goal to care team and patient/family/caregiver     - Collaborate with rehabilitation services on mobility goals if consulted  - Perform Range of Motion 2 times a day  - Reposition patient every 2 hours  - Dangle patient 2 times a day  - Stand patient 2 times a day  - Ambulate patient 2 times a day  - Out of bed to chair 2 times a day   - Out of bed for meals 2 times a day  - Out of bed for toileting  - Record patient progress and toleration of activity level   Outcome: Completed     Problem: Prexisting or High Potential for Compromised Skin Integrity  Goal: Skin integrity is maintained or improved  Description: INTERVENTIONS:  - Identify patients at risk for skin breakdown  - Assess and monitor skin integrity  - Assess and monitor nutrition and hydration status  - Monitor labs   - Assess for incontinence   - Turn and reposition patient  - Assist with mobility/ambulation  - Relieve pressure over bony prominences  - Avoid friction and shearing  - Provide appropriate hygiene as needed including keeping skin clean and dry  - Evaluate need for skin moisturizer/barrier cream  - Collaborate with interdisciplinary team   - Patient/family teaching  - Consider wound care consult   Outcome: Completed     Problem: Nutrition/Hydration-ADULT  Goal: Nutrient/Hydration intake appropriate for improving, restoring or maintaining nutritional needs  Description: Monitor and assess patient's nutrition/hydration status for malnutrition  Collaborate with interdisciplinary team and initiate plan and interventions as ordered  Monitor patient's weight and dietary intake as ordered or per policy  Utilize nutrition screening tool and intervene as necessary  Determine patient's food preferences and provide high-protein, high-caloric foods as appropriate       INTERVENTIONS:  - Monitor oral intake, urinary output, labs, and treatment plans  - Assess nutrition and hydration status and recommend course of action  - Evaluate amount of meals eaten  - Assist patient with eating if necessary   - Allow adequate time for meals  - Recommend/ encourage appropriate diets, oral nutritional supplements, and vitamin/mineral supplements  - Order, calculate, and assess calorie counts as needed  - Recommend, monitor, and adjust tube feedings and TPN/PPN based on assessed needs  - Assess need for intravenous fluids  - Provide specific nutrition/hydration education as appropriate  - Include patient/family/caregiver in decisions related to nutrition  Outcome: Completed     Problem: RESPIRATORY - ADULT  Goal: Achieves optimal ventilation and oxygenation  Description: INTERVENTIONS:  - Assess for changes in respiratory status  - Assess for changes in mentation and behavior  - Position to facilitate oxygenation and minimize respiratory effort  - Oxygen administered by appropriate delivery if ordered  - Initiate smoking cessation education as indicated  - Encourage broncho-pulmonary hygiene including cough, deep breathe, Incentive Spirometry  - Assess the need for suctioning and aspirate as needed  - Assess and instruct to report SOB or any respiratory difficulty  - Respiratory Therapy support as indicated  Outcome: Completed   blem: PAIN - ADULT  Goal: Verbalizes/displays adequate comfort level or baseline comfort level  Description: Interventions:  - Encourage patient to monitor pain and request assistance  - Assess pain using appropriate pain scale  - Administer analgesics based on type and severity of pain and evaluate response  - Implement non-pharmacological measures as appropriate and evaluate response  - Consider cultural and social influences on pain and pain management  - Notify physician/advanced practitioner if interventions unsuccessful or patient reports new pain  Outcome: Completed

## 2022-02-10 NOTE — ASSESSMENT & PLAN NOTE
Symptoms: shortness of breath at rest, anorexia and confusion    Lab Results   Component Value Date    SARSCOV2 Positive (A) 02/03/2022    SARSCOV2 Positive (A) 01/03/2022   CXR: Left lung base, possibly milder right lung base airspace disease  Possible left effusion findings are most suspicious for infection  No results for input(s): PROCALCITONI in the last 72 hours  Plan:  · Consider bacterial pneumonia given elevated PCT     · Completed abx   · Guaifenesin 600 mg q 12 hours

## 2022-02-10 NOTE — ASSESSMENT & PLAN NOTE
Takes Oxycodone 30 mg 6 times a day for chronic back pain  Oxycodone held initially due to concern for mental status  Resumed at lower dose    Monitor for tolerance  Pain not well controlled--> increased oral oxycodone to 20mg [id number]

## 2022-02-10 NOTE — ASSESSMENT & PLAN NOTE
POA as evidenced by tachycardia, leukocytosis (elevated from patient baseline), tachypnea  Suspected source: Pneumonia   Clx: Blood and sputum NTD   Completed ceftriaxone and doxycycline

## 2022-02-11 ENCOUNTER — TELEPHONE (OUTPATIENT)
Dept: GASTROENTEROLOGY | Facility: CLINIC | Age: 67
End: 2022-02-11

## 2022-02-22 ENCOUNTER — HOSPITAL ENCOUNTER (INPATIENT)
Facility: HOSPITAL | Age: 67
LOS: 3 days | Discharge: NURSING, REHAB OR LONG-TERM CARE | DRG: 091 | End: 2022-02-26
Attending: EMERGENCY MEDICINE | Admitting: INTERNAL MEDICINE
Payer: MEDICARE

## 2022-02-22 ENCOUNTER — APPOINTMENT (EMERGENCY)
Dept: RADIOLOGY | Facility: HOSPITAL | Age: 67
DRG: 091 | End: 2022-02-22
Payer: MEDICARE

## 2022-02-22 ENCOUNTER — APPOINTMENT (EMERGENCY)
Dept: CT IMAGING | Facility: HOSPITAL | Age: 67
DRG: 091 | End: 2022-02-22
Payer: MEDICARE

## 2022-02-22 DIAGNOSIS — G89.4 CHRONIC PAIN SYNDROME: ICD-10-CM

## 2022-02-22 DIAGNOSIS — R09.02 HYPOXIA: ICD-10-CM

## 2022-02-22 DIAGNOSIS — K74.60 HEPATIC CIRRHOSIS, UNSPECIFIED HEPATIC CIRRHOSIS TYPE, UNSPECIFIED WHETHER ASCITES PRESENT (HCC): ICD-10-CM

## 2022-02-22 DIAGNOSIS — G25.3 MYOCLONIC JERKING: ICD-10-CM

## 2022-02-22 DIAGNOSIS — J96.01 ACUTE RESPIRATORY FAILURE WITH HYPOXIA AND HYPERCAPNIA (HCC): ICD-10-CM

## 2022-02-22 DIAGNOSIS — G47.10 HYPERSOMNIA, UNSPECIFIED: ICD-10-CM

## 2022-02-22 DIAGNOSIS — J18.9 PNEUMONIA: Primary | ICD-10-CM

## 2022-02-22 DIAGNOSIS — E87.2 RESPIRATORY ACIDOSIS: ICD-10-CM

## 2022-02-22 DIAGNOSIS — J96.02 ACUTE RESPIRATORY FAILURE WITH HYPOXIA AND HYPERCAPNIA (HCC): ICD-10-CM

## 2022-02-22 DIAGNOSIS — N17.9 AKI (ACUTE KIDNEY INJURY) (HCC): ICD-10-CM

## 2022-02-22 DIAGNOSIS — E66.2 MORBID (SEVERE) OBESITY WITH ALVEOLAR HYPOVENTILATION (HCC): ICD-10-CM

## 2022-02-22 DIAGNOSIS — R19.7 DIARRHEA, UNSPECIFIED TYPE: ICD-10-CM

## 2022-02-22 PROCEDURE — 70450 CT HEAD/BRAIN W/O DYE: CPT

## 2022-02-22 PROCEDURE — 83880 ASSAY OF NATRIURETIC PEPTIDE: CPT | Performed by: EMERGENCY MEDICINE

## 2022-02-22 PROCEDURE — 85007 BL SMEAR W/DIFF WBC COUNT: CPT | Performed by: EMERGENCY MEDICINE

## 2022-02-22 PROCEDURE — 82140 ASSAY OF AMMONIA: CPT | Performed by: EMERGENCY MEDICINE

## 2022-02-22 PROCEDURE — 99285 EMERGENCY DEPT VISIT HI MDM: CPT

## 2022-02-22 PROCEDURE — 80053 COMPREHEN METABOLIC PANEL: CPT | Performed by: EMERGENCY MEDICINE

## 2022-02-22 PROCEDURE — 71045 X-RAY EXAM CHEST 1 VIEW: CPT

## 2022-02-22 PROCEDURE — 99285 EMERGENCY DEPT VISIT HI MDM: CPT | Performed by: EMERGENCY MEDICINE

## 2022-02-22 PROCEDURE — 84484 ASSAY OF TROPONIN QUANT: CPT | Performed by: EMERGENCY MEDICINE

## 2022-02-22 PROCEDURE — 71275 CT ANGIOGRAPHY CHEST: CPT

## 2022-02-22 PROCEDURE — 85027 COMPLETE CBC AUTOMATED: CPT | Performed by: EMERGENCY MEDICINE

## 2022-02-22 PROCEDURE — G1004 CDSM NDSC: HCPCS

## 2022-02-22 PROCEDURE — 36415 COLL VENOUS BLD VENIPUNCTURE: CPT | Performed by: EMERGENCY MEDICINE

## 2022-02-22 PROCEDURE — 93005 ELECTROCARDIOGRAM TRACING: CPT

## 2022-02-22 PROCEDURE — 83735 ASSAY OF MAGNESIUM: CPT | Performed by: EMERGENCY MEDICINE

## 2022-02-22 RX ADMIN — IOHEXOL 100 ML: 350 INJECTION, SOLUTION INTRAVENOUS at 23:34

## 2022-02-23 ENCOUNTER — APPOINTMENT (INPATIENT)
Dept: ULTRASOUND IMAGING | Facility: HOSPITAL | Age: 67
DRG: 091 | End: 2022-02-23
Payer: MEDICARE

## 2022-02-23 PROBLEM — G25.3 MYOCLONIC JERKING: Status: ACTIVE | Noted: 2022-02-23

## 2022-02-23 PROBLEM — E87.29 RESPIRATORY ACIDOSIS: Status: ACTIVE | Noted: 2022-02-23

## 2022-02-23 PROBLEM — E87.2 RESPIRATORY ACIDOSIS: Status: ACTIVE | Noted: 2022-02-23

## 2022-02-23 PROBLEM — E87.1 HYPONATREMIA: Status: ACTIVE | Noted: 2022-02-23

## 2022-02-23 LAB
2HR DELTA HS TROPONIN: 0 NG/L
4HR DELTA HS TROPONIN: -1 NG/L
ADDITIONAL SETTING: 12
ALBUMIN SERPL BCP-MCNC: 2.2 G/DL (ref 3.5–5)
ALBUMIN SERPL BCP-MCNC: 2.5 G/DL (ref 3.5–5)
ALP SERPL-CCNC: 179 U/L (ref 46–116)
ALP SERPL-CCNC: 197 U/L (ref 46–116)
ALT SERPL W P-5'-P-CCNC: 35 U/L (ref 12–78)
ALT SERPL W P-5'-P-CCNC: 42 U/L (ref 12–78)
AMMONIA PLAS-SCNC: 30 UMOL/L (ref 11–35)
ANCILLARY VALUES: ABNORMAL
ANION GAP SERPL CALCULATED.3IONS-SCNC: 3 MMOL/L (ref 4–13)
ANION GAP SERPL CALCULATED.3IONS-SCNC: 6 MMOL/L (ref 4–13)
ANISOCYTOSIS BLD QL SMEAR: PRESENT
ARTERIAL PATENCY WRIST A: ABNORMAL
AST SERPL W P-5'-P-CCNC: 75 U/L (ref 5–45)
AST SERPL W P-5'-P-CCNC: 79 U/L (ref 5–45)
BASE EX.OXY STD BLDV CALC-SCNC: 71.2 % (ref 60–80)
BASE EXCESS BLDA CALC-SCNC: -1 MMOL/L (ref -2–3)
BASE EXCESS BLDV CALC-SCNC: -1.8 MMOL/L
BASOPHILS # BLD AUTO: 0.02 THOUSANDS/ΜL (ref 0–0.1)
BASOPHILS # BLD MANUAL: 0 THOUSAND/UL (ref 0–0.1)
BASOPHILS NFR BLD AUTO: 0 % (ref 0–1)
BASOPHILS NFR MAR MANUAL: 0 % (ref 0–1)
BILIRUB SERPL-MCNC: 0.42 MG/DL (ref 0.2–1)
BILIRUB SERPL-MCNC: 0.47 MG/DL (ref 0.2–1)
BUN SERPL-MCNC: 20 MG/DL (ref 5–25)
BUN SERPL-MCNC: 23 MG/DL (ref 5–25)
CALCIUM ALBUM COR SERPL-MCNC: 9.2 MG/DL (ref 8.3–10.1)
CALCIUM ALBUM COR SERPL-MCNC: 9.3 MG/DL (ref 8.3–10.1)
CALCIUM SERPL-MCNC: 7.9 MG/DL (ref 8.3–10.1)
CALCIUM SERPL-MCNC: 8 MG/DL (ref 8.3–10.1)
CARDIAC TROPONIN I PNL SERPL HS: 3 NG/L
CARDIAC TROPONIN I PNL SERPL HS: 4 NG/L
CARDIAC TROPONIN I PNL SERPL HS: 4 NG/L
CHLORIDE SERPL-SCNC: 101 MMOL/L (ref 100–108)
CHLORIDE SERPL-SCNC: 101 MMOL/L (ref 100–108)
CO2 SERPL-SCNC: 26 MMOL/L (ref 21–32)
CO2 SERPL-SCNC: 26 MMOL/L (ref 21–32)
CREAT SERPL-MCNC: 1.49 MG/DL (ref 0.6–1.3)
CREAT SERPL-MCNC: 1.51 MG/DL (ref 0.6–1.3)
DS:DELIVERY SYSTEM: ABNORMAL
EOSINOPHIL # BLD AUTO: 0.07 THOUSAND/ΜL (ref 0–0.61)
EOSINOPHIL # BLD MANUAL: 0.05 THOUSAND/UL (ref 0–0.4)
EOSINOPHIL NFR BLD AUTO: 1 % (ref 0–6)
EOSINOPHIL NFR BLD MANUAL: 1 % (ref 0–6)
ERYTHROCYTE [DISTWIDTH] IN BLOOD BY AUTOMATED COUNT: 17.1 % (ref 11.6–15.1)
ERYTHROCYTE [DISTWIDTH] IN BLOOD BY AUTOMATED COUNT: 17.1 % (ref 11.6–15.1)
FIO2 GAS DIL.REBREATH: 40 L
GFR SERPL CREATININE-BSD FRML MDRD: 36 ML/MIN/1.73SQ M
GFR SERPL CREATININE-BSD FRML MDRD: 36 ML/MIN/1.73SQ M
GLUCOSE SERPL-MCNC: 108 MG/DL (ref 65–140)
GLUCOSE SERPL-MCNC: 136 MG/DL (ref 65–140)
GLUCOSE SERPL-MCNC: 143 MG/DL (ref 65–140)
GLUCOSE SERPL-MCNC: 157 MG/DL (ref 65–140)
GLUCOSE SERPL-MCNC: 163 MG/DL (ref 65–140)
GLUCOSE SERPL-MCNC: 170 MG/DL (ref 65–140)
GLUCOSE SERPL-MCNC: 212 MG/DL (ref 65–140)
HCO3 BLDA-SCNC: 26.5 MMOL/L (ref 22–28)
HCO3 BLDV-SCNC: 26.3 MMOL/L (ref 24–30)
HCT VFR BLD AUTO: 27.9 % (ref 34.8–46.1)
HCT VFR BLD AUTO: 31.3 % (ref 34.8–46.1)
HCT VFR BLD CALC: 43 % (ref 34.8–46.1)
HGB BLD-MCNC: 8.3 G/DL (ref 11.5–15.4)
HGB BLD-MCNC: 9.2 G/DL (ref 11.5–15.4)
HGB BLDA-MCNC: 14.6 G/DL (ref 11.5–15.4)
HYPERCHROMIA BLD QL SMEAR: PRESENT
IMM GRANULOCYTES # BLD AUTO: 0.01 THOUSAND/UL (ref 0–0.2)
IMM GRANULOCYTES NFR BLD AUTO: 0 % (ref 0–2)
INR PPP: 1.13 (ref 0.84–1.19)
LACTATE SERPL-SCNC: 0.9 MMOL/L (ref 0.5–2)
LYMPHOCYTES # BLD AUTO: 1.07 THOUSAND/UL (ref 0.6–4.47)
LYMPHOCYTES # BLD AUTO: 1.16 THOUSANDS/ΜL (ref 0.6–4.47)
LYMPHOCYTES # BLD AUTO: 22 % (ref 14–44)
LYMPHOCYTES NFR BLD AUTO: 22 % (ref 14–44)
MAGNESIUM SERPL-MCNC: 2.3 MG/DL (ref 1.6–2.6)
MCH RBC QN AUTO: 25.9 PG (ref 26.8–34.3)
MCH RBC QN AUTO: 25.9 PG (ref 26.8–34.3)
MCHC RBC AUTO-ENTMCNC: 29.4 G/DL (ref 31.4–37.4)
MCHC RBC AUTO-ENTMCNC: 29.7 G/DL (ref 31.4–37.4)
MCV RBC AUTO: 87 FL (ref 82–98)
MCV RBC AUTO: 88 FL (ref 82–98)
METAMYELOCYTES NFR BLD MANUAL: 2 % (ref 0–1)
MONOCYTES # BLD AUTO: 0.19 THOUSAND/UL (ref 0–1.22)
MONOCYTES # BLD AUTO: 0.5 THOUSAND/ΜL (ref 0.17–1.22)
MONOCYTES NFR BLD AUTO: 10 % (ref 4–12)
MONOCYTES NFR BLD: 4 % (ref 4–12)
NEUTROPHILS # BLD AUTO: 3.47 THOUSANDS/ΜL (ref 1.85–7.62)
NEUTROPHILS # BLD MANUAL: 3.4 THOUSAND/UL (ref 1.85–7.62)
NEUTS BAND NFR BLD MANUAL: 3 % (ref 0–8)
NEUTS SEG NFR BLD AUTO: 67 % (ref 43–75)
NEUTS SEG NFR BLD AUTO: 67 % (ref 43–75)
NRBC BLD AUTO-RTO: 0 /100 WBCS
NRBC BLD AUTO-RTO: 1 /100 WBC (ref 0–2)
NT-PROBNP SERPL-MCNC: 640 PG/ML
O2 CT BLDV-SCNC: 10.4 ML/DL
PCO2 BLD: 146 MM HG
PCO2 BLD: 28 MMOL/L (ref 21–32)
PCO2 BLD: 55.1 MM HG (ref 36–44)
PCO2 BLDA: 55.1 MM HG
PCO2 BLDV: 63.3 MM HG (ref 42–50)
PH BLD: 7.29 [PH]
PH BLD: 7.29 [PH] (ref 7.35–7.45)
PH BLDV: 7.24 [PH] (ref 7.3–7.4)
PLATELET # BLD AUTO: 142 THOUSANDS/UL (ref 149–390)
PLATELET # BLD AUTO: 143 THOUSANDS/UL (ref 149–390)
PLATELET BLD QL SMEAR: ABNORMAL
PMV BLD AUTO: 10.3 FL (ref 8.9–12.7)
PMV BLD AUTO: 10.8 FL (ref 8.9–12.7)
PO2 BLD: 146 MM HG (ref 75–129)
PO2 BLDV: 43.9 MM HG (ref 35–45)
POTASSIUM BLD-SCNC: 4.8 MMOL/L (ref 3.5–5.3)
POTASSIUM SERPL-SCNC: 4.8 MMOL/L (ref 3.5–5.3)
POTASSIUM SERPL-SCNC: 5.2 MMOL/L (ref 3.5–5.3)
POTASSIUM SERPL-SCNC: 6 MMOL/L (ref 3.5–5.3)
PRESSURE SETTING: 5
PROCALCITONIN SERPL-MCNC: 0.56 NG/ML
PROCALCITONIN SERPL-MCNC: 0.68 NG/ML
PROT SERPL-MCNC: 5.9 G/DL (ref 6.4–8.2)
PROT SERPL-MCNC: 6.4 G/DL (ref 6.4–8.2)
PROTHROMBIN TIME: 14.5 SECONDS (ref 11.6–14.5)
RBC # BLD AUTO: 3.21 MILLION/UL (ref 3.81–5.12)
RBC # BLD AUTO: 3.55 MILLION/UL (ref 3.81–5.12)
RBC MORPH BLD: PRESENT
RESPIRATORY RATE: 14
SAMPLE SITE: ABNORMAL
SAO2 % BLD FROM PO2: 99 % (ref 60–85)
SODIUM BLD-SCNC: 135 MMOL/L (ref 136–145)
SODIUM SERPL-SCNC: 130 MMOL/L (ref 136–145)
SODIUM SERPL-SCNC: 133 MMOL/L (ref 136–145)
SPECIMEN SOURCE: ABNORMAL
VARIANT LYMPHS # BLD AUTO: 1 %
VENTILATION VALUE: 0
WBC # BLD AUTO: 4.85 THOUSAND/UL (ref 4.31–10.16)
WBC # BLD AUTO: 5.23 THOUSAND/UL (ref 4.31–10.16)

## 2022-02-23 PROCEDURE — 82805 BLOOD GASES W/O2 SATURATION: CPT | Performed by: INTERNAL MEDICINE

## 2022-02-23 PROCEDURE — 94002 VENT MGMT INPAT INIT DAY: CPT

## 2022-02-23 PROCEDURE — 94640 AIRWAY INHALATION TREATMENT: CPT

## 2022-02-23 PROCEDURE — 84132 ASSAY OF SERUM POTASSIUM: CPT

## 2022-02-23 PROCEDURE — 85025 COMPLETE CBC W/AUTO DIFF WBC: CPT | Performed by: INTERNAL MEDICINE

## 2022-02-23 PROCEDURE — 94760 N-INVAS EAR/PLS OXIMETRY 1: CPT

## 2022-02-23 PROCEDURE — 80053 COMPREHEN METABOLIC PANEL: CPT | Performed by: INTERNAL MEDICINE

## 2022-02-23 PROCEDURE — 85014 HEMATOCRIT: CPT

## 2022-02-23 PROCEDURE — 82948 REAGENT STRIP/BLOOD GLUCOSE: CPT

## 2022-02-23 PROCEDURE — 82947 ASSAY GLUCOSE BLOOD QUANT: CPT

## 2022-02-23 PROCEDURE — 84132 ASSAY OF SERUM POTASSIUM: CPT | Performed by: EMERGENCY MEDICINE

## 2022-02-23 PROCEDURE — 84484 ASSAY OF TROPONIN QUANT: CPT | Performed by: EMERGENCY MEDICINE

## 2022-02-23 PROCEDURE — 85610 PROTHROMBIN TIME: CPT | Performed by: INTERNAL MEDICINE

## 2022-02-23 PROCEDURE — 94003 VENT MGMT INPAT SUBQ DAY: CPT

## 2022-02-23 PROCEDURE — 36415 COLL VENOUS BLD VENIPUNCTURE: CPT | Performed by: EMERGENCY MEDICINE

## 2022-02-23 PROCEDURE — 76705 ECHO EXAM OF ABDOMEN: CPT

## 2022-02-23 PROCEDURE — 84145 PROCALCITONIN (PCT): CPT | Performed by: INTERNAL MEDICINE

## 2022-02-23 PROCEDURE — 87040 BLOOD CULTURE FOR BACTERIA: CPT | Performed by: EMERGENCY MEDICINE

## 2022-02-23 PROCEDURE — 84295 ASSAY OF SERUM SODIUM: CPT

## 2022-02-23 PROCEDURE — 82803 BLOOD GASES ANY COMBINATION: CPT

## 2022-02-23 PROCEDURE — 99223 1ST HOSP IP/OBS HIGH 75: CPT | Performed by: INTERNAL MEDICINE

## 2022-02-23 PROCEDURE — 83605 ASSAY OF LACTIC ACID: CPT | Performed by: INTERNAL MEDICINE

## 2022-02-23 PROCEDURE — 94664 DEMO&/EVAL PT USE INHALER: CPT

## 2022-02-23 RX ORDER — ONDANSETRON 2 MG/ML
4 INJECTION INTRAMUSCULAR; INTRAVENOUS EVERY 6 HOURS PRN
Status: DISCONTINUED | OUTPATIENT
Start: 2022-02-23 | End: 2022-02-26 | Stop reason: HOSPADM

## 2022-02-23 RX ORDER — PRAVASTATIN SODIUM 20 MG
20 TABLET ORAL DAILY
Status: DISCONTINUED | OUTPATIENT
Start: 2022-02-23 | End: 2022-02-26 | Stop reason: HOSPADM

## 2022-02-23 RX ORDER — ACETAMINOPHEN 325 MG/1
325 TABLET ORAL EVERY 8 HOURS PRN
Status: DISCONTINUED | OUTPATIENT
Start: 2022-02-23 | End: 2022-02-26 | Stop reason: HOSPADM

## 2022-02-23 RX ORDER — METOCLOPRAMIDE 10 MG/1
10 TABLET ORAL DAILY
Status: DISCONTINUED | OUTPATIENT
Start: 2022-02-23 | End: 2022-02-26 | Stop reason: HOSPADM

## 2022-02-23 RX ORDER — OXYBUTYNIN CHLORIDE 5 MG/1
10 TABLET, EXTENDED RELEASE ORAL DAILY
Status: DISCONTINUED | OUTPATIENT
Start: 2022-02-23 | End: 2022-02-26 | Stop reason: HOSPADM

## 2022-02-23 RX ORDER — HEPARIN SODIUM 5000 [USP'U]/ML
5000 INJECTION, SOLUTION INTRAVENOUS; SUBCUTANEOUS EVERY 8 HOURS SCHEDULED
Status: DISCONTINUED | OUTPATIENT
Start: 2022-02-23 | End: 2022-02-26 | Stop reason: HOSPADM

## 2022-02-23 RX ORDER — INSULIN GLARGINE 100 [IU]/ML
20 INJECTION, SOLUTION SUBCUTANEOUS
Status: DISCONTINUED | OUTPATIENT
Start: 2022-02-23 | End: 2022-02-26 | Stop reason: HOSPADM

## 2022-02-23 RX ORDER — ALBUTEROL SULFATE 2.5 MG/3ML
10 SOLUTION RESPIRATORY (INHALATION) ONCE
Status: COMPLETED | OUTPATIENT
Start: 2022-02-23 | End: 2022-02-23

## 2022-02-23 RX ORDER — SODIUM CHLORIDE FOR INHALATION 0.9 %
3 VIAL, NEBULIZER (ML) INHALATION
Status: DISCONTINUED | OUTPATIENT
Start: 2022-02-23 | End: 2022-02-25

## 2022-02-23 RX ORDER — ALBUTEROL SULFATE 2.5 MG/3ML
2.5 SOLUTION RESPIRATORY (INHALATION)
Status: DISCONTINUED | OUTPATIENT
Start: 2022-02-23 | End: 2022-02-23

## 2022-02-23 RX ORDER — DOCUSATE SODIUM 100 MG/1
100 CAPSULE, LIQUID FILLED ORAL 2 TIMES DAILY PRN
Status: DISCONTINUED | OUTPATIENT
Start: 2022-02-23 | End: 2022-02-26 | Stop reason: HOSPADM

## 2022-02-23 RX ORDER — OXYCODONE HYDROCHLORIDE 10 MG/1
30 TABLET ORAL 3 TIMES DAILY
Status: DISCONTINUED | OUTPATIENT
Start: 2022-02-23 | End: 2022-02-25

## 2022-02-23 RX ORDER — DULOXETIN HYDROCHLORIDE 60 MG/1
60 CAPSULE, DELAYED RELEASE ORAL DAILY
Status: DISCONTINUED | OUTPATIENT
Start: 2022-02-23 | End: 2022-02-26 | Stop reason: HOSPADM

## 2022-02-23 RX ORDER — AMLODIPINE BESYLATE 10 MG/1
10 TABLET ORAL DAILY
Status: DISCONTINUED | OUTPATIENT
Start: 2022-02-23 | End: 2022-02-26 | Stop reason: HOSPADM

## 2022-02-23 RX ORDER — LISINOPRIL 10 MG/1
10 TABLET ORAL DAILY
Status: DISCONTINUED | OUTPATIENT
Start: 2022-02-23 | End: 2022-02-26 | Stop reason: HOSPADM

## 2022-02-23 RX ORDER — LEVALBUTEROL 1.25 MG/.5ML
1.25 SOLUTION, CONCENTRATE RESPIRATORY (INHALATION)
Status: DISCONTINUED | OUTPATIENT
Start: 2022-02-23 | End: 2022-02-26 | Stop reason: HOSPADM

## 2022-02-23 RX ORDER — FERROUS SULFATE 325(65) MG
325 TABLET ORAL
Status: DISCONTINUED | OUTPATIENT
Start: 2022-02-23 | End: 2022-02-23 | Stop reason: SDUPTHER

## 2022-02-23 RX ORDER — LEVALBUTEROL 1.25 MG/.5ML
1.25 SOLUTION, CONCENTRATE RESPIRATORY (INHALATION) EVERY 8 HOURS PRN
Status: DISCONTINUED | OUTPATIENT
Start: 2022-02-23 | End: 2022-02-23

## 2022-02-23 RX ORDER — PRAVASTATIN SODIUM 20 MG
20 TABLET ORAL DAILY
COMMUNITY

## 2022-02-23 RX ADMIN — AMLODIPINE BESYLATE 10 MG: 10 TABLET ORAL at 10:09

## 2022-02-23 RX ADMIN — LEVALBUTEROL HYDROCHLORIDE 1.25 MG: 1.25 SOLUTION, CONCENTRATE RESPIRATORY (INHALATION) at 21:43

## 2022-02-23 RX ADMIN — OXYCODONE HYDROCHLORIDE 30 MG: 10 TABLET ORAL at 17:06

## 2022-02-23 RX ADMIN — CEFTRIAXONE SODIUM 1000 MG: 10 INJECTION, POWDER, FOR SOLUTION INTRAVENOUS at 01:44

## 2022-02-23 RX ADMIN — LISINOPRIL 10 MG: 10 TABLET ORAL at 10:09

## 2022-02-23 RX ADMIN — INSULIN LISPRO 2 UNITS: 100 INJECTION, SOLUTION INTRAVENOUS; SUBCUTANEOUS at 17:53

## 2022-02-23 RX ADMIN — ALBUTEROL SULFATE 10 MG: 2.5 SOLUTION RESPIRATORY (INHALATION) at 01:18

## 2022-02-23 RX ADMIN — HEPARIN SODIUM 5000 UNITS: 5000 INJECTION INTRAVENOUS; SUBCUTANEOUS at 15:07

## 2022-02-23 RX ADMIN — LEVALBUTEROL HYDROCHLORIDE 1.25 MG: 1.25 SOLUTION, CONCENTRATE RESPIRATORY (INHALATION) at 14:18

## 2022-02-23 RX ADMIN — INSULIN GLARGINE 20 UNITS: 100 INJECTION, SOLUTION SUBCUTANEOUS at 22:09

## 2022-02-23 RX ADMIN — OXYBUTYNIN CHLORIDE 10 MG: 5 TABLET, EXTENDED RELEASE ORAL at 10:09

## 2022-02-23 RX ADMIN — OXYCODONE HYDROCHLORIDE 30 MG: 10 TABLET ORAL at 22:25

## 2022-02-23 RX ADMIN — HEPARIN SODIUM 5000 UNITS: 5000 INJECTION INTRAVENOUS; SUBCUTANEOUS at 05:04

## 2022-02-23 RX ADMIN — SODIUM CHLORIDE 1000 ML: 0.9 INJECTION, SOLUTION INTRAVENOUS at 01:18

## 2022-02-23 RX ADMIN — ISODIUM CHLORIDE 3 ML: 0.03 SOLUTION RESPIRATORY (INHALATION) at 21:43

## 2022-02-23 RX ADMIN — HEPARIN SODIUM 5000 UNITS: 5000 INJECTION INTRAVENOUS; SUBCUTANEOUS at 22:09

## 2022-02-23 RX ADMIN — ISODIUM CHLORIDE 3 ML: 0.03 SOLUTION RESPIRATORY (INHALATION) at 14:18

## 2022-02-23 RX ADMIN — DULOXETINE HYDROCHLORIDE 60 MG: 60 CAPSULE, DELAYED RELEASE ORAL at 10:09

## 2022-02-23 RX ADMIN — PRAVASTATIN SODIUM 20 MG: 20 TABLET ORAL at 10:09

## 2022-02-23 RX ADMIN — METOCLOPRAMIDE 10 MG: 10 TABLET ORAL at 10:09

## 2022-02-23 RX ADMIN — INSULIN LISPRO 1 UNITS: 100 INJECTION, SOLUTION INTRAVENOUS; SUBCUTANEOUS at 13:20

## 2022-02-23 RX ADMIN — ALBUTEROL SULFATE 2.5 MG: 2.5 SOLUTION RESPIRATORY (INHALATION) at 07:34

## 2022-02-23 NOTE — ASSESSMENT & PLAN NOTE
Lab Results   Component Value Date    HGBA1C 7 5 (H) 01/04/2022     Patient missed her home Lantus dose yesterday night   Will check stat fingerstick and give 5 units of lantus  Continue Lantus 20 units at bedtime  SSI  Hypoglycemia protocol  Carb controlled diet

## 2022-02-23 NOTE — ASSESSMENT & PLAN NOTE
Patient has H/o Tics  Jerks ?likely secondary to metabolic disorder-toxic metabolic(hypercarbia), ? Drug-induced (Has been taking Reglan for a long time), less likely seizure(no postictal state)  CT head normal no acute intracranial abnormality  Monitor vital signs and electrolytes  Improved with resolution of hypercarbia

## 2022-02-23 NOTE — ASSESSMENT & PLAN NOTE
· Ranging from 12-11  · POA-8 3  · Likely secondary to cirrhosis, anemia of chronic disease  · Iron panel done in January 2022, had ferritin level of 23  · Started on p o  ferrous sulfate

## 2022-02-23 NOTE — ED PROVIDER NOTES
History  Chief Complaint   Patient presents with    Diarrhea     Arrives via EMS with C/O diarrhea, loss of appetite, and twitching extremities  Pt also "zoned out" at home and stopped responding to family members for several minutes  Hx recent Covid/Covid PNA  History provided by:  Patient   used: No    Diarrhea  Associated symptoms: no abdominal pain, no chills, no diaphoresis, no fever, no headaches and no vomiting      Pt is a 72 y/o presenting to ED with ams  Not eating or drinking  Recently hospitalized for pna and covid  No cp  Feels sob  Has a cough  No abd pain  No n/v  Has diarrhea  Has been having jerking movements of arm  No h/o seizures  No fevers  Will do septic eval, cardiac eval, eval for pe, admission to hospital    Prior to Admission Medications   Prescriptions Last Dose Informant Patient Reported? Taking?    DULoxetine (CYMBALTA) 60 mg delayed release capsule  Self Yes Yes   Sig: Take 60 mg by mouth daily   Insulin Aspart FlexPen 100 UNIT/ML SOPN  Self Yes No   Sig: Inject 3 Units under the skin 3 (three) times a day with meals If bs>250   Mirabegron ER (Myrbetriq) 50 MG TB24  Self Yes Yes   Sig: Take 50 mg by mouth in the morning   amLODIPine (NORVASC) 10 mg tablet  Self No Yes   Sig: Take 1 tablet (10 mg total) by mouth daily   docusate sodium (COLACE) 100 mg capsule  Self No No   Sig: Take 1 capsule (100 mg total) by mouth 2 (two) times a day as needed for constipation   insulin glargine (LANTUS SOLOSTAR) 100 units/mL injection pen  Self Yes Yes   Sig: Inject 20 Units under the skin daily at bedtime     lactulose 20 g/30 mL  Self No No   Sig: Take 30 mL (20 g total) by mouth 2 (two) times a day   Patient not taking: Reported on 2/3/2022    lisinopril (ZESTRIL) 20 mg tablet  Self Yes Yes   Sig: Take 10 mg by mouth daily    metoclopramide (REGLAN) 10 mg tablet  Self Yes Yes   Sig: Take 10 mg by mouth daily   oxyCODONE (ROXICODONE) 5 mg immediate release tablet  Self Yes Yes   Sig: Take 30 mg by mouth every 6 (six) hours as needed for moderate pain Per patient  patient has been taking 30mg PO 6 times a day  pravastatin (PRAVACHOL) 20 mg tablet   Yes Yes   Sig: Take 20 mg by mouth daily      Facility-Administered Medications: None       Past Medical History:   Diagnosis Date    Abnormal head CT 2017    Acute kidney injury ([address] ) 2018    Cellulitis 1/10/2017    Closed fracture of multiple ribs of right side 2017    Degenerative disc disease at L5-S1 level     Diabetes mellitus (HCC)     History of methicillin resistant staphylococcus aureus (MRSA) 2018    negative nasal culture- isolation and hx of mrsa removed 2018    Hyperkalemia 2018    Hypertension     Hypocalcemia 2018    Hyponatremia 2017       Past Surgical History:   Procedure Laterality Date    CHOLECYSTECTOMY      JOINT REPLACEMENT      OOPHORECTOMY      NC INCISION,IMPLANT,NEUROELEC,SACRAL NERVE N/A 2021    Procedure: INSERTION NEUROSTIMULATOR ELECTRODE ARRAY SACRAL NERVE; FLUOROSCOPY; ELECTRONIC ANALYSIS;  Surgeon: Yasmine Low MD;  Location: [facility];  Service: UroGynecology           SACRAL NERVE STIMULATOR PLACEMENT N/A 2021    Procedure: IPG INSERTION AND PROGRAMMING;  Surgeon: Yasmine Low MD;  Location: [facility];  Service: UroGynecology              Family History   Problem Relation Age of Onset    Rheum arthritis Mother     Alzheimer's disease Mother     Lupus Mother      I have reviewed and agree with the history as documented      E-Cigarette/Vaping    E-Cigarette Use Current Every Day User     Start Date 19     Cartridges/Day 1     Comments VAPE      E-Cigarette/Vaping Substances    Nicotine Yes     THC Yes not using anymore     Social History     Tobacco Use    Smoking status: Former Smoker     Types: E-Cigarettes     Start date: 1976     Quit date: 2019     Years since quittin 8    Smokeless tobacco: Never Used   Vaping Use    Vaping Use: Every day    Start date: 4/8/2019    Substances: Nicotine, THC (not using anymore)   Substance Use Topics    Alcohol use: Not Currently     Alcohol/week: 0 0 standard drinks    Drug use: Not Currently     Types: Marijuana     Comment: last used 1 year ago        Review of Systems   Constitutional: Negative for chills, diaphoresis and fever  HENT: Negative for congestion and sore throat  Respiratory: Positive for shortness of breath  Negative for cough, wheezing and stridor  Cardiovascular: Negative for chest pain, palpitations and leg swelling  Gastrointestinal: Positive for diarrhea  Negative for abdominal pain, blood in stool, nausea and vomiting  Genitourinary: Negative for dysuria, frequency and urgency  Musculoskeletal: Negative for neck pain and neck stiffness  Skin: Negative for pallor and rash  Neurological: Negative for dizziness, syncope, weakness, light-headedness and headaches  All other systems reviewed and are negative  Physical Exam  Physical Exam  Vitals reviewed  Constitutional:       Appearance: Normal appearance  She is well-developed  HENT:      Head: Normocephalic and atraumatic  Eyes:      Extraocular Movements: Extraocular movements intact  Pupils: Pupils are equal, round, and reactive to light  Cardiovascular:      Rate and Rhythm: Normal rate and regular rhythm  Heart sounds: Normal heart sounds  Pulmonary:      Effort: Pulmonary effort is normal  No respiratory distress  Comments: Decreased breath sounds throughout  Abdominal:      General: Bowel sounds are normal       Palpations: Abdomen is soft  Tenderness: There is no abdominal tenderness  Musculoskeletal:      Cervical back: Normal range of motion and neck supple  Comments: Bilateral leg swelling, worse on left   Skin:     General: Skin is warm and dry  Capillary Refill: Capillary refill takes less than 2 seconds  Neurological:      General: No focal deficit present  Mental Status: She is alert and oriented to person, place, and time  Comments: Tremors noted         Vital Signs  ED Triage Vitals   Temperature Pulse Respirations Blood Pressure SpO2   02/22/22 2313 02/22/22 2313 02/22/22 2313 02/22/22 2313 02/22/22 2313   98 6 °F (37 °C) 101 20 116/56 (!) 86 %      Temp Source Heart Rate Source Patient Position - Orthostatic VS BP Location FiO2 (%)   02/22/22 2313 02/23/22 1127 02/23/22 1127 02/23/22 1127 02/25/22 2250   Oral Monitor Sitting Right arm 40      Pain Score       02/23/22 0345       2           Vitals:    02/25/22 1657 02/25/22 2048 02/26/22 0750 02/26/22 1540   BP: (!) 178/87 141/88 (!) 174/78 170/71   Pulse: (!) 106 (!) 113 92 (!) 51   Patient Position - Orthostatic VS: Lying Sitting Lying Sitting         Visual Acuity      ED Medications  Medications   iohexol (OMNIPAQUE) 350 MG/ML injection (SINGLE-DOSE) 100 mL (100 mL Intravenous Given 2/22/22 2334)   sodium chloride 0 9 % bolus 1,000 mL (0 mL Intravenous Stopped 2/23/22 0300)   albuterol inhalation solution 10 mg (10 mg Nebulization Given 2/23/22 0118)   ceftriaxone (ROCEPHIN) 1 g/50 mL in dextrose IVPB (0 mg Intravenous Stopped 2/23/22 0300)       Diagnostic Studies  Results Reviewed     Procedure Component Value Units Date/Time    Blood culture #1 [[id number]] Collected: 02/23/22 0141    Lab Status: Final result Specimen: Blood from Hand, Right Updated: 02/28/22 0701     Blood Culture No Growth After 5 Days  Blood culture #2 [[id number]] Collected: 02/23/22 0141    Lab Status: Final result Specimen: Blood from Hand, Left Updated: 02/28/22 0701     Blood Culture No Growth After 5 Days      POCT Blood Gas (CG8+) [[id number]]  (Abnormal) Collected: 02/23/22 0747    Lab Status: Final result Specimen: Arterial Updated: 02/23/22 0953     pH, Art i-STAT 7 291     pH, i-STAT Temp Corrected 7 291     pCO2, Art i-STAT 55 1 mm HG      pCO2, i-STAT TC 55 1 mm HG      pO2, ART i-STAT 146 0 mm HG      pO2, i-STAT  mm HG      BE, i-STAT -1 mmol/L      HCO3, Art i-STAT 26 5 mmol/L      CO2, i-STAT 28 mmol/L      O2 Sat, i-STAT 99 %      SODIUM, I-STAT 135 mmol/l      Potassium, i-STAT 4 8 mmol/L      Hct, i-STAT 43 %      Hgb, i-STAT 14 6 g/dl      Glucose, i-STAT 163 mg/dl      POC FIO2 40 L      Specimen Type ARTERIAL     Delivery System BiPAP     Respiratory Rate 14     Vent Value 0     Ancillary Values PEEPPS     Pressure Setting 5     SITE Right Radial     Additional Settings 12     BARRETT TEST Positive Allens Test    Fingerstick Glucose (POCT) [[id number]]  (Abnormal) Collected: 02/23/22 0627    Lab Status: Final result Updated: 02/23/22 0628     POC Glucose 170 mg/dl     Procalcitonin Reflex [[id number]]  (Abnormal) Collected: 02/23/22 0421    Lab Status: Final result Specimen: Blood from Arm, Left Updated: 02/23/22 0613     Procalcitonin 0 56 ng/ml     Comprehensive metabolic panel [[id number]]  (Abnormal) Collected: 02/23/22 0421    Lab Status: Final result Specimen: Blood from Arm, Left Updated: 02/23/22 0518     Sodium 133 mmol/L      Potassium 4 8 mmol/L      Chloride 101 mmol/L      CO2 26 mmol/L      ANION GAP 6 mmol/L      BUN 20 mg/dL      Creatinine 1 51 mg/dL      Glucose 136 mg/dL      Calcium 8 0 mg/dL      Corrected Calcium 9 2 mg/dL      AST 75 U/L      ALT 42 U/L      Alkaline Phosphatase 197 U/L      Total Protein 6 4 g/dL      Albumin 2 5 g/dL      Total Bilirubin 0 42 mg/dL      eGFR 36 ml/min/1 73sq m     Narrative:      [facility] guidelines for Chronic Kidney Disease (CKD):     Stage 1 with normal or high GFR (GFR > 90 mL/min/1 73 square meters)    Stage 2 Mild CKD (GFR = 60-89 mL/min/1 73 square meters)    Stage 3A Moderate CKD (GFR = 45-59 mL/min/1 73 square meters)    Stage 3B Moderate CKD (GFR = 30-44 mL/min/1 73 square meters)    Stage 4 Severe CKD (GFR = 15-29 mL/min/1 73 square meters)    Stage 5 End Stage CKD (GFR <15 mL/min/1 73 square meters)  Note: GFR calculation is accurate only with a steady state creatinine    HS Troponin I 4hr [[id number]]  (Normal) Collected: 02/23/22 0421    Lab Status: Final result Specimen: Blood from Arm, Left Updated: 02/23/22 0514     hs TnI 4hr 3 ng/L      Delta 4hr hsTnI -1 ng/L     Lactic acid, plasma [[id number]]  (Normal) Collected: 02/23/22 0421    Lab Status: Final result Specimen: Blood from Arm, Left Updated: 02/23/22 0510     LACTIC ACID 0 9 mmol/L     Narrative:      Result may be elevated if tourniquet was used during collection      Protime-INR [[id number]]  (Normal) Collected: 02/23/22 0420    Lab Status: Final result Specimen: Blood from Arm, Left Updated: 02/23/22 0459     Protime 14 5 seconds      INR 1 13    CBC and differential [[id number]]  (Abnormal) Collected: 02/23/22 0421    Lab Status: Final result Specimen: Blood from Arm, Left Updated: 02/23/22 0440     WBC 5 23 Thousand/uL      RBC 3 55 Million/uL      Hemoglobin 9 2 g/dL      Hematocrit 31 3 %      MCV 88 fL      MCH 25 9 pg      MCHC 29 4 g/dL      RDW 17 1 %      MPV 10 8 fL      Platelets 246 Thousands/uL      nRBC 0 /100 WBCs      Neutrophils Relative 67 %      Immat GRANS % 0 %      Lymphocytes Relative 22 %      Monocytes Relative 10 %      Eosinophils Relative 1 %      Basophils Relative 0 %      Neutrophils Absolute 3 47 Thousands/µL      Immature Grans Absolute 0 01 Thousand/uL      Lymphocytes Absolute 1 16 Thousands/µL      Monocytes Absolute 0 50 Thousand/µL      Eosinophils Absolute 0 07 Thousand/µL      Basophils Absolute 0 02 Thousands/µL     Legionella antigen, urine [[id number]]     Lab Status: No result Specimen: Urine     Strep Pneumoniae, Urine [[id number]]     Lab Status: No result Specimen: Urine     Blood gas, venous [[id number]]  (Abnormal) Collected: 02/23/22 0301    Lab Status: Final result Specimen: Blood from Arm, Right Updated: 02/23/22 0313     pH, Surjit 7 237     pCO2, Surjit 63 3 mm Hg      pO2, Surjit 43 9 mm Hg      HCO3, Surjit 26 3 mmol/L      Base Excess, Surjit -1 8 mmol/L      O2 Content, Surjit 10 4 ml/dL      O2 HGB, VENOUS 71 2 %     Procalcitonin with AM Reflex [[id number]]  (Abnormal) Collected: 02/23/22 0127    Lab Status: Final result Specimen: Blood from Arm, Right Updated: 02/23/22 0239     Procalcitonin 0 68 ng/ml     HS Troponin I 2hr [[id number]]  (Normal) Collected: 02/23/22 0127    Lab Status: Final result Specimen: Blood from Arm, Right Updated: 02/23/22 0158     hs TnI 2hr 4 ng/L      Delta 2hr hsTnI 0 ng/L     Potassium [[id number]]  (Normal) Collected: 02/23/22 0127    Lab Status: Final result Specimen: Blood from Arm, Right Updated: 02/23/22 0144     Potassium 5 2 mmol/L     CBC and differential [[id number]]  (Abnormal) Collected: 02/22/22 2354    Lab Status: Final result Specimen: Blood from Arm, Left Updated: 02/23/22 0120     WBC 4 85 Thousand/uL      RBC 3 21 Million/uL      Hemoglobin 8 3 g/dL      Hematocrit 27 9 %      MCV 87 fL      MCH 25 9 pg      MCHC 29 7 g/dL      RDW 17 1 %      MPV 10 3 fL      Platelets 977 Thousands/uL     Narrative: This is an appended report  These results have been appended to a previously verified report      Manual Differential(PHLEBS Do Not Order) [[id number]]  (Abnormal) Collected: 02/22/22 2354    Lab Status: Final result Specimen: Blood from Arm, Left Updated: 02/23/22 0120     Segmented % 67 %      Bands % 3 %      Lymphocytes % 22 %      Monocytes % 4 %      Eosinophils, % 1 %      Basophils % 0 %      Metamyelocytes% 2 %      Atypical Lymphocytes % 1 %      Absolute Neutrophils 3 40 Thousand/uL      Lymphocytes Absolute 1 07 Thousand/uL      Monocytes Absolute 0 19 Thousand/uL      Eosinophils Absolute 0 05 Thousand/uL      Basophils Absolute 0 00 Thousand/uL      Total Counted --     nRBC 1 /100 WBC      RBC Morphology Present     Anisocytosis Present     Hypochromia Present     Platelet Estimate Borderline    Magnesium [[id number]] (Normal) Collected: 02/22/22 2354    Lab Status: Final result Specimen: Blood from Arm, Left Updated: 02/23/22 0107     Magnesium 2 3 mg/dL     NT-BNP PRO [[id number]]  (Abnormal) Collected: 02/22/22 2354    Lab Status: Final result Specimen: Blood from Arm, Left Updated: 02/23/22 0107     NT-proBNP 640 pg/mL     Comprehensive metabolic panel [[id number]]  (Abnormal) Collected: 02/22/22 2354    Lab Status: Final result Specimen: Blood from Arm, Left Updated: 02/23/22 0100     Sodium 130 mmol/L      Potassium 6 0 mmol/L      Chloride 101 mmol/L      CO2 26 mmol/L      ANION GAP 3 mmol/L      BUN 23 mg/dL      Creatinine 1 49 mg/dL      Glucose 108 mg/dL      Calcium 7 9 mg/dL      Corrected Calcium 9 3 mg/dL      AST 79 U/L      ALT 35 U/L      Alkaline Phosphatase 179 U/L      Total Protein 5 9 g/dL      Albumin 2 2 g/dL      Total Bilirubin 0 47 mg/dL      eGFR 36 ml/min/1 73sq m     Narrative:      [facility] guidelines for Chronic Kidney Disease (CKD):     Stage 1 with normal or high GFR (GFR > 90 mL/min/1 73 square meters)    Stage 2 Mild CKD (GFR = 60-89 mL/min/1 73 square meters)    Stage 3A Moderate CKD (GFR = 45-59 mL/min/1 73 square meters)    Stage 3B Moderate CKD (GFR = 30-44 mL/min/1 73 square meters)    Stage 4 Severe CKD (GFR = 15-29 mL/min/1 73 square meters)    Stage 5 End Stage CKD (GFR <15 mL/min/1 73 square meters)  Note: GFR calculation is accurate only with a steady state creatinine    HS Troponin 0hr (reflex protocol) [[id number]]  (Normal) Collected: 02/22/22 2354    Lab Status: Final result Specimen: Blood from Arm, Left Updated: 02/23/22 0056     hs TnI 0hr 4 ng/L     Ammonia [[id number]]  (Normal) Collected: 02/22/22 2354    Lab Status: Final result Specimen: Blood from Arm, Left Updated: 02/23/22 0053     Ammonia 30 umol/L                  US abdomen limited   Final Result by Carmen Ziegler MD (02/23 1017)      Severe splenomegaly  No ascites  Workstation performed: [id number]         XR chest 1 view portable   Final Result by Hugh Mccord MD (02/23 9414)      Slight vascular congestion  Left lingula small infiltrate and/or atelectasis                  Workstation performed: [id number]         CT head without contrast   Final Result by Ara Anthony MD (02/23 0117)      No acute intracranial abnormality  Workstation performed: [id number]         CTA ED chest PE study   Final Result by Ara Anthony MD (02/23 0120)      No evidence of pulmonary embolus within the limitations of the study  Patchy airspace consolidation in the lower lobes and left upper lobe suspicious for multifocal pneumonia  Mediastinal lymphadenopathy  Cirrhotic hepatic morphology  Splenomegaly  The study was marked in [facility] for immediate notification        Workstation performed: [id number]                    Procedures  Procedures     ECG shows rate of 91, sinus, normal axis, normal QRS, nonspecific ST and T-waves throughout, independently interpreted by me    ED Course                                   Wells' Criteria for PE      Most Recent Value   Wells' Criteria for PE    Clinical signs and symptoms of DVT 0 Filed at: 02/22/2022 2320   PE is primary diagnosis or equally likely 3 Filed at: 02/22/2022 2320   HR >100 1 5 Filed at: 02/22/2022 2320   Immobilization at least 3 days or Surgery in the previous 4 weeks 1 5 Filed at: 02/22/2022 2320   Previous, objectively diagnosed PE or DVT 0 Filed at: 02/22/2022 2320   Hemoptysis 0 Filed at: 02/22/2022 2320   Malignancy with treatment within 6 months or palliative 0 Filed at: 02/22/2022 2320   Guadalupe Houser' Criteria Total 6 Filed at: 02/22/2022 2320                MDM    Disposition  Final diagnoses:   Pneumonia   Hypoxia   YVONNE (acute kidney injury) ([facility] [address] )     Time reflects when diagnosis was documented in both MDM as applicable and the Disposition within this note     Time User Action Codes Description Comment    2/23/2022  1:38 AM Tadevosyan, Kiarra Add [J18 9] Pneumonia     2/23/2022  1:38 AM Tadevosyan, Kiarra Add [R09 02] Hypoxia     2/23/2022  1:38 AM Tadevosyan, Kiarra Add [N17 9] YVONNE (acute kidney injury) ([facility] [address] )     2/23/2022  2:59 AM Tamera Raider Add [K74 60] Hepatic cirrhosis, unspecified hepatic cirrhosis type, unspecified whether ascites present ([facility] [address] )     2/23/2022  2:59 AM Tamera Raider Add [G25 3] Myoclonic jerking     2/24/2022 12:19 PM Sarita Blinks Add [J96 01,  J96 02] Acute respiratory failure with hypoxia and hypercapnia ([facility] [address] [facility] )     2/24/2022 12:19 PM Sarita Blinks Add [E87 2] Respiratory acidosis     2/24/2022 12:20 PM Sarita Blinks Add [E66 2] Morbid (severe) obesity with alveolar hypoventilation ([facility] [address] )      2/26/2022 12:08 PM Sarita Blinks Add [G89 4] Chronic pain syndrome     2/26/2022 12:08 PM Sarita Blinks Add [R19 7] Diarrhea, unspecified type       ED Disposition     ED Disposition Condition Date/Time Comment    Admit Stable Wed Feb 23, 2022  1:38 AM Case was discussed with medicine resident and the patient's admission status was agreed to be Admission Status: inpatient status to the service of Dr Moy Godfrey MD Documentation      Most Recent Value   Accepting Facility Name, [address] (Company and [address] #) [facility] Name, [address] [employer] and [address]) Flora Martinez      Follow-up Information     Follow up With Specialties Details Why Contact Info    James Celestin MD Internal Medicine Schedule an appointment as soon as possible for a visit in 1 week(s)  [address] [place] [address]   [phone]            Discharge Medication List as of 2/26/2022  2:30 PM      START taking these medications    Details   levalbuterol (XOPENEX) 1 25 mg/0 5 mL nebulizer solution Take 0 5 mL (1 25 mg total) by nebulization 3 (three) times a day, Starting Sat 2/26/2022, No Print      psyllium (METAMUCIL) packet Take 1 packet by mouth 2 (two) times a day, Starting Sat 2/26/2022, No Print      saccharomyces boulardii (FLORASTOR) 250 mg capsule Take 1 capsule (250 mg total) by mouth 2 (two) times a day, Starting Sat 2/26/2022, No Print      sodium chloride 0 9 % nebulizer solution Take 3 mL by nebulization 3 (three) times a day, Starting Sat 2/26/2022, No Print         CONTINUE these medications which have CHANGED    Details   oxyCODONE (ROXICODONE) 5 immediate release tablet Take 6 tablets (30 mg total) by mouth every 6 (six) hours as needed for moderate pain Per patient  patient has been taking 30mg PO 6 times a day   Max Daily Amount: 120 mg, Starting Sat 2/26/2022, Print         CONTINUE these medications which have NOT CHANGED    Details   amLODIPine (NORVASC) 10 mg tablet Take 1 tablet (10 mg total) by mouth daily, Starting Sat 8/25/2018, Print      DULoxetine (CYMBALTA) 60 mg delayed release capsule Take 60 mg by mouth daily, Historical Med      insulin glargine (LANTUS SOLOSTAR) 100 units/mL injection pen Inject 20 Units under the skin daily at bedtime  , Historical Med      lisinopril (ZESTRIL) 20 mg tablet Take 10 mg by mouth daily , Historical Med      metoclopramide (REGLAN) 10 mg tablet Take 10 mg by mouth daily, Historical Med      Mirabegron ER (Myrbetriq) 50 MG TB24 Take 50 mg by mouth in the morning, Historical Med      pravastatin (PRAVACHOL) 20 mg tablet Take 20 mg by mouth daily, Historical Med      docusate sodium (COLACE) 100 mg capsule Take 1 capsule (100 mg total) by mouth 2 (two) times a day as needed for constipation, Starting Mon 9/20/2021, No Print      Insulin Aspart FlexPen 100 UNIT/ML SOPN Inject 3 Units under the skin 3 (three) times a day with meals If bs>250, Historical Med         STOP taking these medications       lactulose 20 g/30 mL Comments:   Reason for Stopping:               Outpatient Discharge Orders Discharge Condition:  Improving     Patient Aware of Diagnosis: Yes     Free of Communicable Disease:   Yes     Physical Therapy Eval And Treat     Occupational Therapy Eval and Treat     Activity:  As Tolerated     BIPAP Study   Standing Status: Future Standing Exp   Date: 02/24/23       PDMP Review       Value Time User    PDMP Reviewed  Yes 2/26/2022 12:06 PM Jodie Ganser, MD          ED Provider  Electronically Signed by           Jeromy Srinivasan MD  03/03/22 5216

## 2022-02-23 NOTE — ASSESSMENT & PLAN NOTE
· Ranging from 12-11  · POA-8 3  · Order repeat CBC  · Likely secondary to cirrhosis, anemia of chronic disease  · Iron panel done in January 2022, had ferritin level of 23  · Will start p o  ferrous sulfate

## 2022-02-23 NOTE — H&P
[address] 1955, 73 y o  female MRN: [id number]  Unit/Bed#: ED 09 Encounter: [id number]  Primary Care Provider: Sebastian Barrera MD   Date and time admitted to hospital: 2/22/2022 11:03 PM    * Respiratory acidosis  Assessment & Plan  · Retaining CO2, in the setting of ?infection, lethargy, chronic opioid use  · Start BiPAP and reassess ABG  · Asterixis likely secondary to hypercarbia  · Monitor VS      Myoclonic jerking  Assessment & Plan  Patient has H/o Tics  Jerks ?likely secondary to metabolic disorder-toxic metabolic(hypercarbia), ? Drug-induced (Has been taking Reglan for a long time), less likely seizure(no postictal state)  CT head normal no acute intracranial abnormality  Monitor vital signs and electrolytes  If symptoms are not getting better, will consult Neurology        Hyponatremia  Assessment & Plan  Likely secondary to poor PO solute intake, cirrhosis  Chronically ranging from low 130's to 140  Monitor BMP  IF sodium remains low, will consider urine sodium, serum osm and urine osm  Anemia  Assessment & Plan  · Ranging from 12-11  · POA-8 3  · Order repeat CBC  · Likely secondary to cirrhosis, anemia of chronic disease  · Iron panel done in January 2022, had ferritin level of 23  · Will start p o  ferrous sulfate    Elevated procalcitonin  Assessment & Plan  · Elevated to 0 68, decreased compared to previous hospitalization  · ? Because of CKD   · Monitor off ABx for now  · Received Ceftriaxone in ED  · Monitor VS    Cirrhosis [facility])  Assessment & Plan  Secondary to alcoholic cirrhosis  Patient was evaluated by GI before  Does not follow-up as outpatient  Has history of encephalopathy? Hepatic  Normal ammonia levels on presentation  Patient not taking lactulose  Alpha fetoprotein in January was normal  No HCC screening  Last ultrasound in Jan was limited  Elevated AST and alkaline phosphatase    Will order CMP, INR for MELD score  Monitor vital signs, serial abdominal exams, orientation, CBC, CMP      Chronic pain syndrome  Assessment & Plan  Patient takes oxycodone 30 mg q 6  Will decrease to 3 times today, to see if respiratory status and lethargy will improve    Type 2 diabetes mellitus with hyperglycemia, with long-term current use of insulin [facility])  Assessment & Plan  Lab Results   Component Value Date    HGBA1C 7 5 (H) 01/04/2022     Patient missed her home Lantus dose yesterday night  Will check stat fingerstick and give 5 units of lantus  Continue Lantus 20 units at bedtime, may need lower dose tonight if given 5 units this AM  SSI  Hypoglycemia protocol  Carb controlled diet      VTE Prophylaxis: Heparin  /    Code Status:  Level 1 full code  POLST: POLST form is not discussed and not completed at this time  Anticipated Length of Stay:  Patient will be admitted on an Inpatient basis with an anticipated length of stay of  more than 2 midnights  Justification for Hospital Stay:  Hypoxia    Chief Complaint:   "I started jerking"    History of Present Illness: Sharon Castro is a 73 y o  female with past medical history of alcoholic cirrhosis, type 2 DM on insulin, chronic pain syndrome who presents with jerking/tremors  ED wanted to admit the patient  because of hypoxia on presentation, requiring 2 L and CTA consistent with pneumonia, ? altered mental status and YVONNE  Creatinine POA 1 49, baseline 1 2-1 4    Patient stated she came to the ED because she and her  was worried about increased jerks since yesterday  Patient is alert and oriented, denies shortness of breath, fever  States she had cough since COVID infection, no exacerbation at the time of presentation  Denies abdominal pain, nausea, vomiting  Stated she does not take lactulose, she had frequent diarrhea with fecal incontinence       Patient was evaluated by Neurology in 2020 for myoclonus- the time thought to be likely toxic metabolic (uremia at that time), recommended limiting opioids  MRI brain at that time showed 4 mm partially calcified meningioma, chronic microangiopathy  Patient was recently admitted from 02/03 to 2/10 for 7 days for sepsis secondary to pneumonia, finished ceftriaxone and doxycycline  She was evaluated by Neurology during that admission for left  Upper extremity weakness, no mention of tics at the time  MRI C-spine showed mild degenerative changes at C4-5, C5-6  MRI brain showed chronic microangiopathy  In the ED, patient does not meet sepsis criteria, with CTA findings, ED started antibiotics ceftriaxone and vancomycin  Ammonia levels normal   Hemolyzed sample had elevated potassium, repeat value was normal      UPDATE 4:40 AM- patient was lethargic, Respiratory bedside to setup BiPAP, will check stat fingerstick, ordered US abdomen to see if patient has ascites to consider SBP in the differential    Review of Systems:    Review of Systems   Constitutional: Negative  HENT: Negative  Eyes: Negative  Respiratory: Positive for cough  Negative for apnea, choking, chest tightness, shortness of breath, wheezing and stridor  Cardiovascular: Positive for leg swelling  Gastrointestinal: Negative  Genitourinary: Negative  Musculoskeletal: Positive for back pain  Skin: Negative  Neurological: Negative  Hematological: Negative  Psychiatric/Behavioral: Negative          Past Medical and Surgical History:     Past Medical History:   Diagnosis Date    Abnormal head CT 7/14/2017    Acute kidney injury ([address] ) 8/19/2018    Cellulitis 1/10/2017    Closed fracture of multiple ribs of right side 7/14/2017    Degenerative disc disease at L5-S1 level     Diabetes mellitus (HCC)     History of methicillin resistant staphylococcus aureus (MRSA) 08/21/2018    negative nasal culture- isolation and hx of mrsa removed 8/20/2018    Hyperkalemia 4/25/2018    Hypertension     Hypocalcemia 4/25/2018    Hyponatremia 7/14/2017       Past Surgical History:   Procedure Laterality Date    CHOLECYSTECTOMY      JOINT REPLACEMENT      OOPHORECTOMY      CO INCISION,IMPLANT,NEUROELEC,SACRAL NERVE N/A 9/20/2021    Procedure: INSERTION NEUROSTIMULATOR ELECTRODE ARRAY SACRAL NERVE; FLUOROSCOPY; ELECTRONIC ANALYSIS;  Surgeon: Narda Persaud MD;  Location: [facility];  Service: UroGynecology           SACRAL NERVE STIMULATOR PLACEMENT N/A 9/28/2021    Procedure: IPG INSERTION AND PROGRAMMING;  Surgeon: Narda Persaud MD;  Location: [facility];  Service: UroGynecology              Meds/Allergies:    Prior to Admission medications    Medication Sig Start Date End Date Taking? Authorizing Provider   amLODIPine (NORVASC) 10 mg tablet Take 1 tablet (10 mg total) by mouth daily 8/25/18  Yes Samuel Michael MD   DULoxetine (CYMBALTA) 60 mg delayed release capsule Take 60 mg by mouth daily   Yes Historical Provider, MD   insulin glargine (LANTUS SOLOSTAR) 100 units/mL injection pen Inject 20 Units under the skin daily at bedtime     Yes Historical Provider, MD   lisinopril (ZESTRIL) 20 mg tablet Take 10 mg by mouth daily    Yes Historical Provider, MD   metoclopramide (REGLAN) 10 mg tablet Take 10 mg by mouth daily   Yes Historical Provider, MD   Mirabegron ER (Myrbetriq) 50 MG TB24 Take 50 mg by mouth in the morning   Yes Historical Provider, MD   oxyCODONE (ROXICODONE) 5 mg immediate release tablet Take 15 mg by mouth every 6 (six) hours as needed for moderate pain Per patient  patient has been taking 30mg PO 6 times a day      Yes Historical Provider, MD   pravastatin (PRAVACHOL) 20 mg tablet Take 20 mg by mouth daily   Yes Historical Provider, MD   docusate sodium (COLACE) 100 mg capsule Take 1 capsule (100 mg total) by mouth 2 (two) times a day as needed for constipation 9/20/21   Bob Vizcaino MD   Insulin Aspart FlexPen 100 UNIT/ML SOPN Inject 3 Units under the skin 3 (three) times a day with meals If bs>250 Historical Provider, MD   lactulose 20 g/30 mL Take 30 mL (20 g total) by mouth 2 (two) times a day  Patient not taking: Reported on 2/3/2022  1/7/22   Tim Fuentes PA-C   gabapentin (NEURONTIN) 100 mg capsule Take 1 capsule (100 mg total) by mouth 2 (two) times a day 3/11/20 2/23/22  Sandra Del Rio PA-C   guaiFENesin (MUCINEX) 600 mg 12 hr tablet Take 1 tablet (600 mg total) by mouth every 12 (twelve) hours 22  Micaela Gaitan PA-C     I have reviewed home medications with patient personally  Allergies: No Known Allergies    Social History:     Marital Status: /Civil Union   Occupation:  None  Patient Pre-hospital Living Situation:  With   Patient Pre-hospital Level of Mobility:  Uses walker  Patient Pre-hospital Diet Restrictions:  None  Substance Use History:   Social History     Substance and Sexual Activity   Alcohol Use Not Currently    Alcohol/week: 0 0 standard drinks     Social History     Tobacco Use   Smoking Status Former Smoker    Types: E-Cigarettes    Start date: 1976   Za [employer] Quit date: 2019    Years since quittin 8   Smokeless Tobacco Never Used     Social History     Substance and Sexual Activity   Drug Use Not Currently    Types: Marijuana    Comment: last used 1 year ago        Family History:    Family History   Problem Relation Age of Onset    Rheum arthritis Mother     Alzheimer's disease Mother     Lupus Mother        Physical Exam:     Vitals:   Blood Pressure: 120/58 (22 0600)  Pulse: 92 (22)  Temperature: 97 9 °F (36 6 °C) (22)  Temp Source: Oral (22)  Respirations: 14 (22)  Height: 5' 2" (157 5 cm) (22)  Weight - Scale: 81 2 kg (179 lb) (22)  SpO2: 100 % (22)    Physical Exam  Vitals and nursing note reviewed  Constitutional:       Appearance: She is ill-appearing  HENT:      Head: Normocephalic and atraumatic        Comments: Temporal wasting bilaterally Nose: Nose normal       Mouth/Throat:      Mouth: Mucous membranes are dry  Pharynx: Oropharynx is clear  Comments: No teeth  Eyes:      General: No scleral icterus  Extraocular Movements: Extraocular movements intact  Conjunctiva/sclera: Conjunctivae normal    Cardiovascular:      Rate and Rhythm: Regular rhythm  Tachycardia present  Pulses: Normal pulses  Heart sounds: Normal heart sounds  Pulmonary:      Breath sounds: Wheezing and rhonchi present  Comments: On 4 L at the time of evaluation, saturating at 99%, decreased to 3 L, saturating at 97%  Abdominal:      General: Bowel sounds are normal  There is distension  Palpations: Abdomen is soft  Tenderness: There is no abdominal tenderness  There is no right CVA tenderness or left CVA tenderness  Comments: Splenomegaly, fluid wave +, dull percussion note on the sides,   Musculoskeletal:      Cervical back: Normal range of motion and neck supple  Right lower leg: Edema present  Left lower leg: Edema present  Skin:     General: Skin is dry  Capillary Refill: Capillary refill takes 2 to 3 seconds  Coloration: Skin is pale  Comments: Chronic venous stasis-bilateral lower extremity  Bruises upper extremity   Neurological:      General: No focal deficit present  Mental Status: She is alert and oriented to person, place, and time  Comments: Asterixis+   Psychiatric:         Mood and Affect: Mood normal          Behavior: Behavior normal          Thought Content: Thought content normal          Judgment: Judgment normal          Additional Data:     Lab Results: I have personally reviewed pertinent reports        Results from last 7 days   Lab Units 02/23/22 0421   WBC Thousand/uL 5 23   HEMOGLOBIN g/dL 9 2*   HEMATOCRIT % 31 3*   PLATELETS Thousands/uL 143*   NEUTROS PCT % 67   LYMPHS PCT % 22   MONOS PCT % 10   EOS PCT % 1     Results from last 7 days   Lab Units 02/23/22 0421 POTASSIUM mmol/L 4 8   CHLORIDE mmol/L 101   CO2 mmol/L 26   BUN mg/dL 20   CREATININE mg/dL 1 51*   CALCIUM mg/dL 8 0*   ALK PHOS U/L 197*   ALT U/L 42   AST U/L 75*     Results from last 7 days   Lab Units 02/23/22  0420   INR  1 13       Imaging: I have personally reviewed pertinent reports  XR chest 1 view portable    Result Date: 2/3/2022  Narrative: CHEST INDICATION:   sob  Altered mental status  COMPARISON:  1/3/2022 EXAM PERFORMED/VIEWS:  XR CHEST PORTABLE FINDINGS: Lung volumes are within normal limits  Patient rotated to the left  Airspace disease at the left lung base and at the medial right lung base  Right medial base opacity may be secondary to rotation  Cannot exclude small left pleural effusion  No pneumothorax  Stable appearance of the cardiomediastinal silhouette  Dense mitral annular calcification  No acute osseous or soft tissue pathology  Cholecystectomy clips  The study was marked in [facility] for immediate notification  Impression: Left lung base, possibly milder right lung base airspace disease  Possible left effusion findings are most suspicious for infection  Workstation performed: [id number]     XR ribs bilateral 3 vw    Result Date: 2/7/2022  Narrative: BILATERAL RIBS INDICATION:   fall/rib pain since  COMPARISON:  2/3/2022 VIEWS:  XR RIBS BILATERAL 3 VW FINDINGS: The cardiomediastinal silhouette is unremarkable  Dense mitral annulus calcifications are again noted  Left lower lobe airspace opacities have improved  There is no pneumothorax  No rib fractures are identified  Stimulator device noted within the pelvis  Impression: No evidence of a rib fracture  Improved left lower lobe airspace opacities  Workstation performed: [id number]     CT head without contrast    Result Date: 2/23/2022  Narrative: CT BRAIN - WITHOUT CONTRAST INDICATION:   ams  COMPARISON:  2/3/2022  TECHNIQUE:  CT examination of the brain was performed    In addition to axial images, sagittal and coronal 2D reformatted images were created and submitted for interpretation  Radiation dose length product (DLP) for this visit:  881 mGy-cm   This examination, like all CT scans performed in the [facility], was performed utilizing techniques to minimize radiation dose exposure, including the use of iterative reconstruction and automated exposure control  IMAGE QUALITY:  Diagnostic  FINDINGS: PARENCHYMA: Decreased attenuation is noted in periventricular and subcortical white matter demonstrating an appearance that is statistically most likely to represent mild microangiopathic change; this appearance is similar when compared to most recent prior examination  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  Curvilinear hyperdensity in the right occipital region unchanged from prior studies  No CT signs of acute infarction  No intracranial mass, mass effect or midline shift  No acute parenchymal hemorrhage  VENTRICLES AND EXTRA-AXIAL SPACES:  Normal for the patient's age  VISUALIZED ORBITS AND PARANASAL SINUSES:  Unremarkable  CALVARIUM AND EXTRACRANIAL SOFT TISSUES:  Normal      Impression: No acute intracranial abnormality  Workstation performed: [id number]     CT head without contrast    Result Date: 2/3/2022  Narrative: CT BRAIN - WITHOUT CONTRAST INDICATION:   r/o bleed  Patient has suspected COVID-19  COMPARISON:  1/3/2022 TECHNIQUE:  CT examination of the brain was performed  In addition to axial images, sagittal and coronal 2D reformatted images were created and submitted for interpretation  Radiation dose length product (DLP) for this visit:  [phone] mGy-cm   This examination, like all CT scans performed in the [facility], was performed utilizing techniques to minimize radiation dose exposure, including the use of iterative reconstruction and automated exposure control  IMAGE QUALITY:  Diagnostic  FINDINGS: PARENCHYMA:  No intracranial mass, mass effect or midline shift   No CT signs of acute infarction  No acute parenchymal hemorrhage  Old right frontal region chronic insult  Curvilinear hyperdensity right occipital region on 2/19, unchanged from prior studies  VENTRICLES AND EXTRA-AXIAL SPACES:  Normal for the patient's age  VISUALIZED ORBITS AND PARANASAL SINUSES:  Unremarkable  CALVARIUM AND EXTRACRANIAL SOFT TISSUES:  Normal      Impression: No acute intracranial abnormality  Workstation performed: [id number]     CT spine cervical wo contrast    Result Date: 2/7/2022  Narrative: CT CERVICAL SPINE - WITHOUT CONTRAST INDICATION:   recent fall/left arm pain and numbeness  COMPARISON:  CT cervical spine 3/1/2020 TECHNIQUE:  CT examination of the cervical spine was performed without intravenous contrast   Contiguous axial images were obtained  Sagittal and coronal reconstructions were performed  Radiation dose length product (DLP) for this visit:  561 mGy-cm   This examination, like all CT scans performed in the [place], was performed utilizing techniques to minimize radiation dose exposure, including the use of iterative reconstruction and automated exposure control  IMAGE QUALITY:  Diagnostic  FINDINGS: ALIGNMENT:  There is preserved normal cervical lordosis  No subluxation  VERTEBRAL BODIES:  No acute fracture DEGENERATIVE CHANGES:  Multilevel small disc bulge and mild canal narrowing more pronounced at level C5-6  Facet arthropathy and uncovertebral spurring resulting in multilevel foraminal stenosis  There is moderate to severe right and moderate left foraminal stenosis at C5-6  Moderate right foraminal stenosis at C4-5  PREVERTEBRAL AND PARASPINAL SOFT TISSUES:  Unremarkable  THORACIC INLET:  Normal      Impression: No acute cervical spine fracture or traumatic malalignment    Workstation performed: [id number]     MRI brain wo contrast    Result Date: 2/9/2022  Narrative: MRI BRAIN WITHOUT CONTRAST INDICATION: Per EMS patient had syncopal episode, and patient states near syncope episode  COMPARISON:   None  TECHNIQUE:  Sagittal T1, axial T2, axial FLAIR, axial T1, axial [place] and axial diffusion imaging  IMAGE QUALITY:  Significantly degraded by patient motion artifact  FINDINGS: BRAIN PARENCHYMA:  There is no discrete mass, mass effect or midline shift  There is no intracranial hemorrhage  There is no evidence of acute infarction and diffusion imaging is unremarkable  Small scattered hyperintensities on T2/FLAIR imaging are noted in the periventricular and subcortical white matter demonstrating an appearance that is statistically most likely to represent mild microangiopathic change  VENTRICLES:  Normal for the patient's age  SELLA AND PITUITARY GLAND:  Normal  ORBITS:  Normal  PARANASAL SINUSES:  Normal  VASCULATURE:  Evaluation of the major intracranial vasculature demonstrates appropriate flow voids  CALVARIUM AND SKULL BASE:  Normal  EXTRACRANIAL SOFT TISSUES:  Normal      Impression: White matter changes suggestive of chronic microangiopathy  No acute intracranial pathology  Image quality degraded by patient motion artifact  Workstation performed: [id number]     MRI cervical spine wo contrast    Result Date: 2/9/2022  Narrative: MRI CERVICAL SPINE WITHOUT CONTRAST INDICATION: L arm weakness  COMPARISON:  None  TECHNIQUE:  Sagittal T1, sagittal T2, sagittal inversion recovery, axial T2, axial  2D merge IMAGE QUALITY:  Image quality is severely degraded by patient motion artifact  FINDINGS: ALIGNMENT:  Normal alignment of the cervical spine  No compression fracture  No subluxation  No scoliosis  MARROW SIGNAL:  Normal marrow signal is identified within the visualized bony structures  No discrete marrow lesion  CERVICAL AND VISUALIZED THORACIC CORD:  Normal signal within the visualized cord   PREVERTEBRAL AND PARASPINAL SOFT TISSUES:  Normal  VISUALIZED POSTERIOR FOSSA:  The visualized posterior fossa demonstrates no abnormal signal  CERVICAL DISC SPACES: C2-C3: Normal  C3-C4:  Normal  C4-C5:  Small uncinate osteophyte on the left without central or foraminal narrowing  C5-C6:  Small uncinate osteophytes bilaterally with minimal bulge  No central or foraminal narrowing  C6-C7:  Normal  C7-T1:  Normal  UPPER THORACIC DISC SPACES:  Normal      Impression: Image quality significantly degraded by patient motion artifact  Although no gross cord signal abnormality is seen  Subtle cord abnormality would be missed by this technique  Mild degenerative changes are noted at C4-5 and C5-6  No cord compression  Workstation performed: [id number]     CTA ED chest PE study    Result Date: 2/23/2022  Narrative: CTA - CHEST WITH IV CONTRAST - PULMONARY ANGIOGRAM INDICATION:   syncope, hypoxia, recent covid  COMPARISON: 7/15/2020  TECHNIQUE: CTA examination of the chest was performed using angiographic technique according to a protocol specifically tailored to evaluate for pulmonary embolism  Axial, sagittal, and coronal 2D reformatted images were created from the source data and  submitted for interpretation  In addition, coronal 3D MIP postprocessing was performed on the acquisition scanner  Radiation dose length product (DLP) for this visit:  241 mGy-cm   This examination, like all CT scans performed in the [facility], was performed utilizing techniques to minimize radiation dose exposure, including the use of iterative reconstruction and automated exposure control  IV Contrast:  100 mL of iohexol (OMNIPAQUE)  FINDINGS: PULMONARY ARTERIAL TREE:  Evaluation is limited due to streak artifact  No pulmonary embolus is seen within the limitations of the study  LUNGS:  There is patchy airspace consolidation in the lower lobes and left upper lobe  Stable 2 mm subpleural nodule in the right upper lobe  There is no tracheal or endobronchial lesion  PLEURA:  Unremarkable  HEART/GREAT VESSELS:  Unremarkable for patient's age  No thoracic aortic aneurysm    Coronary artery calcification  MEDIASTINUM AND LAKIA:  Enlarged mediastinal lymph nodes  For example, 2 5 x 1 6 cm prevascular lymph node (2/50), 1 4 x 1 9 cm precarinal lymph node (2/60), and 1 5 x 3 1 cm subcarinal lymph node (2/83)  CHEST WALL AND LOWER NECK:   Unremarkable  VISUALIZED STRUCTURES IN THE UPPER ABDOMEN:  Cirrhotic hepatic morphology  The spleen is enlarged measuring 14 5 cm in AP dimension  OSSEOUS STRUCTURES:  No acute fracture or destructive osseous lesion  Impression: No evidence of pulmonary embolus within the limitations of the study  Patchy airspace consolidation in the lower lobes and left upper lobe suspicious for multifocal pneumonia  Mediastinal lymphadenopathy  Cirrhotic hepatic morphology  Splenomegaly  The study was marked in [facility] for immediate notification  Workstation performed: [id number]       EKG, Pathology, and Other Studies Reviewed on Admission:   · EKG: Reviewed, no PVCs noted on EKG, frequent PVCs on screen  24 hour tele ordered    Epic / Care Everywhere Records Reviewed: Yes     ** Please Note: This note has been constructed using a voice recognition system   **

## 2022-02-23 NOTE — ASSESSMENT & PLAN NOTE
Secondary to alcoholic cirrhosis  Patient was evaluated by GI before  Does not follow-up as outpatient  Normal ammonia levels on presentation  Patient not taking lactulose due to chronic diarrhea at home  Alpha fetoprotein in January was normal  No HCC screening  Last ultrasound in Jan was limited  Elevated AST and alkaline phosphatase    Monitor vital signs, serial abdominal exams, orientation, CBC, CMP  Outpatient follow-up with GI

## 2022-02-23 NOTE — ASSESSMENT & PLAN NOTE
Secondary to alcoholic cirrhosis  Patient was evaluated by GI before  Does not follow-up as outpatient  Has history of encephalopathy? Hepatic  Normal ammonia levels on presentation  Patient not taking lactulose  Alpha fetoprotein in January was normal  No HCC screening  Last ultrasound in Jan was limited  Elevated AST and alkaline phosphatase    Will order CMP, INR for MELD score  Monitor vital signs, serial abdominal exams, orientation, CBC, CMP

## 2022-02-23 NOTE — ASSESSMENT & PLAN NOTE
· Retaining CO2, in the setting of probable GILSON and chronic opioid use  · Tolerated BiPAP well and repeat ABG showed resolution of hypercarbia  · Asterixis likely secondary to hypercarbia  · Monitor VS  · Resolved

## 2022-02-23 NOTE — PHYSICAL THERAPY NOTE
Physical Therapy Cancellation Note         02/23/22 1014   Note Type   Note type Cancelled Session   Cancel Reasons Medical status   Additional Comments PT orders noted and chart reviewed  Per RN, pt currently on BiPap and not appropriate for mobility at this time  Will continue to follow and evaluate as appropriate          Rosalba Earl, PT,DPT

## 2022-02-23 NOTE — ASSESSMENT & PLAN NOTE
· Retaining CO2, in the setting of ?infection, lethargy, chronic opioid use  · Start BiPAP and reassess ABG  · Asterixis likely secondary to hypercarbia  · Monitor VS

## 2022-02-23 NOTE — ASSESSMENT & PLAN NOTE
Patient takes oxycodone 30 mg q 6  Decrease to t i d  And respiratory status and lethargy have improved      No longer altered

## 2022-02-23 NOTE — ASSESSMENT & PLAN NOTE
Lab Results   Component Value Date    HGBA1C 7 5 (H) 01/04/2022     Patient missed her home Lantus dose yesterday night   Will check stat fingerstick and give 5 units of lantus  Continue Lantus 20 units at bedtime, may need lower dose tonight if given 5 units this AM  [facility]  Hypoglycemia protocol  Carb controlled diet

## 2022-02-23 NOTE — ASSESSMENT & PLAN NOTE
· Elevated to 0 68, decreased compared to previous hospitalization  · ?   Because of CKD   · Monitor off ABx for now  · Received Ceftriaxone in ED  · Monitor VS

## 2022-02-23 NOTE — ED NOTES
Nurse Geraldo Torres at pt bedside Nurse checked tele box transmission      [facility]  02/23/22 0880

## 2022-02-23 NOTE — ASSESSMENT & PLAN NOTE
Likely secondary to poor PO solute intake, cirrhosis  Chronically ranging from low 130's to 140  Monitor BMP  IF sodium remains low, will consider urine sodium, serum osm and urine osm

## 2022-02-23 NOTE — ASSESSMENT & PLAN NOTE
Patient has H/o Tics  Jerks ?likely secondary to metabolic disorder-toxic metabolic(hypercarbia), ? Drug-induced (Has been taking Reglan for a long time), less likely seizure(no postictal state)  CT head normal no acute intracranial abnormality  Monitor vital signs and electrolytes  If symptoms are not getting better, will consult Neurology

## 2022-02-24 PROBLEM — J96.02 ACUTE RESPIRATORY FAILURE WITH HYPERCAPNIA (HCC): Status: ACTIVE | Noted: 2022-01-04

## 2022-02-24 PROBLEM — G92.8 TOXIC METABOLIC ENCEPHALOPATHY: Status: ACTIVE | Noted: 2022-01-04

## 2022-02-24 LAB
ATRIAL RATE: 97 BPM
GLUCOSE SERPL-MCNC: 127 MG/DL (ref 65–140)
GLUCOSE SERPL-MCNC: 137 MG/DL (ref 65–140)
GLUCOSE SERPL-MCNC: 77 MG/DL (ref 65–140)
P AXIS: 69 DEGREES
PR INTERVAL: 180 MS
QRS AXIS: 59 DEGREES
QRSD INTERVAL: 82 MS
QT INTERVAL: 348 MS
QTC INTERVAL: 428 MS
T WAVE AXIS: 52 DEGREES
VENTRICULAR RATE: 91 BPM

## 2022-02-24 PROCEDURE — 99233 SBSQ HOSP IP/OBS HIGH 50: CPT | Performed by: INTERNAL MEDICINE

## 2022-02-24 PROCEDURE — 97110 THERAPEUTIC EXERCISES: CPT

## 2022-02-24 PROCEDURE — 82948 REAGENT STRIP/BLOOD GLUCOSE: CPT

## 2022-02-24 PROCEDURE — 94640 AIRWAY INHALATION TREATMENT: CPT

## 2022-02-24 PROCEDURE — 97535 SELF CARE MNGMENT TRAINING: CPT

## 2022-02-24 PROCEDURE — 94760 N-INVAS EAR/PLS OXIMETRY 1: CPT

## 2022-02-24 PROCEDURE — 93010 ELECTROCARDIOGRAM REPORT: CPT | Performed by: INTERNAL MEDICINE

## 2022-02-24 RX ORDER — SACCHAROMYCES BOULARDII 250 MG
250 CAPSULE ORAL 2 TIMES DAILY
Status: DISCONTINUED | OUTPATIENT
Start: 2022-02-24 | End: 2022-02-26 | Stop reason: HOSPADM

## 2022-02-24 RX ADMIN — HEPARIN SODIUM 5000 UNITS: 5000 INJECTION INTRAVENOUS; SUBCUTANEOUS at 15:13

## 2022-02-24 RX ADMIN — Medication 1 PACKET: at 12:48

## 2022-02-24 RX ADMIN — Medication 250 MG: at 18:34

## 2022-02-24 RX ADMIN — ISODIUM CHLORIDE 3 ML: 0.03 SOLUTION RESPIRATORY (INHALATION) at 21:18

## 2022-02-24 RX ADMIN — OXYBUTYNIN CHLORIDE 10 MG: 5 TABLET, EXTENDED RELEASE ORAL at 09:02

## 2022-02-24 RX ADMIN — Medication 250 MG: at 12:48

## 2022-02-24 RX ADMIN — AMLODIPINE BESYLATE 10 MG: 10 TABLET ORAL at 09:02

## 2022-02-24 RX ADMIN — HEPARIN SODIUM 5000 UNITS: 5000 INJECTION INTRAVENOUS; SUBCUTANEOUS at 05:30

## 2022-02-24 RX ADMIN — OXYCODONE HYDROCHLORIDE 30 MG: 10 TABLET ORAL at 09:02

## 2022-02-24 RX ADMIN — HEPARIN SODIUM 5000 UNITS: 5000 INJECTION INTRAVENOUS; SUBCUTANEOUS at 21:48

## 2022-02-24 RX ADMIN — LEVALBUTEROL HYDROCHLORIDE 1.25 MG: 1.25 SOLUTION, CONCENTRATE RESPIRATORY (INHALATION) at 21:18

## 2022-02-24 RX ADMIN — OXYCODONE HYDROCHLORIDE 30 MG: 10 TABLET ORAL at 21:48

## 2022-02-24 RX ADMIN — METOCLOPRAMIDE 10 MG: 10 TABLET ORAL at 09:02

## 2022-02-24 RX ADMIN — LISINOPRIL 10 MG: 10 TABLET ORAL at 09:02

## 2022-02-24 RX ADMIN — ISODIUM CHLORIDE 3 ML: 0.03 SOLUTION RESPIRATORY (INHALATION) at 14:34

## 2022-02-24 RX ADMIN — OXYCODONE HYDROCHLORIDE 30 MG: 10 TABLET ORAL at 15:15

## 2022-02-24 RX ADMIN — Medication 1 PACKET: at 18:35

## 2022-02-24 RX ADMIN — LEVALBUTEROL HYDROCHLORIDE 1.25 MG: 1.25 SOLUTION, CONCENTRATE RESPIRATORY (INHALATION) at 14:34

## 2022-02-24 RX ADMIN — PRAVASTATIN SODIUM 20 MG: 20 TABLET ORAL at 09:02

## 2022-02-24 RX ADMIN — DULOXETINE HYDROCHLORIDE 60 MG: 60 CAPSULE, DELAYED RELEASE ORAL at 09:02

## 2022-02-24 RX ADMIN — INSULIN GLARGINE 20 UNITS: 100 INJECTION, SOLUTION SUBCUTANEOUS at 22:40

## 2022-02-24 NOTE — PROGRESS NOTES
[facility]  Progress Note - Thad Clear 1955, 73 y o  female MRN: [id number]  Unit/Bed#: S -01 Encounter: [id number]  Primary Care Provider: Anne Neri MD   Date and time admitted to hospital: 2/22/2022 11:03 PM    Hyponatremia  Assessment & Plan  Likely secondary to poor PO solute intake, cirrhosis  Chronically ranging from low 130's to 140  Monitor BMP  IF sodium remains low, will consider urine sodium, serum osm and urine osm  Myoclonic jerking  Assessment & Plan  Patient has H/o Tics  Jerks ?likely secondary to metabolic disorder-toxic metabolic(hypercarbia), ? Drug-induced (Has been taking Reglan for a long time), less likely seizure(no postictal state)  CT head normal no acute intracranial abnormality  Monitor vital signs and electrolytes  Improved with resolution of hypercarbia    Anemia  Assessment & Plan  · Ranging from 12-11  · POA-8 3  · Likely secondary to cirrhosis, anemia of chronic disease  · Iron panel done in January 2022, had ferritin level of 23  · Started on p o  ferrous sulfate    Elevated procalcitonin  Assessment & Plan  · Elevated to 0 68, decreased compared to previous hospitalization  · ? Because of CKD   · Monitor off ABx for now  · Received Ceftriaxone in ED  · Monitor VS    Acute respiratory failure with hypercapnia ([facility] [address] )  Assessment & Plan  Present on admission, likely due to opiate use with possible GILSON  Was on BiPAP in the ED, with improvement in ABG  Will order outpatient sleep study  Resolved    Toxic metabolic encephalopathy  Assessment & Plan  Likely due to hypercarbia retention and chronic opioid use  Improved with BiPAP and decreasing oxycodone dosing  Maintain delirium precautions    Cirrhosis ([address] )  Assessment & Plan  Secondary to alcoholic cirrhosis  Patient was evaluated by GI before  Does not follow-up as outpatient  Normal ammonia levels on presentation    Patient not taking lactulose due to chronic diarrhea at home  Alpha fetoprotein in January was normal  No HCC screening  Last ultrasound in Jan was limited  Elevated AST and alkaline phosphatase  Monitor vital signs, serial abdominal exams, orientation, CBC, CMP  Outpatient follow-up with GI    Chronic pain syndrome  Assessment & Plan  Patient takes oxycodone 30 mg q 6  Decrease to t i d  And respiratory status and lethargy have improved  No longer altered    Type 2 diabetes mellitus with hyperglycemia, with long-term current use of insulin [facility])  Assessment & Plan  Lab Results   Component Value Date    HGBA1C 7 5 (H) 01/04/2022     Patient missed her home Lantus dose yesterday night  Will check stat fingerstick and give 5 units of lantus  Continue Lantus 20 units at bedtime  [facility]  Hypoglycemia protocol  Carb controlled diet    * Respiratory acidosis  Assessment & Plan  · Retaining CO2, in the setting of probable GILSON and chronic opioid use  · Tolerated BiPAP well and repeat ABG showed resolution of hypercarbia  · Asterixis likely secondary to hypercarbia  · Monitor VS  · Resolved      VTE Pharmacologic Prophylaxis: VTE Score: 3 Moderate Risk (Score 3-4) - Pharmacological DVT Prophylaxis Ordered: heparin  Patient Centered Rounds: I performed bedside rounds with nursing staff today  Discussions with Specialists or Other Care Team Provider: CM    Education and Discussions with Family / Patient: Updated  () at bedside  Time Spent for Care: 45 minutes  More than 50% of total time spent on counseling and coordination of care as described above  Current Length of Stay: 1 day(s)  Current Patient Status: Inpatient   Certification Statement: The patient will continue to require additional inpatient hospital stay due to evaluation by PT and OT  Discharge Plan: Anticipate discharge in 24-48 hrs to discharge location to be determined pending rehab evaluations      Code Status: Level 1 - Full Code    Subjective:   Patient complains of chronic loose stools that have been present for months  She stopped lactulose at home  She has had only one episode this morning  She was also tearful regarding her chronic medical problems  She still has weakness in her LUE and is now considering going to rehab on discharge  She denies fevers, chills, N/V, lightheadedness  Objective:     Vitals:   Temp (24hrs), Av °F (36 7 °C), Min:97 7 °F (36 5 °C), Max:98 4 °F (36 9 °C)    Temp:  [97 7 °F (36 5 °C)-98 4 °F (36 9 °C)] 98 °F (36 7 °C)  HR:  [81-94] 94  Resp:  [18-19] 19  BP: (136-145)/(60-68) 145/68  SpO2:  [96 %-99 %] 96 %  Body mass index is 34 06 kg/m²  Input and Output Summary (last 24 hours): Intake/Output Summary (Last 24 hours) at 2022 1327  Last data filed at 2022 0902  Gross per 24 hour   Intake 1070 ml   Output 700 ml   Net 370 ml       Physical Exam:   Physical Exam  Vitals and nursing note reviewed  Constitutional:       Appearance: She is obese  Comments: Chronically ill-appearing   HENT:      Head: Normocephalic and atraumatic  Right Ear: External ear normal       Left Ear: External ear normal       Nose: Nose normal       Mouth/Throat:      Mouth: Mucous membranes are moist       Pharynx: Oropharynx is clear  Eyes:      Conjunctiva/sclera: Conjunctivae normal       Pupils: Pupils are equal, round, and reactive to light  Cardiovascular:      Rate and Rhythm: Normal rate and regular rhythm  Pulses: Normal pulses  Heart sounds: Normal heart sounds  Pulmonary:      Effort: Pulmonary effort is normal       Breath sounds: Normal breath sounds  Abdominal:      General: Abdomen is flat  Bowel sounds are normal       Palpations: Abdomen is soft  Musculoskeletal:         General: No swelling or tenderness  Cervical back: Neck supple  No muscular tenderness  Skin:     General: Skin is warm and dry  Capillary Refill: Capillary refill takes less than 2 seconds     Neurological:      General: No focal deficit present  Mental Status: She is alert and oriented to person, place, and time  Mental status is at baseline  Psychiatric:         Behavior: Behavior normal          Thought Content: Thought content normal          Judgment: Judgment normal       Comments: tearful           Additional Data:     Labs:  Results from last 7 days   Lab Units 02/23/22  0747 02/23/22  0421 02/22/22  2354 02/22/22  2354   WBC Thousand/uL  --  5 23   < > 4 85   HEMOGLOBIN g/dL  --  9 2*   < > 8 3*   I STAT HEMOGLOBIN g/dl 14 6  --   --   --    HEMATOCRIT %  --  31 3*   < > 27 9*   HEMATOCRIT, ISTAT % 43  --   --   --    PLATELETS Thousands/uL  --  143*   < > 142*   BANDS PCT %  --   --   --  3   NEUTROS PCT %  --  67  --   --    LYMPHS PCT %  --  22  --   --    LYMPHO PCT %  --   --   --  22   MONOS PCT %  --  10  --   --    MONO PCT %  --   --   --  4   EOS PCT %  --  1  --  1    < > = values in this interval not displayed       Results from last 7 days   Lab Units 02/23/22  0747 02/23/22  0421   SODIUM mmol/L  --  133*   POTASSIUM mmol/L  --  4 8   CHLORIDE mmol/L  --  101   CO2 mmol/L  --  26   CO2, I-STAT mmol/L 28  --    BUN mg/dL  --  20   CREATININE mg/dL  --  1 51*   ANION GAP mmol/L  --  6   CALCIUM mg/dL  --  8 0*   ALBUMIN g/dL  --  2 5*   TOTAL BILIRUBIN mg/dL  --  0 42   ALK PHOS U/L  --  197*   ALT U/L  --  42   AST U/L  --  75*   GLUCOSE RANDOM mg/dL  --  136     Results from last 7 days   Lab Units 02/23/22  0420   INR  1 13     Results from last 7 days   Lab Units 02/24/22  1201 02/24/22  0637 02/23/22  2206 02/23/22  1516 02/23/22  1155 02/23/22  0627   POC GLUCOSE mg/dl 137 77 143* 212* 157* 170*         Results from last 7 days   Lab Units 02/23/22  0421 02/23/22  0127   LACTIC ACID mmol/L 0 9  --    PROCALCITONIN ng/ml 0 56* 0 68*       Lines/Drains:  Invasive Devices  Report    Peripheral Intravenous Line            Peripheral IV 02/22/22 Left Forearm 1 day                      Imaging: No pertinent imaging reviewed  Recent Cultures (last 7 days):   Results from last 7 days   Lab Units 02/23/22  0141   BLOOD CULTURE  No Growth at 24 hrs  No Growth at 24 hrs  Last 24 Hours Medication List:   Current Facility-Administered Medications   Medication Dose Route Frequency Provider Last Rate    acetaminophen  325 mg Oral Q8H PRN Ki Connor MD      amLODIPine  10 mg Oral Daily Ki Connor MD      docusate sodium  100 mg Oral BID PRN Ki Connor MD      DULoxetine  60 mg Oral Daily Ki Connor MD      heparin (porcine)  5,000 Units Subcutaneous Q8H [address] Ki Connor MD      insulin glargine  20 Units Subcutaneous HS Ki Connor MD      insulin lispro  1-5 Units Subcutaneous TID AC Ki Connor MD      levalbuterol  1 25 mg Nebulization TID Ki Connor MD      lisinopril  10 mg Oral Daily Ki Connor MD      metoclopramide  10 mg Oral Daily Ki Connor MD      ondansetron  4 mg Intravenous Q6H PRN Ki Connor MD      oxybutynin  10 mg Oral Daily Ki Connor MD      oxyCODONE  30 mg Oral TID Ki Connor MD      pravastatin  20 mg Oral Daily Ki Connor MD      psyllium  1 packet Oral BID Alexys Mcdaniels MD      saccharomyces boulardii  250 mg Oral BID Alexys Mcdaniels MD      sodium chloride  3 mL Nebulization TID Ki Connor MD       Facility-Administered Medications Ordered in Other Encounters   Medication Dose Route Frequency Provider Last Rate    ferrous sulfate  325 mg Oral Daily With Alva Davidson MD          Today, Patient Was Seen By: Alexys Mcdaniels MD    **Please Note: This note may have been constructed using a voice recognition system  **

## 2022-02-24 NOTE — CASE MANAGEMENT
Case Management Assessment & Discharge Planning Note    Patient name Blu Rivers  Location S /S -01 MRN [id number]  : 1955 Date 2022       Current Admission Date: 2022  Current Admission Diagnosis:Respiratory acidosis   Patient Active Problem List    Diagnosis Date Noted    Myoclonic jerking 2022    Hyponatremia 2022    Respiratory acidosis 2022    History of recent fall 2022    Left arm weakness 2022    Anemia 2022    Acute respiratory failure with hypoxia and hypercapnia ([facility] [address] ) 2022    Acute kidney injury superimposed on CKD ([facility] [address] ) 2022    Pneumonia 2022    Sepsis ([facility] ) 2022    Pressure injury of skin of buttock 2022    Toxic metabolic encephalopathy     Acute respiratory failure with hypercapnia ([facility] [address] ) 2022    Fecal occult blood test positive 2022    COVID-19 2022    Elevated procalcitonin 2022    Urge incontinence 2021    Ex-smoker 2021    Syncope 07/15/2020    Cirrhosis ([facility] ) 07/15/2020    Splenomegaly 07/15/2020    CAD (coronary artery disease) 07/15/2020    Other microscopic hematuria 2020    Spondylosis of lumbar joint 05/15/2020    Marijuana use 2020    Left hip pain 2020    Chronic pain syndrome 2018    Peripheral neuropathy 2018    Type 2 diabetes mellitus with hyperglycemia, with long-term current use of insulin ([facility] [address] ) 2018    Chronic venous stasis dermatitis of both lower extremities 2017    Venous insufficiency (chronic) (peripheral) 2017    Essential hypertension       LOS (days): 1  Geometric Mean LOS (GMLOS) (days): 3 60  Days to GMLOS:2     OBJECTIVE:  PATIENT READMITTED TO HOSPITAL  Risk of Unplanned Readmission Score: 31         Current admission status: Inpatient       Preferred Pharmacy:   [facility] [place] [address], [place] - [address]  [address] [facility] [place] [address]  Phone: [phone] Fax: [phone]    Primary Care Provider: Steff Gómez MD    Primary Insurance: MEDICARE  Secondary Insurance: Elizabeth Manzo    ASSESSMENT:  [address] [facility] [place] Representative - Spouse   Primary Phone: [phone] (Mobile)  Home Phone: [phone]                              Patient Information  Admitted from[de-identified] Home  Mental Status: Alert  During Assessment patient was accompanied by: Not accompanied during assessment  Assessment information provided by[de-identified] Patient (VM Left for spouse, Yousif Johnson)  Primary Caregiver: Self  Support Systems: Self,Spouse/significant [address] [facility] of Residence: [address] [facility] do you live in?: [place] entry access options   Select all that apply : Stairs  Number of steps to enter home : 3 (3 steps, then 5 and then 1 to enter)  Type of Current Residence: [address] [place] home (patient remains on the 1st floor)  Upon entering residence, is there a bathroom on the main floor (no further steps)?: Yes  In the last 12 months, was there a time when you were not able to pay the mortgage or rent on time?: No  In the last 12 months, how many places have you lived?: 1  In the last 12 months, was there a time when you did not have a steady place to sleep or slept in a shelter (including now)?: No  Homeless/housing insecurity resource given?: No  Living Arrangements: Lives w/ Spouse/significant other,Lives w/ Daughter (and daughter's significant other)  Is patient a ?: No    Activities of Daily Living Prior to Admission  Functional Status: Assistance  Completes ADLs independently?: No  Level of ADL dependence: Assistance  Ambulates independently?: Yes  Does patient use assisted devices?: Yes  Assisted Devices (DME) used: Rollator  Does patient currently own DME?: Yes  What DME does the patient currently own?: Rollator,Wheelchair,Bedside Commode,Shower Chair  Does patient have a history of Outpatient Therapy (PT/OT)?: No  Does the patient have a history of Short-Term Rehab?: Yes (believes she's been to rehab "a long time ago")  Does patient have a history of HHC?: Yes (does not recall agency; recently arranged with [facility] through [address] on 2/10)  Does patient currently have [facility]?: No (patient states she has no current services)         Patient Information Continued  Does patient have prescription coverage?: Yes  Within the past 12 months, you worried that your food would run out before you got the money to buy more : Never true  Within the past 12 months, the food you bought just didnt last and you didnt have money to get more : Never true  Food insecurity resource given?: N/A  Does patient receive dialysis treatments?: No         Means of Transportation  Means of Transport to Appts[de-identified] Family transport  In the past 12 months, has lack of transportation kept you from medical appointments or from getting medications?: No  In the past 12 months, has lack of transportation kept you from meetings, work, or from getting things needed for daily living?: No  Was application for public transport provided?: N/A        DISCHARGE DETAILS:    Discharge planning discussed with[de-identified] patient at bedside; VM left for spouse, Philippe Moreno  Freedom of Choice: Yes (re: home care and rehab)  Comments - Freedom of Choice: patient refusing rehab at this time; reports she would only consider rehab if able to be done at home  Reports that spouse would be one to discuss preferences    CM contacted family/caregiver?: Yes (VM left for spousePhilippe; awaiting return call)  Were Treatment Team discharge recommendations reviewed with patient/caregiver?: Yes  Did patient/caregiver verbalize understanding of patient care needs?: Yes  Were patient/caregiver advised of the risks associated with not following Treatment Team discharge recommendations?: Yes    Contacts  Patient Contacts: spouse, Johnathon  Relationship to Patient[de-identified] Family  Contact Method: Phone  Reason/Outcome: Continuity of [address] [facility] [address]         Is the patient interested in Corine Smith at discharge?: Yes  [address] requested[de-identified] [address] Name[de-identified] Other  [address] Provider[de-identified] PCP  Home Health Services Needed[de-identified] Evaluate Functional Status and Safety,Gait/ADL Training,Strengthening/Theraputic Exercises to Improve Function  Homebound Criteria Met[de-identified] Requires the Assistance of Another Person for Safe Ambulation or to Leave the Home,Uses an Assist Device (i e  cane, walker, etc)  Supporting Clincal Findings[de-identified] Fatigues Easliy in Short Distances,Limited Endurance    DME Referral Provided  Referral made for DME?: No    Other Referral/Resources/Interventions Provided:  Interventions: [facility]  Referral Comments: Patient admitted due to respiratory acidosis  Met with patient at bedside to complete assessment  Patient presents as alert, oriented x3, but forgetful at times  Patient reports that she lives at home with her spouse, Noni Goodell, daughter and daughter's significant other  States that she has four daughters and a son in total; daughters live in the area, son is in [place]  Patient states that house is a duplex, with a set of 3 steps, then 5 steps to enter; patient's spouse assists her with the stairs  Patient reports that once inside, she remains on the 1st floor; usually sleeps in her recliner  Patient walks with her rollator; also has wheelchair, commode and shower seat to use when needed  Patient's family provides transportation  Patient believes she's been to rehab in the past, but does not recall where or when  Patient reports history of home care services, but does not believe she is current with anyone   Patient had been hospitalized for COVID from 2/3-2/10 this year; records show home care had been set up with [facility], but patient does not know if she is current or not; reports spouse is best person to speak with about services  Awaiting PT/OT evaluation - patient states that she will only consider rehab if able to do it in her home; does not want to go to an in-patient facility  Offer made to reach out to family to discuss d/c plan, and patient requesting spouse be called  [facility] made to [facility] requesting return call to confirm information provided and discuss d/c plan  Will continue to follow for d/c planning needs      Would you like to participate in our [facility] service program?  : No - Declined          Transport at Discharge : Auto with designated                              IMM Given (Date):: 02/23/22

## 2022-02-24 NOTE — PLAN OF CARE
Problem: OCCUPATIONAL THERAPY ADULT  Goal: Performs self-care activities at highest level of function for planned discharge setting  See evaluation for individualized goals  Description: Treatment Interventions: ADL retraining,Functional transfer training,UE strengthening/ROM,Endurance training,Patient/family training,Equipment evaluation/education,Neuromuscular reeducation,Fine motor coordination activities,Compensatory technique education,Continued evaluation,Activityengagement          See flowsheet documentation for full assessment, interventions and recommendations  Outcome: Progressing  Note: Limitation: Decreased ADL status,Decreased UE strength,Decreased Safe judgement during ADL,Decreased sensation,Decreased cognition,Decreased endurance,Decreased self-care trans,Decreased high-level ADLs,Decreased fine motor control  Prognosis: Fair  Assessment: Pt is a 73 y o  female seen for OT evaluation s/p admit [facility] 2/22/2022 w/ Respiratory acidosis  Comorbidities affecting pt's functional performance at time of assessment are listed above  Personal factors affecting pt at time of IE include:steps to enter environment, difficulty performing ADLS, difficulty performing IADLS  and environment  Prior to admission, pt was reportedly (I) up until 3 wks ago when LUE became weak  Since the latter, pt reports needing assistance assistance for all ADLs  At baseline, pt mod (I) for functional mobility and ADL transfers  Upon evaluation: Pt requires setup/(S) for eating, G&H, min (A) for UB ADL, mod (A) for LB ADL, max (A) with toileting, close (S)/CGA for ADL transfers, mod (A) with bed mobility  Pt presents below baseline level of independence 2* the following deficits impacting occupational performance: weakness, decreased ROM, decreased strength, decreased balance, decreased tolerance, impaired GMC, impaired [address], impaired sensation, impaired memory, impaired problem solving and increased pain   Pt to benefit from continued skilled OT tx while in the hospital to address deficits as defined above and maximize level of functional independence w ADL's and functional mobility  Occupational Performance areas to address include: eating, grooming, bathing/shower, toilet hygiene, dressing, functional mobility, community mobility, clothing management and leisure activities  Pt with score of 45/100 on the Barthel Index  Pt's raw score on the AM-PAC Daily activity inpatient short form is 16, standardized score is 35 96, less than 39 4  Patients at this level are likely to benefit from DC to inpatient rehab, which this writer agrees with  D/C recommendations may change to [facility] with family supports PENDING progress  Pt with desire to d/c to home  Based on OT evaluation, the assessment(s) completed, performance deficits listed, and current level of function, pt identified as a HIGH complexity evaluation        OT Discharge Recommendation: Post acute rehabilitation services (versus HHOT pending progress and availablity of (A))

## 2022-02-24 NOTE — ASSESSMENT & PLAN NOTE
Likely due to hypercarbia retention and chronic opioid use  Improved with BiPAP and decreasing oxycodone dosing  Maintain delirium precautions

## 2022-02-24 NOTE — OCCUPATIONAL THERAPY NOTE
Occupational Therapy Evaluation      Blu Rivers    2/24/2022    Principal Problem:    Respiratory acidosis  Active Problems:    Type 2 diabetes mellitus with hyperglycemia, with long-term current use of insulin (HCC)    Chronic pain syndrome    Cirrhosis (HCC)    Toxic metabolic encephalopathy    Acute respiratory failure with hypercapnia (HCC)    Elevated procalcitonin    Anemia    Myoclonic jerking    Hyponatremia      Past Medical History:   Diagnosis Date    Abnormal head CT 7/14/2017    Acute kidney injury ([address] ) 8/19/2018    Cellulitis 1/10/2017    Closed fracture of multiple ribs of right side 7/14/2017    Degenerative disc disease at L5-S1 level     Diabetes mellitus (HCC)     History of methicillin resistant staphylococcus aureus (MRSA) 08/21/2018    negative nasal culture- isolation and hx of mrsa removed 8/20/2018    Hyperkalemia 4/25/2018    Hypertension     Hypocalcemia 4/25/2018    Hyponatremia 7/14/2017       Past Surgical History:   Procedure Laterality Date    CHOLECYSTECTOMY      JOINT REPLACEMENT      OOPHORECTOMY      MS INCISION,IMPLANT,NEUROELEC,SACRAL NERVE N/A 9/20/2021    Procedure: INSERTION NEUROSTIMULATOR ELECTRODE ARRAY SACRAL NERVE; FLUOROSCOPY; ELECTRONIC ANALYSIS;  Surgeon: Narda Persaud MD;  [facility]: [facility];  Service: UroGynecology           SACRAL NERVE STIMULATOR PLACEMENT N/A 9/28/2021    Procedure: IPG INSERTION AND PROGRAMMING;  Surgeon: Narda Persaud MD;  [facility];  Service: UroGynecology               02/24/22 1242   OT Last Visit   OT Visit Date 02/24/22   Note Type   Note type Evaluation   Additional Comments OT consult received  Chart reviewed  RN cleared pt for OT evaluation  Pt presents using BSC, agreeable to OT evaluation  At the end of session, pt left in bed with needs/call bell in reach  Restrictions/Precautions   Other Precautions Fall Risk;Multiple lines; Bed Alarm; Chair Alarm   Pain Assessment   Pain Assessment Tool 0-10   Pain Score 8   Pain Location/Orientation Location: Back; Location: Neck   Patient's Stated Pain Goal No pain   Hospital Pain Intervention(s) Repositioned  (RN aware of pt's c/o pain)   Home Living   Type of Home   ("half house")   Home Layout Stairs to enter with rails  (3+5 CATRACHO)   Bathroom Shower/Tub Tub/shower unit   Bathroom Toilet Standard   Bathroom Equipment Grab bars in shower; Toilet raiser;Built-in shower seat   Home Equipment   (rollator, )   Prior Function   Level of [place] Independent with ADLs and functional mobility  (prior to the last 3 weeks, pt reports (I) ADL, mod (I) ADL)   Lives With Spouse;Daughter  (and daughter's significant other)   Receives Help From Family   ADL Assistance Independent   IADLs Needs assistance   Falls in the last 6 months 1 to 4  (1 fall approx 3-4 weeks ago)   Vocational On disability   Lifestyle   Autonomy Pt reports prior to 3 weeks ago being completely (I) with ADLs and mod (I) for functional mobility  Pt uses a rollator at all times in the home  Pt states approx    Reciprocal Relationships Pt reports someone is home at all times  Family has been assisting pt since pt with UE deficits  Psychosocial   Psychosocial (WDL) WDL   Subjective   Subjective "2 or 3 weeks ago, I woke up with my arm swollen ten times the size it is now  Ever since then, it's useless  I can't do anything with it "   Barrett Jurchristos Eli "I should have went to the doctor then but that was my fault   I never did "    ADL   Eating Assistance 5  Supervision/Setup   Eating Deficit Setup   Grooming Assistance 5  Supervision/Setup   Grooming Deficit Setup   UB Bathing Assistance 4  Minimal Assistance   LB Bathing Assistance 3  Moderate Assistance   UB Dressing Assistance 4  Minimal Assistance   UB Dressing Deficit   (assist with fasteners and threading UE's)   [facility] 3  Moderate Assistance   Toileting Assistance  2  Maximal Assistance   Toileting Deficit Clothing management down;Clothing management up;Perineal hygiene   Bed Mobility   Supine to Sit 3  Moderate assistance   Additional items HOB elevated  (hand held assist for trunk)   Sit to Supine 3  Moderate assistance   Additional items LE management   Transfers   Sit to Stand 5  Supervision  (close (S)/CGA)   Stand to Sit 5  Supervision  Reino Halo)   Toilet transfer 5  Supervision   Additional items Commode   Functional Mobility   Functional Mobility 5  Supervision   Additional Comments Pt CGA/close (S) with rollator   Balance   Static Sitting Good   Dynamic Sitting Good   Static Standing Fair   Dynamic Standing Fair -  (observed reaching outside WERNER for items on bed unsupported)   Activity Tolerance   Activity Tolerance Patient limited by pain; Patient limited by fatigue   Nurse Made Aware RN made aware of pt's LUE deficits and pt's self report of never alerting medical staff of LUE deficits  RUE Assessment   RUE Assessment WFL   LUE Assessment   LUE Assessment X   LUE Strength   LUE Overall Strength Deficits   L Forearm Pronation 2-/5  (using accessory muscles to complete)   L Forearm Supination 2-/5  (same as above)   L Wrist Flexion 3-/5   L Wrist Extension 3-/5   L Elbow Flexion 3/5   L Elbow Extension 3-/5   L Shoulder Flexion 3-/5   Hand Function   Gross Motor Coordination Impaired   Fine Motor Coordination Impaired  (grasp 2-/5)   Sensation   Light Touch Partial deficits in the LUE   Sharp/Dull Partial deficits in the RUE   Additional Comments unable to further assess LUE; pt urgently requesting to use BSC to urinate   Vision-Basic Assessment   Current Vision Wears glasses all the time   Vision - Complex Assessment   Acuity Able to read clock/calendar on wall without difficulty   Perception   Inattention/Neglect Appears intact   Cognition   Overall Cognitive Status Impaired   Arousal/Participation Alert; Cooperative   Attention Attends with cues to redirect   Orientation Level Oriented X4   Memory Decreased recall of precautions; Within functional limits   Following Commands Follows one step commands without difficulty   Comments Pt verified ID by stating name and   Assessment   Limitation Decreased ADL status; Decreased UE strength;Decreased Safe judgement during ADL;Decreased sensation;Decreased cognition;Decreased endurance;Decreased self-care trans;Decreased high-level ADLs; Decreased fine motor control   Prognosis Fair   Assessment Pt is a 73 y o  female seen for OT evaluation s/p [facility] on [facility] w/ Respiratory acidosis  Comorbidities affecting pt's functional performance at time of assessment are listed above  Personal factors affecting pt at time of IE include:steps to enter environment, difficulty performing ADLS, difficulty performing IADLS  and environment  Prior to admission, pt was reportedly (I) up until 3 wks ago when LUE became weak  Since the latter, pt reports needing assistance assistance for all ADLs  At baseline, pt mod (I) for functional mobility and ADL transfers  Upon evaluation: Pt requires setup/(S) for eating, G&H, min (A) for UB ADL, mod (A) for LB ADL, max (A) with toileting, close (S)/CGA for ADL transfers, mod (A) with bed mobility  Pt presents below baseline level of independence 2* the following deficits impacting occupational performance: weakness, decreased ROM, decreased strength, decreased balance, decreased tolerance, impaired GMC, impaired [address], impaired sensation, impaired memory, impaired problem solving and increased pain  Pt to benefit from continued skilled OT tx while in the hospital to address deficits as defined above and maximize level of functional independence w ADL's and functional mobility  Occupational Performance areas to address include: eating, grooming, bathing/shower, toilet hygiene, dressing, functional mobility, community mobility, clothing management and leisure activities  Pt with score of 45/100 on the Barthel Index   Pt's raw score on the AM-PAC Daily activity inpatient short form is 16, standardized score is 35 96, less than 39 4  Patients at this level are likely to benefit from DC to inpatient rehab, which this writer agrees with  D/C recommendations may change to [facility] with family supports PENDING progress  Pt with desire to d/c to home  Based on OT evaluation, the assessment(s) completed, performance deficits listed, and current level of function, pt identified as a HIGH complexity evaluation  Goals   Patient Goals to go home   LTG Time Frame 7-10   Long Term Goal #1 see below for list of goals   Plan   Treatment Interventions ADL retraining;Functional transfer training;UE strengthening/ROM; Endurance training;Patient/family training;Equipment evaluation/education; Neuromuscular reeducation; Fine motor coordination activities; Compensatory technique education;Continued evaluation; Activityengagement   Goal Expiration Date 03/06/22   OT Treatment Day 1   OT Frequency 3-5x/wk   Additional Treatment Session   Start Time 1255   End Time 1320   Treatment Assessment Pt seen at bedside for OT evaluation and tx session  Tx session consisted of education re: encouraged use of impaired LUE as well as transfer training  Noted pt quick to respond "I can't do that because of my hand" with regard to all ADL tasks  OT encouraged use of LUE- even as a "helper hand"  Furthermore, educated on AAROM activities with LUE and safety with ROM  Noted pt not locking rollator prior to transfers and therefore instruction re: rollatory safety provided  Toileting x2 during session  Initially, pt max (A) for toileting and required assistance with all phases however 2nd trial, pt able to pull down brief with min (A) of left side  Pt was able to pull up majority of brief eith the exception of left side due to LUE deficits  Pt continued to require full assistance with bowel hygiene   Pt would benefit from continued OT services to address deficits listed in evaluation and to maximize overall independence for safe d/c to home with supports  See evaluation for further details  Recommendation   OT Discharge Recommendation Post acute rehabilitation services  (versus HHOT pending progress and availablity of (A))   Additional Comments  Dual d/c plan 2' pt with steps to enter home  PT has yet to evaluate  Furthermore, pt requiring assistance with all ADLs at this time and would benefit from services  Pt reports has adequate assistance from family with ADLs should pt d/c to home      AM-PAC Daily Activity Inpatient   Lower Body Dressing 2   Bathing 2   Toileting 2   Upper Body Dressing 3   Grooming 3   Eating 4   Daily Activity Raw Score 16   Daily Activity Standardized Score (Calc for Raw Score >=11) 35 96   AM-PAC Applied Cognition Inpatient   Following a Speech/Presentation 3   Understanding Ordinary Conversation 4   Taking Medications 3   Remembering Where Things Are Placed or Put Away 3   Remembering List of 4-5 Errands 3   Taking Care of Complicated Tasks 3   Applied Cognition Raw Score 19   Applied Cognition Standardized Score 39 77   Barthel Index   Feeding 5   Bathing 0   Grooming Score 0   Dressing Score 5   Bladder Score 10   Bowels Score 10   Toilet Use Score 5   Transfers (Bed/Chair) Score 10   Mobility (Level Surface) Score 0   Stairs Score 0   Barthel Index Score 45   GOALS:    Pt will achieve the following within specified time frame:  *Perform ADL transfers with mod (I) for inc'd independence with ADLs/purposeful tasks    *Perform UB ADL mod (I) for inc'd independence with self cares    *Perform LB ADL mod (I) using AE prn for inc'd independence with self cares     *Increase static stand balance to F+ and dyn stand balance to F for inc'd safety with standing purposeful tasks    *Increase stand tolerance x5-7m for inc'd tolerance with standing purposeful tasks    *Perform clothing management/hygiene for toileting mod (I) for inc'd independence with self cares    *Participate in 10m UE therex to increase overall stamina/activity tolerance for purposeful tasks    *Participate in further cognitive testing to assist with safe d/c planning    *Perform sinkside G&H mod (I), with good safety and F/F+ balance for inc'd independence with self cares    To [place], OT

## 2022-02-24 NOTE — ASSESSMENT & PLAN NOTE
Present on admission, likely due to opiate use with possible GILSON  Was on BiPAP in the ED, with improvement in ABG  Will order outpatient sleep study  Resolved

## 2022-02-24 NOTE — PLAN OF CARE
Problem: MOBILITY - ADULT  Goal: Maintain or return to baseline ADL function  Description: INTERVENTIONS:  -  Assess patient's ability to carry out ADLs; assess patient's baseline for ADL function and identify physical deficits which impact ability to perform ADLs (bathing, care of mouth/teeth, toileting, grooming, dressing, etc )  - Assess/evaluate cause of self-care deficits   - Assess range of motion  - Assess patient's mobility; develop plan if impaired  - Assess patient's need for assistive devices and provide as appropriate  - Encourage maximum independence but intervene and supervise when necessary  - Involve family in performance of ADLs  - Assess for home care needs following discharge   - Consider OT consult to assist with ADL evaluation and planning for discharge  - Provide patient education as appropriate  Outcome: Progressing  Goal: Maintains/Returns to pre admission functional level  Description: INTERVENTIONS:  - Perform BMAT or MOVE assessment daily    - Set and communicate daily mobility goal to care team and patient/family/caregiver  - Collaborate with rehabilitation services on mobility goals if consulted  - Perform Range of Motion 3 times a day  - Reposition patient every 2 hours    - Dangle patient 3 times a day  - Stand patient 3 times a day  - Ambulate patient 3 times a day  - Out of bed to chair 3 times a day   - Out of bed for meals 3 times a day  - Out of bed for toileting  - Record patient progress and toleration of activity level   Outcome: Progressing     Problem: Prexisting or High Potential for Compromised Skin Integrity  Goal: Skin integrity is maintained or improved  Description: INTERVENTIONS:  - Identify patients at risk for skin breakdown  - Assess and monitor skin integrity  - Assess and monitor nutrition and hydration status  - Monitor labs   - Assess for incontinence   - Turn and reposition patient  - Assist with mobility/ambulation  - Relieve pressure over bony prominences  - Avoid friction and shearing  - Provide appropriate hygiene as needed including keeping skin clean and dry  - Evaluate need for skin moisturizer/barrier cream  - Collaborate with interdisciplinary team   - Patient/family teaching  - Consider wound care consult   Outcome: Progressing     Problem: Potential for Falls  Goal: Patient will remain free of falls  Description: INTERVENTIONS:  - Educate patient/family on patient safety including physical limitations  - Instruct patient to call for assistance with activity   - Consult OT/PT to assist with strengthening/mobility   - Keep Call bell within reach  - Keep bed low and locked with side rails adjusted as appropriate  - Keep care items and personal belongings within reach  - Initiate and maintain comfort rounds  - Make Fall Risk Sign visible to staff  - Offer Toileting every 3 Hours, in advance of need  - Initiate/Maintain bed alarm  - Apply yellow socks and bracelet for high fall risk patients  - Consider moving patient to room near nurses station  Outcome: Progressing

## 2022-02-25 PROBLEM — J96.02 ACUTE RESPIRATORY FAILURE WITH HYPERCAPNIA (HCC): Status: RESOLVED | Noted: 2022-01-04 | Resolved: 2022-02-25

## 2022-02-25 LAB
GLUCOSE SERPL-MCNC: 177 MG/DL (ref 65–140)
GLUCOSE SERPL-MCNC: 193 MG/DL (ref 65–140)
GLUCOSE SERPL-MCNC: 83 MG/DL (ref 65–140)
GLUCOSE SERPL-MCNC: 98 MG/DL (ref 65–140)

## 2022-02-25 PROCEDURE — 94760 N-INVAS EAR/PLS OXIMETRY 1: CPT

## 2022-02-25 PROCEDURE — 99233 SBSQ HOSP IP/OBS HIGH 50: CPT | Performed by: INTERNAL MEDICINE

## 2022-02-25 PROCEDURE — 97110 THERAPEUTIC EXERCISES: CPT

## 2022-02-25 PROCEDURE — 97163 PT EVAL HIGH COMPLEX 45 MIN: CPT

## 2022-02-25 PROCEDURE — 94660 CPAP INITIATION&MGMT: CPT

## 2022-02-25 PROCEDURE — 82948 REAGENT STRIP/BLOOD GLUCOSE: CPT

## 2022-02-25 PROCEDURE — 94640 AIRWAY INHALATION TREATMENT: CPT

## 2022-02-25 RX ORDER — OXYCODONE HYDROCHLORIDE 10 MG/1
30 TABLET ORAL EVERY 6 HOURS PRN
Status: DISCONTINUED | OUTPATIENT
Start: 2022-02-25 | End: 2022-02-26 | Stop reason: HOSPADM

## 2022-02-25 RX ORDER — SODIUM CHLORIDE FOR INHALATION 0.9 %
3 VIAL, NEBULIZER (ML) INHALATION
Status: DISCONTINUED | OUTPATIENT
Start: 2022-02-25 | End: 2022-02-26 | Stop reason: HOSPADM

## 2022-02-25 RX ADMIN — HEPARIN SODIUM 5000 UNITS: 5000 INJECTION INTRAVENOUS; SUBCUTANEOUS at 23:01

## 2022-02-25 RX ADMIN — AMLODIPINE BESYLATE 10 MG: 10 TABLET ORAL at 10:47

## 2022-02-25 RX ADMIN — ISODIUM CHLORIDE 3 ML: 0.03 SOLUTION RESPIRATORY (INHALATION) at 13:25

## 2022-02-25 RX ADMIN — Medication 1 PACKET: at 18:43

## 2022-02-25 RX ADMIN — OXYCODONE HYDROCHLORIDE 30 MG: 10 TABLET ORAL at 23:01

## 2022-02-25 RX ADMIN — LEVALBUTEROL HYDROCHLORIDE 1.25 MG: 1.25 SOLUTION, CONCENTRATE RESPIRATORY (INHALATION) at 19:29

## 2022-02-25 RX ADMIN — Medication 1 PACKET: at 10:50

## 2022-02-25 RX ADMIN — LISINOPRIL 10 MG: 10 TABLET ORAL at 10:46

## 2022-02-25 RX ADMIN — ISODIUM CHLORIDE 3 ML: 0.03 SOLUTION RESPIRATORY (INHALATION) at 19:29

## 2022-02-25 RX ADMIN — ISODIUM CHLORIDE 3 ML: 0.03 SOLUTION RESPIRATORY (INHALATION) at 08:10

## 2022-02-25 RX ADMIN — PRAVASTATIN SODIUM 20 MG: 20 TABLET ORAL at 10:47

## 2022-02-25 RX ADMIN — OXYBUTYNIN CHLORIDE 10 MG: 5 TABLET, EXTENDED RELEASE ORAL at 10:46

## 2022-02-25 RX ADMIN — OXYCODONE HYDROCHLORIDE 30 MG: 10 TABLET ORAL at 10:47

## 2022-02-25 RX ADMIN — INSULIN GLARGINE 15 UNITS: 100 INJECTION, SOLUTION SUBCUTANEOUS at 23:01

## 2022-02-25 RX ADMIN — INSULIN LISPRO 1 UNITS: 100 INJECTION, SOLUTION INTRAVENOUS; SUBCUTANEOUS at 18:42

## 2022-02-25 RX ADMIN — METOCLOPRAMIDE 10 MG: 10 TABLET ORAL at 10:46

## 2022-02-25 RX ADMIN — OXYCODONE HYDROCHLORIDE 30 MG: 10 TABLET ORAL at 16:27

## 2022-02-25 RX ADMIN — LEVALBUTEROL HYDROCHLORIDE 1.25 MG: 1.25 SOLUTION, CONCENTRATE RESPIRATORY (INHALATION) at 13:25

## 2022-02-25 RX ADMIN — HEPARIN SODIUM 5000 UNITS: 5000 INJECTION INTRAVENOUS; SUBCUTANEOUS at 16:27

## 2022-02-25 RX ADMIN — Medication 250 MG: at 18:43

## 2022-02-25 RX ADMIN — LEVALBUTEROL HYDROCHLORIDE 1.25 MG: 1.25 SOLUTION, CONCENTRATE RESPIRATORY (INHALATION) at 08:10

## 2022-02-25 RX ADMIN — HEPARIN SODIUM 5000 UNITS: 5000 INJECTION INTRAVENOUS; SUBCUTANEOUS at 06:07

## 2022-02-25 RX ADMIN — DULOXETINE HYDROCHLORIDE 60 MG: 60 CAPSULE, DELAYED RELEASE ORAL at 10:46

## 2022-02-25 RX ADMIN — Medication 250 MG: at 10:46

## 2022-02-25 NOTE — PROGRESS NOTES
[facility]  Progress Note - Denisha Patrick 1955, 73 y o  female MRN: [id number]  Unit/Bed#: S -01 Encounter: [id number]  Primary Care Provider: Adarsh Archer MD   Date and time admitted to hospital: 2/22/2022 11:03 PM    Myoclonic jerking  Assessment & Plan  Patient has H/o Tics  Jerks ?likely secondary to metabolic disorder-toxic metabolic(hypercarbia), ? Drug-induced (Has been taking Reglan for a long time), less likely seizure(no postictal state)  CT head normal no acute intracranial abnormality  Monitor vital signs and electrolytes  Improved with resolution of hypercarbia    Anemia  Assessment & Plan  · Ranging from 12-11  · POA-8 3  · Likely secondary to cirrhosis, anemia of chronic disease  · Iron panel done in January 2022, had ferritin level of 23  · Started on p o  ferrous sulfate    Toxic metabolic encephalopathy  Assessment & Plan  Likely due to hypercarbia retention and chronic opioid use  Improved with BiPAP and decreasing oxycodone dosing  Maintain delirium precautions    Cirrhosis ([facility] [address] )  Assessment & Plan  Secondary to alcoholic cirrhosis  Patient was evaluated by GI before  Does not follow-up as outpatient  Normal ammonia levels on presentation  Patient not taking lactulose due to chronic diarrhea at home  Alpha fetoprotein in January was normal  No HCC screening  Last ultrasound in Jan was limited  Elevated AST and alkaline phosphatase  Monitor vital signs, serial abdominal exams, orientation, CBC, CMP  Outpatient follow-up with GI    Chronic pain syndrome  Assessment & Plan  Patient takes oxycodone 30 mg q 6  Decrease to t i d  And respiratory status and lethargy have improved      No longer altered    Type 2 diabetes mellitus with hyperglycemia, with long-term current use of insulin [facility])  Assessment & Plan  Lab Results   Component Value Date    HGBA1C 7 5 (H) 01/04/2022     Continue Lantus 20 units at bedtime  SSI  Hypoglycemia protocol  Carb controlled diet    * Respiratory acidosis  Assessment & Plan  · Retaining CO2, in the setting of probable GILSON and chronic opioid use  · Tolerated BiPAP well and repeat ABG showed resolution of hypercarbia  · Asterixis likely secondary to hypercarbia  · Monitor VS  · Resolved    Acute respiratory failure with hypercapnia (HCC)-resolved as of 2022  Assessment & Plan  Present on admission, likely due to opiate use with possible GILSON  Was on BiPAP in the ED, with improvement in ABG  Will order outpatient sleep study  Resolved      VTE Pharmacologic Prophylaxis: VTE Score: 3 Moderate Risk (Score 3-4) - Pharmacological DVT Prophylaxis Ordered: heparin  Patient Centered Rounds: I performed bedside rounds with nursing staff today  Discussions with Specialists or Other Care Team Provider: CM    Education and Discussions with Family / Patient: Updated  () via phone  Time Spent for Care: 45 minutes  More than 50% of total time spent on counseling and coordination of care as described above  Current Length of Stay: 2 day(s)  Current Patient Status: Inpatient   Certification Statement: The patient will continue to require additional inpatient hospital stay due to pending placement  Discharge Plan: Anticipate discharge in 24-48 hrs to rehab facility  Code Status: Level 1 - Full Code    Subjective:   Patient states she feels much better today  Her BM's have slowed down and they are more firm  She denies CP, SOB, lightheadedness, N/V  Objective:     Vitals:   Temp (24hrs), Av 3 °F (36 8 °C), Min:98 2 °F (36 8 °C), Max:98 3 °F (36 8 °C)    Temp:  [98 2 °F (36 8 °C)-98 3 °F (36 8 °C)] 98 2 °F (36 8 °C)  HR:  [] 93  Resp:  [19] 19  SpO2:  [92 %-97 %] 97 %  Body mass index is 33 87 kg/m²  Input and Output Summary (last 24 hours):      Intake/Output Summary (Last 24 hours) at 2022 1401  Last data filed at 2022  Gross per 24 hour   Intake --   Output 2 ml   Net -2 ml Physical Exam:   Physical Exam  Vitals and nursing note reviewed  Constitutional:       Appearance: She is obese  Comments: Chronically ill-appearing   HENT:      Head: Normocephalic and atraumatic  Right Ear: External ear normal       Left Ear: External ear normal       Nose: Nose normal       Mouth/Throat:      Mouth: Mucous membranes are moist       Pharynx: Oropharynx is clear  Eyes:      Conjunctiva/sclera: Conjunctivae normal       Pupils: Pupils are equal, round, and reactive to light  Cardiovascular:      Rate and Rhythm: Normal rate and regular rhythm  Pulses: Normal pulses  Heart sounds: Normal heart sounds  Pulmonary:      Effort: Pulmonary effort is normal       Breath sounds: Normal breath sounds  Abdominal:      General: Abdomen is flat  Bowel sounds are normal       Palpations: Abdomen is soft  Musculoskeletal:         General: No swelling or tenderness  Cervical back: Neck supple  No muscular tenderness  Skin:     General: Skin is warm and dry  Capillary Refill: Capillary refill takes less than 2 seconds  Neurological:      General: No focal deficit present  Mental Status: She is alert and oriented to person, place, and time  Mental status is at baseline  Psychiatric:         Mood and Affect: Mood normal          Behavior: Behavior normal          Thought Content:  Thought content normal          Judgment: Judgment normal           Additional Data:     Labs:  Results from last 7 days   Lab Units 02/23/22  0747 02/23/22  0421 02/22/22  2354 02/22/22  2354   WBC Thousand/uL  --  5 23   < > 4 85   HEMOGLOBIN g/dL  --  9 2*   < > 8 3*   I STAT HEMOGLOBIN g/dl 14 6  --   --   --    HEMATOCRIT %  --  31 3*   < > 27 9*   HEMATOCRIT, ISTAT % 43  --   --   --    PLATELETS Thousands/uL  --  143*   < > 142*   BANDS PCT %  --   --   --  3   NEUTROS PCT %  --  67  --   --    LYMPHS PCT %  --  22  --   --    LYMPHO PCT %  --   --   --  22   MONOS PCT %  -- 10  --   --    MONO PCT %  --   --   --  4   EOS PCT %  --  1  --  1    < > = values in this interval not displayed  Results from last 7 days   Lab Units 02/23/22  0747 02/23/22  0421   SODIUM mmol/L  --  133*   POTASSIUM mmol/L  --  4 8   CHLORIDE mmol/L  --  101   CO2 mmol/L  --  26   CO2, I-STAT mmol/L 28  --    BUN mg/dL  --  20   CREATININE mg/dL  --  1 51*   ANION GAP mmol/L  --  6   CALCIUM mg/dL  --  8 0*   ALBUMIN g/dL  --  2 5*   TOTAL BILIRUBIN mg/dL  --  0 42   ALK PHOS U/L  --  197*   ALT U/L  --  42   AST U/L  --  75*   GLUCOSE RANDOM mg/dL  --  136     Results from last 7 days   Lab Units 02/23/22  0420   INR  1 13     Results from last 7 days   Lab Units 02/25/22  1104 02/25/22  0821 02/24/22  2053 02/24/22  1201 02/24/22  0637 02/23/22  2206 02/23/22  1516 02/23/22  1155 02/23/22  0627   POC GLUCOSE mg/dl 193* 98 127 137 77 143* 212* 157* 170*         Results from last 7 days   Lab Units 02/23/22  0421 02/23/22  0127   LACTIC ACID mmol/L 0 9  --    PROCALCITONIN ng/ml 0 56* 0 68*       Lines/Drains:  Invasive Devices  Report    Peripheral Intravenous Line            Peripheral IV 02/22/22 Left Forearm 2 days    Peripheral IV 02/25/22 Distal;Dorsal (posterior); Right Wrist <1 day                      Imaging: No pertinent imaging reviewed  Recent Cultures (last 7 days):   Results from last 7 days   Lab Units 02/23/22  0141   BLOOD CULTURE  No Growth at 48 hrs  No Growth at 48 hrs         Last 24 Hours Medication List:   Current Facility-Administered Medications   Medication Dose Route Frequency Provider Last Rate    acetaminophen  325 mg Oral Q8H PRN Nadia Rosales MD      amLODIPine  10 mg Oral Daily Nadia Rosales MD      docusate sodium  100 mg Oral BID PRN Nadia Rosales MD      DULoxetine  60 mg Oral Daily Nadia Rosales MD      heparin (porcine)  5,000 Units Subcutaneous Q8H [address] Nadia Rosales MD      insulin glargine  20 Units Subcutaneous HS MD Avani Fitch insulin lispro  1-5 Units Subcutaneous TID AC Srinivasa Kaye MD      levalbuterol  1 25 mg Nebulization TID Srinivasa Kaye MD      lisinopril  10 mg Oral Daily Srinivasa Kaye MD      metoclopramide  10 mg Oral Daily Srinivasa Kaye MD      ondansetron  4 mg Intravenous Q6H PRN Srinivasa Kaye MD      oxybutynin  10 mg Oral Daily Srinivasa Kaye MD      oxyCODONE  30 mg Oral TID Srinivasa Kaye MD      pravastatin  20 mg Oral Daily Srinivasa Kaye MD      psyllium  1 packet Oral BID Veronica Mcdonnell MD      saccharomyces boulardii  250 mg Oral BID Veronica Mcdonnell MD      sodium chloride  3 mL Nebulization TID Srinivasa Kaye MD       Facility-Administered Medications Ordered in Other Encounters   Medication Dose Route Frequency Provider Last Rate    ferrous sulfate  325 mg Oral Daily With Breakfast Rod Ojeda MD          Today, Patient Was Seen By: Veronica Mcdonnell MD    **Please Note: This note may have been constructed using a voice recognition system  **

## 2022-02-25 NOTE — CASE MANAGEMENT
Case Management Discharge Planning Note    Patient name Sam Heller  Location S /S -01 MRN [id number]  : 1955 Date 2022       Current Admission Date: 2022  Current Admission Diagnosis:Respiratory acidosis   Patient Active Problem List    Diagnosis Date Noted    Myoclonic jerking 2022    Hyponatremia 2022    Respiratory acidosis 2022    History of recent fall 2022    Left arm weakness 2022    Anemia 2022    Acute respiratory failure with hypoxia and hypercapnia ([address] ) 2022    Acute kidney injury superimposed on CKD ([facility] ) 2022    Pneumonia 2022    Sepsis ([facility] ) 2022    Pressure injury of skin of buttock 2022    Toxic metabolic encephalopathy     Fecal occult blood test positive 2022    COVID-19 2022    Elevated procalcitonin 2022    Urge incontinence 2021    Ex-smoker 2021    Syncope 07/15/2020    Cirrhosis ([address] ) 07/15/2020    Splenomegaly 07/15/2020    CAD (coronary artery disease) 07/15/2020    Other microscopic hematuria 2020    Spondylosis of lumbar joint 05/15/2020    Marijuana use 2020    Left hip pain 2020    Chronic pain syndrome 2018    Peripheral neuropathy 2018    Type 2 diabetes mellitus with hyperglycemia, with long-term current use of insulin ([facility] ) 2018    Chronic venous stasis dermatitis of both lower extremities 2017    Venous insufficiency (chronic) (peripheral) 2017    Essential hypertension       LOS (days): 2  Geometric Mean LOS (GMLOS) (days): 3 60  Days to GMLOS:1     OBJECTIVE:  Risk of Unplanned Readmission Score: 31         Current admission status: Inpatient   Preferred Pharmacy:   [address] [place] [address], [address]  [address] [facility] [address]  Phone: [phone] Fax: [phone]    Primary Care Provider: Joann Gutierrez MD    Primary Insurance: MEDICARE  Secondary Insurance: AETNA    DISCHARGE DETAILS:    Discharge planning discussed with[de-identified] patient's spouse, Marv Espinosa, by phone  Freedom of Choice: Yes  Comments - Freedom of Choice:  agreeable for rehab referrals to be sent; requesting to speak with patient and this writer when he comes to the hospital - declined offer to email listing  CM contacted family/caregiver?: Yes (spoke with spouse by phone)  Were Treatment Team discharge recommendations reviewed with patient/caregiver?: Yes  Did patient/caregiver verbalize understanding of patient care needs?: Yes  Were patient/caregiver advised of the risks associated with not following Treatment Team discharge recommendations?: Yes    Contacts  Patient Contacts: spouse, Marv Espinosa  Relationship to Patient[de-identified] Family  Contact Method: Phone  Reason/Outcome: Continuity of [facility] [address]         Is the patient interested in [facility] at discharge?: Yes  [address] requested[de-identified] [address] Name[de-identified] Other ([address])  [facility] Provider[de-identified] PCP  Home Health Services Needed[de-identified] Evaluate Functional Status and Safety,Gait/ADL Training,Strengthening/Theraputic Exercises to Improve Function  Homebound Criteria Met[de-identified] Requires the Assistance of Another Person for Safe Ambulation or to Leave the Home,Uses an Assist Device (i e  cane, walker, etc)  Supporting Clincal Findings[de-identified] Fatigues Easliy in Short Distances,Limited Endurance,Cognitive Deficit Requiring the Assistance of Others         Other Referral/Resources/Interventions Provided:  Interventions: SNF,Short Term Rehab,[facility]  Referral Comments: Call made to spouse to follow-up on conversation re: rehab  Spouse aware that this writer spoke with patient yesterday and she had refused rehab   Spouse reports that she's been in the hospital 3 times recently and is aware that recommendation has been rehab at this time  Patient did have home care arranged with [facility] after d/c on 2/17, but services had not been started  Spouse believes that rehab may be a good idea, but he would like to speak with patient and this writer when he gets to the hospital  Spouse agreeable for referrals to be sent for STR in anticipation of need  Aware that otherwise patient is stable for d/c, so requesting referral be sent to [facility] as well  Declined offer for listing to be emailed; aware this writer to print out STR listing to provide when he gets to the hospital  Confirmed that patient does live at home with family and usually ambulates with her rollator; sleeps on a recliner at night  Spouse to call this writer when he arrives and will follow-up re: rehab options at that time      Would you like to participate in our [address] service program?  : No - Declined    Treatment Team Recommendation: SNF,Short Term Rehab

## 2022-02-25 NOTE — PLAN OF CARE
Problem: PHYSICAL THERAPY ADULT  Goal: Performs mobility at highest level of function for planned discharge setting  See evaluation for individualized goals  Description: Treatment/Interventions: LE strengthening/ROM,Therapeutic exercise,Endurance training,Cognitive reorientation,Patient/family training,Bed mobility (PT to see when transfer training is appropriate)          See flowsheet documentation for full assessment, interventions and recommendations  Note: Prognosis: Fair  Problem List: Decreased strength,Decreased range of motion,Decreased endurance,Impaired balance,Decreased mobility,Decreased safety awareness,Impaired tone,Pain,Decreased skin integrity (LE edema)  Assessment: Pt presents with jerking/tremors  Dx: hyponatremia, myoclonic jerking, anemia, elevated procalcitonin, acute hypercapnic respiratory failure, toxic metabolic encephalopathy, cirrhosis, chronic pain syndrome, respiratory acidosis, COVID, and DM  order placed for PT eval and tx  pt presents w/ comorbidities of cirrhosis, DM, cellulitis, rib fxs, DDD, and chronic pain syndrome and personal factors of living in 2 story house, mobilizing w/ assistive device, stair(s) to enter home and positive fall history  pt presents w/ pain, weakness, decreased ROM, decreased endurance, impaired balance, altered sensation, impaired tone, decreased safety awareness, fall risk and impaired skin integrity  these impairments are evident in findings from physical examination (weakness, decreased ROM, altered sensation, impaired skin integrity and impaired tone), mobility assessment (need for min to max assist w/ bed mobility, inability to sit edge of bed or mobilize to a chair, need for input for mobility technique), and Barthel Index: 40/100  pt needed input for mobility technique  pt is at risk for falls due to physical and safety awareness deficits   pt's clinical presentation is unstable/unpredictable (evident in poor blood sugar control, tachycardia, need for assist w/ bed mobility and inability to mobilize out of bed when usually mobilizing independently, pain impacting overall mobility status and need for input for task focus and mobility technique)  pt needs inpatient PT tx to improve mobility deficits and progress mobility training as appropriate  discharge recommendation is for inpatient rehab to reduce fall risk and maximize level of functional independence  PT Discharge Recommendation: Post acute rehabilitation services          See flowsheet documentation for full assessment

## 2022-02-25 NOTE — CASE MANAGEMENT
Case Management Discharge Planning Note    Patient name Michelle Nickerson  Location S /S -01 MRN [id number]  : 1955 Date 2022       Current Admission Date: 2022  Current Admission Diagnosis:Respiratory acidosis   Patient Active Problem List    Diagnosis Date Noted    Myoclonic jerking 2022    Hyponatremia 2022    Respiratory acidosis 2022    History of recent fall 2022    Left arm weakness 2022    Anemia 2022    Acute respiratory failure with hypoxia and hypercapnia ([facility] ) 2022    Acute kidney injury superimposed on CKD ([facility] ) 2022    Pneumonia 2022    Sepsis ([facility] ) 2022    Pressure injury of skin of buttock 2022    Toxic metabolic encephalopathy     Fecal occult blood test positive 2022    COVID-19 2022    Elevated procalcitonin 2022    Urge incontinence 2021    Ex-smoker 2021    Syncope 07/15/2020    Cirrhosis ([address] ) 07/15/2020    Splenomegaly 07/15/2020    CAD (coronary artery disease) 07/15/2020    Other microscopic hematuria 2020    Spondylosis of lumbar joint 05/15/2020    Marijuana use 2020    Left hip pain 2020    Chronic pain syndrome 2018    Peripheral neuropathy 2018    Type 2 diabetes mellitus with hyperglycemia, with long-term current use of insulin ([facility] ) 2018    Chronic venous stasis dermatitis of both lower extremities 2017    Venous insufficiency (chronic) (peripheral) 2017    Essential hypertension       LOS (days): 2  Geometric Mean LOS (GMLOS) (days): 3 60  Days to GMLOS:1     OBJECTIVE:  Risk of Unplanned Readmission Score: 31         Current admission status: Inpatient   Preferred Pharmacy:   [address] [place] [address], [address]  [address] [facility] [address]  Phone: [phone] Fax: [phone]    Primary Care Provider: James Celestin MD    Primary Insurance: MEDICARE  Secondary Insurance: AETNA    DISCHARGE DETAILS:    Discharge planning discussed with[de-identified] patient's spouse, Curvin Popper and patient at bedside  Freedom of Choice: Yes  Comments - Freedom of Choice: requesting [facility] as first choice; aware this writer to follow-up with bed offers received  Printed listing provided  CM contacted family/caregiver?: Yes  Were Treatment Team discharge recommendations reviewed with patient/caregiver?: Yes  Did patient/caregiver verbalize understanding of patient care needs?: Yes  Were patient/caregiver advised of the risks associated with not following Treatment Team discharge recommendations?: Yes    Contacts  Patient Contacts: spouse, Johnathon  Relationship to Patient[de-identified] Family  Contact Method: In Person  Reason/Outcome: Continuity of [address]         Is the patient interested in [facility] at discharge?: Yes  [address] requested[de-identified] [address] Name[de-identified] Other ([address])  [address] [facility] Provider[de-identified] PCP  Home Health Services Needed[de-identified] Evaluate Functional Status and Safety,Gait/ADL Training,Strengthening/Theraputic Exercises to Improve Function  Homebound Criteria Met[de-identified] Requires the Assistance of Another Person for Safe Ambulation or to Leave the Home,Uses an Assist Device (i e  cane, walker, etc)  Supporting Clincal Findings[de-identified] Fatigues Easliy in Short Distances,Limited Endurance,Cognitive Deficit Requiring the Assistance of Others         Other Referral/Resources/Interventions Provided:  Interventions: HHC,Short Term Rehab,SNF  Referral Comments: Confirmation received from [facility] that they are able to see patient at discharge; aware that patient/family considering rehab  Patient's spouse at bedside - this writer to room to discuss rehab options with patient and spouse   Spouse upset re: patient's bedsheets not being cleaned - requesting same be upset  Also begins to relay complaints about her pain medication being adjusted (normally on 6 times/day - now on 3), concerned about care received at a facility vs at home, wanting clarification on BiPap need  This writer relayed medical questions should be directed to [facility], and relayed that BiPap set-up would require out-patient follow-up for a sleep study, etc  Discussed options for d/c - home with home care vs rehab; listing of rehab facilities provided and ultimately spouse requesting to follow-up re: [facility]  Charge nurse and  informed of complaints re: medication and dirty bed sheets and MD notified of questions posed re: pain meds and BiPap need  Call to [facility]; awaiting return call  Patient does have a bed offer from [facility] [address]      Would you like to participate in our [address] service program?  : No - Declined    Treatment Team Recommendation: Short Term Rehab,SNF

## 2022-02-25 NOTE — CASE MANAGEMENT
Case Management Discharge Planning Note    Patient name Clifford Duncan  Location S /S -01 MRN [id number]  : 1955 Date 2022       Current Admission Date: 2022  Current Admission Diagnosis:Respiratory acidosis   Patient Active Problem List    Diagnosis Date Noted    Myoclonic jerking 2022    Hyponatremia 2022    Respiratory acidosis 2022    History of recent fall 2022    Left arm weakness 2022    Anemia 2022    Acute respiratory failure with hypoxia and hypercapnia ([facility] ) 2022    Acute kidney injury superimposed on CKD ([facility] ) 2022    Pneumonia 2022    Sepsis ([facility] ) 2022    Pressure injury of skin of buttock 2022    Toxic metabolic encephalopathy     Fecal occult blood test positive 2022    COVID-19 2022    Elevated procalcitonin 2022    Urge incontinence 2021    Ex-smoker 2021    Syncope 07/15/2020    Cirrhosis ([facility] ) 07/15/2020    Splenomegaly 07/15/2020    CAD (coronary artery disease) 07/15/2020    Other microscopic hematuria 2020    Spondylosis of lumbar joint 05/15/2020    Marijuana use 2020    Left hip pain 2020    Chronic pain syndrome 2018    Peripheral neuropathy 2018    Type 2 diabetes mellitus with hyperglycemia, with long-term [facility] use of [facility]    Chronic venous stasis dermatitis of both lower extremities 2017    Venous insufficiency (chronic) (peripheral) 2017    Essential hypertension       LOS (days): 2  Geometric Mean LOS (GMLOS) (days): 3 60  Days to GMLOS:1     OBJECTIVE:  Risk of Unplanned Readmission Score: 31         Current admission status: Inpatient   Preferred Pharmacy:   [address] [place] [address], [address]  [address] [facility] [address]  Phone: [phone] Fax: [phone]    Primary Care Provider: Jonathan Jaffe MD    Primary Insurance: MEDICARE  Secondary Insurance: AETNA    DISCHARGE DETAILS:    Discharge planning discussed with[de-identified] spoke with patient at bedside; spouse by phone  Freedom of Choice: Yes  Comments - Freedom of Choice: accepting bed offer at [facility] for tomorrow (earliest)  CM contacted family/caregiver?: Yes  Were Treatment Team discharge recommendations reviewed with patient/caregiver?: Yes  Did patient/caregiver verbalize understanding of patient care needs?: Yes  Were patient/caregiver advised of the risks associated with not following Treatment Team discharge recommendations?: Yes    Contacts  Patient Contacts: spouse, Johnathon  Relationship to Patient[de-identified] Family  Contact Method: In Person  Reason/Outcome: Continuity of Care,Emergency Contact,Referral,Discharge Planning              Other Referral/Resources/Interventions Provided:  Interventions: SNF,Short Term Rehab  Referral Comments: Spoke with Sanjana at [facility] - able to offer a bed for this weekend, Saturday vs Sunday, but aiming for Saturday admission  Spoke with patient about same and spouse (by phone in patient's room)  Both in agreement with bed offer and transfer to [facility] once bed is available - aware of anticipated d/c for tomorrow  IMM reviewed with patient and spouse; patient signed and copy was provided  BiPap order forwarded to [facility] for their records; TT with RT who relayed patient will need a medium mask; [place] to order same in preparation for patient's admission  Weekend CM will need to follow-up re: bed availability      Would you like to participate in our [address] service program?  : No - Declined    Treatment Team Recommendation: Short Term Rehab,SNF  Discharge Destination Plan[de-identified] SNF,Short Term Rehab [facility])  Transport at Discharge : [address] [facility]                             IMM Given (Date):: 02/25/22  IMM Given to[de-identified] Patient  Family notified[de-identified] spouse, Ata Griffin

## 2022-02-25 NOTE — PHYSICAL THERAPY NOTE
PHYSICAL THERAPY EVALUATION NOTE    Patient Name: Radha Morales  JJRSC'F Date: 2/25/2022  AGE:   73 y o  Mrn:   [id number]  ADMIT DX:  Diarrhea [R19 7]  Pneumonia [J18 9]  Myoclonic jerking [G25 3]  Hypoxia [R09 02]  YVONNE (acute kidney injury) ([facility] ) [N17 9]  Hepatic cirrhosis, unspecified hepatic cirrhosis type, unspecified whether ascites present ([email or web address] ) [K74 60]    Past Medical History:   Diagnosis Date    Abnormal head CT 7/14/2017    Acute kidney injury ([facility] ) 8/19/2018    Cellulitis 1/10/2017    Closed fracture of multiple ribs of right side 7/14/2017    Degenerative disc disease at L5-S1 level     Diabetes mellitus (HCC)     History of methicillin resistant staphylococcus aureus (MRSA) 08/21/2018    negative nasal culture- isolation and hx of mrsa removed 8/20/2018    Hyperkalemia 4/25/2018    Hypertension     Hypocalcemia 4/25/2018    Hyponatremia 7/14/2017     Length Of Stay: 2  PHYSICAL THERAPY EVALUATION :   02/25/22 0747   PT Last Visit   PT Visit Date 02/25/22   Pain Assessment   Pain Assessment Tool 0-10   Pain Score 5   Pain Location/Orientation Location: Abdomen   Restrictions/Precautions   Other Precautions Bed Alarm; Chair Alarm; Fall Risk;Pain   Home Living   Type of [address] [place] [address] Two level; Able to live on main level with bedroom/bathroom; Other (Comment)  (3+5 CATRACHO)   Additional Comments lives w/ spouse and family  ambulates w/ rollator  owns wheelchair, bedside commode, and shower chair  independent w/ ADLs  family drives  1 fall in last 6 months  General   Additional Pertinent History 2/25/22 at 00:57, heart rate was 111 BPM  2/23/22 at 15:16, glucose was 212  Family/Caregiver Present No   Cognition   Arousal/Participation Cooperative   Attention Attends with cues to redirect   Orientation Level Oriented to person; Other (Comment)  (pt was identified w/ full name, birth date)   Following Commands Follows one step commands with increased time or repetition   Comments room air resting pulse ox 96% and 83 BPM    Subjective   Subjective pt seen supine in bed  pt agreed to PT eval  reports feeling tired and having abdominal pain  RUE Assessment   RUE Assessment WFL  (3+/5, shoulder 3/5)   LUE Assessment   LUE Assessment X  (3-/5, shoulder 2/5)   RLE Assessment   RLE Assessment WFL  (3/5, hip flexion 3-/5)   LLE Assessment   LLE Assessment X  (3-/5)   Vestibular   Vestibular Comments 2+ edema LEs   Coordination   Movements are Fluid and Coordinated 0   Coordination and Movement Description intermittent jerking all extremities  Sensation X  (light touch impaired left hand)   Bed Mobility   Rolling R 4  Minimal assistance   Additional items Assist x 1;Bedrails; Increased time required;Verbal cues;LE management  (for bedrail use, LE positioning)   Rolling L 4  Minimal assistance   Additional items Assist x 1;Bedrails; Increased time required;Verbal cues;LE management  (for bedrail use, LE positioning)   Supine to Sit 2  Maximal assistance  (supine to long sitting)   Additional items Assist x 1;HOB elevated; Bedrails; Increased time required;Verbal cues  (for bedrail use, breathing technique)   Sit to Supine 2  Maximal assistance  (long sitting to supine)   Additional items Assist x 1;HOB elevated; Bedrails; Increased time required;Verbal cues  (for bedrail use, breathing technique)   Additional Comments Therapist elevated head of bed to highest position  Pt grasped lower bed rails w/ UEs  pt mobilized upper body into long sitting position  pt tolerated 5 seconds and 10 seconds x2 w/ minx1  rest breaks x 3 to 4 minutes each were needed  additional long sitting not possible due to fatigue and pain  Pt was unable to mobilize to edge of bed  Pt requires dependent means for out of bed mobilization     Transfers   Sit to Stand Unable to assess   Balance   Static Sitting Poor   Static Standing Zero   Ambulatory Zero Activity Tolerance   Activity Tolerance Patient limited by fatigue;Patient limited by pain   Nurse Made Aware spoke to Johnathon Alarcon  Left note for Kindra Juarez  Assessment   Prognosis Fair   Problem List Decreased strength;Decreased range of motion;Decreased endurance; Impaired balance;Decreased mobility; Decreased safety awareness; Impaired tone;Pain;Decreased skin integrity  (LE edema)   Assessment Pt presents with jerking/tremors  Dx: hyponatremia, myoclonic jerking, anemia, elevated procalcitonin, acute hypercapnic respiratory failure, toxic metabolic encephalopathy, cirrhosis, chronic pain syndrome, respiratory acidosis, COVID, and DM  order placed for PT eval and tx  pt presents w/ comorbidities of cirrhosis, DM, cellulitis, rib fxs, DDD, and chronic pain syndrome and personal factors of living in 2 story house, mobilizing w/ assistive device, stair(s) to enter home and positive fall history  pt presents w/ pain, weakness, decreased ROM, decreased endurance, impaired balance, altered sensation, impaired tone, decreased safety awareness, fall risk and impaired skin integrity  these impairments are evident in findings from physical examination (weakness, decreased ROM, altered sensation, impaired skin integrity and impaired tone), mobility assessment (need for min to max assist w/ bed mobility, inability to sit edge of bed or mobilize to a chair, need for input for mobility technique), and Barthel Index: 40/100  pt needed input for mobility technique  pt is at risk for falls due to physical and safety awareness deficits  pt's clinical presentation is unstable/unpredictable (evident in poor blood sugar control, tachycardia, need for assist w/ bed mobility and inability to mobilize out of bed when usually mobilizing independently, pain impacting overall mobility status and need for input for task focus and mobility technique)   pt needs inpatient PT tx to improve mobility deficits and progress mobility training as appropriate  discharge recommendation is for inpatient rehab to reduce fall risk and maximize level of functional independence  Goals   Patient Goals go home   STG Expiration Date 03/07/22   Short Term Goal #1 pt will: Increase bilateral LE strength 1 grade to facilitate independent mobility, Perform rolling and repositioning in bed modified independent to decrease caregiver burden, Perform supine <---> long sitting/sitting edge of bed transition w/ minx1 to improve independence, Increase static sitting balance 1 grade to decrease risk for falls, Complete exercise program w/ less than 25% input from therapist to increase strength and endurance, Tolerate seated at EOB 20 minutes modified independent to facilitate functional task performance and Improve Barthel Index score to 65 or greater to facilitate independence  PT to see when transfer training is appropriate  PT Treatment Day 1   Plan   Treatment/Interventions LE strengthening/ROM; Therapeutic exercise; Endurance training;Cognitive reorientation;Patient/family training;Bed mobility  (PT to see when transfer training is appropriate)   PT Frequency 3-5x/wk   Recommendation   PT Discharge Recommendation Post acute rehabilitation services   AM-PAC Basic Mobility Inpatient   Turning in Bed Without Bedrails 3   Lying on Back to Sitting on Edge of Flat Bed 1   Moving Bed to Chair 1   Standing Up From Chair 1   Walk in Room 1   Climb 3-5 Stairs 1   Basic Mobility Inpatient Raw Score 8   Turning Head Towards Sound 4   Follow Simple Instructions 3   Low Function Basic Mobility Raw Score 15   Low Function Basic Mobility Standardized Score 23 9   Highest Level Of Mobility   JH-HLM Goal 3: Sit at edge of bed   JH-HLM Highest Level of Mobility 2: Bed activities/Dependent transfer   JH-HLM Goal Achieved No   Barthel Index   Feeding 10   Bathing 0   Grooming Score 5   Dressing Score 5   Bladder Score 10   Bowels Score 10   Toilet Use Score 0   Transfers (Bed/Chair) Score 0 Mobility (Level Surface) Score 0   Stairs Score 0   Barthel Index Score 40   Additional Treatment Session   Start Time 0747   End Time 2316   Treatment Assessment therapist introduced LE exercise program participation to pt to address strength and endurance deficits noted during evaluation  ankle pumps 15  quad sets and heel slides 10 each  pt needed short rest breaks w/ activity  input was needed for pt to complete all exercise reps and to maintain appropriate technique  exercise handout was provided to facilitate understanding of technique and independence w/ completing exercises  continued inpatient PT is needed to reduce fall risk and progress PT intervention as appropriate  Additional Treatment Day 1   End of Consult   Patient Position at End of Consult Supine;Bed/Chair alarm activated; All needs within reach     The patient's AM-PAC Basic Mobility Inpatient Short Form Raw Score is 8  A Raw score of less than or equal to 16 suggests the patient may benefit from discharge to post-acute rehabilitation services  Please also refer to the recommendation of the Physical Therapist for safe discharge planning  Skilled PT recommended while in hospital and upon DC to progress pt toward treatment goals       Nadia Mcneill, PT

## 2022-02-25 NOTE — PLAN OF CARE
Problem: MOBILITY - ADULT  Goal: Maintain or return to baseline ADL function  Description: INTERVENTIONS:  -  Assess patient's ability to carry out ADLs; assess patient's baseline for ADL function and identify physical deficits which impact ability to perform ADLs (bathing, care of mouth/teeth, toileting, grooming, dressing, etc )  - Assess/evaluate cause of self-care deficits   - Assess range of motion  - Assess patient's mobility; develop plan if impaired  - Assess patient's need for assistive devices and provide as appropriate  - Encourage maximum independence but intervene and supervise when necessary  - Involve family in performance of ADLs  - Assess for home care needs following discharge   - Consider OT consult to assist with ADL evaluation and planning for discharge  - Provide patient education as appropriate  Outcome: Progressing  Goal: Maintains/Returns to pre admission functional level  Description: INTERVENTIONS:  - Perform BMAT or MOVE assessment daily    - Set and communicate daily mobility goal to care team and patient/family/caregiver  - Collaborate with rehabilitation services on mobility goals if consulted  - Perform Range of Motion  times a day  - Reposition patient every  hours    - Dangle patient  times a day  - Stand patient  times a day  - Ambulate patient  times a day  - Out of bed to chair  times a day   - Out of bed for meal times a day  - Out of bed for toileting  - Record patient progress and toleration of activity level   Outcome: Progressing     Problem: Prexisting or High Potential for Compromised Skin Integrity  Goal: Skin integrity is maintained or improved  Description: INTERVENTIONS:  - Identify patients at risk for skin breakdown  - Assess and monitor skin integrity  - Assess and monitor nutrition and hydration status  - Monitor labs   - Assess for incontinence   - Turn and reposition patient  - Assist with mobility/ambulation  - Relieve pressure over bony prominences  - Avoid friction and shearing  - Provide appropriate hygiene as needed including keeping skin clean and dry  - Evaluate need for skin moisturizer/barrier cream  - Collaborate with interdisciplinary team   - Patient/family teaching  - Consider wound care consult   Outcome: Progressing     Problem: Potential for Falls  Goal: Patient will remain free of falls  Description: INTERVENTIONS:  - Educate patient/family on patient safety including physical limitations  - Instruct patient to call for assistance with activity   - Consult OT/PT to assist with strengthening/mobility   - Keep Call bell within reach  - Keep bed low and locked with side rails adjusted as appropriate  - Keep care items and personal belongings within reach  - Initiate and maintain comfort rounds  - Make Fall Risk Sign visible to staff  - Offer Toileting every  Hours, in advance of need  - Initiate/Maintain alarm  - Obtain necessary fall risk management equipment:   - Apply yellow socks and bracelet for high fall risk patients  - Consider moving patient to room near nurses station  Outcome: Progressing

## 2022-02-25 NOTE — ASSESSMENT & PLAN NOTE
Lab Results   Component Value Date    HGBA1C 7 5 (H) 01/04/2022     Continue Lantus 20 units at bedtime  SSI  Hypoglycemia protocol  Carb controlled diet

## 2022-02-26 VITALS
BODY MASS INDEX: 34.08 KG/M2 | HEART RATE: 51 BPM | SYSTOLIC BLOOD PRESSURE: 170 MMHG | HEIGHT: 62 IN | OXYGEN SATURATION: 92 % | TEMPERATURE: 97.2 F | RESPIRATION RATE: 18 BRPM | DIASTOLIC BLOOD PRESSURE: 71 MMHG | WEIGHT: 185.19 LBS

## 2022-02-26 PROBLEM — E87.2 RESPIRATORY ACIDOSIS: Status: RESOLVED | Noted: 2022-02-23 | Resolved: 2022-02-26

## 2022-02-26 PROBLEM — R79.89 ELEVATED PROCALCITONIN: Status: RESOLVED | Noted: 2022-01-04 | Resolved: 2022-02-26

## 2022-02-26 PROBLEM — G92.8 TOXIC METABOLIC ENCEPHALOPATHY: Status: RESOLVED | Noted: 2022-01-04 | Resolved: 2022-02-26

## 2022-02-26 PROBLEM — E87.29 RESPIRATORY ACIDOSIS: Status: RESOLVED | Noted: 2022-02-23 | Resolved: 2022-02-26

## 2022-02-26 PROBLEM — G25.3 MYOCLONIC JERKING: Status: RESOLVED | Noted: 2022-02-23 | Resolved: 2022-02-26

## 2022-02-26 PROBLEM — E87.1 HYPONATREMIA: Status: RESOLVED | Noted: 2022-02-23 | Resolved: 2022-02-26

## 2022-02-26 LAB
GLUCOSE SERPL-MCNC: 114 MG/DL (ref 65–140)
GLUCOSE SERPL-MCNC: 123 MG/DL (ref 65–140)
GLUCOSE SERPL-MCNC: 63 MG/DL (ref 65–140)
GLUCOSE SERPL-MCNC: 97 MG/DL (ref 65–140)

## 2022-02-26 PROCEDURE — 94760 N-INVAS EAR/PLS OXIMETRY 1: CPT

## 2022-02-26 PROCEDURE — 94640 AIRWAY INHALATION TREATMENT: CPT

## 2022-02-26 PROCEDURE — 82948 REAGENT STRIP/BLOOD GLUCOSE: CPT

## 2022-02-26 PROCEDURE — 99239 HOSP IP/OBS DSCHRG MGMT >30: CPT | Performed by: INTERNAL MEDICINE

## 2022-02-26 PROCEDURE — 94660 CPAP INITIATION&MGMT: CPT

## 2022-02-26 RX ORDER — SODIUM CHLORIDE FOR INHALATION 0.9 %
3 VIAL, NEBULIZER (ML) INHALATION
Refills: 0
Start: 2022-02-26 | End: 2022-03-19

## 2022-02-26 RX ORDER — LEVALBUTEROL 1.25 MG/.5ML
1.25 SOLUTION, CONCENTRATE RESPIRATORY (INHALATION)
Refills: 0
Start: 2022-02-26 | End: 2022-03-19

## 2022-02-26 RX ORDER — OXYCODONE HYDROCHLORIDE 5 MG/1
30 TABLET ORAL EVERY 6 HOURS PRN
Qty: 10 TABLET | Refills: 0 | Status: SHIPPED | OUTPATIENT
Start: 2022-02-26 | End: 2022-03-19

## 2022-02-26 RX ORDER — SACCHAROMYCES BOULARDII 250 MG
250 CAPSULE ORAL 2 TIMES DAILY
Refills: 0
Start: 2022-02-26

## 2022-02-26 RX ADMIN — DULOXETINE HYDROCHLORIDE 60 MG: 60 CAPSULE, DELAYED RELEASE ORAL at 08:05

## 2022-02-26 RX ADMIN — ISODIUM CHLORIDE 3 ML: 0.03 SOLUTION RESPIRATORY (INHALATION) at 07:45

## 2022-02-26 RX ADMIN — METOCLOPRAMIDE 10 MG: 10 TABLET ORAL at 08:04

## 2022-02-26 RX ADMIN — Medication 250 MG: at 08:04

## 2022-02-26 RX ADMIN — HEPARIN SODIUM 5000 UNITS: 5000 INJECTION INTRAVENOUS; SUBCUTANEOUS at 06:08

## 2022-02-26 RX ADMIN — LEVALBUTEROL HYDROCHLORIDE 1.25 MG: 1.25 SOLUTION, CONCENTRATE RESPIRATORY (INHALATION) at 07:45

## 2022-02-26 RX ADMIN — OXYBUTYNIN CHLORIDE 10 MG: 5 TABLET, EXTENDED RELEASE ORAL at 08:04

## 2022-02-26 RX ADMIN — Medication 1 PACKET: at 08:04

## 2022-02-26 RX ADMIN — PRAVASTATIN SODIUM 20 MG: 20 TABLET ORAL at 08:04

## 2022-02-26 RX ADMIN — OXYCODONE HYDROCHLORIDE 30 MG: 10 TABLET ORAL at 08:04

## 2022-02-26 RX ADMIN — LISINOPRIL 10 MG: 10 TABLET ORAL at 08:05

## 2022-02-26 RX ADMIN — HEPARIN SODIUM 5000 UNITS: 5000 INJECTION INTRAVENOUS; SUBCUTANEOUS at 15:23

## 2022-02-26 RX ADMIN — AMLODIPINE BESYLATE 10 MG: 10 TABLET ORAL at 08:04

## 2022-02-26 NOTE — CASE MANAGEMENT
Case Management Progress Note    Patient name Domingo DENISE /S -01 MRN [id number]  : 1955 Date 2022       LOS (days): 3  Geometric Mean LOS (GMLOS) (days): 3 60  Days to GMLOS:0 2        OBJECTIVE:        Current admission status: Inpatient  [facility]   [facility] [address] [place] [address], [address] [place] [id number]  Phone: [phone] Fax: [phone]    Primary Care Provider: Tanya Barton MD    Primary Insurance: MEDICARE  Secondary Insurance: Cheng Patel    PROGRESS NOTE:    CRISTI CARTER t/c with Sanjana @ [facility] [phone] as follow up  Sanjana advised no bed available upstairs as family requests  Due to COVID history, Sanjana advised that a bed is available downstairs today that is more appropriate due to pt's "co-morbidities" where pt can be isolated through weekend, and then be evaluated by director of nursing [facility]  CRISTI CARTER t/c to Ryan Hamilton (spouse) [phone]  to relay information  Requested Johnathon to call Sanjana to discuss same and then follow up with CRISTI CARTER with decision on placement @ facility today

## 2022-02-26 NOTE — DISCHARGE SUMMARY
Discharge Summary - [facility] Medicine    Patient Information: Kasie Paez 73 y o  female MRN: [id number]  Unit/Bed#: S -01 Encounter: [id number]    Discharging Physician / Practitioner: Veronica Mcdonnell MD  PCP: Yeimy Carrillo MD  Admission Date: 2/22/2022  Discharge Date: 02/26/22    Reason for Admission:  Jerking of upper extremities    Discharge Diagnoses:     Principal Problem (Resolved):    Respiratory acidosis  Active Problems:    Type 2 diabetes mellitus with hyperglycemia, with long-term current use of insulin (HCC)    Chronic pain syndrome    Cirrhosis ([facility] )    Anemia  Resolved Problems:    Toxic metabolic encephalopathy    Acute respiratory failure with hypercapnia ([facility] )    Elevated procalcitonin    Myoclonic jerking    Hyponatremia      Consultations During Hospital Stay:  · None    Procedures Performed:   · None    Significant Findings:   · Toxic metabolic encephalopathy  · Acute hypercapnic respiratory failure  · Myoclonic jerking  · Hyponatremia  · Respiratory acidosis    Incidental Findings:   · None     Test Results Pending at Discharge (will require follow up): · None     Outpatient Tests Requested:  · Outpatient sleep study    Complications:  None    Hospital Course: Kasie Paez is a 73 y o  female patient who originally presented to the hospital on 2/22/2022 due to jerking of her upper extremities  She was also found to be in respiratory acidosis and acute hypercapnic respiratory failure  She was placed on BiPAP with improvement  Her jerking was likely due to hypercapnia combined with over-sedation from her chronic opioid use  She was referred for outpatient sleep study  Patient complained of chronic diarrhea since prior admission  She was started on Metamucil and probiotics with improvement  Condition at Discharge: fair     Discharge Day Visit / Exam:     Subjective:  Patient has no complaints today  She states she feels well    Diarrhea is better  Vitals: Blood Pressure: (!) 174/78 (02/26/22 0750)  Pulse: 92 (02/26/22 0750)  Temperature: 97 9 °F (36 6 °C) (02/26/22 0750)  Temp Source: Oral (02/26/22 0750)  Respirations: 14 (02/26/22 0750)  Height: 5' 2" (157 5 cm) (02/22/22 2313)  Weight - Scale: 84 kg (185 lb 3 oz) (02/25/22 0600)  SpO2: 98 % (02/26/22 0750)  Exam:   Physical Exam  Vitals and nursing note reviewed  Constitutional:       Appearance: She is obese  Comments: Chronically ill-appearing   HENT:      Head: Normocephalic and atraumatic  Right Ear: External ear normal       Left Ear: External ear normal       Nose: Nose normal       Mouth/Throat:      Mouth: Mucous membranes are moist       Pharynx: Oropharynx is clear  Eyes:      Conjunctiva/sclera: Conjunctivae normal       Pupils: Pupils are equal, round, and reactive to light  Cardiovascular:      Rate and Rhythm: Normal rate and regular rhythm  Pulses: Normal pulses  Heart sounds: Normal heart sounds  Pulmonary:      Effort: Pulmonary effort is normal       Breath sounds: Normal breath sounds  Abdominal:      General: Abdomen is flat  Bowel sounds are normal  There is no distension  Palpations: Abdomen is soft  Tenderness: There is no abdominal tenderness  Musculoskeletal:         General: No swelling or tenderness  Cervical back: Neck supple  No muscular tenderness  Skin:     General: Skin is warm and dry  Capillary Refill: Capillary refill takes less than 2 seconds  Findings: No erythema or rash  Neurological:      General: No focal deficit present  Mental Status: She is alert and oriented to person, place, and time  Mental status is at baseline  Psychiatric:         Mood and Affect: Mood normal          Behavior: Behavior normal          Thought Content:  Thought content normal          Judgment: Judgment normal          Discharge instructions/Information to patient and family:   See after visit summary for information provided to patient and family  Provisions for Follow-Up Care:  See after visit summary for information related to follow-up care and any pertinent home health orders  Disposition:     Other [facility]aeed [facility] at [facility]    For Discharges to [facility]:   · Not Applicable to this Patient - Not Applicable to this Patient    Planned Readmission:  No     Discharge Statement:  I spent 35  minutes discharging the patient  This time was spent on the day of discharge  I had direct contact with the patient on the day of discharge  Greater than 50% of the total time was spent examining patient, answering all patient questions, arranging and discussing plan of care with patient as well as directly providing post-discharge instructions  Additional time then spent on discharge activities  Discharge Medications:  See after visit summary for reconciled discharge medications provided to patient and family  ** Please Note: Dragon 360 Dictation voice to text software may have been used in the creation of this document   **

## 2022-02-26 NOTE — CASE MANAGEMENT
Case Management Progress Note    Patient name Rogers Moon  [facility] S /S -01 MRN [id number]  : 1955 Date 2022       LOS (days): 3  Geometric Mean LOS (GMLOS) (days): 3 60  Days to GMLOS:0 2        OBJECTIVE:        Current admission status: Inpatient  Preferred Pharmacy:   [address] [address]  Phone: [phone] Fax: [phone]    Primary Care Provider: Valencia Jasmine MD    Primary Insurance: MEDICARE  Secondary Insurance: Carmela Allen    PROGRESS NOTE:    T/c from [place] @ [place]   accepted bed  [facility] referral sent for wheelchair Magdalen Flores transport for this afternoon via [address]  Bedside RN and MD updated

## 2022-02-26 NOTE — CASE MANAGEMENT
Case Management Progress Note    Patient name Rajesh Whitehead  Location S /S -01 MRN [id number]  : 1955 Date 2022       LOS (days): 3  Geometric Mean LOS (GMLOS) (days): 3 60  Days to GMLOS:0 1        OBJECTIVE:        Current admission status: Inpatient  Preferred [facility]acy:   [facility] [address] [place], [address] [place] [address]  Phone: [phone] Fax: [phone]    Primary Care Provider: Heidy Munoz MD    Primary Insurance: MEDICARE  Secondary Insurance: Hurtis Crumble    PROGRESS NOTE:    Pt scheduled for pickup time for 4:40pm via wheelchair Norva Slice      [phone] and fax- [phone] added to facility contacts  CM t/c  Nino Haley to make him aware of pickup time  RN and Sanjana @ [facility] updated re: same  Requested COVID vaccine card from patient   does not have the information  Requested RN to request same from patient  CM following

## 2022-02-26 NOTE — PLAN OF CARE
Problem: MOBILITY - ADULT  Goal: Maintain or return to baseline ADL function  Description: INTERVENTIONS:  -  Assess patient's ability to carry out ADLs; assess patient's baseline for ADL function and identify physical deficits which impact ability to perform ADLs (bathing, care of mouth/teeth, toileting, grooming, dressing, etc )  - Assess/evaluate cause of self-care deficits   - Assess range of motion  - Assess patient's mobility; develop plan if impaired  - Assess patient's need for assistive devices and provide as appropriate  - Encourage maximum independence but intervene and supervise when necessary  - Involve family in performance of ADLs  - Assess for home care needs following discharge   - Consider OT consult to assist with ADL evaluation and planning for discharge  - Provide patient education as appropriate  Outcome: Progressing  Goal: Maintains/Returns to pre admission functional level  Description: INTERVENTIONS:  - Perform BMAT or MOVE assessment daily    - Set and communicate daily mobility goal to care team and patient/family/caregiver  - Collaborate with rehabilitation services on mobility goals if consulted  - Perform Range of Motion  - Reposition patient every 2 hours    - Dangle patient   - Stand patient   - Ambulate patient  - Out of bed to chair    - Out of bed for meals   - Out of bed for toileting  - Record patient progress and toleration of activity level   Outcome: Progressing     Problem: Potential for Falls  Goal: Patient will remain free of falls  Description: INTERVENTIONS:  - Educate patient/family on patient safety including physical limitations  - Instruct patient to call for assistance with activity   - Consult OT/PT to assist with strengthening/mobility   - Keep Call bell within reach  - Keep bed low and locked with side rails adjusted as appropriate  - Keep care items and personal belongings within reach  - Initiate and maintain comfort rounds  - Make Fall Risk Sign visible to staff  - Offer Toileting every 2 Hours, in advance of need  - Initiate/Maintain bed/chair alarm  - Obtain necessary fall risk management equipment  - Apply yellow socks and bracelet for high fall risk patients  - Consider moving patient to room near nurses station  Outcome: Progressing

## 2022-02-26 NOTE — PLAN OF CARE
Problem: Prexisting or High Potential for Compromised Skin Integrity  Goal: Skin integrity is maintained or improved  Description: INTERVENTIONS:  - Identify patients at risk for skin breakdown  - Assess and monitor skin integrity  - Assess and monitor nutrition and hydration status  - Monitor labs   - Assess for incontinence   - Turn and reposition patient  - Assist with mobility/ambulation  - Relieve pressure over bony prominences  - Avoid friction and shearing  - Provide appropriate hygiene as needed including keeping skin clean and dry  - Evaluate need for skin moisturizer/barrier cream  - Collaborate with interdisciplinary team   - Patient/family teaching  - Consider wound care consult   Outcome: Progressing     Problem: Potential for Falls  Goal: Patient will remain free of falls  Description: INTERVENTIONS:  - Educate patient/family on patient safety including physical limitations  - Instruct patient to call for assistance with activity   - Consult OT/PT to assist with strengthening/mobility   - Keep Call bell within reach  - Keep bed low and locked with side rails adjusted as appropriate  - Keep care items and personal belongings within reach  - Initiate and maintain comfort rounds  - Make Fall Risk Sign visible to staff  - Offer Toileting every 2 Hours, in advance of need  - Initiate/Maintain bed alarm  - Obtain necessary fall risk management equipment  - Apply yellow socks and bracelet for high fall risk patients  - Consider moving patient to room near nurses station  Outcome: Progressing

## 2022-02-28 LAB
BACTERIA BLD CULT: NORMAL
BACTERIA BLD CULT: NORMAL

## 2022-03-15 ENCOUNTER — OFFICE VISIT (OUTPATIENT)
Dept: WOUND CARE | Facility: HOSPITAL | Age: 67
End: 2022-03-15
Payer: MEDICARE

## 2022-03-15 VITALS
HEART RATE: 96 BPM | HEIGHT: 62 IN | TEMPERATURE: 97.8 F | WEIGHT: 179 LBS | BODY MASS INDEX: 32.94 KG/M2 | SYSTOLIC BLOOD PRESSURE: 142 MMHG | RESPIRATION RATE: 15 BRPM | DIASTOLIC BLOOD PRESSURE: 81 MMHG

## 2022-03-15 DIAGNOSIS — R32 URINARY INCONTINENCE, UNSPECIFIED TYPE: ICD-10-CM

## 2022-03-15 DIAGNOSIS — R15.9 INCONTINENCE OF FECES, UNSPECIFIED FECAL INCONTINENCE TYPE: ICD-10-CM

## 2022-03-15 DIAGNOSIS — R26.2 AMBULATORY DYSFUNCTION: ICD-10-CM

## 2022-03-15 DIAGNOSIS — L98.9 DISORDER OF THE SKIN AND SUBCUTANEOUS TISSUE, UNSPECIFIED: Primary | ICD-10-CM

## 2022-03-15 PROCEDURE — 99204 OFFICE O/P NEW MOD 45 MIN: CPT | Performed by: NURSE PRACTITIONER

## 2022-03-15 PROCEDURE — 99213 OFFICE O/P EST LOW 20 MIN: CPT | Performed by: NURSE PRACTITIONER

## 2022-03-15 RX ORDER — LIDOCAINE HYDROCHLORIDE 40 MG/ML
5 SOLUTION TOPICAL ONCE
Status: COMPLETED | OUTPATIENT
Start: 2022-03-15 | End: 2022-03-15

## 2022-03-15 RX ORDER — NYSTATIN 100000 [USP'U]/G
POWDER TOPICAL 3 TIMES DAILY
Qty: 15 G | Refills: 0 | Status: SHIPPED | OUTPATIENT
Start: 2022-03-15

## 2022-03-15 RX ORDER — GABAPENTIN 300 MG/1
300 CAPSULE ORAL
COMMUNITY
End: 2022-03-23 | Stop reason: HOSPADM

## 2022-03-15 RX ADMIN — LIDOCAINE HYDROCHLORIDE 5 ML: 40 SOLUTION TOPICAL at 10:34

## 2022-03-15 NOTE — PROGRESS NOTES
Patient ID: Thad Ruiz is a 73 y o  female Date of Birth 1955     Chief Complaint  Chief Complaint   Patient presents with    New Patient Visit     left groin       Allergies  Patient has no known allergies  Assessment:       Diagnoses and all orders for this visit:    Disorder of the skin and subcutaneous tissue, unspecified  -     nystatin (MYCOSTATIN) powder; Apply topically 3 (three) times a day    Urinary incontinence, unspecified type  -     lidocaine (XYLOCAINE) 4 % topical solution 5 mL  -     Wound cleansing and dressings; Future  -     Wound off loading; Future    Incontinence of feces, unspecified fecal incontinence type    Ambulatory dysfunction    Other orders  -     gabapentin (NEURONTIN) 300 mg capsule; 300 mg daily at bedtime as needed          Plan:  1  Initial visit  Wounds cleansed normal saline and gauze  Patient has extensive moisture associated skin damage in her bilateral groin folds which also extends to her buttocks  Wounds not showing any signs or symptoms of infection, however patient does have erythema of her bilateral thighs and groin wounds due to frequent urinary incontinence which is trapped from the depends diapers that she wears to help manage her urinary incontinence  2  Will order nystatin powder to be mixed with zinc oxide paste which can be applied to her wounds BID and p r n   3  Counseled patient about lying in bed more frequently with no depends diaper and only and incontinence pad on the bed to prevent urine from being trapped against her skin  Patient and  were not receptive to this suggestion  4  Also suggested trying to order patient a Pur[place] to help manage her urinary incontinence  Patient and  were not receptive to this suggestion either  5  Also suggested trying to order patient a hospital bed  Patient and  were not receptive to this suggestion either   6  Will try to get patient visiting nurses to help with care at home  7  Patient will followup in 2 weeks         Wound 03/15/22 Other (comment) Groin Left (Active)   Wound Image Images linked 03/15/22 1028   Wound Description Epithelialization;Granulation tissue;Slough; Beefy red 03/15/22 1028   Shaunna-wound Assessment Purple;Erythema;Dry;Scaly 03/15/22 1028   Wound Length (cm) 7 cm 03/15/22 1028   Wound Width (cm) 4 5 cm 03/15/22 1028   Wound Depth (cm) 0 1 cm 03/15/22 1028   Wound Surface Area (cm^2) 31 5 cm^2 03/15/22 1028   Wound Volume (cm^3) 3 15 cm^3 03/15/22 1028   Calculated Wound Volume (cm^3) 3 15 cm^3 03/15/22 1028   Drainage Amount Small 03/15/22 1028   Drainage Description Bloody 03/15/22 1028   Non-staged Wound Description Full thickness 03/15/22 1028   Dressing Status Intact 03/15/22 1028       Wound 03/15/22 Other (comment) Groin Left (Active)   Date First Assessed/Time First Assessed: 03/15/22 1027   Primary Wound Type: (c) Other (comment)  Location: Groin  Wound Location Orientation: Left       [REMOVED] Wound 09/20/21 Other (comment) Buttocks Bilateral (Removed)   Resolved Date: 03/15/22  Date First Assessed/Time First Assessed: 09/20/21 0745   Pre-Existing Wound: Yes  Primary Wound Type: (c) Other (comment)  Location: Buttocks  Wound Location Orientation: Bilateral  Dressing Status: Open to air  Wound Outcome:    [REMOVED] Wound 09/20/21 MASD Sacrum Mid (Removed)   Resolved Date: 03/15/22  Date First Assessed/Time First Assessed: 09/20/21 1134   Primary Wound Type: MASD  Location: Sacrum  Wound Location Orientation: Mid  Wound Description (Comments): 3 small incisions with small lead wire - 3 sites closed with g  [REMOVED] Wound 09/20/21 Arm Distal;Left;Posterior; Lower; Inferior (Removed)   Resolved Date: 03/15/22  Date First Assessed/Time First Assessed: 09/20/21 1320   Location: Arm  Wound Location Orientation: Distal;Left;Posterior; Lower; Inferior  Wound Description (Comments): ski tear  Dressing Status: Clean;Dry; Intact  Wound Outcome           Heather Carr Wound 09/28/21 Back N/A (Removed)   Resolved Date: 03/15/22  Date First Assessed/Time First Assessed: 09/28/21 1451   Location: Back  Wound Location Orientation: N/A  Wound Description (Comments): 1 incision dressed with exofin   Wound Outcome: Unknown (No longer present)       [REMOVED] Wound 01/07/22 Pressure Injury Buttocks Right (Removed)   Resolved Date: 03/15/22  Date First Assessed: 01/07/22   Pre-Existing Wound: Yes  Primary Wound Type: Pressure Injury  Location: Buttocks  Wound Location Orientation: Right  Wound Outcome: Unknown (No longer present)       [REMOVED] Wound 02/04/22 Thigh Left (Removed)   Resolved Date: 03/15/22  Date First Assessed/Time First Assessed: 02/04/22 0456   Pre-Existing Wound: Yes  Location: Thigh  Wound Location Orientation: (c) Left       [REMOVED] Wound 02/07/22 Pretibial Right (Removed)   Resolved Date: 03/15/22  Date First Assessed/Time First Assessed: 02/07/22 1245   Location: Pretibial  Wound Location Orientation: Right  Wound Outcome: Unknown (No longer present)       [REMOVED] Wound 02/07/22 Pretibial Left (Removed)   Resolved Date: 03/15/22  Date First Assessed/Time First Assessed: 02/07/22 1245   Location: Pretibial  Wound Location Orientation: Left  Wound Outcome: Unknown (No longer present)       [REMOVED] Wound 02/23/22 Skin Tear Skin tear Arm Left (Removed)   Resolved Date: 03/15/22  Date First Assessed/Time First Assessed: 02/23/22 1200   Pre-Existing Wound: Yes  Primary Wound Type: Skin Tear  Traumatic Wound Type: Skin tear  Location: Arm  Wound Location Orientation: Left  Wound Description (Comments): s  [REMOVED] Wound 02/23/22 MASD Buttocks Left (Removed)   Resolved Date: 03/15/22  Date First Assessed/Time First Assessed: 02/23/22 1702   Pre-Existing Wound: Yes  Primary Wound Type: MASD  Location: Buttocks  Wound Location Orientation: Left  Wound Description (Comments): MASD L buttock  Wound Outcome: Unk         [REMOVED] Wound 02/23/22 MASD Groin Inner; Left (Removed)   Resolved Date: 03/15/22  Date First Assessed/Time First Assessed: 02/23/22 1934   Pre-Existing Wound: Yes  Primary Wound Type: MASD  Location: Groin  Wound Location Orientation: Inner;Left  Wound Outcome: Unknown (No longer present)       [REMOVED] Wound 02/23/22 Pressure Injury Sacrum (Removed)   Resolved Date: 03/15/22  Date First Assessed/Time First Assessed: 02/23/22 1938   Pre-Existing Wound: Yes  Primary Wound Type: Pressure Injury  Location: Sacrum  Wound Description (Comments): sacral slit  Wound Outcome: Unknown (No longer present)       Subjective:     Patient is a 81-year-old female who presents to the [facility] wound center as a new patient for moisture associated skin damage of her bilateral groin folds and buttocks  Patient's  who is present with her today reports that patient's wounds have been present for the past several weeks  Patient reports she has a long-standing history of urinary and fecal incontinence  Patient reports her fecal incontinence is due to previously being on lactulose which she was started on during her last hospitalization  She is currently not taking lactulose  She wears diapers to help manage her incontinence and she reports she is frequently wet throughout the day  Patient also reports she spends all day sitting in a recliner chair  She is unable to tolerate laying in a bed at night due to her chronic back pain  Patient's  is the primary caregiver  He reports that he spends multiple times a day taking care of her and cleaning her up after she has multiple episodes of incontinence  Patient reports she has history of placement of a bladder stimulator which was done by Dr Miguel Lynn which has not improved her urinary incontinence  Patient and  are frustrated due to moisture associated skin damage not getting better and her persistent urinary and fecal incontinence  She denies any pain, fevers, or chills        The following portions of the patient's history were reviewed and updated as appropriate:   She  has a past medical history of Abnormal head CT (7/14/2017), Acute kidney injury ([facility] ) (8/19/2018), Cellulitis (1/10/2017), Chronic back pain, Cirrhosis ([facility] ), Closed fracture of multiple ribs of right side (7/14/2017), Degenerative disc disease at L5-S1 level, Diabetes mellitus ([facility] ), GERD (gastroesophageal reflux disease), History of methicillin resistant staphylococcus aureus (MRSA) (08/21/2018), Hyperkalemia (4/25/2018), Hypertension, Hypocalcemia (4/25/2018), and Hyponatremia (7/14/2017)  She   Patient Active Problem List    Diagnosis Date Noted    History of recent fall 02/07/2022    Left arm weakness 02/06/2022    Anemia 02/06/2022    Acute respiratory failure with hypoxia and hypercapnia ([facility] ) 02/03/2022    Acute kidney injury superimposed on CKD ([facility] ) 02/03/2022    Pneumonia 02/03/2022    Sepsis ([facility] ) 02/03/2022    Pressure injury of skin of buttock 01/06/2022    Fecal occult blood test positive 01/04/2022    COVID-19 01/04/2022    Urge incontinence 09/20/2021    Ex-smoker 09/20/2021    Syncope 07/15/2020    Cirrhosis ([facility] ) 07/15/2020    Splenomegaly 07/15/2020    CAD (coronary artery disease) 07/15/2020    Other microscopic hematuria 06/05/2020    Spondylosis of lumbar joint 05/15/2020    Marijuana use 05/14/2020    Left hip pain 05/14/2020    Chronic pain syndrome 02/22/2018    Peripheral neuropathy 02/22/2018    Type 2 diabetes mellitus with hyperglycemia, with long-term current use of insulin ([facility] ) 02/20/2018    Chronic venous stasis dermatitis of both lower extremities 07/14/2017    Venous insufficiency (chronic) (peripheral) 01/14/2017    Essential hypertension      She  has a past surgical history that includes Joint replacement; Cholecystectomy; Oophorectomy; pr incision,implant,neuroelec,sacral nerve (N/A, 9/20/2021); and Sacral nerve stimulator placement (N/A, 9/28/2021)    Her family history includes Alzheimer's disease in her mother; Lupus in her mother; Rheum arthritis in her mother  She  reports that she quit smoking about 2 years ago  Her smoking use included e-cigarettes  She started smoking about 46 years ago  She has never used smokeless tobacco  She reports previous alcohol use  She reports previous drug use  Drug: Marijuana  Current Outpatient Medications   Medication Sig Dispense Refill    amLODIPine (NORVASC) 10 mg tablet Take 1 tablet (10 mg total) by mouth daily 30 tablet 0    DULoxetine (CYMBALTA) 60 mg delayed release capsule Take 60 mg by mouth daily      Insulin Aspart FlexPen 100 UNIT/ML SOPN Inject 3 Units under the skin 3 (three) times a day with meals If bs>250      insulin glargine (LANTUS SOLOSTAR) 100 units/mL injection pen Inject 20 Units under the skin daily at bedtime        lisinopril (ZESTRIL) 20 mg tablet Take 10 mg by mouth daily       metoclopramide (REGLAN) 10 mg tablet Take 10 mg by mouth daily      Mirabegron ER (Myrbetriq) 50 MG TB24 Take 50 mg by mouth in the morning      oxyCODONE (ROXICODONE) 5 immediate release tablet Take 6 tablets (30 mg total) by mouth every 6 (six) hours as needed for moderate pain Per patient  patient has been taking 30mg PO 6 times a day   Max Daily Amount: 120 mg (Patient taking differently: Take 30 mg by mouth every 4 (four) hours Pt states it is ordered every 4 hours (not prn) and that is how she takes it ) 10 tablet 0    pravastatin (PRAVACHOL) 20 mg tablet Take 20 mg by mouth daily      psyllium (METAMUCIL) packet Take 1 packet by mouth 2 (two) times a day  0    saccharomyces boulardii (FLORASTOR) 250 mg capsule Take 1 capsule (250 mg total) by mouth 2 (two) times a day  0    docusate sodium (COLACE) 100 mg capsule Take 1 capsule (100 mg total) by mouth 2 (two) times a day as needed for constipation (Patient not taking: Reported on 3/15/2022 )  0    gabapentin (NEURONTIN) 300 mg capsule 300 mg daily at bedtime as needed      levalbuterol (XOPENEX) 1 25 mg/0 5 mL nebulizer solution Take 0 5 mL (1 25 mg total) by nebulization 3 (three) times a day  0    nystatin (MYCOSTATIN) powder Apply topically 3 (three) times a day 15 g 0    sodium chloride 0 9 % nebulizer solution Take 3 mL by nebulization 3 (three) times a day  0     No current facility-administered medications for this visit  Facility-Administered Medications Ordered in Other Visits   Medication Dose Route Frequency Provider Last Rate Last Admin    ferrous sulfate tablet 325 mg  325 mg Oral Daily With Breakfast Hailey Moran MD         She has No Known Allergies       Review of Systems   Constitutional: Negative  HENT: Negative for ear pain and hearing loss  Eyes: Negative for pain  Respiratory: Negative for chest tightness and shortness of breath  Cardiovascular: Negative for chest pain, palpitations and leg swelling  Gastrointestinal: Positive for diarrhea  Negative for nausea and vomiting  Genitourinary: Positive for enuresis, frequency and urgency  Negative for dysuria  Musculoskeletal: Positive for gait problem  Skin: Positive for wound  Neurological: Positive for weakness  Negative for tremors  Psychiatric/Behavioral: Negative for behavioral problems, confusion and suicidal ideas  Objective:       Wound 03/15/22 Other (comment) Groin Left (Active)   Wound Image Images linked 03/15/22 1028   Wound Description Epithelialization;Granulation tissue;Slough; Beefy red 03/15/22 1028   Shaunna-wound Assessment Purple;Erythema;Dry;Scaly 03/15/22 1028   Wound Length (cm) 7 cm 03/15/22 1028   Wound Width (cm) 4 5 cm 03/15/22 1028   Wound Depth (cm) 0 1 cm 03/15/22 1028   Wound Surface Area (cm^2) 31 5 cm^2 03/15/22 1028   Wound Volume (cm^3) 3 15 cm^3 03/15/22 1028   Calculated Wound Volume (cm^3) 3 15 cm^3 03/15/22 1028   Drainage Amount Small 03/15/22 1028   Drainage Description Bloody 03/15/22 1028   Non-staged Wound Description Full thickness 03/15/22 1028   Dressing Status Intact 03/15/22 1028       /81   Pulse 96   Temp 97 8 °F (36 6 °C)   Resp 15   Ht 5' 2" (1 575 m)   Wt 81 2 kg (179 lb)   LMP  (LMP Unknown) Comment: post menopause  BMI 32 74 kg/m²     Physical Exam  Vitals and nursing note reviewed  Constitutional:       General: She is not in acute distress  Appearance: Normal appearance  She is overweight  HENT:      Head: Normocephalic and atraumatic  Eyes:      General:         Right eye: No discharge  Left eye: No discharge  Pulmonary:      Effort: Pulmonary effort is normal  No respiratory distress  Musculoskeletal:      Cervical back: Normal range of motion and neck supple  No rigidity  Skin:     General: Skin is warm and dry  Findings: Erythema and wound present  Neurological:      General: No focal deficit present  Mental Status: She is alert and oriented to person, place, and time  Mental status is at baseline  Psychiatric:         Mood and Affect: Mood normal          Behavior: Behavior normal          Thought Content: Thought content normal          Judgment: Judgment normal                        Wound Instructions:  Orders Placed This Encounter   Procedures    Wound cleansing and dressings     Left Groin Wound:    Wash your hands with soap and water  Remove old dressing, discard into plastic bag and place in trash  Cleanse the wound with soap and water prior to applying a clean dressing  Do not use tissue or cotton balls  Do not scrub the wound  Pat dry using gauze  Shower yes   Apply Triad mixed with nystatin powder to the groin wound two times per day and as needed  This was done today  Standing Status:   Future     Standing Expiration Date:   3/15/2023    Wound off loading     Off-loading Instructions:    Keep weight and pressure off wound at all times  , Turn every 2 hours  Avoid position directing pressure to Wound site   Limit side lying to 30 degree tilt  Limit the head of bed elevation to 30 degrees  and Do Not Sit for Long Periods of Time  Standing Status:   Future     Standing Expiration Date:   3/15/2023        Diagnosis ICD-10-CM Associated Orders   1  Disorder of the skin and subcutaneous tissue, unspecified  L98 9 nystatin (MYCOSTATIN) powder   2  Urinary incontinence, unspecified type  R32 lidocaine (XYLOCAINE) 4 % topical solution 5 mL     Wound cleansing and dressings     Wound off loading   3  Incontinence of feces, unspecified fecal incontinence type  R15 9    4   Ambulatory dysfunction  R26 2

## 2022-03-15 NOTE — PATIENT INSTRUCTIONS
Orders Placed This Encounter   Procedures    Wound cleansing and dressings     Left Groin Wound:    Wash your hands with soap and water  Remove old dressing, discard into plastic bag and place in trash  Cleanse the wound with soap and water prior to applying a clean dressing  Do not use tissue or cotton balls  Do not scrub the wound  Pat dry using gauze  Shower yes   Apply Triad mixed with nystatin powder to the groin wound two times per day and as needed  This was done today  Standing Status:   Future     Standing Expiration Date:   3/15/2023    Wound off loading     Off-loading Instructions:    Keep weight and pressure off wound at all times  , Turn every 2 hours  Avoid position directing pressure to Wound site  Limit side lying to 30 degree tilt  Limit the head of bed elevation to 30 degrees  and Do Not Sit for Long Periods of Time       Standing Status:   Future     Standing Expiration Date:   3/15/2023

## 2022-03-19 ENCOUNTER — HOSPITAL ENCOUNTER (INPATIENT)
Facility: HOSPITAL | Age: 67
LOS: 3 days | Discharge: HOME HEALTH | DRG: 871 | End: 2022-03-23
Attending: EMERGENCY MEDICINE | Admitting: INTERNAL MEDICINE
Payer: MEDICARE

## 2022-03-19 ENCOUNTER — APPOINTMENT (EMERGENCY)
Dept: RADIOLOGY | Facility: HOSPITAL | Age: 67
DRG: 871 | End: 2022-03-19
Payer: MEDICARE

## 2022-03-19 ENCOUNTER — APPOINTMENT (EMERGENCY)
Dept: CT IMAGING | Facility: HOSPITAL | Age: 67
DRG: 871 | End: 2022-03-19
Payer: MEDICARE

## 2022-03-19 DIAGNOSIS — N17.9 AKI (ACUTE KIDNEY INJURY) (HCC): ICD-10-CM

## 2022-03-19 DIAGNOSIS — I87.2 VENOUS INSUFFICIENCY (CHRONIC) (PERIPHERAL): ICD-10-CM

## 2022-03-19 DIAGNOSIS — M54.9 BACK PAIN: ICD-10-CM

## 2022-03-19 DIAGNOSIS — E87.5 HYPERKALEMIA: ICD-10-CM

## 2022-03-19 DIAGNOSIS — Z79.899 POLYPHARMACY: ICD-10-CM

## 2022-03-19 DIAGNOSIS — G93.40 ACUTE ENCEPHALOPATHY: Primary | ICD-10-CM

## 2022-03-19 DIAGNOSIS — R06.89 HYPERCARBIA: ICD-10-CM

## 2022-03-19 DIAGNOSIS — T40.2X1A OPIOID OVERDOSE (HCC): ICD-10-CM

## 2022-03-19 DIAGNOSIS — T07.XXXA MULTIPLE WOUNDS: ICD-10-CM

## 2022-03-19 DIAGNOSIS — J98.8 RESPIRATORY INFECTION: ICD-10-CM

## 2022-03-19 DIAGNOSIS — G25.3 MYOCLONIC JERKING: ICD-10-CM

## 2022-03-19 DIAGNOSIS — G89.4 CHRONIC PAIN SYNDROME: ICD-10-CM

## 2022-03-19 DIAGNOSIS — R26.2 AMBULATORY DYSFUNCTION: ICD-10-CM

## 2022-03-19 DIAGNOSIS — T50.901A ACCIDENTAL OVERDOSE: ICD-10-CM

## 2022-03-19 LAB
2HR DELTA HS TROPONIN: 2 NG/L
ALBUMIN SERPL BCP-MCNC: 2.4 G/DL (ref 3.5–5)
ALP SERPL-CCNC: 160 U/L (ref 46–116)
ALT SERPL W P-5'-P-CCNC: 23 U/L (ref 12–78)
AMMONIA PLAS-SCNC: 16 UMOL/L (ref 11–35)
ANION GAP SERPL CALCULATED.3IONS-SCNC: 7 MMOL/L (ref 4–13)
APAP SERPL-MCNC: <2 UG/ML (ref 10–20)
APTT PPP: 39 SECONDS (ref 23–37)
AST SERPL W P-5'-P-CCNC: 35 U/L (ref 5–45)
BASE EX.OXY STD BLDV CALC-SCNC: 75.2 % (ref 60–80)
BASE EX.OXY STD BLDV CALC-SCNC: 94.4 % (ref 60–80)
BASE EXCESS BLDV CALC-SCNC: -2.4 MMOL/L
BASE EXCESS BLDV CALC-SCNC: -3.1 MMOL/L
BASOPHILS # BLD AUTO: 0.04 THOUSANDS/ΜL (ref 0–0.1)
BASOPHILS NFR BLD AUTO: 0 % (ref 0–1)
BILIRUB SERPL-MCNC: 0.58 MG/DL (ref 0.2–1)
BUN SERPL-MCNC: 29 MG/DL (ref 5–25)
CALCIUM ALBUM COR SERPL-MCNC: 8.4 MG/DL (ref 8.3–10.1)
CALCIUM SERPL-MCNC: 7.1 MG/DL (ref 8.3–10.1)
CARDIAC TROPONIN I PNL SERPL HS: 4 NG/L
CARDIAC TROPONIN I PNL SERPL HS: 6 NG/L
CHLORIDE SERPL-SCNC: 100 MMOL/L (ref 100–108)
CO2 SERPL-SCNC: 27 MMOL/L (ref 21–32)
CREAT SERPL-MCNC: 1.7 MG/DL (ref 0.6–1.3)
EOSINOPHIL # BLD AUTO: 0.18 THOUSAND/ΜL (ref 0–0.61)
EOSINOPHIL NFR BLD AUTO: 2 % (ref 0–6)
ERYTHROCYTE [DISTWIDTH] IN BLOOD BY AUTOMATED COUNT: 16 % (ref 11.6–15.1)
ETHANOL SERPL-MCNC: <3 MG/DL (ref 0–3)
GFR SERPL CREATININE-BSD FRML MDRD: 31 ML/MIN/1.73SQ M
GLUCOSE SERPL-MCNC: 199 MG/DL (ref 65–140)
GLUCOSE SERPL-MCNC: 211 MG/DL (ref 65–140)
HCO3 BLDV-SCNC: 22.2 MMOL/L (ref 24–30)
HCO3 BLDV-SCNC: 25.5 MMOL/L (ref 24–30)
HCT VFR BLD AUTO: 36.3 % (ref 34.8–46.1)
HGB BLD-MCNC: 11.1 G/DL (ref 11.5–15.4)
IMM GRANULOCYTES # BLD AUTO: 0.07 THOUSAND/UL (ref 0–0.2)
IMM GRANULOCYTES NFR BLD AUTO: 1 % (ref 0–2)
INR PPP: 1 (ref 0.84–1.19)
LACTATE SERPL-SCNC: 1.2 MMOL/L (ref 0.5–2)
LYMPHOCYTES # BLD AUTO: 1.16 THOUSANDS/ΜL (ref 0.6–4.47)
LYMPHOCYTES NFR BLD AUTO: 11 % (ref 14–44)
MAGNESIUM SERPL-MCNC: 2 MG/DL (ref 1.6–2.6)
MCH RBC QN AUTO: 24.9 PG (ref 26.8–34.3)
MCHC RBC AUTO-ENTMCNC: 30.6 G/DL (ref 31.4–37.4)
MCV RBC AUTO: 81 FL (ref 82–98)
MONOCYTES # BLD AUTO: 0.5 THOUSAND/ΜL (ref 0.17–1.22)
MONOCYTES NFR BLD AUTO: 5 % (ref 4–12)
NEUTROPHILS # BLD AUTO: 8.55 THOUSANDS/ΜL (ref 1.85–7.62)
NEUTS SEG NFR BLD AUTO: 81 % (ref 43–75)
NRBC BLD AUTO-RTO: 0 /100 WBCS
O2 CT BLDV-SCNC: 13 ML/DL
O2 CT BLDV-SCNC: 13.5 ML/DL
PCO2 BLDV: 40.7 MM HG (ref 42–50)
PCO2 BLDV: 58.9 MM HG (ref 42–50)
PH BLDV: 7.25 [PH] (ref 7.3–7.4)
PH BLDV: 7.36 [PH] (ref 7.3–7.4)
PLATELET # BLD AUTO: 192 THOUSANDS/UL (ref 149–390)
PMV BLD AUTO: 10.4 FL (ref 8.9–12.7)
PO2 BLDV: 109.6 MM HG (ref 35–45)
PO2 BLDV: 46.8 MM HG (ref 35–45)
POTASSIUM SERPL-SCNC: 6 MMOL/L (ref 3.5–5.3)
PROT SERPL-MCNC: 6.4 G/DL (ref 6.4–8.2)
PROTHROMBIN TIME: 13.2 SECONDS (ref 11.6–14.5)
RBC # BLD AUTO: 4.46 MILLION/UL (ref 3.81–5.12)
SALICYLATES SERPL-MCNC: <3 MG/DL (ref 3–20)
SODIUM SERPL-SCNC: 134 MMOL/L (ref 136–145)
TSH SERPL DL<=0.05 MIU/L-ACNC: 1.22 UIU/ML (ref 0.36–3.74)
WBC # BLD AUTO: 10.5 THOUSAND/UL (ref 4.31–10.16)

## 2022-03-19 PROCEDURE — 99291 CRITICAL CARE FIRST HOUR: CPT | Performed by: EMERGENCY MEDICINE

## 2022-03-19 PROCEDURE — 83735 ASSAY OF MAGNESIUM: CPT | Performed by: EMERGENCY MEDICINE

## 2022-03-19 PROCEDURE — 80053 COMPREHEN METABOLIC PANEL: CPT | Performed by: EMERGENCY MEDICINE

## 2022-03-19 PROCEDURE — 84484 ASSAY OF TROPONIN QUANT: CPT | Performed by: EMERGENCY MEDICINE

## 2022-03-19 PROCEDURE — 87040 BLOOD CULTURE FOR BACTERIA: CPT | Performed by: EMERGENCY MEDICINE

## 2022-03-19 PROCEDURE — 96375 TX/PRO/DX INJ NEW DRUG ADDON: CPT

## 2022-03-19 PROCEDURE — 36415 COLL VENOUS BLD VENIPUNCTURE: CPT | Performed by: EMERGENCY MEDICINE

## 2022-03-19 PROCEDURE — 85025 COMPLETE CBC W/AUTO DIFF WBC: CPT | Performed by: EMERGENCY MEDICINE

## 2022-03-19 PROCEDURE — 84443 ASSAY THYROID STIM HORMONE: CPT | Performed by: EMERGENCY MEDICINE

## 2022-03-19 PROCEDURE — 80143 DRUG ASSAY ACETAMINOPHEN: CPT | Performed by: EMERGENCY MEDICINE

## 2022-03-19 PROCEDURE — 94760 N-INVAS EAR/PLS OXIMETRY 1: CPT

## 2022-03-19 PROCEDURE — 83605 ASSAY OF LACTIC ACID: CPT | Performed by: EMERGENCY MEDICINE

## 2022-03-19 PROCEDURE — 70450 CT HEAD/BRAIN W/O DYE: CPT

## 2022-03-19 PROCEDURE — 82140 ASSAY OF AMMONIA: CPT | Performed by: EMERGENCY MEDICINE

## 2022-03-19 PROCEDURE — 82077 ASSAY SPEC XCP UR&BREATH IA: CPT | Performed by: EMERGENCY MEDICINE

## 2022-03-19 PROCEDURE — 85610 PROTHROMBIN TIME: CPT | Performed by: EMERGENCY MEDICINE

## 2022-03-19 PROCEDURE — 94660 CPAP INITIATION&MGMT: CPT

## 2022-03-19 PROCEDURE — 96365 THER/PROPH/DIAG IV INF INIT: CPT

## 2022-03-19 PROCEDURE — 94002 VENT MGMT INPAT INIT DAY: CPT

## 2022-03-19 PROCEDURE — 85730 THROMBOPLASTIN TIME PARTIAL: CPT | Performed by: EMERGENCY MEDICINE

## 2022-03-19 PROCEDURE — 84145 PROCALCITONIN (PCT): CPT | Performed by: INTERNAL MEDICINE

## 2022-03-19 PROCEDURE — 71045 X-RAY EXAM CHEST 1 VIEW: CPT

## 2022-03-19 PROCEDURE — 80179 DRUG ASSAY SALICYLATE: CPT | Performed by: EMERGENCY MEDICINE

## 2022-03-19 PROCEDURE — 99291 CRITICAL CARE FIRST HOUR: CPT

## 2022-03-19 PROCEDURE — 93005 ELECTROCARDIOGRAM TRACING: CPT

## 2022-03-19 PROCEDURE — 82805 BLOOD GASES W/O2 SATURATION: CPT | Performed by: EMERGENCY MEDICINE

## 2022-03-19 PROCEDURE — 82948 REAGENT STRIP/BLOOD GLUCOSE: CPT

## 2022-03-19 RX ORDER — ACETAMINOPHEN 650 MG/1
650 SUPPOSITORY RECTAL ONCE
Status: COMPLETED | OUTPATIENT
Start: 2022-03-20 | End: 2022-03-20

## 2022-03-19 RX ORDER — CALCIUM GLUCONATE 20 MG/ML
1 INJECTION, SOLUTION INTRAVENOUS ONCE
Status: COMPLETED | OUTPATIENT
Start: 2022-03-19 | End: 2022-03-19

## 2022-03-19 RX ORDER — NALOXONE HYDROCHLORIDE 0.4 MG/ML
INJECTION, SOLUTION INTRAMUSCULAR; INTRAVENOUS; SUBCUTANEOUS
Status: DISPENSED
Start: 2022-03-19 | End: 2022-03-20

## 2022-03-19 RX ORDER — NALOXONE HYDROCHLORIDE 0.4 MG/ML
INJECTION, SOLUTION INTRAMUSCULAR; INTRAVENOUS; SUBCUTANEOUS
Status: COMPLETED
Start: 2022-03-19 | End: 2022-03-19

## 2022-03-19 RX ADMIN — AZITHROMYCIN MONOHYDRATE 500 MG: 500 INJECTION, POWDER, LYOPHILIZED, FOR SOLUTION INTRAVENOUS at 23:38

## 2022-03-19 RX ADMIN — NALXONE HYDROCHLORIDE 0.4 MG: 0.4 INJECTION INTRAMUSCULAR; INTRAVENOUS; SUBCUTANEOUS at 22:12

## 2022-03-19 RX ADMIN — SODIUM CHLORIDE 1000 ML: 0.9 INJECTION, SOLUTION INTRAVENOUS at 22:14

## 2022-03-19 RX ADMIN — Medication 0.2 MG: at 20:30

## 2022-03-19 RX ADMIN — CALCIUM GLUCONATE 1 G: 20 INJECTION, SOLUTION INTRAVENOUS at 22:14

## 2022-03-19 RX ADMIN — Medication 0.1 MG: at 20:20

## 2022-03-19 RX ADMIN — CEFTRIAXONE SODIUM 1000 MG: 10 INJECTION, POWDER, FOR SOLUTION INTRAVENOUS at 23:31

## 2022-03-19 RX ADMIN — Medication 0.1 MG: at 20:15

## 2022-03-20 PROBLEM — N18.9 CKD (CHRONIC KIDNEY DISEASE): Status: ACTIVE | Noted: 2022-03-20

## 2022-03-20 PROBLEM — J96.02 ACUTE RESPIRATORY ACIDOSIS (HCC): Status: ACTIVE | Noted: 2022-02-23

## 2022-03-20 PROBLEM — G93.40 ACUTE ENCEPHALOPATHY: Status: ACTIVE | Noted: 2022-01-04

## 2022-03-20 PROBLEM — F11.20 OPIATE DEPENDENCE (HCC): Status: ACTIVE | Noted: 2022-03-20

## 2022-03-20 PROBLEM — D69.6 THROMBOCYTOPENIA (HCC): Status: ACTIVE | Noted: 2022-03-20

## 2022-03-20 LAB
AMORPH URATE CRY URNS QL MICRO: ABNORMAL /HPF
AMPHETAMINES SERPL QL SCN: NEGATIVE
ANION GAP SERPL CALCULATED.3IONS-SCNC: 5 MMOL/L (ref 4–13)
BACTERIA UR QL AUTO: ABNORMAL /HPF
BARBITURATES UR QL: NEGATIVE
BASOPHILS # BLD AUTO: 0.02 THOUSANDS/ΜL (ref 0–0.1)
BASOPHILS NFR BLD AUTO: 0 % (ref 0–1)
BENZODIAZ UR QL: NEGATIVE
BILIRUB UR QL STRIP: NEGATIVE
BUN SERPL-MCNC: 29 MG/DL (ref 5–25)
CALCIUM SERPL-MCNC: 7.2 MG/DL (ref 8.3–10.1)
CHLORIDE SERPL-SCNC: 102 MMOL/L (ref 100–108)
CLARITY UR: CLEAR
CO2 SERPL-SCNC: 26 MMOL/L (ref 21–32)
COCAINE UR QL: NEGATIVE
COLOR UR: YELLOW
CREAT SERPL-MCNC: 1.59 MG/DL (ref 0.6–1.3)
EOSINOPHIL # BLD AUTO: 0.02 THOUSAND/ΜL (ref 0–0.61)
EOSINOPHIL NFR BLD AUTO: 0 % (ref 0–6)
ERYTHROCYTE [DISTWIDTH] IN BLOOD BY AUTOMATED COUNT: 16.1 % (ref 11.6–15.1)
GFR SERPL CREATININE-BSD FRML MDRD: 33 ML/MIN/1.73SQ M
GLUCOSE SERPL-MCNC: 106 MG/DL (ref 65–140)
GLUCOSE SERPL-MCNC: 125 MG/DL (ref 65–140)
GLUCOSE SERPL-MCNC: 132 MG/DL (ref 65–140)
GLUCOSE SERPL-MCNC: 197 MG/DL (ref 65–140)
GLUCOSE SERPL-MCNC: 201 MG/DL (ref 65–140)
GLUCOSE SERPL-MCNC: 225 MG/DL (ref 65–140)
GLUCOSE UR STRIP-MCNC: NEGATIVE MG/DL
HCT VFR BLD AUTO: 31.8 % (ref 34.8–46.1)
HGB BLD-MCNC: 9.3 G/DL (ref 11.5–15.4)
HGB UR QL STRIP.AUTO: ABNORMAL
IMM GRANULOCYTES # BLD AUTO: 0.08 THOUSAND/UL (ref 0–0.2)
IMM GRANULOCYTES NFR BLD AUTO: 1 % (ref 0–2)
KETONES UR STRIP-MCNC: NEGATIVE MG/DL
LEUKOCYTE ESTERASE UR QL STRIP: NEGATIVE
LYMPHOCYTES # BLD AUTO: 0.87 THOUSANDS/ΜL (ref 0.6–4.47)
LYMPHOCYTES NFR BLD AUTO: 8 % (ref 14–44)
MAGNESIUM SERPL-MCNC: 2.1 MG/DL (ref 1.6–2.6)
MCH RBC QN AUTO: 25 PG (ref 26.8–34.3)
MCHC RBC AUTO-ENTMCNC: 29.2 G/DL (ref 31.4–37.4)
MCV RBC AUTO: 86 FL (ref 82–98)
METHADONE UR QL: NEGATIVE
MONOCYTES # BLD AUTO: 0.52 THOUSAND/ΜL (ref 0.17–1.22)
MONOCYTES NFR BLD AUTO: 5 % (ref 4–12)
MUCOUS THREADS UR QL AUTO: ABNORMAL
NEUTROPHILS # BLD AUTO: 9.9 THOUSANDS/ΜL (ref 1.85–7.62)
NEUTS SEG NFR BLD AUTO: 86 % (ref 43–75)
NITRITE UR QL STRIP: NEGATIVE
NON-SQ EPI CELLS URNS QL MICRO: ABNORMAL /HPF
NRBC BLD AUTO-RTO: 0 /100 WBCS
OPIATES UR QL SCN: POSITIVE
OXYCODONE+OXYMORPHONE UR QL SCN: POSITIVE
PCP UR QL: NEGATIVE
PH UR STRIP.AUTO: 5 [PH]
PLATELET # BLD AUTO: 130 THOUSANDS/UL (ref 149–390)
PMV BLD AUTO: 10.1 FL (ref 8.9–12.7)
POTASSIUM SERPL-SCNC: 5 MMOL/L (ref 3.5–5.3)
PROCALCITONIN SERPL-MCNC: 0.19 NG/ML
PROT UR STRIP-MCNC: ABNORMAL MG/DL
RBC # BLD AUTO: 3.72 MILLION/UL (ref 3.81–5.12)
RBC #/AREA URNS AUTO: ABNORMAL /HPF
SODIUM SERPL-SCNC: 133 MMOL/L (ref 136–145)
SP GR UR STRIP.AUTO: >=1.03 (ref 1–1.03)
THC UR QL: NEGATIVE
UROBILINOGEN UR QL STRIP.AUTO: 0.2 E.U./DL
WBC # BLD AUTO: 11.41 THOUSAND/UL (ref 4.31–10.16)
WBC #/AREA URNS AUTO: ABNORMAL /HPF

## 2022-03-20 PROCEDURE — 36415 COLL VENOUS BLD VENIPUNCTURE: CPT | Performed by: INTERNAL MEDICINE

## 2022-03-20 PROCEDURE — 81001 URINALYSIS AUTO W/SCOPE: CPT | Performed by: EMERGENCY MEDICINE

## 2022-03-20 PROCEDURE — 94660 CPAP INITIATION&MGMT: CPT

## 2022-03-20 PROCEDURE — 85025 COMPLETE CBC W/AUTO DIFF WBC: CPT | Performed by: INTERNAL MEDICINE

## 2022-03-20 PROCEDURE — 80048 BASIC METABOLIC PNL TOTAL CA: CPT | Performed by: INTERNAL MEDICINE

## 2022-03-20 PROCEDURE — 82948 REAGENT STRIP/BLOOD GLUCOSE: CPT

## 2022-03-20 PROCEDURE — 94760 N-INVAS EAR/PLS OXIMETRY 1: CPT

## 2022-03-20 PROCEDURE — 87081 CULTURE SCREEN ONLY: CPT | Performed by: INTERNAL MEDICINE

## 2022-03-20 PROCEDURE — 99223 1ST HOSP IP/OBS HIGH 75: CPT | Performed by: INTERNAL MEDICINE

## 2022-03-20 PROCEDURE — 83735 ASSAY OF MAGNESIUM: CPT | Performed by: INTERNAL MEDICINE

## 2022-03-20 PROCEDURE — 94003 VENT MGMT INPAT SUBQ DAY: CPT

## 2022-03-20 PROCEDURE — 80307 DRUG TEST PRSMV CHEM ANLYZR: CPT | Performed by: EMERGENCY MEDICINE

## 2022-03-20 PROCEDURE — 99449 NTRPROF PH1/NTRNET/EHR 31/>: CPT | Performed by: EMERGENCY MEDICINE

## 2022-03-20 RX ORDER — DULOXETIN HYDROCHLORIDE 60 MG/1
60 CAPSULE, DELAYED RELEASE ORAL DAILY
Status: DISCONTINUED | OUTPATIENT
Start: 2022-03-20 | End: 2022-03-21

## 2022-03-20 RX ORDER — DULOXETIN HYDROCHLORIDE 60 MG/1
60 CAPSULE, DELAYED RELEASE ORAL DAILY
Status: DISCONTINUED | OUTPATIENT
Start: 2022-03-20 | End: 2022-03-20

## 2022-03-20 RX ORDER — SACCHAROMYCES BOULARDII 250 MG
250 CAPSULE ORAL 2 TIMES DAILY
Status: DISCONTINUED | OUTPATIENT
Start: 2022-03-20 | End: 2022-03-23 | Stop reason: HOSPADM

## 2022-03-20 RX ORDER — HEPARIN SODIUM 5000 [USP'U]/ML
5000 INJECTION, SOLUTION INTRAVENOUS; SUBCUTANEOUS EVERY 8 HOURS SCHEDULED
Status: DISCONTINUED | OUTPATIENT
Start: 2022-03-20 | End: 2022-03-23 | Stop reason: HOSPADM

## 2022-03-20 RX ORDER — VANCOMYCIN HYDROCHLORIDE 1 G/200ML
1000 INJECTION, SOLUTION INTRAVENOUS EVERY 24 HOURS
Status: DISCONTINUED | OUTPATIENT
Start: 2022-03-21 | End: 2022-03-21

## 2022-03-20 RX ORDER — PRAVASTATIN SODIUM 20 MG
20 TABLET ORAL DAILY
Status: DISCONTINUED | OUTPATIENT
Start: 2022-03-20 | End: 2022-03-23 | Stop reason: HOSPADM

## 2022-03-20 RX ORDER — NALOXONE HYDROCHLORIDE 0.4 MG/ML
0.4 INJECTION, SOLUTION INTRAMUSCULAR; INTRAVENOUS; SUBCUTANEOUS
Status: DISCONTINUED | OUTPATIENT
Start: 2022-03-20 | End: 2022-03-23 | Stop reason: HOSPADM

## 2022-03-20 RX ORDER — SODIUM CHLORIDE 9 MG/ML
50 INJECTION, SOLUTION INTRAVENOUS CONTINUOUS
Status: DISPENSED | OUTPATIENT
Start: 2022-03-20 | End: 2022-03-20

## 2022-03-20 RX ORDER — LISINOPRIL 10 MG/1
20 TABLET ORAL DAILY
Status: DISCONTINUED | OUTPATIENT
Start: 2022-03-20 | End: 2022-03-20

## 2022-03-20 RX ORDER — INSULIN GLARGINE 100 [IU]/ML
10 INJECTION, SOLUTION SUBCUTANEOUS
Status: DISCONTINUED | OUTPATIENT
Start: 2022-03-20 | End: 2022-03-21

## 2022-03-20 RX ORDER — VANCOMYCIN HYDROCHLORIDE 1 G/200ML
15 INJECTION, SOLUTION INTRAVENOUS EVERY 12 HOURS
Status: DISCONTINUED | OUTPATIENT
Start: 2022-03-20 | End: 2022-03-20

## 2022-03-20 RX ORDER — LISINOPRIL 10 MG/1
10 TABLET ORAL DAILY
Status: DISCONTINUED | OUTPATIENT
Start: 2022-03-20 | End: 2022-03-23 | Stop reason: HOSPADM

## 2022-03-20 RX ORDER — LISINOPRIL 10 MG/1
10 TABLET ORAL DAILY
Status: DISCONTINUED | OUTPATIENT
Start: 2022-03-20 | End: 2022-03-20

## 2022-03-20 RX ORDER — ONDANSETRON 2 MG/ML
4 INJECTION INTRAMUSCULAR; INTRAVENOUS EVERY 6 HOURS PRN
Status: DISCONTINUED | OUTPATIENT
Start: 2022-03-20 | End: 2022-03-23 | Stop reason: HOSPADM

## 2022-03-20 RX ORDER — OXYCODONE HYDROCHLORIDE 10 MG/1
30 TABLET ORAL EVERY 8 HOURS
Status: DISCONTINUED | OUTPATIENT
Start: 2022-03-20 | End: 2022-03-22

## 2022-03-20 RX ORDER — POLYETHYLENE GLYCOL 3350 17 G/17G
17 POWDER, FOR SOLUTION ORAL DAILY PRN
Status: DISCONTINUED | OUTPATIENT
Start: 2022-03-20 | End: 2022-03-23 | Stop reason: HOSPADM

## 2022-03-20 RX ORDER — AMLODIPINE BESYLATE 10 MG/1
10 TABLET ORAL DAILY
Status: DISCONTINUED | OUTPATIENT
Start: 2022-03-20 | End: 2022-03-23 | Stop reason: HOSPADM

## 2022-03-20 RX ADMIN — INSULIN GLARGINE 10 UNITS: 100 INJECTION, SOLUTION SUBCUTANEOUS at 21:33

## 2022-03-20 RX ADMIN — SODIUM CHLORIDE 1000 ML: 0.9 INJECTION, SOLUTION INTRAVENOUS at 00:55

## 2022-03-20 RX ADMIN — VANCOMYCIN HYDROCHLORIDE 1000 MG: 1 INJECTION, SOLUTION INTRAVENOUS at 02:34

## 2022-03-20 RX ADMIN — NALXONE HYDROCHLORIDE 0.4 MG: 0.4 INJECTION INTRAMUSCULAR; INTRAVENOUS; SUBCUTANEOUS at 01:56

## 2022-03-20 RX ADMIN — INSULIN LISPRO 2 UNITS: 100 INJECTION, SOLUTION INTRAVENOUS; SUBCUTANEOUS at 02:32

## 2022-03-20 RX ADMIN — Medication 250 MG: at 18:04

## 2022-03-20 RX ADMIN — SODIUM CHLORIDE 50 ML/HR: 0.9 INJECTION, SOLUTION INTRAVENOUS at 03:37

## 2022-03-20 RX ADMIN — OXYCODONE HYDROCHLORIDE 30 MG: 10 TABLET ORAL at 21:32

## 2022-03-20 RX ADMIN — INSULIN GLARGINE 10 UNITS: 100 INJECTION, SOLUTION SUBCUTANEOUS at 02:32

## 2022-03-20 RX ADMIN — ACETAMINOPHEN 650 MG: 650 SUPPOSITORY RECTAL at 00:16

## 2022-03-20 RX ADMIN — CEFEPIME HYDROCHLORIDE 2000 MG: 2 INJECTION, POWDER, FOR SOLUTION INTRAVENOUS at 10:16

## 2022-03-20 RX ADMIN — OXYCODONE HYDROCHLORIDE 30 MG: 10 TABLET ORAL at 14:12

## 2022-03-20 RX ADMIN — HEPARIN SODIUM 5000 UNITS: 5000 INJECTION INTRAVENOUS; SUBCUTANEOUS at 13:35

## 2022-03-20 RX ADMIN — HEPARIN SODIUM 5000 UNITS: 5000 INJECTION INTRAVENOUS; SUBCUTANEOUS at 21:32

## 2022-03-20 RX ADMIN — CEFEPIME HYDROCHLORIDE 2000 MG: 2 INJECTION, POWDER, FOR SOLUTION INTRAVENOUS at 20:05

## 2022-03-20 RX ADMIN — HEPARIN SODIUM 5000 UNITS: 5000 INJECTION INTRAVENOUS; SUBCUTANEOUS at 06:26

## 2022-03-20 RX ADMIN — DULOXETINE HYDROCHLORIDE 60 MG: 60 CAPSULE, DELAYED RELEASE ORAL at 13:35

## 2022-03-20 NOTE — ASSESSMENT & PLAN NOTE
· Present on admission:  Fever 101 6, leukocytosis, tachycardia  Source likely pneumonia  UA showed no wbc, bacteria  Patient had bilateral lower extremity venous stasis did not appreciate any cellulitis  Has bilateral redness medial thigh bilaterally, that it did not seem infected  · Received ceftriaxone and azithromycin for pneumonia suspicion in emergency room  · Lactic within normal limits  · CXR worsening bilateral opacities    Plan:   · Patient had repeated admissions the past few months will order cefepime and vancomycin     · MRSA swab  · Check procalcitonin  · Monitor CBC, VS   · Follow-up for blood culture

## 2022-03-20 NOTE — ASSESSMENT & PLAN NOTE
· 160 - 180 mg a day per ( 30 mg 6 times a day) for back pain   · Hold for now   · Monitor for withdrawal

## 2022-03-20 NOTE — ASSESSMENT & PLAN NOTE
Recent Labs     03/19/22 2036   HGB 11 1*     ·   · Chronic microcytic hypochromic anemia  · Currently stable at baseline

## 2022-03-20 NOTE — ASSESSMENT & PLAN NOTE
· On and off in the past 3 years  Per , she was getting them when she had pneumonias or infections and opiates overdose  · Left hand weaker that the right one after she fell -Was seen by neurology during previous admission, was thought to be muscular

## 2022-03-20 NOTE — ASSESSMENT & PLAN NOTE
· Patient admitted with altered mental status  Per , she was able to communicate while on the ambulance but got altered here  · Per ER, she was able to answer questions here and had pinpoint pupils, improved after narcan( total 0 4 mg), but was even better after she was placed on BiPap   · During my encounter, she answered some of the question, but delayed  · Per , she was taking 30 mg oxycodone 6 time a day( wife confirmed, she said 6- 7 times a day)   said it was ordered like that by her pcp from [place]  He said he is giving her around 160-180 mg a day, along with Gabapentin prn( last dose yesterday)  I told him that she had similar problems in the past and that she was supposed to take 30 mg tid  He said " I know but I don't want her to be in pain"  He mentioned that she started to have hands jerkings Saturday around 2 o'clock  As well he mentioned her breathing sounds like rumbling since yesterday  · Most likely etiology is opiates overdose, infection, hypoxia and hypercapnia  TSH, ammonia, comma panel within normal limits  CT head showed no cute changes  Urine drug screen showed opiates   said that sometimes his wife breathing stops while she is sleeping and that he has to reposition her head  She was supposed to have sleep study done, but no spots were available until late in April  Plan:   · Continue BiPAP   · Continue narcan prn   · Monitor for opiates withdrawal   · NPO until more alert   · Hold Cymbalta, gabapentin and oxycodone for now     · Frecvent neuro checks  · Treat the infection

## 2022-03-20 NOTE — QUICK NOTE
I spoke with  Ale Morillo , complete update about patients current clinical condition and treatment plan provided, all questions answered  Attempt to contact patient's primary care physician dr Trinity Watts unsuccessful, try again Monday in order to establish accurate medication reconciliation

## 2022-03-20 NOTE — ASSESSMENT & PLAN NOTE
· Most likely secondary to opiate overdose and sedative   · VBG improved after Narcan and with BiPap   · Continue Bipap and Narcan prn

## 2022-03-20 NOTE — ASSESSMENT & PLAN NOTE
Lab Results   Component Value Date    EGFR 31 03/19/2022    EGFR 36 02/23/2022    EGFR 36 02/22/2022    CREATININE 1 70 (H) 03/19/2022    CREATININE 1 51 (H) 02/23/2022    CREATININE 1 49 (H) 02/22/2022     · Baseline appears to be between 1 2- 1 6   · Slightly elevated   · Will give gentle hydration with normal saline 50 cc/h for 10 h   · Monitor   · Renally dosed medication, avoid nephrotoxins and hypotension

## 2022-03-20 NOTE — ASSESSMENT & PLAN NOTE
· Blood pressure repeated upon presentation  · Continue home medication with lisinopril 10 mg daily and amlodipine 10 mg  · Monitor

## 2022-03-20 NOTE — PROGRESS NOTES
Vancomycin IV Pharmacy-to-Dose Consultation    Cecelia Mayes is a 73 y o  female who is currently receiving Vancomycin IV with management by the Pharmacy Consult service  Relevant clinical data and objective / subjective history reviewed  Vancomycin Assessment:  Indication: pneumonia  Micro:             3/20 MRSA culture: in process            3/19 blood culture x 2: pending  Procalcitonin: 0 19 (3/19)  Renal Function: at baseline (SCr 1 2-1 6); SCr 1 59 (3/20)  Days of Therapy: 1  Current Dose: 1000 mg IV q12h  Goal Trough: 15-20 (appropriate for most indications)   Goal AUC(24h): 400-600      Vancomycin Plan:  New Dosing: change to 1000 mg IV q24h (next dose: 3/21 0000)  Predicted Trough / AUC: 18 6 / 624  Next Level: trough 3/22 at 2330  Renal Function Monitoring: continue to monitor SCr and UOP; next BMP scheduled for 3/21 AM      Pharmacy will continue to follow closely for s/sx of nephrotoxicity, infusion reactions and appropriateness of therapy  BMP and CBC will be ordered per protocol  We will continue to follow the patient's culture results and clinical progress daily         Mac Harrison PharmD  Pharmacist

## 2022-03-20 NOTE — CONSULTS
INTERPROFESSIONAL (PHONE) [facility]  Eula Quezada 73 y o  female MRN: [id number]  Unit/Bed#: S -01 Encounter: [id number]      Reason for Consult / Principal Problem: AMS/jerking  Inpatient consult to Toxicology  Consult performed by: Ric Dutta MD  Consult ordered by: Jamey Steinberg MD        03/20/22      ASSESSMENT:  18-year-old female admitted for encephalopathy, potentially preciptated by polypharmacy ingestion  - oxycodone exposure  - gabapentin exposure  - duloxetine exposure  Also found to have  - hyponatremia  - YVONNE  - hyperglycemia  - leukocytosis  - anemia  - thrombocytopenia    RECOMMENDATIONS:    The patient's altered mental status could be explained by her multiple medications potentially cause CNS depression including gabapentin, oxycodone, duloxetine  In the setting and SSRI overdose, we could potentially see myoclonus, hyperreflexia due to serotonergic toxicity  At this time, the patient has no evidence of any particular toxidrome and has normal vital signs  Her oxycodone and duloxetine can be continued as needed, however would strongly recommend scaling down her dosing if she is taking oxycodone for chronic pain  Alternatively, if oxycodone is appropriate to be discontinued, you can treat her with supportive care and manage any withdrawal symptoms  Suboxone could potentially also be an option for her if she is interested in discontinuing narcotics  I would discontinue her gabapentin in the setting of YVONNE as gabapentin toxicity is potentiated with renal dysfunction  Summary:    1  Continue duloxetine  2  Assess patient's need to remain on oxycodone  3  Discuss stopping narcotics and managing withdrawal with either supportive care or potentially with MAT  4  Stop gabapentin  5  Consider tapering patient's oxycodone dosing  6    Can consider treating anxiety with benzodiazepines, however would not offer these as outpatient prescription      For further questions, please contact the medical  on call via [place] Text or throughl the [facility] Operator Service or [employer]  Please see additional teaching note below:      Gabapentin Toxicity Discussion  Department of Medical Toxicology   [address]    Background: Gabapentin is an antiepileptic medication  It is often times used off-label for mood stabilization, sleep, various withdrawal states, as well as chronic pain and neuropathy  In recent years, it has abuse potential has been increasing, primarily among inmates and it is now a scheduled narcotic  Abuse can be via ingestion or insufflation, but overdose is generally via ingestion  Pharmacology: Although gabapentin acts through calcium channel inhibition, its mechanism of action is not fully known  Gabapentin is structurally similar to gammaaminobutyric acid (ROSALBA) but does not bind to ROSALBA receptors  Bioavailability relatively decreases at increased dosage because of saturation of the l-amino acid transport system  Dosage adjustments are necessary in patients with creatinine clearance of 60 mL/min or less  It is not metabolized by and does not affect the [id number] system  It has no significant interactions with other antiepileptics  Toxicity: Its toxic affects are much like any other sedative hypnotic, in which it causes central nervous system depression with normal vital signs and no significant respiratory depression unless the patient is otherwise prone due to pre-existing conditions such as sleep apnea or obesity  Like most antiepileptic agents, a trio of symptoms is generally present which includes central nervous system depression, nystagmus, and ataxia  Treatment: Treatment requires supportive care and airway monitoring  The symptoms generally lasts about 8-12 hours    Patient's can be considered stable from a toxicological standpoint when they are awake, with normal vital signs and ambulatory ability  Presence of persistent toxicity can make them a fall risk without having their mobility assessed prior to clearance  Resources:   Ramón Martel Toxicologic Emergencies 10/E  Rafi Mclaughlin  Anticonvulsant Toxicity  [employer]  [employer] no    Medical Toxicology  [facility]  Discussion: Opiods/Opiates     Introduction: Opiates are naturally occurring compounds derived from the juice of the poppy Papaver somniferum while opiods are these as well as synthetically derived opiate analogs  They are used predominately as pain medications and are frequently abused  Metabolism/Pharmacokinetics: Opiods stimulate opiate receptors in the CNS causing sedation and respiratory depression  Death typically results from respiratory failure secondary to apnea or aspiration  Peak effect typically occurs in 2-3 hours after ingestion by may be delayed secondary to decreased gastric motility or extended release preparations  Rates of elimination vary widely from 1 to 30 hours depending on the formulation  Toxic Dose:  Varies widely based on the medication and the patients tolerance to the drug  Clinical Presentation:      Mild-Moderate Overdose:  Lethargy, pinpoint pupils, decreased blood pressure, decreased heart rate, decreased bowel sounds, muscle laxity  Severe Overdose:  Coma, respiratory depression, apnea, noncardiogenic pulmonary edema, death  Specific Agents:  Codeine, dextromethorphan, meperiding, propoxyphene, and tramadol may cause seizures  Propoxyphene may produce cardiotoxicity  Withdrawal symptoms:  While the withdrawal symptoms make may the patient wish they were dead, they are not fatal   Symptoms include anxiety, piloerection, abdominal cramps, diarrhea, and insomnia        Diagnosis:  The diagnosis of opiod intoxication/withdrawal is typically clinical   While a urine drug screen can detect morphine it is unable to detect synthetic opiods and the utility of the UDS in adult patients is limited  Treatment:    1) The key intervention necessary to avoid morbidity/mortality in the opiod overdose patient is appropriate airway/respiratory management  2) Naloxone (Narcan) can be used to reverse an overdose but should be used cautiously in opiod dependent patient (0 2 to 0 4 mg IV, repeated to desired effect) given the potential for withdrawal symptoms or unmasking of a co-ingestant such as a sympathomimetic  Naloxone has a half-fife of only 1-2 hours (less than many narcotics) so the patient must be monitored closely for sedation/apnea for 4 hours after the last dose of narcan  Decontamination:  Activated charcoal may be used in acute ingestions but the risk of aspiration limits its use in the non-intubated patient  References    Opiates and Opiods  Kathy Evans  In [facility]ot [facility], ed  [address]: Monie, 2004: pp  [phone]  Medical Toxicology Teaching Note  [address] [facility]  Serotonin Syndrome  Updated 10/20/2011    Introduction: The most common severe adverse effect of SSRIs such as escitalopram is serotonergic syndrome  This constellation of signs/sxs was first reported in patients taking MAOIs who were given another drug that enhanced serotonergic activity  Pathophysiology:  While the mechanism is not fully understood, it involves excessive stimulation of the serotonin 5-HT2A receptors  Clinical Manifestations: The classic triad of serotonin syndrome consists of AMS, autonomic instability, and neuromuscular hyperactivity  Symptoms include agitation, myoclonus, hyperreflexia (greater in lower extremities than upper extremities), diaphoresis, tremor, diarrhea, incoordination, muscle rigidity, and hyperthermia  Minor manifestations are common when initiating SSRIs    Life-threatening toxicity results from hyperthermia which is caused by excessive muscle activity  Prolonged hyperthermia can cause protein denaturing, enzymatic dysfunction, metabolic acidosis, rhabdomyolysis, myoglobinuria, renal failure, hepatic injury, DIC or ARDS  Diagnosis:  While several diagnostic criteria exist a simplified modality is to evaluate for the triad of autonomic instability, AMS, and neuromuscular hyperactivity with exposure to as SSRI, MAOI, SNRI, or other offending medication  It should be noted that patients typically must be exposed to multiple serotonergic agents or take a massive overdose in order to develop serotonergic syndrome  Management:  Treatment focuses on limiting the neuromuscular hyperactivity since this is what will lead to the fatal hyperthermia  1) Benzodiazepines are the first line agent for limiting this hyperactivity  2) While there is some evidence that cyproheptadine may decrease symptoms the benefit appears to be limited to those patients with mild to moderate toxicity  Furthermore, since cyproheptadine is only available in PO formulation, its use is limited in the severely toxic patient (consider administering through an NG tube)  3) Case reports indicate that atypical antipsycotics may also be beneficial       4) When these modalities are ineffective in preventing hyperthermia, neuromuscular blockade is necessary (i e  Vecuronium)  References:  Misty Desai  Poisoning & Drug Overdose  2007  Nick Álvarez Toxicologic Emergencies  2006  Hx and PE limited by the dynamics of a phone consultation  I have not personally interviewed or evaluated the patient, but only advised based on the information provided to me  Primary provider is responsible for all clinical decisions  Pertinent history, physical exam and clinical findings and course discussed: Domingo Butler is a 73y o  year old female who was admitted overnight for altered mental status and jerking movements    Past medical history significant for ETOH abuse, diabetes, chronic pain  On arrival to the ED, patient was noted to have pinpoint pupils  Patient is noted to be on oxycodone, gabapentin, duloxetine  Patient received 2 doses of naloxone in the emergency department with restoration of mentation  Patient was also found to be hyponatremic with an YVONNE  Review of systems and physical exam not performed by me      Historical Information   Past Medical History:   Diagnosis Date    Abnormal head CT 7/14/2017    Acute kidney injury ([address] ) 8/19/2018    Cellulitis 1/10/2017    Chronic back pain     Cirrhosis (HCC)     Closed fracture of multiple ribs of right side 7/14/2017    Degenerative disc disease at L5-S1 level     Diabetes mellitus (HCC)     GERD (gastroesophageal reflux disease)     History of methicillin resistant staphylococcus aureus (MRSA) 08/21/2018    negative nasal culture- isolation and hx of mrsa removed 8/20/2018    Hyperkalemia 4/25/2018    Hypertension     Hypocalcemia 4/25/2018    Hyponatremia 7/14/2017     Past Surgical History:   Procedure Laterality Date    CHOLECYSTECTOMY      JOINT REPLACEMENT      OOPHORECTOMY      NM INCISION,IMPLANT,NEUROELEC,SACRAL NERVE N/A 9/20/2021    Procedure: INSERTION NEUROSTIMULATOR ELECTRODE ARRAY SACRAL NERVE; FLUOROSCOPY; ELECTRONIC ANALYSIS;  Surgeon: Robbin Funes MD;  Location: [facility];  Service: UroGynecology           SACRAL NERVE STIMULATOR PLACEMENT N/A 9/28/2021    Procedure: IPG INSERTION AND PROGRAMMING;  Surgeon: Robbin Funes MD;  Location: [facility];  Service: UroGynecology            Social History   Social History     Substance and Sexual Activity   Alcohol Use Not Currently    Alcohol/week: 0 0 standard drinks     Social History     Substance and Sexual Activity   Drug Use Not Currently    Types: Marijuana    Comment: last used 1 year ago      Social History     Tobacco Use   Smoking Status Former Smoker    Types: E-Cigarettes    Start date: 1976   Loulou Lantigua Quit date: 2019    Years since quittin 9   Smokeless Tobacco Never Used     Family History   Problem Relation Age of Onset    Rheum arthritis Mother     Alzheimer's disease Mother     Lupus Mother         Prior to Admission medications    Medication Sig Start Date End Date Taking?  Authorizing Provider   amLODIPine (NORVASC) 10 mg tablet Take 1 tablet (10 mg total) by mouth daily 18  Yes Tricia Michael MD   DULoxetine (CYMBALTA) 60 mg delayed release capsule Take 60 mg by mouth daily   Yes Historical Provider, MD   gabapentin (NEURONTIN) 300 mg capsule 300 mg daily at bedtime as needed   Yes Historical Provider, MD   insulin glargine (LANTUS SOLOSTAR) 100 units/mL injection pen Inject 20 Units under the skin daily at bedtime     Yes Historical Provider, MD   lisinopril (ZESTRIL) 20 mg tablet Take 10 mg by mouth daily    Yes Historical Provider, MD   metoclopramide (REGLAN) 10 mg tablet Take 10 mg by mouth daily   Yes Historical Provider, MD   Mirabegron ER (Myrbetriq) 50 MG TB24 Take 50 mg by mouth in the morning   Yes Historical Provider, MD   pravastatin (PRAVACHOL) 20 mg tablet Take 20 mg by mouth daily   Yes Historical Provider, MD   saccharomyces boulardii (FLORASTOR) 250 mg capsule Take 1 capsule (250 mg total) by mouth 2 (two) times a day 22  Yes Rosa Patterson MD   docusate sodium (COLACE) 100 mg capsule Take 1 capsule (100 mg total) by mouth 2 (two) times a day as needed for constipation  Patient not taking: Reported on 3/15/2022  9/20/21   Reyes De Santiago MD   Insulin Aspart FlexPen 100 UNIT/ML SOPN Inject 3 Units under the skin 3 (three) times a day with meals If bs>250    Historical Provider, MD   nystatin (MYCOSTATIN) powder Apply topically 3 (three) times a day 3/15/22   APRYL Martins   psyllium (METAMUCIL) packet Take 1 packet by mouth 2 (two) times a day 22   Rosa Patterson MD       Current Facility-Administered Medications Medication Dose Route Frequency    amLODIPine (NORVASC) tablet 10 mg  10 mg Oral Daily    cefepime (MAXIPIME) 2 g/50 mL dextrose IVPB  2,000 mg Intravenous Q12H    heparin (porcine) subcutaneous injection 5,000 Units  5,000 Units Subcutaneous Q8H [facility]    insulin glargine (LANTUS) subcutaneous injection 10 Units 0 1 mL  10 Units Subcutaneous HS    insulin lispro (HumaLOG) 100 units/mL subcutaneous injection 1-5 Units  1-5 Units Subcutaneous HS    lisinopril (ZESTRIL) tablet 10 mg  10 mg Oral Daily    naloxone (NARCAN) 0 04 mg/mL syringe 0 4 mg  0 4 mg Intravenous Once    naloxone (NARCAN) injection 0 4 mg  0 4 mg Intravenous Q1H PRN    naloxone nasal- Given to patient by provider at discharge  (NARCAN) 4 mg/0 1 mL nasal spray 4 mg  4 mg Does not apply Once    ondansetron (ZOFRAN) injection 4 mg  4 mg Intravenous Q6H PRN    pravastatin (PRAVACHOL) tablet 20 mg  20 mg Oral Daily    saccharomyces boulardii (FLORASTOR) capsule 250 mg  250 mg Oral BID    sodium chloride 0 9 % infusion  50 mL/hr Intravenous Continuous    vancomycin (VANCOCIN) IVPB (premix in dextrose) 1,000 mg 200 mL  15 mg/kg (Adjusted) Intravenous Q12H     Facility-Administered Medications Ordered in Other Encounters   Medication Dose Route Frequency    ferrous sulfate tablet 325 mg  325 mg Oral Daily With Breakfast       Allergies   Allergen Reactions    Pollen Extract Allergic Rhinitis       Objective       Intake/Output Summary (Last 24 hours) at 3/20/2022 1042  Last data filed at 3/20/2022 0334  Gross per 24 hour   Intake 2500 ml   Output --   Net 2500 ml       Invasive Devices:   Peripheral IV 03/19/22 Left;Ventral (anterior) Forearm (Active)       Peripheral IV 03/19/22 Right Antecubital (Active)   Site Assessment WDL; Clean;Dry; Intact 03/19/22 2224   Dressing Type Transparent 03/19/22 2224   Line Status Blood return noted; Saline locked 03/19/22 2224   Dressing Status Clean;Dry; Intact 03/19/22 2224       Vitals   Vitals:    03/20/22 0530 03/20/22 0630 03/20/22 0700 03/20/22 0816   BP: 112/56 122/59 131/58 107/80   TempSrc:       Pulse: 70 82 76 82   Resp: 18 18 16 17   Patient Position - Orthostatic VS: Lying Lying     Temp:             EKG, Pathology, and/or Other Studies: I have personally reviewed pertinent films in PACS with a Radiologist  and pending      Lab Results: I have personally reviewed pertinent reports  Labs:    Results from last 7 days   Lab Units 03/20/22  0626   WBC Thousand/uL 11 41*   HEMOGLOBIN g/dL 9 3*   HEMATOCRIT % 31 8*   PLATELETS Thousands/uL 130*   NEUTROS PCT % 86*   LYMPHS PCT % 8*   MONOS PCT % 5      Results from last 7 days   Lab Units 03/20/22  0626 03/19/22 2036 03/19/22  2036   SODIUM mmol/L 133*   < > 134*   POTASSIUM mmol/L 5 0   < > 6 0*   CHLORIDE mmol/L 102   < > 100   CO2 mmol/L 26   < > 27   BUN mg/dL 29*   < > 29*   CREATININE mg/dL 1 59*   < > 1 70*   CALCIUM mg/dL 7 2*   < > 7 1*   ALK PHOS U/L  --   --  160*   ALT U/L  --   --  23   AST U/L  --   --  35   MAGNESIUM mg/dL 2 1   < > 2 0    < > = values in this interval not displayed        Results from last 7 days   Lab Units 03/19/22 2036   INR  1 00   PTT seconds 39*     Results from last 7 days   Lab Units 03/19/22  2219   LACTIC ACID mmol/L 1 2     0   Lab Value Date/Time    TROPONINI <0 03 10/22/2020 1438    TROPONINI <0 02 07/15/2020 1026    TROPONINI <0 02 07/15/2020 0557    TROPONINI <0 02 07/15/2020 0039    TROPONINI <0 02 05/14/2020 1148    TROPONINI <0 02 04/30/2020 1919    TROPONINI <0 02 02/29/2020 2255    TROPONINI <0 02 08/18/2018 2242    TROPONINI <0 02 02/20/2018 1504    TROPONINI <0 02 07/14/2017 1418     Results from last 7 days   Lab Units 03/19/22  2337   PH RUTH  7 355   PCO2 RUTH mm Hg 40 7*   PO2 RUTH mm Hg 109 6*   HCO3 RUTH mmol/L 22 2*   O2 CONTENT RUTH ml/dL 13 5   O2 HGB, VENOUS % 94 4*     Results from last 7 days   Lab Units 03/19/22 2036   ACETAMINOPHEN LVL ug/mL <2*   ETHANOL LVL mg/dL <3   SALICYLATE LVL mg/dL <3* Invalid input(s): EXTPREGUR      Imaging Studies: I have personally reviewed pertinent reports  Counseling / Coordination of Care  Total time spent today 45 minutes  This was a phone consultation

## 2022-03-20 NOTE — ED PROVIDER NOTES
History  Chief Complaint   Patient presents with    Altered Mental Status     Per EMS, pt has AMS starting today with tremors to bilateral hands  History provided by:  Patient, EMS personnel and spouse  History limited by:  Mental status change   used: No      71-year-old female brought by EMS for evaluation of mental status change, tremors today  Patient unable to provide history  Briefly responded to name on arrival   Arrived on supplemental oxygen with labile pulse ox but may be abnormal reading secondary to some tremoring in both arms and legs  Does not appear rhythmic  Does not appear to be having a seizure  Pupils are pinpoint  Based on chart review she takes 30 mg of oxycodone 4 times daily  Given 0 1 mg of Narcan with some initial improvement, followed by additional dose with further improvement in mental status  Able to respond to her name and communicate although does not seem to be at baseline yet  Fingerstick glucose normal   Based on chart review and , the patient has had admissions for similar symptoms with tremors and acute encephalopathy with suspected etiology as over-sedation from chronic opioid use  Had COVID pneumonia requiring admission 2 months ago  History of chronic back pain, cirrhosis, hypertension, diabetes, chronic edema/erythema of lower legs  Per PDMP: Last fill 60 tablets 30 mg oxycodone for 10 day supply on 3/17  This is increased frequency from 4 times daily to 6 times daily from her prior prescriptions  Prior to Admission Medications   Prescriptions Last Dose Informant Patient Reported? Taking?    DULoxetine (CYMBALTA) 60 mg delayed release capsule  Self Yes No   Sig: Take 60 mg by mouth daily   Insulin Aspart FlexPen 100 UNIT/ML SOPN  Self Yes No   Sig: Inject 3 Units under the skin 3 (three) times a day with meals If bs>250   Mirabegron ER (Myrbetriq) 50 MG TB24  Self Yes No   Sig: Take 50 mg by mouth in the morning   amLODIPine (NORVASC) 10 mg tablet  Self No No   Sig: Take 1 tablet (10 mg total) by mouth daily   docusate sodium (COLACE) 100 mg capsule  Self No No   Sig: Take 1 capsule (100 mg total) by mouth 2 (two) times a day as needed for constipation   Patient not taking: Reported on 3/15/2022    gabapentin (NEURONTIN) 300 mg capsule   Yes No   Si mg daily at bedtime as needed   insulin glargine (LANTUS SOLOSTAR) 100 units/mL injection pen  Self Yes No   Sig: Inject 20 Units under the skin daily at bedtime     lisinopril (ZESTRIL) 20 mg tablet  Self Yes No   Sig: Take 10 mg by mouth daily    metoclopramide (REGLAN) 10 mg tablet  Self Yes No   Sig: Take 10 mg by mouth daily   nystatin (MYCOSTATIN) powder   No No   Sig: Apply topically 3 (three) times a day   pravastatin (PRAVACHOL) 20 mg tablet   Yes No   Sig: Take 20 mg by mouth daily   psyllium (METAMUCIL) packet   No No   Sig: Take 1 packet by mouth 2 (two) times a day   saccharomyces boulardii (FLORASTOR) 250 mg capsule   No No   Sig: Take 1 capsule (250 mg total) by mouth 2 (two) times a day      Facility-Administered Medications: None       Past Medical History:   Diagnosis Date    Abnormal head CT 2017    Acute kidney injury ([facility] [address] ) 2018    Cellulitis 1/10/2017    Chronic back pain     Cirrhosis (HCC)     Closed fracture of multiple ribs of right side 2017    Degenerative disc disease at L5-S1 level     Diabetes mellitus (HCC)     GERD (gastroesophageal reflux disease)     History of methicillin resistant staphylococcus aureus (MRSA) 2018    negative nasal culture- isolation and hx of mrsa removed 2018    Hyperkalemia 2018    Hypertension     Hypocalcemia 2018    Hyponatremia 2017       Past Surgical History:   Procedure Laterality Date    CHOLECYSTECTOMY      JOINT REPLACEMENT      OOPHORECTOMY      WI INCISION,IMPLANT,NEUROELEC,SACRAL NERVE N/A 2021    Procedure: INSERTION NEUROSTIMULATOR ELECTRODE ARRAY SACRAL NERVE; FLUOROSCOPY; ELECTRONIC ANALYSIS;  Surgeon: Yasmine Low MD;  Location: [facility];  Service: UroGynecology           SACRAL NERVE STIMULATOR PLACEMENT N/A 2021    Procedure: IPG INSERTION AND PROGRAMMING;  Surgeon: Yasmine Low MD;  Location: [facility];  Service: UroGynecology              Family History   Problem Relation Age of Onset    Rheum arthritis Mother     Alzheimer's disease Mother     Lupus Mother      I have reviewed and agree with the history as documented      E-Cigarette/Vaping    E-Cigarette Use Current Every Day User     Cartridges/Day 1     Comments VAPE      E-Cigarette/Vaping Substances    Nicotine Yes     THC Yes not using anymore     Social History     Tobacco Use    Smoking status: Former Smoker     Types: E-Cigarettes     Start date: 1976     Quit date: 2019     Years since quittin 9    Smokeless tobacco: Never Used   Vaping Use    Vaping Use: Every day    Substances: Nicotine, THC (not using anymore)   Substance Use Topics    Alcohol use: Not Currently     Alcohol/week: 0 0 standard drinks    Drug use: Not Currently     Types: Marijuana     Comment: last used 1 year ago        Review of Systems    Physical Exam  Physical Exam    Vital Signs  ED Triage Vitals   Temperature Pulse Respirations Blood Pressure SpO2   22   100 3 °F (37 9 °C) (!) 122 22 (!) 172/100 93 %      Temp Source Heart Rate Source Patient Position - Orthostatic VS BP Location FiO2 (%)   22 --   Oral Monitor Lying Right arm       Pain Score       22 0016       Med Not Given for Pain - for MAR use only           Vitals:    22 2130 22 2223 22 2241 22 2345   BP: 118/56 157/74 124/83 162/69   Pulse: (!) 112 104 (!) 15 (!) 112   Patient Position - Orthostatic VS: Lying Lying  Sitting         Visual Acuity  Visual Acuity Most Recent Value   L Pupil Size (mm) 2   R Pupil Size (mm) 2          ED Medications  Medications   naloxone (NARCAN) 0 4 mg/mL injection **ADS Override Pull** (0 4 mg  Not Given 3/19/22 2031)   naloxone nasal- Given to patient by provider at discharge   [facility]) 4 mg/0 1 mL nasal spray 4 mg (0 mg Does not apply Hold 3/19/22 2352)   naloxone (NARCAN) 0 04 mg/mL syringe 0 4 mg (0 4 mg Intravenous Not Given 3/19/22 2224)   azithromycin (ZITHROMAX) 500 mg in sodium chloride 0 9% 250mL IVPB 500 mg (500 mg Intravenous New Bag 3/19/22 2338)   naloxone [facility]) injection 0 4 mg (has no administration in time range)   sodium chloride 0 9 % bolus 1,000 mL (has no administration in time range)   naloxone (NARCAN) 0 04 mg/mL syringe 0 1 mg (0 1 mg Intravenous Given 3/19/22 2015)   naloxone (NARCAN) 0 04 mg/mL syringe 0 1 mg (0 1 mg Intravenous Given 3/19/22 2020)   naloxone (NARCAN) 0 04 mg/mL syringe 0 2 mg (0 2 mg Intravenous Given 3/19/22 2030)   sodium chloride 0 9 % bolus 1,000 mL (0 mL Intravenous Stopped 3/19/22 2314)   calcium gluconate 1 g in sodium chloride 0 9% 50 mL (premix) (0 g Intravenous Stopped 3/19/22 2244)   naloxone (NARCAN) 0 4 mg/mL injection **ADS Override Pull** (0 4 mg  Given 3/19/22 2212)   ceftriaxone (ROCEPHIN) 1 g/50 mL in dextrose IVPB (0 mg Intravenous Stopped 3/19/22 2336)   acetaminophen (TYLENOL) rectal suppository 650 mg (650 mg Rectal Given 3/20/22 0016)       Diagnostic Studies  Results Reviewed     Procedure Component Value Units Date/Time    UA w Reflex to Microscopic w Reflex to Culture [[id number]] Collected: 03/20/22 0022    Lab Status: No result Specimen: Urine, Straight Cath     Rapid drug screen, urine [[id number]] Collected: 03/20/22 0022    Lab Status: No result Specimen: Urine, Catheter     Blood gas, venous [[id number]]  (Abnormal) Collected: 03/19/22 0516    Lab Status: Final result Specimen: Blood from Arm, Left Updated: 03/19/22 9629     pH, Surjit 7 355     pCO2, Surjit 40 7 mm Hg pO2, Surjit 109 6 mm Hg      HCO3, Surjit 22 2 mmol/L      Base Excess, Surjit -3 1 mmol/L      O2 Content, Surjit 13 5 ml/dL      O2 HGB, VENOUS 94 4 %     HS Troponin I 2hr [[id number]]  (Normal) Collected: 03/19/22 2219    Lab Status: Final result Specimen: Blood from Arm, Left Updated: 03/19/22 2307     hs TnI 2hr 6 ng/L      Delta 2hr hsTnI 2 ng/L     Lactic acid [[id number]]  (Normal) Collected: 03/19/22 2219    Lab Status: Final result Specimen: Blood from Arm, Left Updated: 03/19/22 2302     LACTIC ACID 1 2 mmol/L     Narrative:      Result may be elevated if tourniquet was used during collection  Blood culture #1 [[id number]] Collected: 03/19/22 2219    Lab Status: In process Specimen: Blood from Arm, Left Updated: 03/19/22 2231    Blood culture #2 [[id number]] Collected: 03/19/22 2219    Lab Status:  In process Specimen: Blood from Arm, Right Updated: 03/19/22 2231    Ammonia [[id number]]  (Normal) Collected: 03/19/22 2122    Lab Status: Final result Specimen: Blood from Arm, Left Updated: 03/19/22 2143     Ammonia 16 umol/L     Comprehensive metabolic panel [[id number]]  (Abnormal) Collected: 03/19/22 2036    Lab Status: Final result Specimen: Blood from Arm, Left Updated: 03/19/22 2135     Sodium 134 mmol/L      Potassium 6 0 mmol/L      Chloride 100 mmol/L      CO2 27 mmol/L      ANION GAP 7 mmol/L      BUN 29 mg/dL      Creatinine 1 70 mg/dL      Glucose 199 mg/dL      Calcium 7 1 mg/dL      Corrected Calcium 8 4 mg/dL      AST 35 U/L      ALT 23 U/L      Alkaline Phosphatase 160 U/L      Total Protein 6 4 g/dL      Albumin 2 4 g/dL      Total Bilirubin 0 58 mg/dL      eGFR 31 ml/min/1 73sq m     Narrative:      [place] guidelines for Chronic Kidney Disease (CKD):     Stage 1 with normal or high GFR (GFR > 90 mL/min/1 73 square meters)    Stage 2 Mild CKD (GFR = 60-89 mL/min/1 73 square meters)    Stage 3A Moderate CKD (GFR = 45-59 mL/min/1 73 square meters)    Stage 3B Moderate CKD (GFR = 30-44 mL/min/1 73 square meters)    Stage 4 Severe CKD (GFR = 15-29 mL/min/1 73 square meters)    Stage 5 End Stage CKD (GFR <15 mL/min/1 73 square meters)  Note: GFR calculation is accurate only with a steady state creatinine    HS Troponin 0hr (reflex protocol) [[id number]]  (Normal) Collected: 03/19/22 2019    Lab Status: Final result Specimen: Blood from Arm, Left Updated: 03/19/22 2134     hs TnI 0hr 4 ng/L     TSH [[id number]]  (Normal) Collected: 03/19/22 2036    Lab Status: Final result Specimen: Blood from Arm, Left Updated: 03/19/22 2121     TSH 3RD GENERATON 1 221 uIU/mL     Narrative:      Patients undergoing fluorescein dye angiography may retain small amounts of fluorescein in the body for 48-72 hours post procedure  Samples containing fluorescein can produce falsely depressed TSH values  If the patient had this procedure,a specimen should be resubmitted post fluorescein clearance  Magnesium [[id number]]  (Normal) Collected: 03/19/22 2036    Lab Status: Final result Specimen: Blood from Arm, Left Updated: 03/19/22 2121     Magnesium 2 0 mg/dL     Salicylate level [[id number]]  (Abnormal) Collected: 03/19/22 2036    Lab Status: Final result Specimen: Blood from Arm, Left Updated: 97/66/51 6069     Salicylate Lvl <3 mg/dL     Acetaminophen level-If concentration is detectable, please discuss with medical  on call   [[id number]]  (Abnormal) Collected: 03/19/22 2036    Lab Status: Final result Specimen: Blood from Arm, Left Updated: 03/19/22 2121     Acetaminophen Level <2 ug/mL     Protime-INR [[id number]]  (Normal) Collected: 03/19/22 2036    Lab Status: Final result Specimen: Blood from Arm, Left Updated: 03/19/22 2105     Protime 13 2 seconds      INR 1 00    APTT [[id number]]  (Abnormal) Collected: 03/19/22 2036    Lab Status: Final result Specimen: Blood from Arm, Left Updated: 03/19/22 2105     PTT 39 seconds     Ethanol [[id number]]  (Normal) Collected: 03/19/22 2036    Lab Status: Final result Specimen: Blood from Arm, Left Updated: 03/19/22 2104     Ethanol Lvl <3 mg/dL     CBC and differential [[id number]]  (Abnormal) Collected: 03/19/22 2036    Lab Status: Final result Specimen: Blood from Arm, Left Updated: 03/19/22 2050     WBC 10 50 Thousand/uL      RBC 4 46 Million/uL      Hemoglobin 11 1 g/dL      Hematocrit 36 3 %      MCV 81 fL      MCH 24 9 pg      MCHC 30 6 g/dL      RDW 16 0 %      MPV 10 4 fL      Platelets 452 Thousands/uL      nRBC 0 /100 WBCs      Neutrophils Relative 81 %      Immat GRANS % 1 %      Lymphocytes Relative 11 %      Monocytes Relative 5 %      Eosinophils Relative 2 %      Basophils Relative 0 %      Neutrophils Absolute 8 55 Thousands/µL      Immature Grans Absolute 0 07 Thousand/uL      Lymphocytes Absolute 1 16 Thousands/µL      Monocytes Absolute 0 50 Thousand/µL      Eosinophils Absolute 0 18 Thousand/µL      Basophils Absolute 0 04 Thousands/µL     Blood gas, venous [[id number]]  (Abnormal) Collected: 03/19/22 2019    Lab Status: Final result Specimen: Blood from Arm, Left Updated: 03/19/22 2049     pH, Surjit 7 255     pCO2, Surjit 58 9 mm Hg      pO2, Surjit 46 8 mm Hg      HCO3, Surjit 25 5 mmol/L      Base Excess, Surjit -2 4 mmol/L      O2 Content, Surjit 13 0 ml/dL      O2 HGB, VENOUS 75 2 %     Fingerstick Glucose (POCT) [[id number]]  (Abnormal) Collected: 03/19/22 2015    Lab Status: Final result Updated: 03/19/22 2017     POC Glucose 211 mg/dl                   XR chest 1 view portable   ED Interpretation by Alejandro Stanford MD (03/19 2303)   Patchy opacities left lung      CT head without contrast   Final Result by Carlo Delgado MD (03/19 2204)      No acute intracranial abnormality  Chronic right frontal lobe lacunar infarct              Workstation performed: [id number]                    Procedures  ECG 12 Lead Documentation Only    Date/Time: 3/19/2022 8:59 PM  Performed by: Alejandro Stanford MD  Authorized by: Alejandro Stanford MD     Indications / Diagnosis:  Altered  ECG reviewed by me, the ED Provider: yes    Patient location:  ED  Previous ECG:     Previous ECG:  Compared to current    Comparison ECG info:  2/22/22  Quality:     Tracing quality:  Limited by artifact  Rate:     ECG rate:  116  Rhythm:     Rhythm: sinus tachycardia    Ectopy:     Ectopy: none    QRS:     QRS axis:  Normal  Conduction:     Conduction: normal    ST segments:     ST segments:  Normal  T waves:     T waves: non-specific      CriticalCare Time  Performed by: Deb Rosado MD  Authorized by: Deb Rosado MD     Critical care provider statement:     Critical care time (minutes):  50    Critical care was necessary to treat or prevent imminent or life-threatening deterioration of the following conditions:  CNS failure or compromise, respiratory failure, sepsis and toxidrome    Critical care was time spent personally by me on the following activities:  Obtaining history from patient or surrogate, development of treatment plan with patient or surrogate, discussions with consultants, evaluation of patient's response to treatment, examination of patient, ordering and performing treatments and interventions, ordering and review of laboratory studies, ordering and review of radiographic studies, re-evaluation of patient's condition and review of old charts             ED Course  ED Course as of 03/20/22 0026   Sat Mar 19, 2022   2102 pH, Surjit(!): 7 255   2102 pCO2, Surjit(!): 58 9   2102 pO2, Surjit(!): 46 8   2136 Sodium(!): 134   2136 Potassium(!): 6 0  Slightly hemolyzed  2136 BUN(!): 29   2136 Creatinine(!): 1 70   2136 Calcium(!): 7 1   2253 Patient improving on BiPAP  Communicating better  2317 Patient seems back to baseline at this time  Now completely oriented     2353 pCO2, Surjit(!): 40 7  Improved on BiPAP                               SBIRT 22yo+      Most Recent Value   SBIRT (23 yo +)    In order to provide better care to our patients, we are screening all of our patients for alcohol and drug use  Would it be okay to ask you these screening questions? Unable to answer at this time Filed at: 03/19/2022 9993                    MDM  Number of Diagnoses or Management Options  Accidental overdose: new and requires workup  Acute encephalopathy: new and requires workup  YVONNE (acute kidney injury) ([facility] [address] ): new and requires workup  Hypercarbia: new and requires workup  Hyperkalemia: new and requires workup  Polypharmacy: new and requires workup  Respiratory infection: new and requires workup  Diagnosis management comments: 71-year-old female brought by EMS for evaluation of mental status change today  Similar presentations and admissions in the past   Came in tremulous, poorly responsive with pinpoint pupils  Had improvement in mental status after Narcan administration  Found to have elevated pCO2 likely related to home chronic opioid medication use  This improved with Narcan and BiPAP  Patient also febrile with scattered infiltrates in the left lung field  Started on IV ceftriaxone and azithromycin  Lab work also notable for elevated creatinine, elevated potassium although slight hemolysis was noted  She was given calcium gluconate in the setting of hyperkalemia and hypocalcemia  No EKG changes related to hypercalcemia         Amount and/or Complexity of Data Reviewed  Clinical lab tests: ordered and reviewed  Tests in the radiology section of CPT®: ordered and reviewed  Obtain history from someone other than the patient: yes  Review and summarize past medical records: yes  Discuss the patient with other providers: yes  Independent visualization of images, tracings, or specimens: yes    Patient Progress  Patient progress: improved      Disposition  Final diagnoses:   Acute encephalopathy   Hypercarbia   Polypharmacy   Accidental overdose   Hyperkalemia   YVONNE (acute kidney injury) ([facility] [address] )   Respiratory infection     Time reflects when diagnosis was documented in both MDM as applicable and the Disposition within this note     Time User Action Codes Description Comment    3/19/2022 11:18 PM Donah Booze Add [G93 40] Acute encephalopathy     3/19/2022 11:18 PM Abelino Nelson J Add [R06 89] Hypercarbia     3/19/2022 11:20 PM Donah Booze Add [Z79 899] Polypharmacy     3/19/2022 11:20 PM Abelino Nelson J Add [T50 901A] Accidental overdose     3/19/2022 11:54 PM Anila, [address] Add [E87 5] Hyperkalemia     3/19/2022 11:54 PM AnilaBradleyed J Add [N17 9] YVONNE (acute kidney injury) ([facility] [address] )     3/19/2022 11:54 PM Donah Booze Add [J98 8] Respiratory infection       ED Disposition     ED Disposition Condition Date/Time Comment    Admit Stable Sun Mar 20, 2022 12:26 AM Case was discussed with [facility] resident and the patient's admission status was agreed to be Admission Status: inpatient status to the service of Dr Kaleigh Leal   Follow-up Information    None         Patient's Medications   Discharge Prescriptions    No medications on file       No discharge procedures on file      PDMP Review       Value Time User    PDMP Reviewed  Yes 3/19/2022 11:35 PM Jose De Jesus Quevedo MD          ED Provider  Electronically Signed by           Jose De Jesus Quevedo MD  03/20/22 8524

## 2022-03-20 NOTE — ASSESSMENT & PLAN NOTE
Lab Results   Component Value Date    HGBA1C 7 5 (H) 01/04/2022       Recent Labs     03/19/22 2015   POCGLU 211*       Blood Sugar Average: Last 72 hrs:  (P) 211     · Home regimen Lantus 18 units bedtime, NovoLog sliding scale  · Patient will be NPO until mentation improves    · Sliding scale insulin  · Lantus 10 units (can be increased to her home dose when she will have diet)

## 2022-03-20 NOTE — H&P
[facility] 1955, 73 y o  female MRN: [id number]  Unit/Bed#: ED 15 Encounter: [id number]  Primary Care Provider: Katty Paige MD   Date and time admitted to hospital: 3/19/2022  7:50 PM    * Acute encephalopathy  Assessment & Plan  · Patient admitted with altered mental status  Per , she was able to communicate while on the ambulance but got altered here  · Per ER, she was able to answer questions here and had pinpoint pupils, improved after narcan( total 0 4 mg), but was even better after she was placed on BiPap   · During my encounter, she answered some of the question, but delayed  · Per , she was taking 30 mg oxycodone 6 time a day( wife confirmed, she said 6- 7 times a day)   said it was ordered like that by her pcp from [place]  He said he is giving her around 160-180 mg a day, along with Gabapentin prn( last dose yesterday)  I told him that she had similar problems in the past and that she was supposed to take 30 mg tid  He said " I know but I don't want her to be in pain"  He mentioned that she started to have hands jerkings Saturday around 2 o'clock  As well he mentioned her breathing sounds like rumbling since yesterday  · Most likely etiology is opiates overdose, infection, hypoxia and hypercapnia  TSH, ammonia, comma panel within normal limits  CT head showed no cute changes  Urine drug screen showed opiates   said that sometimes his wife breathing stops while she is sleeping and that he has to reposition her head  She was supposed to have sleep study done, but no spots were available until late in April  Plan:   · Continue BiPAP   · Continue narcan prn   · Monitor for opiates withdrawal   · NPO until more alert   · Hold Cymbalta, gabapentin and oxycodone for now     · Frecvent neuro checks  · Treat the infection       Sepsis [facility])  Assessment & Plan  · Present on admission:  Fever 101 6, leukocytosis, tachycardia  Source likely pneumonia  UA showed no wbc, bacteria  Patient had bilateral lower extremity venous stasis did not appreciate any cellulitis  Has bilateral redness medial thigh bilaterally, that it did not seem infected  · Received ceftriaxone and azithromycin for pneumonia suspicion in emergency room  · Lactic within normal limits  · CXR worsening bilateral opacities    Plan:   · Patient had repeated admissions the past few months will order cefepime and vancomycin  · MRSA swab  · Check procalcitonin  · Monitor CBC, VS   · Follow-up for blood culture        CKD (chronic kidney disease)  Assessment & Plan  Lab Results   Component Value Date    EGFR 31 03/19/2022    EGFR 36 02/23/2022    EGFR 36 02/22/2022    CREATININE 1 70 (H) 03/19/2022    CREATININE 1 51 (H) 02/23/2022    CREATININE 1 49 (H) 02/22/2022     · Baseline appears to be between 1 2- 1 6   · Slightly elevated   · Will give gentle hydration with normal saline 50 cc/h for 10 h   · Monitor   · Renally dosed medication, avoid nephrotoxins and hypotension     Opiate dependence (HCC)  Assessment & Plan  · 160 - 180 mg a day per ( 30 mg 6 times a day) for back pain   · Hold for now   · Monitor for withdrawal     Acute respiratory acidosis  Assessment & Plan  · Most likely secondary to opiate overdose and sedative   · VBG improved after Narcan and with BiPap   · Continue Bipap and Narcan prn     Myoclonic jerking  Assessment & Plan  · On and off in the past 3 years  Per , she was getting them when she had pneumonias or infections and opiates overdose  · Left hand weaker that the right one after she fell -Was seen by neurology during previous admission, was thought to be muscular       Anemia  Assessment & Plan  Recent Labs     03/19/22 2036   HGB 11 1*     ·   · Chronic microcytic hypochromic anemia  · Currently stable at baseline    Ambulatory dysfunction  Assessment & Plan  · Walks with a walker, needs assistance  · Fall precautions   · PT/OT when stable     Chronic pain syndrome  Assessment & Plan  · Patient with chronic back/hip pain  · Per , she is taking 160-180 mg of oxycodone daily (30 mg 6 times a day, per  it was ordered by her PCP [place])  · PDMP reviewed  · Hold oxycodone for now  Monitor for withdrawal        Type 2 diabetes mellitus with hyperglycemia, with long-term current use of insulin [facility])  Assessment & Plan  Lab Results   Component Value Date    HGBA1C 7 5 (H) 01/04/2022       Recent Labs     03/19/22 2015   POCGLU 211*       Blood Sugar Average: Last 72 hrs:  (P) 211     · Home regimen Lantus 18 units bedtime, NovoLog sliding scale  · Patient will be NPO until mentation improves  · Sliding scale insulin  · Lantus 10 units (can be increased to her home dose when she will have diet)      Essential hypertension  Assessment & Plan  · Blood pressure repeated upon presentation  · Continue home medication with lisinopril 10 mg daily and amlodipine 10 mg  · Monitor    VTE Pharmacologic Prophylaxis: VTE Score: 5 Moderate Risk (Score 3-4) - Pharmacological DVT Prophylaxis Ordered: heparin  Code Status: Level 1 - Full Code   Discussion with family: Updated  () via phone  Anticipated Length of Stay: Patient will be admitted on an inpatient basis with an anticipated length of stay of greater than 2 midnights secondary to aceute encephalopathy   Chief Complaint: altered mental status, jerkings     History of Present Illness: Denisha Patrick is a 73 y o  female with a PMH of hypertension, cirrhosis secondary to alcohol abuse , splenomegaly, chronic back pain and opiate dependence, who presents with jerking and altered mental status  Per , patient started to have hand shaking Saturday afternoon that got worse  He mentioned that she started to have jerking 6 3 years ago with infections mostly and had a similar episode with opiate overdose recently    The patient reported the patient sometimes cannot move and that is unresponsive   mentioned patient was able to communicate while in the ambulance, but got worse when she got here   mentioned that patient is taking oxycodone 30 mg 6 times a day, sometimes more or less usually between 160-180 mg a day  She mentioned that her PCP from [place] ordered oxycodone for her  Per ER, patient had pinpoint pupils and briefly responded to name on arrival   Her mentation improved after Narcan and BiPAP  CT head upon presentation showed no acute changes       Patient was admitted for further management       Review of Systems:  Review of Systems   Unable to perform ROS: Mental status change       Past Medical and Surgical History:   Past Medical History:   Diagnosis Date    Abnormal head CT 7/14/2017    Acute kidney injury ([facility] [address] ) 8/19/2018    Cellulitis 1/10/2017    Chronic back pain     Cirrhosis ([facility] [address] )     Closed fracture of multiple ribs of right side 7/14/2017    Degenerative disc disease at L5-S1 level     Diabetes mellitus (HCC)     GERD (gastroesophageal reflux disease)     History of methicillin resistant staphylococcus aureus (MRSA) 08/21/2018    negative nasal culture- isolation and hx of mrsa removed 8/20/2018    Hyperkalemia 4/25/2018    Hypertension     Hypocalcemia 4/25/2018    Hyponatremia 7/14/2017       Past Surgical History:   Procedure Laterality Date    CHOLECYSTECTOMY      JOINT REPLACEMENT      OOPHORECTOMY      ID INCISION,IMPLANT,NEUROELEC,SACRAL NERVE N/A 9/20/2021    Procedure: INSERTION NEUROSTIMULATOR ELECTRODE ARRAY SACRAL NERVE; FLUOROSCOPY; ELECTRONIC ANALYSIS;  Surgeon: Alina Mccarty MD;  Location: [facility];  Service: UroGynecology           SACRAL NERVE STIMULATOR PLACEMENT N/A 9/28/2021    Procedure: IPG INSERTION AND PROGRAMMING;  Surgeon: Alina Mccarty MD;  Location: [facility];  Service: UroGynecology              Meds/Allergies:  Prior to Admission medications Medication Sig Start Date End Date Taking? Authorizing Provider   amLODIPine (NORVASC) 10 mg tablet Take 1 tablet (10 mg total) by mouth daily 8/25/18  Yes Jose Michael MD   DULoxetine (CYMBALTA) 60 mg delayed release capsule Take 60 mg by mouth daily   Yes Historical Provider, MD   gabapentin (NEURONTIN) 300 mg capsule 300 mg daily at bedtime as needed   Yes Historical Provider, MD   insulin glargine (LANTUS SOLOSTAR) 100 units/mL injection pen Inject 20 Units under the skin daily at bedtime     Yes Historical Provider, MD   lisinopril (ZESTRIL) 20 mg tablet Take 10 mg by mouth daily    Yes Historical Provider, MD   metoclopramide (REGLAN) 10 mg tablet Take 10 mg by mouth daily   Yes Historical Provider, MD   Mirabegron ER (Myrbetriq) 50 MG TB24 Take 50 mg by mouth in the morning   Yes Historical Provider, MD   pravastatin (PRAVACHOL) 20 mg tablet Take 20 mg by mouth daily   Yes Historical Provider, MD   saccharomyces boulardii (FLORASTOR) 250 mg capsule Take 1 capsule (250 mg total) by mouth 2 (two) times a day 2/26/22  Yes Dana Hernandes MD   docusate sodium (COLACE) 100 mg capsule Take 1 capsule (100 mg total) by mouth 2 (two) times a day as needed for constipation  Patient not taking: Reported on 3/15/2022  9/20/21   Diana Dumont MD   Insulin Aspart FlexPen 100 UNIT/ML SOPN Inject 3 Units under the skin 3 (three) times a day with meals If bs>250    Historical Provider, MD   nystatin (MYCOSTATIN) powder Apply topically 3 (three) times a day 3/15/22   APRYL Quick   psyllium (METAMUCIL) packet Take 1 packet by mouth 2 (two) times a day 2/26/22   Dana Hernandes MD     I have reviewed home medications with patient family member  Allergies:    Allergies   Allergen Reactions    Pollen Extract Allergic Rhinitis       Social History:  Marital Status: /Civil Union   Occupation:   Patient Pre-hospital Living Situation: With spouse  Patient Pre-hospital Level of Mobility: walks with walker  Patient Pre-hospital Diet Restrictions: none   Substance Use History:   Social History     Substance and Sexual Activity   Alcohol Use Not Currently    Alcohol/week: 0 0 standard drinks     Social History     Tobacco Use   Smoking Status Former Smoker    Types: E-Cigarettes    Start date: 1976   Grzegorz Daubs Quit date: 2019    Years since quittin 9   Smokeless Tobacco Never Used     Social History     Substance and Sexual Activity   Drug Use Not Currently    Types: Marijuana    Comment: last used 1 year ago        Family History:  Family History   Problem Relation Age of Onset    Rheum arthritis Mother     Alzheimer's disease Mother     Lupus Mother        Physical Exam:     Vitals:   Blood Pressure: 133/63 (22 0230)  Pulse: 84 (22)  Temperature: (!) 101 6 °F (38 7 °C) (22)  Temp Source: Oral (22)  Respirations: 18 (22)  Height: 5' 2" (157 5 cm) (22)  Weight - Scale: 81 6 kg (180 lb) (22)  SpO2: 100 % (22)    Physical Exam  Vitals reviewed  Constitutional:       General: She is not in acute distress  Appearance: She is not diaphoretic  HENT:      Mouth/Throat:      Comments: BiPap on   Eyes:      General:         Right eye: No discharge  Left eye: No discharge  Comments: Pupils sluggish    Cardiovascular:      Rate and Rhythm: Regular rhythm  Tachycardia present  Pulmonary:      Effort: No respiratory distress  Breath sounds: No wheezing, rhonchi or rales  Comments: Basal left coarse breath sounds   Abdominal:      General: There is distension  Tenderness: There is no abdominal tenderness  There is no guarding or rebound  Musculoskeletal:      Right lower leg: No edema  Left lower leg: No edema  Comments: Chronic venous dermastasis    Skin:     General: Skin is warm  Findings: Erythema (medial aspect thights ) present     Neurological:      Mental Status: She is lethargic  Comments: Oriented to self and place   Lethargic but arousable  Bilateral hand tremors    Psychiatric:         Speech: Speech is delayed  Additional Data:     Lab Results:  Results from last 7 days   Lab Units 03/19/22 2036   WBC Thousand/uL 10 50*   HEMOGLOBIN g/dL 11 1*   HEMATOCRIT % 36 3   PLATELETS Thousands/uL 192   NEUTROS PCT % 81*   LYMPHS PCT % 11*   MONOS PCT % 5   EOS PCT % 2     Results from last 7 days   Lab Units 03/19/22 2036   SODIUM mmol/L 134*   POTASSIUM mmol/L 6 0*   CHLORIDE mmol/L 100   CO2 mmol/L 27   BUN mg/dL 29*   CREATININE mg/dL 1 70*   ANION GAP mmol/L 7   CALCIUM mg/dL 7 1*   ALBUMIN g/dL 2 4*   TOTAL BILIRUBIN mg/dL 0 58   ALK PHOS U/L 160*   ALT U/L 23   AST U/L 35   GLUCOSE RANDOM mg/dL 199*     Results from last 7 days   Lab Units 03/19/22 2036   INR  1 00     Results from last 7 days   Lab Units 03/20/22  0231 03/19/22 2015   POC GLUCOSE mg/dl 225* 211*         Results from last 7 days   Lab Units 03/19/22  2219   LACTIC ACID mmol/L 1 2       Imaging: Reviewed radiology reports from this admission including: chest xray and CT head  XR chest 1 view portable   ED Interpretation by Lalo Keyes MD (03/19 2305)   Patchy opacities left lung      CT head without contrast   Final Result by Noemy Santa MD (03/19 2204)      No acute intracranial abnormality  Chronic right frontal lobe lacunar infarct  Workstation performed: [id number]             EKG and Other Studies Reviewed on Admission:   · EKG: Sinus Tachycardia    ** Please Note: This note has been constructed using a voice recognition system   **

## 2022-03-20 NOTE — ASSESSMENT & PLAN NOTE
· Patient with chronic back/hip pain  · Per , she is taking 160-180 mg of oxycodone daily (30 mg 6 times a day, per  it was ordered by her PCP Abdullahi garces)  · PDMP reviewed  · Hold oxycodone for now   Monitor for withdrawal

## 2022-03-21 PROBLEM — T07.XXXA MULTIPLE WOUNDS: Status: ACTIVE | Noted: 2022-03-21

## 2022-03-21 LAB
ALBUMIN SERPL BCP-MCNC: 2.2 G/DL (ref 3.5–5)
ALP SERPL-CCNC: 152 U/L (ref 46–116)
ALT SERPL W P-5'-P-CCNC: 30 U/L (ref 12–78)
ANION GAP SERPL CALCULATED.3IONS-SCNC: 8 MMOL/L (ref 4–13)
AST SERPL W P-5'-P-CCNC: 52 U/L (ref 5–45)
ATRIAL RATE: 227 BPM
BASE EX.OXY STD BLDV CALC-SCNC: 83.7 % (ref 60–80)
BASE EXCESS BLDV CALC-SCNC: 2.6 MMOL/L
BASOPHILS # BLD AUTO: 0.01 THOUSANDS/ΜL (ref 0–0.1)
BASOPHILS NFR BLD AUTO: 0 % (ref 0–1)
BILIRUB SERPL-MCNC: 0.53 MG/DL (ref 0.2–1)
BUN SERPL-MCNC: 21 MG/DL (ref 5–25)
CALCIUM ALBUM COR SERPL-MCNC: 9.2 MG/DL (ref 8.3–10.1)
CALCIUM SERPL-MCNC: 7.8 MG/DL (ref 8.3–10.1)
CHLORIDE SERPL-SCNC: 103 MMOL/L (ref 100–108)
CO2 SERPL-SCNC: 24 MMOL/L (ref 21–32)
CREAT SERPL-MCNC: 1.11 MG/DL (ref 0.6–1.3)
EOSINOPHIL # BLD AUTO: 0.15 THOUSAND/ΜL (ref 0–0.61)
EOSINOPHIL NFR BLD AUTO: 4 % (ref 0–6)
ERYTHROCYTE [DISTWIDTH] IN BLOOD BY AUTOMATED COUNT: 15.8 % (ref 11.6–15.1)
GFR SERPL CREATININE-BSD FRML MDRD: 52 ML/MIN/1.73SQ M
GLUCOSE SERPL-MCNC: 109 MG/DL (ref 65–140)
GLUCOSE SERPL-MCNC: 137 MG/DL (ref 65–140)
GLUCOSE SERPL-MCNC: 201 MG/DL (ref 65–140)
GLUCOSE SERPL-MCNC: 67 MG/DL (ref 65–140)
GLUCOSE SERPL-MCNC: 90 MG/DL (ref 65–140)
HCO3 BLDV-SCNC: 29.3 MMOL/L (ref 24–30)
HCT VFR BLD AUTO: 30.8 % (ref 34.8–46.1)
HGB BLD-MCNC: 9 G/DL (ref 11.5–15.4)
IMM GRANULOCYTES # BLD AUTO: 0.01 THOUSAND/UL (ref 0–0.2)
IMM GRANULOCYTES NFR BLD AUTO: 0 % (ref 0–2)
LYMPHOCYTES # BLD AUTO: 0.75 THOUSANDS/ΜL (ref 0.6–4.47)
LYMPHOCYTES NFR BLD AUTO: 19 % (ref 14–44)
MCH RBC QN AUTO: 24.9 PG (ref 26.8–34.3)
MCHC RBC AUTO-ENTMCNC: 29.2 G/DL (ref 31.4–37.4)
MCV RBC AUTO: 85 FL (ref 82–98)
MONOCYTES # BLD AUTO: 0.32 THOUSAND/ΜL (ref 0.17–1.22)
MONOCYTES NFR BLD AUTO: 8 % (ref 4–12)
MRSA NOSE QL CULT: NORMAL
NEUTROPHILS # BLD AUTO: 2.73 THOUSANDS/ΜL (ref 1.85–7.62)
NEUTS SEG NFR BLD AUTO: 69 % (ref 43–75)
NRBC BLD AUTO-RTO: 0 /100 WBCS
O2 CT BLDV-SCNC: 12 ML/DL
PCO2 BLDV: 56.8 MM HG (ref 42–50)
PH BLDV: 7.33 [PH] (ref 7.3–7.4)
PLATELET # BLD AUTO: 100 THOUSANDS/UL (ref 149–390)
PMV BLD AUTO: 11.7 FL (ref 8.9–12.7)
PO2 BLDV: 51.5 MM HG (ref 35–45)
POTASSIUM SERPL-SCNC: 5 MMOL/L (ref 3.5–5.3)
PROCALCITONIN SERPL-MCNC: 0.68 NG/ML
PROT SERPL-MCNC: 6.5 G/DL (ref 6.4–8.2)
QRS AXIS: 59 DEGREES
QRSD INTERVAL: 72 MS
QT INTERVAL: 298 MS
QTC INTERVAL: 414 MS
RBC # BLD AUTO: 3.61 MILLION/UL (ref 3.81–5.12)
SODIUM SERPL-SCNC: 135 MMOL/L (ref 136–145)
T WAVE AXIS: 26 DEGREES
VENTRICULAR RATE: 116 BPM
WBC # BLD AUTO: 3.97 THOUSAND/UL (ref 4.31–10.16)

## 2022-03-21 PROCEDURE — 85025 COMPLETE CBC W/AUTO DIFF WBC: CPT | Performed by: INTERNAL MEDICINE

## 2022-03-21 PROCEDURE — 80053 COMPREHEN METABOLIC PANEL: CPT | Performed by: INTERNAL MEDICINE

## 2022-03-21 PROCEDURE — 99232 SBSQ HOSP IP/OBS MODERATE 35: CPT | Performed by: INTERNAL MEDICINE

## 2022-03-21 PROCEDURE — 97116 GAIT TRAINING THERAPY: CPT

## 2022-03-21 PROCEDURE — 97163 PT EVAL HIGH COMPLEX 45 MIN: CPT

## 2022-03-21 PROCEDURE — 82805 BLOOD GASES W/O2 SATURATION: CPT

## 2022-03-21 PROCEDURE — 97167 OT EVAL HIGH COMPLEX 60 MIN: CPT

## 2022-03-21 PROCEDURE — 82948 REAGENT STRIP/BLOOD GLUCOSE: CPT

## 2022-03-21 PROCEDURE — 84145 PROCALCITONIN (PCT): CPT | Performed by: INTERNAL MEDICINE

## 2022-03-21 PROCEDURE — 97530 THERAPEUTIC ACTIVITIES: CPT

## 2022-03-21 PROCEDURE — 93010 ELECTROCARDIOGRAM REPORT: CPT | Performed by: INTERNAL MEDICINE

## 2022-03-21 RX ORDER — LIDOCAINE 50 MG/G
1 PATCH TOPICAL DAILY
Status: DISCONTINUED | OUTPATIENT
Start: 2022-03-21 | End: 2022-03-23 | Stop reason: HOSPADM

## 2022-03-21 RX ORDER — MICONAZOLE NITRATE 20 MG/G
CREAM TOPICAL 2 TIMES DAILY
Status: DISCONTINUED | OUTPATIENT
Start: 2022-03-21 | End: 2022-03-23 | Stop reason: HOSPADM

## 2022-03-21 RX ORDER — INSULIN GLARGINE 100 [IU]/ML
12 INJECTION, SOLUTION SUBCUTANEOUS
Status: DISCONTINUED | OUTPATIENT
Start: 2022-03-21 | End: 2022-03-23 | Stop reason: HOSPADM

## 2022-03-21 RX ORDER — DULOXETIN HYDROCHLORIDE 30 MG/1
30 CAPSULE, DELAYED RELEASE ORAL DAILY
Status: DISCONTINUED | OUTPATIENT
Start: 2022-03-22 | End: 2022-03-23 | Stop reason: HOSPADM

## 2022-03-21 RX ADMIN — OXYCODONE HYDROCHLORIDE 30 MG: 10 TABLET ORAL at 04:25

## 2022-03-21 RX ADMIN — DICLOFENAC SODIUM 2 G: 10 GEL TOPICAL at 11:54

## 2022-03-21 RX ADMIN — HEPARIN SODIUM 5000 UNITS: 5000 INJECTION INTRAVENOUS; SUBCUTANEOUS at 21:36

## 2022-03-21 RX ADMIN — DICLOFENAC SODIUM 2 G: 10 GEL TOPICAL at 21:37

## 2022-03-21 RX ADMIN — Medication 250 MG: at 17:13

## 2022-03-21 RX ADMIN — HEPARIN SODIUM 5000 UNITS: 5000 INJECTION INTRAVENOUS; SUBCUTANEOUS at 13:56

## 2022-03-21 RX ADMIN — LIDOCAINE 5% 1 PATCH: 700 PATCH TOPICAL at 15:12

## 2022-03-21 RX ADMIN — INSULIN GLARGINE 12 UNITS: 100 INJECTION, SOLUTION SUBCUTANEOUS at 21:36

## 2022-03-21 RX ADMIN — MICONAZOLE NITRATE: 20 CREAM TOPICAL at 17:02

## 2022-03-21 RX ADMIN — DULOXETINE HYDROCHLORIDE 60 MG: 60 CAPSULE, DELAYED RELEASE ORAL at 08:16

## 2022-03-21 RX ADMIN — PRAVASTATIN SODIUM 20 MG: 20 TABLET ORAL at 08:16

## 2022-03-21 RX ADMIN — VANCOMYCIN HYDROCHLORIDE 1000 MG: 1 INJECTION, SOLUTION INTRAVENOUS at 00:03

## 2022-03-21 RX ADMIN — Medication 250 MG: at 08:16

## 2022-03-21 RX ADMIN — CEFEPIME HYDROCHLORIDE 2000 MG: 2 INJECTION, POWDER, FOR SOLUTION INTRAVENOUS at 20:13

## 2022-03-21 RX ADMIN — INSULIN LISPRO 1 UNITS: 100 INJECTION, SOLUTION INTRAVENOUS; SUBCUTANEOUS at 22:24

## 2022-03-21 RX ADMIN — MICONAZOLE NITRATE: 20 CREAM TOPICAL at 11:53

## 2022-03-21 RX ADMIN — CEFEPIME HYDROCHLORIDE 2000 MG: 2 INJECTION, POWDER, FOR SOLUTION INTRAVENOUS at 08:23

## 2022-03-21 RX ADMIN — VANCOMYCIN HYDROCHLORIDE 750 MG: 750 INJECTION, SOLUTION INTRAVENOUS at 11:53

## 2022-03-21 RX ADMIN — HEPARIN SODIUM 5000 UNITS: 5000 INJECTION INTRAVENOUS; SUBCUTANEOUS at 05:50

## 2022-03-21 RX ADMIN — DICLOFENAC SODIUM 2 G: 10 GEL TOPICAL at 17:02

## 2022-03-21 NOTE — PLAN OF CARE
Problem: OCCUPATIONAL THERAPY ADULT  Goal: Performs self-care activities at highest level of function for planned discharge setting  See evaluation for individualized goals  Description: Treatment Interventions: ADL retraining,Functional transfer training,UE strengthening/ROM,Endurance training,Patient/family training,Equipment evaluation/education,Compensatory technique education,Continued evaluation          See flowsheet documentation for full assessment, interventions and recommendations  Outcome: Progressing  Note: Limitation: Decreased ADL status,Decreased UE ROM,Decreased UE strength,Decreased cognition,Decreased endurance,Decreased self-care trans,Decreased high-level ADLs  Prognosis: Fair  Assessment: Pt is a 73 y o  female seen for OT evaluation s/p admit [facility] on 3/19/2022 w/ Acute encephalopathy  Comorbidities affecting pt's functional performance at time of assessment include: HTN, type 2 DM, chronic pain syndrome, hyperkalemia, ambulatory dysfunction, sepsis, anemia, myoclonic jerking, hyponatremia, acute respiratory acidosis, opiate dependence, CKD, thrombocytopenia    Personal factors affecting pt at time of IE include:steps to enter environment, difficulty performing ADLS, difficulty performing IADLS , limited insight into deficits, flat affect and decreased initiation and engagement   Prior to admission, pt was assisted with ADLs and functional mobility with a rollator at home  Upon evaluation: Pt requires mod A for mobility and max A for ADLs 2* the following deficits impacting occupational performance: weakness, decreased strength, decreased balance and decreased tolerance  Pt to benefit from continued skilled OT tx while in the hospital to address deficits as defined above and maximize level of functional independence w ADL's and functional mobility   Occupational Performance areas to address include: grooming, bathing/shower, toilet hygiene, dressing, functional mobility and community mobility  From OT standpoint, recommendation at time of d/c would be home OT pending progress       OT Discharge Recommendation: Home with home health rehabilitation

## 2022-03-21 NOTE — ASSESSMENT & PLAN NOTE
· POA: Patient admitted with altered mental status  Per , she was able to communicate while on the ambulance but got altered here  · Per ER, she was able to answer questions here and had pinpoint pupils, improved after narcan( total 0 4 mg), but was even better after she was placed on BiPap   · Per , she was taking 30 mg oxycodone 6 time a day( wife confirmed, she said 6- 7 times a day)   said it was ordered like that by her pcp from [place]  He said he is giving her around 160-180 mg a day, along with Gabapentin prn( last dose yesterday)  I told him that she had similar problems in the past and that she was supposed to take 30 mg tid  He said " I know but I don't want her to be in pain"  He mentioned that she started to have hands jerkings Saturday around 2 o'clock  As well he mentioned her breathing sounds like rumbling since yesterday  ·  said that sometimes his wife breathing stops while she is sleeping and that he has to reposition her head  She was supposed to have sleep study done, but no spots were available until late in April  · Most likely etiology is opiates overdose, infection, hypoxia and hypercapnia  · W/U: TSH, ammonia, comma panel within normal limits  CT head showed no cute changes  Urine drug screen showed opiates     · During exam 03/21: mentation has improved drastically and is oriented to person, place and time     Plan:   · Continue BiPAP   · Continue narcan prn   · Monitor for opiates withdrawal   · Per toxicology  · Taper down cymbalta  · Stop gabapentin  · Continue oxycodone 30mg TID and try to taper as able to (PCP has had patient on a wean for some time and plants to continue tapering down)  · Will attempt to wean down to 30mg BID   · Given aqua K pads, lidoderm patch  and voltaren gel for pain regimen for back pain (in afternoon if patient able to tolerate no afternoon dose)   · Treat suspected  Pneumonia as per plan under sepsis  · Wean down O2 and maintain SpO2 >88%  · No need for palliative consult given patient's mentation improving, pneumonia is being treated w/ antibx, and continuing to wean off opioids as per discussion with patient's PCP

## 2022-03-21 NOTE — ASSESSMENT & PLAN NOTE
· Present on admission:  Fever 101 6, leukocytosis, tachycardia  Source likely pneumonia  UA showed no wbc, bacteria  Patient had bilateral lower extremity venous stasis did not appreciate any cellulitis  Has bilateral redness medial thigh bilaterally, that it did not seem infected     · Received ceftriaxone and azithromycin for pneumonia suspicion in emergency room  · Lactic within normal limits  · CXR showed Possible bilateral infiltrates more prominent on the left  · Impression: likely bacterial pneumonia    Lab Results   Component Value Date    PROCALCITONI 0 68 (H) 03/21/2022    PROCALCITONI 0 19 03/19/2022    PROCALCITONI 0 56 (H) 02/23/2022    MRSA swab negative   procalcitonin was 0 68  Lab Results   Component Value Date    WBC 3 55 (L) 03/22/2022    HGB 9 3 (L) 03/22/2022    HCT 29 3 (L) 03/22/2022    MCV 79 (L) 03/22/2022     (L) 03/22/2022         Plan:   ·  received cefepime 03/22 and de-escalated to ceftriaxone (day 4 of antibiotics)   · Can likely be discharged tomorrow on additional 3 days cefdinir po to make a total course of 7 days of antibiotics  · MRSA swab negative and  Discontinued vancomycin  ·  procalcitonin was elevated for compared to 2 days ago and pending a m  reflex  · Patient remains to be leukopenic at 3 55 WBC which is similar to [facility] value yesterday 0 3/21 as well  · Monitor CBC, VS   · Follow-up for blood culture (no growth at 48 hours)

## 2022-03-21 NOTE — ASSESSMENT & PLAN NOTE
· On and off in the past 3 years  Per , she was getting them when she had pneumonias or infections and opiates overdose  · Left hand weaker that the right one after she fell -Was seen by neurology during previous admission, was thought to be muscular     ·  myoclonic jerking has been continuous over a chronic period per patient and likely a contribution from opioids given patient's cirrhosis   ·  patient's gabapentin of 300 mg daily was stopped  ·  Cymbalta will also be tapered down,  Currently taking 60 mg daily and will try and taper down as able to   ·  per discussion with toxicology,  Is best to decrease serotonergic medications as taper off and have PCP switched to a pain medication that is less serotonergic given patient's myoclonic jerking  · Will likely be given aqua K pads, voltaren gel, and lidoderm patches in the afternoon if patient able to tolerate oxy 30mg BID

## 2022-03-21 NOTE — PLAN OF CARE
Problem: Potential for Falls  Goal: Patient will remain free of falls  Description: INTERVENTIONS:  - Educate patient/family on patient safety including physical limitations  - Instruct patient to call for assistance with activity   - Consult OT/PT to assist with strengthening/mobility   - Keep Call bell within reach  - Keep bed low and locked with side rails adjusted as appropriate  - Keep care items and personal belongings within reach  - Initiate and maintain comfort rounds  - Make Fall Risk Sign visible to staff  - Offer Toileting every 2 Hours, in advance of need  - Initiate/Maintain bed alarm  - Obtain necessary fall risk management equipment:    - Apply yellow socks and bracelet for high fall risk patients  - Consider moving patient to room near nurses station  Outcome: Progressing     Problem: Prexisting or High Potential for Compromised Skin Integrity  Goal: Skin integrity is maintained or improved  Description: INTERVENTIONS:  - Identify patients at risk for skin breakdown  - Assess and monitor skin integrity  - Assess and monitor nutrition and hydration status  - Monitor labs   - Assess for incontinence   - Turn and reposition patient  - Assist with mobility/ambulation  - Relieve pressure over bony prominences  - Avoid friction and shearing  - Provide appropriate hygiene as needed including keeping skin clean and dry  - Evaluate need for skin moisturizer/barrier cream  - Collaborate with interdisciplinary team   - Patient/family teaching  - Consider wound care consult   Outcome: Progressing     Problem: MOBILITY - ADULT  Goal: Maintain or return to baseline ADL function  Description: INTERVENTIONS:  -  Assess patient's ability to carry out ADLs; assess patient's baseline for ADL function and identify physical deficits which impact ability to perform ADLs (bathing, care of mouth/teeth, toileting, grooming, dressing, etc )  - Assess/evaluate cause of self-care deficits   - Assess range of motion  - Assess patient's mobility; develop plan if impaired  - Assess patient's need for assistive devices and provide as appropriate  - Encourage maximum independence but intervene and supervise when necessary  - Involve family in performance of ADLs  - Assess for home care needs following discharge   - Consider OT consult to assist with ADL evaluation and planning for discharge  - Provide patient education as appropriate  Outcome: Progressing  Goal: Maintains/Returns to pre admission functional level  Description: INTERVENTIONS:  - Perform BMAT or MOVE assessment daily    - Set and communicate daily mobility goal to care team and patient/family/caregiver  - Collaborate with rehabilitation services on mobility goals if consulted  - Perform Range of Motion 3 times a day  - Reposition patient every 2 hours  - Dangle patient 3 times a day  - Stand patient 3 times a day  - Ambulate patient 3 times a day  - Out of bed to chair 3 times a day   - Out of bed for meals 3 times a day  - Out of bed for toileting  - Record patient progress and toleration of activity level   Outcome: Progressing     Problem: Nutrition/Hydration-ADULT  Goal: Nutrient/Hydration intake appropriate for improving, restoring or maintaining nutritional needs  Description: Monitor and assess patient's nutrition/hydration status for malnutrition  Collaborate with interdisciplinary team and initiate plan and interventions as ordered  Monitor patient's weight and dietary intake as ordered or per policy  Utilize nutrition screening tool and intervene as necessary  Determine patient's food preferences and provide high-protein, high-caloric foods as appropriate       INTERVENTIONS:  - Monitor oral intake, urinary output, labs, and treatment plans  - Assess nutrition and hydration status and recommend course of action  - Evaluate amount of meals eaten  - Assist patient with eating if necessary   - Allow adequate time for meals  - Recommend/ encourage appropriate diets, oral nutritional supplements, and vitamin/mineral supplements  - Order, calculate, and assess calorie counts as needed  - Recommend, monitor, and adjust tube feedings and TPN/PPN based on assessed needs  - Assess need for intravenous fluids  - Provide specific nutrition/hydration education as appropriate  - Include patient/family/caregiver in decisions related to nutrition  Outcome: Progressing

## 2022-03-21 NOTE — ASSESSMENT & PLAN NOTE
· Patient with chronic back/hip pain  · Per , she is taking 160-180 mg of oxycodone daily (30 mg 6 times a day, per  it was ordered by her PCP Abdullahi garces)  · PDMP reviewed  · Cymbalta will also be tapered down; currently at 30 mg daily and will try and taper down as able to   ·  per discussion with toxicology,  Is best to decrease serotonergic medications as taper off and have PCP switched to a pain medication that is less serotonergic given patient's myoclonic jerking  · On aqua K pads, voltaren gel, and lidoderm patches in the afternoon in addition to oxy 30mg BID and patient has been tolerating

## 2022-03-21 NOTE — ASSESSMENT & PLAN NOTE
Lab Results   Component Value Date    SODIUM 135 (L) 03/21/2022    SODIUM 133 (L) 03/20/2022    SODIUM 134 (L) 03/19/2022    SODIUM 133 (L) 02/23/2022      hyponatremia improving and close to within normal limits after previously getting a infusion of normal saline  Patient continues to drink p o  fluids without problem

## 2022-03-21 NOTE — DISCHARGE INSTR - OTHER ORDERS
Skin care plans:  1-Cleanse sacro-buttocks and groin with soap and water  Pat dry  Dust open areas with stoma powder and Apply YULI to sacrum, buttocks, groin BID and PRN  2-Hydraguard to bilateral heel BID and PRN  3-Elevate heels to offload pressure  4-Ehob cushion when out of bed  5-Turn/repoisiton q2h or when medically stable for pressure re-distribution on skin  6-Moisturize skin daily with skin nourishing cream   7-Cleanse under breasts with soap and water  Pat dry  Dust with nystatin powder and apply maxorb rope  Change daily and PRN  8- Cleanse left leg venous ulcer with soap and water  Pat dry   Apply allevyn foam  Change every three days and PRN

## 2022-03-21 NOTE — PLAN OF CARE
Problem: PHYSICAL THERAPY ADULT  Goal: Performs mobility at highest level of function for planned discharge setting  See evaluation for individualized goals  Description: Treatment/Interventions: Functional transfer training,LE strengthening/ROM,Elevations,Therapeutic exercise,Endurance training,Cognitive reorientation,Patient/family training,Equipment eval/education,Bed mobility,Gait training  Equipment Recommended: Handy Stuart       See flowsheet documentation for full assessment, interventions and recommendations  3/21/2022 1606 by Thor Frias PT  Note: Prognosis: Fair  Problem List: Decreased strength,Decreased endurance,Impaired balance,Decreased mobility,Decreased cognition,Obesity  Assessment: Rajesh Whitehead is a 73 y o  Female who [facility] on 3/19/2022 from home w/ c/o AMS and diagnosis of acute encephalopathy  Orders for PT eval and treat received, w/ activity orders of up w/ assist and fall precautions  Pt presents w/ comorbidities of HTN, DMII, CPS, opoid dependence, CKD, peripheral neuropathy  At baseline, pt mobilizes independently w/ rollator  Upon evaluation, pt presents w/ the following deficits: weakness, impaired balance and decreased endurance  Upon eval, pt requires mod A x 1 for bed mobility, mod A x 1 for transfers, and mod > min A x 1 for gait  Pt's clinical presentation is unstable/unpredictable due to need for assist w/ all phases of mobility when usually mobilizing independently, pain impacting overall mobility status, need for supplemental oxygen in order to maintain oxygen saturation and need for input for task focus and mobility technique  Patient is at an increased risk of falls due to physical and cognitive deficits  Given the above findings, discharge recommendation is for Home w/ HHPT and family support pending progress, confirmation of PLOF and family assistance available (if pt unable to progress mobility-wise and lack of family support, recommend STR)   During this admission, pt would benefit from continued skilled inpatient PT in the acute care setting in order to address the abovementioned deficits to maximize function and mobility before DC from acute care  PT Discharge Recommendation: Home with home health rehabilitation (HHPT > STR, pending mobility progress, level of A from [facility])          See flowsheet documentation for full assessment

## 2022-03-21 NOTE — PROGRESS NOTES
Vancomycin IV Pharmacy-to-Dose Consultation    Bj Couch is a 73 y o  female who is currently receiving Vancomycin IV with management by the Pharmacy Consult service for the treatment of Pneumonia  Assessment/Plan:    The patient's chart was reviewed  Renal function is stable  There are no signs or symptoms of nephrotoxicity and/or infusion reactions documented  Based on today's assessment, since renal function improved, will change vancomycin dosing to 750mg IV every 12 hours, with a plan for trough to be drawn at 1130 on 3/22  We will continue to follow the patient's culture results and clinical progress daily        Augustina Denver, PharmD   Pharmacist

## 2022-03-21 NOTE — ASSESSMENT & PLAN NOTE
POA: 160 - 180 mg a day per ( 30 mg 6 times a day) for back pain   · changed to oxycodone t i d  on 03/20, switching to oxycodone 30 mg  b i d  given patient's myoclonus 03/21  ·  confirmed with patient's PCP that patient was previously on  oxycodone 30 mg q 6-8 hours for the chronic back pain in addition to chronic regional pain syndrome / fibromyalgia but mainly for the chronic back pain    ·   Of note PCP stated that patient had been slowly wean down as previously was on multiple more pain regimens including Belbuca as well as a higher dose of oxycodone as well as morphine  ·  PCP was able to wean down patient off the other pain medications down to just the oxycodone and will likely continue wean in the outpatient setting  · Monitor for withdrawal

## 2022-03-21 NOTE — ASSESSMENT & PLAN NOTE
POA: 160 - 180 mg a day per ( 30 mg 6 times a day) for back pain   · On oxycodone 30 mg  b i d  given patient's myoclonus 03/21 and patient is tolerating  · Has Lidoderm patch Voltaren gel, aqua K pads for additional pain regimen in the afternoon  · Monitor for withdrawal

## 2022-03-21 NOTE — ASSESSMENT & PLAN NOTE
Lab Results   Component Value Date    HGBA1C 7 5 (H) 01/04/2022       Recent Labs     03/20/22  1823 03/20/22  2131 03/20/22  2357 03/21/22  1157   POCGLU 106 125 109 90       Blood Sugar Average: Last 72 hrs:  (P) 149 375     · Home regimen Lantus 18 units bedtime, NovoLog sliding scale  · Mentation has improved switched to carb controlled/diabetic diet  · Sliding scale insulin  · Lantus 12 units (can be increased  As patient continues to eat her meals as currently only eating about half her meals)

## 2022-03-21 NOTE — NURSING NOTE
Pt lethargic per shift report, but passed RN bedside swallow eval and able to take pill whole with water  Pt refused skin care (Mauri cream) and topical pain medication  Pt states not in pain, but AAx3 (unclear as to situation)

## 2022-03-21 NOTE — PLAN OF CARE
Problem: Potential for Falls  Goal: Patient will remain free of falls  Description: INTERVENTIONS:  - Educate patient/family on patient safety including physical limitations  - Instruct patient to call for assistance with activity   - Consult OT/PT to assist with strengthening/mobility   - Keep Call bell within reach  - Keep bed low and locked with side rails adjusted as appropriate  - Keep care items and personal belongings within reach  - Apply yellow socks and bracelet for high fall risk patients  - Consider moving patient to room near nurses station  3/21/2022 0951 by William Weinstein RN  Outcome: Progressing  3/21/2022 0951 by William Weinstein, RN  Outcome: Progressing

## 2022-03-21 NOTE — PHYSICAL THERAPY NOTE
PHYSICAL THERAPY EVALUATION NOTE    Patient Name: Clifford ESTEVEZ Date: 3/21/2022     AGE:   73 y o  Mrn:   [id number]  ADMIT DX:  Hyperkalemia [E87 5]  Hypercarbia [R06 89]  Respiratory infection [J98 8]  Leg swelling [M79 89]  Limb tremor [R25 1]  Polypharmacy [Z79 899]  YVONNE (acute kidney injury) ([facility] [address] ) [N17 9]  Acute encephalopathy [G93 40]  Accidental overdose [T50 901A]  Redness and swelling of lower leg [M79 89, R23 8]    Past Medical History:   Diagnosis Date    Abnormal head CT 2017    Acute kidney injury ([facility] [address] ) 2018    Cellulitis 1/10/2017    Chronic back pain     Cirrhosis ([facility] [address] )     Closed fracture of multiple ribs of right side 2017    Degenerative disc disease at L5-S1 level     Diabetes mellitus (HCC)     GERD (gastroesophageal reflux disease)     History of methicillin resistant staphylococcus aureus (MRSA) 2018    negative nasal culture- isolation and hx of mrsa removed 2018    Hyperkalemia 2018    Hypertension     Hypocalcemia 2018    Hyponatremia 2017     Length Of Stay: 1  PHYSICAL THERAPY EVALUATION :   Patient's identity confirmed via 2 patient identifiers (full name and ) at start of session       22 1222   PT Last Visit   PT Visit Date 22   Note Type   Note type Evaluation   Pain Assessment   Pain Assessment Tool 0-10   Pain Score No Pain   Restrictions/Precautions   Weight Bearing Precautions Per Order No   Other Precautions Cognitive; Chair Alarm; Bed Alarm;O2;Fall Risk;Pain   Home Living   Type of [address] [place] [address] Two level;Stairs to enter with rails  (3+5 CATRACHO)   Bathroom Shower/Tub Tub/shower unit   Bathroom Toilet Standard   Home Equipment Walker  (rollator)   Additional Comments Pt reports using rollator to ambulate PTA (short household distances)   Prior Function   Level of [place] Needs assistance with IADLs   Lives With Family  (, daugther, daughter's partner)   Receives Help From Family   ADL Assistance Needs assistance   IADLs Needs assistance   General   Additional Pertinent History Pt w/ odd, random BUE "jerks" when laying in bed at start of session and decreased alertness  Once seated EOB, tremors disappear and alertness is markedly improved   Family/Caregiver Present No   Cognition   Overall Cognitive Status Impaired   Arousal/Participation Alert   Orientation Level Oriented to person;Oriented to place   Following Commands Follows multistep commands with increased time or repetition   Comments Pt w/ poor insight, delayed processing    Subjective   Subjective "I have to pee"   RLE Assessment   RLE Assessment WFL   Strength RLE   RLE Overall Strength 3+/5   LLE Assessment   LLE Assessment WFL   Strength LLE   LLE Overall Strength 3+/5   Vision-Basic Assessment   Current Vision Wears glasses all the time   Bed Mobility   Supine to Sit 3  Moderate assistance   Additional items Assist x 1; Increased time required;Verbal cues;LE management   Additional Comments Pt seated OOB in recliner chair at end of eval   Transfers   Sit to Stand 3  Moderate assistance   Additional items Assist x 1; Increased time required;Verbal cues   Stand to Sit 3  Moderate assistance   Additional items Assist x 1; Increased time required;Verbal cues   Ambulation/Elevation   Gait pattern Decreased foot clearance; Short stride; Excessively slow   Gait Assistance 3  Moderate assist   Additional items Assist x 1   Assistive Device   (B HHA)   Distance 2 ft  (steppage transfer to commode)   Balance   Static Sitting Fair   Dynamic Sitting Fair -   Static Standing Poor +   Dynamic Standing Poor +   Ambulatory Poor   Activity Tolerance   Activity Tolerance Patient limited by fatigue   Medical Staff [place] coordination w/ OT Marcelo Hylton   Nurse Made Aware Spoke to RN Natasha   Assessment   Prognosis Fair   Problem List Decreased strength;Decreased endurance; Impaired balance;Decreased mobility; Decreased cognition;Obesity   Assessment Haritha Pozo is a 73 y o  Female who [facility] on 3/19/2022 from home w/ c/o AMS and diagnosis of acute encephalopathy  Orders for PT eval and treat received, w/ activity orders of up w/ assist and fall precautions  Pt presents w/ comorbidities of HTN, DMII, CPS, opoid dependence, CKD, peripheral neuropathy  At baseline, pt mobilizes independently w/ rollator  Upon evaluation, pt presents w/ the following deficits: weakness, impaired balance and decreased endurance  Upon eval, pt requires mod A x 1 for bed mobility, mod A x 1 for transfers, and mod > min A x 1 for gait  Pt's clinical presentation is unstable/unpredictable due to need for assist w/ all phases of mobility when usually mobilizing independently, pain impacting overall mobility status, need for supplemental oxygen in order to maintain oxygen saturation and need for input for task focus and mobility technique  Patient is at an increased risk of falls due to physical and cognitive deficits  Given the above findings, discharge recommendation is for Home w/ HHPT and family support pending progress, confirmation of PLOF and family assistance available (if pt unable to progress mobility-wise and lack of family support, recommend STR)  During this admission, pt would benefit from continued skilled inpatient PT in the acute care setting in order to address the abovementioned deficits to maximize function and mobility before DC from acute care  Goals   Patient Goals to go home   STG Expiration Date 03/31/22   Short Term Goal #1 Patient will:  Increase bilateral LE strength 1/2 grade to facilitate independent mobility, Perform all bed mobility tasks w/ supervision to decrease fall risk factors, Perform all transfers w/ supervision to improve independence, Ambulate at least 50 ft  with roller walker w/ supervision w/o LOB, Navigate 8 stairs w/ supervision with unilateral handrail to facilitate return to previous living environment, Increase all balance 1/2 grade to decrease risk for falls, Complete exercise program independently and Tolerate 3 hr OOB to faciliate upright tolerance   PT Treatment Day 0   Plan   Treatment/Interventions Functional transfer training;LE strengthening/ROM; Elevations; Therapeutic exercise; Endurance training;Cognitive reorientation;Patient/family training;Equipment eval/education; Bed mobility;Gait training   PT Frequency 3-5x/wk   Recommendation   PT Discharge Recommendation Home with home health rehabilitation  (HHPT > STR, pending mobility progress, level of A from [facility])   Equipment Recommended Walker   AM-PAC Basic Mobility Inpatient   Turning in Bed Without Bedrails 3   Lying on Back to Sitting on Edge of Flat Bed 2   Moving Bed to Chair 2   Standing Up From Chair 2   Walk in Room 3   Climb 3-5 Stairs 2   Basic Mobility Inpatient Raw Score 14   Basic Mobility Standardized Score 35 55   Highest Level Of Mobility   -[facility] Goal 4: Move to chair/commode   [facility] Highest Level of Mobility 5: Stand (1 or more minutes)   -[facility] Goal Achieved Yes   Additional Treatment Session   Start Time 1212   End Time 1222   Treatment Assessment Pt seated OOB in recliner chair  She is agreeable to participate in PT intervention  Pt is able to perform STS from recliner chair w/ min A x 1  Pt is then able to ambullate 15 ft w/ rollator w/ min A x 1  Pt deferred further ambulation, ? increased confusion, aksing if there was pee on the floor and perseverating on that   Equipment Use RW   Additional Treatment Day 1   End of Consult   Patient Position at End of Consult Bedside chair; All needs within reach;Bed/Chair alarm activated     Pt seen as a co-eval due to the patient's co-morbidities, clinically unstable presentation, and present impairments which are a regression from the patient's baseline        The patient's AM-[facility] Basic Mobility Inpatient Short Form Raw Score is 14, Standardized Score is 35 55  A standardized score less than 38 32 (raw score of 16) suggests the patient may benefit from discharge to post-acute rehabilitation services which may not coincide with above PT recommendations   However please refer to therapist recommendation for discharge planning given other factors that may influence destination (Pt verbalizing being primarily sedentary at baseline, receives A from family for mobility/ADLs)    Pt would benefit from skilled inpatient PT during this admission in order to facilitate progress towards goals to maximize functional independence    Rachel Clevelandr, PT

## 2022-03-21 NOTE — ASSESSMENT & PLAN NOTE
Lab Results   Component Value Date    K 5 0 03/22/2022    K 5 0 03/21/2022    K 5 0 03/20/2022     resolved

## 2022-03-21 NOTE — OCCUPATIONAL THERAPY NOTE
Occupational Therapy Evaluation & Treatment     Patient Name: Sharon CHAU Date: 3/21/2022  Problem List  Principal Problem:    Acute encephalopathy  Active Problems:    Essential hypertension    Type 2 diabetes mellitus with hyperglycemia, with long-term current use of insulin (HCC)    Chronic pain syndrome    Hyperkalemia    Ambulatory dysfunction    Sepsis ([address] )    Anemia    Myoclonic jerking    Hyponatremia    Acute respiratory acidosis    Opiate dependence (HCC)    CKD (chronic kidney disease)    Thrombocytopenia (HCC)    Multiple wounds    Past Medical History  Past Medical History:   Diagnosis Date    Abnormal head CT 7/14/2017    Acute kidney injury ([address] ) 8/19/2018    Cellulitis 1/10/2017    Chronic back pain     Cirrhosis (HCC)     Closed fracture of multiple ribs of right side 7/14/2017    Degenerative disc disease at L5-S1 level     Diabetes mellitus (HCC)     GERD (gastroesophageal reflux disease)     History of methicillin resistant staphylococcus aureus (MRSA) 08/21/2018    negative nasal culture- isolation and hx of mrsa removed 8/20/2018    Hyperkalemia 4/25/2018    Hypertension     Hypocalcemia 4/25/2018    Hyponatremia 7/14/2017     Past Surgical History  Past Surgical History:   Procedure Laterality Date    CHOLECYSTECTOMY      JOINT REPLACEMENT      OOPHORECTOMY      WV INCISION,IMPLANT,NEUROELEC,SACRAL NERVE N/A 9/20/2021    Procedure: INSERTION NEUROSTIMULATOR ELECTRODE ARRAY SACRAL NERVE; FLUOROSCOPY; ELECTRONIC ANALYSIS;  Surgeon: Ramsey De La Cruz MD;  Location: [facility];  Service: UroGynecology           SACRAL NERVE STIMULATOR PLACEMENT N/A 9/28/2021    Procedure: IPG INSERTION AND PROGRAMMING;  Surgeon: Ramsey De La Cruz MD;  Location: [facility];  Service: UroGynecology                    03/21/22 1223   OT Last Visit   OT Visit Date 03/21/22   Note Type   Note type Evaluation   Restrictions/Precautions   Weight Bearing Precautions Per Order No   Other Precautions Cognitive; Chair Alarm; Bed Alarm; Fall Risk;O2;Pain   Pain Assessment   Pain Assessment Tool 0-10   Pain Score No Pain   Home Living   Type of Home House   Home Layout Two level;Stairs to enter with rails  (3+5 CATRACHO)   Bathroom Shower/Tub Tub/shower unit   Bathroom Toilet Standard   Bathroom Equipment Grab bars in shower; Shower chair   Bathroom Accessibility Accessible via walker   [address] [facility]  (Rollator)   Prior Function   Level of [place] Needs assistance with ADLs and functional mobility   Lives With Spouse;Daughter   Receives Help From Family   ADL Assistance Needs assistance   IADLs Needs assistance   Comments Reports she is typically able to walk short household distances with her rollator  Psychosocial   Psychosocial (WDL) X   Patient Behaviors/Mood Irritable  (FLAT)   Subjective   Subjective "Is the floor going to get wet?" Pt perseverates on the floor getting wet during toileting  ADL   Where Assessed Chair   Eating Assistance 7  Independent   LB Dressing Assistance 2  Maximal Assistance   LB Dressing Deficit Don/doff R sock; Don/doff L sock   Toileting Assistance  2  Maximal Assistance   Toileting Deficit Perineal hygiene;Steadying;Setup;Supervison/safety; Bedside commode   Additional Comments Pt transfers from EOB to B/S commode for BM stands with mod A for support and max A to wipe      Bed Mobility   Supine to Sit 3  Moderate assistance   Additional items Assist x 1   Additional Comments OOB to chair at end of session   Transfers   Sit to Stand 3  Moderate assistance   Additional items Assist x 1   Stand to Sit 3  Moderate assistance   Additional items Assist x 1   Stand pivot 4  Minimal assistance   Additional items Assist x 1   Additional Comments Rollator   Balance   Static Sitting Fair   Dynamic Sitting Fair -   Static Standing Poor +   Dynamic Standing Poor +   Activity Tolerance   Activity Tolerance Patient limited by fatigue   Medical Staff Made Aware Care Coordinated with Rupesh Merino, PT   Nurse Made Aware OK to see per RN   RUE Assessment   RUE Assessment WFL   LUE Assessment   LUE Assessment WFL   Hand Function   Gross Motor Coordination Functional   Fine Motor Coordination Functional   Cognition   Overall Cognitive Status Impaired   Arousal/Participation Alert; Cooperative   Attention Attends with cues to redirect   Orientation Level Oriented to person;Oriented to place   Memory Within functional limits   Following Commands Follows one step commands without difficulty   Comments Delayed processing and poor insight  Assessment   Limitation Decreased ADL status; Decreased UE ROM; Decreased UE strength;Decreased cognition;Decreased endurance;Decreased self-care trans;Decreased high-level ADLs   Prognosis Fair   Assessment Pt is a 73 y o  female seen for OT evaluation s/p [facility] 3/19/2022 w/ Acute encephalopathy  Comorbidities affecting pt's functional performance at time of assessment include: HTN, type 2 DM, chronic pain syndrome, hyperkalemia, ambulatory dysfunction, sepsis, anemia, myoclonic jerking, hyponatremia, acute respiratory acidosis, opiate dependence, CKD, thrombocytopenia    Personal factors affecting pt at time of IE include:steps to enter environment, difficulty performing ADLS, difficulty performing IADLS , limited insight into deficits, flat affect and decreased initiation and engagement   Prior to admission, pt was assisted with ADLs and functional mobility with a rollator at home  Upon evaluation: Pt requires mod A for mobility and max A for ADLs 2* the following deficits impacting occupational performance: weakness, decreased strength, decreased balance and decreased tolerance  Pt to benefit from continued skilled OT tx while in the hospital to address deficits as defined above and maximize level of functional independence w ADL's and functional mobility   Occupational Performance areas to address include: grooming, bathing/shower, toilet hygiene, dressing, functional mobility and community mobility  From OT standpoint, recommendation at time of d/c would be home OT pending progress  Goals   Patient Goals Go home   Plan   Treatment Interventions ADL retraining;Functional transfer training;UE strengthening/ROM; Endurance training;Patient/family training;Equipment evaluation/education; Compensatory technique education;Continued evaluation   Goal Expiration Date 03/31/22   OT Treatment Day 1   OT Frequency 2-3x/wk   Recommendation   OT Discharge Recommendation Home with home health rehabilitation   AM-PAC Daily Activity Inpatient   Lower Body Dressing 2   Bathing 2   Toileting 2   Upper Body Dressing 4   Grooming 4   Eating 4   Daily Activity Raw Score 18   Daily Activity Standardized Score (Calc for Raw Score >=11) 38 66     GOALS    1) Pt will improve activity tolerance to G for min 30 min txment sessions    2) Pt will complete UB/LB dressing/self care w/ mod I using adaptive device and DME as needed    3) Pt will complete bathing w/ Mod I w/ use of AE and DME as needed    4) Pt will complete toileting w/ mod I w/ G hygiene/thoroughness using DME as needed    5) Pt will improve functional transfers to Mod I on/off all surfaces using DME as needed w/ G balance/safety     6) Pt will improve functional mobility during ADL/IADL/leisure tasks to Mod I using DME as needed w/ G balance/safety     7) Pt will participate in simulated IADL management task to increase independence to Mod I w/ G safety and endurance    8) Pt will demonstrate G attention for 10 minutes during ongoing cognitive assessment to assist w/ safe d/c planning/recommendations    9) Pt will demonstrate G carryover of pt/caregiver education and training as appropriate w/ mod I w/o cues w/ good tolerance    10) Pt will demonstrate 100% carryover of energy conservation techniques w/ mod I t/o functional I/ADL/leisure tasks w/o cues s/p skilled education    Colby Schneider, MARTIN, OTR/L

## 2022-03-21 NOTE — NURSING NOTE
Pt in need of accurate I/O but is having multiple episodes of incontinence  Pt was bladder scanned after voiding 350 cc of clear, yellow urine in commode but did have a moderate amount of urine that was unable to be measured  Pt was bladder scanned after voiding and still had 311 cc of urine  Provider notified

## 2022-03-21 NOTE — ASSESSMENT & PLAN NOTE
Noted deep tissue wound in sacrum and   Partial thickness venous ulcer on left medial leg     Wound care to follow

## 2022-03-21 NOTE — ASSESSMENT & PLAN NOTE
Recent Labs     03/20/22  0626 03/21/22  0650 03/22/22  0453   HGB 9 3* 9 0* 9 3*     ·   · Chronic microcytic hypochromic anemia  · Continue to monitor

## 2022-03-21 NOTE — SEPSIS NOTE
Sepsis Note   Haritha Pozo 73 y o  female MRN: [id number]  Unit/Bed#: S -01 Encounter: [id number]       qSOFA     Row Name 03/21/22 07:38:17 03/21/22 04:26:50 03/21/22 0300 03/21/22 0000 03/20/22 22:30:15    Altered mental status GCS < 15 -- -- 0 0 --    Respiratory Rate > / =22 -- 0 -- -- 0    Systolic BP < / =998 0 0 -- -- 0    Q Sofa Score 0 0 -- 0 0    Row Name 03/20/22 2000 03/20/22 19:12:49 03/20/22 1630 03/20/22 15:06:56 03/20/22 0930    Altered mental status GCS < 15 0 -- 0 -- 0    Respiratory Rate > / =22 -- 0 -- 0 --    Systolic BP < / =645 -- 0 -- 0 --    Q Sofa Score 0 0 0 0 0    Row Name 03/20/22 08:16:59 03/20/22 0700 03/20/22 0630 03/20/22 0530 03/20/22 0500    Altered mental status GCS < 15 -- -- -- -- --    Respiratory Rate > / =22 0 0 0 0 0    Systolic BP < / =023 0 0 0 0 0    Q Sofa Score 0 0 0 0 0    Row Name 03/20/22 0330 03/20/22 0300 03/20/22 0245 03/20/22 0230 03/20/22 0130    Altered mental status GCS < 15 -- -- 0 -- --    Respiratory Rate > / =22 0 0 -- 0 0    Systolic BP < / =715 0 0 -- 0 0    Q Sofa Score 0 0 0 0 1    Row Name 03/20/22 0100 03/20/22 0055 03/20/22 0030 03/19/22 2345 03/19/22 2241    Altered mental status GCS < 15 -- -- -- -- --    Respiratory Rate > / =22 0 0 1 1 1    Systolic BP < / =645 0 0 0 0 0    Q Sofa Score 1 1 2 2 2    Row Name 03/19/22 2226 03/19/22 2223 03/19/22 2130 03/19/22 2059 03/19/22 2000    Altered mental status GCS < 15 1 -- -- 0 --    Respiratory Rate > / =22 -- 1 1 -- 1    Systolic BP < / =330 -- 0 0 -- 0    Q Sofa Score 2 1 1 1 1    Row Name 03/19/22 1958                Altered mental status GCS < 15 --        Respiratory Rate > / =85 1        Systolic BP < / =582 0        Q Sofa Score 1                      Sepsis  - Criteria met: leukopenic at 3 97, tachycardic at 99, and on 4L NC this AM (does not wear O2 at home)   - Suspected source: Lungs/Pneumonia   - Workup: lactate 1 2 (WNL), CXR showed Possible bilateral infiltrates more prominent on the left, procal: 0 58 (elevated)  - Treatment        - patient given 1L bolus of NS x2, and then NS infusion for 12 hours at 50cc/hr        - started on antibiotics: cefepime and vancomycin              - MRSA swab negative so vanc d/c today 03/21             - cefepime can likely be de-escalated to ceftriaxone 03/22         [facility], DO

## 2022-03-21 NOTE — ASSESSMENT & PLAN NOTE
· POAMost likely secondary to opiate overdose and sedative   · VBG improved after Narcan and with BiPap   · Seems to have resolved with CO2 WNL  · Continue Bipap and Narcan prn   · 03/21 later afternoon, patient VBG was noted to hypercapnic w/ CO2 56 8 and was put on BiPAP overnight   · Improved VBG this AM 03/22 w/ CO2 in 42 9, mildly alkalotic at 7 460 along w/ pt's mentation

## 2022-03-21 NOTE — ASSESSMENT & PLAN NOTE
· Patient with chronic back/hip pain  · Per , she is taking 160-180 mg of oxycodone daily (30 mg 6 times a day, per  it was ordered by her PCP [place])  · PDMP reviewed  · PCP prescribes roxycodone (30mg q6-8 hours) and has tapered medication down from larger regimen of pain medication in the past  Including morphine and belbuca (buprenorphine)  And is still maintaining patient's  Wean that expected continue post discharge from hospital

## 2022-03-21 NOTE — WOUND OSTOMY CARE
Consult Note - Wound   Tor Valentina 73 y o  female MRN: [id number]  Unit/Bed#: S -01 Encounter: [id number]      History and Present Illness:  Patient is seen for skin assessment  Patient examined with primary nurse present  Patient is occasionally incontinent  Patient is a moderate assist to turn in bed  Patient follows in the outpatient wound center and should continue to do so at discharge  Assessment Findings:   1  POA DTI on sacrum        2  MASD with fungal rash on groin        3  Partial thickness venous ulcer on left medial leg        4  MASD with fungal rash under breasts    Heels intact    See flowsheets for details    Skin care plans:  1-Cleanse sacro-buttocks and groin with soap and water  Pat dry  Dust open areas with stoma powder and Apply YULI to sacrum, buttocks, groin BID and PRN  2-Hydraguard to bilateral heel BID and PRN  3-Elevate heels to offload pressure  4-Ehob cushion when out of bed  5-Turn/repoisiton q2h or when medically stable for pressure re-distribution on skin  6-Moisturize skin daily with skin nourishing cream   7-Cleanse under breasts with soap and water  Pat dry  Dust with nystatin powder and apply maxorb rope  Change daily and PRN  8- Cleanse left leg venous ulcer with soap and water  Pat dry  Apply allevyn foam  Change every three days and PRN  Call or tigertext with any questions  Wound Care will continue to follow    Wound 03/15/22 MASD Groin Left (Active)   Wound Image   03/21/22 1015   Wound Description Drainage;Fragile;Slough 03/21/22 1015   Shaunna-wound Assessment Edema; Erythema; Excoriated;Rash 03/21/22 1015   Wound Length (cm) 9 cm 03/21/22 1015   Wound Width (cm) 13 cm 03/21/22 1015   Wound Depth (cm) 0 1 cm 03/21/22 1015   Wound Surface Area (cm^2) 117 cm^2 03/21/22 1015   Wound Volume (cm^3) 11 7 cm^3 03/21/22 1015   Calculated Wound Volume (cm^3) 11 7 cm^3 03/21/22 1015   Change in Wound Size % -271 43 03/21/22 1015   Drainage Amount Small 03/21/22 1015   Drainage Description Foul smelling;Serous 03/21/22 1015   Non-staged Wound Description Partial thickness 03/21/22 1015   Treatments Cleansed 03/21/22 1015   Dressing Dry dressing;Gauze 03/21/22 0800   Dressing Status Clean;Dry; Intact 03/20/22 0930       Wound 03/20/22 Pressure Injury Buttocks (Active)   Wound Image   03/21/22 1013   Wound Description Clean;Dry;Fragile;Pink 03/21/22 1013   Pressure Injury Stage DTPI 03/21/22 1013   Shaunna-wound Assessment Clean;Dry; Intact 03/21/22 1013   Wound Length (cm) 8 cm 03/21/22 1013   Wound Width (cm) 6 cm 03/21/22 1013   Wound Depth (cm) 0 1 cm 03/21/22 1013   Wound Surface Area (cm^2) 48 cm^2 03/21/22 1013   Wound Volume (cm^3) 4 8 cm^3 03/21/22 1013   Calculated Wound Volume (cm^3) 4 8 cm^3 03/21/22 1013   Treatments Cleansed 03/21/22 1013   Dressing Protective barrier 03/21/22 1013       Wound 03/21/22 Venous Ulcer Pretibial Left;Medial (Active)   Wound Image   03/21/22 1008   Wound Description Drainage;Edema;Fragile; Yellow 03/21/22 1008   Shaunna-wound Assessment Clean;Dry; Intact 03/21/22 1008   Wound Length (cm) 1 cm 03/21/22 1008   Wound Width (cm) 3 cm 03/21/22 1008   Wound Depth (cm) 0 1 cm 03/21/22 1008   Wound Surface Area (cm^2) 3 cm^2 03/21/22 1008   Wound Volume (cm^3) 0 3 cm^3 03/21/22 1008   Calculated Wound Volume (cm^3) 0 3 cm^3 03/21/22 1008   Drainage Amount Small 03/21/22 1008   Drainage Description Serous 03/21/22 1008   Non-staged Wound Description Partial thickness 03/21/22 1008   Treatments Cleansed 03/21/22 1008   Dressing Foam, Silicon (eg   Allevyn, etc) 03/21/22 1008

## 2022-03-21 NOTE — ASSESSMENT & PLAN NOTE
Recent Labs     03/19/22 2036 03/20/22  0626 03/21/22  0650   HGB 11 1* 9 3* 9 0*     ·   · Chronic microcytic hypochromic anemia  · Continue to monitor

## 2022-03-21 NOTE — NURSING NOTE
Pt observed to have somewhat frequent periods of myoclonic jerks involving BL UE and head  Pt also had several instances of involuntary facial grimacing  Provider made aware  No new orders at this time  Will continue to monitor

## 2022-03-21 NOTE — ASSESSMENT & PLAN NOTE
Lab Results   Component Value Date    K 5 0 03/21/2022    K 5 0 03/20/2022    K 6 0 (H) 03/19/2022     resolved

## 2022-03-21 NOTE — ASSESSMENT & PLAN NOTE
Lab Results   Component Value Date    EGFR 52 03/21/2022    EGFR 33 03/20/2022    EGFR 31 03/19/2022    CREATININE 1 11 03/21/2022    CREATININE 1 59 (H) 03/20/2022    CREATININE 1 70 (H) 03/19/2022     · Baseline appears to be between 1-1 2   · Has resolved back to baseline after IVF given 03/20  · Renally dosed medication, avoid nephrotoxins and hypotension

## 2022-03-21 NOTE — ASSESSMENT & PLAN NOTE
· On and off in the past 3 years  Per , she was getting them when she had pneumonias or infections and opiates overdose  · Left hand weaker that the right one after she fell -Was seen by neurology during previous admission, was thought to be muscular     ·  myoclonic jerking has been continuous over a chronic period per patient and likely a contribution from opioids given patient's cirrhosis   ·  Cymbalta will also be tapered down; currently at 30 mg daily and will try and taper down as able to   ·  per discussion with toxicology,  Is best to decrease serotonergic medications as taper off and have PCP switched to a pain medication that is less serotonergic given patient's myoclonic jerking  · On aqua K pads, voltaren gel, and lidoderm patches in the afternoon in addition to oxy 30mg BID and patient has been tolerating

## 2022-03-21 NOTE — ASSESSMENT & PLAN NOTE
Lab Results   Component Value Date    SODIUM 136 03/22/2022    SODIUM 135 (L) 03/21/2022    SODIUM 133 (L) 03/20/2022    SODIUM 134 (L) 03/19/2022      hyponatremia improving and close to within normal limits after previously getting a infusion of normal saline  Patient continues to drink p o  fluids without problem  Resolved

## 2022-03-21 NOTE — ASSESSMENT & PLAN NOTE
· Most likely secondary to opiate overdose and sedative   · VBG improved after Narcan and with BiPap   · Seems to have resolved with CO2 WNL  · Continue Bipap and Narcan prn

## 2022-03-21 NOTE — PROGRESS NOTES
[facility]  Progress Note - Dayna Allen 1955, 73 y o  female MRN: [id number]  Unit/Bed#: S -01 Encounter: [id number]  Primary Care Provider: Noe Villa MD   Date and time admitted to hospital: 3/19/2022  7:50 PM    * Acute encephalopathy  Assessment & Plan  · POA: Patient admitted with altered mental status  Per , she was able to communicate while on the ambulance but got altered here  · Per ER, she was able to answer questions here and had pinpoint pupils, improved after narcan( total 0 4 mg), but was even better after she was placed on BiPap   · Per , she was taking 30 mg oxycodone 6 time a day( wife confirmed, she said 6- 7 times a day)   said it was ordered like that by her pcp from [place]  He said he is giving her around 160-180 mg a day, along with Gabapentin prn( last dose yesterday)  I told him that she had similar problems in the past and that she was supposed to take 30 mg tid  He said " I know but I don't want her to be in pain"  He mentioned that she started to have hands jerkings Saturday around 2 o'clock  As well he mentioned her breathing sounds like rumbling since yesterday  ·  said that sometimes his wife breathing stops while she is sleeping and that he has to reposition her head  She was supposed to have sleep study done, but no spots were available until late in April  · Most likely etiology is opiates overdose, infection, hypoxia and hypercapnia  · W/U: TSH, ammonia, comma panel within normal limits  CT head showed no cute changes  Urine drug screen showed opiates     · During exam 03/21: mentation has improved drastically and is oriented to person, place and time     Plan:   · Continue BiPAP   · Continue narcan prn   · Monitor for opiates withdrawal   · Per toxicology  · Taper down cymbalta  · Stop gabapentin  · Continue oxycodone 30mg TID and try to taper as able to (PCP has had patient on a wean for some time and plants to continue tapering down)  · Will attempt to wean down to 30mg BID   · Given aqua K pads, lidoderm patch  and voltaren gel for pain regimen for back pain (in afternoon if patient able to tolerate no afternoon dose)   · Treat suspected  Pneumonia as per plan under sepsis  · Wean down O2 and maintain SpO2 >88%  · No need for palliative consult given patient's mentation improving, pneumonia is being treated w/ antibx, and continuing to wean off opioids as per discussion with patient's PCP  Opiate dependence (HCC)  Assessment & Plan  POA: 160 - 180 mg a day per ( 30 mg 6 times a day) for back pain   · changed to oxycodone t i d  on 03/20, switching to oxycodone 30 mg  b i d  given patient's myoclonus 03/21  ·  confirmed with patient's PCP that patient was previously on  oxycodone 30 mg q 6-8 hours for the chronic back pain in addition to chronic regional pain syndrome / fibromyalgia but mainly for the chronic back pain  ·   Of note PCP stated that patient had been slowly wean down as previously was on multiple more pain regimens including Belbuca as well as a higher dose of oxycodone as well as morphine  ·  PCP was able to wean down patient off the other pain medications down to just the oxycodone and will likely continue wean in the outpatient setting  · Monitor for withdrawal    Myoclonic jerking  Assessment & Plan  · On and off in the past 3 years  Per , she was getting them when she had pneumonias or infections and opiates overdose  · Left hand weaker that the right one after she fell -Was seen by neurology during previous admission, was thought to be muscular     ·  myoclonic jerking has been continuous over a chronic period per patient and likely a contribution from opioids given patient's cirrhosis   ·  patient's gabapentin of 300 mg daily was stopped  ·  Cymbalta will also be tapered down,  Currently taking 60 mg daily and will try and taper down as able to   ·  per discussion with toxicology,  Is best to decrease serotonergic medications as taper off and have PCP switched to a pain medication that is less serotonergic given patient's myoclonic jerking  · Will likely be given aqua K pads, voltaren gel, and lidoderm patches in the afternoon if patient able to tolerate oxy 30mg BID    Sepsis ([facility] [address] )  Assessment & Plan  · Present on admission:  Fever 101 6, leukocytosis, tachycardia  Source likely pneumonia  UA showed no wbc, bacteria  Patient had bilateral lower extremity venous stasis did not appreciate any cellulitis  Has bilateral redness medial thigh bilaterally, that it did not seem infected  · Received ceftriaxone and azithromycin for pneumonia suspicion in emergency room  · Lactic within normal limits  · CXR showed Possible bilateral infiltrates more prominent on the left  · Impression: likely bacterial pneumonia    Lab Results   Component Value Date    PROCALCITONI 0 68 (H) 03/21/2022    PROCALCITONI 0 19 03/19/2022    PROCALCITONI 0 56 (H) 02/23/2022    MRSA swab negative   procalcitonin was 0 68  Lab Results   Component Value Date    WBC 3 97 (L) 03/21/2022    HGB 9 0 (L) 03/21/2022    HCT 30 8 (L) 03/21/2022    MCV 85 03/21/2022     (L) 03/21/2022         Plan:   ·  received cefepime and vancomycin today 03/21  · can likely deescalate to ceftriaxone/3rd generation cephalosporin starting tomorrow 0 3/22  · MRSA swab negative and  Discontinued vancomycin  ·  procalcitonin was elevated for compared to 2 days ago and pending a m  reflex  ·  patient noted to be leukopenic compared to leukocytosis that was appreciated yesterday's 2 3/20 at 11 41  · Monitor CBC, VS   · Follow-up for blood culture (no growth at 24 hours)        Multiple wounds  Assessment & Plan  Noted deep tissue wound in sacrum and   Partial thickness venous ulcer on left medial leg     Wound care to follow    Thrombocytopenia [facility])  Assessment & Plan    Continue to monitor clinically for any rashes/petechiae    monitor with daily  CBC and platelets    CKD (chronic kidney disease)  Assessment & Plan  Lab Results   Component Value Date    EGFR 52 03/21/2022    EGFR 33 03/20/2022    EGFR 31 03/19/2022    CREATININE 1 11 03/21/2022    CREATININE 1 59 (H) 03/20/2022    CREATININE 1 70 (H) 03/19/2022     · Baseline appears to be between 1-1 2   · Has resolved back to baseline after IVF given 03/20  · Renally dosed medication, avoid nephrotoxins and hypotension     Acute respiratory acidosis  Assessment & Plan  · Most likely secondary to opiate overdose and sedative   · VBG improved after Narcan and with BiPap   · Seems to have resolved with CO2 WNL  · Continue Bipap and Narcan prn     Hyponatremia  Assessment & Plan  Lab Results   Component Value Date    SODIUM 135 (L) 03/21/2022    SODIUM 133 (L) 03/20/2022    SODIUM 134 (L) 03/19/2022    SODIUM 133 (L) 02/23/2022      hyponatremia improving and close to within normal limits after previously getting a infusion of normal saline  Patient continues to drink p o  fluids without problem    Anemia  Assessment & Plan  Recent Labs     03/19/22 2036 03/20/22  0626 03/21/22  0650   HGB 11 1* 9 3* 9 0*     ·   · Chronic microcytic hypochromic anemia  · Continue to monitor     Ambulatory dysfunction  Assessment & Plan  · Walks with a walker, needs assistance  · Fall precautions   · PT when stable   · OT recommends home w/ home PT/OT    Hyperkalemia  Assessment & Plan  Lab Results   Component Value Date    K 5 0 03/21/2022    K 5 0 03/20/2022    K 6 0 (H) 03/19/2022     resolved    Chronic pain syndrome  Assessment & Plan  · Patient with chronic back/hip pain  · Per , she is taking 160-180 mg of oxycodone daily (30 mg 6 times a day, per  it was ordered by her PCP [place])  · PDMP reviewed  · PCP prescribes roxycodone (30mg q6-8 hours) and has tapered medication down from larger regimen of pain medication in the past  Including morphine and belbuca (buprenorphine)  And is still maintaining patient's  Wean that expected continue post discharge from hospital       Type 2 diabetes mellitus with hyperglycemia, with long-term current use of insulin [facility])  Assessment & Plan  Lab Results   Component Value Date    HGBA1C 7 5 (H) 01/04/2022       Recent Labs     03/20/22  1823 03/20/22  2131 03/20/22  2357 03/21/22  1157   POCGLU 106 125 109 90       Blood Sugar Average: Last 72 hrs:  (P) 149 375     · Home regimen Lantus 18 units bedtime, NovoLog sliding scale  · Mentation has improved switched to carb controlled/diabetic diet  · Sliding scale insulin  · Lantus 12 units (can be increased  As patient continues to eat her meals as currently only eating about half her meals)    Essential hypertension  Assessment & Plan  · Blood pressure repeated upon presentation  · Continue home medication with lisinopril 10 mg daily and amlodipine 10 mg  · Monitor        VTE Pharmacologic Prophylaxis: VTE Score: 5 High Risk (Score >/= 5) - Pharmacological DVT Prophylaxis Ordered: heparin  Sequential Compression Devices Ordered  Patient Centered Rounds: I performed bedside rounds with nursing staff today  Discussions with Specialists or Other Care Team Provider: Toxicology, PCP    Education and Discussions with Family / Patient: Updated  () via phone  Current Length of Stay: 1 day(s)  Current Patient Status: Inpatient   Discharge Plan: pending PT/OT eval but likely to be placed in rehab    Code Status: Level 1 - Full Code    Subjective:   Her mentation has improved and she mostly complains about her myoclonic jerking that she states has been chronic for her  Patient states that her her jerking has not been improving all that much  She had some complaints when getting blood draws but seemed to tolerate       She denies any fevers/chill, chest pain, shortness of breath, abdominal pain, nausea, vomiting, diarrhea, problems urinating, problems  With bowel movements, and is eating/drinking without problem  Objective:     Vitals:   Temp (24hrs), Av 2 °F (36 8 °C), Min:97 9 °F (36 6 °C), Max:98 4 °F (36 9 °C)    Temp:  [97 9 °F (36 6 °C)-98 4 °F (36 9 °C)] 98 3 °F (36 8 °C)  HR:  [] 93  Resp:  [16-18] 18  BP: (105-169)/(65-89) 143/68  SpO2:  [93 %-99 %] 93 %  Body mass index is 34 27 kg/m²  Input and Output Summary (last 24 hours): Intake/Output Summary (Last 24 hours) at 3/21/2022 1459  Last data filed at 3/20/2022 2329  Gross per 24 hour   Intake 400 ml   Output --   Net 400 ml       Physical Exam:   Physical Exam  Vitals and nursing note reviewed  Constitutional:       General: She is not in acute distress  Appearance: She is well-developed  HENT:      Head: Normocephalic and atraumatic  Eyes:      Conjunctiva/sclera: Conjunctivae normal    Cardiovascular:      Rate and Rhythm: Normal rate and regular rhythm  Pulses: Normal pulses  Heart sounds: No murmur heard  Pulmonary:      Effort: Pulmonary effort is normal  No respiratory distress  Comments: Coarse breath sounds appreciated  Abdominal:      Palpations: Abdomen is soft  Tenderness: There is no abdominal tenderness  Musculoskeletal:      Cervical back: Neck supple  Skin:     General: Skin is warm and dry  Comments: Noted skin ulcer noted in the sacrum and in left medial leg    Neurological:      Mental Status: She is alert and oriented to person, place, and time        Comments: Noted weakness 3/5 in bilat UE and LE           Additional Data:     Labs:  Results from last 7 days   Lab Units 22  0650   WBC Thousand/uL 3 97*   HEMOGLOBIN g/dL 9 0*   HEMATOCRIT % 30 8*   PLATELETS Thousands/uL 100*   NEUTROS PCT % 69   LYMPHS PCT % 19   MONOS PCT % 8   EOS PCT % 4     Results from last 7 days   Lab Units 22  0650   SODIUM mmol/L 135*   POTASSIUM mmol/L 5 0   CHLORIDE mmol/L 103   CO2 mmol/L 24   BUN mg/dL 21   CREATININE mg/dL 1 11 ANION GAP mmol/L 8   CALCIUM mg/dL 7 8*   ALBUMIN g/dL 2 2*   TOTAL BILIRUBIN mg/dL 0 53   ALK PHOS U/L 152*   ALT U/L 30   AST U/L 52*   GLUCOSE RANDOM mg/dL 67     Results from last 7 days   Lab Units 03/19/22  2036   INR  1 00     Results from last 7 days   Lab Units 03/21/22  1157 03/20/22  2357 03/20/22  2131 03/20/22  1823 03/20/22  1208 03/20/22  0714 03/20/22  0231 03/19/22 2015   POC GLUCOSE mg/dl 90 109 125 106 132 197* 225* 211*         Results from last 7 days   Lab Units 03/21/22  0710 03/19/22  2219 03/19/22 2036   LACTIC ACID mmol/L  --  1 2  --    PROCALCITONIN ng/ml 0 68*  --  0 19       Lines/Drains:  Invasive Devices  Report    Peripheral Intravenous Line            Peripheral IV 03/19/22 Left;Ventral (anterior) Forearm 2 days    Peripheral IV 03/21/22 Dorsal (posterior); Right Forearm <1 day                      Imaging: Reviewed radiology reports from this admission including: chest xray    Recent Cultures (last 7 days):   Results from last 7 days   Lab Units 03/19/22 2219   BLOOD CULTURE  No Growth at 24 hrs  No Growth at 24 hrs         Last 24 Hours Medication List:   Current Facility-Administered Medications   Medication Dose Route Frequency Provider Last Rate    amLODIPine  10 mg Oral Daily Saran Muir MD      cefepime  2,000 mg Intravenous Q12H Saran Muir MD 2,000 mg (03/21/22 0823)    Diclofenac Sodium  2 g Topical 4x Daily Trg [address], DO      [START ON 3/22/2022] DULoxetine  30 mg Oral Daily Pillo Campos DO      heparin (porcine)  5,000 Units Subcutaneous [facility] Saran Muir MD      insulin glargine  12 Units Subcutaneous HS Pillo Campos DO      insulin lispro  1-5 Units Subcutaneous HS Saran Muir MD      lidocaine  1 patch Topical Daily Pillo Campos DO      lisinopril  10 mg Oral Daily Saran Muir MD      Stendal Dura ANTIFUNGAL   Topical BID Osmel Blanca MD      naloxone  0 4 mg Intravenous Once Deb Rosado MD      naloxone  0 4 mg Intravenous Q1H PRN Deb Rosado MD      naloxone -Provider supplied  4 mg Does not apply Once Deb Rosado MD      ondansetron  4 mg Intravenous Q6H PRN Luc De La Rosa MD      oxyCODONE  30 mg Oral Q8H Yohana Houser MD      polyethylene glycol  17 g Oral Daily PRN Yohana Houser MD      pravastatin  20 mg Oral Daily Luc De La Rosa MD      saccharomyces boulardii  250 mg Oral BID Luc De La Rosa MD       Facility-Administered Medications Ordered in Other Encounters   Medication Dose Route Frequency Provider Last Rate    ferrous sulfate  325 mg Oral Daily With Breakfast Gildardo Bueno MD          Today, Patient Was Seen By: Erica Garza DO    **Please Note: This note may have been constructed using a voice recognition system  **

## 2022-03-21 NOTE — ASSESSMENT & PLAN NOTE
Lab Results   Component Value Date    EGFR 70 03/22/2022    EGFR 52 03/21/2022    EGFR 33 03/20/2022    CREATININE 0 87 03/22/2022    CREATININE 1 11 03/21/2022    CREATININE 1 59 (H) 03/20/2022     · Baseline appears to be between 1-1 2   · Has resolved back to baseline after IVF given 03/20  · Renally dosed medication, avoid nephrotoxins and hypotension

## 2022-03-21 NOTE — ASSESSMENT & PLAN NOTE
· Present on admission:  Fever 101 6, leukocytosis, tachycardia  Source likely pneumonia  UA showed no wbc, bacteria  Patient had bilateral lower extremity venous stasis did not appreciate any cellulitis  Has bilateral redness medial thigh bilaterally, that it did not seem infected     · Received ceftriaxone and azithromycin for pneumonia suspicion in emergency room  · Lactic within normal limits  · CXR showed Possible bilateral infiltrates more prominent on the left  · Impression: likely bacterial pneumonia    Lab Results   Component Value Date    PROCALCITONI 0 68 (H) 03/21/2022    PROCALCITONI 0 19 03/19/2022    PROCALCITONI 0 56 (H) 02/23/2022    MRSA swab negative   procalcitonin was 0 68  Lab Results   Component Value Date    WBC 3 97 (L) 03/21/2022    HGB 9 0 (L) 03/21/2022    HCT 30 8 (L) 03/21/2022    MCV 85 03/21/2022     (L) 03/21/2022         Plan:   ·  received cefepime and vancomycin today 03/21  · can likely deescalate to ceftriaxone/3rd generation cephalosporin starting tomorrow 0 3/22  · MRSA swab negative and  Discontinued vancomycin  ·  procalcitonin was elevated for compared to 2 days ago and pending a m  reflex  ·  patient noted to be leukopenic compared to leukocytosis that was appreciated yesterday's 2 3/20 at 11 41  · Monitor CBC, VS   · Follow-up for blood culture (no growth at 24 hours)

## 2022-03-21 NOTE — ASSESSMENT & PLAN NOTE
· POA: Patient admitted with altered mental status  Per , she was able to communicate while on the ambulance but got altered here  · Per ER, she was able to answer questions here and had pinpoint pupils, improved after narcan( total 0 4 mg), but was even better after she was placed on BiPap   · Per , she was taking 30 mg oxycodone 6 time a day( wife confirmed, she said 6- 7 times a day)   said it was ordered like that by her pcp from [place]  He said he is giving her around 160-180 mg a day, along with Gabapentin prn( last dose yesterday)  I told him that she had similar problems in the past and that she was supposed to take 30 mg tid  He said " I know but I don't want her to be in pain"  He mentioned that she started to have hands jerkings Saturday around 2 o'clock  As well he mentioned her breathing sounds like rumbling since yesterday  ·  said that sometimes his wife breathing stops while she is sleeping and that he has to reposition her head  She was supposed to have sleep study done, but no spots were available until late in April  · Most likely etiology is opiates overdose, infection, hypoxia and hypercapnia  · W/U: TSH, ammonia, comma panel within normal limits  CT head showed no cute changes  Urine drug screen showed opiates     During exam 03/22: mentation has improved drastically compared to afternoon 03/22 when patient was very drowsy      Plan:   · 03/21 later afternoon, patient VBG was noted to hypercapnic w/ CO2 56 8 and was put on BiPAP overnight   Improved VBG this AM 03/22 w/ CO2 in 42 9, mildly alkalotic at 7 460 along w/ pt's mentation  · Continue BiPAP   · Continue narcan prn   · Monitor for opiates withdrawal   · Per toxicology  · Taper down cymbalta off (currently on 30mg daily)  · Stop gabapentin  · Continue oxycodone and try to taper as able to (PCP has had patient on a wean for some time and plants to continue tapering down)  · On 30mg BID and tolerating  · Given aqua K pads, lidoderm patch  and voltaren gel for pain regimen for back pain (in afternoon)  · Treat suspected  Pneumonia as per plan under sepsis  · Off O2

## 2022-03-22 ENCOUNTER — TELEPHONE (OUTPATIENT)
Dept: NEUROLOGY | Facility: CLINIC | Age: 67
End: 2022-03-22

## 2022-03-22 PROBLEM — E87.1 HYPONATREMIA: Status: RESOLVED | Noted: 2022-02-23 | Resolved: 2022-03-22

## 2022-03-22 PROBLEM — E87.5 HYPERKALEMIA: Status: RESOLVED | Noted: 2018-04-25 | Resolved: 2022-03-22

## 2022-03-22 LAB
ANION GAP SERPL CALCULATED.3IONS-SCNC: 6 MMOL/L (ref 4–13)
BASE EX.OXY STD BLDV CALC-SCNC: 97.4 % (ref 60–80)
BASE EXCESS BLDV CALC-SCNC: 5.5 MMOL/L
BASOPHILS # BLD AUTO: 0.03 THOUSANDS/ΜL (ref 0–0.1)
BASOPHILS NFR BLD AUTO: 1 % (ref 0–1)
BUN SERPL-MCNC: 15 MG/DL (ref 5–25)
CALCIUM SERPL-MCNC: 7.9 MG/DL (ref 8.3–10.1)
CHLORIDE SERPL-SCNC: 101 MMOL/L (ref 100–108)
CO2 SERPL-SCNC: 29 MMOL/L (ref 21–32)
CREAT SERPL-MCNC: 0.87 MG/DL (ref 0.6–1.3)
EOSINOPHIL # BLD AUTO: 0.13 THOUSAND/ΜL (ref 0–0.61)
EOSINOPHIL NFR BLD AUTO: 4 % (ref 0–6)
ERYTHROCYTE [DISTWIDTH] IN BLOOD BY AUTOMATED COUNT: 15.5 % (ref 11.6–15.1)
GFR SERPL CREATININE-BSD FRML MDRD: 70 ML/MIN/1.73SQ M
GLUCOSE SERPL-MCNC: 102 MG/DL (ref 65–140)
GLUCOSE SERPL-MCNC: 124 MG/DL (ref 65–140)
GLUCOSE SERPL-MCNC: 151 MG/DL (ref 65–140)
GLUCOSE SERPL-MCNC: 163 MG/DL (ref 65–140)
GLUCOSE SERPL-MCNC: 87 MG/DL (ref 65–140)
HCO3 BLDV-SCNC: 29.8 MMOL/L (ref 24–30)
HCT VFR BLD AUTO: 29.3 % (ref 34.8–46.1)
HGB BLD-MCNC: 9.3 G/DL (ref 11.5–15.4)
IMM GRANULOCYTES # BLD AUTO: 0.01 THOUSAND/UL (ref 0–0.2)
IMM GRANULOCYTES NFR BLD AUTO: 0 % (ref 0–2)
LYMPHOCYTES # BLD AUTO: 0.57 THOUSANDS/ΜL (ref 0.6–4.47)
LYMPHOCYTES NFR BLD AUTO: 16 % (ref 14–44)
MCH RBC QN AUTO: 25.1 PG (ref 26.8–34.3)
MCHC RBC AUTO-ENTMCNC: 31.7 G/DL (ref 31.4–37.4)
MCV RBC AUTO: 79 FL (ref 82–98)
MONOCYTES # BLD AUTO: 0.25 THOUSAND/ΜL (ref 0.17–1.22)
MONOCYTES NFR BLD AUTO: 7 % (ref 4–12)
NEUTROPHILS # BLD AUTO: 2.56 THOUSANDS/ΜL (ref 1.85–7.62)
NEUTS SEG NFR BLD AUTO: 72 % (ref 43–75)
NRBC BLD AUTO-RTO: 0 /100 WBCS
O2 CT BLDV-SCNC: 14.2 ML/DL
PCO2 BLDV: 42.9 MM HG (ref 42–50)
PH BLDV: 7.46 [PH] (ref 7.3–7.4)
PLATELET # BLD AUTO: 119 THOUSANDS/UL (ref 149–390)
PMV BLD AUTO: 10.5 FL (ref 8.9–12.7)
PO2 BLDV: 154.7 MM HG (ref 35–45)
POTASSIUM SERPL-SCNC: 5 MMOL/L (ref 3.5–5.3)
RBC # BLD AUTO: 3.71 MILLION/UL (ref 3.81–5.12)
SODIUM SERPL-SCNC: 136 MMOL/L (ref 136–145)
WBC # BLD AUTO: 3.55 THOUSAND/UL (ref 4.31–10.16)

## 2022-03-22 PROCEDURE — 82948 REAGENT STRIP/BLOOD GLUCOSE: CPT

## 2022-03-22 PROCEDURE — 94761 N-INVAS EAR/PLS OXIMETRY MLT: CPT

## 2022-03-22 PROCEDURE — 85025 COMPLETE CBC W/AUTO DIFF WBC: CPT

## 2022-03-22 PROCEDURE — 80048 BASIC METABOLIC PNL TOTAL CA: CPT

## 2022-03-22 PROCEDURE — 99232 SBSQ HOSP IP/OBS MODERATE 35: CPT | Performed by: HOSPITALIST

## 2022-03-22 PROCEDURE — 82805 BLOOD GASES W/O2 SATURATION: CPT

## 2022-03-22 PROCEDURE — 36600 WITHDRAWAL OF ARTERIAL BLOOD: CPT

## 2022-03-22 RX ORDER — OXYCODONE HYDROCHLORIDE 10 MG/1
20 TABLET ORAL EVERY 8 HOURS
Status: DISCONTINUED | OUTPATIENT
Start: 2022-03-22 | End: 2022-03-23 | Stop reason: HOSPADM

## 2022-03-22 RX ADMIN — INSULIN GLARGINE 12 UNITS: 100 INJECTION, SOLUTION SUBCUTANEOUS at 22:04

## 2022-03-22 RX ADMIN — AMLODIPINE BESYLATE 10 MG: 10 TABLET ORAL at 08:26

## 2022-03-22 RX ADMIN — LISINOPRIL 10 MG: 10 TABLET ORAL at 08:26

## 2022-03-22 RX ADMIN — INSULIN LISPRO 1 UNITS: 100 INJECTION, SOLUTION INTRAVENOUS; SUBCUTANEOUS at 22:07

## 2022-03-22 RX ADMIN — Medication 250 MG: at 17:36

## 2022-03-22 RX ADMIN — OXYCODONE HYDROCHLORIDE 20 MG: 10 TABLET ORAL at 22:04

## 2022-03-22 RX ADMIN — DICLOFENAC SODIUM 2 G: 10 GEL TOPICAL at 13:14

## 2022-03-22 RX ADMIN — HEPARIN SODIUM 5000 UNITS: 5000 INJECTION INTRAVENOUS; SUBCUTANEOUS at 04:28

## 2022-03-22 RX ADMIN — DICLOFENAC SODIUM 2 G: 10 GEL TOPICAL at 22:05

## 2022-03-22 RX ADMIN — DICLOFENAC SODIUM 2 G: 10 GEL TOPICAL at 08:27

## 2022-03-22 RX ADMIN — CEFEPIME HYDROCHLORIDE 2000 MG: 2 INJECTION, POWDER, FOR SOLUTION INTRAVENOUS at 08:50

## 2022-03-22 RX ADMIN — HEPARIN SODIUM 5000 UNITS: 5000 INJECTION INTRAVENOUS; SUBCUTANEOUS at 22:04

## 2022-03-22 RX ADMIN — MICONAZOLE NITRATE: 20 CREAM TOPICAL at 17:36

## 2022-03-22 RX ADMIN — LIDOCAINE 5% 1 PATCH: 700 PATCH TOPICAL at 13:14

## 2022-03-22 RX ADMIN — MICONAZOLE NITRATE: 20 CREAM TOPICAL at 08:27

## 2022-03-22 RX ADMIN — HEPARIN SODIUM 5000 UNITS: 5000 INJECTION INTRAVENOUS; SUBCUTANEOUS at 13:15

## 2022-03-22 RX ADMIN — CEFTRIAXONE SODIUM 1000 MG: 10 INJECTION, POWDER, FOR SOLUTION INTRAVENOUS at 15:56

## 2022-03-22 RX ADMIN — DICLOFENAC SODIUM 2 G: 10 GEL TOPICAL at 17:36

## 2022-03-22 RX ADMIN — OXYCODONE HYDROCHLORIDE 30 MG: 10 TABLET ORAL at 04:28

## 2022-03-22 RX ADMIN — DULOXETINE HYDROCHLORIDE 30 MG: 30 CAPSULE, DELAYED RELEASE ORAL at 08:26

## 2022-03-22 RX ADMIN — OXYCODONE HYDROCHLORIDE 30 MG: 10 TABLET ORAL at 13:15

## 2022-03-22 RX ADMIN — Medication 250 MG: at 08:26

## 2022-03-22 RX ADMIN — PRAVASTATIN SODIUM 20 MG: 20 TABLET ORAL at 08:26

## 2022-03-22 NOTE — CASE MANAGEMENT
Case Management Assessment & Discharge Planning Note    Patient name Eula Quezada  Location W MS 0/W -47 MRN [id number]  : 1955 Date 3/22/2022       Current Admission Date: 3/19/2022  Current Admission Diagnosis:Acute encephalopathy   Patient Active Problem List    Diagnosis Date Noted    Multiple wounds 2022    Opiate dependence ([address] ) 2022    CKD (chronic kidney disease) 2022    Thrombocytopenia ([address] ) 2022    Myoclonic jerking 2022    Hyponatremia 2022    Acute respiratory acidosis 2022    History of recent fall 2022    Left arm weakness 2022    Anemia 2022    Acute respiratory failure with hypoxia and hypercapnia ([address] ) 2022    Acute kidney injury superimposed on CKD ([facility] ) 2022    Pneumonia 2022    Sepsis ([facility] ) 2022    Pressure injury of skin of buttock 2022    Acute encephalopathy 2022    Fecal occult blood test positive 2022    COVID-19 2022    Urge incontinence 2021    Ex-smoker 2021    Syncope 07/15/2020    Cirrhosis ([facility] ) 07/15/2020    Splenomegaly 07/15/2020    CAD (coronary artery disease) 07/15/2020    Other microscopic hematuria 2020    Spondylosis of lumbar joint 05/15/2020    Marijuana use 2020    Left hip pain 2020    Ambulatory dysfunction 2020    Hyperkalemia 2018    Chronic pain syndrome 2018    Peripheral neuropathy 2018    Type 2 diabetes mellitus with hyperglycemia, with long-term current use of insulin ([facility] ) 2018    Chronic venous stasis dermatitis of both lower extremities 2017    Venous insufficiency (chronic) (peripheral) 2017    Essential hypertension       LOS (days): 2  Geometric Mean LOS (GMLOS) (days): 4 80  Days to GMLOS:2 2     OBJECTIVE:  PATIENT READMITTED TO HOSPITAL  Risk of Unplanned Readmission Score: 34         Current admission status: Inpatient       Preferred Pharmacy:   [facility], [id number] [place] [address]   [address] [facility] [address]  Phone: [phone] Fax: [phone]    Primary Care Provider: Jonathan Jaffe MD    Primary Insurance: MEDICARE  Secondary Insurance: [address]:  [address] [place] Representative - Spouse   Primary Phone: [phone] (Mobile)  Home Phone: [phone]               Advance Directives  Does patient have a [address]?: Yes  Does patient have Advance Directives?: Yes  Primary Contact: Johnathon-spouse         Readmission Root Cause  30 Day Readmission: Yes  Who directed you to return to the hospital?: Family  Did you understand whom to contact if you had questions or problems?: Yes  Did you get your prescriptions before you left the hospital?: No  Reason[de-identified] Preference for own pharmacy  Were you able to get your prescriptions filled when you left the hospital?: Yes  Did you take your medications as prescribed?: Yes  Were you able to get to your follow-up appointments?: Yes  During previous admission, was a post-acute recommendation made?: Yes  What post-acute resources were offered?: [facility]  Patient was readmitted due to: Acute encephalopathy  Action Plan: Chronic back pain-adjusting medications/PT/OT following-C/referrals sent for VNA at discharge    Patient Information  Admitted from[de-identified] Home  Mental Status: Alert  During Assessment patient was accompanied by: Spouse  Assessment information provided by[de-identified] Patient,Spouse  Primary Caregiver: Spouse  Caregiver's Name[de-identified] Sebastien Burksmichele Relationship to Patient[de-identified] Family Member  Support Systems: Spouse/significant [address] of Residence: [address] [facility] do you live in?: [place] entry access options   Select all that apply : Stairs  Number of steps to enter home : 9  Do the steps have railings?: Yes  Type of Current Residence: 2 story home  Upon entering residence, is there a bedroom on the main floor (no further steps)?: Yes  Upon entering residence, is there a bathroom on the main floor (no further steps)?: Yes  In the last 12 months, was there a time when you were not able to pay the mortgage or rent on time?: No  In the last 12 months, was there a time when you did not have a steady place to sleep or slept in a shelter (including now)?: No  Homeless/housing insecurity resource given?: No  Living Arrangements: Lives w/ Spouse/significant other,Lives w/ Daughter    Activities of Daily Living Prior to Admission  Functional Status: Assistance  Completes ADLs independently?: No  Level of ADL dependence: Assistance  Ambulates independently?: No  Level of ambulatory dependence: Assistance  Does patient use assisted devices?: Yes  Assisted Devices (DME) used: Rollator,Bedside Commode,Wheelchair  Does patient currently own DME?: Yes  What DME does the patient currently own?: Bedside Commode,Rollator,Shower Chair,Wheelchair  Does patient have a history of Outpatient Therapy (PT/OT)?: No  Does the patient have a history of Short-Term Rehab?: Yes ([facility])  Does patient have a history of HHC?: Yes ([address] showed)  Does patient currently have [place] [address]?: No         Patient Information Continued  Income Source: SSI/SSD  Does patient have prescription coverage?: Yes  Food insecurity resource given?: No  Does patient receive dialysis treatments?: No  Does patient have a history of substance abuse?: No  Does patient have a history of Mental Health Diagnosis?: No    PHQ 2/9 Screening   Reviewed PHQ 2/9 Depression Screening Score?: No    Means of Transportation  Means of Transport to Appts[de-identified] Family transport  In the past 12 months, has lack of transportation kept you from medical appointments or from getting medications?: No  In the past 12 months, has lack of transportation kept you from meetings, work, or from getting things needed for daily living?: No  Was application for public transport provided?: No        DISCHARGE DETAILS:    Discharge planning discussed with[de-identified] patient and spouse  Freedom of Choice: Yes     CM contacted family/caregiver?: Yes  Were Treatment Team discharge recommendations reviewed with patient/caregiver?: Yes  Did patient/caregiver verbalize understanding of patient care needs?: Yes  Were patient/caregiver advised of the risks associated with not following Treatment Team discharge recommendations?: Yes    Contacts  Patient Contacts: spouse, Johnathon  Relationship to Patient[de-identified] Family  Contact Method: In Person  Reason/Outcome: Continuity of [address]         Is the patient interested in Corine Smith at discharge?: Yes  [address] requested[de-identified] [address] Name[de-identified] Other  [address] [place] [address] Joaquin Provider[de-identified] PCP  Home Health Services Needed[de-identified] Evaluate Functional Status and Safety,Gait/ADL Training,Strengthening/Theraputic Exercises to Improve Function,Wound/Ostomy Care  Homebound Criteria Met[de-identified] Requires the Assistance of Another Person for Safe Ambulation or to Leave the Home,Uses an Assist Device (i e  cane, walker, etc)  Supporting Clincal Findings[de-identified] Limited Endurance    DME Referral Provided  Referral made for DME?: No    Other Referral/Resources/Interventions Provided:  Interventions: [facility]  Referral Comments: [place] referrals sent for [facility] SN/PT/OT    Treatment Team Recommendation: Home with [facility]  Discharge Destination Plan[de-identified] Home with [place] at Discharge : Family     CM spoke with patient and  at the bedside  CM introduced self and role  CM reviewed with patient and  per PT, the recommendation at discharge is Home therapy  CM discussed Freedom of Choice  Patient and  requesting VNA at discharge  No preference noted for VNA    did state [facility] never called them at discharge last hospitalization  CM discussed blanket referral process and will f/u with available options  Patient's  stated he is able to transport at discharge  Patient has DME at home  CM sent blanket referrals for VNA  Waiting for options  CM reviewed discharge planning process including the following: identifying caregivers at home, preference for d/c planning needs,   availability of [facility] to [facility] program, availability of treatment team to discuss questions or concerns patient and/or family may have regarding diagnosis, plan of care, old or new medications and discharge planning   CM will continue to follow for care coordination and update assessment as appropriate

## 2022-03-22 NOTE — CONSULTS
Vancomycin IV Pharmacy-to-Dose Consultation    Charu Parrish is a 73 y o  female who was receiving Vancomycin IV with management by the Pharmacy Consult service for treatment of Pneumonia    The patient's Vancomycin therapy has been completed / discontinued  Thank you for allowing us to take part in this patient's care  Pharmacy will sign-off now; please call or re-consult if there are any questions          Rancho Galdamez, PharmD  Pharmacist

## 2022-03-22 NOTE — QUICK NOTE
Called to bedside for increasing myoclonus  Family at bedside  Patient has a 2 year history of myoclonic jerks  Unclear etiology per family  They are anxious she will be discharged tomorrow  Patient with random jerking of arms and head  But remains awake  Oriented to place and person  I am unsure this is true myoclonus  Family has reported extensive neurology evals in the past  I will not add sedating meds given her narcotic use at this time  Motor strength 5/5 b/l LE dorsiflexion and extension, 5/5  strength LUE right Upper extremity limited by pain    I will order Neurology evaluation mainly at families concern over worsening myoclonus

## 2022-03-22 NOTE — CASE MANAGEMENT
Case Management Progress Note    Patient name Sam Heller  [facility] W /W -97 MRN [id number]  : 1955 Date 3/22/2022       LOS (days): 2  Geometric Mean LOS (GMLOS) (days): 4 80  Days to GMLOS:2 4        OBJECTIVE:        Current admission status: Inpatient  Preferred Pharmacy:   [facility] [address] [place], [address]   [address] [address]  Phone: [phone] Fax: [phone]    Primary Care Provider: Joann Gutierrez MD    Primary Insurance: MEDICARE  Secondary Insurance: Ruthann Keith    PROGRESS NOTE:      CM discussed goals of care with primary provider based on scores of Goals of Care systems list     CM department will continue to follow to assist with discharge coordination

## 2022-03-22 NOTE — RESPIRATORY THERAPY NOTE
Home Oxygen Qualifying Test     Patient name: Pako Johnson        : 1955   Date of Test:  2022  Diagnosis:    Home Oxygen Test:    **Medicare Guidelines require item(s) 1-5 on all ambulatory patients or 1 and 2 on non-ambulatory patients  1  Baseline SPO2 on Room Air at rest 93 %       1  SPO2 during exertion on Room Air 90  %  a  During exertion monitor SPO2  If SPO2 increases >=89%, do not add supplemental oxygen       2  Test performed during exertion activity  []  Supplemental Home Oxygen is indicated  [x]  Client does not qualify for home oxygen  Respiratory Additional Notes    Pt was using her own walker  Her HR went up to 120 range during the walk  No shortness of breath noted     Jayesh Bland, RT

## 2022-03-22 NOTE — TELEPHONE ENCOUNTER
----- Message from Tory Blank MD sent at 3/21/2022  3:58 PM EDT -----  approved  ----- Message -----  From: April Davis  Sent: 3/9/2022   1:18 PM EDT  To: Sleep Medicine [place] Provider    This sleep study needs approval      If approved please sign and return to clerical pool  If denied please include reasons why  Also provide alternative testing if warranted  Please sign and return to clerical pool

## 2022-03-22 NOTE — PROGRESS NOTES
[facility]  Progress Note - Sarahangelique Guernsey 1955, 73 y o  female MRN: [id number]  Unit/Bed#: W -02 Encounter: [id number]  Primary Care Provider: Heidy Munoz MD   Date and time admitted to hospital: 3/19/2022  7:50 PM    * Acute encephalopathy  Assessment & Plan  · POA: Patient admitted with altered mental status  Per , she was able to communicate while on the ambulance but got altered here  · Per ER, she was able to answer questions here and had pinpoint pupils, improved after narcan( total 0 4 mg), but was even better after she was placed on BiPap   · Per , she was taking 30 mg oxycodone 6 time a day( wife confirmed, she said 6- 7 times a day)   said it was ordered like that by her pcp from [place]  He said he is giving her around 160-180 mg a day, along with Gabapentin prn( last dose yesterday)  I told him that she had similar problems in the past and that she was supposed to take 30 mg tid  He said " I know but I don't want her to be in pain"  He mentioned that she started to have hands jerkings Saturday around 2 o'clock  As well he mentioned her breathing sounds like rumbling since yesterday  ·  said that sometimes his wife breathing stops while she is sleeping and that he has to reposition her head  She was supposed to have sleep study done, but no spots were available until late in April  · Most likely etiology is opiates overdose, infection, hypoxia and hypercapnia  · W/U: TSH, ammonia, comma panel within normal limits  CT head showed no cute changes  Urine drug screen showed opiates     During exam 03/22: mentation has improved drastically compared to afternoon 03/21 when patient was very drowsy      Plan:   · 03/21 later afternoon, patient VBG was noted to hypercapnic w/ CO2 56 8 and was put on BiPAP overnight   Improved VBG this AM 03/22 w/ CO2 in 42 9, mildly alkalotic at 7 460 along w/ pt's mentation  · Continue BiPAP · Continue narcan prn   · Monitor for opiates withdrawal   · Per toxicology  · Taper down cymbalta off (currently on 30mg daily)  · Stop gabapentin  · Continue oxycodone and try to taper as able to (PCP has had patient on a wean for some time and plants to continue tapering down)  · On 30mg BID and tolerating  · Given aqua K pads, lidoderm patch  and voltaren gel for pain regimen for back pain (in afternoon)  · Treat suspected  Pneumonia as per plan under sepsis  · Off O2        Opiate dependence (HCC)  Assessment & Plan  POA: 160 - 180 mg a day per ( 30 mg 6 times a day) for back pain   · On oxycodone 30 mg  b i d  given patient's myoclonus 03/21 and patient is tolerating  · Has Lidoderm patch Voltaren gel, aqua K pads for additional pain regimen in the afternoon  · Monitor for withdrawal    Myoclonic jerking  Assessment & Plan  · On and off in the past 3 years  Per , she was getting them when she had pneumonias or infections and opiates overdose  · Left hand weaker that the right one after she fell -Was seen by neurology during previous admission, was thought to be muscular  ·  myoclonic jerking has been continuous over a chronic period per patient and likely a contribution from opioids given patient's cirrhosis   ·  Cymbalta will also be tapered down; currently at 30 mg daily and will try and taper down as able to   ·  per discussion with toxicology,  Is best to decrease serotonergic medications as taper off and have PCP switched to a pain medication that is less serotonergic given patient's myoclonic jerking  · On aqua K pads, voltaren gel, and lidoderm patches in the afternoon in addition to oxy 30mg BID and patient has been tolerating    Sepsis ([facility] [address] )  Assessment & Plan  · Present on admission:  Fever 101 6, leukocytosis, tachycardia  Source likely pneumonia  UA showed no wbc, bacteria  Patient had bilateral lower extremity venous stasis did not appreciate any cellulitis    Has bilateral redness medial thigh bilaterally, that it did not seem infected  · Received ceftriaxone and azithromycin for pneumonia suspicion in emergency room  · Lactic within normal limits  · CXR showed Possible bilateral infiltrates more prominent on the left  · Impression: likely bacterial pneumonia    Lab Results   Component Value Date    PROCALCITONI 0 68 (H) 03/21/2022    PROCALCITONI 0 19 03/19/2022    PROCALCITONI 0 56 (H) 02/23/2022    MRSA swab negative   procalcitonin was 0 68  Lab Results   Component Value Date    WBC 3 55 (L) 03/22/2022    HGB 9 3 (L) 03/22/2022    HCT 29 3 (L) 03/22/2022    MCV 79 (L) 03/22/2022     (L) 03/22/2022         Plan:   ·  received cefepime 03/22 and de-escalated to ceftriaxone (day 4 of antibiotics)   · Can likely be discharged tomorrow on additional 3 days cefdinir po to make a total course of 7 days of antibiotics  · MRSA swab negative and  Discontinued vancomycin  ·  procalcitonin was elevated 03/21  · Patient remains to be leukopenic at 3 55 WBC which is similar to GENESIS Vicky Leslie 23 value yesterday 0 3/21 as well  · Monitor CBC, VS   · Follow-up for blood culture (no growth at 48 hours)        Multiple wounds  Assessment & Plan  Noted deep tissue wound in sacrum and   Partial thickness venous ulcer on left medial leg     Wound care to follow    Thrombocytopenia [facility])  Assessment & Plan    Continue to monitor clinically for any rashes/petechiae    monitor with daily  CBC and platelets    CKD (chronic kidney disease)  Assessment & Plan  Lab Results   Component Value Date    EGFR 70 03/22/2022    EGFR 52 03/21/2022    EGFR 33 03/20/2022    CREATININE 0 87 03/22/2022    CREATININE 1 11 03/21/2022    CREATININE 1 59 (H) 03/20/2022     · Baseline appears to be between 1-1 2   · Has resolved back to baseline after IVF given 03/20  · Renally dosed medication, avoid nephrotoxins and hypotension     Acute respiratory acidosis  Assessment & Plan  · POAMost likely secondary to opiate overdose and sedative   · VBG improved after Narcan and with BiPap   · Seems to have resolved with CO2 WNL  · Continue Bipap and Narcan prn   · 03/21 later afternoon, patient VBG was noted to hypercapnic w/ CO2 56 8 and was put on BiPAP overnight   · Improved VBG this AM 03/22 w/ CO2 in 42 9, mildly alkalotic at 7 460 along w/ pt's mentation    Anemia  Assessment & Plan  Recent Labs     03/20/22  0626 03/21/22  0650 03/22/22  0453   HGB 9 3* 9 0* 9 3*     ·   · Chronic microcytic hypochromic anemia  · Continue to monitor     Ambulatory dysfunction  Assessment & Plan  · Walks with a walker, needs assistance  · Fall precautions   · PT/OT recommends home w/ home PT/OT    Chronic pain syndrome  Assessment & Plan  · Patient with chronic back/hip pain  · Per , she is taking 160-180 mg of oxycodone daily (30 mg 6 times a day, per  it was ordered by her PCP [place])  · PDMP reviewed  · Cymbalta will also be tapered down; currently at 30 mg daily and will try and taper down as able to   ·  per discussion with toxicology,  Is best to decrease serotonergic medications as taper off and have PCP switched to a pain medication that is less serotonergic given patient's myoclonic jerking  · On aqua K pads, voltaren gel, and lidoderm patches in the afternoon in addition to oxy 30mg BID and patient has been tolerating       Type 2 diabetes mellitus with hyperglycemia, with long-term current use of insulin [facility])  Assessment & Plan  Lab Results   Component Value Date    HGBA1C 7 5 (H) 01/04/2022       Recent Labs     03/20/22  1823 03/20/22  2131 03/20/22  2357 03/21/22  1157   POCGLU 106 125 109 90       Blood Sugar Average: Last 72 hrs:  (P) 149 375     · Home regimen Lantus 18 units bedtime, NovoLog sliding scale  · Mentation has improved switched to carb controlled/diabetic diet  · Sliding scale insulin  · Lantus 12 units (can be increased  As patient continues to eat her meals as currently only eating about half her meals)    Essential hypertension  Assessment & Plan  · Blood pressure repeated upon presentation  · Continue home medication with lisinopril 10 mg daily and amlodipine 10 mg  · Monitor    Hyponatremia-resolved as of 3/22/2022  Assessment & Plan  Lab Results   Component Value Date    SODIUM 136 03/22/2022    SODIUM 135 (L) 03/21/2022    SODIUM 133 (L) 03/20/2022    SODIUM 134 (L) 03/19/2022      hyponatremia improving and close to within normal limits after previously getting a infusion of normal saline  Patient continues to drink p o  fluids without problem  Resolved    Hyperkalemia-resolved as of 3/22/2022  Assessment & Plan  Lab Results   Component Value Date    K 5 0 03/22/2022    K 5 0 03/21/2022    K 5 0 03/20/2022     resolved        VTE Pharmacologic Prophylaxis: VTE Score: 5 High Risk (Score >/= 5) - Pharmacological DVT Prophylaxis Ordered: heparin  Sequential Compression Devices Ordered  Patient Centered Rounds: I performed bedside rounds with nursing staff today  Discussions with Specialists or Other Care Team Provider: PT/OT notes reviewed    Education and Discussions with Family / Patient: Updated  () at bedside  Current Length of Stay: 2 day(s)  Current Patient Status: Inpatient   Discharge Plan: Anticipate discharge tomorrow to home with home services  Code Status: Level 1 - Full Code    Subjective:   Patient noted that her mentation has vastly improved and she is much less stress compared to yesterday 0 3/21 afternoon  Patient understood that she got the BiPAP overnight and likely contributed to her improvement in mentation  Patient also understands that she has continued to get antibiotics and that she well likely be discharged to home with home PT tomorrow which she is very agreeable to  She seems to be tolerating the pain regimen well currently and has no complaints otherwise and is glad she is off the oxygen      Patient's  requested patient to get home oxygen eval and I recommended that she do a ambulatory pulse ox and this was completed and patient remained greater than 89% SpO2 which is qualified her for any home oxygen  She denies any fevers/chill, chest pain, shortness of breath, abdominal pain, nausea, vomiting, diarrhea, problems urinating, problems  With bowel movements, and is eating/drinking without problem  Objective:     Vitals:   Temp (24hrs), Av 6 °F (37 °C), Min:98 °F (36 7 °C), Max:99 5 °F (37 5 °C)    Temp:  [98 °F (36 7 °C)-99 5 °F (37 5 °C)] 98 8 °F (37 1 °C)  HR:  [86-98] 98  Resp:  [17-18] 17  BP: (132-165)/(64-86) 157/79  SpO2:  [83 %-97 %] 83 %  Body mass index is 34 27 kg/m²  Input and Output Summary (last 24 hours): Intake/Output Summary (Last 24 hours) at 3/22/2022 1634  Last data filed at 3/21/2022 1741  Gross per 24 hour   Intake --   Output 350 ml   Net -350 ml       Physical Exam:   Physical Exam  Vitals and nursing note reviewed  Constitutional:       General: She is not in acute distress  Appearance: She is well-developed  HENT:      Head: Normocephalic and atraumatic  Eyes:      Conjunctiva/sclera: Conjunctivae normal    Cardiovascular:      Rate and Rhythm: Normal rate and regular rhythm  Pulses: Normal pulses  Heart sounds: No murmur heard  Pulmonary:      Effort: Pulmonary effort is normal  No respiratory distress  Comments: Coarse breath sounds appreciated  Abdominal:      Palpations: Abdomen is soft  Tenderness: There is no abdominal tenderness  Musculoskeletal:      Cervical back: Neck supple  Skin:     General: Skin is warm and dry  Comments: Noted skin ulcer noted in the sacrum and in left medial leg;  Noted chronic looking dry skin in bilateral LE   Neurological:      Mental Status: She is alert and oriented to person, place, and time        Comments: Noted weakness 3/5 in bilat UE and LE           Additional Data:     Labs:  Results from last 7 days   Lab Units 03/22/22  0453   WBC Thousand/uL 3 55*   HEMOGLOBIN g/dL 9 3*   HEMATOCRIT % 29 3*   PLATELETS Thousands/uL 119*   NEUTROS PCT % 72   LYMPHS PCT % 16   MONOS PCT % 7   EOS PCT % 4     Results from last 7 days   Lab Units 03/22/22  0453 03/21/22  0650 03/21/22  0650   SODIUM mmol/L 136   < > 135*   POTASSIUM mmol/L 5 0   < > 5 0   CHLORIDE mmol/L 101   < > 103   CO2 mmol/L 29   < > 24   BUN mg/dL 15   < > 21   CREATININE mg/dL 0 87   < > 1 11   ANION GAP mmol/L 6   < > 8   CALCIUM mg/dL 7 9*   < > 7 8*   ALBUMIN g/dL  --   --  2 2*   TOTAL BILIRUBIN mg/dL  --   --  0 53   ALK PHOS U/L  --   --  152*   ALT U/L  --   --  30   AST U/L  --   --  52*   GLUCOSE RANDOM mg/dL 124   < > 67    < > = values in this interval not displayed  Results from last 7 days   Lab Units 03/19/22  2036   INR  1 00     Results from last 7 days   Lab Units 03/22/22  1215 03/22/22  0757 03/21/22  2142 03/21/22  1756 03/21/22  1157 03/20/22  2357 03/20/22  2131 03/20/22  1823 03/20/22  1208 03/20/22  0714 03/20/22  0231 03/19/22 2015   POC GLUCOSE mg/dl 102 87 201* 137 90 109 125 106 132 197* 225* 211*         Results from last 7 days   Lab Units 03/21/22  0710 03/19/22 2219 03/19/22 2036   LACTIC ACID mmol/L  --  1 2  --    PROCALCITONIN ng/ml 0 68*  --  0 19       Lines/Drains:  Invasive Devices  Report    Peripheral Intravenous Line            Peripheral IV 03/19/22 Left;Ventral (anterior) Forearm 3 days    Peripheral IV 03/21/22 Dorsal (posterior); Right Forearm 1 day                      Imaging: Reviewed radiology reports from this admission including: chest xray    Recent Cultures (last 7 days):   Results from last 7 days   Lab Units 03/19/22 2219   BLOOD CULTURE  No Growth at 48 hrs  No Growth at 48 hrs         Last 24 Hours Medication List:   Current Facility-Administered Medications   Medication Dose Route Frequency Provider Last Rate    amLODIPine  10 mg Oral Daily Virgil Turner MD      Diclofenac Sodium  2 g Topical 4x Daily Pillo GAN DO      DULoxetine  30 mg Oral Daily Pillo GAN DO      heparin (porcine)  5,000 Units Subcutaneous [facility] Loren Felipe MD      insulin glargine  12 Units Subcutaneous HS Pillo Campos DO      insulin lispro  1-5 Units Subcutaneous HS Loren Felipe MD      lidocaine  1 patch Topical Daily Pillo Campos DO      lisinopril  10 mg Oral Daily Loren Felipe MD      YULI ANTIFUNGAL   Topical BID Chadwickno Isadora Mcgrath MD      naloxone  0 4 mg Intravenous Once Brad Marrufo MD      naloxone  0 4 mg Intravenous Q1H PRN Brad Marrufo MD      naloxone -Provider supplied  4 mg Does not apply Once Brad Marrufo MD      ondansetron  4 mg Intravenous Q6H PRN Loren Felipe MD      oxyCODONE  20 mg Oral Q8H Eric Connor MD      polyethylene glycol  17 g Oral Daily PRN Louann Mcdaniel MD      pravastatin  20 mg Oral Daily Loren Felipe MD      saccharomyces boulardii  250 mg Oral BID Loren Felipe MD       Facility-Administered Medications Ordered in Other Encounters   Medication Dose Route Frequency Provider Last Rate    ferrous sulfate  325 mg Oral Daily With Brooke Slater MD          Today, Patient Was Seen By: Leah Armas DO    **Please Note: This note may have been constructed using a voice recognition system  **

## 2022-03-23 VITALS
HEART RATE: 87 BPM | OXYGEN SATURATION: 95 % | SYSTOLIC BLOOD PRESSURE: 154 MMHG | DIASTOLIC BLOOD PRESSURE: 77 MMHG | WEIGHT: 187.39 LBS | BODY MASS INDEX: 34.48 KG/M2 | TEMPERATURE: 98.4 F | HEIGHT: 62 IN | RESPIRATION RATE: 16 BRPM

## 2022-03-23 PROBLEM — G93.40 ACUTE ENCEPHALOPATHY: Status: RESOLVED | Noted: 2022-01-04 | Resolved: 2022-03-22

## 2022-03-23 LAB
ANION GAP SERPL CALCULATED.3IONS-SCNC: 10 MMOL/L (ref 4–13)
ANION GAP SERPL CALCULATED.3IONS-SCNC: 6 MMOL/L (ref 4–13)
ATRIAL RATE: 92 BPM
BASOPHILS # BLD AUTO: 0.02 THOUSANDS/ΜL (ref 0–0.1)
BASOPHILS NFR BLD AUTO: 0 % (ref 0–1)
BUN SERPL-MCNC: 14 MG/DL (ref 5–25)
BUN SERPL-MCNC: 14 MG/DL (ref 5–25)
CALCIUM SERPL-MCNC: 8.1 MG/DL (ref 8.3–10.1)
CALCIUM SERPL-MCNC: 8.2 MG/DL (ref 8.3–10.1)
CHLORIDE SERPL-SCNC: 102 MMOL/L (ref 100–108)
CHLORIDE SERPL-SCNC: 98 MMOL/L (ref 100–108)
CO2 SERPL-SCNC: 26 MMOL/L (ref 21–32)
CO2 SERPL-SCNC: 26 MMOL/L (ref 21–32)
CREAT SERPL-MCNC: 0.87 MG/DL (ref 0.6–1.3)
CREAT SERPL-MCNC: 1.02 MG/DL (ref 0.6–1.3)
EOSINOPHIL # BLD AUTO: 0.1 THOUSAND/ΜL (ref 0–0.61)
EOSINOPHIL NFR BLD AUTO: 2 % (ref 0–6)
ERYTHROCYTE [DISTWIDTH] IN BLOOD BY AUTOMATED COUNT: 16.4 % (ref 11.6–15.1)
GFR SERPL CREATININE-BSD FRML MDRD: 57 ML/MIN/1.73SQ M
GFR SERPL CREATININE-BSD FRML MDRD: 70 ML/MIN/1.73SQ M
GLUCOSE SERPL-MCNC: 100 MG/DL (ref 65–140)
GLUCOSE SERPL-MCNC: 101 MG/DL (ref 65–140)
GLUCOSE SERPL-MCNC: 234 MG/DL (ref 65–140)
GLUCOSE SERPL-MCNC: 243 MG/DL (ref 65–140)
HCT VFR BLD AUTO: 32.1 % (ref 34.8–46.1)
HGB BLD-MCNC: 9.6 G/DL (ref 11.5–15.4)
IMM GRANULOCYTES # BLD AUTO: 0.01 THOUSAND/UL (ref 0–0.2)
IMM GRANULOCYTES NFR BLD AUTO: 0 % (ref 0–2)
LYMPHOCYTES # BLD AUTO: 0.55 THOUSANDS/ΜL (ref 0.6–4.47)
LYMPHOCYTES NFR BLD AUTO: 12 % (ref 14–44)
MCH RBC QN AUTO: 24.7 PG (ref 26.8–34.3)
MCHC RBC AUTO-ENTMCNC: 29.9 G/DL (ref 31.4–37.4)
MCV RBC AUTO: 83 FL (ref 82–98)
MONOCYTES # BLD AUTO: 0.44 THOUSAND/ΜL (ref 0.17–1.22)
MONOCYTES NFR BLD AUTO: 10 % (ref 4–12)
NEUTROPHILS # BLD AUTO: 3.4 THOUSANDS/ΜL (ref 1.85–7.62)
NEUTS SEG NFR BLD AUTO: 76 % (ref 43–75)
NRBC BLD AUTO-RTO: 0 /100 WBCS
P AXIS: 80 DEGREES
PLATELET # BLD AUTO: 129 THOUSANDS/UL (ref 149–390)
PMV BLD AUTO: 11.4 FL (ref 8.9–12.7)
POTASSIUM SERPL-SCNC: 4.2 MMOL/L (ref 3.5–5.3)
POTASSIUM SERPL-SCNC: 5.6 MMOL/L (ref 3.5–5.3)
PR INTERVAL: 146 MS
QRS AXIS: 44 DEGREES
QRSD INTERVAL: 78 MS
QT INTERVAL: 352 MS
QTC INTERVAL: 435 MS
RBC # BLD AUTO: 3.88 MILLION/UL (ref 3.81–5.12)
SODIUM SERPL-SCNC: 134 MMOL/L (ref 136–145)
SODIUM SERPL-SCNC: 134 MMOL/L (ref 136–145)
T WAVE AXIS: 43 DEGREES
VENTRICULAR RATE: 92 BPM
WBC # BLD AUTO: 4.52 THOUSAND/UL (ref 4.31–10.16)

## 2022-03-23 PROCEDURE — 99222 1ST HOSP IP/OBS MODERATE 55: CPT | Performed by: PSYCHIATRY & NEUROLOGY

## 2022-03-23 PROCEDURE — 94760 N-INVAS EAR/PLS OXIMETRY 1: CPT

## 2022-03-23 PROCEDURE — 99239 HOSP IP/OBS DSCHRG MGMT >30: CPT | Performed by: HOSPITALIST

## 2022-03-23 PROCEDURE — 80048 BASIC METABOLIC PNL TOTAL CA: CPT

## 2022-03-23 PROCEDURE — 94640 AIRWAY INHALATION TREATMENT: CPT

## 2022-03-23 PROCEDURE — 82948 REAGENT STRIP/BLOOD GLUCOSE: CPT

## 2022-03-23 PROCEDURE — 93005 ELECTROCARDIOGRAM TRACING: CPT

## 2022-03-23 PROCEDURE — 85025 COMPLETE CBC W/AUTO DIFF WBC: CPT

## 2022-03-23 PROCEDURE — 93010 ELECTROCARDIOGRAM REPORT: CPT | Performed by: INTERNAL MEDICINE

## 2022-03-23 RX ORDER — LIDOCAINE 50 MG/G
1 PATCH TOPICAL DAILY
Qty: 30 PATCH | Refills: 0 | Status: SHIPPED | OUTPATIENT
Start: 2022-03-23

## 2022-03-23 RX ORDER — OXYCODONE HYDROCHLORIDE 30 MG/1
30 TABLET ORAL 2 TIMES DAILY
Qty: 20 TABLET | Refills: 0
Start: 2022-03-23 | End: 2022-04-02

## 2022-03-23 RX ORDER — DEXTROSE MONOHYDRATE 100 MG/ML
125 INJECTION, SOLUTION INTRAVENOUS ONCE
Status: COMPLETED | OUTPATIENT
Start: 2022-03-23 | End: 2022-03-23

## 2022-03-23 RX ORDER — DULOXETIN HYDROCHLORIDE 20 MG/1
20 CAPSULE, DELAYED RELEASE ORAL DAILY
Qty: 7 CAPSULE | Refills: 0 | Status: SHIPPED | OUTPATIENT
Start: 2022-03-29 | End: 2022-04-05

## 2022-03-23 RX ORDER — OXYCODONE HYDROCHLORIDE 30 MG/1
30 TABLET ORAL
Status: ON HOLD | COMMUNITY
Start: 2022-03-17 | End: 2022-03-23 | Stop reason: SDUPTHER

## 2022-03-23 RX ORDER — ALBUTEROL SULFATE 2.5 MG/3ML
SOLUTION RESPIRATORY (INHALATION)
Status: COMPLETED
Start: 2022-03-23 | End: 2022-03-23

## 2022-03-23 RX ORDER — CALCIUM GLUCONATE 20 MG/ML
1 INJECTION, SOLUTION INTRAVENOUS ONCE
Status: COMPLETED | OUTPATIENT
Start: 2022-03-23 | End: 2022-03-23

## 2022-03-23 RX ORDER — CEFDINIR 300 MG/1
300 CAPSULE ORAL EVERY 12 HOURS SCHEDULED
Qty: 4 CAPSULE | Refills: 0 | Status: SHIPPED | OUTPATIENT
Start: 2022-03-23 | End: 2022-03-25

## 2022-03-23 RX ORDER — OXYCODONE HYDROCHLORIDE 20 MG/1
20 TABLET ORAL 2 TIMES DAILY
Qty: 10 TABLET | Refills: 0 | Status: CANCELLED
Start: 2022-03-23 | End: 2022-03-28

## 2022-03-23 RX ORDER — DULOXETIN HYDROCHLORIDE 30 MG/1
30 CAPSULE, DELAYED RELEASE ORAL DAILY
Qty: 5 CAPSULE | Refills: 0 | Status: SHIPPED | OUTPATIENT
Start: 2022-03-23 | End: 2022-03-28

## 2022-03-23 RX ADMIN — MICONAZOLE NITRATE: 20 CREAM TOPICAL at 08:26

## 2022-03-23 RX ADMIN — Medication 250 MG: at 08:25

## 2022-03-23 RX ADMIN — PRAVASTATIN SODIUM 20 MG: 20 TABLET ORAL at 08:25

## 2022-03-23 RX ADMIN — AMLODIPINE BESYLATE 10 MG: 10 TABLET ORAL at 08:25

## 2022-03-23 RX ADMIN — CALCIUM GLUCONATE 1 G: 20 INJECTION, SOLUTION INTRAVENOUS at 08:29

## 2022-03-23 RX ADMIN — HEPARIN SODIUM 5000 UNITS: 5000 INJECTION INTRAVENOUS; SUBCUTANEOUS at 05:08

## 2022-03-23 RX ADMIN — DULOXETINE HYDROCHLORIDE 30 MG: 30 CAPSULE, DELAYED RELEASE ORAL at 08:25

## 2022-03-23 RX ADMIN — LISINOPRIL 10 MG: 10 TABLET ORAL at 08:25

## 2022-03-23 RX ADMIN — DEXTROSE MONOHYDRATE 125 ML: 100 INJECTION, SOLUTION INTRAVENOUS at 08:29

## 2022-03-23 RX ADMIN — OXYCODONE HYDROCHLORIDE 20 MG: 10 TABLET ORAL at 11:57

## 2022-03-23 RX ADMIN — OXYCODONE HYDROCHLORIDE 20 MG: 10 TABLET ORAL at 05:08

## 2022-03-23 RX ADMIN — INSULIN HUMAN 10 UNITS: 100 INJECTION, SOLUTION PARENTERAL at 08:28

## 2022-03-23 RX ADMIN — ALBUTEROL SULFATE 10 MG: 2.5 SOLUTION RESPIRATORY (INHALATION) at 08:46

## 2022-03-23 RX ADMIN — DICLOFENAC SODIUM 2 G: 10 GEL TOPICAL at 08:26

## 2022-03-23 RX ADMIN — CEFTRIAXONE SODIUM 1000 MG: 10 INJECTION, POWDER, FOR SOLUTION INTRAVENOUS at 10:35

## 2022-03-23 RX ADMIN — ALBUTEROL SULFATE 10 MG: 2.5 SOLUTION RESPIRATORY (INHALATION) at 08:47

## 2022-03-23 NOTE — ASSESSMENT & PLAN NOTE
Lab Results   Component Value Date    HGBA1C 7 5 (H) 01/04/2022       Recent Labs     03/22/22  0757 03/22/22  1215 03/22/22  1810 03/22/22  2155   POCGLU 87 102 163* 151*       Blood Sugar Average: Last 72 hrs:  (P) 469 [id number]     · Home regimen Lantus 18 units bedtime, NovoLog sliding scale and can continue on discharge  · Mentation has improved switched to carb controlled/diabetic diet

## 2022-03-23 NOTE — ASSESSMENT & PLAN NOTE
Lab Results   Component Value Date    EGFR 70 03/22/2022    EGFR 52 03/21/2022    EGFR 33 03/20/2022    CREATININE 0 87 03/22/2022    CREATININE 1 11 03/21/2022    CREATININE 1 59 (H) 03/20/2022     · Baseline appears to be between 0 8-1 1  · Has resolved back to baseline after IVF given 03/20  · Renally dosed medication, avoid nephrotoxins and hypotension

## 2022-03-23 NOTE — ASSESSMENT & PLAN NOTE
Recent Labs     03/20/22  0626 03/21/22  0650 03/22/22  0453   HGB 9 3* 9 0* 9 3*     ·   · Chronic microcytic hypochromic anemia  · Continue to monitor for clinical signs of shortness of breath, tachycardia on discharge

## 2022-03-23 NOTE — ASSESSMENT & PLAN NOTE
· On and off in the past 3 years  Per , she was getting them when she had pneumonias or infections and opiates overdose  · Left hand weaker that the right one after she fell -Was seen by neurology during previous admission, was thought to be muscular     ·  myoclonic jerking has been continuous over a chronic period per patient and likely a contribution from opioids given patient's cirrhosis   ·  Cymbalta will also be tapered down; currently at 30 mg daily  And will continue at 30 mg for  5 days and then taper down to 20 mg for 7 days and then will stop  ·  per discussion with toxicology,  Is best to decrease serotonergic medications as taper off and have PCP switched to a pain medication that is less serotonergic given patient's myoclonic jerking  · On aqua K pads, voltaren gel, and lidoderm patches in the afternoon in addition to oxy 30mg BID and patient has been tolerating and will be d/c on this regimen  · Overnight that reported myoclonic jerking was worsening although does not seem to be reported to be worsening per nursing, Neurology was consulted for final recommendations

## 2022-03-23 NOTE — PLAN OF CARE
Problem: Potential for Falls  Goal: Patient will remain free of falls  Description: INTERVENTIONS:  - Educate patient/family on patient safety including physical limitations  - Instruct patient to call for assistance with activity   - Consult OT/PT to assist with strengthening/mobility   - Keep Call bell within reach  - Keep bed low and locked with side rails adjusted as appropriate  - Keep care items and personal belongings within reach  - Initiate and maintain comfort rounds  - Make Fall Risk Sign visible to staff  - Offer Toileting every  Hours, in advance of need  - Initiate/Maintain alarm  - Obtain necessary fall risk management equipment:   - Apply yellow socks and bracelet for high fall risk patients  - Consider moving patient to room near nurses station  Outcome: Adequate for Discharge     Problem: Prexisting or High Potential for Compromised Skin Integrity  Goal: Skin integrity is maintained or improved  Description: INTERVENTIONS:  - Identify patients at risk for skin breakdown  - Assess and monitor skin integrity  - Assess and monitor nutrition and hydration status  - Monitor labs   - Assess for incontinence   - Turn and reposition patient  - Assist with mobility/ambulation  - Relieve pressure over bony prominences  - Avoid friction and shearing  - Provide appropriate hygiene as needed including keeping skin clean and dry  - Evaluate need for skin moisturizer/barrier cream  - Collaborate with interdisciplinary team   - Patient/family teaching  - Consider wound care consult   Outcome: Adequate for Discharge     Problem: MOBILITY - ADULT  Goal: Maintain or return to baseline ADL function  Description: INTERVENTIONS:  -  Assess patient's ability to carry out ADLs; assess patient's baseline for ADL function and identify physical deficits which impact ability to perform ADLs (bathing, care of mouth/teeth, toileting, grooming, dressing, etc )  - Assess/evaluate cause of self-care deficits   - Assess range of motion  - Assess patient's mobility; develop plan if impaired  - Assess patient's need for assistive devices and provide as appropriate  - Encourage maximum independence but intervene and supervise when necessary  - Involve family in performance of ADLs  - Assess for home care needs following discharge   - Consider OT consult to assist with ADL evaluation and planning for discharge  - Provide patient education as appropriate  Outcome: Adequate for Discharge  Goal: Maintains/Returns to pre admission functional level  Description: INTERVENTIONS:  - Perform BMAT or MOVE assessment daily    - Set and communicate daily mobility goal to care team and patient/family/caregiver  - Collaborate with rehabilitation services on mobility goals if consulted  - Perform Range of Motion  times a day  - Reposition patient every  hours  - Dangle patient  times a day  - Stand patient  times a day  - Ambulate patient  times a day  - Out of bed to chair  times a day   - Out of bed for meals times a day  - Out of bed for toileting  - Record patient progress and toleration of activity level   Outcome: Adequate for Discharge     Problem: Nutrition/Hydration-ADULT  Goal: Nutrient/Hydration intake appropriate for improving, restoring or maintaining nutritional needs  Description: Monitor and assess patient's nutrition/hydration status for malnutrition  Collaborate with interdisciplinary team and initiate plan and interventions as ordered  Monitor patient's weight and dietary intake as ordered or per policy  Utilize nutrition screening tool and intervene as necessary  Determine patient's food preferences and provide high-protein, high-caloric foods as appropriate       INTERVENTIONS:  - Monitor oral intake, urinary output, labs, and treatment plans  - Assess nutrition and hydration status and recommend course of action  - Evaluate amount of meals eaten  - Assist patient with eating if necessary   - Allow adequate time for meals  - Recommend/ encourage appropriate diets, oral nutritional supplements, and vitamin/mineral supplements  - Order, calculate, and assess calorie counts as needed  - Recommend, monitor, and adjust tube feedings and TPN/PPN based on assessed needs  - Assess need for intravenous fluids  - Provide specific nutrition/hydration education as appropriate  - Include patient/family/caregiver in decisions related to nutrition  Outcome: Adequate for Discharge

## 2022-03-23 NOTE — ASSESSMENT & PLAN NOTE
Noted deep tissue wound in sacrum and   Partial thickness venous ulcer on left medial leg     Wound care to follow; referral made to wound care

## 2022-03-23 NOTE — ASSESSMENT & PLAN NOTE
· Patient with chronic back/hip pain  · Per , she is taking 160-180 mg of oxycodone daily (30 mg 6 times a day, per  it was ordered by her PCP Abdullahi garces)  · PDMP reviewed  · Cymbalta will also be tapered down; currently at 30 mg daily  And will continue at 30 mg for  5 days and then taper down to 20 mg for 7 days and then will stop  ·  per discussion with toxicology,  Is best to decrease serotonergic medications as taper off and have PCP switched to a pain medication that is less serotonergic given patient's myoclonic jerking  On aqua K pads, voltaren gel, and lidoderm patches in the afternoon in addition to oxy 30mg BID and patient has been tolerating and will be d/c on this regimen

## 2022-03-23 NOTE — ASSESSMENT & PLAN NOTE
This patient's acute encephalopathy present more on the day or 2 after her admission has clearly resolved  She is awake alert conversant  Her conversation is motivated  She does have cognitive issues which have been addressed as far back as 2017 as well as in the office when we have seen her previously  Her cognition improves with a decrease in her narcotic and other cognitive clouding meds, control of her depression, and also improves with motivation  She would benefit actually from an outpatient program of adult  or a senior citizens program as well

## 2022-03-23 NOTE — CONSULTS
[facility]  Neurology Consult- Sadaf Cheung 1955, 73 y o  female   MRN: [id number]  Unit/Bed#: W -01 Encounter: [id number]    Inpatient consult to Neurology  Consult performed by: APRYL Horowitz  Consult ordered by: Benji Ludwig MD      Reason for Consult / Principal Problem:  Myoclonus  Hx and PE limited by:  None  Review of previous medical records was completed  Family, was not present at the bedside for history and examination    Myoclonic jerking  Assessment & Plan  Neurology is asked to see again this 58-year-old chronically ill woman on the day of her likely discharge for a question of myoclonus after she has been here since the 23  Her encephalopathy has cleared a neurology has been asked to see her for question of myoclonal jerking which reportedly had been improving but apparently at the family's request neurology is asked to see her today  This patient is known to both the inpatient as well as outpatient Neurology for several years, since 2017  I refer the reader to those individual notes  This particular examiner saw this patient last month mid February for left upper extremity weakness after she had fallen and had weakness noted in the left upper extremity related to her fall  I do not find her to have any myoclonus at this time  It has improved as her meds have been titrated downward  She remains with a slight tremor, fine, in her left hand and wrist   I expect this is still residual from her fall and also somewhat defensive and cognitive as she is able to do some more things with her left hand and wrist when she is distracted then when she is asked in a confrontational examination manner  At this time Neurology does not have any new recommendations other than continued health promotion on multiple fronts  She should follow up with us once in the office, on a nonurgent basis, approximately a 1-2 months    At this time there is no neurologic impediment to her discharge  * Acute encephalopathy-resolved as of 3/22/2022  Assessment & Plan  This patient's acute encephalopathy present more on the day or 2 after her admission has clearly resolved  She is awake alert conversant  Her conversation is motivated  She does have cognitive issues which have been addressed as far back as 2017 as well as in the office when we have seen her previously  Her cognition improves with a decrease in her narcotic and other cognitive clouding meds, control of her depression, and also improves with motivation  She would benefit actually from an outpatient program of adult  or a senior citizens program as well  This patient should be seen in our outpatient Neurologic offices in it at any of our [place] is non urgently in a month or so  She would most likely benefit from neuropsych or cognitive testing to establish a cognitive baseline on this patient on an occasion when she is not in the hospital   No changes at this time  HPI: Julio Cesar Hill is a right handed  73 y o  female who  is known to this neurologic inpatient examiner she was just recently seen last month mid February  She is known to multiple inpatient hospital providers from her previous visits and she is also known to our outpatient staff as well  Julio Cesar Hill is a 62 y o  female with history of MCI, depression, diabetes mellitus, HTN, HLD, polyneuropathy, chronic pain syndrome with opioid dependence who I saw last month after she was admitted for right arm weakness when she fell at home  At that time there was no neurologic impairment  She has been seen on previous occasions and was seen this time last year for encephalopathy and tremor  Tremor at that time was felt to be related to reglan usage  She has had multiple admissions in the past with TME/AMS in setting of metabolic issues  Med changes were done at that time       She was originally seen July 2017 by Jennifer Urena and   [place] with epsidoes of confusion and presyncope  She saw Dr Shoemaker at the [facility] as well who felt she had MCI due to multiple medical conditions and underlying mood related disorder  Neurology is asked to see her now as noted above after she cleared being encephalopathic but reportedly has some myoclonic jerks that are reported to have resolved by the staff but the family are apparently still concerned      ROS: 12 system cued query:  The patient is quite awake alert and bright today  She denies any complaints of pain  She reports her left hand is still not as good as it was before her fall  She denies that she has any jerking at this time  She denies any headache  No weakness that is new  She reports she is chronically weak but reports it she will work on it again when she goes home  She denies any facial numbness tingling or limb dysesthesias  No chest pain no shortness of breath  When queried specifically concerning her myoclonus he reports that occurs when she is excited  Remainder of her query is negative  The staff nurse reports no adverse events and in fact reports that she feels her my clonus is significantly improved today  She does note that her IV has recently infiltrated and her right is somewhat swollen from it        Historical Information     Past Medical History:   Diagnosis Date    Abnormal head CT 7/14/2017    Acute kidney injury ([address] ) 8/19/2018    Cellulitis 1/10/2017    Chronic back pain     Cirrhosis (HCC)     Closed fracture of multiple ribs of right side 7/14/2017    Degenerative disc disease at L5-S1 level     Diabetes mellitus (HCC)     GERD (gastroesophageal reflux disease)     History of methicillin resistant staphylococcus aureus (MRSA) 08/21/2018    negative nasal culture- isolation and hx of mrsa removed 8/20/2018    Hyperkalemia 4/25/2018    Hypertension     Hypocalcemia 4/25/2018    Hyponatremia 7/14/2017     Past Surgical History: Procedure Laterality Date    CHOLECYSTECTOMY      JOINT REPLACEMENT      OOPHORECTOMY      VT INCISION,IMPLANT,NEUROELEC,SACRAL NERVE N/A 9/20/2021    Procedure: INSERTION NEUROSTIMULATOR ELECTRODE ARRAY SACRAL NERVE; FLUOROSCOPY; ELECTRONIC ANALYSIS;  Surgeon: Noam Limon MD;  Location: [facility];  Service: UroGynecology           SACRAL NERVE STIMULATOR PLACEMENT N/A 9/28/2021    Procedure: IPG INSERTION AND PROGRAMMING;  Surgeon: Noam Limon MD;  Location: [facility];  Service: UroGynecology              Social History :  Patient is  lives with her family in the family home  She has been disabled for several years  Family History: non-contributory      Allergies   Allergen Reactions    Pollen Extract Allergic Rhinitis     Meds:all current active meds have been reviewed  I note the decreases that Internal Medicine service has tried with her meds      Scheduled Meds:  Medication Dose Route Frequency    amLODIPine  10 mg Oral Daily    Diclofenac Sodium  2 g Topical 4x Daily    DULoxetine  30 mg Oral Daily    heparin (porcine)  5,000 Units Subcutaneous Q8H [address]    insulin glargine  12 Units Subcutaneous HS    insulin lispro  1-5 Units Subcutaneous HS    lidocaine  1 patch Topical Daily    lisinopril  10 mg Oral Daily    YULI ANTIFUNGAL   Topical BID    naloxone  0 4 mg Intravenous Once    naloxone  0 4 mg Intravenous Q1H PRN    naloxone -Provider supplied  4 mg Does not apply Once    ondansetron  4 mg Intravenous Q6H PRN    oxyCODONE  20 mg Oral Q8H    polyethylene glycol  17 g Oral Daily PRN    pravastatin  20 mg Oral Daily    saccharomyces boulardii  250 mg Oral BID     Facility-Administered Medications Ordered in Other Encounters   Medication Dose Route Frequency Provider Last Rate    ferrous sulfate  325 mg Oral Daily With Shade Rodriguez MD       PRN Meds: naloxone    ondansetron    polyethylene glycol      Physical Exam:   Objective   Vitals:Blood pressure 154/77, pulse 87, temperature 98 4 °F (36 9 °C), resp  rate 16, height 5' 2" (1 575 m), weight 85 kg (187 lb 6 3 oz), SpO2 95 %  ,Body mass index is 34 27 kg/m²  Patient was examined in bed there is no family at bedside  General: alert and bright, appears older than stated age and cooperative,  Head: Normocephalic, without obvious abnormality, atraumatic  Oral exam: lips, mucosa, and tongue moist;   Neck: no carotid bruit,   Lungs: clear to auscultation ant  bilaterally  Heart: regular rate and rhythm, S1, S2 normal, no murmur appreciated,   Abdomen: soft, +BS    Extremities:  Her right proximal arm is somewhat swollen compared to the left, both arms have superficial bruising appreciated  Otherwise  atraumatic, no cyanosis or edema    Neurologic:   Mental status: Alert, she recognizes the examiner oriented, thought content appropriate her speech is clear without paraphasic errors  She is spontaneous with social conversation  She is delayed and has consistent cognitive slowing as has been noted on her previous exams with specific cognitive tasks such as reverse ordering the months of the year, calculating simple coin age, reporting the president, or other specific cognitive questions  CN Exam: BRANDON, EOM's I, VF full, Gaze conjugate No sensory or motor lateralizations (No PP on face), Hearing I B, CNIX-XII I B  Motor: full power, age appropriate x upper limbs, with the exception of her left hand and wrist are still somewhat weak compared with my exam with this patient last month where her shoulder and arm now are much improved on the left  Her bilateral lower extremities she has general deconditioned weaknesses at both hips  Her obesity precludes also brisk knee flexion and extension although they are equal   Her dorsiflexion on both ankles is equal and great toe extension is also 4+/5     Sensory:  Grossly intact  X upper limbs, (not PP tested), she has diabetic neuropathy as well as chronic venous stasis in her bilateral lower extremities as well  Cerebellar: no past pointing or drift from modified Romberg position, no gross ataxia w maneuvers, she is noted to have a tremor of her left hand which waxes and wanes depending on somewhat her participation and anxiety, distraction, as well as fine verses gross tasks that she is doing     DTR's: Age appropriate, WNL; Plantars: downgoing  Gait:  I did not gait her at this time  Lab Results:   I have personally reviewed pertinent reports  , CBC:   Results from last 7 days   Lab Units 03/22/22 0453 03/21/22  0650 03/20/22 0626   WBC Thousand/uL 3 55* 3 97* 11 41*   RBC Million/uL 3 71* 3 61* 3 72*   HEMOGLOBIN g/dL 9 3* 9 0* 9 3*   HEMATOCRIT % 29 3* 30 8* 31 8*   MCV fL 79* 85 86   PLATELETS Thousands/uL 119* 100* 130*   , BMP/CMP:   Results from last 7 days   Lab Units 03/23/22  0639 03/22/22 0453 03/21/22  0650 03/20/22 0626 03/19/22 2036   SODIUM mmol/L 134* 136 135*   < > 134*   POTASSIUM mmol/L 5 6* 5 0 5 0   < > 6 0*   CHLORIDE mmol/L 102 101 103   < > 100   CO2 mmol/L 26 29 24   < > 27   BUN mg/dL 14 15 21   < > 29*   CREATININE mg/dL 0 87 0 87 1 11   < > 1 70*   CALCIUM mg/dL 8 1* 7 9* 7 8*   < > 7 1*   AST U/L  --   --  52*  --  35   ALT U/L  --   --  30  --  23   ALK PHOS U/L  --   --  152*  --  160*   EGFR ml/min/1 73sq m 70 70 52   < > 31    < > = values in this interval not displayed  , Vitamin B12:   , HgBA1C:   , TSH:   Results from last 7 days   Lab Units 03/19/22 2036   TSH 3RD GENERATON uIU/mL 1 221   , Coagulation:   Results from last 7 days   Lab Units 03/19/22 2036   INR  1 00   , Lipid Profile:        Imaging Studies: I have personally reviewed pertinent films in PACS and Have reviewed her CT head done on the 19th at the time of admission  I have also reviewed reviewed her previous films  There is no acute pathology appreciated on her film at this time          Dictation voice to text software has been used in the creation of this document  Please consider this in light of any contextual or grammatical errors

## 2022-03-23 NOTE — CASE MANAGEMENT
Case Management Discharge Planning Note    Patient name Rogers Moon  Location W MS 0/W -81 MRN [id number]  : 1955 Date 3/23/2022       Current Admission Date: 3/19/2022  Current Admission Diagnosis:Type 2 diabetes mellitus with hyperglycemia, with long-term current use of insulin [facility])   Patient Active Problem List    Diagnosis Date Noted    Multiple wounds 2022    Opiate dependence ([facility] ) 2022    CKD (chronic kidney disease) 2022    Thrombocytopenia ([facility] ) 2022    Myoclonic jerking 2022    Acute respiratory acidosis 2022    History of recent fall 2022    Left arm weakness 2022    Anemia 2022    Acute respiratory failure with hypoxia and hypercapnia ([facility] [address] [facility] ) 2022    Acute kidney injury superimposed on CKD ([facility] [address] ) 2022    Pneumonia 2022    Sepsis ([facility] [address] [facility] ) 2022    Pressure injury of skin of buttock 2022    Fecal occult blood test positive 2022    COVID-19 2022    Urge incontinence 2021    Ex-smoker 2021    Syncope 07/15/2020    Cirrhosis ([facility] [facility] ) 07/15/2020    Splenomegaly 07/15/2020    CAD (coronary artery disease) 07/15/2020    Other microscopic hematuria 2020    Spondylosis of lumbar joint 05/15/2020    Marijuana use 2020    Left hip pain 2020    Ambulatory dysfunction 2020    Hyperkalemia 2018    Chronic pain syndrome 2018    Peripheral neuropathy 2018    Type 2 diabetes mellitus with hyperglycemia, with long-term current use of insulin ([facility] [address] [facility] ) 2018    Chronic venous stasis dermatitis of both lower extremities 2017    Venous insufficiency (chronic) (peripheral) 2017    Essential hypertension       LOS (days): 3  Geometric Mean LOS (GMLOS) (days): 4 80  Days to GMLOS:1 3     OBJECTIVE:  Risk of Unplanned Readmission Score: 36         Current admission status: Inpatient   Preferred Pharmacy:   [id number]  Phone: [phone] Fax: [phone]    Primary Care Provider: Citlalli Macias MD    Primary Insurance: MEDICARE  Secondary Insurance: Festus Kwok    DISCHARGE DETAILS:    Discharge planning discussed with[de-identified] patient  Freedom of Choice: Yes     CM contacted family/caregiver?: Yes  Were Treatment Team discharge recommendations reviewed with patient/caregiver?: Yes  Did patient/caregiver verbalize understanding of patient care needs?: Yes  Were patient/caregiver advised of the risks associated with not following Treatment Team discharge recommendations?: Yes    [address] [employer] [facility] Name[de-identified] [facility]    Other Referral/Resources/Interventions Provided:  Referral Comments: [facility] able to accept for Home SN/PT/OT at discharge    ETA of Transport (Date): 03/23/22     IMM Given (Date):: 03/23/22  IMM Given to[de-identified] Patient     CM updated per SLIM, patient is medically stable for discharge home today  CM spoke with patient at the bedside  Patient updated [facility] ref[facility] were [facility] for [facility]  [facility] to [facility]  [facility] for Home SN/PT/[facility]  Patient aware that [facility] will call her within 24-48 hrs of discharge  [facility] contact information will be on her AVS      Patient's  will transport at discharge  CM reviewed IMM at bedside  No other questions/concerns noted at this time  IMM reviewed with patient, patient agrees with discharge determination      CM faxed AVS and F2F to [facility] at

## 2022-03-23 NOTE — DISCHARGE SUMMARY
[facility]  Discharge- Blu Rivers 1955, 73 y o  female MRN: [id number]  Unit/Bed#: W -80 Encounter: [id number]  Primary Care Provider: Cher Avilez MD   Date and time admitted to hospital: 3/19/2022  7:50 PM    * Acute encephalopathy  Assessment & Plan  · POA: Patient admitted with altered mental status  Per , she was able to communicate while on the ambulance but got altered here  · Per ER, she was able to answer questions here and had pinpoint pupils, improved after narcan( total 0 4 mg), but was even better after she was placed on BiPap   · Per , she was taking 30 mg oxycodone 6 time a day( wife confirmed, she said 6- 7 times a day)   said it was ordered like that by her pcp from [place]  He said he is giving her around 160-180 mg a day, along with Gabapentin prn( last dose yesterday)  I told him that she had similar problems in the past and that she was supposed to take 30 mg tid  He said " I know but I don't want her to be in pain"  He mentioned that she started to have hands jerkings Saturday around 2 o'clock  As well he mentioned her breathing sounds like rumbling since yesterday  ·  said that sometimes his wife breathing stops while she is sleeping and that he has to reposition her head  She was supposed to have sleep study done, but no spots were available until late in April  · Most likely etiology is opiates overdose, infection, hypoxia and hypercapnia  · W/U: TSH, ammonia, comma panel within normal limits  CT head showed no cute changes  Urine drug screen showed opiates     During exam 03/22: mentation has improved drastically compared to afternoon 03/21 when patient was very drowsy      Plan:   · 03/21 later afternoon, patient VBG was noted to hypercapnic w/ CO2 56 8 and was put on BiPAP overnight   Improved VBG this AM 03/22 w/ CO2 in 42 9, mildly alkalotic at 7 460 along w/ pt's mentation  · Continue BiPAP · Continue narcan prn   · Monitor for opiates withdrawal   · Per toxicology  · Taper down cymbalta off (currently on 30mg daily) and follow as plan on opioid overdose  · Stop gabapentin  · Continue oxycodone and try to taper as able to (PCP has had patient on a wean for some time and plants to continue tapering down)  · On 30mg BID and tolerating and to continue on d/c   · Given aqua K pads, lidoderm patch  and voltaren gel for pain regimen for back pain and will be d/c on this  · Treat suspected  Pneumonia as per plan under sepsis          Opiate dependence ([facility] [address] )  Assessment & Plan  POA: 160 - 180 mg a day per ( 30 mg 6 times a day) for back pain   · On oxycodone 30 mg  b i d  given patient's myoclonus 03/21 and patient is tolerating Will be discharge on this  · Has Lidoderm patch Voltaren gel, aqua K pads for additional pain regimen in the afternoon  Will be discharge on this  · Monitor for withdrawal    Myoclonic jerking  Assessment & Plan  · On and off in the past 3 years  Per , she was getting them when she had pneumonias or infections and opiates overdose  · Left hand weaker that the right one after she fell -Was seen by neurology during previous admission, was thought to be muscular     ·  myoclonic jerking has been continuous over a chronic period per patient and likely a contribution from opioids given patient's cirrhosis   ·  Cymbalta will also be tapered down; currently at 30 mg daily  And will continue at 30 mg for  5 days and then taper down to 20 mg for 7 days and then will stop  ·  per discussion with toxicology,  Is best to decrease serotonergic medications as taper off and have PCP switched to a pain medication that is less serotonergic given patient's myoclonic jerking  · On aqua K pads, voltaren gel, and lidoderm patches in the afternoon in addition to oxy 30mg BID and patient has been tolerating and will be d/c on this regimen  · Overnight that reported myoclonic jerking was worsening although does not seem to be reported to be worsening per nursing, Neurology was consulted for final recommendations    Sepsis [facility])  Assessment & Plan  · Present on admission:  Fever 101 6, leukocytosis, tachycardia  Source likely pneumonia  UA showed no wbc, bacteria  Patient had bilateral lower extremity venous stasis did not appreciate any cellulitis  Has bilateral redness medial thigh bilaterally, that it did not seem infected  · Received ceftriaxone and azithromycin for pneumonia suspicion in emergency room  · Lactic within normal limits  · CXR showed Possible bilateral infiltrates more prominent on the left  · Impression: likely bacterial pneumonia    Lab Results   Component Value Date    PROCALCITONI 0 68 (H) 03/21/2022    PROCALCITONI 0 19 03/19/2022    PROCALCITONI 0 56 (H) 02/23/2022    MRSA swab negative   procalcitonin was 0 68  Lab Results   Component Value Date    WBC 3 55 (L) 03/22/2022    HGB 9 3 (L) 03/22/2022    HCT 29 3 (L) 03/22/2022    MCV 79 (L) 03/22/2022     (L) 03/22/2022         Plan:   ·  received cefepime 03/22 and de-escalated to ceftriaxone (day 5 of antibiotics)   · Can be discharged tomorrow on additional 3 days cefdinir po to make a total course of 7 days of antibiotics  · MRSA swab negative and  Discontinued vancomycin  ·  procalcitonin was elevated for compared to 2 days ago   · Follow-up for blood culture:  Blood culture showed Gram-positive cocci in clusters  ·  likely skin contaminant because other blood culture did not grow any thing        Multiple wounds  Assessment & Plan  Noted deep tissue wound in sacrum and   Partial thickness venous ulcer on left medial leg     Wound care to follow; referral made to wound care    Thrombocytopenia [facility])  Assessment & Plan    Continue to monitor clinically for any rashes/petechiae   On discharge    CKD (chronic kidney disease)  Assessment & Plan  Lab Results   Component Value Date    EGFR 70 03/22/2022    EGFR 52 03/21/2022    EGFR 33 03/20/2022    CREATININE 0 87 03/22/2022    CREATININE 1 11 03/21/2022    CREATININE 1 59 (H) 03/20/2022     · Baseline appears to be between 1-1 2   · Has resolved back to baseline after IVF given 03/20  · Renally dosed medication, avoid nephrotoxins and hypotension     Acute respiratory acidosis  Assessment & Plan  · POAMost likely secondary to opiate overdose and sedative   · VBG improved after Narcan and with BiPap   · Seems to have resolved with CO2 WNL  · Continue Bipap and Narcan prn   · 03/21 later afternoon, patient VBG was noted to hypercapnic w/ CO2 56 8 and was put on BiPAP overnight   · Improved VBG AM 03/22 w/ CO2 in 42 9, mildly alkalotic at 7 460 along w/ pt's mentation    Anemia  Assessment & Plan  Recent Labs     03/20/22  0626 03/21/22  0650 03/22/22  0453   HGB 9 3* 9 0* 9 3*     ·   · Chronic microcytic hypochromic anemia  · Continue to monitor     Ambulatory dysfunction  Assessment & Plan  · Walks with a walker, needs assistance  · Fall precautions   · PT/OT recommends home w/ home PT/OT    Hyperkalemia  Assessment & Plan  Lab Results   Component Value Date    K 5 6 (H) 03/23/2022    K 5 0 03/22/2022    K 5 0 03/21/2022     Given dextrose w/ 10units insulin, albuterol, and calcium gluconate  BMP in 1 week to monitor     Chronic pain syndrome  Assessment & Plan  · Patient with chronic back/hip pain  · Per , she is taking 160-180 mg of oxycodone daily (30 mg 6 times a day, per  it was ordered by her PCP [place])  · PDMP reviewed  · Cymbalta will also be tapered down; currently at 30 mg daily  And will continue at 30 mg for  5 days and then taper down to 20 mg for 7 days and then will stop  ·  per discussion with toxicology,  Is best to decrease serotonergic medications as taper off and have PCP switched to a pain medication that is less serotonergic given patient's myoclonic jerking  On aqua K pads, voltaren gel, and lidoderm patches in the afternoon in addition to oxy 30mg BID and patient has been tolerating and will be d/c on this regimen     Type 2 diabetes mellitus with hyperglycemia, with long-term current use of insulin [facility])  Assessment & Plan  Lab Results   Component Value Date    HGBA1C 7 5 (H) 01/04/2022       Recent Labs     03/22/22  0757 03/22/22  1215 03/22/22  1810 03/22/22  2155   POCGLU 87 102 163* 151*       Blood Sugar Average: Last 72 hrs:  (P) 601 [id number]     · Home regimen Lantus 18 units bedtime, NovoLog sliding scale and can continue on discharge  · Mentation has improved switched to carb controlled/diabetic diet      Essential hypertension  Assessment & Plan    · Continue home medication with lisinopril 10 mg daily and amlodipine 10 mg  · Monitor    Medical Problems             Resolved Problems  Date Reviewed: 3/23/2022          Resolved    Hyponatremia 3/22/2022     Resolved by  Jackie Landers DO              Discharging Resident: Jackie Landers DO  Discharging Attending: Osvaldo Moreno MD  PCP: Valencia Jasmine MD  Admission Date:   Admission Orders (From admission, onward)     Ordered        03/20/22 0026  Inpatient Admission  Once                      Discharge Date: 03/23/22    Consultations During Hospital Stay:  · Toxicology  · Neurology    Procedures Performed:   · None    Significant Findings / Test Results:   - Sujey Ang in ED and BiPAP due to pinpoint pupils and and patient being encephalopathic that improved after the Narcan and BiPAP  · VBG showed increased CO2 retention and was placed on BiPAP  · One blood culture showed Gram-positive cocci in clusters but is likely contaminant given that the seconds with culture did not grow any organisms    Incidental Findings:   · None     Test Results Pending at Discharge (will require follow up):   · None     Outpatient Tests Requested:  · BMP in 1 week to get monitor Potassium levels     Complications:  None    Reason for Admission: Acute Encephalopathy    Hospital Course: Rogers Moon is a 73 y o  female patient who originally presented to the hospital on 3/19/2022 due to  Altered mental status as patient was getting altered on the way here from the ambulance  Patient was able to answer some questions as impaired pinpoint pupils in the ED and was given Narcan as well as BiPAP to improve her mentation  This is suspected likely due to patient's pain regimen of oxycodone 6 times a day   As well as with gabapentin  Toxicology was also during her stay who recommended tapering down the oxycodone as well as stopping gabapentin and tapering down her Cymbalta for pain regimen  Cymbalta particular was tapered down due to noted myoclonic jerking and due to risk of  serotonic  Activity  Given her history of cirrhosis, oxycodone was also recommended to be tapered down as likely contributing to patient's myoclonus  Her pain regimen was confirmed with her PCP that has been slowly tapering down from much higher pain regimen doses as well as other additional pain medications  During her stay, her procalcitonin was also elevated and she was already started on broad-spectrum antibiotics with cefepime and vancomycin  And this was continued when the procalcitonin came back elevated and slowly this was de-escalated to just IV ceftriaxone given that MRSA was negative and patient was clearly improving in terms of mentation  Patient also had a VBG that was done due to concern for CO to hypercapnic encephalopathy due to patient's mentation still being lethargic/ drowsy 2 days prior to discharge and was given BiPAP overnight that made a large improvement the following day with patient's mentation  She will be given  An additional 5 days of  Cefdinir on discharge to make a total 7 day course of antibiotics for suspected pneumonia  Her Cymbalta will also be tapered  Over the next 2 weeks until she stops taking it    She is to follow-up with her PCP in regards to further to bring down her pain regimen  Please see above list of diagnoses and related plan for additional information  Condition at Discharge: fair    Discharge Day Visit / Exam:   Subjective:  Patient is eager to go home this morning and anxious to leave ASAP  I explained that she will have home PT come to her home to help her in gaining back her strength  She states that she feels the myoclonic jerking is improving  No complaints otherwise  Vitals: Blood Pressure: 154/77 (03/23/22 0727)  Pulse: 87 (03/23/22 0727)  Temperature: 98 4 °F (36 9 °C) (03/23/22 0727)  Temp Source: Oral (03/22/22 2154)  Respirations: 16 (03/23/22 0727)  Height: 5' 2" (157 5 cm) (03/20/22 0816)  Weight - Scale: 85 kg (187 lb 6 3 oz) (03/20/22 0816)  SpO2: 94 % (03/23/22 0727)  Exam:   Physical Exam  Vitals and nursing note reviewed  Constitutional:       General: She is not in acute distress  Appearance: She is well-developed  HENT:      Head: Normocephalic and atraumatic  Eyes:      Conjunctiva/sclera: Conjunctivae normal    Cardiovascular:      Rate and Rhythm: Normal rate and regular rhythm  Heart sounds: No murmur heard  Pulmonary:      Effort: Pulmonary effort is normal  No respiratory distress  Breath sounds: Normal breath sounds  Abdominal:      Palpations: Abdomen is soft  Tenderness: There is no abdominal tenderness  Musculoskeletal:      Cervical back: Neck supple  Skin:     General: Skin is warm and dry  Comments: Left leg skin ulcer in wound bandage, chronic dry skin  Still swollen in bilat LE that is chronic    Neurological:      Mental Status: She is alert  Comments: Patient able to lift bilat LE against gravity           Discussion with Family: Updated  () at bedside  Discharge instructions/Information to patient and family:   See after visit summary for information provided to patient and family        Provisions for Follow-Up Care:  See after visit summary for information related to follow-up care and any pertinent home health orders  Disposition:   Home with VNA Services (Reminder: Complete face to face encounter)    Planned Readmission: None    Discharge Medications:  See after visit summary for reconciled discharge medications provided to patient and/or family        **Please Note: This note may have been constructed using a voice recognition system**

## 2022-03-23 NOTE — ASSESSMENT & PLAN NOTE
POA: 160 - 180 mg a day per ( 30 mg 6 times a day) for back pain   · On oxycodone 30 mg  b i d  given patient's myoclonus 03/21 and patient is tolerating Will be discharge on this  · Has Lidoderm patch Voltaren gel, aqua K pads for additional pain regimen in the afternoon  Will be discharge on this  · Monitor for withdrawal

## 2022-03-23 NOTE — ASSESSMENT & PLAN NOTE
· POA: Patient admitted with altered mental status  Per , she was able to communicate while on the ambulance but got altered here  · Per ER, she was able to answer questions here and had pinpoint pupils, improved after narcan( total 0 4 mg), but was even better after she was placed on BiPap   · Per , she was taking 30 mg oxycodone 6 time a day( wife confirmed, she said 6- 7 times a day)   said it was ordered like that by her pcp from [place]  He said he is giving her around 160-180 mg a day, along with Gabapentin prn( last dose yesterday)  I told him that she had similar problems in the past and that she was supposed to take 30 mg tid  He said " I know but I don't want her to be in pain"  He mentioned that she started to have hands jerkings Saturday around 2 o'clock  As well he mentioned her breathing sounds like rumbling since yesterday  ·  said that sometimes his wife breathing stops while she is sleeping and that he has to reposition her head  She was supposed to have sleep study done, but no spots were available until late in April  · Most likely etiology is opiates overdose, infection, hypoxia and hypercapnia  · W/U: TSH, ammonia, comma panel within normal limits  CT head showed no cute changes  Urine drug screen showed opiates     During exam 03/22: mentation has improved drastically compared to afternoon 03/21 when patient was very drowsy      Plan:   · 03/21 later afternoon, patient VBG was noted to hypercapnic w/ CO2 56 8 and was put on BiPAP overnight   Improved VBG this AM 03/22 w/ CO2 in 42 9, mildly alkalotic at 7 460 along w/ pt's mentation  · Monitor for opiates withdrawal   · Per toxicology  · Taper down cymbalta off (currently on 30mg daily) and follow as plan on opioid overdose  · Stop gabapentin  · Continue oxycodone and try to taper as able to (PCP has had patient on a wean for some time and plants to continue tapering down)  · On 30mg BID and tolerating and to continue on d/c   · Given aqua K pads, lidoderm patch  and voltaren gel for pain regimen for back pain and will be d/c on this  · Treat suspected  Pneumonia as per plan under sepsis

## 2022-03-23 NOTE — ASSESSMENT & PLAN NOTE
Neurology is asked to see again this 36-year-old chronically ill woman on the day of her likely discharge for a question of myoclonus after she has been here since the 23  Her encephalopathy has cleared a neurology has been asked to see her for question of myoclonal jerking which reportedly had been improving but apparently at the family's request neurology is asked to see her today  This patient is known to both the inpatient as well as outpatient Neurology for several years, since 2017  I refer the reader to those individual notes  This particular examiner saw this patient last month mid February for left upper extremity weakness after she had fallen and had weakness noted in the left upper extremity related to her fall  I do not find her to have any myoclonus at this time  It has improved as her meds have been titrated downward  She remains with a slight tremor, fine, in her left hand and wrist   I expect this is still residual from her fall and also somewhat defensive and cognitive as she is able to do some more things with her left hand and wrist when she is distracted then when she is asked in a confrontational examination manner  At this time Neurology does not have any new recommendations other than continued health promotion on multiple fronts  She should follow up with us once in the office, on a nonurgent basis, approximately a 1-2 months  At this time there is no neurologic impediment to her discharge

## 2022-03-23 NOTE — ASSESSMENT & PLAN NOTE
· Present on admission:  Fever 101 6, leukocytosis, tachycardia  Source likely pneumonia  UA showed no wbc, bacteria  Patient had bilateral lower extremity venous stasis did not appreciate any cellulitis  Has bilateral redness medial thigh bilaterally, that it did not seem infected     · Received ceftriaxone and azithromycin for pneumonia suspicion in emergency room  · Lactic within normal limits  · CXR showed Possible bilateral infiltrates more prominent on the left  · Impression: likely bacterial pneumonia    Lab Results   Component Value Date    PROCALCITONI 0 68 (H) 03/21/2022    PROCALCITONI 0 19 03/19/2022    PROCALCITONI 0 56 (H) 02/23/2022    MRSA swab negative   procalcitonin was 0 68  Lab Results   Component Value Date    WBC 3 55 (L) 03/22/2022    HGB 9 3 (L) 03/22/2022    HCT 29 3 (L) 03/22/2022    MCV 79 (L) 03/22/2022     (L) 03/22/2022         Plan:   ·  received cefepime 03/22 and de-escalated to ceftriaxone (day 5 of antibiotics)   · Can be discharged tomorrow on additional 2 days cefdinir po to make a total course of 7 days of antibiotics  · MRSA swab negative and  Discontinued vancomycin  · Follow-up for blood culture:  Blood culture showed Gram-positive cocci in clusters  ·  likely skin contaminant because other blood culture did not grow any thing

## 2022-03-23 NOTE — DISCHARGE INSTR - AVS FIRST PAGE
Dear Farrah Fowler,     It was our pleasure to care for you here at [facility]  It is our hope that we were always able to exceed the expected standards for your care during your stay  You were hospitalized due to Toxic Metabolic Encephalopathy  You were cared for on the [facility] 4th floor by Susan Hinkle DO under the service of Deion Hartley MD with the [facility] who covers for your primary care physician (PCP), Lester Gallagher MD, while you were hospitalized  If you have any questions or concerns related to this hospitalization, you may contact us at [phone]  For follow up as well as any medication refills, we recommend that you follow up with your primary care physician  A registered nurse will reach out to you by phone within a few days after your discharge to answer any additional questions that you may have after going home  However, at this time we provide for you here, the most important instructions / recommendations at discharge:     Notable Medication Adjustments -   Stop taking Gabapentin 300mg and colace 100mg  Please start taking Cefdinir 300mg every 12 hours for 2 days   Please take Cymbalta 30 mg daily for 5 days  (03/23-03/28) then go down to Cymbalta 20 mg daily (03/29-04/05)  Start voltaren in gel apply topically 4 times per day   Apply Lidoderm patch to back daily and remove after 12 hours   Oxycodone 30 mg tablets 2 times per day (please follow-up with PCP in regards to further pain regimen recommendations)   Please use ice packs to lower back    Testing Required after Discharge -   None  Important follow up information -   Please follow up with your PCP after discharge within one week    Other Instructions -   Please come back to the ED if your symptoms severely worsen, you experience chest pain, or shortness of breath    Please review this entire after visit summary as additional general instructions including medication list, appointments, activity, diet, any pertinent wound care, and other additional recommendations from your care team that may be provided for you        Sincerely,     Lindy Keith, DO

## 2022-03-23 NOTE — ASSESSMENT & PLAN NOTE
Lab Results   Component Value Date    K 5 6 (H) 03/23/2022    K 5 0 03/22/2022    K 5 0 03/21/2022     Given dextrose w/ 10 units insulin, albuterol, and calcium gluconate in the AM 03/23  BMP in afternoon and back WNL, clear for discharge

## 2022-03-23 NOTE — ASSESSMENT & PLAN NOTE
· POAMost likely secondary to opiate overdose and sedative   · VBG improved after Narcan and with BiPap   · Seems to have resolved with CO2 WNL  · Continue Bipap and Narcan prn   · 03/21 later afternoon, patient VBG was noted to hypercapnic w/ CO2 56 8 and was put on BiPAP overnight   · Improved VBG AM 03/22 w/ CO2 in 42 9, mildly alkalotic at 7 460 along w/ pt's mentation

## 2022-03-25 ENCOUNTER — TELEPHONE (OUTPATIENT)
Dept: NEUROLOGY | Facility: CLINIC | Age: 67
End: 2022-03-25

## 2022-03-25 LAB
BACTERIA BLD CULT: ABNORMAL
BACTERIA BLD CULT: NORMAL
GRAM STN SPEC: ABNORMAL

## 2022-03-25 NOTE — TELEPHONE ENCOUNTER
7am-[id number] and left a message  Advised to callback to schedule an HFU  SLAN/   Acute toxic/metabolic encephalopathy    Principal problem             NOTES:NONE-TRIAGED W/CHE PIMENTEL:General neurology AP or attending in 4-8 weeks    3:50 PM  20 days left
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
## 2022-06-28 ENCOUNTER — APPOINTMENT (EMERGENCY)
Dept: CT IMAGING | Facility: HOSPITAL | Age: 67
End: 2022-06-28
Payer: MEDICARE

## 2022-06-28 ENCOUNTER — HOSPITAL ENCOUNTER (EMERGENCY)
Facility: HOSPITAL | Age: 67
Discharge: HOME | End: 2022-06-28
Attending: EMERGENCY MEDICINE | Admitting: EMERGENCY MEDICINE
Payer: MEDICARE

## 2022-06-28 VITALS
RESPIRATION RATE: 16 BRPM | HEIGHT: 62 IN | DIASTOLIC BLOOD PRESSURE: 83 MMHG | BODY MASS INDEX: 34.27 KG/M2 | OXYGEN SATURATION: 91 % | SYSTOLIC BLOOD PRESSURE: 172 MMHG | HEART RATE: 76 BPM | TEMPERATURE: 97.5 F

## 2022-06-28 DIAGNOSIS — D64.9 ANEMIA: ICD-10-CM

## 2022-06-28 DIAGNOSIS — S22.42XA CLOSED FRACTURE OF MULTIPLE RIBS OF LEFT SIDE, INITIAL ENCOUNTER: ICD-10-CM

## 2022-06-28 DIAGNOSIS — R91.8 GROUND GLASS OPACITY PRESENT ON IMAGING OF LUNG: ICD-10-CM

## 2022-06-28 DIAGNOSIS — R11.2 NAUSEA AND VOMITING: Primary | ICD-10-CM

## 2022-06-28 LAB
ALBUMIN SERPL BCP-MCNC: 3.4 G/DL (ref 3.5–5)
ALP SERPL-CCNC: 84 U/L (ref 34–104)
ALT SERPL W P-5'-P-CCNC: 8 U/L (ref 7–52)
ANION GAP SERPL CALCULATED.3IONS-SCNC: 6 MMOL/L (ref 4–13)
AST SERPL W P-5'-P-CCNC: 15 U/L (ref 13–39)
BACTERIA UR QL AUTO: ABNORMAL /HPF
BASOPHILS # BLD AUTO: 0.02 THOUSANDS/ΜL (ref 0–0.1)
BASOPHILS NFR BLD AUTO: 1 % (ref 0–1)
BILIRUB SERPL-MCNC: 0.41 MG/DL (ref 0.2–1)
BILIRUB UR QL STRIP: NEGATIVE
BUN SERPL-MCNC: 20 MG/DL (ref 5–25)
CALCIUM ALBUM COR SERPL-MCNC: 9.2 MG/DL (ref 8.3–10.1)
CALCIUM SERPL-MCNC: 8.7 MG/DL (ref 8.4–10.2)
CARDIAC TROPONIN I PNL SERPL HS: 4 NG/L
CHLORIDE SERPL-SCNC: 102 MMOL/L (ref 96–108)
CLARITY UR: ABNORMAL
CO2 SERPL-SCNC: 25 MMOL/L (ref 21–32)
COLOR UR: YELLOW
CREAT SERPL-MCNC: 1.02 MG/DL (ref 0.6–1.3)
EOSINOPHIL # BLD AUTO: 0.06 THOUSAND/ΜL (ref 0–0.61)
EOSINOPHIL NFR BLD AUTO: 1 % (ref 0–6)
ERYTHROCYTE [DISTWIDTH] IN BLOOD BY AUTOMATED COUNT: 18.6 % (ref 11.6–15.1)
GFR SERPL CREATININE-BSD FRML MDRD: 57 ML/MIN/1.73SQ M
GLUCOSE SERPL-MCNC: 94 MG/DL (ref 65–140)
GLUCOSE SERPL-MCNC: 95 MG/DL (ref 65–140)
GLUCOSE UR STRIP-MCNC: NEGATIVE MG/DL
HCT VFR BLD AUTO: 32.7 % (ref 34.8–46.1)
HGB BLD-MCNC: 9.6 G/DL (ref 11.5–15.4)
HGB UR QL STRIP.AUTO: ABNORMAL
IMM GRANULOCYTES # BLD AUTO: 0.02 THOUSAND/UL (ref 0–0.2)
IMM GRANULOCYTES NFR BLD AUTO: 1 % (ref 0–2)
KETONES UR STRIP-MCNC: NEGATIVE MG/DL
LACTATE SERPL-SCNC: 0.8 MMOL/L (ref 0.5–2)
LEUKOCYTE ESTERASE UR QL STRIP: ABNORMAL
LIPASE SERPL-CCNC: <6 U/L (ref 11–82)
LYMPHOCYTES # BLD AUTO: 0.96 THOUSANDS/ΜL (ref 0.6–4.47)
LYMPHOCYTES NFR BLD AUTO: 22 % (ref 14–44)
MAGNESIUM SERPL-MCNC: 2 MG/DL (ref 1.9–2.7)
MCH RBC QN AUTO: 22.5 PG (ref 26.8–34.3)
MCHC RBC AUTO-ENTMCNC: 29.4 G/DL (ref 31.4–37.4)
MCV RBC AUTO: 77 FL (ref 82–98)
MONOCYTES # BLD AUTO: 0.23 THOUSAND/ΜL (ref 0.17–1.22)
MONOCYTES NFR BLD AUTO: 5 % (ref 4–12)
NEUTROPHILS # BLD AUTO: 3.1 THOUSANDS/ΜL (ref 1.85–7.62)
NEUTS SEG NFR BLD AUTO: 70 % (ref 43–75)
NITRITE UR QL STRIP: NEGATIVE
NON-SQ EPI CELLS URNS QL MICRO: ABNORMAL /HPF
NRBC BLD AUTO-RTO: 0 /100 WBCS
PH UR STRIP.AUTO: 7 [PH]
PLATELET # BLD AUTO: 187 THOUSANDS/UL (ref 149–390)
PMV BLD AUTO: 9 FL (ref 8.9–12.7)
POTASSIUM SERPL-SCNC: 5.1 MMOL/L (ref 3.5–5.3)
PROT SERPL-MCNC: 7.1 G/DL (ref 6.4–8.4)
PROT UR STRIP-MCNC: ABNORMAL MG/DL
RBC # BLD AUTO: 4.26 MILLION/UL (ref 3.81–5.12)
RBC #/AREA URNS AUTO: ABNORMAL /HPF
SODIUM SERPL-SCNC: 133 MMOL/L (ref 135–147)
SP GR UR STRIP.AUTO: 1.01 (ref 1–1.03)
UROBILINOGEN UR QL STRIP.AUTO: 0.2 E.U./DL
WBC # BLD AUTO: 4.39 THOUSAND/UL (ref 4.31–10.16)
WBC #/AREA URNS AUTO: ABNORMAL /HPF

## 2022-06-28 PROCEDURE — 96374 THER/PROPH/DIAG INJ IV PUSH: CPT

## 2022-06-28 PROCEDURE — 71250 CT THORAX DX C-: CPT

## 2022-06-28 PROCEDURE — 99284 EMERGENCY DEPT VISIT MOD MDM: CPT

## 2022-06-28 PROCEDURE — 99285 EMERGENCY DEPT VISIT HI MDM: CPT | Performed by: PHYSICIAN ASSISTANT

## 2022-06-28 PROCEDURE — 83605 ASSAY OF LACTIC ACID: CPT | Performed by: PHYSICIAN ASSISTANT

## 2022-06-28 PROCEDURE — 36415 COLL VENOUS BLD VENIPUNCTURE: CPT | Performed by: PHYSICIAN ASSISTANT

## 2022-06-28 PROCEDURE — 80053 COMPREHEN METABOLIC PANEL: CPT | Performed by: PHYSICIAN ASSISTANT

## 2022-06-28 PROCEDURE — 83735 ASSAY OF MAGNESIUM: CPT | Performed by: PHYSICIAN ASSISTANT

## 2022-06-28 PROCEDURE — 96361 HYDRATE IV INFUSION ADD-ON: CPT

## 2022-06-28 PROCEDURE — 81001 URINALYSIS AUTO W/SCOPE: CPT | Performed by: PHYSICIAN ASSISTANT

## 2022-06-28 PROCEDURE — 84484 ASSAY OF TROPONIN QUANT: CPT | Performed by: PHYSICIAN ASSISTANT

## 2022-06-28 PROCEDURE — 85025 COMPLETE CBC W/AUTO DIFF WBC: CPT | Performed by: PHYSICIAN ASSISTANT

## 2022-06-28 PROCEDURE — 83690 ASSAY OF LIPASE: CPT | Performed by: PHYSICIAN ASSISTANT

## 2022-06-28 PROCEDURE — 82948 REAGENT STRIP/BLOOD GLUCOSE: CPT

## 2022-06-28 PROCEDURE — 93005 ELECTROCARDIOGRAM TRACING: CPT

## 2022-06-28 PROCEDURE — 74176 CT ABD & PELVIS W/O CONTRAST: CPT

## 2022-06-28 RX ORDER — ONDANSETRON 4 MG/1
4 TABLET, ORALLY DISINTEGRATING ORAL EVERY 6 HOURS PRN
Qty: 20 TABLET | Refills: 0 | Status: SHIPPED | OUTPATIENT
Start: 2022-06-28

## 2022-06-28 RX ORDER — ONDANSETRON 2 MG/ML
4 INJECTION INTRAMUSCULAR; INTRAVENOUS ONCE
Status: COMPLETED | OUTPATIENT
Start: 2022-06-28 | End: 2022-06-28

## 2022-06-28 RX ORDER — LIDOCAINE 50 MG/G
1 PATCH TOPICAL DAILY
Qty: 15 PATCH | Refills: 0 | Status: SHIPPED | OUTPATIENT
Start: 2022-06-28

## 2022-06-28 RX ADMIN — SODIUM CHLORIDE 1000 ML: 0.9 INJECTION, SOLUTION INTRAVENOUS at 16:09

## 2022-06-28 RX ADMIN — ONDANSETRON 4 MG: 2 INJECTION INTRAMUSCULAR; INTRAVENOUS at 16:10

## 2022-06-28 NOTE — ED PROVIDER NOTES
History  Chief Complaint   Patient presents with    Vomiting     Pt reports NV x2 weeks, unable to keep anything down, tried OTC medications without relief  States also thinks she may have broken rib after  tried helping her out of chair, heard pop in L side and wants xray to make sure  Denies abd pain, just constant nausea  Denies fevers  Patient is a 59-year-old female with a past medical history of cirrhosis, diabetes mellitus, gastroparesis, GERD and hypertension, presenting to the ED for evaluation of nausea and vomiting  Patient states that she has had nausea with nonbloody/nonbilious vomiting over the past 2 weeks  Over the past few days she has not been able to keep anything down  She has been taking her prescribed Reglan, which is prescribed for her gastroparesis, as well as OTC emetrol with no relief  She denies any specific abdominal pain but states that her stomach feels raw  She denies any fevers, chills, chest pain, SOB, diarrhea, melena, hematochezia or urinary symptoms  Her last bowel movement was yesterday and was normal for her  Patient is also concerned for left-sided rib fractures  She states that about a week ago she was standing up out of her chair and started to fall backwards  Her  quickly grabbed her to stop her fall and she felt a pop in the left side of her ribcage  She denies any difficulty breathing but has had persistent pain in that area  Prior to Admission Medications   Prescriptions Last Dose Informant Patient Reported? Taking?    DULoxetine (CYMBALTA) 20 mg capsule   No No   Sig: Take 1 capsule (20 mg total) by mouth daily for 7 days   DULoxetine (CYMBALTA) 30 mg delayed release capsule   No No   Sig: Take 1 capsule (30 mg total) by mouth daily for 5 days   Diclofenac Sodium (VOLTAREN) 1 %   No No   Sig: Apply 2 g topically 4 (four) times a day   Insulin Aspart FlexPen 100 UNIT/ML SOPN  Self Yes No   Sig: Inject 3 Units under the skin 3 (three) times a day with meals If bs>250   Mirabegron ER (Myrbetriq) 50 MG TB24  Self Yes No   Sig: Take 50 mg by mouth in the morning   amLODIPine (NORVASC) 10 mg tablet  Self No No   Sig: Take 1 tablet (10 mg total) by mouth daily   insulin glargine (LANTUS SOLOSTAR) 100 units/mL injection pen  Self Yes No   Sig: Inject 20 Units under the skin daily at bedtime     lidocaine (LIDODERM) 5 %   No No   Sig: Apply 1 patch topically daily Remove & Discard patch within 12 hours or as directed by MD   lisinopril (ZESTRIL) 20 mg tablet  Self Yes No   Sig: Take 10 mg by mouth daily    metoclopramide (REGLAN) 10 mg tablet  Self Yes No   Sig: Take 10 mg by mouth daily   nystatin (MYCOSTATIN) powder   No No   Sig: Apply topically 3 (three) times a day   pravastatin (PRAVACHOL) 20 mg tablet   Yes No   Sig: Take 20 mg by mouth daily   psyllium (METAMUCIL) packet   No No   Sig: Take 1 packet by mouth 2 (two) times a day   saccharomyces boulardii (FLORASTOR) 250 mg capsule   No No   Sig: Take 1 capsule (250 mg total) by mouth 2 (two) times a day      Facility-Administered Medications: None       Past Medical History:   Diagnosis Date    Abnormal head CT 7/14/2017    Acute kidney injury ([facility] [address] ) 8/19/2018    Cellulitis 1/10/2017    Chronic back pain     Cirrhosis (HCC)     Closed fracture of multiple ribs of right side 7/14/2017    Degenerative disc disease at L5-S1 level     Diabetes mellitus (HCC)     GERD (gastroesophageal reflux disease)     History of methicillin resistant staphylococcus aureus (MRSA) 08/21/2018    negative nasal culture- isolation and hx of mrsa removed 8/20/2018    Hyperkalemia 4/25/2018    Hypertension     Hypocalcemia 4/25/2018    Hyponatremia 7/14/2017       Past Surgical History:   Procedure Laterality Date    CHOLECYSTECTOMY      JOINT REPLACEMENT      OOPHORECTOMY      NM INCISION,IMPLANT,NEUROELEC,SACRAL NERVE N/A 9/20/2021    Procedure: INSERTION NEUROSTIMULATOR ELECTRODE ARRAY SACRAL NERVE; FLUOROSCOPY; ELECTRONIC ANALYSIS;  Surgeon: Yovani Combs MD;  Location: [facility];  Service: UroGynecology           SACRAL NERVE STIMULATOR PLACEMENT N/A 9/28/2021    Procedure: IPG INSERTION AND PROGRAMMING;  Surgeon: Yovani Combs MD;  Location: [facility];  Service: UroGynecology              Family History   Problem Relation Age of Onset    Rheum arthritis Mother     Alzheimer's disease Mother     Lupus Mother      I have reviewed and agree with the history as documented  E-Cigarette/Vaping    E-Cigarette Use Current Every Day User     Cartridges/Day 1     Comments VAPE      E-Cigarette/Vaping Substances    Nicotine Yes     THC Yes not using anymore     Social History     Tobacco Use    Smoking status: Former Smoker     Types: E-Cigarettes     Start date: 1/1/1976     Quit date: 4/17/2019     Years since quitting: 3 2    Smokeless tobacco: Never Used   Vaping Use    Vaping Use: Every day    Substances: Nicotine, THC (not using anymore)   Substance Use Topics    Alcohol use: Not Currently     Alcohol/week: 0 0 standard drinks    Drug use: Not Currently     Types: Marijuana     Comment: last used 1 year ago        Review of Systems   Constitutional: Negative for appetite change, chills, fatigue and fever  HENT: Negative for congestion, rhinorrhea, sinus pressure, sinus pain and sore throat  Eyes: Negative for photophobia and visual disturbance  Respiratory: Negative for cough, shortness of breath and wheezing  Cardiovascular: Negative for chest pain, palpitations and leg swelling  Gastrointestinal: Positive for nausea and vomiting  Negative for abdominal pain, blood in stool, constipation and diarrhea  Genitourinary: Negative for difficulty urinating, dysuria, flank pain, frequency, hematuria and urgency  Musculoskeletal: Positive for arthralgias (left-sided rib pain)  Negative for back pain, joint swelling, myalgias and neck pain     Neurological: Negative for dizziness, syncope, weakness, light-headedness and headaches  All other systems reviewed and are negative  Physical Exam  Physical Exam  Vitals and nursing note reviewed  Constitutional:       General: She is awake  Appearance: Normal appearance  She is well-developed  She is not toxic-appearing or diaphoretic  HENT:      Head: Normocephalic and atraumatic  Right Ear: External ear normal       Left Ear: External ear normal       Nose: Nose normal       Mouth/Throat:      Lips: Pink  Mouth: Mucous membranes are moist    Eyes:      General: Lids are normal  No scleral icterus  Conjunctiva/sclera: Conjunctivae normal       Pupils: Pupils are equal, round, and reactive to light  Cardiovascular:      Rate and Rhythm: Normal rate and regular rhythm  Pulses: Normal pulses  Radial pulses are 2+ on the right side and 2+ on the left side  Heart sounds: Normal heart sounds, S1 normal and S2 normal    Pulmonary:      Effort: Pulmonary effort is normal  No accessory muscle usage  Breath sounds: Normal breath sounds  No stridor  No decreased breath sounds, wheezing, rhonchi or rales  Chest:       Abdominal:      General: Abdomen is flat  Bowel sounds are normal  There is no distension  Palpations: Abdomen is soft  Tenderness: There is abdominal tenderness in the left upper quadrant  There is no right CVA tenderness, left CVA tenderness, guarding or rebound  Musculoskeletal:      Cervical back: Full passive range of motion without pain and neck supple  No signs of trauma  No pain with movement  Right lower leg: No edema  Left lower leg: No edema  Lymphadenopathy:      Cervical: No cervical adenopathy  Skin:     General: Skin is warm and dry  Capillary Refill: Capillary refill takes less than 2 seconds  Coloration: Skin is not cyanotic, jaundiced or pale  Neurological:      Mental Status: She is alert and oriented to person, place, and time  GCS: GCS eye subscore is 4  GCS verbal subscore is 5  GCS motor subscore is 6  Gait: Gait normal    Psychiatric:         Mood and Affect: Mood normal          Speech: Speech normal          Behavior: Behavior is cooperative           Vital Signs  ED Triage Vitals [06/28/22 1324]   Temperature Pulse Respirations Blood Pressure SpO2   97 5 °F (36 4 °C) 93 20 (!) 180/82 97 %      Temp Source Heart Rate Source Patient Position - Orthostatic VS BP Location FiO2 (%)   Oral Monitor Sitting Right arm --      Pain Score       No Pain           Vitals:    06/28/22 1324 06/28/22 1615 06/28/22 1730   BP: (!) 180/82 169/79 (!) 172/83   Pulse: 93 66 76   Patient Position - Orthostatic VS: Sitting           Visual Acuity  Visual Acuity    Flowsheet Row Most Recent Value   L Pupil Size (mm) 3   R Pupil Size (mm) 3          ED Medications  Medications   sodium chloride 0 9 % bolus 1,000 mL (0 mL Intravenous Stopped 6/28/22 1709)   ondansetron (ZOFRAN) injection 4 mg (4 mg Intravenous Given 6/28/22 1610)       Diagnostic Studies  Results Reviewed     Procedure Component Value Units Date/Time    Urine Microscopic [[id number]]  (Abnormal) Collected: 06/28/22 1816    Lab Status: Final result Specimen: Urine, Clean Catch Updated: 06/28/22 1838     RBC, UA 0-1 /hpf      WBC, UA 2-4 /hpf      Epithelial Cells Occasional /hpf      Bacteria, UA Innumerable /hpf     UA w Reflex to Microscopic w Reflex to Culture [[id number]]  (Abnormal) Collected: 06/28/22 1816    Lab Status: Final result Specimen: Urine, Clean Catch Updated: 06/28/22 1827     Color, UA Yellow     Clarity, UA Slightly Cloudy     Specific [place], UA 1 010     pH, UA 7 0     Leukocytes, UA Small     Nitrite, UA Negative     Protein, UA 30 (1+) mg/dl      Glucose, UA Negative mg/dl      Ketones, UA Negative mg/dl      Urobilinogen, UA 0 2 E U /dl      Bilirubin, UA Negative     Occult Blood, UA Trace-Intact    Comprehensive metabolic panel [[id number]]  (Abnormal) Collected: 06/28/22 1610    Lab Status: Final result Specimen: Blood from Arm, Left Updated: 06/28/22 1727     Sodium 133 mmol/L      Potassium 5 1 mmol/L      Chloride 102 mmol/L      CO2 25 mmol/L      ANION GAP 6 mmol/L      BUN 20 mg/dL      Creatinine 1 02 mg/dL      Glucose 95 mg/dL      Calcium 8 7 mg/dL      Corrected Calcium 9 2 mg/dL      AST 15 U/L      ALT 8 U/L      Alkaline Phosphatase 84 U/L      Total Protein 7 1 g/dL      Albumin 3 4 g/dL      Total Bilirubin 0 41 mg/dL      eGFR 57 ml/min/1 73sq m     Narrative:      [place] guidelines for Chronic Kidney Disease (CKD):     Stage 1 with normal or high GFR (GFR > 90 mL/min/1 73 square meters)    Stage 2 Mild CKD (GFR = 60-89 mL/min/1 73 square meters)    Stage 3A Moderate CKD (GFR = 45-59 mL/min/1 73 square meters)    Stage 3B Moderate CKD (GFR = 30-44 mL/min/1 73 square meters)    Stage 4 Severe CKD (GFR = 15-29 mL/min/1 73 square meters)    Stage 5 End Stage CKD (GFR <15 mL/min/1 73 square meters)  Note: GFR calculation is accurate only with a steady state creatinine    Magnesium [[id number]]  (Normal) Collected: 06/28/22 1610    Lab Status: Final result Specimen: Blood from Arm, Left Updated: 06/28/22 1727     Magnesium 2 0 mg/dL     HS Troponin 0hr (reflex protocol) [[id number]]  (Normal) Collected: 06/28/22 1610    Lab Status: Final result Specimen: Blood from Arm, Left Updated: 06/28/22 1720     hs TnI 0hr 4 ng/L     Lipase [[id number]]  (Abnormal) Collected: 06/28/22 1610    Lab Status: Final result Specimen: Blood from Arm, Left Updated: 06/28/22 1715     Lipase <6 u/L     Lactic acid [[id number]]  (Normal) Collected: 06/28/22 1610    Lab Status: Final result Specimen: Blood from Arm, Left Updated: 06/28/22 1711     LACTIC ACID 0 8 mmol/L     Narrative:      Result may be elevated if tourniquet was used during collection      CBC and differential [[id number]]  (Abnormal) Collected: 06/28/22 1610    Lab Status: Final result Specimen: Blood from Arm, Left Updated: 06/28/22 1640     WBC 4 39 Thousand/uL      RBC 4 26 Million/uL      Hemoglobin 9 6 g/dL      Hematocrit 32 7 %      MCV 77 fL      MCH 22 5 pg      MCHC 29 4 g/dL      RDW 18 6 %      MPV 9 0 fL      Platelets 725 Thousands/uL      nRBC 0 /100 WBCs      Neutrophils Relative 70 %      Immat GRANS % 1 %      Lymphocytes Relative 22 %      Monocytes Relative 5 %      Eosinophils Relative 1 %      Basophils Relative 1 %      Neutrophils Absolute 3 10 Thousands/µL      Immature Grans Absolute 0 02 Thousand/uL      Lymphocytes Absolute 0 96 Thousands/µL      Monocytes Absolute 0 23 Thousand/µL      Eosinophils Absolute 0 06 Thousand/µL      Basophils Absolute 0 02 Thousands/µL     Fingerstick Glucose (POCT) [[id number]]  (Normal) Collected: 06/28/22 1551    Lab Status: Final result Updated: 06/28/22 1552     POC Glucose 94 mg/dl                  CT chest abdomen pelvis wo contrast   Final Result by Marielena Munoz MD (06/28 1729)      1  Groundglass opacities throughout the right lung could represent mild aspiration pneumonitis  2   Subacute left anterolateral 4th through 7th rib fractures  3   Large amount of stool in the right colon may indicate constipation  No evidence of bowel obstruction, colitis or diverticulitis                    Workstation performed: [id number]                    Procedures  ECG 12 Lead Documentation Only    Date/Time: 6/28/2022 6:31 PM  Performed by: Anita Echavarria PA-C  Authorized by: Anita Echavarria PA-C     Indications / Diagnosis:  N/V  ECG reviewed by me, the ED Provider: yes    Patient location:  ED  Previous ECG:     Previous ECG:  Compared to current    Comparison ECG info:  3/23/22    Comparison to cardiac monitor: Yes    Interpretation:     Interpretation: non-specific    Rate:     ECG rate:  62    ECG rate assessment: normal    Rhythm:     Rhythm: sinus rhythm    Ectopy:     Ectopy: none    QRS:     QRS axis:  Normal    QRS intervals:  Normal  Conduction:     Conduction: normal    ST segments:     ST segments:  Normal  T waves:     T waves: inverted      Inverted:  AVR and V2  Comments:      No STEMI  QT/QTc 408/414             ED Course  ED Course as of 06/29/22 0813   Tue Jun 28, 2022   1707 Hemoglobin(!): 9 6  Stable             HEART Risk Score    Flowsheet Row Most Recent Value   Heart Score Risk Calculator    History 0 Filed at: 06/29/2022 0759   ECG 0 Filed at: 06/29/2022 0759   Age 2 Filed at: 06/29/2022 0759   Risk Factors 2 Filed at: 06/29/2022 0759   Troponin 0 Filed at: 06/29/2022 0759   HEART Score 4 Filed at: 06/29/2022 0759                                      MDM  Number of Diagnoses or Management Options  Anemia  Closed fracture of multiple ribs of left side, initial encounter  Ground glass opacity present on imaging of lung  Nausea and vomiting  Diagnosis management comments: Patient is a 36-year-old female with a past medical history of cirrhosis, diabetes mellitus, gastroparesis, GERD and hypertension, presenting to the ED for evaluation of nausea and vomiting  Labs notable for anemia which is unchanged from patient's baseline but are otherwise non-contributing  CT shows a large amount of stool in the right colon which may indicate constipation  Patient has a history of gastroparesis and is having bowel movements  There is no evidence of bowel obstruction  Patient's nausea fully resolved after zofran and she was able to tolerate PO with no subsequent vomiting  She is feeling significantly improved and declines admission; she would like to go home with a prescription for Zofran  Patient advised not to combine this with Zofran due to risk of QT prolongation     CT was also notable for subacute left anterolateral 4th through 7th rib fractures consistent with area of tenderness on exam  She is not hypoxic and declines any pain medication, lidocaine patches, etc  She was given an incentive spirometer to use at home and instructed to use this as often as possible  Additional finding of groundglass opacities throughout the right lung was noted  Patient is afebrile with no cough or SOB  Patient's urine was notable for innumerable bacteria; however, she has no urinary complaints at this time  Will hold off on antibiotics and follow-up on urine culture  Patient was advised to follow-up with her PCP as soon as possible or return to the ED for new/worsening symptoms  The management plan was discussed in detail with the patient at bedside and all questions were answered  Strict ED return instructions were discussed at bedside  Prior to discharge, both verbal and written instructions were provided  We discussed the signs and symptoms that should prompt the patient to return to the ED  All questions were answered and the patient was comfortable with the plan of care and discharged home  The patient agrees to return to the Emergency Department for concerns and/or progression of illness         Amount and/or Complexity of Data Reviewed  Clinical lab tests: ordered and reviewed  Tests in the radiology section of CPT®: ordered and reviewed    Patient Progress  Patient progress: stable      Disposition  Final diagnoses:   Nausea and vomiting   Closed fracture of multiple ribs of left side, initial encounter   Ground glass opacity present on imaging of lung   Anemia     Time reflects when diagnosis was documented in both MDM as applicable and the Disposition within this note     Time User Action Codes Description Comment    6/28/2022  6:46 PM Rao Santillan Add [R11 2] Nausea and vomiting     6/28/2022  6:47 PM Levi Benitez Add [S22 42XA] Closed fracture of multiple ribs of left side, initial encounter     6/28/2022  6:48 PM Krista Benitez Add [R91 8] Ground glass opacity present on imaging of lung     6/28/2022  6:56 PM Rao Santillan Add [D64 9] Anemia       ED Disposition     ED Disposition   Discharge    Condition Stable    Date/Time   Tue Jun 28, 2022  6:57 PM    Comment   Wing Montanez discharge to home/self care  Follow-up Information     Follow up With Specialties Details Why Contact Info Additional Information    Corbin Watts  Schedule an appointment as soon as possible for a visit   [address]  [address]  [place] [facility] Emergency Department Emergency Medicine  If symptoms worsen [address] [facility] [address] [facility], [address], [place], [place], [address]          Discharge Medication List as of 6/28/2022  6:57 PM      START taking these medications    Details   !! lidocaine (Lidoderm) 5 % Apply 1 patch topically daily Remove & Discard patch within 12 hours or as directed by MD, Starting Tue 6/28/2022, Normal      ondansetron (Zofran ODT) 4 mg disintegrating tablet Take 1 tablet (4 mg total) by mouth every 6 (six) hours as needed for nausea or vomiting, Starting Tue 6/28/2022, Normal       !! - Potential duplicate medications found  Please discuss with provider        CONTINUE these medications which have NOT CHANGED    Details   amLODIPine (NORVASC) 10 mg tablet Take 1 tablet (10 mg total) by mouth daily, Starting Sat 8/25/2018, Print      Diclofenac Sodium (VOLTAREN) 1 % Apply 2 g topically 4 (four) times a day, Starting Wed 3/23/2022, Normal      DULoxetine (CYMBALTA) 20 mg capsule Take 1 capsule (20 mg total) by mouth daily for 7 days, Starting Tue 3/29/2022, Until Tue 4/5/2022, Normal      DULoxetine (CYMBALTA) 30 mg delayed release capsule Take 1 capsule (30 mg total) by mouth daily for 5 days, Starting Wed 3/23/2022, Until Mon 3/28/2022, Normal      Insulin Aspart FlexPen 100 UNIT/ML SOPN Inject 3 Units under the skin 3 (three) times a day with meals If bs>250, Historical Med      insulin glargine (LANTUS SOLOSTAR) 100 units/mL injection pen Inject 20 Units under the skin daily at bedtime  , Historical Med      !! lidocaine (LIDODERM) 5 % Apply 1 patch topically daily Remove & Discard patch within 12 hours or as directed by MD, Starting Wed 3/23/2022, Normal      lisinopril (ZESTRIL) 20 mg tablet Take 10 mg by mouth daily , Historical Med      metoclopramide (REGLAN) 10 mg tablet Take 10 mg by mouth daily, Historical Med      Mirabegron ER (Myrbetriq) 50 MG TB24 Take 50 mg by mouth in the morning, Historical Med      nystatin (MYCOSTATIN) powder Apply topically 3 (three) times a day, Starting Tue 3/15/2022, Normal      pravastatin (PRAVACHOL) 20 mg tablet Take 20 mg by mouth daily, Historical Med      psyllium (METAMUCIL) packet Take 1 packet by mouth 2 (two) times a day, Starting Sat 2/26/2022, No Print      saccharomyces boulardii (FLORASTOR) 250 mg capsule Take 1 capsule (250 mg total) by mouth 2 (two) times a day, Starting Sat 2/26/2022, No Print       !! - Potential duplicate medications found  Please discuss with provider  No discharge procedures on file      PDMP Review       Value Time User    PDMP Reviewed  Yes 3/23/2022  6:08 AM Opal Christiansen DO          ED Provider  Electronically Signed by           Maisha Mata PA-C  06/29/22 3089

## 2022-06-29 LAB
ATRIAL RATE: 62 BPM
P AXIS: 86 DEGREES
PR INTERVAL: 160 MS
QRS AXIS: 71 DEGREES
QRSD INTERVAL: 68 MS
QT INTERVAL: 408 MS
QTC INTERVAL: 414 MS
T WAVE AXIS: 54 DEGREES
VENTRICULAR RATE: 62 BPM

## 2022-06-29 PROCEDURE — 93010 ELECTROCARDIOGRAM REPORT: CPT | Performed by: INTERNAL MEDICINE

## 2022-08-11 NOTE — ED NOTES
Arrived to room 27 responding to verbal stimuli with slight hand tremors noted  Non verbal   Dr Hilda Berger called to room        Tanna Ortiz RN  08/18/18 [id number]
Pt incontinent of urine, pericare given for same prior to luther insertion        Augusto South RN  08/19/18 [id number]
Report received from Brigette Edwards     [facility]  08/18/18 9918
SLIM at bedside     [place], [facility]  08/19/18 2452
Pt will transfer independenly with RW or least restrictive AD in 2 weeks.

## 2022-10-11 PROBLEM — A41.9 SEPSIS (HCC): Status: RESOLVED | Noted: 2022-02-03 | Resolved: 2022-10-11

## 2022-10-12 PROBLEM — J18.9 PNEUMONIA: Status: RESOLVED | Noted: 2022-02-03 | Resolved: 2022-10-12

## 2022-10-31 ENCOUNTER — APPOINTMENT (OUTPATIENT)
Dept: RADIOLOGY | Facility: AMBULARY SURGERY CENTER | Age: 67
End: 2022-10-31
Attending: ORTHOPAEDIC SURGERY

## 2022-10-31 ENCOUNTER — OFFICE VISIT (OUTPATIENT)
Dept: OBGYN CLINIC | Facility: CLINIC | Age: 67
End: 2022-10-31

## 2022-10-31 VITALS
SYSTOLIC BLOOD PRESSURE: 176 MMHG | WEIGHT: 187 LBS | HEIGHT: 62 IN | BODY MASS INDEX: 34.41 KG/M2 | HEART RATE: 114 BPM | DIASTOLIC BLOOD PRESSURE: 90 MMHG

## 2022-10-31 DIAGNOSIS — M54.32 SCIATICA OF LEFT SIDE: ICD-10-CM

## 2022-10-31 DIAGNOSIS — M54.42 CHRONIC BILATERAL LOW BACK PAIN WITH LEFT-SIDED SCIATICA: ICD-10-CM

## 2022-10-31 DIAGNOSIS — M70.62 GREATER TROCHANTERIC BURSITIS OF LEFT HIP: Primary | ICD-10-CM

## 2022-10-31 DIAGNOSIS — M25.552 LEFT HIP PAIN: ICD-10-CM

## 2022-10-31 DIAGNOSIS — G89.29 CHRONIC BILATERAL LOW BACK PAIN WITH LEFT-SIDED SCIATICA: ICD-10-CM

## 2022-10-31 RX ORDER — BUPIVACAINE HYDROCHLORIDE 2.5 MG/ML
0.5 INJECTION, SOLUTION INFILTRATION; PERINEURAL
Status: COMPLETED | OUTPATIENT
Start: 2022-10-31 | End: 2022-10-31

## 2022-10-31 RX ORDER — BETAMETHASONE SODIUM PHOSPHATE AND BETAMETHASONE ACETATE 3; 3 MG/ML; MG/ML
6 INJECTION, SUSPENSION INTRA-ARTICULAR; INTRALESIONAL; INTRAMUSCULAR; SOFT TISSUE
Status: COMPLETED | OUTPATIENT
Start: 2022-10-31 | End: 2022-10-31

## 2022-10-31 RX ORDER — OXYCODONE HYDROCHLORIDE 30 MG/1
TABLET ORAL
COMMUNITY
Start: 2022-10-07

## 2022-10-31 RX ADMIN — BUPIVACAINE HYDROCHLORIDE 0.5 ML: 2.5 INJECTION, SOLUTION INFILTRATION; PERINEURAL at 15:54

## 2022-10-31 RX ADMIN — BETAMETHASONE SODIUM PHOSPHATE AND BETAMETHASONE ACETATE 6 MG: 3; 3 INJECTION, SUSPENSION INTRA-ARTICULAR; INTRALESIONAL; INTRAMUSCULAR; SOFT TISSUE at 15:54

## 2022-10-31 NOTE — PROGRESS NOTES
Assessment:  1  Greater trochanteric bursitis of left hip  XR hip/pelv 2-3 vws left if performed    Large joint arthrocentesis: L greater trochanteric bursa   2  Sciatica of left side  Ambulatory Referral to Pain Management   3  Chronic bilateral low back pain with left-sided sciatica  Ambulatory Referral to Pain Management     Patient Active Problem List   Diagnosis   • Essential hypertension   • Venous insufficiency (chronic) (peripheral)   • Chronic venous stasis dermatitis of both lower extremities   • Type 2 diabetes mellitus with hyperglycemia, with long-term current use of insulin (HCC)   • Chronic pain syndrome   • Peripheral neuropathy   • Hyperkalemia   • Ambulatory dysfunction   • Marijuana use   • Left hip pain   • Spondylosis of lumbar joint   • Other microscopic hematuria   • Syncope   • Cirrhosis (HCC)   • Splenomegaly   • CAD (coronary artery disease)   • Urge incontinence   • Ex-smoker   • Fecal occult blood test positive   • COVID-19   • Pressure injury of skin of buttock   • Acute respiratory failure with hypoxia and hypercapnia (HCC)   • Acute kidney injury superimposed on CKD (HCC)   • Left arm weakness   • Anemia   • History of recent fall   • Myoclonic jerking   • Acute respiratory acidosis (HCC)   • Opiate dependence (HCC)   • CKD (chronic kidney disease)   • Thrombocytopenia (HCC)   • Multiple wounds           Plan      Upon review of the left hip x-ray, a thorough history and my examination, Kimberly Emmanuel is presenting with signs and symptoms consistent with greater trochanteric bursitis  Diagnosis and treatment options discussed with the patient and her , they were given the opportunity to ask questions  A corticosteroid injection was offered, accepted, and administered  Procedure tolerated well, post injection protocol advised  A referral to spine and pain was also given             Subjective:     Patient ID:    Chief Marcella Lyons 78 y o  female      HPI    Patient is here today for initial evaluation of left hip  Patient has a history of left hip hemiarthroplasty 10+ years ago  She presents today using a rolling walker for ambulation due to pain and weakness  Today she indicates the pain is in the lateral hip, she indicates the great trochanter as the location of her pain and states it radiates down her leg  She denies any recent mechanism of injury or trauma  She describes her pain as severe and constant, she denies any groin pain  She also notes a history of sciatica and chronic back pain  She states she is prescribed oxycodone for her back but it does no help her knee         The following portions of the patient's history were reviewed and updated as appropriate: allergies, current medications, past family history, past social history, past surgical history and problem list         Social History     Socioeconomic History   • Marital status: /Civil Union     Spouse name: Karthik Sin   • Number of children: 5   • Years of education: 13   • Highest education level: Some college, no degree   Occupational History   • Not on file   Tobacco Use   • Smoking status: Former Smoker     Types: E-Cigarettes     Start date: 1/1/1976     Quit date: 4/17/2019     Years since quitting: 3 5   • Smokeless tobacco: Never Used   Vaping Use   • Vaping Use: Every day   • Substances: Nicotine, THC (not using anymore)   Substance and Sexual Activity   • Alcohol use: Not Currently     Alcohol/week: 0 0 standard drinks   • Drug use: Not Currently     Types: Marijuana     Comment: last used 1 year ago    • Sexual activity: Not Currently   Other Topics Concern   • Not on file   Social History Narrative   • Not on file     Social Determinants of Health     Financial Resource Strain: Not on file   Food Insecurity: No Food Insecurity   • Worried About Running Out of Food in the Last Year: Never true   • Ran Out of Food in the Last Year: Never true   Transportation Needs: No Transportation Needs   • Lack of Transportation (Medical): No   • Lack of Transportation (Non-Medical):  No   Physical Activity: Not on file   Stress: Not on file   Social Connections: Not on file   Intimate Partner Violence: Not on file   Housing Stability: Low Risk    • Unable to Pay for Housing in the Last Year: No   • Number of Places Lived in the Last Year: 1   • Unstable Housing in the Last Year: No     Past Medical History:   Diagnosis Date   • Abnormal head CT 7/14/2017   • Acute kidney injury ([facility], [place] [address] ) 8/19/2018   • Cellulitis 1/10/2017   • Chronic back pain    • Cirrhosis ([facility], [place] [address] )    • Closed fracture of multiple ribs of right side 7/14/2017   • Degenerative disc disease at L5-S1 level    • Diabetes mellitus (HCC)    • GERD (gastroesophageal reflux disease)    • History of methicillin resistant staphylococcus aureus (MRSA) 08/21/2018    negative nasal culture- isolation and hx of mrsa removed 8/20/2018   • Hyperkalemia 4/25/2018   • Hypertension    • Hypocalcemia 4/25/2018   • Hyponatremia 7/14/2017     Past Surgical History:   Procedure Laterality Date   • CHOLECYSTECTOMY     • JOINT REPLACEMENT     • OOPHORECTOMY     • DC INCISION,IMPLANT,NEUROELEC,SACRAL NERVE N/A 9/20/2021    Procedure: INSERTION NEUROSTIMULATOR ELECTRODE ARRAY SACRAL NERVE; FLUOROSCOPY; ELECTRONIC ANALYSIS;  Surgeon: Colten Rodriguez MD;  Location: [facility];  Service: UroGynecology          • SACRAL NERVE STIMULATOR PLACEMENT N/A 9/28/2021    Procedure: IPG INSERTION AND PROGRAMMING;  Surgeon: Colten Rodriguez MD;  Location: [facility];  Service: UroGynecology            Allergies   Allergen Reactions   • Pollen Extract Allergic Rhinitis     Current Outpatient Medications on File Prior to Visit   Medication Sig Dispense Refill   • amLODIPine (NORVASC) 10 mg tablet Take 1 tablet (10 mg total) by mouth daily 30 tablet 0   • Diclofenac Sodium (VOLTAREN) 1 % Apply 2 g topically 4 (four) times a day 100 g 0   • DULoxetine (CYMBALTA) 20 mg capsule Take 1 capsule (20 mg total) by mouth daily for 7 days 7 capsule 0   • DULoxetine (CYMBALTA) 30 mg delayed release capsule Take 1 capsule (30 mg total) by mouth daily for 5 days 5 capsule 0   • Insulin Aspart FlexPen 100 UNIT/ML SOPN Inject 3 Units under the skin 3 (three) times a day with meals If bs>250     • insulin glargine (LANTUS SOLOSTAR) 100 units/mL injection pen Inject 20 Units under the skin daily at bedtime       • lidocaine (LIDODERM) 5 % Apply 1 patch topically daily Remove & Discard patch within 12 hours or as directed by MD 30 patch 0   • lidocaine (Lidoderm) 5 % Apply 1 patch topically daily Remove & Discard patch within 12 hours or as directed by MD 15 patch 0   • lisinopril (ZESTRIL) 20 mg tablet Take 10 mg by mouth daily      • metoclopramide (REGLAN) 10 mg tablet Take 10 mg by mouth daily     • Mirabegron ER (Myrbetriq) 50 MG TB24 Take 50 mg by mouth in the morning     • nystatin (MYCOSTATIN) powder Apply topically 3 (three) times a day 15 g 0   • ondansetron (Zofran ODT) 4 mg disintegrating tablet Take 1 tablet (4 mg total) by mouth every 6 (six) hours as needed for nausea or vomiting 20 tablet 0   • oxyCODONE (ROXICODONE) 30 MG immediate release tablet TAKE 1 TABLET BY MOUTH EVERY 4 HOURS FOR CHRONIC INTRACTABLE PAIN     • pravastatin (PRAVACHOL) 20 mg tablet Take 20 mg by mouth daily     • psyllium (METAMUCIL) packet Take 1 packet by mouth 2 (two) times a day  0   • saccharomyces boulardii (FLORASTOR) 250 mg capsule Take 1 capsule (250 mg total) by mouth 2 (two) times a day  0     Current Facility-Administered Medications on File Prior to Visit   Medication Dose Route Frequency Provider Last Rate Last Admin   • ferrous sulfate tablet 325 mg  325 mg Oral Daily With Breakfast Rachel Michel MD                  Objective:    Review of Systems   Constitutional: Negative for chills and fever  HENT: Negative for ear pain and sore throat  Eyes: Negative for pain and visual disturbance     Respiratory: Negative for cough and shortness of breath  Cardiovascular: Negative for chest pain and palpitations  Gastrointestinal: Negative for abdominal pain and vomiting  Genitourinary: Negative for dysuria and hematuria  Musculoskeletal: Negative for arthralgias and back pain  Skin: Negative for color change and rash  Neurological: Negative for seizures and syncope  All other systems reviewed and are negative  Right Knee Exam     Other   Sensation: normal  Effusion: effusion present    Comments:  Chronic lymphedema in lower extremity       Left Knee Exam     Other   Sensation: normal  Effusion: effusion present    Comments:  Chronic lymphedema in lower extremity       Left Hip Exam     Tenderness   The patient is experiencing tenderness in the greater trochanter (sciatic notch, L4)  Physical Exam  Vitals and nursing note reviewed  HENT:      Head: Normocephalic  Eyes:      Extraocular Movements: Extraocular movements intact  Cardiovascular:      Rate and Rhythm: Normal rate  Pulmonary:      Effort: Pulmonary effort is normal    Musculoskeletal:         General: Normal range of motion  Cervical back: Normal range of motion  Right knee: Effusion present  Left knee: Effusion present  Skin:     General: Skin is warm and dry  Neurological:      General: No focal deficit present  Mental Status: She is alert  Psychiatric:         Behavior: Behavior normal          Large joint arthrocentesis: L greater trochanteric bursa  Universal Protocol:  Consent: Verbal consent obtained    Risks and benefits: risks, benefits and alternatives were discussed  Consent given by: patient  Timeout called at: 10/31/2022 3:53 PM   Patient understanding: patient states understanding of the procedure being performed  Patient identity confirmed: verbally with patient    Supporting Documentation  Indications: pain and diagnostic evaluation   Procedure Details  Location: hip - L greater trochanteric bursa  Needle size: 22 G  Ultrasound guidance: no  Medications administered: 0 5 mL bupivacaine 0 25 %; 6 mg betamethasone acetate-betamethasone sodium phosphate 6 (3-3) mg/mL    Patient tolerance: patient tolerated the procedure well with no immediate complications  Dressing:  Sterile dressing applied             I have personally reviewed pertinent films in PACS and my interpretation is x-rays of the left hip demonstrates inplace sarita arthroplasty hardware no signs of loosening or failure  Scribe Attestation    I,:  Gino Mart am acting as a scribe while in the presence of the attending physician :       I,:  Leroy Weir DO personally performed the services described in this documentation    as scribed in my presence :           Portions of the record may have been created with voice recognition software   Occasional wrong word or "sound a like" substitutions may have occurred due to the inherent limitations of voice recognition software   Read the chart carefully and recognize, using context, where substitutions have occurred

## 2022-11-02 ENCOUNTER — TELEPHONE (OUTPATIENT)
Dept: OBGYN CLINIC | Facility: HOSPITAL | Age: 67
End: 2022-11-02

## 2022-11-02 NOTE — TELEPHONE ENCOUNTER
Tried to reach out to patient to let her know that [facility] not have anything that they can offer her procedure wise and do not manage chronic pain medications  Will suggest [facility] referral and can fax to them for her to contact for an appt  Mailbox is currently full so I could not leave a voicemail  Will try later

## 2023-04-05 NOTE — CONSULTS
Consult Note - Wound   Feng Worthy 73 y o  female MRN: [id number]  Unit/Bed#: S -01 Encounter: [id number]      Assessment:   Wound care nurse consult for multiple wounds  Daughter Yadira Elaine was in the room when I arrived  Patient moving to bed from bedside commode when I entered room  Bilateral buttocks/ischial areas with chronic skin discolortion  Images taken  "My doctor gave me zinc  I wish I had some more of that " POA  Left ischial deep dermal layer wound  Measures approximately 0 4 x 1 x 0 2 cm  Red tissue present  No slough visualized  POA  "I have had this a long time "     In gluteal cleft is a longitudinal partial thickness area of skin erosion  This is due to moisture and friction- not pressure  POA  1 3 x 0 1 x 0 1 cm  Pink tissue exposed  Periwound with blanchable redness  Right lower leg, anterior aspect, with superficial, dermal layer wound  "Oh, I have had this a long time too  I hit something " Measures approximately 3 x 1 8 x 0 1 cm  Dessicated red tissue wound bed  Periwound with dry skin  No erythema  No drainage  Irregular, flat wound edges  Both lower legs with dry, scaly skin  Both arms with bruising, scabs  When I explained to patient to put zinc on the left upper thigh (ischial) wound and in the butt crack and to put lotion on lower legs,  patient said the problem was that she had no one to help her do this at home  Patient states is most comfortable in the bed because "it is warmer there"  I placed a large pressure redistribution cushion in the chair and instructed her to take it home with her  She was excited about that  Purplish/maroonish circumferential, oval shaped bruising on jaw  Patient said occurred when she fell  "I hit the table "    Treatment performed:  1  Zinc oxide 20% applied to left ischial wound, to buttocks skin discoloration, and to gluteal cleft wound  2  I applied Hydraguard to intact skin on bilateral lower legs and feet  3  For right lower leg, anterior aspect wound, I cleansed with normal saline  Applied small sized Dermagran after the square was unfolded  Covered with dry gauze and secured with gauze roll and tape  I explained to patient the treatments performed  I left Hydraguard and zinc on the bedside table  Patient said someone will have to explain this to her   "He likes to know everything, " patient states  By this time, the daughter Areli Bartholomew had left  Wound/Skin Care Plan:   1  For left ischium and gluteal cleft wounds: Cleanse gently and apply zinc oxide to wounds three times a day and as needed  (Wound care nurse provides zinc ) If zinc unavailable, apply Calazime  Unable to wear a bordered foam dressing due to incontinence  Also apply zinc to bilateral buttocks/sacrum  2  For right lower leg, anterior aspect wound: Cleanse with normal saline  Apply small sized Dermagran  Unfold the square and place on wound bed  Cover with dry gauze and secure with gauze roll and tape  Change every other day and as needed  3  Apply Hydraguard to left lower leg once a day  Apply to right lower leg at time of every other day dressing changes  4  Pressure redistribution cushion in chair ( Done)  5  Encourage patient to turn and reposition  6  Patient declined to heave her heels offloaded because "that will make it really hard for me to get out of bed to the bedside commode "  7  Wound care nurse follow-up  Discussed with patient; Laura Marino RN  Discussed with RAUL Marshall patient's comments that she has no one to help her with care  Sergio Patel will attempt to provide aide services if patient/family agreeable  Left ischial wound, POA  Gluteal cleft MASD        Right lower leg wound      Left lower leg dry, scaly intact skin  Chin bruising "From a fall", patient states  Partial Purse String (Intermediate) Text: Given the location of the defect and the characteristics of the surrounding skin an intermediate purse string closure was deemed most appropriate.  Undermining was performed circumfirentially around the surgical defect.  A purse string suture was then placed and tightened. Wound tension only allowed a partial closure of the circular defect.

## 2024-03-06 NOTE — ASSESSMENT & PLAN NOTE
[facility] - Emergency Dept.  Cardiology  Consult Note    Patient Name: Tc Bustos  MRN: [id number]  Admission Date: 3/5/2024  Hospital Length of Stay: 0 days  Code Status: Full Code   Attending Provider: Argenis Hemphill MD   Consulting Provider: Suzan Coburn Gael Whitmore MD        Subjective:     Chief POA with creatinine of 1 78; upon dc from [facility] few days ago was 1 02  · Worsened on 3/8 to 2  14   Got small amt of IVF and creatinine improved   · Avoid nephrotoxins  · Appreciate nephrology input Suzan Coburn PA-C  Cardiology   [facility] - Emergency Dept.

## 2025-01-02 NOTE — PROGRESS NOTES
[facility]  Progress Note - Chaz Gill 1955, 73 y o  female MRN: [id number]  Unit/Bed#: S -01 Encounter: [id number]  Primary Care Provider: Max Sarmiento MD   Date and time admitted to hospital: 2/3/2022  4:25 PM    * Acute respiratory failure with hypoxia and hypercapnia (HCC)  Assessment & Plan  · O2 saturation in mid 80's on NRB on presentation from EMS  · Recently discharged from hospital from COVID pneumonia (Tested + 1/3/22)   · Initiated on BiPAP 12/6 and saturations corrected to 95-97%   · Initial ABG showed pH of 7 338 with pCO2 of 52 2   · CXR bilateral basilar pneumonia  PCT elevated  Most likely due to Pneumonia  Clinical improvement after treatment for pneumonia  No pleuritic chest pain etc to suggest possible PE  Venous doppler attempted by vascular tech  Pt couldn't even tolerate the transducer touching her legs  Pneumonia  Assessment & Plan  Symptoms: shortness of breath at rest, anorexia and confusion    Lab Results   Component Value Date    SARSCOV2 Positive (A) 02/03/2022    SARSCOV2 Positive (A) 01/03/2022   CXR: Left lung base, possibly milder right lung base airspace disease  Possible left effusion findings are most suspicious for infection  Recent Labs     02/05/22  0524 02/06/22  0628   PROCALCITONI 2 37* 1 38*        Plan:  · Consider bacterial pneumonia given elevated PCT  · Continue Ceftriaxone 1 g IV q  24 hours/Doxycycline IV   · Guaifenesin 600 mg q 12 hours        Sepsis ([address] )  Assessment & Plan  POA as evidenced by tachycardia, leukocytosis (elevated form patient baseline), tachypnea  Suspected source: Pneumonia   Clx: Blood and sputum NTD   PCD trending down    Abx: ceftriaxone and doxycycline IV       Left arm weakness  Assessment & Plan  Pt's  reported her left arm weakness and numbness since her fall two weeks ago  She was found facing down on the floor  and was awake at the time   Family put her back to bed and didn't seek medical attention at the time  Ct head 2/3 negative  CT C spine negative for acute  LUE remains weak with sensory deficit  Will appreciate neurology eval  May need MRI c spine or brachial plexus? History of recent fall  Assessment & Plan  Again fall 2 weeks ago  Pt now c/o right sided rib pain since her fall  Will check rib series  PT/OT following   Fall precaution    Metabolic encephalopathy  Assessment & Plan  · POA In setting of acute hypercapnic hypoxic respiratory failure secondary to pneumonia meeting sepsis criteria vs hepatic encephalopathy vs opioid side effect   · Patient and her  report that she has been taking 30 mg oxycodone q6 hours at home  · Ammonia measured at 39 POA , level now down to normal  · Now Awake and alert/baseline          Cirrhosis (HCC)  Assessment & Plan  · Documented history   · Ammonia level 36 down from 50 on prior admission one month ago  · She has not been taking Lactulose due to profuse diarrhea whenever she takes lactulose  Continue Xifaximin   · Ammonia level nl  Monitor       Chronic pain syndrome  Assessment & Plan  Takes Oxycodone 30 mg 6 times a day for chronic back pain  Oxycodone held initially due to concern for mental status  Resumed at lower dose   Monitor for tolerance  Pain appears controlled  VTE Pharmacologic Prophylaxis:   Pharmacologic: Heparin  Mechanical VTE Prophylaxis in Place: Yes    Patient Centered Rounds: I have performed bedside rounds with nursing staff today  Discussions with Specialists or Other Care Team Provider:     Education and Discussions with Family / Patient: Spoke with Pt's son     Time Spent for Care: 20 minutes  More than 50% of total time spent on counseling and coordination of care as described above      Current Length of Stay: 4 day(s)    Current Patient Status: Inpatient   Certification Statement: The patient will continue to require additional inpatient hospital stay due to Waiting for MRI Discharge Plan: Pending workup results     Code Status: Level 1 - Full Code      Subjective:   Pt c/o right sided rib pain since her recent fall at home two weeks ago  Her left arm has been numb and weak since then  Objective:     Vitals:   Temp (24hrs), Av 8 °F (36 6 °C), Min:97 5 °F (36 4 °C), Max:98 3 °F (36 8 °C)    Temp:  [97 5 °F (36 4 °C)-98 3 °F (36 8 °C)] 97 8 °F (36 6 °C)  HR:  [100-116] 105  Resp:  [18] 18  BP: (145-173)/(72-96) 167/79  SpO2:  [90 %-93 %] 90 %  Body mass index is 33 47 kg/m²  Input and Output Summary (last 24 hours): Intake/Output Summary (Last 24 hours) at 2022 1710  Last data filed at 2022 1300  Gross per 24 hour   Intake 470 ml   Output 550 ml   Net -80 ml       Physical Exam:     Physical Exam  Constitutional:       General: She is not in acute distress  Appearance: She is not ill-appearing, toxic-appearing or diaphoretic  Eyes:      General:         Right eye: No discharge  Left eye: No discharge  Cardiovascular:      Rate and Rhythm: Normal rate and regular rhythm  Pulses: Normal pulses  Heart sounds: No murmur heard  Pulmonary:      Effort: Pulmonary effort is normal  No respiratory distress  Breath sounds: Normal breath sounds  No wheezing  Abdominal:      General: Abdomen is flat  Bowel sounds are normal  There is no distension  Tenderness: There is no abdominal tenderness  Musculoskeletal:         General: Swelling present  Skin:     General: Skin is warm  Neurological:      Mental Status: She is oriented to person, place, and time  Comments: LUE weakness distal>proximal  Weak hand       Psychiatric:         Mood and Affect: Mood normal          Behavior: Behavior normal          Additional Data:     Labs:    Results from last 7 days   Lab Units 22  0628   WBC Thousand/uL 5 21   HEMOGLOBIN g/dL 9 7*   HEMATOCRIT % 31 8*   PLATELETS Thousands/uL 147*   NEUTROS PCT % 72   LYMPHS PCT % 20 MONOS PCT % 5   EOS PCT % 2     Results from last 7 days   Lab Units 02/06/22  0628 02/04/22  0507 02/04/22  0032   POTASSIUM mmol/L 4 6   < >  --    CHLORIDE mmol/L 103   < >  --    CO2 mmol/L 28   < >  --    CO2, I-STAT mmol/L  --   --  28   BUN mg/dL 13   < >  --    CREATININE mg/dL 1 02   < >  --    CALCIUM mg/dL 8 3   < >  --    ALK PHOS U/L 131*   < >  --    ALT U/L 20   < >  --    AST U/L 25   < >  --    GLUCOSE, ISTAT mg/dl  --   --  89    < > = values in this interval not displayed  Results from last 7 days   Lab Units 02/03/22  1701   INR  1 14         Recent Cultures (last 7 days):     Results from last 7 days   Lab Units 02/04/22  0423 02/03/22  1701   BLOOD CULTURE   --  No Growth at 72 hrs  No Growth at 72 hrs     LEGIONELLA URINARY ANTIGEN  Negative  --        Last 24 Hours Medication List:   Current Facility-Administered Medications   Medication Dose Route Frequency Provider Last Rate    acetaminophen  975 mg Oral Q6H PRN TIFFANIE JASSO      albuterol  2 puff Inhalation Q4H PRN Pillo Campos DO      [START ON 2/8/2022] amLODIPine  10 mg Oral Daily Saira Kelly MD      cefTRIAXone  2,000 mg Intravenous Q24H [facility] 2,000 mg (02/06/22 1704)    DULoxetine  30 mg Oral Daily Saira Kelly MD      gabapentin  100 mg Oral BID Saira Kelly MD      guaiFENesin  600 mg Oral BID [facility]      heparin (porcine)  5,000 Units Subcutaneous [facility] [address], DO      insulin lispro  1-6 Units Subcutaneous HS Yuliya Oquendo PA-C      insulin lispro  2-12 Units Subcutaneous TID AC Yuliya Oquendo PA-C      Labetalol HCl  10 mg Intravenous Q6H PRN Saira Kelly MD      ondansetron  4 mg Intravenous Q4H PRN Gauri Campos DO      oxyCODONE  15 mg Oral Q4H PRN Saira Kelly MD      pantoprazole  40 mg Oral Early Morning Saira Kelly MD      psyllium  1 packet Oral Daily Saira Kelly MD      saccharomyces boulardii  250 mg Oral BID Saira Kelly MD      sodium chloride (PF)  3 mL Intravenous Q1H PRN Gauri Campos,        Facility-Administered Medications Ordered in Other Encounters   Medication Dose Route Frequency Provider Last Rate    ferrous sulfate  325 mg Oral Daily With Breakfast Cassy Vásquez MD          Today, Patient Was Seen By: Saira Kelly MD    ** Please Note: Dictation voice to text software may have been used in the creation of this document   ** actual/standing anxiety

## (undated) DEVICE — INTENDED FOR TISSUE SEPARATION, AND OTHER PROCEDURES THAT REQUIRE A SHARP SURGICAL BLADE TO PUNCTURE OR CUT.: Brand: BARD-PARKER SAFETY BLADES SIZE 11, STERILE

## (undated) DEVICE — REMOTE CONTROL: Brand: AXONICS

## (undated) DEVICE — SCD SEQUENTIAL COMPRESSION COMFORT SLEEVE MEDIUM KNEE LENGTH: Brand: KENDALL SCD

## (undated) DEVICE — 2000CC GUARDIAN II: Brand: GUARDIAN

## (undated) DEVICE — TRIAL STIMULATOR: Brand: AXONICS

## (undated) DEVICE — MEDI-VAC YANKAUER SUCTION HANDLE W/BULBOUS AND CONTROL VENT: Brand: CARDINAL HEALTH

## (undated) DEVICE — DRAPE C-ARM X-RAY

## (undated) DEVICE — GLOVE PI ULTRA TOUCH SZ.8.0

## (undated) DEVICE — SUT MONOCRYL 3-0 PS-2 27 IN Y427H

## (undated) DEVICE — TUBING SUCTION 5MM X 12 FT

## (undated) DEVICE — PENCIL ELECTROSURG E-Z CLEAN -0035H

## (undated) DEVICE — CHEST/BREAST DRAPE: Brand: CONVERTORS

## (undated) DEVICE — ADHESIVE SKIN HIGH VISCOSITY EXOFIN 1ML

## (undated) DEVICE — DRAPE SHEET THREE QUARTER

## (undated) DEVICE — PROVE COVER: Brand: UNBRANDED

## (undated) DEVICE — LEAD IMPLANT KIT: Brand: AXONICS

## (undated) DEVICE — SYRINGE 10ML LL

## (undated) DEVICE — BETHLEHEM UNIVERSAL MINOR GEN: Brand: CARDINAL HEALTH

## (undated) DEVICE — DRAPE EQUIPMENT RF WAND

## (undated) DEVICE — INTENDED FOR TISSUE SEPARATION, AND OTHER PROCEDURES THAT REQUIRE A SHARP SURGICAL BLADE TO PUNCTURE OR CUT.: Brand: BARD-PARKER SAFETY BLADES SIZE 15, STERILE

## (undated) DEVICE — 3M™ TEGADERM™ TRANSPARENT FILM DRESSING FRAME STYLE, 1627, 4 IN X 10 IN (10 CM X 25 CM), 20/CT 4CT/CASE: Brand: 3M™ TEGADERM™

## (undated) DEVICE — NEEDLE 25G X 1 1/2

## (undated) DEVICE — SUT SILK 2-0 SH 30 IN K833H

## (undated) DEVICE — HYDROPHILIC WOUND DRESSING WITH ZINC PLUS VITAMINS A AND B6.: Brand: DERMAGRAN®-B

## (undated) DEVICE — CHARGING SYSTEM: Brand: AXONICS